# Patient Record
Sex: FEMALE | Race: BLACK OR AFRICAN AMERICAN | NOT HISPANIC OR LATINO | Employment: UNEMPLOYED | URBAN - METROPOLITAN AREA
[De-identification: names, ages, dates, MRNs, and addresses within clinical notes are randomized per-mention and may not be internally consistent; named-entity substitution may affect disease eponyms.]

---

## 2018-04-18 DIAGNOSIS — B17.10 ACUTE HEPATITIS C: Primary | ICD-10-CM

## 2018-05-08 ENCOUNTER — HOSPITAL ENCOUNTER (OUTPATIENT)
Dept: RADIOLOGY | Facility: OTHER | Age: 58
Discharge: HOME | End: 2018-05-08
Attending: INTERNAL MEDICINE
Payer: MEDICAID

## 2018-05-08 DIAGNOSIS — B17.10 ACUTE HEPATITIS C: ICD-10-CM

## 2018-05-08 PROCEDURE — 76700 US EXAM ABDOM COMPLETE: CPT | Mod: TC,59

## 2018-05-08 PROCEDURE — 91200 LIVER ELASTOGRAPHY: CPT | Mod: TC

## 2018-05-08 PROCEDURE — 76700 US EXAM ABDOM COMPLETE: CPT | Mod: 26,59,, | Performed by: RADIOLOGY

## 2018-05-08 PROCEDURE — 91200 LIVER ELASTOGRAPHY: CPT | Mod: 26,59,, | Performed by: RADIOLOGY

## 2020-05-01 ENCOUNTER — HOSPITAL ENCOUNTER (OUTPATIENT)
Facility: OTHER | Age: 60
Discharge: HOME | End: 2020-05-04
Attending: EMERGENCY MEDICINE | Admitting: EMERGENCY MEDICINE
Payer: MEDICAID

## 2020-05-01 ENCOUNTER — HOSPITAL ENCOUNTER (OUTPATIENT)
Dept: RADIATION THERAPY | Facility: HOSPITAL | Age: 60
Discharge: HOME | End: 2020-05-01
Attending: RADIOLOGY
Payer: MEDICAID

## 2020-05-01 DIAGNOSIS — J45.909 ASTHMA, UNSPECIFIED ASTHMA SEVERITY, UNSPECIFIED WHETHER COMPLICATED, UNSPECIFIED WHETHER PERSISTENT: ICD-10-CM

## 2020-05-01 DIAGNOSIS — R91.8 MASS OF UPPER LOBE OF RIGHT LUNG: ICD-10-CM

## 2020-05-01 DIAGNOSIS — Z72.0 TOBACCO ABUSE: ICD-10-CM

## 2020-05-01 DIAGNOSIS — R06.02 SHORTNESS OF BREATH: ICD-10-CM

## 2020-05-01 DIAGNOSIS — R91.8 LUNG MASS: Primary | ICD-10-CM

## 2020-05-01 PROBLEM — I10 ESSENTIAL HYPERTENSION: Status: ACTIVE | Noted: 2020-05-01

## 2020-05-01 LAB
ALBUMIN SERPL BCP-MCNC: 2.9 G/DL (ref 3.5–5.2)
ALP SERPL-CCNC: 81 U/L (ref 55–135)
ALT SERPL W/O P-5'-P-CCNC: 11 U/L (ref 10–44)
ANION GAP SERPL CALC-SCNC: 11 MMOL/L (ref 8–16)
AST SERPL-CCNC: 13 U/L (ref 10–40)
BASOPHILS # BLD AUTO: 0.12 K/UL (ref 0–0.2)
BASOPHILS NFR BLD: 0.9 % (ref 0–1.9)
BILIRUB SERPL-MCNC: 0.3 MG/DL (ref 0.1–1)
BNP SERPL-MCNC: 42 PG/ML (ref 0–99)
BUN SERPL-MCNC: 9 MG/DL (ref 6–20)
CALCIUM SERPL-MCNC: 9.8 MG/DL (ref 8.7–10.5)
CHLORIDE SERPL-SCNC: 104 MMOL/L (ref 95–110)
CK SERPL-CCNC: 76 U/L (ref 20–180)
CO2 SERPL-SCNC: 25 MMOL/L (ref 23–29)
CREAT SERPL-MCNC: 0.7 MG/DL (ref 0.5–1.4)
CRP SERPL-MCNC: 37.2 MG/L (ref 0–8.2)
DIFFERENTIAL METHOD: ABNORMAL
EOSINOPHIL # BLD AUTO: 0.7 K/UL (ref 0–0.5)
EOSINOPHIL NFR BLD: 5.5 % (ref 0–8)
ERYTHROCYTE [DISTWIDTH] IN BLOOD BY AUTOMATED COUNT: 14.1 % (ref 11.5–14.5)
EST. GFR  (AFRICAN AMERICAN): >60 ML/MIN/1.73 M^2
EST. GFR  (NON AFRICAN AMERICAN): >60 ML/MIN/1.73 M^2
GLUCOSE SERPL-MCNC: 115 MG/DL (ref 70–110)
HCT VFR BLD AUTO: 37.8 % (ref 37–48.5)
HGB BLD-MCNC: 11.5 G/DL (ref 12–16)
IMM GRANULOCYTES # BLD AUTO: 0.09 K/UL (ref 0–0.04)
IMM GRANULOCYTES NFR BLD AUTO: 0.7 % (ref 0–0.5)
LACTATE SERPL-SCNC: 2.9 MMOL/L (ref 0.5–2.2)
LDH SERPL L TO P-CCNC: 372 U/L (ref 110–260)
LYMPHOCYTES # BLD AUTO: 2.5 K/UL (ref 1–4.8)
LYMPHOCYTES NFR BLD: 19.3 % (ref 18–48)
MCH RBC QN AUTO: 26.4 PG (ref 27–31)
MCHC RBC AUTO-ENTMCNC: 30.4 G/DL (ref 32–36)
MCV RBC AUTO: 87 FL (ref 82–98)
MONOCYTES # BLD AUTO: 0.8 K/UL (ref 0.3–1)
MONOCYTES NFR BLD: 6.6 % (ref 4–15)
NEUTROPHILS # BLD AUTO: 8.5 K/UL (ref 1.8–7.7)
NEUTROPHILS NFR BLD: 67 % (ref 38–73)
NRBC BLD-RTO: 0 /100 WBC
PLATELET # BLD AUTO: 351 K/UL (ref 150–350)
PMV BLD AUTO: 9.3 FL (ref 9.2–12.9)
POTASSIUM SERPL-SCNC: 3.7 MMOL/L (ref 3.5–5.1)
PROCALCITONIN SERPL IA-MCNC: 0.02 NG/ML
PROT SERPL-MCNC: 7.7 G/DL (ref 6–8.4)
RBC # BLD AUTO: 4.35 M/UL (ref 4–5.4)
SARS-COV-2 RDRP RESP QL NAA+PROBE: NEGATIVE
SODIUM SERPL-SCNC: 140 MMOL/L (ref 136–145)
TROPONIN I SERPL DL<=0.01 NG/ML-MCNC: <0.006 NG/ML (ref 0–0.03)
WBC # BLD AUTO: 12.69 K/UL (ref 3.9–12.7)

## 2020-05-01 PROCEDURE — G0378 HOSPITAL OBSERVATION PER HR: HCPCS

## 2020-05-01 PROCEDURE — 93010 EKG 12-LEAD: ICD-10-PCS | Mod: ,,, | Performed by: INTERNAL MEDICINE

## 2020-05-01 PROCEDURE — 94640 AIRWAY INHALATION TREATMENT: CPT

## 2020-05-01 PROCEDURE — 82550 ASSAY OF CK (CPK): CPT

## 2020-05-01 PROCEDURE — 99285 EMERGENCY DEPT VISIT HI MDM: CPT | Mod: 25

## 2020-05-01 PROCEDURE — 83615 LACTATE (LD) (LDH) ENZYME: CPT

## 2020-05-01 PROCEDURE — 25000003 PHARM REV CODE 250: Performed by: PHYSICIAN ASSISTANT

## 2020-05-01 PROCEDURE — 85025 COMPLETE CBC W/AUTO DIFF WBC: CPT

## 2020-05-01 PROCEDURE — 25500020 PHARM REV CODE 255: Performed by: EMERGENCY MEDICINE

## 2020-05-01 PROCEDURE — 84145 PROCALCITONIN (PCT): CPT

## 2020-05-01 PROCEDURE — 86140 C-REACTIVE PROTEIN: CPT

## 2020-05-01 PROCEDURE — 96372 THER/PROPH/DIAG INJ SC/IM: CPT

## 2020-05-01 PROCEDURE — 83605 ASSAY OF LACTIC ACID: CPT

## 2020-05-01 PROCEDURE — 94761 N-INVAS EAR/PLS OXIMETRY MLT: CPT

## 2020-05-01 PROCEDURE — 25000242 PHARM REV CODE 250 ALT 637 W/ HCPCS: Performed by: EMERGENCY MEDICINE

## 2020-05-01 PROCEDURE — 84484 ASSAY OF TROPONIN QUANT: CPT

## 2020-05-01 PROCEDURE — 25000242 PHARM REV CODE 250 ALT 637 W/ HCPCS: Performed by: PHYSICIAN ASSISTANT

## 2020-05-01 PROCEDURE — 80053 COMPREHEN METABOLIC PANEL: CPT

## 2020-05-01 PROCEDURE — 93010 ELECTROCARDIOGRAM REPORT: CPT | Mod: ,,, | Performed by: INTERNAL MEDICINE

## 2020-05-01 PROCEDURE — 83880 ASSAY OF NATRIURETIC PEPTIDE: CPT

## 2020-05-01 PROCEDURE — 27000221 HC OXYGEN, UP TO 24 HOURS

## 2020-05-01 PROCEDURE — U0002 COVID-19 LAB TEST NON-CDC: HCPCS

## 2020-05-01 PROCEDURE — 63600175 PHARM REV CODE 636 W HCPCS: Performed by: PHYSICIAN ASSISTANT

## 2020-05-01 PROCEDURE — 99220 PR INITIAL OBSERVATION CARE,LEVL III: ICD-10-PCS | Mod: ,,, | Performed by: PHYSICIAN ASSISTANT

## 2020-05-01 PROCEDURE — 25000003 PHARM REV CODE 250: Performed by: EMERGENCY MEDICINE

## 2020-05-01 PROCEDURE — 99220 PR INITIAL OBSERVATION CARE,LEVL III: CPT | Mod: ,,, | Performed by: PHYSICIAN ASSISTANT

## 2020-05-01 PROCEDURE — 93005 ELECTROCARDIOGRAM TRACING: CPT

## 2020-05-01 RX ORDER — GABAPENTIN 100 MG/1
100 CAPSULE ORAL 3 TIMES DAILY
Status: ON HOLD | COMMUNITY
End: 2020-06-19 | Stop reason: HOSPADM

## 2020-05-01 RX ORDER — AMLODIPINE BESYLATE 5 MG/1
5 TABLET ORAL DAILY
Status: ON HOLD | COMMUNITY
End: 2022-01-01 | Stop reason: SDUPTHER

## 2020-05-01 RX ORDER — FLUTICASONE FUROATE AND VILANTEROL 200; 25 UG/1; UG/1
1 POWDER RESPIRATORY (INHALATION) DAILY
Status: DISCONTINUED | OUTPATIENT
Start: 2020-05-02 | End: 2020-05-04 | Stop reason: HOSPADM

## 2020-05-01 RX ORDER — HYDROCODONE BITARTRATE AND ACETAMINOPHEN 5; 325 MG/1; MG/1
1 TABLET ORAL EVERY 6 HOURS PRN
Status: DISCONTINUED | OUTPATIENT
Start: 2020-05-01 | End: 2020-05-04 | Stop reason: HOSPADM

## 2020-05-01 RX ORDER — ENOXAPARIN SODIUM 100 MG/ML
40 INJECTION SUBCUTANEOUS EVERY 24 HOURS
Status: DISCONTINUED | OUTPATIENT
Start: 2020-05-01 | End: 2020-05-03

## 2020-05-01 RX ORDER — PANTOPRAZOLE SODIUM 40 MG/1
40 TABLET, DELAYED RELEASE ORAL DAILY
Status: DISCONTINUED | OUTPATIENT
Start: 2020-05-02 | End: 2020-05-04 | Stop reason: HOSPADM

## 2020-05-01 RX ORDER — HYDRALAZINE HYDROCHLORIDE 25 MG/1
25 TABLET, FILM COATED ORAL EVERY 8 HOURS PRN
Status: DISCONTINUED | OUTPATIENT
Start: 2020-05-01 | End: 2020-05-04 | Stop reason: HOSPADM

## 2020-05-01 RX ORDER — ALBUTEROL SULFATE 0.63 MG/3ML
0.63 SOLUTION RESPIRATORY (INHALATION) EVERY 6 HOURS PRN
Status: ON HOLD | COMMUNITY
End: 2020-05-04 | Stop reason: SDUPTHER

## 2020-05-01 RX ORDER — IPRATROPIUM BROMIDE AND ALBUTEROL SULFATE 2.5; .5 MG/3ML; MG/3ML
3 SOLUTION RESPIRATORY (INHALATION)
Status: COMPLETED | OUTPATIENT
Start: 2020-05-01 | End: 2020-05-01

## 2020-05-01 RX ORDER — AMOXICILLIN 250 MG
1 CAPSULE ORAL 2 TIMES DAILY PRN
Status: DISCONTINUED | OUTPATIENT
Start: 2020-05-01 | End: 2020-05-04 | Stop reason: HOSPADM

## 2020-05-01 RX ORDER — ONDANSETRON 2 MG/ML
4 INJECTION INTRAMUSCULAR; INTRAVENOUS EVERY 8 HOURS PRN
Status: DISCONTINUED | OUTPATIENT
Start: 2020-05-01 | End: 2020-05-04 | Stop reason: HOSPADM

## 2020-05-01 RX ORDER — GUAIFENESIN 100 MG/5ML
200 SOLUTION ORAL EVERY 4 HOURS PRN
Status: DISCONTINUED | OUTPATIENT
Start: 2020-05-01 | End: 2020-05-02

## 2020-05-01 RX ORDER — GABAPENTIN 100 MG/1
100 CAPSULE ORAL 3 TIMES DAILY
Status: DISCONTINUED | OUTPATIENT
Start: 2020-05-01 | End: 2020-05-04 | Stop reason: HOSPADM

## 2020-05-01 RX ORDER — BENZONATATE 100 MG/1
100 CAPSULE ORAL 3 TIMES DAILY PRN
Status: DISCONTINUED | OUTPATIENT
Start: 2020-05-01 | End: 2020-05-02

## 2020-05-01 RX ORDER — VALSARTAN 80 MG/1
320 TABLET ORAL DAILY
Status: DISCONTINUED | OUTPATIENT
Start: 2020-05-02 | End: 2020-05-04 | Stop reason: HOSPADM

## 2020-05-01 RX ORDER — VALSARTAN AND HYDROCHLOROTHIAZIDE 320; 12.5 MG/1; MG/1
1 TABLET, FILM COATED ORAL DAILY
Status: DISCONTINUED | OUTPATIENT
Start: 2020-05-02 | End: 2020-05-01

## 2020-05-01 RX ORDER — PANTOPRAZOLE SODIUM 20 MG/1
20 TABLET, DELAYED RELEASE ORAL DAILY
Status: DISCONTINUED | OUTPATIENT
Start: 2020-05-02 | End: 2020-05-01

## 2020-05-01 RX ORDER — IPRATROPIUM BROMIDE AND ALBUTEROL SULFATE 2.5; .5 MG/3ML; MG/3ML
3 SOLUTION RESPIRATORY (INHALATION)
Status: DISCONTINUED | OUTPATIENT
Start: 2020-05-01 | End: 2020-05-04 | Stop reason: HOSPADM

## 2020-05-01 RX ORDER — PANTOPRAZOLE SODIUM 20 MG/1
20 TABLET, DELAYED RELEASE ORAL DAILY
Status: ON HOLD | COMMUNITY
End: 2020-06-19 | Stop reason: HOSPADM

## 2020-05-01 RX ORDER — HYDROCHLOROTHIAZIDE 12.5 MG/1
12.5 TABLET ORAL DAILY
Status: DISCONTINUED | OUTPATIENT
Start: 2020-05-02 | End: 2020-05-04 | Stop reason: HOSPADM

## 2020-05-01 RX ORDER — ACETAMINOPHEN 325 MG/1
650 TABLET ORAL EVERY 4 HOURS PRN
Status: DISCONTINUED | OUTPATIENT
Start: 2020-05-01 | End: 2020-05-04 | Stop reason: HOSPADM

## 2020-05-01 RX ORDER — IBUPROFEN 200 MG
1 TABLET ORAL DAILY
Status: DISCONTINUED | OUTPATIENT
Start: 2020-05-02 | End: 2020-05-04 | Stop reason: HOSPADM

## 2020-05-01 RX ORDER — VALSARTAN AND HYDROCHLOROTHIAZIDE 320; 12.5 MG/1; MG/1
1 TABLET, FILM COATED ORAL DAILY
Status: ON HOLD | COMMUNITY
End: 2020-06-19 | Stop reason: HOSPADM

## 2020-05-01 RX ORDER — ALBUTEROL SULFATE 0.83 MG/ML
0.63 SOLUTION RESPIRATORY (INHALATION) EVERY 6 HOURS PRN
Status: DISCONTINUED | OUTPATIENT
Start: 2020-05-01 | End: 2020-05-04 | Stop reason: HOSPADM

## 2020-05-01 RX ORDER — SODIUM CHLORIDE 0.9 % (FLUSH) 0.9 %
10 SYRINGE (ML) INJECTION
Status: DISCONTINUED | OUTPATIENT
Start: 2020-05-01 | End: 2020-05-04 | Stop reason: HOSPADM

## 2020-05-01 RX ORDER — AMLODIPINE BESYLATE 5 MG/1
5 TABLET ORAL DAILY
Status: DISCONTINUED | OUTPATIENT
Start: 2020-05-02 | End: 2020-05-04 | Stop reason: HOSPADM

## 2020-05-01 RX ADMIN — IPRATROPIUM BROMIDE AND ALBUTEROL SULFATE 3 ML: .5; 3 SOLUTION RESPIRATORY (INHALATION) at 01:05

## 2020-05-01 RX ADMIN — IPRATROPIUM BROMIDE AND ALBUTEROL SULFATE 3 ML: .5; 3 SOLUTION RESPIRATORY (INHALATION) at 07:05

## 2020-05-01 RX ADMIN — BENZONATATE 100 MG: 100 CAPSULE ORAL at 08:05

## 2020-05-01 RX ADMIN — ENOXAPARIN SODIUM 40 MG: 100 INJECTION SUBCUTANEOUS at 08:05

## 2020-05-01 RX ADMIN — IOHEXOL 75 ML: 350 INJECTION, SOLUTION INTRAVENOUS at 01:05

## 2020-05-01 RX ADMIN — GUAIFENESIN 200 MG: 100 SOLUTION ORAL at 07:05

## 2020-05-01 RX ADMIN — HYDROCODONE BITARTRATE AND ACETAMINOPHEN 1 TABLET: 5; 325 TABLET ORAL at 06:05

## 2020-05-01 RX ADMIN — GUAIFENESIN 200 MG: 100 SOLUTION ORAL at 11:05

## 2020-05-01 RX ADMIN — GABAPENTIN 100 MG: 100 CAPSULE ORAL at 08:05

## 2020-05-01 RX ADMIN — SODIUM CHLORIDE 1000 ML: 0.9 INJECTION, SOLUTION INTRAVENOUS at 01:05

## 2020-05-01 NOTE — HPI
58 y/o F with a PMHx of hypertension, chronic Hep C, GERD, chronic low back pain, tobacco abuse, lung mass presented to ED with c/o worsening SOB over the last 2 months associated with productive cough. Symptoms occur at rest and worsen with exertion. Also reported associated chest and back pain with cough. Denied any hemoptysis. Reported she was treated in early March for a presumed pneumonia at [facility] and was scheduled for a Pulmonary follow up at [facility] that was cancelled d/t COVID pandemic that has not been rescheduled.  Reports long history of smoking, but stopped 2 weeks ago when symptoms became severe. Denies fever, chills, HA, N/V/D, abdominal pain, night sweats, weight loss, lower extremity edema. Denies any alcohol or illicit drug use. ED evaluation CT chest masslike consolidation with pleural effusion on the right. Afebrile, labs unremarkable. Placed in Observation.

## 2020-05-01 NOTE — ASSESSMENT & PLAN NOTE
- worsening SOB for 2 months, initially noted at [facility], tx for PNA, was to f/w up with Pulm at [facility], cancelled d/t COVID pandemia  - CT Masslike consolidation RUL with associated right hilar/subcarinal involvement concerning for neoplasm until proven otherwise. Metastatic bilateral pulmonary nodularity and mediastinal adenopathy.  Additional enlarged left axillary node, possible additional metastasis. Moderate right pleural effusion.  - Pulm Critical care consulted   - will need thora and bronch  - cont home bronchodilators   - supplement O2  - assess prior to dc for possible home needs

## 2020-05-01 NOTE — ASSESSMENT & PLAN NOTE
- elevated on admission  - resume home meds amlodipine 5 mg, HCTZ 12.5 mg, valsartan 320 mg daily  - PRN hydralazine  - monitor

## 2020-05-01 NOTE — ED TRIAGE NOTES
"+intermittent SOB and coughing " since [place]" . Pt states " I was supposed to go get a lung study done because the SOB keeps getting worse but they canceled." pt reports subjective fever. Pt denies cp, abdominal pain, n/v/d. No respiratory distress noted. Pt talking in complete sentences.   "

## 2020-05-01 NOTE — H&P
[facility] Hospital Medicine  History & Physical    Patient Name: Janeth Boyce  MRN: [id number]  Admission Date: 5/1/2020  Attending Physician: KARISHMA Hudson MD   Primary Care Provider: Freeman Caballero MD         Patient information was obtained from patient, past medical records and ER records.     Subjective:     Principal Problem:Mass of upper lobe of right lung    Chief Complaint:   Chief Complaint   Patient presents with    Shortness of Breath     Reports worsening SOB over the last month, reports having a canceled Pulmonology appointment at [facility]        HPI: 60 y/o F with a PMHx of hypertension, chronic Hep C, GERD, chronic low back pain, tobacco abuse, lung mass presented to ED with c/o worsening SOB over the last 2 months associated with productive cough. Symptoms occur at rest and worsen with exertion. Also reported associated chest and back pain with cough. Denied any hemoptysis. Reported she was treated in early March for a presumed pneumonia at [facility] and was scheduled for a Pulmonary follow up at [facility] that was cancelled d/t COVID pandemic that has not been rescheduled.  Reports long history of smoking, but stopped 2 weeks ago when symptoms became severe. Denies fever, chills, HA, N/V/D, abdominal pain, night sweats, weight loss, lower extremity edema. Denies any alcohol or illicit drug use. ED evaluation CT chest masslike consolidation with pleural effusion on the right. Afebrile, labs unremarkable. Placed in Observation.     Past Medical History:   Diagnosis Date    Asthma     Hepatitis C     Hypertension        History reviewed. No pertinent surgical history.    Review of patient's allergies indicates:  No Known Allergies    No current facility-administered medications on file prior to encounter.      Current Outpatient Medications on File Prior to Encounter   Medication Sig    albuterol (ACCUNEB) 0.63 mg/3 mL Nebu Take 0.63 mg by nebulization every 6 (six) hours as  needed. Rescue    amLODIPine (NORVASC) 5 MG tablet Take 5 mg by mouth once daily.    budesonide/formoterol fumarate (SYMBICORT INHL) Inhale into the lungs.    gabapentin (NEURONTIN) 100 MG capsule Take 100 mg by mouth 3 (three) times daily.    pantoprazole (PROTONIX) 20 MG tablet Take 20 mg by mouth once daily.    valsartan-hydrochlorothiazide (DIOVAN-HCT) 320-12.5 mg per tablet Take 1 tablet by mouth once daily.    [DISCONTINUED] oxycodone-acetaminophen 5-325 mg (PERCOCET) 5-325 mg per tablet Take 1 tablet by mouth every 4 (four) hours as needed for Pain.     Family History     None        Tobacco Use    Smoking status: Current Some Day Smoker   Substance and Sexual Activity    Alcohol use: Yes     Comment: occasional    Drug use: No    Sexual activity: Not on file     Review of Systems   Constitutional: Negative for activity change, appetite change, chills and fever.   HENT: Negative for congestion, rhinorrhea and sore throat.    Eyes: Negative.    Respiratory: Positive for cough and shortness of breath. Negative for wheezing.    Cardiovascular: Positive for chest pain (right sided with cough). Negative for palpitations and leg swelling.   Gastrointestinal: Negative for abdominal distention, abdominal pain, constipation, diarrhea, nausea and vomiting.   Endocrine: Negative for polydipsia and polyuria.   Genitourinary: Negative for difficulty urinating, frequency and hematuria.   Musculoskeletal: Negative for back pain and neck pain.   Skin: Negative.    Neurological: Negative for dizziness, syncope, weakness, light-headedness, numbness and headaches.   Psychiatric/Behavioral: Negative for confusion and decreased concentration.     Objective:     Vital Signs (Most Recent):  Temp: 98.5 °F (36.9 °C) (05/01/20 1643)  Pulse: 87 (05/01/20 1643)  Resp: 16 (05/01/20 1643)  BP: (!) 206/93 (05/01/20 1643)  SpO2: 99 % (05/01/20 1643) Vital Signs (24h Range):  Temp:  [98.5 °F (36.9 °C)-98.6 °F (37 °C)] 98.5 °F (36.9  °C)  Pulse:  [70-92] 87  Resp:  [16-28] 16  SpO2:  [95 %-100 %] 99 %  BP: (142-206)/(75-93) 206/93     Weight: 94.8 kg (209 lb)  Body mass index is 34.78 kg/m².    Physical Exam   Constitutional: She is oriented to person, place, and time. She appears well-developed and well-nourished. No distress.   HENT:   Head: Normocephalic and atraumatic.   Eyes: Conjunctivae are normal. No scleral icterus.   Neck: Normal range of motion. Neck supple.   Cardiovascular: Normal rate, regular rhythm, normal heart sounds and intact distal pulses.   Pulmonary/Chest: She has no wheezes (mild expiratory).   Mild tacypnea  Diminished bs R   Abdominal: Soft. Bowel sounds are normal. She exhibits no distension. There is no tenderness.   Musculoskeletal: Normal range of motion. She exhibits no edema.   Neurological: She is alert and oriented to person, place, and time.   Skin: Skin is warm and dry. Capillary refill takes less than 2 seconds. She is not diaphoretic.   Psychiatric: She has a normal mood and affect.   Nursing note and vitals reviewed.          Significant Labs:   CBC:   Recent Labs   Lab 05/01/20  1208   WBC 12.69   HGB 11.5*   HCT 37.8   *     CMP:   Recent Labs   Lab 05/01/20  1208      K 3.7      CO2 25   *   BUN 9   CREATININE 0.7   CALCIUM 9.8   PROT 7.7   ALBUMIN 2.9*   BILITOT 0.3   ALKPHOS 81   AST 13   ALT 11   ANIONGAP 11   EGFRNONAA >60     Troponin:   Recent Labs   Lab 05/01/20  1208   TROPONINI <0.006     All pertinent labs within the past 24 hours have been reviewed.    Significant Imaging: I have reviewed all pertinent imaging results/findings within the past 24 hours.   Imaging Results           CT Chest With Contrast (Final result)  Result time 05/01/20 14:19:18    Final result by Bruce Bunn MD (05/01/20 14:19:18)                 Impression:      Masslike consolidation centered in the left upper lobe with associated right hilar/subcarinal involvement concerning for  neoplasm until proven otherwise.    Metastatic bilateral pulmonary nodularity and mediastinal adenopathy.  Additional enlarged left axillary node, possible additional metastasis.    Moderate right pleural effusion.    Additional findings as above.    RECIST SUMMARY:    Date of prior examination for comparison: None    Lesion 1: Right upper lobe.  7.8 cm.  Series 2, image 29.    Lesion 2: Precarinal node.  2.6 cm.  Series 2, image 31.    This report was flagged in Epic as abnormal.      Electronically signed by: Bruce Bunn  Date:    05/01/2020  Time:    14:19             Narrative:    EXAMINATION:  CT CHEST WITH CONTRAST    CLINICAL HISTORY:  Neoplasm: chest, lung, suspected;    TECHNIQUE:  Low dose axial images were obtained from the thoracic inlet to the pubic symphysis following the administration of 100 cc of Omnipaque 350 intravenous contrast.  Oral contrast was also administered.  Sagittal and coronal reformats were provided.    COMPARISON:  Chest radiograph dated 05/01/2020    FINDINGS:  There is heterogeneous masslike consolidation centered in the left upper lobe with pleural extension, for reference measuring 6.5 x 7.8 cm (series 2, image 29).  Areas of surrounding ground-glass attenuation more superiorly extending to the apex.  There is associated right hilar/subcarinal involvement with resultant narrowing of the distal right mainstem and proximal segmental bronchi.  Several additional subcentimeter nodules in the right middle, lower lobes and left lung (for reference 7 mm in the left upper lobe on series 2, image 31).  Moderate right-sided pleural effusion with dependent atelectasis.  No pericardial effusion.    Structures at the base of the neck are unremarkable.  The trachea is clear.  There is mediastinal adenopathy predominately involving the prevascular and paratracheal regions, for reference precarinal node measuring 2.6 cm (series 2, image 31).  There is also enlarged left axillary node  measuring 1.9 cm (series 2, image 20).    Limited images through the upper abdomen are unremarkable.  No aggressive osseous lesion.  Spine DJD.                                X-Ray Chest AP Portable (Final result)  Result time 05/01/20 12:33:12    Final result by Jasper Crouch MD (05/01/20 12:33:12)                 Impression:      Opacity in the RIGHT upper lobe may be that of pneumonia.  Postobstructive pneumonia secondary to an underlying mass/neoplasm not excluded.  CT examination chest may be helpful.    This report was flagged in Epic as abnormal.      Electronically signed by: Jasper Crouch MD  Date:    05/01/2020  Time:    12:33             Narrative:    EXAMINATION:  XR CHEST AP PORTABLE    CLINICAL HISTORY:  Suspected Covid-19 Virus Infection;    TECHNIQUE:  Single frontal view of the chest was performed.    COMPARISON:  None    FINDINGS:  There is relative opacity at the level the RIGHT upper lobe with volume loss.  A postobstructive mass is not excluded.  Underlying mild pulmonary vascular congestion.  Other etiologies for the opacity in the RIGHT upper lobe include that of pneumonia although postobstructive pneumonia not excluded.  Small RIGHT effusion suspect.  No evident pneumothorax.    The cardiac silhouette is normal in size. The hilar and mediastinal contours are unremarkable.    Old fracture deformity identified level the RIGHT proximal humerus identified, incompletely visualized, seen on a prior examination of 09/11/2013.  Incompletely healed RIGHT humerus fracture not excluded.                                  Assessment/Plan:     * Mass of upper lobe of right lung  - worsening SOB for 2 months, initially noted at [place], tx for PNA, was to f/w up with Pulm at [facility], cancelled d/t COVID pandemia  - CT Masslike consolidation RUL with associated right hilar/subcarinal involvement concerning for neoplasm until proven otherwise. Metastatic bilateral pulmonary nodularity and mediastinal  adenopathy.  Additional enlarged left axillary node, possible additional metastasis. Moderate right pleural effusion.  - Pulm Critical care consulted   - will need thora and bronch  - cont home bronchodilators   - supplement O2  - assess prior to dc for possible home needs      Tobacco abuse  - 34 pack year Hx  - reports quit 2 weeks ago  - nicotine patch      Essential hypertension  - elevated on admission  - resume home meds amlodipine 5 mg, HCTZ 12.5 mg, valsartan 320 mg daily  - PRN hydralazine  - monitor      Shortness of breath  - see above      VTE Risk Mitigation (From admission, onward)         Ordered     enoxaparin injection 40 mg  Daily      05/01/20 1719     IP VTE HIGH RISK PATIENT  Once      05/01/20 1718     Place sequential compression device  Until discontinued      05/01/20 1646                   Gypsy Colmenares PA-C  Department of Hospital Medicine   [facility]   Melolabial Transposition Flap Text: The defect edges were debeveled with a #15 scalpel blade.  Given the location of the defect and the proximity to free margins a melolabial flap was deemed most appropriate.  Using a sterile surgical marker, an appropriate melolabial transposition flap was drawn incorporating the defect.    The area thus outlined was incised deep to adipose tissue with a #15 scalpel blade.  The skin margins were undermined to an appropriate distance in all directions utilizing iris scissors.

## 2020-05-01 NOTE — ED PROVIDER NOTES
"Encounter Date: 5/1/2020    SCRIBE #1 NOTE: I, Melva Flo, am scribing for, and in the presence of, Dr. Beck.       History     Chief Complaint   Patient presents with    Shortness of Breath     Reports worsening SOB over the last month, reports having a canceled Pulmonology appointment at [facility]     Time seen by provider: 11:46 AM    This is a 59 y.o. female with a history of HTN who presents with complaint of worsening shortness of breath for 2 months. She reports visiting [facility] when symptoms began who believed she had pneumonia and referred her to pulmonology at [facility]. Her appointment with [facility] was cancelled due to COVID19 and never rescheduled. Since then, she has felt short of breath at rest and states that severity increases with walking. She also reports cough with phlegm and chest pain on cough. She confirms a history of asthma for which she uses a "pump." She was smoking when symptoms began and decided to quit when her breathing worsened last week. She does not drink alcohol or use illicit drugs. She denies fever, chills, and leg swelling. She reports some loose stool but no N/V/D. She denies any weakness, dizziness, or lightheadedness. She has no other complaints at this time.    The history is provided by the patient.     Review of patient's allergies indicates:  No Known Allergies  Past Medical History:   Diagnosis Date    Asthma     Hepatitis C     Hypertension      History reviewed. No pertinent surgical history.  History reviewed. No pertinent family history.  Social History     Tobacco Use    Smoking status: Current Some Day Smoker   Substance Use Topics    Alcohol use: Yes     Comment: occasional    Drug use: No     Review of Systems   Constitutional: Negative for chills and fever.   HENT: Negative for sore throat.    Eyes: Negative for visual disturbance.   Respiratory: Positive for cough and shortness of breath.    Cardiovascular: Positive for chest pain (on cough). " Negative for leg swelling.   Gastrointestinal: Negative for diarrhea, nausea and vomiting.        Positive for loose stool.   Musculoskeletal: Negative for joint swelling.   Skin: Negative for rash and wound.   Neurological: Negative for dizziness, weakness, light-headedness, numbness and headaches.   Psychiatric/Behavioral: Negative for confusion.       Physical Exam     Initial Vitals [05/01/20 1138]   BP Pulse Resp Temp SpO2   (!) 169/90 82 (!) 22 98.6 °F (37 °C) 97 %      MAP       --         Physical Exam    Nursing note and vitals reviewed.  Constitutional: She appears well-developed and well-nourished. No distress.   HENT:   Head: Normocephalic and atraumatic.   Mouth/Throat: Oropharynx is clear and moist.   Eyes: Conjunctivae and EOM are normal. Pupils are equal, round, and reactive to light.   Neck: Normal range of motion. Neck supple.   Cardiovascular: Normal rate, regular rhythm and normal heart sounds.   No murmur heard.  Pulmonary/Chest: No respiratory distress. She has wheezes. She has no rhonchi. She has no rales.   Scattered expiratory wheezes. Mild tachypnea.   Abdominal: Soft. Bowel sounds are normal. There is no tenderness.   Musculoskeletal: Normal range of motion.   Neurological: She is alert and oriented to person, place, and time. She has normal strength. No cranial nerve deficit or sensory deficit.   Skin: Skin is warm and dry. No rash noted.   Psychiatric: She has a normal mood and affect. Her behavior is normal.         ED Course   Procedures  Labs Reviewed   CBC W/ AUTO DIFFERENTIAL - Abnormal; Notable for the following components:       Result Value    Hemoglobin 11.5 (*)     Mean Corpuscular Hemoglobin 26.4 (*)     Mean Corpuscular Hemoglobin Conc 30.4 (*)     Platelets 351 (*)     Immature Granulocytes 0.7 (*)     Gran # (ANC) 8.5 (*)     Immature Grans (Abs) 0.09 (*)     Eos # 0.7 (*)     All other components within normal limits   COMPREHENSIVE METABOLIC PANEL - Abnormal; Notable for  the following components:    Glucose 115 (*)     Albumin 2.9 (*)     All other components within normal limits   C-REACTIVE PROTEIN - Abnormal; Notable for the following components:    CRP 37.2 (*)     All other components within normal limits   LACTATE DEHYDROGENASE - Abnormal; Notable for the following components:     (*)     All other components within normal limits   LACTIC ACID, PLASMA - Abnormal; Notable for the following components:    Lactate (Lactic Acid) 2.9 (*)     All other components within normal limits   TROPONIN I   CK   SARS-COV-2 RNA AMPLIFICATION, QUAL    Narrative:     What symptom criteria does the patient meet?->Shortness of  breath or difficulty breathing   PROCALCITONIN   B-TYPE NATRIURETIC PEPTIDE     EKG Readings: (Independently Interpreted)   Normal sinus rhythm at rate of 82 with no STEMI and no ectopy.       Imaging Results           CT Chest With Contrast (Final result)  Result time 05/01/20 14:19:18    Final result by Bruce Bunn MD (05/01/20 14:19:18)                 Impression:      Masslike consolidation centered in the left upper lobe with associated right hilar/subcarinal involvement concerning for neoplasm until proven otherwise.    Metastatic bilateral pulmonary nodularity and mediastinal adenopathy.  Additional enlarged left axillary node, possible additional metastasis.    Moderate right pleural effusion.    Additional findings as above.    RECIST SUMMARY:    Date of prior examination for comparison: None    Lesion 1: Right upper lobe.  7.8 cm.  Series 2, image 29.    Lesion 2: Precarinal node.  2.6 cm.  Series 2, image 31.    This report was flagged in Epic as abnormal.      Electronically signed by: Bruce Bunn  Date:    05/01/2020  Time:    14:19             Narrative:    EXAMINATION:  CT CHEST WITH CONTRAST    CLINICAL HISTORY:  Neoplasm: chest, lung, suspected;    TECHNIQUE:  Low dose axial images were obtained from the thoracic inlet to the pubic  symphysis following the administration of 100 cc of Omnipaque 350 intravenous contrast.  Oral contrast was also administered.  Sagittal and coronal reformats were provided.    COMPARISON:  Chest radiograph dated 05/01/2020    FINDINGS:  There is heterogeneous masslike consolidation centered in the left upper lobe with pleural extension, for reference measuring 6.5 x 7.8 cm (series 2, image 29).  Areas of surrounding ground-glass attenuation more superiorly extending to the apex.  There is associated right hilar/subcarinal involvement with resultant narrowing of the distal right mainstem and proximal segmental bronchi.  Several additional subcentimeter nodules in the right middle, lower lobes and left lung (for reference 7 mm in the left upper lobe on series 2, image 31).  Moderate right-sided pleural effusion with dependent atelectasis.  No pericardial effusion.    Structures at the base of the neck are unremarkable.  The trachea is clear.  There is mediastinal adenopathy predominately involving the prevascular and paratracheal regions, for reference precarinal node measuring 2.6 cm (series 2, image 31).  There is also enlarged left axillary node measuring 1.9 cm (series 2, image 20).    Limited images through the upper abdomen are unremarkable.  No aggressive osseous lesion.  Spine DJD.                                X-Ray Chest AP Portable (Final result)  Result time 05/01/20 12:33:12    Final result by Jasper Crouch MD (05/01/20 12:33:12)                 Impression:      Opacity in the RIGHT upper lobe may be that of pneumonia.  Postobstructive pneumonia secondary to an underlying mass/neoplasm not excluded.  CT examination chest may be helpful.    This report was flagged in Epic as abnormal.      Electronically signed by: Jasper Crouch MD  Date:    05/01/2020  Time:    12:33             Narrative:    EXAMINATION:  XR CHEST AP PORTABLE    CLINICAL HISTORY:  Suspected Covid-19 Virus  Infection;    TECHNIQUE:  Single frontal view of the chest was performed.    COMPARISON:  None    FINDINGS:  There is relative opacity at the level the RIGHT upper lobe with volume loss.  A postobstructive mass is not excluded.  Underlying mild pulmonary vascular congestion.  Other etiologies for the opacity in the RIGHT upper lobe include that of pneumonia although postobstructive pneumonia not excluded.  Small RIGHT effusion suspect.  No evident pneumothorax.    The cardiac silhouette is normal in size. The hilar and mediastinal contours are unremarkable.    Old fracture deformity identified level the RIGHT proximal humerus identified, incompletely visualized, seen on a prior examination of 09/11/2013.  Incompletely healed RIGHT humerus fracture not excluded.                              X-Rays:   Independently Interpreted Readings:   Chest X-Ray: Right upper lobe infiltrate. No effusion of pneumothorax. Will defer to radiologist.     Medical Decision Making:   History:   Old Medical Records: I decided to obtain old medical records.  Independently Interpreted Test(s):   I have ordered and independently interpreted X-rays - see prior notes.  I have ordered and independently interpreted EKG Reading(s) - see prior notes  Clinical Tests:   Lab Tests: Ordered and Reviewed  Radiological Study: Ordered and Reviewed  Medical Tests: Ordered and Reviewed  ED Management:  Emergent evaluation of 59-year-old female who presents with complaint of worsening shortness of breath, recent pneumonia.  Vital signs are benign, afebrile.  She did have a desaturation when she got up to use the bathroom, and return with O2 saturation 91%, placed on nasal cannula.  Physical examination revealed wheezing.  COVID swab is negative.  She was treated with DuoNebs with improvement.  Labs do show elevated CRP and lactic acid.  I was concerned that previous pneumonia diagnosed as well as continued right upper lobe infiltrate today on x-ray  represent neoplasm.  CT of the chest performed which do show right upper lobe neoplasm.  Patient is admitted in serious condition for further workup and care.            Scribe Attestation:   Scribe #1: I performed the above scribed service and the documentation accurately describes the services I performed. I attest to the accuracy of the note.    Attending Attestation:           Physician Attestation for Scribe:  Physician Attestation Statement for Scribe #1: I, Dr. Beck, reviewed documentation, as scribed by Melva Rossi in my presence, and it is both accurate and complete.                 ED Course as of May 01 2210   Fri May 01, 2020   1450 Discussed case with Dr. Gonzales who will admit patient to observation for Dr. Hudson.    [SF]      ED Course User Index  [SF] Melva Rossi                Clinical Impression:     1. Shortness of breath    2. Mass of upper lobe of right lung    3. Tobacco abuse                ED Disposition Condition    Observation                           Shama Beck MD  05/01/20 3410

## 2020-05-01 NOTE — SUBJECTIVE & OBJECTIVE
Past Medical History:   Diagnosis Date    Asthma     Hepatitis C     Hypertension        History reviewed. No pertinent surgical history.    Review of patient's allergies indicates:  No Known Allergies    No current facility-administered medications on file prior to encounter.      Current Outpatient Medications on File Prior to Encounter   Medication Sig    albuterol (ACCUNEB) 0.63 mg/3 mL Nebu Take 0.63 mg by nebulization every 6 (six) hours as needed. Rescue    amLODIPine (NORVASC) 5 MG tablet Take 5 mg by mouth once daily.    budesonide/formoterol fumarate (SYMBICORT INHL) Inhale into the lungs.    gabapentin (NEURONTIN) 100 MG capsule Take 100 mg by mouth 3 (three) times daily.    pantoprazole (PROTONIX) 20 MG tablet Take 20 mg by mouth once daily.    valsartan-hydrochlorothiazide (DIOVAN-HCT) 320-12.5 mg per tablet Take 1 tablet by mouth once daily.    [DISCONTINUED] oxycodone-acetaminophen 5-325 mg (PERCOCET) 5-325 mg per tablet Take 1 tablet by mouth every 4 (four) hours as needed for Pain.     Family History     None        Tobacco Use    Smoking status: Current Some Day Smoker   Substance and Sexual Activity    Alcohol use: Yes     Comment: occasional    Drug use: No    Sexual activity: Not on file     Review of Systems   Constitutional: Negative for activity change, appetite change, chills and fever.   HENT: Negative for congestion, rhinorrhea and sore throat.    Eyes: Negative.    Respiratory: Positive for cough and shortness of breath. Negative for wheezing.    Cardiovascular: Positive for chest pain (right sided with cough). Negative for palpitations and leg swelling.   Gastrointestinal: Negative for abdominal distention, abdominal pain, constipation, diarrhea, nausea and vomiting.   Endocrine: Negative for polydipsia and polyuria.   Genitourinary: Negative for difficulty urinating, frequency and hematuria.   Musculoskeletal: Negative for back pain and neck pain.   Skin: Negative.     Neurological: Negative for dizziness, syncope, weakness, light-headedness, numbness and headaches.   Psychiatric/Behavioral: Negative for confusion and decreased concentration.     Objective:     Vital Signs (Most Recent):  Temp: 98.5 °F (36.9 °C) (05/01/20 1643)  Pulse: 87 (05/01/20 1643)  Resp: 16 (05/01/20 1643)  BP: (!) 206/93 (05/01/20 1643)  SpO2: 99 % (05/01/20 1643) Vital Signs (24h Range):  Temp:  [98.5 °F (36.9 °C)-98.6 °F (37 °C)] 98.5 °F (36.9 °C)  Pulse:  [70-92] 87  Resp:  [16-28] 16  SpO2:  [95 %-100 %] 99 %  BP: (142-206)/(75-93) 206/93     Weight: 94.8 kg (209 lb)  Body mass index is 34.78 kg/m².    Physical Exam   Constitutional: She is oriented to person, place, and time. She appears well-developed and well-nourished. No distress.   HENT:   Head: Normocephalic and atraumatic.   Eyes: Conjunctivae are normal. No scleral icterus.   Neck: Normal range of motion. Neck supple.   Cardiovascular: Normal rate, regular rhythm, normal heart sounds and intact distal pulses.   Pulmonary/Chest: She has no wheezes (mild expiratory).   Mild tacypnea  Diminished bs R   Abdominal: Soft. Bowel sounds are normal. She exhibits no distension. There is no tenderness.   Musculoskeletal: Normal range of motion. She exhibits no edema.   Neurological: She is alert and oriented to person, place, and time.   Skin: Skin is warm and dry. Capillary refill takes less than 2 seconds. She is not diaphoretic.   Psychiatric: She has a normal mood and affect.   Nursing note and vitals reviewed.          Significant Labs:   CBC:   Recent Labs   Lab 05/01/20  1208   WBC 12.69   HGB 11.5*   HCT 37.8   *     CMP:   Recent Labs   Lab 05/01/20  1208      K 3.7      CO2 25   *   BUN 9   CREATININE 0.7   CALCIUM 9.8   PROT 7.7   ALBUMIN 2.9*   BILITOT 0.3   ALKPHOS 81   AST 13   ALT 11   ANIONGAP 11   EGFRNONAA >60     Troponin:   Recent Labs   Lab 05/01/20  1208   TROPONINI <0.006     All pertinent labs within  the past 24 hours have been reviewed.    Significant Imaging: I have reviewed all pertinent imaging results/findings within the past 24 hours.   Imaging Results           CT Chest With Contrast (Final result)  Result time 05/01/20 14:19:18    Final result by Bruce Bunn MD (05/01/20 14:19:18)                 Impression:      Masslike consolidation centered in the left upper lobe with associated right hilar/subcarinal involvement concerning for neoplasm until proven otherwise.    Metastatic bilateral pulmonary nodularity and mediastinal adenopathy.  Additional enlarged left axillary node, possible additional metastasis.    Moderate right pleural effusion.    Additional findings as above.    RECIST SUMMARY:    Date of prior examination for comparison: None    Lesion 1: Right upper lobe.  7.8 cm.  Series 2, image 29.    Lesion 2: Precarinal node.  2.6 cm.  Series 2, image 31.    This report was flagged in Epic as abnormal.      Electronically signed by: Bruce Bunn  Date:    05/01/2020  Time:    14:19             Narrative:    EXAMINATION:  CT CHEST WITH CONTRAST    CLINICAL HISTORY:  Neoplasm: chest, lung, suspected;    TECHNIQUE:  Low dose axial images were obtained from the thoracic inlet to the pubic symphysis following the administration of 100 cc of Omnipaque 350 intravenous contrast.  Oral contrast was also administered.  Sagittal and coronal reformats were provided.    COMPARISON:  Chest radiograph dated 05/01/2020    FINDINGS:  There is heterogeneous masslike consolidation centered in the left upper lobe with pleural extension, for reference measuring 6.5 x 7.8 cm (series 2, image 29).  Areas of surrounding ground-glass attenuation more superiorly extending to the apex.  There is associated right hilar/subcarinal involvement with resultant narrowing of the distal right mainstem and proximal segmental bronchi.  Several additional subcentimeter nodules in the right middle, lower lobes and left lung  (for reference 7 mm in the left upper lobe on series 2, image 31).  Moderate right-sided pleural effusion with dependent atelectasis.  No pericardial effusion.    Structures at the base of the neck are unremarkable.  The trachea is clear.  There is mediastinal adenopathy predominately involving the prevascular and paratracheal regions, for reference precarinal node measuring 2.6 cm (series 2, image 31).  There is also enlarged left axillary node measuring 1.9 cm (series 2, image 20).    Limited images through the upper abdomen are unremarkable.  No aggressive osseous lesion.  Spine DJD.                                X-Ray Chest AP Portable (Final result)  Result time 05/01/20 12:33:12    Final result by Jasper Crouch MD (05/01/20 12:33:12)                 Impression:      Opacity in the RIGHT upper lobe may be that of pneumonia.  Postobstructive pneumonia secondary to an underlying mass/neoplasm not excluded.  CT examination chest may be helpful.    This report was flagged in Epic as abnormal.      Electronically signed by: Jasper Crouch MD  Date:    05/01/2020  Time:    12:33             Narrative:    EXAMINATION:  XR CHEST AP PORTABLE    CLINICAL HISTORY:  Suspected Covid-19 Virus Infection;    TECHNIQUE:  Single frontal view of the chest was performed.    COMPARISON:  None    FINDINGS:  There is relative opacity at the level the RIGHT upper lobe with volume loss.  A postobstructive mass is not excluded.  Underlying mild pulmonary vascular congestion.  Other etiologies for the opacity in the RIGHT upper lobe include that of pneumonia although postobstructive pneumonia not excluded.  Small RIGHT effusion suspect.  No evident pneumothorax.    The cardiac silhouette is normal in size. The hilar and mediastinal contours are unremarkable.    Old fracture deformity identified level the RIGHT proximal humerus identified, incompletely visualized, seen on a prior examination of 09/11/2013.  Incompletely healed  RIGHT humerus fracture not excluded.

## 2020-05-02 PROBLEM — R05.9 COUGH: Status: ACTIVE | Noted: 2020-05-02

## 2020-05-02 LAB — LACTATE SERPL-SCNC: 1 MMOL/L (ref 0.5–2.2)

## 2020-05-02 PROCEDURE — S4991 NICOTINE PATCH NONLEGEND: HCPCS | Performed by: PHYSICIAN ASSISTANT

## 2020-05-02 PROCEDURE — 96372 THER/PROPH/DIAG INJ SC/IM: CPT

## 2020-05-02 PROCEDURE — 83605 ASSAY OF LACTIC ACID: CPT

## 2020-05-02 PROCEDURE — G0378 HOSPITAL OBSERVATION PER HR: HCPCS

## 2020-05-02 PROCEDURE — 99226 PR SUBSEQUENT OBSERVATION CARE,LEVEL III: CPT | Mod: ,,, | Performed by: PHYSICIAN ASSISTANT

## 2020-05-02 PROCEDURE — 25000003 PHARM REV CODE 250: Performed by: INTERNAL MEDICINE

## 2020-05-02 PROCEDURE — 94761 N-INVAS EAR/PLS OXIMETRY MLT: CPT

## 2020-05-02 PROCEDURE — 99226 PR SUBSEQUENT OBSERVATION CARE,LEVEL III: ICD-10-PCS | Mod: ,,, | Performed by: PHYSICIAN ASSISTANT

## 2020-05-02 PROCEDURE — 94640 AIRWAY INHALATION TREATMENT: CPT

## 2020-05-02 PROCEDURE — 99900035 HC TECH TIME PER 15 MIN (STAT)

## 2020-05-02 PROCEDURE — 25000242 PHARM REV CODE 250 ALT 637 W/ HCPCS: Performed by: PHYSICIAN ASSISTANT

## 2020-05-02 PROCEDURE — 36415 COLL VENOUS BLD VENIPUNCTURE: CPT

## 2020-05-02 PROCEDURE — 63600175 PHARM REV CODE 636 W HCPCS: Performed by: PHYSICIAN ASSISTANT

## 2020-05-02 PROCEDURE — 25000003 PHARM REV CODE 250: Performed by: PHYSICIAN ASSISTANT

## 2020-05-02 RX ORDER — PROMETHAZINE HYDROCHLORIDE AND CODEINE PHOSPHATE 6.25; 1 MG/5ML; MG/5ML
5 SOLUTION ORAL EVERY 6 HOURS PRN
Status: DISCONTINUED | OUTPATIENT
Start: 2020-05-02 | End: 2020-05-04 | Stop reason: HOSPADM

## 2020-05-02 RX ADMIN — PANTOPRAZOLE SODIUM 40 MG: 40 TABLET, DELAYED RELEASE ORAL at 10:05

## 2020-05-02 RX ADMIN — HYDROCHLOROTHIAZIDE 12.5 MG: 12.5 TABLET ORAL at 10:05

## 2020-05-02 RX ADMIN — PROMETHAZINE HYDROCHLORIDE AND CODEINE PHOSPHATE 5 ML: 10; 6.25 SOLUTION ORAL at 05:05

## 2020-05-02 RX ADMIN — IPRATROPIUM BROMIDE AND ALBUTEROL SULFATE 3 ML: .5; 3 SOLUTION RESPIRATORY (INHALATION) at 09:05

## 2020-05-02 RX ADMIN — IPRATROPIUM BROMIDE AND ALBUTEROL SULFATE 3 ML: .5; 3 SOLUTION RESPIRATORY (INHALATION) at 12:05

## 2020-05-02 RX ADMIN — IPRATROPIUM BROMIDE AND ALBUTEROL SULFATE 3 ML: .5; 3 SOLUTION RESPIRATORY (INHALATION) at 07:05

## 2020-05-02 RX ADMIN — PROMETHAZINE HYDROCHLORIDE AND CODEINE PHOSPHATE 5 ML: 10; 6.25 SOLUTION ORAL at 10:05

## 2020-05-02 RX ADMIN — DOCUSATE SODIUM 50MG AND SENNOSIDES 8.6MG 1 TABLET: 8.6; 5 TABLET, FILM COATED ORAL at 08:05

## 2020-05-02 RX ADMIN — GABAPENTIN 100 MG: 100 CAPSULE ORAL at 08:05

## 2020-05-02 RX ADMIN — AMLODIPINE BESYLATE 5 MG: 5 TABLET ORAL at 10:05

## 2020-05-02 RX ADMIN — NICOTINE 1 PATCH: 21 PATCH, EXTENDED RELEASE TRANSDERMAL at 10:05

## 2020-05-02 RX ADMIN — VALSARTAN 320 MG: 80 TABLET, FILM COATED ORAL at 10:05

## 2020-05-02 RX ADMIN — ENOXAPARIN SODIUM 40 MG: 100 INJECTION SUBCUTANEOUS at 05:05

## 2020-05-02 RX ADMIN — GABAPENTIN 100 MG: 100 CAPSULE ORAL at 10:05

## 2020-05-02 RX ADMIN — GABAPENTIN 100 MG: 100 CAPSULE ORAL at 05:05

## 2020-05-02 RX ADMIN — HYDROCODONE BITARTRATE AND ACETAMINOPHEN 1 TABLET: 5; 325 TABLET ORAL at 12:05

## 2020-05-02 RX ADMIN — IPRATROPIUM BROMIDE AND ALBUTEROL SULFATE 3 ML: .5; 3 SOLUTION RESPIRATORY (INHALATION) at 03:05

## 2020-05-02 RX ADMIN — HYDROCODONE BITARTRATE AND ACETAMINOPHEN 1 TABLET: 5; 325 TABLET ORAL at 08:05

## 2020-05-02 RX ADMIN — FLUTICASONE FUROATE AND VILANTEROL TRIFENATATE 1 PUFF: 200; 25 POWDER RESPIRATORY (INHALATION) at 09:05

## 2020-05-02 NOTE — PROGRESS NOTES
Patient seen and examined by me. I agree with the trainee's separate note except as modified.     59 year old with h/o HTN, HCV, tobacco abuse came in with SOB and cough x 2 months. Was treated in March for pneumonia at [facility]. Work up revealed signs concerning for malignancy. Main complaint is intractable cough.     Afebrile, HR 80s, 97% on 2L/min     Labs unremarkable.    CT chest reviewed by me: Large RUL mass with scattered nodules and mediastinal lymphadenopathy; small-moderate right pleural effusion. Left axillary LN enlargement.    On my exam: well appearing, normal work of breathing     Impression: Imaging consistent with metastatic lung cancer, needs formal diagnosis. Patient has low health literacy and we spent 20 minutes with her trying to explain our concerns    Recommendations:   -best site of biopsy is axillary LN with IR. This is the least invasive and would give staging. Yield and risk profile is better than thoracentesis  -no suggestions of infection, agree with no abx  -codeine-based cough medicine for symptom relief. Has not responding to anything else yet.     We will sign off. Please call with any further questions.

## 2020-05-02 NOTE — SUBJECTIVE & OBJECTIVE
Past Medical History:   Diagnosis Date    Asthma     Hepatitis C     Hypertension        Past Surgical History:   Procedure Laterality Date     SECTION      KNEE SURGERY         Review of patient's allergies indicates:  No Known Allergies    Family History     None        Tobacco Use    Smoking status: Former Smoker     Packs/day: 0.50     Years: 40.00     Pack years: 20.00     Types: Cigarettes     Last attempt to quit: 2020     Years since quittin.0   Substance and Sexual Activity    Alcohol use: Yes     Comment: occasional    Drug use: No    Sexual activity: Not on file         Review of Systems   Constitutional: Positive for activity change. Negative for chills, fatigue and fever.   HENT: Negative for rhinorrhea.    Respiratory: Positive for cough and shortness of breath.    Cardiovascular: Negative for chest pain and leg swelling.   Gastrointestinal: Negative for abdominal pain, diarrhea, nausea and vomiting.   Genitourinary: Negative for dysuria and hematuria.   Musculoskeletal: Negative for arthralgias and myalgias.   Skin: Negative for rash.   Hematological: Negative for adenopathy. Does not bruise/bleed easily.   Psychiatric/Behavioral: Negative for confusion.     Objective:     Vital Signs (Most Recent):  Temp: 98.4 °F (36.9 °C) (20 0930)  Pulse: 86 (20 1251)  Resp: 18 (20 1251)  BP: (!) 140/92 (20 0930)  SpO2: 96 % (20 1251) Vital Signs (24h Range):  Temp:  [97.9 °F (36.6 °C)-98.6 °F (37 °C)] 98.4 °F (36.9 °C)  Pulse:  [75-93] 86  Resp:  [16-22] 18  SpO2:  [96 %-100 %] 96 %  BP: (140-206)/(72-93) 140/92     Weight: 94.8 kg (208 lb 15.9 oz)  Body mass index is 34.78 kg/m².    No intake or output data in the 24 hours ending 20 1506    Physical Exam   Constitutional: She appears well-developed and well-nourished.   HENT:   Head: Normocephalic and atraumatic.   Poor dentition   Eyes: No scleral icterus.   Neck: Neck supple.   Cardiovascular: Normal  rate and regular rhythm.   No murmur heard.  Pulmonary/Chest:   Decreased right basilar breath sounds, tubular breath sounds throughout RUL & RLL. CTAB on left. 95% on RA at rest.   Abdominal: Soft. She exhibits no distension.   Musculoskeletal: She exhibits no edema or deformity.   Lymphadenopathy:     She has no cervical adenopathy.   Neurological: She is alert. Coordination normal.   Face symmetric, answering questions appropriately   Skin: Skin is warm and dry. No rash noted.       O2:  Oxygen Concentration (%): 28 (05/01/20 1918)    Lines/Drains/Airways     Peripheral Intravenous Line                 Peripheral IV - Single Lumen 05/01/20 1230 20 G Right Hand 1 day                Significant Labs:    CBC/Anemia Profile:  Recent Labs   Lab 05/01/20  1208   WBC 12.69   HGB 11.5*   HCT 37.8   *   MCV 87   RDW 14.1        Chemistries:  Recent Labs   Lab 05/01/20  1208      K 3.7      CO2 25   BUN 9   CREATININE 0.7   CALCIUM 9.8   ALBUMIN 2.9*   PROT 7.7   BILITOT 0.3   ALKPHOS 81   ALT 11   AST 13       All pertinent labs within the past 24 hours have been reviewed.    Significant Imaging:   CT: I have reviewed all pertinent results/findings within the past 24 hours and my personal findings are:  large, heterogeneous RUL mass with compression of RUL bronchus and BI. Large 4R and station 7 LNs. Large left axillary LN. Small right dependent pleural effusion

## 2020-05-02 NOTE — HOSPITAL COURSE
59 year old with h/o HTN, HCV, tobacco abuse admitted to observation with SOB and cough x 2 months. CT chest Large RUL mass with scattered nodules and mediastinal lymphadenopathy; small-moderate right pleural effusion. Left axillary LN enlargement. Pulmonary consulted recommendation best site of biopsy is axillary LN with IR. This is the least invasive and would give staging. Yield and risk profile is better than thoracentesis, no suggestions of infection. S/p right lung biopsy 5/4/2020. Home O2 arranged to use with activity. Discussed with Pulmonary, will follow up results, Heme/onco referral placed. Stable for discharge home.

## 2020-05-02 NOTE — HPI
Ms. Boyce is a 60 y/o woman admitted for evaluation of progressive symptoms of PRITCHETT and cough. She has had a worsening nonproductive cough since sometime in January to February. She was treated in the ED at [facility] for presumed pneumonia in early March and told to follow up with a pulmonologist at [facility]. She was given breathing treatments and antibiotics, which she reports improved her cough. Unfortunately due to cancelled appointments due to COVID, she was unable to have a follow up appointment with Pulmonology. In the ED she was noted to have a large lung mass and mediastinal adenopathy concerning for cancer. She quit smoking about 3 weeks ago after 1/2ppd x about 40 years, with several quit attempts over the years. She has no family or personal history of malignancy. She denies hemoptysis, hand/ joint soreness.

## 2020-05-02 NOTE — PLAN OF CARE
Patient remains on RA SpO2 95-96% with no distress.Q4 aerosol treatments given tolerated well. No change in pt respiratory status this shift will monitor.

## 2020-05-02 NOTE — ASSESSMENT & PLAN NOTE
RUL mass with compression of RUL bronchus and BI. Associated small pleural effusion and concerning enlarged left axillary lymph node concerning for metastases.  -Recommend IR biopsy of axillary lymph node for diagnosis & staging. Consult order placed. Thoracentesis would be less likely to yield a diagnosis.  -We have discussed my concern for a malignancy, and Ms. Boyce are her family is hopeful that this is an alternative diagnosis despite my repeated mentioning of my concern.   -Congratulated her on her efforts with quitting smoking.

## 2020-05-02 NOTE — ASSESSMENT & PLAN NOTE
Suspect her cough is related to her large lung mass.  -Symptomatic management with codeine-based cough syrup, but recommend this only QHS at discharge to help with sleeping.  -Can use robitussin and tessalon during the day for symptom management

## 2020-05-02 NOTE — SUBJECTIVE & OBJECTIVE
Interval History: c/o cough    Review of Systems   Constitutional: Negative for activity change, appetite change, chills and fever.   HENT: Negative for congestion, rhinorrhea and sore throat.    Eyes: Negative.    Respiratory: Positive for cough and shortness of breath. Negative for wheezing.    Cardiovascular: Positive for chest pain (right sided with cough). Negative for palpitations and leg swelling.   Gastrointestinal: Negative for abdominal distention, abdominal pain, constipation, diarrhea, nausea and vomiting.   Endocrine: Negative for polydipsia and polyuria.   Genitourinary: Negative for difficulty urinating, frequency and hematuria.   Musculoskeletal: Negative for back pain and neck pain.   Skin: Negative.    Neurological: Negative for dizziness, syncope, weakness, light-headedness, numbness and headaches.   Psychiatric/Behavioral: Negative for confusion and decreased concentration.     Objective:     Vital Signs (Most Recent):  Temp: 98.4 °F (36.9 °C) (05/02/20 0930)  Pulse: 87 (05/02/20 0930)  Resp: 16 (05/02/20 0930)  BP: (!) 140/92 (05/02/20 0930)  SpO2: 96 % (05/02/20 0930) Vital Signs (24h Range):  Temp:  [97.9 °F (36.6 °C)-98.6 °F (37 °C)] 98.4 °F (36.9 °C)  Pulse:  [70-93] 87  Resp:  [16-28] 16  SpO2:  [95 %-100 %] 96 %  BP: (140-206)/(72-93) 140/92     Weight: 94.8 kg (208 lb 15.9 oz)  Body mass index is 34.78 kg/m².  No intake or output data in the 24 hours ending 05/02/20 1202   Physical Exam   Constitutional: She is oriented to person, place, and time. She appears well-developed and well-nourished. No distress.   HENT:   Head: Normocephalic and atraumatic.   Eyes: Conjunctivae are normal. No scleral icterus.   Neck: Normal range of motion. Neck supple.   Cardiovascular: Normal rate, regular rhythm, normal heart sounds and intact distal pulses.   Pulmonary/Chest: She has no wheezes (mild expiratory).   Mild tacypnea  Diminished bs R   Abdominal: Soft. Bowel sounds are normal. She exhibits no  distension. There is no tenderness.   Musculoskeletal: Normal range of motion. She exhibits no edema.   Neurological: She is alert and oriented to person, place, and time.   Skin: Skin is warm and dry. Capillary refill takes less than 2 seconds. She is not diaphoretic.   Psychiatric: She has a normal mood and affect.   Nursing note and vitals reviewed.      Significant Labs:   CBC:   Recent Labs   Lab 05/01/20  1208   WBC 12.69   HGB 11.5*   HCT 37.8   *     CMP:   Recent Labs   Lab 05/01/20  1208      K 3.7      CO2 25   *   BUN 9   CREATININE 0.7   CALCIUM 9.8   PROT 7.7   ALBUMIN 2.9*   BILITOT 0.3   ALKPHOS 81   AST 13   ALT 11   ANIONGAP 11   EGFRNONAA >60     All pertinent labs within the past 24 hours have been reviewed.    Significant Imaging: I have reviewed all pertinent imaging results/findings within the past 24 hours.   Imaging Results           CT Chest With Contrast (Final result)  Result time 05/01/20 14:19:18    Final result by Bruce Bunn MD (05/01/20 14:19:18)                 Impression:      Masslike consolidation centered in the left upper lobe with associated right hilar/subcarinal involvement concerning for neoplasm until proven otherwise.    Metastatic bilateral pulmonary nodularity and mediastinal adenopathy.  Additional enlarged left axillary node, possible additional metastasis.    Moderate right pleural effusion.    Additional findings as above.    RECIST SUMMARY:    Date of prior examination for comparison: None    Lesion 1: Right upper lobe.  7.8 cm.  Series 2, image 29.    Lesion 2: Precarinal node.  2.6 cm.  Series 2, image 31.    This report was flagged in Epic as abnormal.      Electronically signed by: Bruce Bunn  Date:    05/01/2020  Time:    14:19             Narrative:    EXAMINATION:  CT CHEST WITH CONTRAST    CLINICAL HISTORY:  Neoplasm: chest, lung, suspected;    TECHNIQUE:  Low dose axial images were obtained from the thoracic inlet  to the pubic symphysis following the administration of 100 cc of Omnipaque 350 intravenous contrast.  Oral contrast was also administered.  Sagittal and coronal reformats were provided.    COMPARISON:  Chest radiograph dated 05/01/2020    FINDINGS:  There is heterogeneous masslike consolidation centered in the left upper lobe with pleural extension, for reference measuring 6.5 x 7.8 cm (series 2, image 29).  Areas of surrounding ground-glass attenuation more superiorly extending to the apex.  There is associated right hilar/subcarinal involvement with resultant narrowing of the distal right mainstem and proximal segmental bronchi.  Several additional subcentimeter nodules in the right middle, lower lobes and left lung (for reference 7 mm in the left upper lobe on series 2, image 31).  Moderate right-sided pleural effusion with dependent atelectasis.  No pericardial effusion.    Structures at the base of the neck are unremarkable.  The trachea is clear.  There is mediastinal adenopathy predominately involving the prevascular and paratracheal regions, for reference precarinal node measuring 2.6 cm (series 2, image 31).  There is also enlarged left axillary node measuring 1.9 cm (series 2, image 20).    Limited images through the upper abdomen are unremarkable.  No aggressive osseous lesion.  Spine DJD.                                X-Ray Chest AP Portable (Final result)  Result time 05/01/20 12:33:12    Final result by Jasper Crouch MD (05/01/20 12:33:12)                 Impression:      Opacity in the RIGHT upper lobe may be that of pneumonia.  Postobstructive pneumonia secondary to an underlying mass/neoplasm not excluded.  CT examination chest may be helpful.    This report was flagged in Epic as abnormal.      Electronically signed by: Jasper Crouch MD  Date:    05/01/2020  Time:    12:33             Narrative:    EXAMINATION:  XR CHEST AP PORTABLE    CLINICAL HISTORY:  Suspected Covid-19 Virus  Infection;    TECHNIQUE:  Single frontal view of the chest was performed.    COMPARISON:  None    FINDINGS:  There is relative opacity at the level the RIGHT upper lobe with volume loss.  A postobstructive mass is not excluded.  Underlying mild pulmonary vascular congestion.  Other etiologies for the opacity in the RIGHT upper lobe include that of pneumonia although postobstructive pneumonia not excluded.  Small RIGHT effusion suspect.  No evident pneumothorax.    The cardiac silhouette is normal in size. The hilar and mediastinal contours are unremarkable.    Old fracture deformity identified level the RIGHT proximal humerus identified, incompletely visualized, seen on a prior examination of 09/11/2013.  Incompletely healed RIGHT humerus fracture not excluded.

## 2020-05-02 NOTE — NURSING
Plan of care reviewed with patient. No distress noted at this time. Peripheral IV saline locked. Fall precautions maintained. Pt O2 levels 94- 96% on room air. Pt received cough meds twice this shift. Pt aware of NPO status after Sunday night for procedure. Will continue to monitor closely.

## 2020-05-02 NOTE — PLAN OF CARE
See discharge planning assessment completed by KATE Lau [facility]      05/02/20 3148   Discharge Assessment   Assessment Type Discharge Planning Assessment

## 2020-05-02 NOTE — ASSESSMENT & PLAN NOTE
- elevated on admission, improved  - cont home meds amlodipine 5 mg, HCTZ 12.5 mg, valsartan 320 mg daily  - PRN hydralazine  - monitor

## 2020-05-02 NOTE — CONSULTS
[facility]  Pulmonology  Consult Note    Patient Name: Janeth Boyce  MRN: [id number]  Admission Date: 2020  Hospital Length of Stay: 0 days  Code Status: Full Code  Attending Physician: KARISHMA Hudson MD  Primary Care Provider: Freeman Caballero MD   Principal Problem: Mass of upper lobe of right lung    Inpatient consult to Pulmonary Critical Care  Consult performed by: Yenny Pierce DO  Consult ordered by: Gypsy Colmenares PA-C        Subjective:     HPI:  Ms. Boyce is a 58 y/o woman admitted for evaluation of progressive symptoms of PRITCHETT and cough. She has had a worsening nonproductive cough since sometime in January to February. She was treated in the ED at [facility] for presumed pneumonia in early March and told to follow up with a pulmonologist at [facility]. She was given breathing treatments and antibiotics, which she reports improved her cough. Unfortunately due to cancelled appointments due to COVID, she was unable to have a follow up appointment with Pulmonology. In the ED she was noted to have a large lung mass and mediastinal adenopathy concerning for cancer. She quit smoking about 3 weeks ago after 12ppd x about 40 years, with several quit attempts over the years. She has no family or personal history of malignancy. She denies hemoptysis, hand/ joint soreness.     Past Medical History:   Diagnosis Date    Asthma     Hepatitis C     Hypertension        Past Surgical History:   Procedure Laterality Date     SECTION      KNEE SURGERY         Review of patient's allergies indicates:  No Known Allergies    Family History     None        Tobacco Use    Smoking status: Former Smoker     Packs/day: 0.50     Years: 40.00     Pack years: 20.00     Types: Cigarettes     Last attempt to quit: 2020     Years since quittin.0   Substance and Sexual Activity    Alcohol use: Yes     Comment: occasional    Drug use: No    Sexual activity: Not on file         Review of  Systems   Constitutional: Positive for activity change. Negative for chills, fatigue and fever.   HENT: Negative for rhinorrhea.    Respiratory: Positive for cough and shortness of breath.    Cardiovascular: Negative for chest pain and leg swelling.   Gastrointestinal: Negative for abdominal pain, diarrhea, nausea and vomiting.   Genitourinary: Negative for dysuria and hematuria.   Musculoskeletal: Negative for arthralgias and myalgias.   Skin: Negative for rash.   Hematological: Negative for adenopathy. Does not bruise/bleed easily.   Psychiatric/Behavioral: Negative for confusion.     Objective:     Vital Signs (Most Recent):  Temp: 98.4 °F (36.9 °C) (05/02/20 0930)  Pulse: 86 (05/02/20 1251)  Resp: 18 (05/02/20 1251)  BP: (!) 140/92 (05/02/20 0930)  SpO2: 96 % (05/02/20 1251) Vital Signs (24h Range):  Temp:  [97.9 °F (36.6 °C)-98.6 °F (37 °C)] 98.4 °F (36.9 °C)  Pulse:  [75-93] 86  Resp:  [16-22] 18  SpO2:  [96 %-100 %] 96 %  BP: (140-206)/(72-93) 140/92     Weight: 94.8 kg (208 lb 15.9 oz)  Body mass index is 34.78 kg/m².    No intake or output data in the 24 hours ending 05/02/20 1506    Physical Exam   Constitutional: She appears well-developed and well-nourished.   HENT:   Head: Normocephalic and atraumatic.   Poor dentition   Eyes: No scleral icterus.   Neck: Neck supple.   Cardiovascular: Normal rate and regular rhythm.   No murmur heard.  Pulmonary/Chest:   Decreased right basilar breath sounds, tubular breath sounds throughout RUL & RLL. CTAB on left. 95% on RA at rest.   Abdominal: Soft. She exhibits no distension.   Musculoskeletal: She exhibits no edema or deformity.   Lymphadenopathy:     She has no cervical adenopathy.   Neurological: She is alert. Coordination normal.   Face symmetric, answering questions appropriately   Skin: Skin is warm and dry. No rash noted.       O2:  Oxygen Concentration (%): 28 (05/01/20 1918)    Lines/Drains/Airways     Peripheral Intravenous Line                 Peripheral  IV - Single Lumen 05/01/20 1230 20 G Right Hand 1 day                Significant Labs:    CBC/Anemia Profile:  Recent Labs   Lab 05/01/20  1208   WBC 12.69   HGB 11.5*   HCT 37.8   *   MCV 87   RDW 14.1        Chemistries:  Recent Labs   Lab 05/01/20  1208      K 3.7      CO2 25   BUN 9   CREATININE 0.7   CALCIUM 9.8   ALBUMIN 2.9*   PROT 7.7   BILITOT 0.3   ALKPHOS 81   ALT 11   AST 13       All pertinent labs within the past 24 hours have been reviewed.    Significant Imaging:   CT: I have reviewed all pertinent results/findings within the past 24 hours and my personal findings are:  large, heterogeneous RUL mass with compression of RUL bronchus and BI. Large 4R and station 7 LNs. Large left axillary LN. Small right dependent pleural effusion    Assessment/Plan:     * Mass of upper lobe of right lung  RUL mass with compression of RUL bronchus and BI. Associated small pleural effusion and concerning enlarged left axillary lymph node concerning for metastases.  -Recommend IR biopsy of axillary lymph node for diagnosis & staging. Consult order placed. Thoracentesis would be less likely to yield a diagnosis.  -We have discussed my concern for a malignancy, and Ms. Boyce are her family is hopeful that this is an alternative diagnosis despite my repeated mentioning of my concern.   -Congratulated her on her efforts with quitting smoking.     Cough  Suspect her cough is related to her large lung mass.  -Symptomatic management with codeine-based cough syrup, but recommend this only QHS at discharge to help with sleeping.  -Can use robitussin and tessalon during the day for symptom management    Tobacco abuse  -con't nicotine replacement therapy  -congratulated her on her success    Shortness of breath  Dyspnea likely due to large right lung mass and recurrent coughing.  -will need ambulatory desaturation screening prior to discharge          Thank you for your consult. I will sign off. Please contact  us if you have any additional questions. Recommend outpatient pulmonology follow up 1-2 weeks after discharge.     Yenny Pierce, DO  Pulmonology  [facility], [place]

## 2020-05-02 NOTE — PLAN OF CARE
Patient in no apparent distress. Sat's  96 % on 2 lpm. Aerosol treatments started Q 4 wa . Will continue to monitor.

## 2020-05-02 NOTE — PROGRESS NOTES
[facility] Hospital Medicine  Progress Note    Patient Name: Janeth Boyce  MRN: [id number]  Patient Class: OP- Observation   Admission Date: 5/1/2020  Length of Stay: 0 days  Attending Physician: KARISHMA Hudson MD  Primary Care Provider: Freeman Caballero MD        Subjective:     Principal Problem:Mass of upper lobe of right lung        HPI:  58 y/o F with a PMHx of hypertension, chronic Hep C, GERD, chronic low back pain, tobacco abuse, lung mass presented to ED with c/o worsening SOB over the last 2 months associated with productive cough. Symptoms occur at rest and worsen with exertion. Also reported associated chest and back pain with cough. Denied any hemoptysis. Reported she was treated in early March for a presumed pneumonia at [facility] and was scheduled for a Pulmonary follow up at [facility] that was cancelled d/t COVID pandemic that has not been rescheduled.  Reports long history of smoking, but stopped 2 weeks ago when symptoms became severe. Denies fever, chills, HA, N/V/D, abdominal pain, night sweats, weight loss, lower extremity edema. Denies any alcohol or illicit drug use. ED evaluation CT chest masslike consolidation with pleural effusion on the right. Afebrile, labs unremarkable. Placed in Observation.     Overview/Hospital Course:  59 year old with h/o HTN, HCV, tobacco abuse admitted to observation with SOB and cough x 2 months. CT chest Large RUL mass with scattered nodules and mediastinal lymphadenopathy; small-moderate right pleural effusion. Left axillary LN enlargement. Pulmonary consulted recommendation best site of biopsy is axillary LN with IR. This is the least invasive and would give staging. Yield and risk profile is better than thoracentesis, no suggestions of infection.     Interval History: c/o cough    Review of Systems   Constitutional: Negative for activity change, appetite change, chills and fever.   HENT: Negative for congestion, rhinorrhea and sore throat.     Eyes: Negative.    Respiratory: Positive for cough and shortness of breath. Negative for wheezing.    Cardiovascular: Positive for chest pain (right sided with cough). Negative for palpitations and leg swelling.   Gastrointestinal: Negative for abdominal distention, abdominal pain, constipation, diarrhea, nausea and vomiting.   Endocrine: Negative for polydipsia and polyuria.   Genitourinary: Negative for difficulty urinating, frequency and hematuria.   Musculoskeletal: Negative for back pain and neck pain.   Skin: Negative.    Neurological: Negative for dizziness, syncope, weakness, light-headedness, numbness and headaches.   Psychiatric/Behavioral: Negative for confusion and decreased concentration.     Objective:     Vital Signs (Most Recent):  Temp: 98.4 °F (36.9 °C) (05/02/20 0930)  Pulse: 87 (05/02/20 0930)  Resp: 16 (05/02/20 0930)  BP: (!) 140/92 (05/02/20 0930)  SpO2: 96 % (05/02/20 0930) Vital Signs (24h Range):  Temp:  [97.9 °F (36.6 °C)-98.6 °F (37 °C)] 98.4 °F (36.9 °C)  Pulse:  [70-93] 87  Resp:  [16-28] 16  SpO2:  [95 %-100 %] 96 %  BP: (140-206)/(72-93) 140/92     Weight: 94.8 kg (208 lb 15.9 oz)  Body mass index is 34.78 kg/m².  No intake or output data in the 24 hours ending 05/02/20 1202   Physical Exam   Constitutional: She is oriented to person, place, and time. She appears well-developed and well-nourished. No distress.   HENT:   Head: Normocephalic and atraumatic.   Eyes: Conjunctivae are normal. No scleral icterus.   Neck: Normal range of motion. Neck supple.   Cardiovascular: Normal rate, regular rhythm, normal heart sounds and intact distal pulses.   Pulmonary/Chest: She has no wheezes (mild expiratory).   Mild tacypnea  Diminished bs R   Abdominal: Soft. Bowel sounds are normal. She exhibits no distension. There is no tenderness.   Musculoskeletal: Normal range of motion. She exhibits no edema.   Neurological: She is alert and oriented to person, place, and time.   Skin: Skin is warm and  dry. Capillary refill takes less than 2 seconds. She is not diaphoretic.   Psychiatric: She has a normal mood and affect.   Nursing note and vitals reviewed.      Significant Labs:   CBC:   Recent Labs   Lab 05/01/20  1208   WBC 12.69   HGB 11.5*   HCT 37.8   *     CMP:   Recent Labs   Lab 05/01/20  1208      K 3.7      CO2 25   *   BUN 9   CREATININE 0.7   CALCIUM 9.8   PROT 7.7   ALBUMIN 2.9*   BILITOT 0.3   ALKPHOS 81   AST 13   ALT 11   ANIONGAP 11   EGFRNONAA >60     All pertinent labs within the past 24 hours have been reviewed.    Significant Imaging: I have reviewed all pertinent imaging results/findings within the past 24 hours.   Imaging Results           CT Chest With Contrast (Final result)  Result time 05/01/20 14:19:18    Final result by Bruce Bunn MD (05/01/20 14:19:18)                 Impression:      Masslike consolidation centered in the left upper lobe with associated right hilar/subcarinal involvement concerning for neoplasm until proven otherwise.    Metastatic bilateral pulmonary nodularity and mediastinal adenopathy.  Additional enlarged left axillary node, possible additional metastasis.    Moderate right pleural effusion.    Additional findings as above.    RECIST SUMMARY:    Date of prior examination for comparison: None    Lesion 1: Right upper lobe.  7.8 cm.  Series 2, image 29.    Lesion 2: Precarinal node.  2.6 cm.  Series 2, image 31.    This report was flagged in Epic as abnormal.      Electronically signed by: Bruce Bunn  Date:    05/01/2020  Time:    14:19             Narrative:    EXAMINATION:  CT CHEST WITH CONTRAST    CLINICAL HISTORY:  Neoplasm: chest, lung, suspected;    TECHNIQUE:  Low dose axial images were obtained from the thoracic inlet to the pubic symphysis following the administration of 100 cc of Omnipaque 350 intravenous contrast.  Oral contrast was also administered.  Sagittal and coronal reformats were  provided.    COMPARISON:  Chest radiograph dated 05/01/2020    FINDINGS:  There is heterogeneous masslike consolidation centered in the left upper lobe with pleural extension, for reference measuring 6.5 x 7.8 cm (series 2, image 29).  Areas of surrounding ground-glass attenuation more superiorly extending to the apex.  There is associated right hilar/subcarinal involvement with resultant narrowing of the distal right mainstem and proximal segmental bronchi.  Several additional subcentimeter nodules in the right middle, lower lobes and left lung (for reference 7 mm in the left upper lobe on series 2, image 31).  Moderate right-sided pleural effusion with dependent atelectasis.  No pericardial effusion.    Structures at the base of the neck are unremarkable.  The trachea is clear.  There is mediastinal adenopathy predominately involving the prevascular and paratracheal regions, for reference precarinal node measuring 2.6 cm (series 2, image 31).  There is also enlarged left axillary node measuring 1.9 cm (series 2, image 20).    Limited images through the upper abdomen are unremarkable.  No aggressive osseous lesion.  Spine DJD.                                X-Ray Chest AP Portable (Final result)  Result time 05/01/20 12:33:12    Final result by Jasper Crouch MD (05/01/20 12:33:12)                 Impression:      Opacity in the RIGHT upper lobe may be that of pneumonia.  Postobstructive pneumonia secondary to an underlying mass/neoplasm not excluded.  CT examination chest may be helpful.    This report was flagged in Epic as abnormal.      Electronically signed by: Jasper Crouch MD  Date:    05/01/2020  Time:    12:33             Narrative:    EXAMINATION:  XR CHEST AP PORTABLE    CLINICAL HISTORY:  Suspected Covid-19 Virus Infection;    TECHNIQUE:  Single frontal view of the chest was performed.    COMPARISON:  None    FINDINGS:  There is relative opacity at the level the RIGHT upper lobe with volume loss.   A postobstructive mass is not excluded.  Underlying mild pulmonary vascular congestion.  Other etiologies for the opacity in the RIGHT upper lobe include that of pneumonia although postobstructive pneumonia not excluded.  Small RIGHT effusion suspect.  No evident pneumothorax.    The cardiac silhouette is normal in size. The hilar and mediastinal contours are unremarkable.    Old fracture deformity identified level the RIGHT proximal humerus identified, incompletely visualized, seen on a prior examination of 09/11/2013.  Incompletely healed RIGHT humerus fracture not excluded.                                    Assessment/Plan:      * Mass of upper lobe of right lung  - worsening SOB for 2 months, initially noted at [facility], tx for PNA, was to f/w up with Pulm at [facility], cancelled d/t COVID pandemia  - CT Masslike consolidation RUL with associated right hilar/subcarinal involvement concerning for neoplasm until proven otherwise. Metastatic bilateral pulmonary nodularity and mediastinal adenopathy.  Additional enlarged left axillary node, possible additional metastasis. Moderate right pleural effusion.  - Pulm Critical care consulted recs for biopsy axillary LN with IR. This is the least invasive and would give staging. Yield and risk profile is better than thoracentesis  - NPO tomorrow night, consult IR  - cont home bronchodilators   - supplement O2  - assess prior to dc for possible home needs      Cough  - promethazine-codeine    Tobacco abuse  - 34 pack year Hx  - reports quit 2 weeks ago  - nicotine patch      Essential hypertension  - elevated on admission, improved  - cont home meds amlodipine 5 mg, HCTZ 12.5 mg, valsartan 320 mg daily  - PRN hydralazine  - monitor      Shortness of breath  - see above      VTE Risk Mitigation (From admission, onward)         Ordered     enoxaparin injection 40 mg  Daily      05/01/20 1719     IP VTE HIGH RISK PATIENT  Once      05/01/20 1718     Place sequential compression  device  Until discontinued      05/01/20 5931                      Gypsy Colmenares PA-C  Department of Hospital Medicine   [facility]

## 2020-05-02 NOTE — PLAN OF CARE
"Discharge planner met with patient at the bedside, appears to be engaging in a telephone conversation on her personal cell phone.  Patient denies any inpatient admissions less than 30 days.  Updated her home address to : [address] [place], [place] [address] dc'ing home to current address; * may have difficulties with transportation home, as per patient   " my nurse told me that they will find me a ride home if I can't get one you know."  Patient explained to that she should try to reach out to her family members for reliable transportation at the time of discharge; verbalizes understanding.     Verified PCP as:   MD quincy Obrien  Preferred pharmacy:   [facility]   [address] [place] [address] [phone]  Denies dialysis care  Denies Coumadin for home use  Denies active home health services  Patient, who lives alone (dog), functions independently in her ADL's; has a 7 days a week sitter for 6 hours daily; assist with transportation, cleaning, and shopping needs; occasionally food preparation   " whatever I need sometimes."  She uses a nebulizer recently ordered by her PCP and quad walking can for home use.   Dc plan is home with no anticipated needs. Patient not certain if her sister or friends would be available to provide transportation at the time of discharge.    5/2/20 1117   Discharge Assessment   Assessment Type Discharge Planning Assessment   Confirmed/corrected address and phone number on face sheet? Yes; updated contact information   Assessment information obtained from? Patient    Communicated expected length of stay with patient/caregiver Yes, observation    Type of Healthcare Directive Received None    If Healthcare Directive is received, is it scanned into Epic? no (comment)   Prior to hospitalization cognitive status: Alert/Oriented   Prior to hospitalization functional status: Independent; Assistive Equipment    Current cognitive status: Alert/Oriented    Current " Functional Status: Independent; Assistive Equipment    Arrived From home or self-care   Lives With Alone    Able to Return to Prior Arrangements Yes    Is patient able to care for self after discharge? Yes    Provided patient/caregiver education on the expected discharge date and the discharge plan Yes   Readmission Within The Last 30 Days no previous admission in last 30 days   Do you have any financial concerns preventing you from receiving the healthcare you need? No   Does the patient have transportation to healthcare appointments? Yes   Transportation Available family or friend will provide; public transportation;    Patient's perception of discharge disposition home or self care   Discharge Plan A Home with self care    Discharge Plan B Home with family    Patient/Family In Agreement With Plan Yes

## 2020-05-02 NOTE — ASSESSMENT & PLAN NOTE
- worsening SOB for 2 months, initially noted at [facility], tx for PNA, was to f/w up with Pulm at [facility], cancelled d/t COVID pandemia  - CT Masslike consolidation RUL with associated right hilar/subcarinal involvement concerning for neoplasm until proven otherwise. Metastatic bilateral pulmonary nodularity and mediastinal adenopathy.  Additional enlarged left axillary node, possible additional metastasis. Moderate right pleural effusion.  - Pulm Critical care consulted recs for biopsy axillary LN with IR. This is the least invasive and would give staging. Yield and risk profile is better than thoracentesis  - NPO tomorrow night, consult IR  - cont home bronchodilators   - supplement O2  - assess prior to dc for possible home needs

## 2020-05-02 NOTE — ASSESSMENT & PLAN NOTE
Dyspnea likely due to large right lung mass and recurrent coughing.  -will need ambulatory desaturation screening prior to discharge

## 2020-05-02 NOTE — PLAN OF CARE
AFVSS. Cough continues to keep patient from being able to sleep well, but does seem to be somewhat improved slightly from start of shift. PRN cough medication minimally effective. Pain medication seemed to help patient more than the cough medicine. Pt states that she just wants to be able to get some sleep without waking frequently with coughing. Pt seems anxious to speak to the MD today about the possibility of tests being run and biopsy being obtained. She also states that she is going to discuss antibiotics being given. She states that she was given antibiotic at the other hospital and they helped so she is confused as to why they are not being given here. No further needs or complaints voiced at this time. No distress noted. Will continue to monitor.    Problem: Fall Injury Risk  Goal: Absence of Fall and Fall-Related Injury  Outcome: Ongoing, Progressing     Problem: Adult Inpatient Plan of Care  Goal: Plan of Care Review  Outcome: Ongoing, Progressing  Goal: Patient-Specific Goal (Individualization)  Outcome: Ongoing, Progressing  Goal: Absence of Hospital-Acquired Illness or Injury  Outcome: Ongoing, Progressing  Goal: Optimal Comfort and Wellbeing  Outcome: Ongoing, Progressing  Goal: Readiness for Transition of Care  Outcome: Ongoing, Progressing  Goal: Rounds/Family Conference  Outcome: Ongoing, Progressing     Problem: Spiritual Distress Risk or Actual  Goal: Spiritual Wellbeing  Outcome: Ongoing, Progressing

## 2020-05-03 PROCEDURE — 27000221 HC OXYGEN, UP TO 24 HOURS

## 2020-05-03 PROCEDURE — 94640 AIRWAY INHALATION TREATMENT: CPT

## 2020-05-03 PROCEDURE — 99226 PR SUBSEQUENT OBSERVATION CARE,LEVEL III: ICD-10-PCS | Mod: ,,, | Performed by: PHYSICIAN ASSISTANT

## 2020-05-03 PROCEDURE — 25000003 PHARM REV CODE 250: Performed by: PHYSICIAN ASSISTANT

## 2020-05-03 PROCEDURE — 25000003 PHARM REV CODE 250: Performed by: INTERNAL MEDICINE

## 2020-05-03 PROCEDURE — G0378 HOSPITAL OBSERVATION PER HR: HCPCS

## 2020-05-03 PROCEDURE — 99900035 HC TECH TIME PER 15 MIN (STAT)

## 2020-05-03 PROCEDURE — S4991 NICOTINE PATCH NONLEGEND: HCPCS | Performed by: PHYSICIAN ASSISTANT

## 2020-05-03 PROCEDURE — 94618 PULMONARY STRESS TESTING: CPT

## 2020-05-03 PROCEDURE — 25000242 PHARM REV CODE 250 ALT 637 W/ HCPCS: Performed by: PHYSICIAN ASSISTANT

## 2020-05-03 PROCEDURE — 94761 N-INVAS EAR/PLS OXIMETRY MLT: CPT

## 2020-05-03 PROCEDURE — 99226 PR SUBSEQUENT OBSERVATION CARE,LEVEL III: CPT | Mod: ,,, | Performed by: PHYSICIAN ASSISTANT

## 2020-05-03 RX ORDER — GUAIFENESIN 100 MG/5ML
200 SOLUTION ORAL EVERY 4 HOURS PRN
Status: DISCONTINUED | OUTPATIENT
Start: 2020-05-03 | End: 2020-05-04 | Stop reason: HOSPADM

## 2020-05-03 RX ORDER — DICLOFENAC SODIUM 10 MG/G
2 GEL TOPICAL DAILY
Status: DISCONTINUED | OUTPATIENT
Start: 2020-05-03 | End: 2020-05-03

## 2020-05-03 RX ORDER — DICLOFENAC SODIUM 10 MG/G
2 GEL TOPICAL 2 TIMES DAILY
Status: DISCONTINUED | OUTPATIENT
Start: 2020-05-03 | End: 2020-05-04 | Stop reason: HOSPADM

## 2020-05-03 RX ADMIN — PANTOPRAZOLE SODIUM 40 MG: 40 TABLET, DELAYED RELEASE ORAL at 08:05

## 2020-05-03 RX ADMIN — IPRATROPIUM BROMIDE AND ALBUTEROL SULFATE 3 ML: .5; 3 SOLUTION RESPIRATORY (INHALATION) at 11:05

## 2020-05-03 RX ADMIN — GUAIFENESIN 200 MG: 100 SOLUTION ORAL at 08:05

## 2020-05-03 RX ADMIN — GABAPENTIN 100 MG: 100 CAPSULE ORAL at 08:05

## 2020-05-03 RX ADMIN — HYDROCHLOROTHIAZIDE 12.5 MG: 12.5 TABLET ORAL at 08:05

## 2020-05-03 RX ADMIN — DICLOFENAC 2 G: 10 GEL TOPICAL at 10:05

## 2020-05-03 RX ADMIN — IPRATROPIUM BROMIDE AND ALBUTEROL SULFATE 3 ML: .5; 3 SOLUTION RESPIRATORY (INHALATION) at 08:05

## 2020-05-03 RX ADMIN — GABAPENTIN 100 MG: 100 CAPSULE ORAL at 02:05

## 2020-05-03 RX ADMIN — NICOTINE 1 PATCH: 21 PATCH, EXTENDED RELEASE TRANSDERMAL at 08:05

## 2020-05-03 RX ADMIN — AMLODIPINE BESYLATE 5 MG: 5 TABLET ORAL at 08:05

## 2020-05-03 RX ADMIN — DICLOFENAC 2 G: 10 GEL TOPICAL at 08:05

## 2020-05-03 RX ADMIN — HYDROCODONE BITARTRATE AND ACETAMINOPHEN 1 TABLET: 5; 325 TABLET ORAL at 08:05

## 2020-05-03 RX ADMIN — IPRATROPIUM BROMIDE AND ALBUTEROL SULFATE 3 ML: .5; 3 SOLUTION RESPIRATORY (INHALATION) at 04:05

## 2020-05-03 RX ADMIN — PROMETHAZINE HYDROCHLORIDE AND CODEINE PHOSPHATE 5 ML: 10; 6.25 SOLUTION ORAL at 07:05

## 2020-05-03 RX ADMIN — VALSARTAN 320 MG: 80 TABLET, FILM COATED ORAL at 08:05

## 2020-05-03 RX ADMIN — FLUTICASONE FUROATE AND VILANTEROL TRIFENATATE 1 PUFF: 200; 25 POWDER RESPIRATORY (INHALATION) at 08:05

## 2020-05-03 RX ADMIN — HYDROCODONE BITARTRATE AND ACETAMINOPHEN 1 TABLET: 5; 325 TABLET ORAL at 10:05

## 2020-05-03 RX ADMIN — PROMETHAZINE HYDROCHLORIDE AND CODEINE PHOSPHATE 5 ML: 10; 6.25 SOLUTION ORAL at 02:05

## 2020-05-03 NOTE — PLAN OF CARE
Pt is awake alert and oriented.  C/O knee pain treated with prn pain medication.  C/O constipation treated with prn medication. No S/S of distress. Vital Signs stable.

## 2020-05-03 NOTE — NURSING
Pt AAOx4, NAD noted. Pt complained of knee pain, given Norco as needed. Pt has frequent, productive cough, given promethazine-codeine as needed. Vitals monitored and stable. 6 minute walk completed, see previous note. Pt currently on 2L of O2 via NC, followed by respiratory. Pt remained free from falls or injury during shift. Pt instructed to be NPO after midnight, verbalized understanding. Pt currently in bed, safety measures implemented. Will continue to monitor.

## 2020-05-03 NOTE — PROGRESS NOTES
[facility] Hospital Medicine  Progress Note    Patient Name: Janeth Boyce  MRN: [id number]  Patient Class: OP- Observation   Admission Date: 5/1/2020  Length of Stay: 0 days  Attending Physician: KARISHMA Hudson MD  Primary Care Provider: Freeman Caballero MD        Subjective:     Principal Problem:Mass of upper lobe of right lung        HPI:  60 y/o F with a PMHx of hypertension, chronic Hep C, GERD, chronic low back pain, tobacco abuse, lung mass presented to ED with c/o worsening SOB over the last 2 months associated with productive cough. Symptoms occur at rest and worsen with exertion. Also reported associated chest and back pain with cough. Denied any hemoptysis. Reported she was treated in early March for a presumed pneumonia at [facility] and was scheduled for a Pulmonary follow up at [facility] that was cancelled d/t COVID pandemic that has not been rescheduled.  Reports long history of smoking, but stopped 2 weeks ago when symptoms became severe. Denies fever, chills, HA, N/V/D, abdominal pain, night sweats, weight loss, lower extremity edema. Denies any alcohol or illicit drug use. ED evaluation CT chest masslike consolidation with pleural effusion on the right. Afebrile, labs unremarkable. Placed in Observation.     Overview/Hospital Course:  59 year old with h/o HTN, HCV, tobacco abuse admitted to observation with SOB and cough x 2 months. CT chest Large RUL mass with scattered nodules and mediastinal lymphadenopathy; small-moderate right pleural effusion. Left axillary LN enlargement. Pulmonary consulted recommendation best site of biopsy is axillary LN with IR. This is the least invasive and would give staging. Yield and risk profile is better than thoracentesis, no suggestions of infection.     Interval History: c/o cough    Review of Systems   Constitutional: Negative for activity change, appetite change, chills and fever.   HENT: Negative for congestion, rhinorrhea and sore throat.     Eyes: Negative.    Respiratory: Positive for cough and shortness of breath. Negative for wheezing.    Cardiovascular: Positive for chest pain (right sided with cough). Negative for palpitations and leg swelling.   Gastrointestinal: Negative for abdominal distention, abdominal pain, constipation, diarrhea, nausea and vomiting.   Endocrine: Negative for polydipsia and polyuria.   Genitourinary: Negative for difficulty urinating, frequency and hematuria.   Musculoskeletal: Negative for back pain and neck pain.   Skin: Negative.    Neurological: Negative for dizziness, syncope, weakness, light-headedness, numbness and headaches.   Psychiatric/Behavioral: Negative for confusion and decreased concentration.     Objective:     Vital Signs (Most Recent):  Temp: 97.6 °F (36.4 °C) (05/03/20 1140)  Pulse: 82 (05/03/20 1140)  Resp: 20 (05/03/20 1140)  BP: 124/82 (05/03/20 1140)  SpO2: (!) 90 % (05/03/20 1140) Vital Signs (24h Range):  Temp:  [97.6 °F (36.4 °C)-99 °F (37.2 °C)] 97.6 °F (36.4 °C)  Pulse:  [] 82  Resp:  [18-21] 20  SpO2:  [90 %-96 %] 90 %  BP: (124-144)/(60-87) 124/82     Weight: 94.8 kg (208 lb 15.9 oz)  Body mass index is 34.78 kg/m².  No intake or output data in the 24 hours ending 05/03/20 1435   Physical Exam   Constitutional: She is oriented to person, place, and time. She appears well-developed and well-nourished. No distress.   HENT:   Head: Normocephalic and atraumatic.   Eyes: Conjunctivae are normal. No scleral icterus.   Neck: Normal range of motion. Neck supple.   Cardiovascular: Normal rate, regular rhythm, normal heart sounds and intact distal pulses.   Pulmonary/Chest: She has no wheezes (mild expiratory).   Mild tachypnea  Diminished bs R   Abdominal: Soft. Bowel sounds are normal. She exhibits no distension. There is no tenderness.   Musculoskeletal: Normal range of motion. She exhibits no edema.   Neurological: She is alert and oriented to person, place, and time.   Skin: Skin is warm  and dry. Capillary refill takes less than 2 seconds. She is not diaphoretic.   Psychiatric: She has a normal mood and affect.   Nursing note and vitals reviewed.      Significant Labs:   CBC:   No results for input(s): WBC, HGB, HCT, PLT in the last 48 hours.  CMP:   No results for input(s): NA, K, CL, CO2, GLU, BUN, CREATININE, CALCIUM, PROT, ALBUMIN, BILITOT, ALKPHOS, AST, ALT, ANIONGAP, EGFRNONAA in the last 48 hours.    Invalid input(s): ESTGFAFR[place]  All pertinent labs have been reviewed.    Significant Imaging: I have reviewed all pertinent imaging results/findings within the past 24 hours.   Imaging Results           CT Chest With Contrast (Final result)  Result time 05/01/20 14:19:18    Final result by Bruce Bunn MD (05/01/20 14:19:18)                 Impression:      Masslike consolidation centered in the left upper lobe with associated right hilar/subcarinal involvement concerning for neoplasm until proven otherwise.    Metastatic bilateral pulmonary nodularity and mediastinal adenopathy.  Additional enlarged left axillary node, possible additional metastasis.    Moderate right pleural effusion.    Additional findings as above.    RECIST SUMMARY:    Date of prior examination for comparison: None    Lesion 1: Right upper lobe.  7.8 cm.  Series 2, image 29.    Lesion 2: Precarinal node.  2.6 cm.  Series 2, image 31.    This report was flagged in Epic as abnormal.      Electronically signed by: Bruce Bunn  Date:    05/01/2020  Time:    14:19             Narrative:    EXAMINATION:  CT CHEST WITH CONTRAST    CLINICAL HISTORY:  Neoplasm: chest, lung, suspected;    TECHNIQUE:  Low dose axial images were obtained from the thoracic inlet to the pubic symphysis following the administration of 100 cc of Omnipaque 350 intravenous contrast.  Oral contrast was also administered.  Sagittal and coronal reformats were provided.    COMPARISON:  Chest radiograph dated 05/01/2020    FINDINGS:  There is  heterogeneous masslike consolidation centered in the left upper lobe with pleural extension, for reference measuring 6.5 x 7.8 cm (series 2, image 29).  Areas of surrounding ground-glass attenuation more superiorly extending to the apex.  There is associated right hilar/subcarinal involvement with resultant narrowing of the distal right mainstem and proximal segmental bronchi.  Several additional subcentimeter nodules in the right middle, lower lobes and left lung (for reference 7 mm in the left upper lobe on series 2, image 31).  Moderate right-sided pleural effusion with dependent atelectasis.  No pericardial effusion.    Structures at the base of the neck are unremarkable.  The trachea is clear.  There is mediastinal adenopathy predominately involving the prevascular and paratracheal regions, for reference precarinal node measuring 2.6 cm (series 2, image 31).  There is also enlarged left axillary node measuring 1.9 cm (series 2, image 20).    Limited images through the upper abdomen are unremarkable.  No aggressive osseous lesion.  Spine DJD.                                X-Ray Chest AP Portable (Final result)  Result time 05/01/20 12:33:12    Final result by Jasper Crouch MD (05/01/20 12:33:12)                 Impression:      Opacity in the RIGHT upper lobe may be that of pneumonia.  Postobstructive pneumonia secondary to an underlying mass/neoplasm not excluded.  CT examination chest may be helpful.    This report was flagged in Epic as abnormal.      Electronically signed by: Jasper Crouch MD  Date:    05/01/2020  Time:    12:33             Narrative:    EXAMINATION:  XR CHEST AP PORTABLE    CLINICAL HISTORY:  Suspected Covid-19 Virus Infection;    TECHNIQUE:  Single frontal view of the chest was performed.    COMPARISON:  None    FINDINGS:  There is relative opacity at the level the RIGHT upper lobe with volume loss.  A postobstructive mass is not excluded.  Underlying mild pulmonary vascular  congestion.  Other etiologies for the opacity in the RIGHT upper lobe include that of pneumonia although postobstructive pneumonia not excluded.  Small RIGHT effusion suspect.  No evident pneumothorax.    The cardiac silhouette is normal in size. The hilar and mediastinal contours are unremarkable.    Old fracture deformity identified level the RIGHT proximal humerus identified, incompletely visualized, seen on a prior examination of 09/11/2013.  Incompletely healed RIGHT humerus fracture not excluded.                                    Assessment/Plan:      * Mass of upper lobe of right lung  - worsening SOB for 2 months, initially noted at [facility], tx for PNA, was to f/w up with Pulm at [facility], cancelled d/t COVID pandemia  - CT Masslike consolidation RUL with associated right hilar/subcarinal involvement concerning for neoplasm until proven otherwise. Metastatic bilateral pulmonary nodularity and mediastinal adenopathy.  Additional enlarged left axillary node, possible additional metastasis. Moderate right pleural effusion.  - Pulm Critical care consulted recs for biopsy axillary LN with IR. This is the least invasive and would give staging. Yield and risk profile is better than thoracentesis  - NPO tonight, biopsy with IR tomorrow  - cont home bronchodilators   - supplement O2  - assess prior to dc for possible home needs      Cough  - promethazine-codeine    Tobacco abuse  - 34 pack year Hx  - reports quit 2 weeks ago  - nicotine patch      Essential hypertension  - elevated on admission, improved  - cont home meds amlodipine 5 mg, HCTZ 12.5 mg, valsartan 320 mg daily  - PRN hydralazine  - monitor      Shortness of breath  - see above      VTE Risk Mitigation (From admission, onward)         Ordered     IP VTE HIGH RISK PATIENT  Once      05/01/20 1718     Place sequential compression device  Until discontinued      05/01/20 1646                      Gypsy Colmenares PA-C  Department of Hospital Medicine   [facility]

## 2020-05-03 NOTE — NURSING
Pt O2 measured on RA at rest, O2 sats at 93%.  Pt walked down the hallway to nurses station and back on RA, O2 sats measured at 86%.  Pt placed on 2L of O2 via NC when back in room, O2 now maintaining at >92%. Will remain on 2L.

## 2020-05-03 NOTE — ASSESSMENT & PLAN NOTE
- worsening SOB for 2 months, initially noted at [facility], tx for PNA, was to f/w up with Pulm at [facility], cancelled d/t COVID pandemia  - CT Masslike consolidation RUL with associated right hilar/subcarinal involvement concerning for neoplasm until proven otherwise. Metastatic bilateral pulmonary nodularity and mediastinal adenopathy.  Additional enlarged left axillary node, possible additional metastasis. Moderate right pleural effusion.  - Pulm Critical care consulted recs for biopsy axillary LN with IR. This is the least invasive and would give staging. Yield and risk profile is better than thoracentesis  - NPO tonight, biopsy with IR tomorrow  - cont home bronchodilators   - supplement O2  - assess prior to dc for possible home needs

## 2020-05-03 NOTE — PLAN OF CARE
Patient on RA with sats as documented.  Patient given aerosol treatment with no adverse reactions noted. Patient instructed on proper use.  Will continue to monitor.

## 2020-05-03 NOTE — SUBJECTIVE & OBJECTIVE
Interval History: c/o cough    Review of Systems   Constitutional: Negative for activity change, appetite change, chills and fever.   HENT: Negative for congestion, rhinorrhea and sore throat.    Eyes: Negative.    Respiratory: Positive for cough and shortness of breath. Negative for wheezing.    Cardiovascular: Positive for chest pain (right sided with cough). Negative for palpitations and leg swelling.   Gastrointestinal: Negative for abdominal distention, abdominal pain, constipation, diarrhea, nausea and vomiting.   Endocrine: Negative for polydipsia and polyuria.   Genitourinary: Negative for difficulty urinating, frequency and hematuria.   Musculoskeletal: Negative for back pain and neck pain.   Skin: Negative.    Neurological: Negative for dizziness, syncope, weakness, light-headedness, numbness and headaches.   Psychiatric/Behavioral: Negative for confusion and decreased concentration.     Objective:     Vital Signs (Most Recent):  Temp: 97.6 °F (36.4 °C) (05/03/20 1140)  Pulse: 82 (05/03/20 1140)  Resp: 20 (05/03/20 1140)  BP: 124/82 (05/03/20 1140)  SpO2: (!) 90 % (05/03/20 1140) Vital Signs (24h Range):  Temp:  [97.6 °F (36.4 °C)-99 °F (37.2 °C)] 97.6 °F (36.4 °C)  Pulse:  [] 82  Resp:  [18-21] 20  SpO2:  [90 %-96 %] 90 %  BP: (124-144)/(60-87) 124/82     Weight: 94.8 kg (208 lb 15.9 oz)  Body mass index is 34.78 kg/m².  No intake or output data in the 24 hours ending 05/03/20 1435   Physical Exam   Constitutional: She is oriented to person, place, and time. She appears well-developed and well-nourished. No distress.   HENT:   Head: Normocephalic and atraumatic.   Eyes: Conjunctivae are normal. No scleral icterus.   Neck: Normal range of motion. Neck supple.   Cardiovascular: Normal rate, regular rhythm, normal heart sounds and intact distal pulses.   Pulmonary/Chest: She has no wheezes (mild expiratory).   Mild tachypnea  Diminished bs R   Abdominal: Soft. Bowel sounds are normal. She exhibits no  distension. There is no tenderness.   Musculoskeletal: Normal range of motion. She exhibits no edema.   Neurological: She is alert and oriented to person, place, and time.   Skin: Skin is warm and dry. Capillary refill takes less than 2 seconds. She is not diaphoretic.   Psychiatric: She has a normal mood and affect.   Nursing note and vitals reviewed.      Significant Labs:   CBC:   No results for input(s): WBC, HGB, HCT, PLT in the last 48 hours.  CMP:   No results for input(s): NA, K, CL, CO2, GLU, BUN, CREATININE, CALCIUM, PROT, ALBUMIN, BILITOT, ALKPHOS, AST, ALT, ANIONGAP, EGFRNONAA in the last 48 hours.    Invalid input(s): ESTGFA[place]  All pertinent labs have been reviewed.    Significant Imaging: I have reviewed all pertinent imaging results/findings within the past 24 hours.   Imaging Results           CT Chest With Contrast (Final result)  Result time 05/01/20 14:19:18    Final result by Bruce Bunn MD (05/01/20 14:19:18)                 Impression:      Masslike consolidation centered in the left upper lobe with associated right hilar/subcarinal involvement concerning for neoplasm until proven otherwise.    Metastatic bilateral pulmonary nodularity and mediastinal adenopathy.  Additional enlarged left axillary node, possible additional metastasis.    Moderate right pleural effusion.    Additional findings as above.    RECIST SUMMARY:    Date of prior examination for comparison: None    Lesion 1: Right upper lobe.  7.8 cm.  Series 2, image 29.    Lesion 2: Precarinal node.  2.6 cm.  Series 2, image 31.    This report was flagged in Epic as abnormal.      Electronically signed by: Bruce Bunn  Date:    05/01/2020  Time:    14:19             Narrative:    EXAMINATION:  CT CHEST WITH CONTRAST    CLINICAL HISTORY:  Neoplasm: chest, lung, suspected;    TECHNIQUE:  Low dose axial images were obtained from the thoracic inlet to the pubic symphysis following the administration of 100 cc of Omnipaque  350 intravenous contrast.  Oral contrast was also administered.  Sagittal and coronal reformats were provided.    COMPARISON:  Chest radiograph dated 05/01/2020    FINDINGS:  There is heterogeneous masslike consolidation centered in the left upper lobe with pleural extension, for reference measuring 6.5 x 7.8 cm (series 2, image 29).  Areas of surrounding ground-glass attenuation more superiorly extending to the apex.  There is associated right hilar/subcarinal involvement with resultant narrowing of the distal right mainstem and proximal segmental bronchi.  Several additional subcentimeter nodules in the right middle, lower lobes and left lung (for reference 7 mm in the left upper lobe on series 2, image 31).  Moderate right-sided pleural effusion with dependent atelectasis.  No pericardial effusion.    Structures at the base of the neck are unremarkable.  The trachea is clear.  There is mediastinal adenopathy predominately involving the prevascular and paratracheal regions, for reference precarinal node measuring 2.6 cm (series 2, image 31).  There is also enlarged left axillary node measuring 1.9 cm (series 2, image 20).    Limited images through the upper abdomen are unremarkable.  No aggressive osseous lesion.  Spine DJD.                                X-Ray Chest AP Portable (Final result)  Result time 05/01/20 12:33:12    Final result by Jasper Crouch MD (05/01/20 12:33:12)                 Impression:      Opacity in the RIGHT upper lobe may be that of pneumonia.  Postobstructive pneumonia secondary to an underlying mass/neoplasm not excluded.  CT examination chest may be helpful.    This report was flagged in Epic as abnormal.      Electronically signed by: Jasper Crouch MD  Date:    05/01/2020  Time:    12:33             Narrative:    EXAMINATION:  XR CHEST AP PORTABLE    CLINICAL HISTORY:  Suspected Covid-19 Virus Infection;    TECHNIQUE:  Single frontal view of the chest was  performed.    COMPARISON:  None    FINDINGS:  There is relative opacity at the level the RIGHT upper lobe with volume loss.  A postobstructive mass is not excluded.  Underlying mild pulmonary vascular congestion.  Other etiologies for the opacity in the RIGHT upper lobe include that of pneumonia although postobstructive pneumonia not excluded.  Small RIGHT effusion suspect.  No evident pneumothorax.    The cardiac silhouette is normal in size. The hilar and mediastinal contours are unremarkable.    Old fracture deformity identified level the RIGHT proximal humerus identified, incompletely visualized, seen on a prior examination of 09/11/2013.  Incompletely healed RIGHT humerus fracture not excluded.

## 2020-05-04 VITALS
HEART RATE: 84 BPM | DIASTOLIC BLOOD PRESSURE: 82 MMHG | SYSTOLIC BLOOD PRESSURE: 152 MMHG | BODY MASS INDEX: 34.82 KG/M2 | RESPIRATION RATE: 20 BRPM | HEIGHT: 65 IN | WEIGHT: 209 LBS | TEMPERATURE: 98 F | OXYGEN SATURATION: 96 %

## 2020-05-04 LAB — POCT GLUCOSE: 142 MG/DL (ref 70–110)

## 2020-05-04 PROCEDURE — 94640 AIRWAY INHALATION TREATMENT: CPT

## 2020-05-04 PROCEDURE — 88305 TISSUE EXAM BY PATHOLOGIST: CPT | Performed by: PATHOLOGY

## 2020-05-04 PROCEDURE — 88274 CYTOGENETICS 25-99: CPT | Mod: 59 | Performed by: PATHOLOGY

## 2020-05-04 PROCEDURE — 99226 PR SUBSEQUENT OBSERVATION CARE,LEVEL III: ICD-10-PCS | Mod: ,,, | Performed by: PHYSICIAN ASSISTANT

## 2020-05-04 PROCEDURE — 88271 CYTOGENETICS DNA PROBE: CPT | Mod: 59 | Performed by: PATHOLOGY

## 2020-05-04 PROCEDURE — 88305 TISSUE EXAM BY PATHOLOGIST: ICD-10-PCS | Mod: 26,,, | Performed by: PATHOLOGY

## 2020-05-04 PROCEDURE — 25000003 PHARM REV CODE 250: Performed by: INTERNAL MEDICINE

## 2020-05-04 PROCEDURE — G0378 HOSPITAL OBSERVATION PER HR: HCPCS

## 2020-05-04 PROCEDURE — 63600175 PHARM REV CODE 636 W HCPCS: Performed by: RADIOLOGY

## 2020-05-04 PROCEDURE — 94761 N-INVAS EAR/PLS OXIMETRY MLT: CPT

## 2020-05-04 PROCEDURE — 25000003 PHARM REV CODE 250: Performed by: PHYSICIAN ASSISTANT

## 2020-05-04 PROCEDURE — 27000221 HC OXYGEN, UP TO 24 HOURS

## 2020-05-04 PROCEDURE — 81235 EGFR GENE COM VARIANTS: CPT | Performed by: PATHOLOGY

## 2020-05-04 PROCEDURE — 99226 PR SUBSEQUENT OBSERVATION CARE,LEVEL III: CPT | Mod: ,,, | Performed by: PHYSICIAN ASSISTANT

## 2020-05-04 PROCEDURE — S4991 NICOTINE PATCH NONLEGEND: HCPCS | Performed by: PHYSICIAN ASSISTANT

## 2020-05-04 PROCEDURE — 25000242 PHARM REV CODE 250 ALT 637 W/ HCPCS: Performed by: PHYSICIAN ASSISTANT

## 2020-05-04 PROCEDURE — 88381 MICRODISSECTION MANUAL: CPT | Mod: 59 | Performed by: PATHOLOGY

## 2020-05-04 PROCEDURE — 81210 BRAF GENE: CPT | Performed by: PATHOLOGY

## 2020-05-04 PROCEDURE — 88360 TUMOR IMMUNOHISTOCHEM/MANUAL: CPT | Performed by: PATHOLOGY

## 2020-05-04 PROCEDURE — 99152 MOD SED SAME PHYS/QHP 5/>YRS: CPT | Performed by: RADIOLOGY

## 2020-05-04 PROCEDURE — 88271 CYTOGENETICS DNA PROBE: CPT | Performed by: PATHOLOGY

## 2020-05-04 PROCEDURE — 88305 TISSUE EXAM BY PATHOLOGIST: CPT | Mod: 26,,, | Performed by: PATHOLOGY

## 2020-05-04 RX ORDER — ALBUTEROL SULFATE 0.63 MG/3ML
0.63 SOLUTION RESPIRATORY (INHALATION) EVERY 6 HOURS PRN
Qty: 90 ML | Refills: 0 | Status: ON HOLD | OUTPATIENT
Start: 2020-05-04 | End: 2020-06-19 | Stop reason: HOSPADM

## 2020-05-04 RX ORDER — HYDROCODONE BITARTRATE AND ACETAMINOPHEN 5; 325 MG/1; MG/1
1 TABLET ORAL EVERY 8 HOURS PRN
Qty: 12 TABLET | Refills: 0 | Status: SHIPPED | OUTPATIENT
Start: 2020-05-04 | End: 2020-05-13

## 2020-05-04 RX ORDER — MIDAZOLAM HYDROCHLORIDE 1 MG/ML
INJECTION INTRAMUSCULAR; INTRAVENOUS
Status: DISCONTINUED | OUTPATIENT
Start: 2020-05-04 | End: 2020-05-04 | Stop reason: HOSPADM

## 2020-05-04 RX ORDER — FENTANYL CITRATE 50 UG/ML
INJECTION, SOLUTION INTRAMUSCULAR; INTRAVENOUS
Status: DISCONTINUED | OUTPATIENT
Start: 2020-05-04 | End: 2020-05-04 | Stop reason: HOSPADM

## 2020-05-04 RX ORDER — PROMETHAZINE HYDROCHLORIDE AND CODEINE PHOSPHATE 6.25; 1 MG/5ML; MG/5ML
5 SOLUTION ORAL NIGHTLY PRN
Qty: 118 ML | Refills: 0 | Status: SHIPPED | OUTPATIENT
Start: 2020-05-04 | End: 2020-05-13

## 2020-05-04 RX ADMIN — HYDROCHLOROTHIAZIDE 12.5 MG: 12.5 TABLET ORAL at 11:05

## 2020-05-04 RX ADMIN — HYDROCODONE BITARTRATE AND ACETAMINOPHEN 1 TABLET: 5; 325 TABLET ORAL at 03:05

## 2020-05-04 RX ADMIN — GABAPENTIN 100 MG: 100 CAPSULE ORAL at 11:05

## 2020-05-04 RX ADMIN — PANTOPRAZOLE SODIUM 40 MG: 40 TABLET, DELAYED RELEASE ORAL at 11:05

## 2020-05-04 RX ADMIN — VALSARTAN 320 MG: 80 TABLET, FILM COATED ORAL at 11:05

## 2020-05-04 RX ADMIN — FLUTICASONE FUROATE AND VILANTEROL TRIFENATATE 1 PUFF: 200; 25 POWDER RESPIRATORY (INHALATION) at 08:05

## 2020-05-04 RX ADMIN — HYDROCODONE BITARTRATE AND ACETAMINOPHEN 1 TABLET: 5; 325 TABLET ORAL at 11:05

## 2020-05-04 RX ADMIN — DICLOFENAC 2 G: 10 GEL TOPICAL at 11:05

## 2020-05-04 RX ADMIN — IPRATROPIUM BROMIDE AND ALBUTEROL SULFATE 3 ML: .5; 3 SOLUTION RESPIRATORY (INHALATION) at 11:05

## 2020-05-04 RX ADMIN — IPRATROPIUM BROMIDE AND ALBUTEROL SULFATE 3 ML: .5; 3 SOLUTION RESPIRATORY (INHALATION) at 08:05

## 2020-05-04 RX ADMIN — NICOTINE 1 PATCH: 21 PATCH, EXTENDED RELEASE TRANSDERMAL at 11:05

## 2020-05-04 RX ADMIN — AMLODIPINE BESYLATE 5 MG: 5 TABLET ORAL at 11:05

## 2020-05-04 NOTE — PLAN OF CARE
[facility] delivered O2 and Nebulizer to the patient, delivery and rental agreement was signed. R/T is currently going over education with the patient.

## 2020-05-04 NOTE — CONSULTS
Inpatient Radiology Pre-procedure Note    History of Present Illness:  Janeth Boyce is a 59 y.o. female who presents for right lung mass biopsy.  Admission H&P reviewed.  Past Medical History:   Diagnosis Date    Asthma     Hepatitis C     Hypertension      Past Surgical History:   Procedure Laterality Date     SECTION      KNEE SURGERY         Review of Systems:   As documented in primary team H&P    Home Meds:   Prior to Admission medications    Medication Sig Start Date End Date Taking? Authorizing Provider   amLODIPine (NORVASC) 5 MG tablet Take 5 mg by mouth once daily.   Yes Historical Provider, MD   budesonide/formoterol fumarate (SYMBICORT INHL) Inhale into the lungs.   Yes Historical Provider, MD   gabapentin (NEURONTIN) 100 MG capsule Take 100 mg by mouth 3 (three) times daily.   Yes Historical Provider, MD   pantoprazole (PROTONIX) 20 MG tablet Take 20 mg by mouth once daily.   Yes Historical Provider, MD   valsartan-hydrochlorothiazide (DIOVAN-HCT) 320-12.5 mg per tablet Take 1 tablet by mouth once daily.   Yes Historical Provider, MD   albuterol (ACCUNEB) 0.63 mg/3 mL Nebu Take 0.63 mg by nebulization every 6 (six) hours as needed. Rescue  20 Yes Historical Provider, MD   albuterol (ACCUNEB) 0.63 mg/3 mL Nebu Take 3 mLs (0.63 mg total) by nebulization every 6 (six) hours as needed. Rescue 20   Gypsy Colmenares PA-C   HYDROcodone-acetaminophen (NORCO) 5-325 mg per tablet Take 1 tablet by mouth every 8 (eight) hours as needed. 20   Gypsy Colmenares PA-C   promethazine-codeine 6.25-10 mg/5 ml (PHENERGAN WITH CODEINE) 6.25-10 mg/5 mL syrup Take 5 mLs by mouth nightly as needed for Cough. 20   Gypsy Colmenares PA-C     Scheduled Meds:    albuterol-ipratropium  3 mL Nebulization Q4H WAKE    amLODIPine  5 mg Oral Daily    diclofenac sodium  2 g Topical (Top) BID    fluticasone furoate-vilanteroL  1 puff Inhalation Daily    gabapentin  100 mg Oral TID    valsartan  320 mg  Oral Daily    And    hydroCHLOROthiazide  12.5 mg Oral Daily    nicotine  1 patch Transdermal Daily    pantoprazole  40 mg Oral Daily     Continuous Infusions:   PRN Meds:acetaminophen, albuterol, fentaNYL, guaifenesin 100 mg/5 ml, hydrALAZINE, HYDROcodone-acetaminophen, midazolam, ondansetron, promethazine-codeine 6.25-10 mg/5 ml, senna-docusate 8.6-50 mg, sodium chloride 0.9%  Anticoagulants/Antiplatelets: no anticoagulation    Allergies: Review of patient's allergies indicates:  No Known Allergies  Sedation Hx: have not been any systemic reactions    Labs:  No results for input(s): INR in the last 168 hours.    Invalid input(s):  PT,  PTT    Recent Labs   Lab 05/01/20  1208   WBC 12.69   HGB 11.5*   HCT 37.8   MCV 87   *      Recent Labs   Lab 05/01/20  1208   *      K 3.7      CO2 25   BUN 9   CREATININE 0.7   CALCIUM 9.8   ALT 11   AST 13   ALBUMIN 2.9*   BILITOT 0.3         Vitals:  Temp: 98.2 °F (36.8 °C) (05/04/20 0723)  Pulse: 87 (05/04/20 1035)  Resp: 17 (05/04/20 1035)  BP: (!) 146/84 (05/04/20 1035)  SpO2: 95 % (05/04/20 1035)     Physical Exam:  ASA: 3  Mallampati: 2    General: no acute distress  Mental Status: alert and oriented to person, place and time  HEENT: normocephalic, atraumatic  Chest: unlabored breathing  Heart: regular heart rate  Abdomen: nondistended  Extremity: moves all extremities    Plan: CT guided right lung mass biopsy  Sedation Plan: Moderate    Cliff Vaca M.D.  Diagnostic and Interventional Radiologist  Department of Radiology  Pager: [phone]

## 2020-05-04 NOTE — PROCEDURES
Radiology Post-Procedure Note    Pre Op Diagnosis: Right lung mass    Post Op Diagnosis: Right lung mass    Procedure: right lung biopsy    Procedure performed by: Cliff Vaca MD    Written Informed Consent Obtained: Yes    Specimen Removed: YES 12 x 18 gauge cores    Estimated Blood Loss: Minimal    Findings:   Using CT guidance a 17 g sheath needle was placed into a Right lung mass. 18 g monopty biopsy gun used to take 18 core biopsy samples. Specimen sent to pathology.     Additional specimen sent to lab: No    Patient tolerated procedure well.    Cliff Vaca M.D.  Diagnostic and Interventional Radiologist  Department of Radiology  Pager: [phone]

## 2020-05-04 NOTE — DISCHARGE SUMMARY
[facility] Hospital Medicine  Discharge Summary      Patient Name: Janeth Boyce  MRN: [id number]  Admission Date: 5/1/2020  Hospital Length of Stay: 0 days  Discharge Date and Time: No discharge date for patient encounter.  Attending Physician: KARISHMA Hudson MD   Discharging Provider: Gypsy Colmenares PA-C  Primary Care Provider: Freeman Caballero MD      HPI:   58 y/o F with a PMHx of hypertension, chronic Hep C, GERD, chronic low back pain, tobacco abuse, lung mass presented to ED with c/o worsening SOB over the last 2 months associated with productive cough. Symptoms occur at rest and worsen with exertion. Also reported associated chest and back pain with cough. Denied any hemoptysis. Reported she was treated in early March for a presumed pneumonia at [facility] and was scheduled for a Pulmonary follow up at [facility] that was cancelled d/t COVID pandemic that has not been rescheduled.  Reports long history of smoking, but stopped 2 weeks ago when symptoms became severe. Denies fever, chills, HA, N/V/D, abdominal pain, night sweats, weight loss, lower extremity edema. Denies any alcohol or illicit drug use. ED evaluation CT chest masslike consolidation with pleural effusion on the right. Afebrile, labs unremarkable. Placed in Observation.     Procedure(s) (LRB):  BIOPSY, WITH CT GUIDANCE (Right)      Hospital Course:   59 year old with h/o HTN, HCV, tobacco abuse admitted to observation with SOB and cough x 2 months. CT chest Large RUL mass with scattered nodules and mediastinal lymphadenopathy; small-moderate right pleural effusion. Left axillary LN enlargement. Pulmonary consulted recommendation best site of biopsy is axillary LN with IR. This is the least invasive and would give staging. Yield and risk profile is better than thoracentesis, no suggestions of infection. S/p right lung biopsy 5/4/2020. Home O2 arranged to use with activity. Discussed with Pulmonary, will follow up results,  "Heme/onco referral placed. Stable for discharge home.      Consults:   Consults (From admission, onward)        Status Ordering Provider     Inpatient consult to Interventional Radiology  Once     Provider:  (Not yet assigned)    Completed YENNY PIERCE     Inpatient consult to Pulmonary Critical Care  Once     Provider:  Yenny Pierce DO    Completed HERNANDEZ SPRAGUE          No new Assessment & Plan notes have been filed under this hospital service since the last note was generated.  Service: Hospital Medicine    Final Active Diagnoses:    Diagnosis Date Noted POA    PRINCIPAL PROBLEM:  Mass of upper lobe of right lung [R91.8] 05/01/2020 Yes    Cough [R05] 05/02/2020 Yes    Shortness of breath [R06.02] 05/01/2020 Yes    Essential hypertension [I10] 05/01/2020 Yes    Tobacco abuse [Z72.0] 05/01/2020 Yes      Problems Resolved During this Admission:       Discharged Condition: stable    Disposition: Home or Self Care    Follow Up:  Follow-up Information     Schedule an appointment as soon as possible for a visit with Freeman Caballero MD.    Specialty:  Family Medicine  Why:  post hospital follow-up  Contact information:  [address] [place] [address]  [phone]                 Patient Instructions:      OXYGEN FOR HOME USE     Order Specific Question Answer Comments   Liter Flow 2    Duration With activity    Qualifying SpO2: 86    Testing done at: Exercise/Activity    Route nasal cannula    Device home concentrator with portable unit    Length of need (in months): 99 mos    Patient condition with qualifying saturation other chronic pulmonary condition    Height: 5' 5" (1.651 m)    Weight: 94.8 kg (208 lb 15.9 oz)    Alternative treatment measures have been tried or considered and deemed clinically ineffective. Yes    Vendor: [facility]    Expected Date of Delivery: 5/4/2020      NEBULIZER FOR HOME USE     Order Specific Question Answer Comments   Height: 5' 5" (1.651 m)    Weight: " 94.8 kg (208 lb 15.9 oz)    Length of need (1-99 months): 99      Ambulatory referral/consult to Hematology / Oncology   Standing Status: Future   Referral Priority: Routine Referral Type: Consultation   Referral Reason: Specialty Services Required   Requested Specialty: Hematology and Oncology   Number of Visits Requested: 1     Diet Adult Regular     Notify your health care provider if you experience any of the following:  temperature >100.4     Notify your health care provider if you experience any of the following:  persistent nausea and vomiting or diarrhea     Notify your health care provider if you experience any of the following:  severe uncontrolled pain     Notify your health care provider if you experience any of the following:  difficulty breathing or increased cough     Specimen to Pathology, Radiology Lung, biopsy not tranplant     Order Specific Question Answer Comments   Source: Lung, biopsy not tranplant    Clinical Information: right      Activity as tolerated       Significant Diagnostic Studies    Imaging Results           CT Chest With Contrast (Final result)  Result time 05/01/20 14:19:18    Final result by Bruce Bunn MD (05/01/20 14:19:18)                 Impression:      Masslike consolidation centered in the left upper lobe with associated right hilar/subcarinal involvement concerning for neoplasm until proven otherwise.    Metastatic bilateral pulmonary nodularity and mediastinal adenopathy.  Additional enlarged left axillary node, possible additional metastasis.    Moderate right pleural effusion.    Additional findings as above.    RECIST SUMMARY:    Date of prior examination for comparison: None    Lesion 1: Right upper lobe.  7.8 cm.  Series 2, image 29.    Lesion 2: Precarinal node.  2.6 cm.  Series 2, image 31.    This report was flagged in Epic as abnormal.      Electronically signed by: Bruce Bunn  Date:    05/01/2020  Time:    14:19             Narrative:     EXAMINATION:  CT CHEST WITH CONTRAST    CLINICAL HISTORY:  Neoplasm: chest, lung, suspected;    TECHNIQUE:  Low dose axial images were obtained from the thoracic inlet to the pubic symphysis following the administration of 100 cc of Omnipaque 350 intravenous contrast.  Oral contrast was also administered.  Sagittal and coronal reformats were provided.    COMPARISON:  Chest radiograph dated 05/01/2020    FINDINGS:  There is heterogeneous masslike consolidation centered in the left upper lobe with pleural extension, for reference measuring 6.5 x 7.8 cm (series 2, image 29).  Areas of surrounding ground-glass attenuation more superiorly extending to the apex.  There is associated right hilar/subcarinal involvement with resultant narrowing of the distal right mainstem and proximal segmental bronchi.  Several additional subcentimeter nodules in the right middle, lower lobes and left lung (for reference 7 mm in the left upper lobe on series 2, image 31).  Moderate right-sided pleural effusion with dependent atelectasis.  No pericardial effusion.    Structures at the base of the neck are unremarkable.  The trachea is clear.  There is mediastinal adenopathy predominately involving the prevascular and paratracheal regions, for reference precarinal node measuring 2.6 cm (series 2, image 31).  There is also enlarged left axillary node measuring 1.9 cm (series 2, image 20).    Limited images through the upper abdomen are unremarkable.  No aggressive osseous lesion.  Spine DJD.                                X-Ray Chest AP Portable (Final result)  Result time 05/01/20 12:33:12    Final result by Jasper Crouch MD (05/01/20 12:33:12)                 Impression:      Opacity in the RIGHT upper lobe may be that of pneumonia.  Postobstructive pneumonia secondary to an underlying mass/neoplasm not excluded.  CT examination chest may be helpful.    This report was flagged in Epic as abnormal.      Electronically signed by: Jasper  MD Miko  Date:    05/01/2020  Time:    12:33             Narrative:    EXAMINATION:  XR CHEST AP PORTABLE    CLINICAL HISTORY:  Suspected Covid-19 Virus Infection;    TECHNIQUE:  Single frontal view of the chest was performed.    COMPARISON:  None    FINDINGS:  There is relative opacity at the level the RIGHT upper lobe with volume loss.  A postobstructive mass is not excluded.  Underlying mild pulmonary vascular congestion.  Other etiologies for the opacity in the RIGHT upper lobe include that of pneumonia although postobstructive pneumonia not excluded.  Small RIGHT effusion suspect.  No evident pneumothorax.    The cardiac silhouette is normal in size. The hilar and mediastinal contours are unremarkable.    Old fracture deformity identified level the RIGHT proximal humerus identified, incompletely visualized, seen on a prior examination of 09/11/2013.  Incompletely healed RIGHT humerus fracture not excluded.                                 All labs within the past 24 hours have been reviewed    Pending Diagnostic Studies:     Procedure Component Value Units Date/Time    Specimen to Pathology, Radiology Lung, biopsy not tranplant [[id number]] Collected:  05/04/20 1029    Order Status:  Sent Lab Status:  In process Updated:  05/04/20 1123    X-Ray Chest 1 View [[id number]]     Order Status:  Sent Lab Status:  No result          Medications:  Reconciled Home Medications:      Medication List      START taking these medications    HYDROcodone-acetaminophen 5-325 mg per tablet  Commonly known as:  NORCO  Take 1 tablet by mouth every 8 (eight) hours as needed.     promethazine-codeine 6.25-10 mg/5 ml 6.25-10 mg/5 mL syrup  Commonly known as:  PHENERGAN with CODEINE  Take 5 mLs by mouth nightly as needed for Cough.        CONTINUE taking these medications    albuterol 0.63 mg/3 mL Nebu  Commonly known as:  ACCUNEB  Take 3 mLs (0.63 mg total) by nebulization every 6 (six) hours as needed. Rescue     gabapentin 100 MG  capsule  Commonly known as:  NEURONTIN  Take 100 mg by mouth 3 (three) times daily.     NORVASC 5 MG tablet  Generic drug:  amLODIPine  Take 5 mg by mouth once daily.     pantoprazole 20 MG tablet  Commonly known as:  PROTONIX  Take 20 mg by mouth once daily.     SYMBICORT INHL  Inhale into the lungs.     valsartan-hydrochlorothiazide 320-12.5 mg per tablet  Commonly known as:  DIOVAN-HCT  Take 1 tablet by mouth once daily.            Indwelling Lines/Drains at time of discharge:   Lines/Drains/Airways     None                 Time spent on the discharge of patient: >30 minutes  Patient was seen and examined on the date of discharge and determined to be suitable for discharge.         Gypsy Colmenares PA-C  Department of Hospital Medicine  [facility]

## 2020-05-04 NOTE — PLAN OF CARE
Pt remained free from falls or injuries this shift. Pt independent in repositioning. No skin breakdown noticed. Pt rested well through the night. Medicated x2 prn knee pain. NPO since midnight for lung biopsy

## 2020-05-05 ENCOUNTER — TELEPHONE (OUTPATIENT)
Dept: HEMATOLOGY/ONCOLOGY | Facility: CLINIC | Age: 60
End: 2020-05-05

## 2020-05-05 NOTE — TELEPHONE ENCOUNTER
----- Message from Wilma Pierson RN sent at 5/5/2020  3:42 PM CDT -----  Contact: pt      ----- Message -----  From: Jennifer IGNACIO August  Sent: 5/5/2020   1:13 PM CDT  To: , #    Reason: Pt calling to speak back with nurse pertaining to appt    Communication: [phone]

## 2020-05-05 NOTE — TELEPHONE ENCOUNTER
----- Message from Beena Alexander RN sent at 5/5/2020  3:44 PM CDT -----  Contact: pt      ----- Message -----  From: Jennifer Bland  Sent: 5/5/2020   1:13 PM CDT  To: , #    Reason: Pt calling to speak back with nurse pertaining to appt    Communication: [phone]

## 2020-05-06 ENCOUNTER — TELEPHONE (OUTPATIENT)
Dept: HEMATOLOGY/ONCOLOGY | Facility: CLINIC | Age: 60
End: 2020-05-06

## 2020-05-06 DIAGNOSIS — R91.8 MASS OF UPPER LOBE OF RIGHT LUNG: Primary | ICD-10-CM

## 2020-05-06 NOTE — NURSING
Called to confirm scheduling of additional workup testing needed.  Transportation is a concern and is asking for assistance with transportation to consult visit on .  Instructions given for NPO for 4hrs in preparation of CT of abdomen/Pelvis.  Verbalized understanding.  Contact information given to call with changes or concerns.  Oncology Navigation   Intake  Cancer Type: Thoracic  MD Assigned: Dr. Peterson  Internal / External Referral: Internal  Initial Nurse Navigator Contact: 20  Contact Method: Individual basket  Date Worked: 20  First Appointment Available: 20  Appointment Date: 20  Schedule to Appointment Timeline (days): 8  First Available Date vs. Scheduled Date (days): 0  Multiple appointments: Yes (CT abdomen/Pelvis MRI)  Reason if booked > 7 days after scheduling: Transportation coordination     Treatment     Acuity  Treatment Tolerability: Has not started treatment yet/treatment fully completed and side effects resolved  ECO  Comorbidities in Medical History: 1  Hospitalization Within the Past Month: 1  Other Medical Factors (+1 each): Home medical equipment required (Uses home oxygen)   Needed: 0  Support: 1  Verbalizes Financial Concerns: 0  Transportation: 1  Mental Health: PHQ Score: 0  History of noncompliance/frequent no shows and cancellations: 0  Verbalizes the need for more education: 0  Other Factors (+1 for Each): 1  Navigation Acuity: 8     Follow Up  Follow up in about 8 days (around 2020) for To follow up on initial appointment.

## 2020-05-06 NOTE — TELEPHONE ENCOUNTER
----- Message from Jennifer Bland sent at 5/6/2020  1:41 PM CDT -----  Contact: pt  Reason: Pt states her call dropped and is returning it    Communication:[phone]

## 2020-05-06 NOTE — TELEPHONE ENCOUNTER
----- Message from Thomas Devries sent at 5/6/2020  2:11 PM CDT -----  Contact: Patient  Returning a call     Communication preference:: [phone]    Additional info::

## 2020-05-07 ENCOUNTER — TELEPHONE (OUTPATIENT)
Dept: PULMONOLOGY | Facility: OTHER | Age: 60
End: 2020-05-07

## 2020-05-07 NOTE — TELEPHONE ENCOUNTER
Multiple attempts to call Janeth to discuss lung biopsy results yesterday and today without success. Left messages both days. Attempted to call cell phone, which was an incorrect number.    PET scan ordered and appointment with Oncology set for 5/13 with Dr. Peterson.    Yenny Pierce 05/07/2020 3:58 PM  [employer] Pulmonary & Critical Care Fellow

## 2020-05-08 ENCOUNTER — TELEPHONE (OUTPATIENT)
Dept: PULMONOLOGY | Facility: OTHER | Age: 60
End: 2020-05-08

## 2020-05-08 NOTE — TELEPHONE ENCOUNTER
I was able to speak with Ms. Boyce to let her know her biopsy results showing lung cancer. She is very upset, but is glad that she already has her appointments scheduled for additional scans and with Oncology next week. Her cough has improved since discharge from the hospital. All of her questions were answered.    Yenny Pierce 05/08/2020 9:10 AM  [facility] Fellow

## 2020-05-09 ENCOUNTER — HOSPITAL ENCOUNTER (OUTPATIENT)
Dept: RADIOLOGY | Facility: HOSPITAL | Age: 60
Discharge: HOME | End: 2020-05-09
Attending: STUDENT IN AN ORGANIZED HEALTH CARE EDUCATION/TRAINING PROGRAM
Payer: MEDICAID

## 2020-05-09 DIAGNOSIS — R91.8 MASS OF UPPER LOBE OF RIGHT LUNG: ICD-10-CM

## 2020-05-09 PROCEDURE — 25500020 PHARM REV CODE 255: Performed by: STUDENT IN AN ORGANIZED HEALTH CARE EDUCATION/TRAINING PROGRAM

## 2020-05-09 PROCEDURE — A9585 GADOBUTROL INJECTION: HCPCS | Performed by: STUDENT IN AN ORGANIZED HEALTH CARE EDUCATION/TRAINING PROGRAM

## 2020-05-09 PROCEDURE — 70553 MRI BRAIN STEM W/O & W/DYE: CPT | Mod: 26,,, | Performed by: RADIOLOGY

## 2020-05-09 PROCEDURE — 74177 CT ABDOMEN PELVIS WITH CONTRAST: ICD-10-PCS | Mod: 26,,, | Performed by: RADIOLOGY

## 2020-05-09 PROCEDURE — 70553 MRI BRAIN STEM W/O & W/DYE: CPT | Mod: TC

## 2020-05-09 PROCEDURE — 70553 MRI BRAIN W WO CONTRAST: ICD-10-PCS | Mod: 26,,, | Performed by: RADIOLOGY

## 2020-05-09 PROCEDURE — 74177 CT ABD & PELVIS W/CONTRAST: CPT | Mod: TC

## 2020-05-09 PROCEDURE — 74177 CT ABD & PELVIS W/CONTRAST: CPT | Mod: 26,,, | Performed by: RADIOLOGY

## 2020-05-09 RX ORDER — GADOBUTROL 604.72 MG/ML
10 INJECTION INTRAVENOUS
Status: COMPLETED | OUTPATIENT
Start: 2020-05-09 | End: 2020-05-09

## 2020-05-09 RX ADMIN — IOHEXOL 15 ML: 350 INJECTION, SOLUTION INTRAVENOUS at 01:05

## 2020-05-09 RX ADMIN — GADOBUTROL 10 ML: 604.72 INJECTION INTRAVENOUS at 03:05

## 2020-05-09 RX ADMIN — IOHEXOL 15 ML: 350 INJECTION, SOLUTION INTRAVENOUS at 12:05

## 2020-05-09 RX ADMIN — IOHEXOL 100 ML: 350 INJECTION, SOLUTION INTRAVENOUS at 02:05

## 2020-05-10 NOTE — PLAN OF CARE
Patient called nursing station and notfiied charge nurse Abril that her oxygen tanks need to be filled and she only has one left.     ERICKA Samuels notified CM of patient's concern and oxygen was supplied by [facility] GRACIELA LI called patient and provided [facility] number for patient to request tanks to be refilled. Patient stated she will call and no further questions or concerns noted

## 2020-05-11 ENCOUNTER — TELEPHONE (OUTPATIENT)
Dept: HEMATOLOGY/ONCOLOGY | Facility: CLINIC | Age: 60
End: 2020-05-11

## 2020-05-11 ENCOUNTER — HOSPITAL ENCOUNTER (OUTPATIENT)
Dept: RADIOLOGY | Facility: HOSPITAL | Age: 60
Discharge: HOME | End: 2020-05-11
Attending: STUDENT IN AN ORGANIZED HEALTH CARE EDUCATION/TRAINING PROGRAM
Payer: MEDICAID

## 2020-05-11 DIAGNOSIS — R91.8 MASS OF UPPER LOBE OF RIGHT LUNG: ICD-10-CM

## 2020-05-11 PROBLEM — C79.31 PRIMARY MALIGNANT NEOPLASM OF LUNG WITH METASTASIS TO BRAIN: Status: ACTIVE | Noted: 2020-05-11

## 2020-05-11 PROBLEM — C34.92: Status: ACTIVE | Noted: 2020-05-11

## 2020-05-11 PROBLEM — C34.90 PRIMARY MALIGNANT NEOPLASM OF LUNG WITH METASTASIS TO BRAIN: Status: ACTIVE | Noted: 2020-05-11

## 2020-05-11 PROCEDURE — 78306 BONE IMAGING WHOLE BODY: CPT | Mod: 26,,, | Performed by: RADIOLOGY

## 2020-05-11 PROCEDURE — A9503 TC99M MEDRONATE: HCPCS

## 2020-05-11 PROCEDURE — 78306 NM BONE SCAN WHOLE BODY: ICD-10-PCS | Mod: 26,,, | Performed by: RADIOLOGY

## 2020-05-11 NOTE — NURSING
Return call to pt to confirm bone scan scheduled for 10:30 am injection and scanning for 1:00pm scanning.  Daughter to bring pt to appointment.

## 2020-05-13 ENCOUNTER — OFFICE VISIT (OUTPATIENT)
Dept: HEMATOLOGY/ONCOLOGY | Facility: CLINIC | Age: 60
End: 2020-05-13
Payer: MEDICAID

## 2020-05-13 ENCOUNTER — CLINICAL SUPPORT (OUTPATIENT)
Dept: HEMATOLOGY/ONCOLOGY | Facility: CLINIC | Age: 60
End: 2020-05-13
Payer: MEDICAID

## 2020-05-13 ENCOUNTER — LAB VISIT (OUTPATIENT)
Dept: LAB | Facility: HOSPITAL | Age: 60
End: 2020-05-13
Attending: STUDENT IN AN ORGANIZED HEALTH CARE EDUCATION/TRAINING PROGRAM
Payer: MEDICAID

## 2020-05-13 VITALS
SYSTOLIC BLOOD PRESSURE: 121 MMHG | WEIGHT: 190.25 LBS | HEART RATE: 81 BPM | RESPIRATION RATE: 20 BRPM | BODY MASS INDEX: 30.58 KG/M2 | DIASTOLIC BLOOD PRESSURE: 77 MMHG | HEIGHT: 66 IN | OXYGEN SATURATION: 100 %

## 2020-05-13 DIAGNOSIS — R91.8 LUNG MASS: ICD-10-CM

## 2020-05-13 DIAGNOSIS — C79.31 PRIMARY MALIGNANT NEOPLASM OF LUNG WITH METASTASIS TO BRAIN: Primary | ICD-10-CM

## 2020-05-13 DIAGNOSIS — C34.91 ADENOCARCINOMA OF LUNG, STAGE 4, RIGHT: ICD-10-CM

## 2020-05-13 DIAGNOSIS — C34.90 PRIMARY MALIGNANT NEOPLASM OF LUNG WITH METASTASIS TO BRAIN: Primary | ICD-10-CM

## 2020-05-13 DIAGNOSIS — C34.90 PRIMARY MALIGNANT NEOPLASM OF LUNG WITH METASTASIS TO BRAIN: ICD-10-CM

## 2020-05-13 DIAGNOSIS — J45.909 ASTHMA, UNSPECIFIED ASTHMA SEVERITY, UNSPECIFIED WHETHER COMPLICATED, UNSPECIFIED WHETHER PERSISTENT: ICD-10-CM

## 2020-05-13 DIAGNOSIS — R76.8 HEPATITIS C ANTIBODY TEST POSITIVE: ICD-10-CM

## 2020-05-13 DIAGNOSIS — C79.31 PRIMARY MALIGNANT NEOPLASM OF LUNG WITH METASTASIS TO BRAIN: ICD-10-CM

## 2020-05-13 DIAGNOSIS — R91.8 MASS OF UPPER LOBE OF RIGHT LUNG: ICD-10-CM

## 2020-05-13 DIAGNOSIS — C79.31 METASTASIS TO BRAIN: Primary | ICD-10-CM

## 2020-05-13 LAB
ALBUMIN SERPL BCP-MCNC: 2.7 G/DL (ref 3.5–5.2)
ALP SERPL-CCNC: 96 U/L (ref 55–135)
ALT SERPL W/O P-5'-P-CCNC: 6 U/L (ref 10–44)
ANION GAP SERPL CALC-SCNC: 9 MMOL/L (ref 8–16)
AST SERPL-CCNC: 13 U/L (ref 10–40)
BASOPHILS # BLD AUTO: 0.13 K/UL (ref 0–0.2)
BASOPHILS NFR BLD: 1.1 % (ref 0–1.9)
BILIRUB SERPL-MCNC: 0.2 MG/DL (ref 0.1–1)
BUN SERPL-MCNC: 24 MG/DL (ref 6–20)
CALCIUM SERPL-MCNC: 9.4 MG/DL (ref 8.7–10.5)
CHLORIDE SERPL-SCNC: 104 MMOL/L (ref 95–110)
CO2 SERPL-SCNC: 23 MMOL/L (ref 23–29)
CREAT SERPL-MCNC: 0.8 MG/DL (ref 0.5–1.4)
DIFFERENTIAL METHOD: ABNORMAL
EOSINOPHIL # BLD AUTO: 0.8 K/UL (ref 0–0.5)
EOSINOPHIL NFR BLD: 6.2 % (ref 0–8)
ERYTHROCYTE [DISTWIDTH] IN BLOOD BY AUTOMATED COUNT: 14.1 % (ref 11.5–14.5)
EST. GFR  (AFRICAN AMERICAN): >60 ML/MIN/1.73 M^2
EST. GFR  (NON AFRICAN AMERICAN): >60 ML/MIN/1.73 M^2
GLUCOSE SERPL-MCNC: 154 MG/DL (ref 70–110)
HAV IGM SERPL QL IA: NEGATIVE
HBV CORE AB SERPL QL IA: POSITIVE
HBV CORE IGM SERPL QL IA: NEGATIVE
HBV SURFACE AG SERPL QL IA: NEGATIVE
HCT VFR BLD AUTO: 36.8 % (ref 37–48.5)
HCV AB SERPL QL IA: POSITIVE
HGB BLD-MCNC: 11.1 G/DL (ref 12–16)
IMM GRANULOCYTES # BLD AUTO: 0.1 K/UL (ref 0–0.04)
IMM GRANULOCYTES NFR BLD AUTO: 0.8 % (ref 0–0.5)
LYMPHOCYTES # BLD AUTO: 2 K/UL (ref 1–4.8)
LYMPHOCYTES NFR BLD: 16.4 % (ref 18–48)
MCH RBC QN AUTO: 26.8 PG (ref 27–31)
MCHC RBC AUTO-ENTMCNC: 30.2 G/DL (ref 32–36)
MCV RBC AUTO: 89 FL (ref 82–98)
MONOCYTES # BLD AUTO: 0.9 K/UL (ref 0.3–1)
MONOCYTES NFR BLD: 7.4 % (ref 4–15)
NEUTROPHILS # BLD AUTO: 8.2 K/UL (ref 1.8–7.7)
NEUTROPHILS NFR BLD: 68.1 % (ref 38–73)
NRBC BLD-RTO: 0 /100 WBC
PLATELET # BLD AUTO: 416 K/UL (ref 150–350)
PMV BLD AUTO: 9.3 FL (ref 9.2–12.9)
POTASSIUM SERPL-SCNC: 3.8 MMOL/L (ref 3.5–5.1)
PROT SERPL-MCNC: 7.9 G/DL (ref 6–8.4)
RBC # BLD AUTO: 4.14 M/UL (ref 4–5.4)
SODIUM SERPL-SCNC: 136 MMOL/L (ref 136–145)
WBC # BLD AUTO: 12.04 K/UL (ref 3.9–12.7)

## 2020-05-13 PROCEDURE — 86704 HEP B CORE ANTIBODY TOTAL: CPT

## 2020-05-13 PROCEDURE — 99205 OFFICE O/P NEW HI 60 MIN: CPT | Mod: S$PBB,,, | Performed by: STUDENT IN AN ORGANIZED HEALTH CARE EDUCATION/TRAINING PROGRAM

## 2020-05-13 PROCEDURE — 99999 PR PBB SHADOW E&M-EST. PATIENT-LVL IV: CPT | Mod: PBBFAC,,, | Performed by: STUDENT IN AN ORGANIZED HEALTH CARE EDUCATION/TRAINING PROGRAM

## 2020-05-13 PROCEDURE — 87522 HEPATITIS C REVRS TRNSCRPJ: CPT

## 2020-05-13 PROCEDURE — 99214 OFFICE O/P EST MOD 30 MIN: CPT | Mod: PBBFAC | Performed by: STUDENT IN AN ORGANIZED HEALTH CARE EDUCATION/TRAINING PROGRAM

## 2020-05-13 PROCEDURE — 85025 COMPLETE CBC W/AUTO DIFF WBC: CPT

## 2020-05-13 PROCEDURE — 36415 COLL VENOUS BLD VENIPUNCTURE: CPT

## 2020-05-13 PROCEDURE — 80053 COMPREHEN METABOLIC PANEL: CPT

## 2020-05-13 PROCEDURE — 99999 PR PBB SHADOW E&M-EST. PATIENT-LVL IV: ICD-10-PCS | Mod: PBBFAC,,, | Performed by: STUDENT IN AN ORGANIZED HEALTH CARE EDUCATION/TRAINING PROGRAM

## 2020-05-13 PROCEDURE — 99205 PR OFFICE/OUTPT VISIT, NEW, LEVL V, 60-74 MIN: ICD-10-PCS | Mod: S$PBB,,, | Performed by: STUDENT IN AN ORGANIZED HEALTH CARE EDUCATION/TRAINING PROGRAM

## 2020-05-13 PROCEDURE — 80074 ACUTE HEPATITIS PANEL: CPT

## 2020-05-13 RX ORDER — PROMETHAZINE HYDROCHLORIDE AND CODEINE PHOSPHATE 6.25; 1 MG/5ML; MG/5ML
5 SOLUTION ORAL NIGHTLY PRN
Qty: 473 ML | Refills: 0 | Status: SHIPPED | OUTPATIENT
Start: 2020-05-13 | End: 2020-06-10

## 2020-05-13 RX ORDER — HYDROCODONE BITARTRATE AND ACETAMINOPHEN 5; 325 MG/1; MG/1
1 TABLET ORAL EVERY 12 HOURS PRN
Qty: 60 TABLET | Refills: 0 | Status: ON HOLD | OUTPATIENT
Start: 2020-05-13 | End: 2020-05-28 | Stop reason: HOSPADM

## 2020-05-13 RX ORDER — PROMETHAZINE HYDROCHLORIDE AND CODEINE PHOSPHATE 6.25; 1 MG/5ML; MG/5ML
5 SOLUTION ORAL NIGHTLY PRN
Qty: 473 ML | Refills: 0 | Status: SHIPPED | OUTPATIENT
Start: 2020-05-13 | End: 2020-05-13

## 2020-05-13 NOTE — Clinical Note
1. Referral to hepatology (plan for immunotherapy in hep C patient).  2. Referral to radiation oncology (brain met)  3.  auth for pembrolizumab 4. 5/26 needs cmp, cbc, tsh, covid screen.  5.  5/27 follow up with me and then cycle 1 pembrolizumab after.  Thanks -e

## 2020-05-13 NOTE — PLAN OF CARE
START OFF PATHWAY REGIMEN - Non-Small Cell Lung            Pembrolizumab (Keytruda)           Additional Orders: Severe immune-mediated reactions can occur. See   prescribing information for more details and required immediate management with   steroids. Monitor thyroid, renal, liver function tests, glucose, and sodium at   baseline and before each dose of pembrolizumab. Ref: Keytruda (pembrolizumab)   prescribing information, 2016.    **Always confirm dose/schedule in your pharmacy ordering system**    Patient Characteristics:  Stage IV Metastatic, Nonsquamous, Initial Chemotherapy/Immunotherapy, PS = 0, 1,   ALK or EGFR or ROS1 or NTRK Genomic Alterations - Did Not Order Test/Quantity   Not Sufficient  AJCC T Category: TX  Current Disease Status: Distant Metastases  AJCC N Category: NX  AJCC M Category: M1b  AJCC 8 Stage Grouping: LAM  Histology: Nonsquamous Cell  ROS1 Rearrangement Status: Negative  T790M Mutation Status: Not Applicable - EGFR Mutation Negative/Unknown  Other Mutations/Biomarkers: No Other Actionable Mutations  NTRK Gene Fusion Status: Did Not Order Test  PD-L1 Expression Status: PD-L1 Positive ? 50% (TPS)  Chemotherapy/Immunotherapy LOT: Initial Chemotherapy/Immunotherapy  Molecular Targeted Therapy: Not Appropriate  ALK Rearrangement Status: Negative  EGFR Mutation Status: Negative/Wild Type  BRAF V600E Mutation Status: Awaiting Test Results  ECOG Performance Status: 1  Intent of Therapy:  Non-Curative / Palliative Intent, Discussed with Patient

## 2020-05-13 NOTE — PROGRESS NOTES
"  PATIENT: Janeth Boyce  MRN: [id number]  DATE: 5/13/2020      Diagnosis:   1. Adenocarcinoma of lung, stage 4, right    2. Primary malignant neoplasm of lung with metastasis to brain    3. Lung mass        Chief Complaint: Lung Cancer (consult)      Oncologic History:    Oncologic History 1. Lung adenocarcinoma    Oncologic Treatment     Pathology 5/5/2020  Right lung, biopsy:  Positive for adenocarcinoma with areas of necrosis.  Molecular studies will be completed for EGFR, ALK, ROS-1, PD-L1 and BRAF and results will follow in a supplemental report.        Subjective:    Interval History: Ms. Boyce is here for new patient consultation    59 year old woman with newly diagnosed lung cancer.  She has been smoking since 15 years old and estimates about 0.5ppd.  She has always had some level of dyspnea with exertion at baseline, but noticed it was worse in February 2020 with an associated cough with white and clear sputum.  She says she was evaluated at [facility] who told her the scans were suspicious for cancer.  She then headed to [facility] but because of COVID-19 her appointment was cancelled.  She is here today without any new symptoms since February and the cough is persistent.  Denies any unilateral weakness, dizziness, headaches, or gait instability.  Able to perform all ADLs and can walk 1 block before getting tired and needing to rest.  On 5/1/2020, she was "admitted to observation with SOB and cough x 2 months. CT chest Large RUL mass with scattered nodules and mediastinal lymphadenopathy; small-moderate right pleural effusion....S/p right lung biopsy 5/4/2020. Home O2 arranged to use with activity. Discussed with Pulmonary, will follow up results, Heme/onco referral placed. Stable for discharge home."    Her niece is on speaker phone during this visit.    Past Medical History:   Past Medical History:   Diagnosis Date    Asthma     Hepatitis C     Hypertension     Lung cancer        Past Surgical HIstory: "   Past Surgical History:   Procedure Laterality Date     SECTION      KNEE SURGERY         Family History: History reviewed. No pertinent family history.    Social History:  reports that she quit smoking about 4 weeks ago. Her smoking use included cigarettes. She has a 20.00 pack-year smoking history. She does not have any smokeless tobacco history on file. She reports that she drinks alcohol. She reports that she does not use drugs.    Allergies:  Review of patient's allergies indicates:  No Known Allergies    Medications:  Current Outpatient Medications   Medication Sig Dispense Refill    albuterol (ACCUNEB) 0.63 mg/3 mL Nebu Take 3 mLs (0.63 mg total) by nebulization every 6 (six) hours as needed. Rescue 90 mL 0    amLODIPine (NORVASC) 5 MG tablet Take 5 mg by mouth once daily.      budesonide/formoterol fumarate (SYMBICORT INHL) Inhale into the lungs.      gabapentin (NEURONTIN) 100 MG capsule Take 100 mg by mouth 3 (three) times daily.      HYDROcodone-acetaminophen (NORCO) 5-325 mg per tablet Take 1 tablet by mouth every 8 (eight) hours as needed. 12 tablet 0    pantoprazole (PROTONIX) 20 MG tablet Take 20 mg by mouth once daily.      promethazine-codeine 6.25-10 mg/5 ml (PHENERGAN WITH CODEINE) 6.25-10 mg/5 mL syrup Take 5 mLs by mouth nightly as needed for Cough. 118 mL 0    valsartan-hydrochlorothiazide (DIOVAN-HCT) 320-12.5 mg per tablet Take 1 tablet by mouth once daily.       No current facility-administered medications for this visit.        Review of Systems   Constitutional: Positive for activity change and fatigue. Negative for chills, fever and unexpected weight change.   HENT: Negative for nosebleeds.    Eyes: Negative for visual disturbance.   Respiratory: Positive for cough and shortness of breath.    Cardiovascular: Negative for chest pain and leg swelling.   Gastrointestinal: Negative for abdominal distention, abdominal pain and nausea.   Endocrine: Negative for cold  "intolerance and heat intolerance.   Genitourinary: Negative for difficulty urinating and dysuria.   Musculoskeletal: Positive for arthralgias (chronic).   Skin: Negative for color change.   Neurological: Positive for weakness. Negative for dizziness, light-headedness and numbness.   Hematological: Does not bruise/bleed easily.   Psychiatric/Behavioral: Negative for confusion.       ECOG Performance Status:  1  Objective:      Vitals:   Vitals:    05/13/20 0925   BP: 121/77   BP Location: Left arm   Patient Position: Sitting   BP Method: Medium (Automatic)   Pulse: 81   Resp: 20   SpO2: 100%   Weight: 86.3 kg (190 lb 4.1 oz)   Height: 5' 6" (1.676 m)     BMI: Body mass index is 30.71 kg/m².    Physical Exam   Constitutional: She appears well-developed. No distress.   HENT:   Head: Normocephalic.   Mouth/Throat: Oropharynx is clear and moist. No oropharyngeal exudate.   Eyes: Pupils are equal, round, and reactive to light. EOM are normal. No scleral icterus.   Neck: Normal range of motion. No tracheal deviation present.   Cardiovascular: Normal rate, regular rhythm and normal heart sounds. Exam reveals no gallop and no friction rub.   No murmur heard.  Pulmonary/Chest: Effort normal. No respiratory distress. She has no wheezes. She has no rales.   Diminished breath sounds throughout right lung fields   Abdominal: Soft. Bowel sounds are normal. She exhibits no distension and no mass. There is no tenderness. There is no guarding.   Musculoskeletal: Normal range of motion. She exhibits no edema.   Neurological: She is alert.   Skin: Skin is warm and dry.       Laboratory Data:   Recent Results (from the past 168 hour(s))   Comprehensive metabolic panel    Collection Time: 05/13/20 11:01 AM   Result Value Ref Range    Sodium 136 136 - 145 mmol/L    Potassium 3.8 3.5 - 5.1 mmol/L    Chloride 104 95 - 110 mmol/L    CO2 23 23 - 29 mmol/L    Glucose 154 (H) 70 - 110 mg/dL    BUN, Bld 24 (H) 6 - 20 mg/dL    Creatinine 0.8 0.5 " - 1.4 mg/dL    Calcium 9.4 8.7 - 10.5 mg/dL    Total Protein 7.9 6.0 - 8.4 g/dL    Albumin 2.7 (L) 3.5 - 5.2 g/dL    Total Bilirubin 0.2 0.1 - 1.0 mg/dL    Alkaline Phosphatase 96 55 - 135 U/L    AST 13 10 - 40 U/L    ALT 6 (L) 10 - 44 U/L    Anion Gap 9 8 - 16 mmol/L    eGFR if African American >60.0 >60 mL/min/1.73 m^2    eGFR if non African American >60.0 >60 mL/min/1.73 m^2   CBC auto differential    Collection Time: 20 11:01 AM   Result Value Ref Range    WBC 12.04 3.90 - 12.70 K/uL    RBC 4.14 4.00 - 5.40 M/uL    Hemoglobin 11.1 (L) 12.0 - 16.0 g/dL    Hematocrit 36.8 (L) 37.0 - 48.5 %    Mean Corpuscular Volume 89 82 - 98 fL    Mean Corpuscular Hemoglobin 26.8 (L) 27.0 - 31.0 pg    Mean Corpuscular Hemoglobin Conc 30.2 (L) 32.0 - 36.0 g/dL    RDW 14.1 11.5 - 14.5 %    Platelets 416 (H) 150 - 350 K/uL    MPV 9.3 9.2 - 12.9 fL    Immature Granulocytes 0.8 (H) 0.0 - 0.5 %    Gran # (ANC) 8.2 (H) 1.8 - 7.7 K/uL    Immature Grans (Abs) 0.10 (H) 0.00 - 0.04 K/uL    Lymph # 2.0 1.0 - 4.8 K/uL    Mono # 0.9 0.3 - 1.0 K/uL    Eos # 0.8 (H) 0.0 - 0.5 K/uL    Baso # 0.13 0.00 - 0.20 K/uL    nRBC 0 0 /100 WBC    Gran% 68.1 38.0 - 73.0 %    Lymph% 16.4 (L) 18.0 - 48.0 %    Mono% 7.4 4.0 - 15.0 %    Eosinophil% 6.2 0.0 - 8.0 %    Basophil% 1.1 0.0 - 1.9 %    Differential Method Automated    Genetic Misc Sendout Test, Blood    Collection Time: 20 11:01 AM   Result Value Ref Range    Miscellaneous Genetic Test Name Guardant 360      CareEverywhere  19  HCV RNA HAZEL Qualitative - [facility] Positive (A)   Comment: Positive: HCV RNA Detected         Imagin2020 CT abdomen/pelvis  COMPARISON:  CT abdomen pelvis 2020    Chest radiograph 2020    CT chest 2020    Shoulder radiograph 2013    FINDINGS:  There is increased radiopharmaceutical distribution of the thoracic spine noting degenerative endplate changes at this level on 2020 CT chest.  Subtle increased  radiopharmaceutical density in the right proximal humerus which may represent sequelae of remote fracture at this level.  Increased density of the bilateral knees likely representing degenerative changes.  The remainder of the skeleton demonstrates physiologic distribution of the radiopharmaceutical.  Both kidneys and the bladder appear normal.      Impression       There is no scintigraphic evidence of metastatic disease.         5/9/2020 MRI brain  COMPARISON:  None    FINDINGS:  Diffusion-weighted imaging demonstrates no evidence of high signal to indicate recent infarction or ischemia.    T2 and FLAIR imaging demonstrates mild vasogenic edema in the right cerebellum associated with a 1.1 cm round heterogeneously enhancing mass in the right cerebellum.  This is consistent with metastatic disease.  No other metastatic foci are detected.    There is moderate probable chronic microvascular ischemic changes in the periventricular and subcortical white matter bilaterally.  Mild involutional changes.    No midline shift or hydrocephalus.    No evidence of major vascular infarction.    No evidence of hematoma/hemorrhage.    Normal vascular flow voids near the skull base.    The paranasal sinuses and mastoid air cells are within normal limits.      Impression       1.1 cm enhancing mass in the right cerebellum with mild adjacent vasogenic edema consistent with metastatic disease.  Recommend clinical correlation and follow-up.       5/11/2020 Bone scan  COMPARISON:  CT chest 05/01/2020.    FINDINGS:  There is a partially visualized right hilar mass with multiple pulmonary nodules within the visualized lung parenchyma, better evaluated to the full extent on prior CT chest examination.  Moderate-sized right pleural effusion, unchanged.    Heart is not enlarged.  No pericardial effusion.    Liver is upper normal in size with normal attenuation.  No focal hepatic lesions are seen.  Gallbladder is normal.  No intra or  extrahepatic biliary ductal dilatation.  Spleen is normal in size.    Stomach, pancreas, and adrenal glands show no significant abnormalities.    Kidneys are normal in size and location.  No renal mass or hydronephrosis.  Visualized ureters are normal in course to the bladder.  Urinary bladder is unremarkable.  Uterus is surgically removed.    Visualized loops of small large bowel show no evidence of inflammation or obstruction.  Colonic diverticulosis without evidence of diverticulitis.  Normal appendix.    No lymphadenopathy in the abdomen and pelvis.  No free intraperitoneal air or ascites.  Abdominal aorta is normal in course and caliber.    Osseous structures show age-appropriate degenerative changes without osseous lytic or destructive process.  Incidental sternal foramen.  Extraperitoneal soft tissues appear unremarkable.      Impression       Partially visualized right hilar mass with multiple pulmonary nodules within both lungs, better visualized to the full extent on prior CT examination.  No evidence of intra-abdominal or intrapelvic metastases.    Moderate-sized right pleural effusion.    Colonic diverticulosis.    Hysterectomy.     5/1/2020 CT chest  COMPARISON:  Chest radiograph dated 05/01/2020    FINDINGS:  There is heterogeneous masslike consolidation centered in the right upper lobe with pleural extension, for reference measuring 6.5 x 7.8 cm (series 2, image 29).  Areas of surrounding ground-glass attenuation more superiorly extending to the apex.  There is associated right hilar/subcarinal involvement with resultant narrowing of the distal right mainstem and proximal segmental bronchi.  Several additional subcentimeter nodules in the right middle, lower lobes and left lung (for reference 7 mm in the left upper lobe on series 2, image 31).  Moderate right-sided pleural effusion with dependent atelectasis.  No pericardial effusion.    Structures at the base of the neck are unremarkable.  The  trachea is clear.  There is mediastinal adenopathy predominately involving the prevascular and paratracheal regions, for reference precarinal node measuring 2.6 cm (series 2, image 31).  There is also enlarged left axillary node measuring 1.9 cm (series 2, image 20).    Limited images through the upper abdomen are unremarkable.  No aggressive osseous lesion.  Spine DJD.      Impression       Masslike consolidation centered in the right upper lobe with associated right hilar/subcarinal involvement concerning for neoplasm until proven otherwise.    Metastatic bilateral pulmonary nodularity and mediastinal adenopathy.  Additional enlarged left axillary node, possible additional metastasis.    Moderate right pleural effusion.    Additional findings as above.    RECIST SUMMARY:    Date of prior examination for comparison: None    Lesion 1: Right upper lobe.  7.8 cm.  Series 2, image 29.    Lesion 2: Precarinal node.  2.6 cm.  Series 2, image 31.           Assessment:       1. Adenocarcinoma of lung, stage 4, right    2. Primary malignant neoplasm of lung with metastasis to brain    3. Lung mass           Plan:       1. 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  Also has pleural effusion on right side.  Pending mutational testing for BRAF.  PDL1 expressed in 100% of tumor cells from biopsy.  No mutation identified for EGFR/ALK/ROS1 by tissue.  -Will refer to radiation oncology  -Candidate for immunotherapy alone.  However, will need referral to hepatology for prophylaxis evaluation given her Hep C status.  --Will eval to see if eligible for [id number]  -Will present to tumor board 5/20/2020 and return to clinic 5/27/20 to start treatment.  (results of mutational analysis not yet resulted during the visit and was only aware of results of tests including PDL1 after the visit).      05/13/2020      Phoned the patient.      Discussed the following with the patient:  In an effort to protect our  immunocompromised patients from potential exposure to COVID-19, [facility] will now require all patients receiving an infusion, an injection, and/or radiation therapy to be tested for COVID-19 prior to their appointment.  All patients currently under treatment will be tested immediately, and patients initiating new treatment cycles or with one-time appointments (injections, transfusions, etc.) must be tested within 72 hours of their appointment.      Symptom Patient's Response   Fever YES/NO: no   Cough YES/NO: yes   Shortness of breath  YES/NO: yes   Difficulty breathing YES/NO: yes   GI symptoms such as diarrhea or nausea YES/NO: yes   Loss of taste YES/NO: no   Loss of smell YES/NO: no     Other Screening Patient's Response   Has the patient previously undergone COVID-19 testing? YES/NO: yes     If yes to the question directly above, what was the result? negative   Has the patient been in close contact with someone who has undergone COVID-19 testing? YES/NO: no     If yes to the question directly above, what was the result? not applicable      The patient's 24-hour COVID-19 test was ordered and scheduled.  Reviewed the appointment date, time, and location with the patient.      Advised the patient that she can expect the results to take approximately 24 hours.  Advised the patient that someone will reach out to her regarding her results if she tests positive, as her treatment appointment will be rescheduled if she tests positive for COVID-19.  Also advised the patient that if she does not hear back from our office or if she is told by our office she has tested negative for COVID-19, she can proceed with treatment as originally planned.     Questions were answered to the patient's satisfaction, and the patient verbalized understanding of information and agreement with the plan.       The above was completed in accordance with instructions and guidelines set forth by [facility].     Signed,    Maik Peterson,  MD     Date:  05/13/2020      Orders Placed This Encounter   Procedures    Comprehensive metabolic panel    CBC auto differential           Maik Peterson MD  Hematology Oncology Fellow PGY6  Pager [phone]

## 2020-05-13 NOTE — NURSING
Nurse Navigator Note  Met with patient during consult appointment with Dr. Ariza and Dr. Peterson. Patient presented in wheelchair and nasal oxygen.  Patient and I reviewed the information discussed with both providers including treatment (radiation/chemotherapy) diagnosis-Stage IV lung with brain mets, and future plans for workup-sent for additional labs.  Visit also included speaker phone with niece on the other end.  Information explained to family members again by phone.  Pt returned call to inquire about pain medication and cough med.  Pain medication e-scripted to [facility].    Oncology Navigation   Intake  Date of Diagnosis: 20  Cancer Type: Thoracic  MD Assigned: Dr. Peterson  Internal / External Referral: Internal  Initial Nurse Navigator Contact: 20  Diagnosis to Initial Contact Timeline (days): 0 days  Contact Method: Individual basket  Date Worked: 20  First Appointment Available: 20  Appointment Date: 20  Schedule to Appointment Timeline (days): 8  First Available Date vs. Scheduled Date (days): 0  Multiple appointments: Yes (CT abdomen/Pelvis MRI)  Reason if booked > 7 days after scheduling: Transportation coordination     Treatment  Current Status: Active  Treatment Type(s): Chemotherapy;Radiation  Chemotherapy Regimen: Pembrolizumab q3wk    Radiation Oncologist: Dr. Colmenares    Procedures: Bone scan;MRI  Bone Scan Schedule Date: 20  MRI Schedule Date: 20    Support Systems: Children;Family members;Friends / neighbors     Acuity  Stage: 2  Treatment Tolerability: Has not started treatment yet/treatment fully completed and side effects resolved  ECO  Comorbidities in Medical History: 1  Hospitalization Within the Past Month: 1  Other Medical Factors (+1 each): Home medical equipment required (Uses home oxygen)   Needed: 0  Support: 0  Verbalizes Financial Concerns: 0  Transportation: 0  Mental Health: PHQ Score: 0  History of noncompliance/frequent no  shows and cancellations: 0  Verbalizes the need for more education: 0  Other Factors (+1 for Each): 1  Navigation Acuity: 8     Follow Up  Follow up in about 4 weeks (around 6/10/2020) for Follow up chemo/radiation treatment.

## 2020-05-14 ENCOUNTER — TELEPHONE (OUTPATIENT)
Dept: HEMATOLOGY/ONCOLOGY | Facility: CLINIC | Age: 60
End: 2020-05-14

## 2020-05-14 ENCOUNTER — DOCUMENTATION ONLY (OUTPATIENT)
Dept: TRANSPLANT | Facility: CLINIC | Age: 60
End: 2020-05-14

## 2020-05-14 NOTE — NURSING
Pt records reviewed.   Pt will be referred to Hepatitis C clinic    Initial referral received  from the [facility]que.   Referring Provider/diagnosis   LILIAN HEATON   Diagnosis: Hepatitis C antibody test positive     HCV RNA pending at time of referral.     Referral letter sent to  patient.

## 2020-05-14 NOTE — LETTER
May 14, 2020    DENISSE Boyce  [address] [address]      Dear DENISSE Boyce:    Your doctor has referred you to the [facility]. We are sending this letter to remind you to make an appointment with us to complete the referral process.     Please call us at [phone] to schedule an appointment. We look forward to seeing you soon.     If you received a call and have been scheduled, please disregard this letter.       Sincerely,        [facility]   [address] [place] [id number]  [phone]

## 2020-05-17 ENCOUNTER — NURSE TRIAGE (OUTPATIENT)
Dept: ADMINISTRATIVE | Facility: CLINIC | Age: 60
End: 2020-05-17

## 2020-05-17 NOTE — TELEPHONE ENCOUNTER
Attempted to contact patient through post procedure symptom monitoring program. No contact made. Triage RN to call back tomorrow.      Reason for Disposition   No answer.  First attempt to contact caller.  Follow-up call scheduled within 15 minutes.    Additional Information   Negative: Caller is angry or rude (e.g., hangs up, verbally abusive, yelling)   Negative: Caller hangs up   Negative: Caller has already spoken with the PCP and has no further questions.   Negative: Caller has already spoken with another triager and has no further questions.   Negative: Caller has already spoken with another triager or PCP AND has further questions AND triager able to answer questions.   Negative: Busy signal.  First attempt to contact caller.  Follow-up call scheduled within 15 minutes.    Protocols used: NO CONTACT OR DUPLICATE CONTACT CALL-A-AH

## 2020-05-18 ENCOUNTER — TELEPHONE (OUTPATIENT)
Dept: HEMATOLOGY/ONCOLOGY | Facility: CLINIC | Age: 60
End: 2020-05-18

## 2020-05-18 ENCOUNTER — HOSPITAL ENCOUNTER (OUTPATIENT)
Dept: RADIATION THERAPY | Facility: HOSPITAL | Age: 60
Discharge: HOME | End: 2020-05-18
Payer: MEDICAID

## 2020-05-18 ENCOUNTER — INITIAL CONSULT (OUTPATIENT)
Dept: RADIATION ONCOLOGY | Facility: CLINIC | Age: 60
End: 2020-05-18
Payer: MEDICAID

## 2020-05-18 ENCOUNTER — DOCUMENTATION ONLY (OUTPATIENT)
Dept: RADIATION ONCOLOGY | Facility: CLINIC | Age: 60
End: 2020-05-18

## 2020-05-18 VITALS
BODY MASS INDEX: 31.59 KG/M2 | HEART RATE: 88 BPM | RESPIRATION RATE: 20 BRPM | OXYGEN SATURATION: 95 % | WEIGHT: 195.69 LBS | SYSTOLIC BLOOD PRESSURE: 170 MMHG | TEMPERATURE: 98 F | DIASTOLIC BLOOD PRESSURE: 83 MMHG

## 2020-05-18 DIAGNOSIS — C34.91 ADENOCARCINOMA OF LUNG, STAGE 4, RIGHT: ICD-10-CM

## 2020-05-18 DIAGNOSIS — C79.31 METASTASIS TO BRAIN: ICD-10-CM

## 2020-05-18 DIAGNOSIS — C79.31 SECONDARY MALIGNANT NEOPLASM OF BRAIN: Primary | ICD-10-CM

## 2020-05-18 LAB
FINAL PATHOLOGIC DIAGNOSIS: NORMAL
GROSS: NORMAL
HCV RNA SERPL NAA+PROBE-LOG IU: <1.08 LOG (10) IU/ML
HCV RNA SERPL QL NAA+PROBE: NOT DETECTED IU/ML
HCV RNA SPEC NAA+PROBE-ACNC: <12 IU/ML
SUPPLEMENTAL DIAGNOSIS: NORMAL

## 2020-05-18 PROCEDURE — 99205 PR OFFICE/OUTPT VISIT, NEW, LEVL V, 60-74 MIN: ICD-10-PCS | Mod: S$PBB,,, | Performed by: RADIOLOGY

## 2020-05-18 PROCEDURE — 99214 OFFICE O/P EST MOD 30 MIN: CPT | Mod: PBBFAC | Performed by: RADIOLOGY

## 2020-05-18 PROCEDURE — 99999 PR PBB SHADOW E&M-EST. PATIENT-LVL IV: ICD-10-PCS | Mod: PBBFAC,,, | Performed by: RADIOLOGY

## 2020-05-18 PROCEDURE — 99205 OFFICE O/P NEW HI 60 MIN: CPT | Mod: S$PBB,,, | Performed by: RADIOLOGY

## 2020-05-18 PROCEDURE — 99999 PR PBB SHADOW E&M-EST. PATIENT-LVL IV: CPT | Mod: PBBFAC,,, | Performed by: RADIOLOGY

## 2020-05-18 RX ORDER — LORAZEPAM 1 MG/1
1 TABLET ORAL
Qty: 5 TABLET | Refills: 0 | Status: SHIPPED | OUTPATIENT
Start: 2020-05-18 | End: 2020-05-22

## 2020-05-18 NOTE — TELEPHONE ENCOUNTER
----- Message from Thomas Devries sent at 5/18/2020 11:45 AM CDT -----  Contact: Patient  Patient Advice/Staff Message     Caller name: Pt    Reason for call: Wants to speak with the doctor, says that she is short winded. Needs to be advised.     Do you feel you need to be seen today:: No        Communication Preference: [phone]    Additional Information:

## 2020-05-18 NOTE — TELEPHONE ENCOUNTER
Contacted for post procedural symptom tracker. Pt denies any cough, sob or fever since procedure. Pt c/o abdominal pain she describes as gas when walking around. Doctor called pt during triage and no further triage needed.    Reason for Disposition   [1] Follow-up call to recent contact AND [2] information only call, no triage required    Additional Information   Negative: [1] Caller is not with the adult (patient) AND [2] reporting urgent symptoms   Negative: Lab result questions   Negative: Medication questions   Negative: Caller can't be reached by phone   Negative: Caller has already spoken to PCP or another triager   Negative: RN needs further essential information from caller in order to complete triage   Negative: Requesting regular office appointment   Negative: [1] Caller requesting NON-URGENT health information AND [2] PCP's office is the best resource   Negative: Health Information question, no triage required and triager able to answer question   Negative: General information question, no triage required and triager able to answer question   Negative: Question about upcoming scheduled test, no triage required and triager able to answer question   Negative: [1] Caller is not with the adult (patient) AND [2] probable NON-URGENT symptoms    Protocols used: INFORMATION ONLY CALL-A-

## 2020-05-18 NOTE — LETTER
May 18, 2020      Maik Peterson MD  [address]  [facility] [address]           [facility]nal[facility] Winters - Radiation Oncology  [address]  [facility] [address]  Phone: [phone]          Patient: Janeth Boyce   MR Number: [id number]   YOB: 1960   Date of Visit: 5/18/2020       Dear Dr. Maik Peterson:    Thank you for referring Janeth Boyce to me for evaluation. Attached you will find relevant portions of my assessment and plan of care.    If you have questions, please do not hesitate to call me. I look forward to following Janeth Boyce along with you.    Sincerely,    Oni Colmenares MD    Enclosure  CC:  No Recipients    If you would like to receive this communication electronically, please contact [email or web address] or [phone] to request more information on "[employer] Link access.    For providers and/or their staff who would like to refer a patient to [facility], please contact us through our one-stop-shop provider referral line, [facility], at [phone].    If you feel you have received this communication in error or would no longer like to receive these types of communications, please e-mail [email or web address]

## 2020-05-18 NOTE — PROGRESS NOTES
05/18/2020    Radiation Oncology Consultation    Assessment   This is a 59 y.o. y/o female with Stage IV RUL NSCLC (adeno; mutation status pending) diagnosed on biopsy 5/4/20. 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 She is referred for consideration of treatment options.     I discussed the treatment options available for cancer that has spread to the brain including surgical resection, radiosurgery, and whole brain radiation. For large, symptomatic masses, surgery is typically preferred to improve symptoms and reduce risk of radiation-induced edema. For patients with more limited disease, radiosurgery is typically favored to improve local control while limiting side effects of radiation. Whole brain radiation is often recommended when many metastases are present of in certain histologies such as small cell lung cancer.    I recommend treating the Right cerebellar metastasis to 20-24 Gy x1 fraction with radiosurgery. Potential short- and long-term side effects of radiosurgery were reviewed. At the end of our discussion, she wished to proceed with the recommended treatment.     At this time her dyspnea appears stable, but she does have disease surrounding all 3 lobar bronchi of the Right lung. If her dyspnea worsens, I would recommend palliative RT for relief of airway obstruction.        Plan   1) CT Simulation today for radiosurgery treatment planning.   2) If dyspnea worsens, consider palliative RT to Right lung.        Chief Complaint   Patient presents with    Lung Cancer     radiation cons       HPI: Ms. Boyce is a 60 y/o female who was diagnosed with RUL NSCLC (adeno; mutational studies pending) on RUL biopsy 5/4/20. As part of staging, she underwent MRI Brain 5/9/20 that demonstrated a 1.1 cm Right cerebellar enhancing mass c/w metastasis. She is referred for consideration of treatment options.     In clinic today, she is accompanied by her daughter. She has dyspnea on exertion  and cough but feels these have been stable for 2-3 months. She denies fevers, weight loss, hemoptysis, or chest pain.       Prior Radiation History: None    Past Medical History:   Diagnosis Date    Asthma     Hepatitis C     Hypertension     Lung cancer        Past Surgical History:   Procedure Laterality Date     SECTION      KNEE SURGERY         Social History     Tobacco Use    Smoking status: Former Smoker     Packs/day: 0.50     Years: 40.00     Pack years: 20.00     Types: Cigarettes     Last attempt to quit: 2020     Years since quittin.1   Substance Use Topics    Alcohol use: Yes     Comment: occasional    Drug use: No       Cancer-related family history is not on file.    Current Outpatient Medications on File Prior to Visit   Medication Sig Dispense Refill    albuterol (ACCUNEB) 0.63 mg/3 mL Nebu Take 3 mLs (0.63 mg total) by nebulization every 6 (six) hours as needed. Rescue 90 mL 0    amLODIPine (NORVASC) 5 MG tablet Take 5 mg by mouth once daily.      budesonide/formoterol fumarate (SYMBICORT INHL) Inhale into the lungs.      gabapentin (NEURONTIN) 100 MG capsule Take 100 mg by mouth 3 (three) times daily.      HYDROcodone-acetaminophen (NORCO) 5-325 mg per tablet Take 1 tablet by mouth every 12 (twelve) hours as needed for Pain. 60 tablet 0    pantoprazole (PROTONIX) 20 MG tablet Take 20 mg by mouth once daily.      valsartan-hydrochlorothiazide (DIOVAN-HCT) 320-12.5 mg per tablet Take 1 tablet by mouth once daily.      promethazine-codeine 6.25-10 mg/5 ml (PHENERGAN WITH CODEINE) 6.25-10 mg/5 mL syrup Take 5 mLs by mouth nightly as needed for Cough. (Patient not taking: Reported on 2020) 473 mL 0     No current facility-administered medications on file prior to visit.        Review of patient's allergies indicates:  No Known Allergies    Review of Systems   Constitutional: Negative for fever and weight loss.   HENT: Negative for ear pain and sore throat.    Eyes:  Negative for blurred vision and double vision.   Respiratory: Positive for shortness of breath. Negative for cough and hemoptysis.    Cardiovascular: Negative for chest pain and leg swelling.   Gastrointestinal: Positive for diarrhea. Negative for abdominal pain, constipation, heartburn and nausea.   Genitourinary: Negative for dysuria and hematuria.   Musculoskeletal: Negative for falls and joint pain.   Neurological: Negative for tingling, speech change, focal weakness, seizures and headaches.   Psychiatric/Behavioral: Negative for depression. The patient is not nervous/anxious.         Vital Signs: BP (!) 170/83 (BP Location: Right arm)   Pulse 88   Temp 98.2 °F (36.8 °C) (Oral)   Resp 20   Wt 88.8 kg (195 lb 11.2 oz)   SpO2 95%   BMI 31.59 kg/m²     ECOG Performance Status: 2 - Ambulates, capable of self care only    Physical Exam   Constitutional: She is oriented to person, place, and time and well-developed, well-nourished, and in no distress.   HENT:   Head: Normocephalic and atraumatic.   Mouth/Throat: Oropharynx is clear and moist.   Eyes: EOM are normal. No scleral icterus.   Neck: Normal range of motion. Neck supple.   Pulmonary/Chest: No accessory muscle usage. No respiratory distress.   Abdominal: Soft. Normal appearance. She exhibits no distension.   Musculoskeletal: Normal range of motion. She exhibits no edema.   Lymphadenopathy:     She has no cervical adenopathy.        Right: No supraclavicular adenopathy present.        Left: No supraclavicular adenopathy present.   Neurological: She is alert and oriented to person, place, and time. No cranial nerve deficit.   Skin: Skin is warm and dry.   Psychiatric: Mood, affect and judgment normal.   Vitals reviewed.       Labs:    Imaging: I have personally reviewed the patient's available images and reports and summarized pertinent findings above in HPI.     Pathology: I have personally reviewed the patient's available pathology and summarized  pertinent findings above in HPI.       - Thank you for allowing me to participate in the care of your patient.    Oni Colmenares MD, PhD

## 2020-05-18 NOTE — NURSING
05/18/2020           Discussed the following with the patient:  In an effort to protect our immunocompromised patients from potential exposure to COVID-19, [facility] will now require all patients receiving an infusion, an injection, and/or radiation therapy to be tested for COVID-19 prior to their appointment.  All patients currently under treatment will be tested immediately, and patients initiating new treatment cycles or with one-time appointments (injections, transfusions, etc.) must be tested within 72 hours of their appointment.      Symptom Patient's Response   Fever YES/NO: no   Cough YES/NO: no   Shortness of breath  YES/NO: yes   Difficulty breathing YES/NO: no   GI symptoms such as diarrhea or nausea YES/NO: no   Loss of taste YES/NO: no   Loss of smell YES/NO: no     Other Screening Patient's Response   Has the patient previously undergone COVID-19 testing? YES/NO: yes     If yes to the question directly above, what was the result? negative   Has the patient been in close contact with someone who has undergone COVID-19 testing? YES/NO: no     If yes to the question directly above, what was the result? not applicable      The patient's 24-hour COVID-19 test was ordered and scheduled.  Reviewed the appointment date, time, and location with the patient.      Advised the patient that she can expect the results to take approximately 24 hours.  Advised the patient that someone will reach out to her regarding her results if she tests positive, as her treatment appointment will be rescheduled if she tests positive for COVID-19.  Also advised the patient that if she does not hear back from our office or if she is told by our office she has tested negative for COVID-19, she can proceed with treatment as originally planned.     Questions were answered to the patient's satisfaction, and the patient verbalized understanding of information and agreement with the plan.       The above was completed in accordance with  instructions and guidelines set forth by [facility].     Signed,        Leia Chavez RN     Date:  05/18/2020

## 2020-05-19 ENCOUNTER — TELEPHONE (OUTPATIENT)
Dept: HEMATOLOGY/ONCOLOGY | Facility: CLINIC | Age: 60
End: 2020-05-19

## 2020-05-19 ENCOUNTER — TELEPHONE (OUTPATIENT)
Dept: RADIATION ONCOLOGY | Facility: CLINIC | Age: 60
End: 2020-05-19

## 2020-05-19 PROCEDURE — 77334 RADIATION TREATMENT AID(S): CPT | Mod: TC | Performed by: RADIOLOGY

## 2020-05-19 PROCEDURE — 77334 PR  RADN TREATMENT AID(S) COMPLX: ICD-10-PCS | Mod: 26,,, | Performed by: RADIOLOGY

## 2020-05-19 PROCEDURE — 77334 RADIATION TREATMENT AID(S): CPT | Mod: 26,,, | Performed by: RADIOLOGY

## 2020-05-19 NOTE — NURSING
"Returned call to pt with concerns of feeling short of breath and an uncomfortable feeling at the top of what is described as her "epigastric" area of her stomach.  Also complained of not having her albuterol for her breathing treatment.  Called preferred pharmacy to inquire and prescription was sent and will be filled for pt to .  Spoke back to pt and informed of prescription feeling.  Complained of not moving her bowels regularly since she was told she has cancer and feels like she is full of gas.  Informed that there are OTC medications that she could take to help relieve her gas.  Pt is very anxious and concerned about completing her scanning on tomorrow.    Oncology Navigation   Intake  Date of Diagnosis: 20  Cancer Type: Thoracic  MD Assigned: Dr. Peterson  Internal / External Referral: Internal  Initial Nurse Navigator Contact: 20  Diagnosis to Initial Contact Timeline (days): 0 days  Contact Method: Individual basket  Date Worked: 20  First Appointment Available: 20  Appointment Date: 20  Schedule to Appointment Timeline (days): 8  First Available Date vs. Scheduled Date (days): 0  Multiple appointments: Yes (CT abdomen/Pelvis MRI)  Reason if booked > 7 days after scheduling: Transportation coordination  Start of Treatment: 20     Treatment  Current Status: Active  Treatment Type(s): Chemotherapy;Radiation  Chemotherapy Regimen: Pembrolizumab q3wk    Radiation Oncologist: Dr. Colmenares    Procedures: Bone scan;MRI  Bone Scan Schedule Date: 20  MRI Schedule Date: 20    Support Systems: Children;Family members;Friends / neighbors     Acuity  Stage: 2  Treatment Tolerability: Has not started treatment yet/treatment fully completed and side effects resolved  ECO  Comorbidities in Medical History: 1  Hospitalization Within the Past Month: 1  Other Medical Factors (+1 each): Home medical equipment required (Uses home oxygen)   Needed: 0  Support: " 0  Verbalizes Financial Concerns: 0  Transportation: 0  Mental Health: PHQ Score: 0  History of noncompliance/frequent no shows and cancellations: 0  Verbalizes the need for more education: 0  Other Factors (+1 for Each): 1  Navigation Acuity: 8     Follow Up  No follow-ups on file.

## 2020-05-19 NOTE — TELEPHONE ENCOUNTER
----- Message from Yoly Drake sent at 5/19/2020  1:16 PM CDT -----  Contact: Patient      ----- Message -----  From: Thomas Devries  Sent: 5/19/2020  12:43 PM CDT  To: [facility]  Pool    Returning a call     Caller name:: Pt    Who Left Message for Patient:: Mariana     Communication preference:: [phone]    Additional info::

## 2020-05-19 NOTE — TELEPHONE ENCOUNTER
----- Message from Yoly Drake sent at 5/13/2020  4:27 PM CDT -----  .  auth for pembrolizumab 4. 5/26 needs cmp, cbc, tsh, covid screen.  5. 5/27 follow up with me and then cycle 1 pembrolizumab after.  Thanks -e

## 2020-05-19 NOTE — TELEPHONE ENCOUNTER
Patient having pain at the top of the stomach, that comes and goes. States that it is like reflux, she verifies that she is taking Protonix as prescribed. She is also asking for her nebulizer solution to be refilled.

## 2020-05-19 NOTE — TELEPHONE ENCOUNTER
"----- Message from Kait To sent at 5/19/2020  9:47 AM CDT -----  Contact: Janeth  Consult/Advisory:    Name Of Caller: Janeth  Provider Name: Dr. Peterson  Does patient feel the need to be seen today? No  Relationship to the Pt?: Self  Contact Preference?: [phone]  What is the nature of the call?  Patient states she is experiencing acid reflux and request a callback to discuss    Additional Notes:  "Thank you for all that you do for our patients'"      "

## 2020-05-19 NOTE — TELEPHONE ENCOUNTER
----- Message from Teagan Avery sent at 5/19/2020  1:15 PM CDT -----  Contact: Pt's sister Ayan Garcia is returning your call. Please call at [phone]  Return call appt for tomorrow in simulations confirmed

## 2020-05-20 PROCEDURE — 77290 THER RAD SIMULAJ FIELD CPLX: CPT | Mod: 26,,, | Performed by: RADIOLOGY

## 2020-05-20 PROCEDURE — 77263 PR  RADIATION THERAPY PLAN COMPLEX: ICD-10-PCS | Mod: ,,, | Performed by: RADIOLOGY

## 2020-05-20 PROCEDURE — 77290 PR  SET RADN THERAPY FIELD COMPLEX: ICD-10-PCS | Mod: 26,,, | Performed by: RADIOLOGY

## 2020-05-20 PROCEDURE — 77290 THER RAD SIMULAJ FIELD CPLX: CPT | Mod: TC | Performed by: RADIOLOGY

## 2020-05-20 PROCEDURE — 77263 THER RADIOLOGY TX PLNG CPLX: CPT | Mod: ,,, | Performed by: RADIOLOGY

## 2020-05-20 PROCEDURE — 77014 HC CT GUIDANCE RADIATION THERAPY FLDS PLACEMENT: CPT | Mod: TC | Performed by: RADIOLOGY

## 2020-05-21 ENCOUNTER — TELEPHONE (OUTPATIENT)
Dept: HEPATOLOGY | Facility: CLINIC | Age: 60
End: 2020-05-21

## 2020-05-21 LAB
GENETIC COUNSELING?: NO
GENSO SPECIMEN TYPE: NORMAL
MISCELLANEOUS GENETIC TEST NAME: NORMAL
PARTENTAL OR SIBLING TESTING?: NO
REFERENCE LAB: NORMAL
TEST RESULT: NORMAL

## 2020-05-21 NOTE — TELEPHONE ENCOUNTER
----- Message from Jennifer B. Scheuermann, PA sent at 5/19/2020 12:14 PM CDT -----  Don't schedule  We've talked w/ hem/onc. If they want her seen they'll send to hepatology but for now don't do anything    ----- Message -----  From: Kylee Peoples RN  Sent: 5/18/2020  10:53 AM CDT  To: Jennifer B. Scheuermann, PA    Hep C quant pending 5/13.  Cancer patient.  Should this one be book ASAP.  ----- Message -----  From: Yoly Drake  Sent: 5/13/2020   4:29 PM CDT  To: [facility] Hepatitis C Staff    Patient has been referred by oncologist. Can you assist with scheduling?

## 2020-05-22 ENCOUNTER — TELEPHONE (OUTPATIENT)
Dept: HEMATOLOGY/ONCOLOGY | Facility: CLINIC | Age: 60
End: 2020-05-22

## 2020-05-22 DIAGNOSIS — C79.31 SECONDARY MALIGNANT NEOPLASM OF BRAIN: ICD-10-CM

## 2020-05-22 NOTE — TELEPHONE ENCOUNTER
"Spoke to patient.  Says sometimes when she stands she has the sensation of wanting to urinate right away.  Advised she may want to try timed voiding every few hours to keep bladder empty so she doesn't have to rush to the bathroom.    Reviewed ativan prescription.  She says she took one for the simulation but then doesn't know what happened to the rest of the pills.  Told her I will prescribe her 2 more pills for pickup on Monday but that she cannot let anyone else hold onto them.  She is agreeable with this plan.    Maik Peterson MD  Hematology Oncology Fellow PGY6  Pager [phone]    ----- Message from Nichole Mccauley RN sent at 5/22/2020  2:15 PM CDT -----  Hi,       I had the pleasure of speaking with Ms. Boyce this morning.  She is complaining of feeling urinary incontinence when she stands.  She is reporting that she gets a real "chill" feeling and then sweaty feeling once she stands.  Then she has uncontrollable urination.  She states she didn't have a catheter while in the hospital but it sounds like it could be a bladder issue.      She is also requesting a refill or a prescription for the pills she was to use for the simulation on Wednesday.  Looks like Dr. Colmenares ordered Ativan for her and she doesn't have any more.  I do believe she took them to calm her nerves down and now don't have any more to take for her simulation.  Please advise or call pt.    Thanks  Nichole"

## 2020-05-22 NOTE — NURSING
"Received call from pt this morning complaining of urinary incontinence.  Symptoms also include feeling a chill and feeling flushed, then urinating  on herself once she stands. Currently using pads to help with incontinence. Also asking for additional Ativan to help calm her "nerves" states she has taken the ones that was prescribed for the simulation on Wednesday.  Message sent to staff.   "
0 = independent

## 2020-05-25 ENCOUNTER — HOSPITAL ENCOUNTER (INPATIENT)
Facility: HOSPITAL | Age: 60
LOS: 4 days | Discharge: HOME | DRG: 181 | End: 2020-05-29
Attending: EMERGENCY MEDICINE | Admitting: INTERNAL MEDICINE
Payer: MEDICAID

## 2020-05-25 ENCOUNTER — TELEPHONE (OUTPATIENT)
Dept: HEMATOLOGY/ONCOLOGY | Facility: CLINIC | Age: 60
End: 2020-05-25

## 2020-05-25 DIAGNOSIS — F41.9 ANXIETY: ICD-10-CM

## 2020-05-25 DIAGNOSIS — J90 PLEURAL EFFUSION ON RIGHT: ICD-10-CM

## 2020-05-25 DIAGNOSIS — C34.91 ADENOCARCINOMA OF LUNG, STAGE 4, RIGHT: Primary | ICD-10-CM

## 2020-05-25 DIAGNOSIS — C79.31 PRIMARY MALIGNANT NEOPLASM OF LUNG WITH METASTASIS TO BRAIN: ICD-10-CM

## 2020-05-25 DIAGNOSIS — C34.90 PRIMARY MALIGNANT NEOPLASM OF LUNG WITH METASTASIS TO BRAIN: ICD-10-CM

## 2020-05-25 DIAGNOSIS — R06.02 SOB (SHORTNESS OF BREATH): ICD-10-CM

## 2020-05-25 DIAGNOSIS — R07.9 CHEST PAIN: ICD-10-CM

## 2020-05-25 PROBLEM — G47.00 INSOMNIA: Status: ACTIVE | Noted: 2020-05-25

## 2020-05-25 LAB
ALBUMIN SERPL BCP-MCNC: 2.9 G/DL (ref 3.5–5.2)
ALP SERPL-CCNC: 94 U/L (ref 55–135)
ALT SERPL W/O P-5'-P-CCNC: 9 U/L (ref 10–44)
ANION GAP SERPL CALC-SCNC: 10 MMOL/L (ref 8–16)
AST SERPL-CCNC: 13 U/L (ref 10–40)
BASOPHILS # BLD AUTO: 0.13 K/UL (ref 0–0.2)
BASOPHILS NFR BLD: 1.1 % (ref 0–1.9)
BILIRUB SERPL-MCNC: 0.3 MG/DL (ref 0.1–1)
BILIRUB UR QL STRIP: NEGATIVE
BNP SERPL-MCNC: 24 PG/ML (ref 0–99)
BUN SERPL-MCNC: 12 MG/DL (ref 6–20)
CALCIUM SERPL-MCNC: 10 MG/DL (ref 8.7–10.5)
CHLORIDE SERPL-SCNC: 101 MMOL/L (ref 95–110)
CLARITY UR REFRACT.AUTO: CLEAR
CO2 SERPL-SCNC: 27 MMOL/L (ref 23–29)
COLOR UR AUTO: YELLOW
CREAT SERPL-MCNC: 0.8 MG/DL (ref 0.5–1.4)
DIFFERENTIAL METHOD: ABNORMAL
EOSINOPHIL # BLD AUTO: 0.9 K/UL (ref 0–0.5)
EOSINOPHIL NFR BLD: 7.8 % (ref 0–8)
ERYTHROCYTE [DISTWIDTH] IN BLOOD BY AUTOMATED COUNT: 14.6 % (ref 11.5–14.5)
EST. GFR  (AFRICAN AMERICAN): >60 ML/MIN/1.73 M^2
EST. GFR  (NON AFRICAN AMERICAN): >60 ML/MIN/1.73 M^2
GLUCOSE SERPL-MCNC: 151 MG/DL (ref 70–110)
GLUCOSE UR QL STRIP: NEGATIVE
HCT VFR BLD AUTO: 39.4 % (ref 37–48.5)
HGB BLD-MCNC: 11.7 G/DL (ref 12–16)
HGB UR QL STRIP: NEGATIVE
IMM GRANULOCYTES # BLD AUTO: 0.05 K/UL (ref 0–0.04)
IMM GRANULOCYTES NFR BLD AUTO: 0.4 % (ref 0–0.5)
INR PPP: 1.1 (ref 0.8–1.2)
KETONES UR QL STRIP: NEGATIVE
LEUKOCYTE ESTERASE UR QL STRIP: NEGATIVE
LIPASE SERPL-CCNC: 6 U/L (ref 4–60)
LYMPHOCYTES # BLD AUTO: 1.8 K/UL (ref 1–4.8)
LYMPHOCYTES NFR BLD: 15.3 % (ref 18–48)
MCH RBC QN AUTO: 26.2 PG (ref 27–31)
MCHC RBC AUTO-ENTMCNC: 29.7 G/DL (ref 32–36)
MCV RBC AUTO: 88 FL (ref 82–98)
MONOCYTES # BLD AUTO: 0.9 K/UL (ref 0.3–1)
MONOCYTES NFR BLD: 7.1 % (ref 4–15)
NEUTROPHILS # BLD AUTO: 8.2 K/UL (ref 1.8–7.7)
NEUTROPHILS NFR BLD: 68.3 % (ref 38–73)
NITRITE UR QL STRIP: NEGATIVE
NRBC BLD-RTO: 0 /100 WBC
PH UR STRIP: 5 [PH] (ref 5–8)
PLATELET # BLD AUTO: 347 K/UL (ref 150–350)
PMV BLD AUTO: 9.6 FL (ref 9.2–12.9)
POTASSIUM SERPL-SCNC: 3.5 MMOL/L (ref 3.5–5.1)
PROT SERPL-MCNC: 8.4 G/DL (ref 6–8.4)
PROT UR QL STRIP: NEGATIVE
PROTHROMBIN TIME: 10.9 SEC (ref 9–12.5)
RBC # BLD AUTO: 4.47 M/UL (ref 4–5.4)
SARS-COV-2 RDRP RESP QL NAA+PROBE: NEGATIVE
SODIUM SERPL-SCNC: 138 MMOL/L (ref 136–145)
SP GR UR STRIP: 1.02 (ref 1–1.03)
TROPONIN I SERPL DL<=0.01 NG/ML-MCNC: <0.006 NG/ML (ref 0–0.03)
URN SPEC COLLECT METH UR: NORMAL
WBC # BLD AUTO: 12 K/UL (ref 3.9–12.7)

## 2020-05-25 PROCEDURE — 99223 PR INITIAL HOSPITAL CARE,LEVL III: ICD-10-PCS | Mod: ,,, | Performed by: INTERNAL MEDICINE

## 2020-05-25 PROCEDURE — 81003 URINALYSIS AUTO W/O SCOPE: CPT

## 2020-05-25 PROCEDURE — 85610 PROTHROMBIN TIME: CPT

## 2020-05-25 PROCEDURE — 99223 1ST HOSP IP/OBS HIGH 75: CPT | Mod: ,,, | Performed by: INTERNAL MEDICINE

## 2020-05-25 PROCEDURE — 93010 EKG 12-LEAD: ICD-10-PCS | Mod: ,,, | Performed by: INTERNAL MEDICINE

## 2020-05-25 PROCEDURE — 25500020 PHARM REV CODE 255: Performed by: EMERGENCY MEDICINE

## 2020-05-25 PROCEDURE — 99285 EMERGENCY DEPT VISIT HI MDM: CPT | Mod: 25

## 2020-05-25 PROCEDURE — 99900035 HC TECH TIME PER 15 MIN (STAT)

## 2020-05-25 PROCEDURE — 93005 ELECTROCARDIOGRAM TRACING: CPT

## 2020-05-25 PROCEDURE — 99285 PR EMERGENCY DEPT VISIT,LEVEL V: ICD-10-PCS | Mod: ,,, | Performed by: EMERGENCY MEDICINE

## 2020-05-25 PROCEDURE — 94640 AIRWAY INHALATION TREATMENT: CPT

## 2020-05-25 PROCEDURE — 25000242 PHARM REV CODE 250 ALT 637 W/ HCPCS: Performed by: STUDENT IN AN ORGANIZED HEALTH CARE EDUCATION/TRAINING PROGRAM

## 2020-05-25 PROCEDURE — 99284 PR EMERGENCY DEPT VISIT,LEVEL IV: ICD-10-PCS | Mod: ,,, | Performed by: EMERGENCY MEDICINE

## 2020-05-25 PROCEDURE — 99285 EMERGENCY DEPT VISIT HI MDM: CPT | Mod: ,,, | Performed by: EMERGENCY MEDICINE

## 2020-05-25 PROCEDURE — 27000221 HC OXYGEN, UP TO 24 HOURS

## 2020-05-25 PROCEDURE — 85025 COMPLETE CBC W/AUTO DIFF WBC: CPT

## 2020-05-25 PROCEDURE — 83690 ASSAY OF LIPASE: CPT

## 2020-05-25 PROCEDURE — 99284 EMERGENCY DEPT VISIT MOD MDM: CPT | Mod: ,,, | Performed by: EMERGENCY MEDICINE

## 2020-05-25 PROCEDURE — 20600001 HC STEP DOWN PRIVATE ROOM

## 2020-05-25 PROCEDURE — 93010 ELECTROCARDIOGRAM REPORT: CPT | Mod: ,,, | Performed by: INTERNAL MEDICINE

## 2020-05-25 PROCEDURE — 25000003 PHARM REV CODE 250: Performed by: STUDENT IN AN ORGANIZED HEALTH CARE EDUCATION/TRAINING PROGRAM

## 2020-05-25 PROCEDURE — U0002 COVID-19 LAB TEST NON-CDC: HCPCS

## 2020-05-25 PROCEDURE — 84484 ASSAY OF TROPONIN QUANT: CPT

## 2020-05-25 PROCEDURE — 80053 COMPREHEN METABOLIC PANEL: CPT

## 2020-05-25 PROCEDURE — 83880 ASSAY OF NATRIURETIC PEPTIDE: CPT

## 2020-05-25 PROCEDURE — 94761 N-INVAS EAR/PLS OXIMETRY MLT: CPT

## 2020-05-25 PROCEDURE — 25000003 PHARM REV CODE 250: Performed by: EMERGENCY MEDICINE

## 2020-05-25 RX ORDER — BUPROPION HYDROCHLORIDE 150 MG/1
TABLET, EXTENDED RELEASE ORAL
COMMUNITY
Start: 2020-05-17 | End: 2020-09-16 | Stop reason: SDUPTHER

## 2020-05-25 RX ORDER — PANTOPRAZOLE SODIUM 20 MG/1
20 TABLET, DELAYED RELEASE ORAL DAILY
Status: DISCONTINUED | OUTPATIENT
Start: 2020-05-26 | End: 2020-05-29 | Stop reason: HOSPADM

## 2020-05-25 RX ORDER — AMLODIPINE BESYLATE 5 MG/1
5 TABLET ORAL DAILY
Status: DISCONTINUED | OUTPATIENT
Start: 2020-05-26 | End: 2020-05-29 | Stop reason: HOSPADM

## 2020-05-25 RX ORDER — BENZONATATE 100 MG/1
100 CAPSULE ORAL 3 TIMES DAILY PRN
Status: DISCONTINUED | OUTPATIENT
Start: 2020-05-25 | End: 2020-05-29 | Stop reason: HOSPADM

## 2020-05-25 RX ORDER — LORAZEPAM 1 MG/1
1 TABLET ORAL
Qty: 2 TABLET | Refills: 0 | Status: ON HOLD | OUTPATIENT
Start: 2020-05-25 | End: 2020-05-28 | Stop reason: HOSPADM

## 2020-05-25 RX ORDER — ACETAMINOPHEN 500 MG
1000 TABLET ORAL EVERY 6 HOURS PRN
Status: DISCONTINUED | OUTPATIENT
Start: 2020-05-25 | End: 2020-05-29 | Stop reason: HOSPADM

## 2020-05-25 RX ORDER — SODIUM CHLORIDE 0.9 % (FLUSH) 0.9 %
10 SYRINGE (ML) INJECTION
Status: DISCONTINUED | OUTPATIENT
Start: 2020-05-25 | End: 2020-05-29 | Stop reason: HOSPADM

## 2020-05-25 RX ORDER — LIDOCAINE HYDROCHLORIDE 20 MG/ML
10 INJECTION, SOLUTION EPIDURAL; INFILTRATION; INTRACAUDAL; PERINEURAL
Status: DISCONTINUED | OUTPATIENT
Start: 2020-05-25 | End: 2020-05-26

## 2020-05-25 RX ORDER — PROCHLORPERAZINE EDISYLATE 5 MG/ML
5 INJECTION INTRAMUSCULAR; INTRAVENOUS EVERY 6 HOURS PRN
Status: DISCONTINUED | OUTPATIENT
Start: 2020-05-25 | End: 2020-05-28

## 2020-05-25 RX ORDER — BUPROPION HYDROCHLORIDE 150 MG/1
150 TABLET ORAL DAILY
Status: DISCONTINUED | OUTPATIENT
Start: 2020-05-26 | End: 2020-05-29 | Stop reason: HOSPADM

## 2020-05-25 RX ORDER — IBUPROFEN 200 MG
24 TABLET ORAL
Status: DISCONTINUED | OUTPATIENT
Start: 2020-05-25 | End: 2020-05-29 | Stop reason: HOSPADM

## 2020-05-25 RX ORDER — IPRATROPIUM BROMIDE AND ALBUTEROL SULFATE 2.5; .5 MG/3ML; MG/3ML
3 SOLUTION RESPIRATORY (INHALATION)
Status: COMPLETED | OUTPATIENT
Start: 2020-05-25 | End: 2020-05-25

## 2020-05-25 RX ORDER — AMOXICILLIN 250 MG
1 CAPSULE ORAL 2 TIMES DAILY
Status: DISCONTINUED | OUTPATIENT
Start: 2020-05-25 | End: 2020-05-29 | Stop reason: HOSPADM

## 2020-05-25 RX ORDER — LOSARTAN POTASSIUM AND HYDROCHLOROTHIAZIDE 12.5; 1 MG/1; MG/1
1 TABLET ORAL DAILY
Status: DISCONTINUED | OUTPATIENT
Start: 2020-05-26 | End: 2020-05-29 | Stop reason: HOSPADM

## 2020-05-25 RX ORDER — IBUPROFEN 200 MG
16 TABLET ORAL
Status: DISCONTINUED | OUTPATIENT
Start: 2020-05-25 | End: 2020-05-29 | Stop reason: HOSPADM

## 2020-05-25 RX ORDER — LIDOCAINE HYDROCHLORIDE 20 MG/ML
10 INJECTION, SOLUTION EPIDURAL; INFILTRATION; INTRACAUDAL; PERINEURAL ONCE
Status: DISCONTINUED | OUTPATIENT
Start: 2020-05-26 | End: 2020-05-25

## 2020-05-25 RX ORDER — POLYETHYLENE GLYCOL 3350 17 G/17G
17 POWDER, FOR SOLUTION ORAL DAILY
Status: DISCONTINUED | OUTPATIENT
Start: 2020-05-26 | End: 2020-05-29 | Stop reason: HOSPADM

## 2020-05-25 RX ORDER — IPRATROPIUM BROMIDE AND ALBUTEROL SULFATE 2.5; .5 MG/3ML; MG/3ML
3 SOLUTION RESPIRATORY (INHALATION) EVERY 4 HOURS PRN
Status: DISCONTINUED | OUTPATIENT
Start: 2020-05-25 | End: 2020-05-26

## 2020-05-25 RX ORDER — HYDROXYZINE HYDROCHLORIDE 10 MG/1
10 TABLET, FILM COATED ORAL
Status: COMPLETED | OUTPATIENT
Start: 2020-05-25 | End: 2020-05-25

## 2020-05-25 RX ORDER — GLUCAGON 1 MG
1 KIT INJECTION
Status: DISCONTINUED | OUTPATIENT
Start: 2020-05-25 | End: 2020-05-29 | Stop reason: HOSPADM

## 2020-05-25 RX ORDER — TALC
6 POWDER (GRAM) TOPICAL NIGHTLY PRN
Status: DISCONTINUED | OUTPATIENT
Start: 2020-05-25 | End: 2020-05-29 | Stop reason: HOSPADM

## 2020-05-25 RX ORDER — GABAPENTIN 100 MG/1
100 CAPSULE ORAL 3 TIMES DAILY
Status: DISCONTINUED | OUTPATIENT
Start: 2020-05-25 | End: 2020-05-29 | Stop reason: HOSPADM

## 2020-05-25 RX ORDER — ONDANSETRON 8 MG/1
8 TABLET, ORALLY DISINTEGRATING ORAL EVERY 8 HOURS PRN
Status: DISCONTINUED | OUTPATIENT
Start: 2020-05-25 | End: 2020-05-28

## 2020-05-25 RX ADMIN — GABAPENTIN 100 MG: 100 CAPSULE ORAL at 09:05

## 2020-05-25 RX ADMIN — BENZONATATE 100 MG: 100 CAPSULE ORAL at 10:05

## 2020-05-25 RX ADMIN — IOHEXOL 100 ML: 350 INJECTION, SOLUTION INTRAVENOUS at 02:05

## 2020-05-25 RX ADMIN — HYDROXYZINE HYDROCHLORIDE 10 MG: 10 TABLET, FILM COATED ORAL at 06:05

## 2020-05-25 RX ADMIN — IPRATROPIUM BROMIDE AND ALBUTEROL SULFATE 3 ML: .5; 3 SOLUTION RESPIRATORY (INHALATION) at 01:05

## 2020-05-25 RX ADMIN — DOCUSATE SODIUM - SENNOSIDES 1 TABLET: 50; 8.6 TABLET, FILM COATED ORAL at 09:05

## 2020-05-25 RX ADMIN — Medication 6 MG: at 10:05

## 2020-05-25 RX ADMIN — IPRATROPIUM BROMIDE AND ALBUTEROL SULFATE 3 ML: .5; 3 SOLUTION RESPIRATORY (INHALATION) at 09:05

## 2020-05-25 NOTE — EKG INTERPRETATIONS - EMERGENCY DEPT.
Independently interpreted by MD:  Rate 96, NSR, no stemi, no ectopy, no hypertrophy, nonspecific T wave changes

## 2020-05-25 NOTE — NURSING
Received a call from pt who complains of increased SOB.  Currently using oxygen and prescribed inhalers.  Instructed pt to go to emergency room if her breathing is worse.  She would like for me to speak with the doctor, spoke with Dr. Peterson and again instructed pt to go to the nearest ER.  Verbalized understanding and will call her daughter to see what she can do.   Also offered an appointment to see Oncology Psychologist and pt deferred making appointment until she speaks with her daughter.

## 2020-05-25 NOTE — ASSESSMENT & PLAN NOTE
Recently dx in May 2020  Not yet started on treatment     Oncologic History 1. Lung adenocarcinoma    Oncologic Treatment      Pathology 5/5/2020  Right lung, biopsy:  Positive for adenocarcinoma with areas of necrosis.  Molecular studies will be completed for EGFR, ALK, ROS-1, PD-L1 and BRAF and results will follow in a supplemental report.

## 2020-05-25 NOTE — ED PROVIDER NOTES
Encounter Date: 2020       History     Chief Complaint   Patient presents with    Shortness of Breath     lung cancer, no treatment started yet, sob     HPI   Ms. Janeth Boyce is a 58yo F with hx of HTN, Hep C, GERD, asthma, and recently diagnosed stage 4 right lung adenocarcinoma with metastasis to the brain (dx 20, not started on treatment) who presents with progressively worsening SOB x 4 days. Patient states she has been feeling short winded ever since discharge home after her biopsy earlier this month, but has gotten significantly worse within the last 4 days. It is worse with exertion and upon laying flat. She denies chest pain or tightness, but endorses epigastric pain. Patient also describes associated chills with her SOB especially when needing to urinate and with BMs. She denies any urinary or bowel incontinence. Patient has a chronic cough productive of clear white sputum that is unchanged. Denies fevers, congestion, sore throat, or runny nose. Patient says she is on home oxygen 2L which she wears continuously. On arrival, patient was afebrile, , RR 32, normotensive, and satting 98% on RA.     Review of patient's allergies indicates:  No Known Allergies  Past Medical History:   Diagnosis Date    Asthma     Hepatitis C     Hypertension     Lung cancer      Past Surgical History:   Procedure Laterality Date     SECTION      KNEE SURGERY       No family history on file.  Social History     Tobacco Use    Smoking status: Former Smoker     Packs/day: 0.50     Years: 40.00     Pack years: 20.00     Types: Cigarettes     Last attempt to quit: 2020     Years since quittin.1   Substance Use Topics    Alcohol use: Yes     Comment: occasional    Drug use: No     Review of Systems   Constitutional: Positive for activity change and chills. Negative for fever.   HENT: Negative for congestion, rhinorrhea and sore throat.    Eyes: Negative for photophobia and visual  disturbance.   Respiratory: Positive for cough (chronic) and shortness of breath. Negative for chest tightness.    Cardiovascular: Negative for chest pain and leg swelling.   Gastrointestinal: Positive for abdominal distention (bloating) and constipation. Negative for abdominal pain, diarrhea, nausea and vomiting.   Genitourinary: Negative for difficulty urinating, dysuria, flank pain and hematuria.   Musculoskeletal: Negative for arthralgias and back pain.   Skin: Negative for rash and wound.   Neurological: Negative for dizziness, syncope and headaches.   Psychiatric/Behavioral: Negative for agitation and confusion. The patient is nervous/anxious.         Physical Exam     Initial Vitals [05/25/20 1145]   BP Pulse Resp Temp SpO2   129/75 103 (!) 32 97.9 °F (36.6 °C) 98 %      MAP       --         Physical Exam    Constitutional: She appears well-developed and well-nourished. She is not diaphoretic. She appears distressed.   AAF, appears anxious   HENT:   Head: Normocephalic and atraumatic.   Mouth/Throat: Oropharynx is clear and moist.   Eyes: Conjunctivae are normal. No scleral icterus.   Neck: Normal range of motion. Neck supple.   Cardiovascular: Regular rhythm, normal heart sounds and intact distal pulses.   No murmur heard.  Tachycardic    Pulmonary/Chest: She has no wheezes. She has no rales.   Tachypneic. Increased WOB. No use of accessory muscles.  Decreased/absent breath sounds throughout R lung fields  Clear breath sounds through L lung fields   Abdominal: Soft. Bowel sounds are normal. She exhibits distension. There is no tenderness. There is no rebound.   Musculoskeletal: She exhibits no edema or tenderness.   Neurological: She is alert and oriented to person, place, and time.   Skin: Skin is warm and dry.         ED Course   Procedures  Labs Reviewed   CBC W/ AUTO DIFFERENTIAL   COMPREHENSIVE METABOLIC PANEL   B-TYPE NATRIURETIC PEPTIDE   TROPONIN I   SARS-COV-2 RNA AMPLIFICATION, QUAL   URINALYSIS,  REFLEX TO URINE CULTURE     EKG Readings: (Independently Interpreted)   NSR. No STEMI. No ectopy.       Imaging Results    None       X-Rays:   Independently Interpreted Readings:   Chest X-Ray: Complete opacification of R hemithorax, unchanged from prior imaging 5/4/20     Medical Decision Making:   History:   Old Medical Records: I decided to obtain old medical records.  Old Records Summarized: records from clinic visits and records from previous admission(s).  Initial Assessment:   60yo F with hx of HTN, Hep C, GERD, asthma, and recently diagnosed stage 4 right lung adenocarcinoma with metastasis to the brain who presents with progressively worsening SOB x 4 days.  Differential Diagnosis:   DDx includes but is not limited to worsening of lung disease from lung cancer, pleural effusion, PE, asthma exacerbation, new onset CHF  Independently Interpreted Test(s):   I have ordered and independently interpreted X-rays - see prior notes.  I have ordered and independently interpreted EKG Reading(s) - see prior notes  Clinical Tests:   Lab Tests: Ordered  Radiological Study: Ordered  Medical Tests: Ordered  ED Management:  CBC and CMP unremarkable. Troponin and BNP negative. Lipase wnl.  EKG with NSR, no ST changes.  CXR with complete opacification of R hemithorax, unchanged from prior imaging 5/4/20.  Will obtain CTA chest to rule out PE in this patient with cancer and recent lung biopsy procedure.    Patient continues to be anxious and complaining of SOB, requesting oxygen and breathing treatment. Patient satting well on RA without wheezing on exam, however will provide duoneb for symptomatic relief.      CTA chest demonstrating progression of airway obstruction and postobstructive consolidation with enlargement of the right pleural effusion.  Large right apically pulmonary mass similar in size compared to prior. No pulmonary thromboembolism. Mediastinal and left pulmonary probable metastatic disease. Also with possible  lytic lesions in T2 and T7 which may represent metastatic disease.    Discussed with heme/onc fellow - will come evaluate patient and admit to med onc service for further workup and management of SOB.     Other:   I have discussed this case with another health care provider.                                 Clinical Impression:       ICD-10-CM ICD-9-CM   1. Adenocarcinoma of lung, stage 4, right C34.91 162.9   2. SOB (shortness of breath) R06.02 786.05   3. Pleural effusion on right J90 511.9                                Triston Dixon MD  Resident  05/25/20 [id number]

## 2020-05-25 NOTE — ED NOTES
"Pt asked for "something to calm me down" for her 8/10 epigastric pain. Pt informed that we don't prescribe anxiety meds for pain. MD notified.     Pt asked for blankets, pillows, and to change the TV channel repeatedly.   "

## 2020-05-25 NOTE — H&P
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  Presents with progressively worsening SOB. She states she has been feeling SOB ever since her biopsy 5/5/20 but has gotten significantly worse in the last 4 days and was so bad yesterday she was unable to sleep. It is worse with exertion and upon laying flat. She denies chest pain, but endorses chest tightness. Patient also complains of feeling flushed, diaphoretic, SOB and lightheaded recently when needing to urinate an/or with her BMs. However she denies any urinary or bowel incontinence. Patient has a chronic cough productive of clear white sputum that is unchanged. She is on home oxygen 2L which she wears continuously. In the ED patient was AF, , RR 32, normotensive, and satting 98% on RA.  When examined by Medical Oncology, she was sitting upright, tachypnic with very mild use of accessory muscles. At that time her O2sat was 93% on RA. Of note, her lung cancer was dx earlier this month. She has not yet started treatment.    Oncologic History:    Oncologic History 1. Lung adenocarcinoma    Oncologic Treatment      Pathology 5/5/2020  Right lung, biopsy:  Positive for adenocarcinoma with areas of necrosis.  Molecular studies will be completed for EGFR, ALK, ROS-1, PD-L1 and BRAF and results will follow in a supplemental report.        Oncology Treatment Plan:   OP PEMBROLIZUMAB 200MG Q3W    Medications:  Continuous Infusions:  Scheduled Meds:  PRN Meds:lidocaine (PF) 20 mg/mL (2%)      Review of patient's allergies indicates:  No Known Allergies     Past Medical History:   Diagnosis Date    Asthma     Hepatitis C 2019    treated    Hypertension     Lung cancer      Past Surgical History:   Procedure Laterality Date     SECTION      KNEE SURGERY       Family History     None        Tobacco Use    Smoking status: Former Smoker     Packs/day: 0.50     Years: 40.00     Pack years: 20.00     Types: Cigarettes     Last attempt to quit: 2020     Years since quittin.1   Substance and Sexual Activity    Alcohol use: Yes     Comment: occasional    Drug use: No    Sexual activity: Not on file       Review of Systems   Constitutional: Positive for diaphoresis (with BM and urination) and fatigue. Negative for chills and fever.   HENT: Negative for ear pain, hearing loss, postnasal drip, rhinorrhea, sore throat, tinnitus and trouble swallowing.    Eyes: Negative for visual disturbance.   Respiratory: Positive for chest tightness and shortness of breath. Negative for cough, wheezing and stridor.    Cardiovascular: Negative for chest pain and leg swelling.   Gastrointestinal: Positive for constipation. Negative for abdominal distention, abdominal pain, diarrhea, nausea and vomiting.   Endocrine: Negative for cold intolerance and heat intolerance.   Genitourinary: Negative for difficulty urinating and dysuria.   Musculoskeletal: Negative for arthralgias and myalgias.   Skin: Negative for rash.   Neurological: Positive for weakness and light-headedness (with urination and BMs). Negative for dizziness and headaches.   Hematological: Does not bruise/bleed easily.   Psychiatric/Behavioral: Negative for confusion.     Objective:     Vital Signs (Most Recent):  Temp: 97.9 °F (36.6 °C) (20 1145)  Pulse: 82 (20 1400)  Resp: 18 (20 1400)  BP: (!) 154/95 (20 1400)  SpO2: (!) 94 % (20 1400) Vital Signs (24h Range):  Temp:  [97.9 °F (36.6 °C)] 97.9 °F (36.6  °C)  Pulse:  [] 82  Resp:  [18-32] 18  SpO2:  [94 %-98 %] 94 %  BP: (129-154)/(75-95) 154/95     Weight: 88 kg (194 lb)  Body mass index is 31.31 kg/m².  Body surface area is 2.02 meters squared.    No intake or output data in the 24 hours ending 05/25/20 1732    Physical Exam   Constitutional: She is oriented to person, place, and time. She appears well-developed and well-nourished.   Anxious appearing   HENT:   Head: Normocephalic and atraumatic.   Right Ear: Hearing normal.   Left Ear: Hearing normal.   Eyes: Conjunctivae, EOM and lids are normal.   Neck: Trachea normal, full passive range of motion without pain and phonation normal. No JVD present. No tracheal tenderness present. No tracheal deviation present.   Cardiovascular: Normal rate, regular rhythm, S1 normal, S2 normal, normal heart sounds and normal pulses. Exam reveals no gallop, no S3, no S4 and no friction rub.   No murmur heard.  Pulses:       Radial pulses are 2+ on the right side, and 2+ on the left side.   Pulmonary/Chest: Accessory muscle usage (mild, intermittent usage) present. No stridor. Tachypnea noted. No respiratory distress. She has decreased breath sounds in the right upper field, the right middle field and the right lower field. She has no wheezes. She has no rhonchi. She has no rales.   Abdominal: Soft. Normal appearance and bowel sounds are normal. There is no tenderness.   Musculoskeletal: Normal range of motion.   Neurological: She is alert and oriented to person, place, and time. She has normal strength. No sensory deficit. GCS eye subscore is 4. GCS verbal subscore is 5. GCS motor subscore is 6.   Skin: Skin is warm, dry and intact. No rash noted. She is not diaphoretic. No pallor.   Psychiatric: She has a normal mood and affect. Her speech is normal and behavior is normal. Judgment and thought content normal. Cognition and memory are normal. She is attentive.   Nursing note and vitals reviewed.      Significant Labs:    Recent Lab Results       05/25/20  1318   05/25/20  1220        Albumin   2.9     Alkaline Phosphatase   94     ALT   9     Anion Gap   10     Appearance, UA Clear       AST   13     Baso #   0.13     Basophil%   1.1     Bilirubin (UA) Negative       BILIRUBIN TOTAL   0.3  Comment:  For infants and newborns, interpretation of results should be based  on gestational age, weight and in agreement with clinical  observations.  Premature Infant recommended reference ranges:  Up to 24 hours.............<8.0 mg/dL  Up to 48 hours............<12.0 mg/dL  3-5 days..................<15.0 mg/dL  6-29 days.................<15.0 mg/dL       BNP   24  Comment:  Values of less than 100 pg/ml are consistent with non-CHF populations.     BUN, Bld   12     Calcium   10.0     Chloride   101     CO2   27     Color, UA Yellow       Creatinine   0.8     Differential Method   Automated     eGFR if    >60.0     eGFR if non    >60.0  Comment:  Calculation used to obtain the estimated glomerular filtration  rate (eGFR) is the CKD-EPI equation.        Eos #   0.9     Eosinophil%   7.8     Glucose   151     Glucose, UA Negative       Gran # (ANC)   8.2     Gran%   68.3     Hematocrit   39.4     Hemoglobin   11.7     Immature Grans (Abs)   0.05  Comment:  Mild elevation in immature granulocytes is non specific and   can be seen in a variety of conditions including stress response,   acute inflammation, trauma and pregnancy. Correlation with other   laboratory and clinical findings is essential.       Immature Granulocytes   0.4     Ketones, UA Negative       Leukocytes, UA Negative       Lipase   6     Lymph #   1.8     Lymph%   15.3     MCH   26.2     MCHC   29.7     MCV   88     Mono #   0.9     Mono%   7.1     MPV   9.6     NITRITE UA Negative       nRBC   0     Occult Blood UA Negative       pH, UA 5.0       Platelets   347     Potassium   3.5     PROTEIN TOTAL   8.4     Protein, UA  Negative  Comment:  Recommend a 24 hour urine protein or a urine   protein/creatinine ratio if globulin induced proteinuria is  clinically suspected.         RBC   4.47     RDW   14.6     SARS-CoV-2 RNA, Amplification, Qual   Negative  Comment:  This test utilizes isothermal nucleic acid amplification   technology to detect the SARS-CoV-2 RdRp nucleic acid segment.   The analytical sensitivity (limit of detection) is 125 genome   equivalents/mL.   A POSITIVE result implies infection with the SARS-CoV-2 virus;  the patient is presumed to be contagious.    A NEGATIVE result means that SARS-CoV-2 nucleic acids are not  present above the limit of detection. A NEGATIVE result should be   treated as presumptive. It does not rule out the possibility of   COVID-19 and should not be the sole basis for treatment decisions.   If COVID-19 is strongly suspected based on clinical and exposure   history, re-testing using an alternate molecular assay should be   considered.   This test is only for use under the Food and Drug   Administration s Emergency Use Authorization (EUA).   Commercial kits are provided by [employer].   Performance characteristics of the EUA have been independently  verified by [facility] Department of  Pathology and Laboratory Medicine.   _________________________________________________________________  The ID NOW COVID-19 Letter of Authorization, along with the   authorized Fact Sheet for Healthcare Providers, the authorized Fact  Sheet for Patients, and authorized labeling are available on the FDA   website:  www.fda.gov/MedicalDevices/Safety/EmergencySituations/mzm466881.htm       Sodium   138     Specific Gravity, UA 1.020       Specimen UA Urine, Clean Catch       Troponin I   <0.006  Comment:  The reference interval for Troponin I represents the 99th percentile   cutoff   for our facility and is consistent with 3rd generation assay   performance.       WBC   12.00            Diagnostic Results:  I have reviewed and interpreted all pertinent imaging results/findings within the past 24 hours.    Assessment/Plan:     Insomnia  2/2 SOB    Plan:  -Melatonin qHS, but patient HOB must be elevated  -Avoid sedating Rx for now as it can worsen SOB    Adenocarcinoma of lung, stage 4, right  Recently dx in May 2020  Not yet started on treatment     Oncologic History 1. Lung adenocarcinoma    Oncologic Treatment      Pathology 5/5/2020  Right lung, biopsy:  Positive for adenocarcinoma with areas of necrosis.  Molecular studies will be completed for EGFR, ALK, ROS-1, PD-L1 and BRAF and results will follow in a supplemental report.         Essential hypertension  On norvasc at home  On valsartan-hctz, not on formulary    Will start losartan-hctz  C/w norvasc    Shortness of breath  Progressive worsening SOB  CTA Chest: Progression of airway obstruction and postobstructive consolidation with enlargement of the right pleural effusion.  Patient's large right apically pulmonary mass is similar in size allowing for differences in technique. No pulmonary thromboembolism. Mediastinal and left pulmonary probable metastatic disease. Questionable lytic lesions in T2 and T7 which may represent metastatic disease.    Plan:  -Pulmonology consulted. Appreciate their assistance  · Likely that her worsening dyspnea is 2.2 near collapse of her right lung, however increase in pleural effusion could be a contributor.   · Discussed thoracentesis with patient -- emphasized that it could potentially improve her breathing but would not completely resolve her symptoms. Although her underlying pathology is high risk for recurrence, it is possible in the short term that drainage of her pleural effusion could allow for better aeration of her lung  · Consented, patient deferred bedside procedure to am. Will send pleural studies as well.  -NPO after MN for thora  -Will hold VTE ppx today in anticipation of thora  tomorrow. Will restart VTE ppx with lovenox later tomorrow  -Monitor O2sats. Initiate supplemental O2 if sats < 92%  -Avoiding sedating medications as it can worsen SOB        Chan Dunham MD  Hematology/Oncology  [facility]    Attending Note  I have personally taken the history and examined this patient and agree with the residents note as stated above.  Above discussed with patient, and her niece and daughter by phone  Appreciate Pulmonary consult

## 2020-05-25 NOTE — ASSESSMENT & PLAN NOTE
Progressive worsening SOB  CTA Chest: Progression of airway obstruction and postobstructive consolidation with enlargement of the right pleural effusion.  Patient's large right apically pulmonary mass is similar in size allowing for differences in technique. No pulmonary thromboembolism. Mediastinal and left pulmonary probable metastatic disease. Questionable lytic lesions in T2 and T7 which may represent metastatic disease.    Plan:  -Pulmonology consulted. Appreciate their assistance  · Likely that her worsening dyspnea is 2.2 near collapse of her right lung, however increase in pleural effusion could be a contributor.   · Discussed thoracentesis with patient -- emphasized that it could potentially improve her breathing but would not completely resolve her symptoms. Although her underlying pathology is high risk for recurrence, it is possible in the short term that drainage of her pleural effusion could allow for better aeration of her lung  · Consented, patient deferred bedside procedure to am. Will send pleural studies as well.  -NPO after MN for thora  -Will hold VTE ppx today in anticipation of thora tomorrow. Will restart VTE ppx with lovenox later tomorrow  -Monitor O2sats. Initiate supplemental O2 if sats < 92%  -Avoiding sedating medications as it can worsen SOB

## 2020-05-25 NOTE — SUBJECTIVE & OBJECTIVE
Oncology Treatment Plan:   OP PEMBROLIZUMAB 200MG Q3W    Medications:  Continuous Infusions:  Scheduled Meds:  PRN Meds:lidocaine (PF) 20 mg/mL (2%)     Review of patient's allergies indicates:  No Known Allergies     Past Medical History:   Diagnosis Date    Asthma     Hepatitis C 2019    treated    Hypertension     Lung cancer      Past Surgical History:   Procedure Laterality Date     SECTION      KNEE SURGERY       Family History     None        Tobacco Use    Smoking status: Former Smoker     Packs/day: 0.50     Years: 40.00     Pack years: 20.00     Types: Cigarettes     Last attempt to quit: 2020     Years since quittin.1   Substance and Sexual Activity    Alcohol use: Yes     Comment: occasional    Drug use: No    Sexual activity: Not on file       Review of Systems   Constitutional: Positive for diaphoresis (with BM and urination) and fatigue. Negative for chills and fever.   HENT: Negative for ear pain, hearing loss, postnasal drip, rhinorrhea, sore throat, tinnitus and trouble swallowing.    Eyes: Negative for visual disturbance.   Respiratory: Positive for chest tightness and shortness of breath. Negative for cough, wheezing and stridor.    Cardiovascular: Negative for chest pain and leg swelling.   Gastrointestinal: Positive for constipation. Negative for abdominal distention, abdominal pain, diarrhea, nausea and vomiting.   Endocrine: Negative for cold intolerance and heat intolerance.   Genitourinary: Negative for difficulty urinating and dysuria.   Musculoskeletal: Negative for arthralgias and myalgias.   Skin: Negative for rash.   Neurological: Positive for weakness and light-headedness (with urination and BMs). Negative for dizziness and headaches.   Hematological: Does not bruise/bleed easily.   Psychiatric/Behavioral: Negative for confusion.     Objective:     Vital Signs (Most Recent):  Temp: 97.9 °F (36.6 °C) (20 1145)  Pulse: 82 (20 1400)  Resp: 18  (05/25/20 1400)  BP: (!) 154/95 (05/25/20 1400)  SpO2: (!) 94 % (05/25/20 1400) Vital Signs (24h Range):  Temp:  [97.9 °F (36.6 °C)] 97.9 °F (36.6 °C)  Pulse:  [] 82  Resp:  [18-32] 18  SpO2:  [94 %-98 %] 94 %  BP: (129-154)/(75-95) 154/95     Weight: 88 kg (194 lb)  Body mass index is 31.31 kg/m².  Body surface area is 2.02 meters squared.    No intake or output data in the 24 hours ending 05/25/20 1732    Physical Exam   Constitutional: She is oriented to person, place, and time. She appears well-developed and well-nourished.   Anxious appearing   HENT:   Head: Normocephalic and atraumatic.   Right Ear: Hearing normal.   Left Ear: Hearing normal.   Eyes: Conjunctivae, EOM and lids are normal.   Neck: Trachea normal, full passive range of motion without pain and phonation normal. No JVD present. No tracheal tenderness present. No tracheal deviation present.   Cardiovascular: Normal rate, regular rhythm, S1 normal, S2 normal, normal heart sounds and normal pulses. Exam reveals no gallop, no S3, no S4 and no friction rub.   No murmur heard.  Pulses:       Radial pulses are 2+ on the right side, and 2+ on the left side.   Pulmonary/Chest: Accessory muscle usage (mild, intermittent usage) present. No stridor. Tachypnea noted. No respiratory distress. She has decreased breath sounds in the right upper field, the right middle field and the right lower field. She has no wheezes. She has no rhonchi. She has no rales.   Abdominal: Soft. Normal appearance and bowel sounds are normal. There is no tenderness.   Musculoskeletal: Normal range of motion.   Neurological: She is alert and oriented to person, place, and time. She has normal strength. No sensory deficit. GCS eye subscore is 4. GCS verbal subscore is 5. GCS motor subscore is 6.   Skin: Skin is warm, dry and intact. No rash noted. She is not diaphoretic. No pallor.   Psychiatric: She has a normal mood and affect. Her speech is normal and behavior is normal.  Judgment and thought content normal. Cognition and memory are normal. She is attentive.   Nursing note and vitals reviewed.      Significant Labs:   Recent Lab Results       05/25/20  1318   05/25/20  1220        Albumin   2.9     Alkaline Phosphatase   94     ALT   9     Anion Gap   10     Appearance, UA Clear       AST   13     Baso #   0.13     Basophil%   1.1     Bilirubin (UA) Negative       BILIRUBIN TOTAL   0.3  Comment:  For infants and newborns, interpretation of results should be based  on gestational age, weight and in agreement with clinical  observations.  Premature Infant recommended reference ranges:  Up to 24 hours.............<8.0 mg/dL  Up to 48 hours............<12.0 mg/dL  3-5 days..................<15.0 mg/dL  6-29 days.................<15.0 mg/dL       BNP   24  Comment:  Values of less than 100 pg/ml are consistent with non-CHF populations.     BUN, Bld   12     Calcium   10.0     Chloride   101     CO2   27     Color, UA Yellow       Creatinine   0.8     Differential Method   Automated     eGFR if    >60.0     eGFR if non    >60.0  Comment:  Calculation used to obtain the estimated glomerular filtration  rate (eGFR) is the CKD-EPI equation.        Eos #   0.9     Eosinophil%   7.8     Glucose   151     Glucose, UA Negative       Gran # (ANC)   8.2     Gran%   68.3     Hematocrit   39.4     Hemoglobin   11.7     Immature Grans (Abs)   0.05  Comment:  Mild elevation in immature granulocytes is non specific and   can be seen in a variety of conditions including stress response,   acute inflammation, trauma and pregnancy. Correlation with other   laboratory and clinical findings is essential.       Immature Granulocytes   0.4     Ketones, UA Negative       Leukocytes, UA Negative       Lipase   6     Lymph #   1.8     Lymph%   15.3     MCH   26.2     MCHC   29.7     MCV   88     Mono #   0.9     Mono%   7.1     MPV   9.6     NITRITE UA Negative       nRBC   0      Occult Blood UA Negative       pH, UA 5.0       Platelets   347     Potassium   3.5     PROTEIN TOTAL   8.4     Protein, UA Negative  Comment:  Recommend a 24 hour urine protein or a urine   protein/creatinine ratio if globulin induced proteinuria is  clinically suspected.         RBC   4.47     RDW   14.6     SARS-CoV-2 RNA, Amplification, Qual   Negative  Comment:  This test utilizes isothermal nucleic acid amplification   technology to detect the SARS-CoV-2 RdRp nucleic acid segment.   The analytical sensitivity (limit of detection) is 125 genome   equivalents/mL.   A POSITIVE result implies infection with the SARS-CoV-2 virus;  the patient is presumed to be contagious.    A NEGATIVE result means that SARS-CoV-2 nucleic acids are not  present above the limit of detection. A NEGATIVE result should be   treated as presumptive. It does not rule out the possibility of   COVID-19 and should not be the sole basis for treatment decisions.   If COVID-19 is strongly suspected based on clinical and exposure   history, re-testing using an alternate molecular assay should be   considered.   This test is only for use under the Food and Drug   Administration s Emergency Use Authorization (EUA).   Commercial kits are provided by [employer].   Performance characteristics of the EUA have been independently  verified by [facility] Department of  Pathology and Laboratory Medicine.   _________________________________________________________________  The ID NOW COVID-19 Letter of Authorization, along with the   authorized Fact Sheet for Healthcare Providers, the authorized Fact  Sheet for Patients, and authorized labeling are available on the FDA   website:  www.fda.gov/MedicalDevices/Safety/EmergencySituations/twq600850.htm       Sodium   138     Specific Gravity, UA 1.020       Specimen UA Urine, Clean Catch       Troponin I   <0.006  Comment:  The reference interval for Troponin I represents the 99th percentile    cutoff   for our facility and is consistent with 3rd generation assay   performance.       WBC   12.00           Diagnostic Results:  I have reviewed and interpreted all pertinent imaging results/findings within the past 24 hours.

## 2020-05-25 NOTE — ASSESSMENT & PLAN NOTE
2/2 SOB    Plan:  -Melatonin qHS, but patient HOB must be elevated  -Avoid sedating Rx for now as it can worsen SOB

## 2020-05-25 NOTE — CONSULTS
Consult Note  [facility]    Attending: Rah Eason  Fellow: Lotus Hauser  Admit Date: 2020  Today's Date: 2020  Reason for Consult:  Right Pleural Effusion    SUBJECTIVE:     HPI:  Ms. Boyce is a 58yo M with PMHx of asthma, HTN and recently diagnosed lung adenocarcinoma presenting with worsening dyspnea. Denies fevers, chills, purulent sputum. Of note patient was recently seen at [facility] for RUL mass and dyspnea with small-moderate pleural effusion.   Reports home inhaler use with nebulizer machine -- denies wheezing but reports that with her increasing dyspnea that using her nebulizer did not help her breathing; also endorses worsening orthopnea.       Review of patient's allergies indicates:  No Known Allergies    Past Medical History:   Diagnosis Date    Asthma     Hepatitis C 2019    treated    Hypertension     Lung cancer      Past Surgical History:   Procedure Laterality Date     SECTION      KNEE SURGERY       History reviewed. No pertinent family history.  Social History     Tobacco Use    Smoking status: Former Smoker     Packs/day: 0.50     Years: 40.00     Pack years: 20.00     Types: Cigarettes     Last attempt to quit: 2020     Years since quittin.1   Substance Use Topics    Alcohol use: Yes     Comment: occasional    Drug use: No       All medications reviewed.    Review of Systems   Constitutional: Negative for chills and fever.   Respiratory: Positive for cough, sputum production and shortness of breath. Negative for wheezing.    Cardiovascular: Positive for orthopnea. Negative for chest pain.       OBJECTIVE:     Vital Signs Trends/Hx Reviewed  Vitals:    20 1145 20 1345 20 1400   BP: 129/75  (!) 154/95   BP Location:   Right arm   Patient Position:   Sitting   Pulse: 103 82 82   Resp: (!) 32 18 18   Temp: 97.9 °F (36.6 °C)     TempSrc: Oral     SpO2: 98% 95% (!) 94%   Weight: 88 kg (194 lb)     Height: 5'  "6" (1.676 m)         Physical Exam:  General: NAD, cooperative & interactive.  HEENT: AT/NC, PERRL, EOMI, oral and nasal mucosa moist. Poor dentition  Neck: Supple without JVD or palpable LAD.   Cardiac: normal rate, regular rhythm, with no MRG with brisk cap refill and symmetric pulses in distal extremities.  Respiratory: Absent breath sounds on the right; CTAL   Abdomen: Soft, NT/ND. +BS. No hepatosplenomegaly.   Extremities: No edema.   Neuro: Grossly intact to brief exam. Oriented x3 with appropriate mood/affect to situation.       Laboratory:  No results for input(s): PH, PCO2, PO2, HCO3, POCSATURATED, BE in the last 24 hours.  Recent Labs   Lab 05/25/20  1220   WBC 12.00   RBC 4.47   HGB 11.7*   HCT 39.4      MCV 88   MCH 26.2*   MCHC 29.7*     Recent Labs   Lab 05/25/20  1220      K 3.5      CO2 27   BUN 12   CREATININE 0.8       Microbiology Data:   Microbiology Results (last 7 days)     ** No results found for the last 168 hours. **           Chest Imaging:   CTA: Progression of airway obstruction and postobstructive consolidation with enlargement of the right pleural effusion.  Patient's large right apically pulmonary mass is similar in size allowing for differences in technique.    No pulmonary thromboembolism.    Mediastinal and left pulmonary probable metastatic disease.    Questionable lytic lesions in T2 and T7 which may represent metastatic disease.        PRN Medications:   lidocaine (PF) 20 mg/mL (2%)    Assessment & Plan:   Patient Active Problem List   Diagnosis    Shortness of breath    Mass of upper lobe of right lung    Essential hypertension    Tobacco abuse    Cough    Adenocarcinoma of lung, stage 4, right    Primary malignant neoplasm of lung with metastasis to brain    Secondary malignant neoplasm of brain       ASSESSMENT & RECOMMENDATIONS   Ms. Boyce is a 58yo F with PMHx significant for the above with worsening dyspnea without relief from bronchodilator " therapy. Review of chest imaging this admission demonstrating modest enlargement of her right pleural effusion with worsening consolidation/post-obstructive atelectasis of her RUL/RML 2.2 right lung adenocarcinoma.     · Likely that her worsening dyspnea is 2.2 near collapse of her right lung, however increase in pleural effusion could be a contributor.   · Discussed thoracentesis with patient -- emphasized that it could potentially improve her breathing but would not completely resolve her symptoms. Although her underlying pathology is high risk for recurrence, it is possible in the short term that drainage of her pleural effusion could allow for better aeration of her lung  · Consented, patient deferred bedside procedure to am. Will send pleural studies as well.     Thank you for allowing us to participate in the care of this patient. We will continue to follow along with you. Please call with questions.    Lotus Hauser M.D., PGY-IV  [employer] [facility]/Critical Care Fellow

## 2020-05-25 NOTE — HPI
58yo F with h/o HTN, Hep C s/p unknown treatment in 2019, GERD, asthma, and recently diagnosed stage 4 right upper lung adenocarcinoma with metastasis to the brain (dx 5/5/20, not started on treatment).  Presents with progressively worsening SOB. She states she has been feeling SOB ever since her biopsy 5/5/20 but has gotten significantly worse in the last 4 days and was so bad yesterday she was unable to sleep. It is worse with exertion and upon laying flat. She denies chest pain, but endorses chest tightness. Patient also complains of feeling flushed, diaphoretic, SOB and lightheaded recently when needing to urinate an/or with her BMs. However she denies any urinary or bowel incontinence. Patient has a chronic cough productive of clear white sputum that is unchanged. She is on home oxygen 2L which she wears continuously. In the ED patient was AF, , RR 32, normotensive, and satting 98% on RA. When examined by Medical Oncology, she was sitting upright, tachypnic with very mild use of accessory muscles. At that time her O2sat was 93% on RA. Of note, her lung cancer was dx earlier this month. She has not yet started treatment.    Oncologic History:    Oncologic History 1. Lung adenocarcinoma    Oncologic Treatment      Pathology 5/5/2020  Right lung, biopsy:  Positive for adenocarcinoma with areas of necrosis.  Molecular studies will be completed for EGFR, ALK, ROS-1, PD-L1 and BRAF and results will follow in a supplemental report.

## 2020-05-25 NOTE — ED NOTES
Bed: 08  Expected date: 5/25/20  Expected time: 11:49 AM  Means of arrival:   Comments:  Triage [place]

## 2020-05-26 PROBLEM — F41.9 ANXIETY: Status: ACTIVE | Noted: 2020-05-26

## 2020-05-26 PROBLEM — B19.20 HCV (HEPATITIS C VIRUS): Status: ACTIVE | Noted: 2020-05-26

## 2020-05-26 LAB
ALBUMIN SERPL BCP-MCNC: 2.6 G/DL (ref 3.5–5.2)
ALP SERPL-CCNC: 88 U/L (ref 55–135)
ALT SERPL W/O P-5'-P-CCNC: 8 U/L (ref 10–44)
ANION GAP SERPL CALC-SCNC: 10 MMOL/L (ref 8–16)
APPEARANCE FLD: ABNORMAL
AST SERPL-CCNC: 16 U/L (ref 10–40)
BASOPHILS # BLD AUTO: 0.13 K/UL (ref 0–0.2)
BASOPHILS NFR BLD: 0.9 % (ref 0–1.9)
BILIRUB SERPL-MCNC: 0.4 MG/DL (ref 0.1–1)
BODY FLD TYPE: ABNORMAL
BODY FLUID SOURCE, LDH: NORMAL
BUN SERPL-MCNC: 11 MG/DL (ref 6–20)
CALCIUM SERPL-MCNC: 9.5 MG/DL (ref 8.7–10.5)
CHLORIDE SERPL-SCNC: 100 MMOL/L (ref 95–110)
CO2 SERPL-SCNC: 28 MMOL/L (ref 23–29)
COLOR FLD: ABNORMAL
CREAT SERPL-MCNC: 0.7 MG/DL (ref 0.5–1.4)
DIFFERENTIAL METHOD: ABNORMAL
EOSINOPHIL # BLD AUTO: 0.9 K/UL (ref 0–0.5)
EOSINOPHIL NFR BLD: 6.2 % (ref 0–8)
EOSINOPHIL NFR FLD MANUAL: 4 %
ERYTHROCYTE [DISTWIDTH] IN BLOOD BY AUTOMATED COUNT: 14.7 % (ref 11.5–14.5)
EST. GFR  (AFRICAN AMERICAN): >60 ML/MIN/1.73 M^2
EST. GFR  (NON AFRICAN AMERICAN): >60 ML/MIN/1.73 M^2
GLUCOSE FLD-MCNC: 89 MG/DL
GLUCOSE SERPL-MCNC: 92 MG/DL (ref 70–110)
GRAM STN SPEC: NORMAL
GRAM STN SPEC: NORMAL
HCT VFR BLD AUTO: 36.5 % (ref 37–48.5)
HGB BLD-MCNC: 10.8 G/DL (ref 12–16)
IMM GRANULOCYTES # BLD AUTO: 0.09 K/UL (ref 0–0.04)
IMM GRANULOCYTES NFR BLD AUTO: 0.7 % (ref 0–0.5)
LDH FLD L TO P-CCNC: 472 U/L
LDH SERPL L TO P-CCNC: 447 U/L (ref 110–260)
LYMPHOCYTES # BLD AUTO: 1.5 K/UL (ref 1–4.8)
LYMPHOCYTES NFR BLD: 10.8 % (ref 18–48)
LYMPHOCYTES NFR FLD MANUAL: 62 %
MAGNESIUM SERPL-MCNC: 1.6 MG/DL (ref 1.6–2.6)
MCH RBC QN AUTO: 26 PG (ref 27–31)
MCHC RBC AUTO-ENTMCNC: 29.6 G/DL (ref 32–36)
MCV RBC AUTO: 88 FL (ref 82–98)
MONOCYTES # BLD AUTO: 1 K/UL (ref 0.3–1)
MONOCYTES NFR BLD: 6.9 % (ref 4–15)
MONOS+MACROS NFR FLD MANUAL: 27 %
NEUTROPHILS # BLD AUTO: 10.3 K/UL (ref 1.8–7.7)
NEUTROPHILS NFR BLD: 74.5 % (ref 38–73)
NEUTROPHILS NFR FLD MANUAL: 6 %
NRBC BLD-RTO: 0 /100 WBC
OTHER CELLS FLD MANUAL: 1 %
PHOSPHATE SERPL-MCNC: 3.7 MG/DL (ref 2.7–4.5)
PLATELET # BLD AUTO: 329 K/UL (ref 150–350)
PMV BLD AUTO: 10.2 FL (ref 9.2–12.9)
POTASSIUM SERPL-SCNC: 4.1 MMOL/L (ref 3.5–5.1)
PROT SERPL-MCNC: 7.8 G/DL (ref 6–8.4)
RBC # BLD AUTO: 4.16 M/UL (ref 4–5.4)
SODIUM SERPL-SCNC: 138 MMOL/L (ref 136–145)
SPECIMEN SOURCE: NORMAL
WBC # BLD AUTO: 13.82 K/UL (ref 3.9–12.7)
WBC # FLD: 1721 /CU MM

## 2020-05-26 PROCEDURE — 82945 GLUCOSE OTHER FLUID: CPT

## 2020-05-26 PROCEDURE — 94761 N-INVAS EAR/PLS OXIMETRY MLT: CPT

## 2020-05-26 PROCEDURE — 99900035 HC TECH TIME PER 15 MIN (STAT)

## 2020-05-26 PROCEDURE — 80053 COMPREHEN METABOLIC PANEL: CPT

## 2020-05-26 PROCEDURE — 20600001 HC STEP DOWN PRIVATE ROOM

## 2020-05-26 PROCEDURE — 99233 SBSQ HOSP IP/OBS HIGH 50: CPT | Mod: ,,, | Performed by: INTERNAL MEDICINE

## 2020-05-26 PROCEDURE — 25000003 PHARM REV CODE 250: Performed by: STUDENT IN AN ORGANIZED HEALTH CARE EDUCATION/TRAINING PROGRAM

## 2020-05-26 PROCEDURE — 83615 LACTATE (LD) (LDH) ENZYME: CPT

## 2020-05-26 PROCEDURE — 99233 PR SUBSEQUENT HOSPITAL CARE,LEVL III: ICD-10-PCS | Mod: ,,, | Performed by: INTERNAL MEDICINE

## 2020-05-26 PROCEDURE — 27000221 HC OXYGEN, UP TO 24 HOURS

## 2020-05-26 PROCEDURE — 77295 3-D RADIOTHERAPY PLAN: CPT | Mod: 26,,, | Performed by: RADIOLOGY

## 2020-05-26 PROCEDURE — 90791 PR PSYCHIATRIC DIAGNOSTIC EVALUATION: ICD-10-PCS | Mod: ,,, | Performed by: PSYCHOLOGIST

## 2020-05-26 PROCEDURE — 83615 LACTATE (LD) (LDH) ENZYME: CPT | Mod: 91

## 2020-05-26 PROCEDURE — 36415 COLL VENOUS BLD VENIPUNCTURE: CPT

## 2020-05-26 PROCEDURE — 87205 SMEAR GRAM STAIN: CPT

## 2020-05-26 PROCEDURE — 32554 PR THORACEN W/O IMAG GUIDANC: ICD-10-PCS | Mod: RT,,, | Performed by: EMERGENCY MEDICINE

## 2020-05-26 PROCEDURE — 89051 BODY FLUID CELL COUNT: CPT

## 2020-05-26 PROCEDURE — 84100 ASSAY OF PHOSPHORUS: CPT

## 2020-05-26 PROCEDURE — 77295 3-D RADIOTHERAPY PLAN: CPT | Mod: TC | Performed by: RADIOLOGY

## 2020-05-26 PROCEDURE — 87075 CULTR BACTERIA EXCEPT BLOOD: CPT

## 2020-05-26 PROCEDURE — 77295 PR 3D RADIOTHERAPY PLAN: ICD-10-PCS | Mod: 26,,, | Performed by: RADIOLOGY

## 2020-05-26 PROCEDURE — 94640 AIRWAY INHALATION TREATMENT: CPT

## 2020-05-26 PROCEDURE — 90791 PSYCH DIAGNOSTIC EVALUATION: CPT | Mod: ,,, | Performed by: PSYCHOLOGIST

## 2020-05-26 PROCEDURE — 85025 COMPLETE CBC W/AUTO DIFF WBC: CPT

## 2020-05-26 PROCEDURE — 32554 ASPIRATE PLEURA W/O IMAGING: CPT | Mod: RT,,, | Performed by: EMERGENCY MEDICINE

## 2020-05-26 PROCEDURE — 25000242 PHARM REV CODE 250 ALT 637 W/ HCPCS: Performed by: STUDENT IN AN ORGANIZED HEALTH CARE EDUCATION/TRAINING PROGRAM

## 2020-05-26 PROCEDURE — 87070 CULTURE OTHR SPECIMN AEROBIC: CPT

## 2020-05-26 PROCEDURE — 83735 ASSAY OF MAGNESIUM: CPT

## 2020-05-26 RX ORDER — IPRATROPIUM BROMIDE AND ALBUTEROL SULFATE 2.5; .5 MG/3ML; MG/3ML
3 SOLUTION RESPIRATORY (INHALATION) EVERY 4 HOURS
Status: DISCONTINUED | OUTPATIENT
Start: 2020-05-26 | End: 2020-05-29 | Stop reason: HOSPADM

## 2020-05-26 RX ORDER — LIDOCAINE HYDROCHLORIDE 20 MG/ML
10 INJECTION, SOLUTION EPIDURAL; INFILTRATION; INTRACAUDAL; PERINEURAL ONCE
Status: COMPLETED | OUTPATIENT
Start: 2020-05-26 | End: 2020-05-26

## 2020-05-26 RX ORDER — OXYCODONE HYDROCHLORIDE 5 MG/1
5 TABLET ORAL ONCE
Status: COMPLETED | OUTPATIENT
Start: 2020-05-26 | End: 2020-05-26

## 2020-05-26 RX ORDER — LORAZEPAM 1 MG/1
1 TABLET ORAL EVERY 6 HOURS PRN
Status: DISCONTINUED | OUTPATIENT
Start: 2020-05-26 | End: 2020-05-29 | Stop reason: HOSPADM

## 2020-05-26 RX ADMIN — PANTOPRAZOLE SODIUM 20 MG: 20 TABLET, DELAYED RELEASE ORAL at 09:05

## 2020-05-26 RX ADMIN — AMLODIPINE BESYLATE 5 MG: 5 TABLET ORAL at 09:05

## 2020-05-26 RX ADMIN — IPRATROPIUM BROMIDE AND ALBUTEROL SULFATE 3 ML: .5; 3 SOLUTION RESPIRATORY (INHALATION) at 04:05

## 2020-05-26 RX ADMIN — LOSARTAN POTASSIUM AND HYDROCHLOROTHIAZIDE 1 TABLET: 12.5; 1 TABLET ORAL at 11:05

## 2020-05-26 RX ADMIN — IPRATROPIUM BROMIDE AND ALBUTEROL SULFATE 3 ML: .5; 3 SOLUTION RESPIRATORY (INHALATION) at 07:05

## 2020-05-26 RX ADMIN — GABAPENTIN 100 MG: 100 CAPSULE ORAL at 09:05

## 2020-05-26 RX ADMIN — BUPROPION HYDROCHLORIDE 150 MG: 150 TABLET, FILM COATED, EXTENDED RELEASE ORAL at 09:05

## 2020-05-26 RX ADMIN — GABAPENTIN 100 MG: 100 CAPSULE ORAL at 03:05

## 2020-05-26 RX ADMIN — OXYCODONE 5 MG: 5 TABLET ORAL at 03:05

## 2020-05-26 RX ADMIN — LIDOCAINE HYDROCHLORIDE 200 MG: 20 INJECTION, SOLUTION EPIDURAL; INFILTRATION; INTRACAUDAL; PERINEURAL at 01:05

## 2020-05-26 RX ADMIN — LIDOCAINE HYDROCHLORIDE 200 MG: 20 INJECTION, SOLUTION EPIDURAL; INFILTRATION; INTRACAUDAL; PERINEURAL at 09:05

## 2020-05-26 RX ADMIN — LORAZEPAM 1 MG: 1 TABLET ORAL at 11:05

## 2020-05-26 RX ADMIN — OXYCODONE 5 MG: 5 TABLET ORAL at 08:05

## 2020-05-26 RX ADMIN — Medication 6 MG: at 09:05

## 2020-05-26 RX ADMIN — IPRATROPIUM BROMIDE AND ALBUTEROL SULFATE 3 ML: .5; 3 SOLUTION RESPIRATORY (INHALATION) at 11:05

## 2020-05-26 NOTE — SUBJECTIVE & OBJECTIVE
Pain: 8  Distress:   Sleep: Poor    Symptoms:   · Mood: insomnia and fatigue  · Anxiety: excessive anxiety/worry, restlessness/keyed up and irritability  · Substance Abuse: denied  · Cognitive Functioning: denied  · Health Behaviors: noncontributory    Psychiatric History: psychotropic management by PCP    Past Medical History:   Diagnosis Date    Anxiety     Asthma     Hepatitis C 2019    treated    Hx of psychiatric care     Hypertension     Lung cancer     Psychiatric exam requested by authority     Psychiatric problem        Family History of Psychiatric Illness: not assessed    Social History (marriage, employment, etc.): Patient lives alone in [facility]. She has three children and several grandchildren She reported that her children are aware of her current diagnosis and current admission.     Substance Use:   Alcohol: none currently   Drugs: none   Tobacco: Former Smoker   Caffeine: none    Current Medications and Drug Reactions (include OTC, herbal):   See medication list. Patient reportedly takes Psych meds as prescribed.     Psychotherapeutics (From admission, onward)    Start     Stop Route Frequency Ordered    05/26/20 1200  LORazepam tablet 1 mg      -- Oral Every 6 hours PRN 05/26/20 1100    05/26/20 0900  buPROPion TB24 tablet 150 mg      -- Oral Daily 05/25/20 1736          Strengths and Liabilities: Strength: Patient accepts guidance/feedback, Strength: Patient is expressive/articulate., Strength: Patient is motivated for change., Strength: Patient has reasonable judgment., Liability: Patient is unstable.    Current Evaluation:     Mental Status Exam:  General Appearance:  age appropriate, lying in bed   Speech: Interruped by SOB and coughing      Level of Cooperation: cooperative      Thought Processes: normal and logical   Mood: euthymic      Thought Content: normal, no suicidality, no homicidality, delusions, or paranoia   Affect: congruent and appropriate   Orientation: Oriented  x3   Memory: recent >  intact, remote >  intact   Attention Span & Concentration: intact   Fund of General Knowledge: intact and appropriate to age and level of education   Abstract Reasoning: intact   Judgment & Insight: good     Language  intact     Janeth Boyce has adjusted to illness fairly well primarily through focus on family. She is exhibiting moderate anxiety including worry about her current situation, finances, and her family.  She has engaged in fair information gathering.  The patient has good family/friend support.  Her support system is coping adequately with the diagnosis/treatment/prognosis. Illness-related psychosocial stressors include difficulty meeting family responsibilities.  The patient has a good partnership with [place] [facility] [place]. The patient reports the following barriers to cancer care:financial limitations.       Assessment - Diagnosis - Goals:       ICD-10-CM ICD-9-CM   1. Adenocarcinoma of lung, stage 4, right C34.91 162.9   2. SOB (shortness of breath) R06.02 786.05   3. Pleural effusion on right J90 511.9   4. Chest pain R07.9 786.50       Plan:individual psychotherapy and medication management by physician    Summary and Recommendations  Janeth Boyce is a 59 y.o. female referred by Self, [facility] for psychological evaluation and treatment.  Ms. Boyce appears to be coping marginally with her diagnosis and proposed treatment course.  Patient was encouraged to speak to her primary care physician or oncologist regarding psychotropic medication options. She is interested in CBT to address depression/anxiety/insomnia and will follow up with me for that purpose. Mood protective strategies during cancer treatment were discussed. .

## 2020-05-26 NOTE — SUBJECTIVE & OBJECTIVE
Interval History: NAONE. Patient was able to rest after getting melatonin yesterday evening. HOB elevated while resting. Patient is a very anxious person. Willing to start ativan 1mg q6PRN. Pulm attempted thora today x2. Second attempt resulted in 400cc extracted. No symptomatic relief. Discussed palliative RT to the Lung with the patient. She is agreeable. Rad Onc agreed to evaluate pt tomorrow at 0800 and potentially give RT later that day if able.     Oncology Treatment Plan:   OP PEMBROLIZUMAB 200MG Q3W    Medications:  Continuous Infusions:  Scheduled Meds:   albuterol-ipratropium  3 mL Nebulization Q4H    amLODIPine  5 mg Oral Daily    buPROPion  150 mg Oral Daily    gabapentin  100 mg Oral TID    lidocaine (PF) 20 mg/mL (2%)  10 mL Infiltration Once    losartan-hydrochlorothiazide 100-12.5 mg  1 tablet Oral Daily    pantoprazole  20 mg Oral Daily    polyethylene glycol  17 g Oral Daily    senna-docusate 8.6-50 mg  1 tablet Oral BID     PRN Meds:acetaminophen, benzonatate, Dextrose 10% Bolus, Dextrose 10% Bolus, glucagon (human recombinant), glucose, glucose, LORazepam, melatonin, ondansetron, prochlorperazine, sodium chloride 0.9%            Review of Systems   Constitutional: Positive for diaphoresis (with BM and urination) and fatigue. Negative for chills and fever.   HENT: Negative for ear pain, hearing loss, postnasal drip, rhinorrhea, sore throat, tinnitus and trouble swallowing.    Eyes: Negative for visual disturbance.   Respiratory: Positive for chest tightness and shortness of breath. Negative for cough, wheezing and stridor.    Cardiovascular: Negative for chest pain and leg swelling.   Gastrointestinal: Negative for abdominal distention, abdominal pain, constipation, diarrhea, nausea and vomiting.   Endocrine: Negative for cold intolerance and heat intolerance.   Genitourinary: Negative for difficulty urinating and dysuria.   Musculoskeletal: Negative for arthralgias and myalgias.   Skin:  Negative for rash.   Neurological: Positive for weakness and light-headedness (with urination and BMs). Negative for dizziness and headaches.   Hematological: Does not bruise/bleed easily.   Psychiatric/Behavioral: Negative for confusion.     Objective:     Vital Signs (Most Recent):  Temp: 98.1 °F (36.7 °C) (05/26/20 1125)  Pulse: 93 (05/26/20 1125)  Resp: 16 (05/26/20 1125)  BP: 135/79 (05/26/20 1125)  SpO2: (!) 94 % (05/26/20 1125) Vital Signs (24h Range):  Temp:  [98.1 °F (36.7 °C)-99 °F (37.2 °C)] 98.1 °F (36.7 °C)  Pulse:  [] 93  Resp:  [16-26] 16  SpO2:  [93 %-97 %] 94 %  BP: (124-136)/(73-85) 135/79     Weight: 81.1 kg (178 lb 12.7 oz)  Body mass index is 28.86 kg/m².  Body surface area is 1.94 meters squared.      Intake/Output Summary (Last 24 hours) at 5/26/2020 1412  Last data filed at 5/26/2020 0400  Gross per 24 hour   Intake 240 ml   Output 400 ml   Net -160 ml       Physical Exam   Constitutional: She is oriented to person, place, and time. She appears well-developed and well-nourished.   Anxious appearing   HENT:   Head: Normocephalic and atraumatic.   Right Ear: Hearing normal.   Left Ear: Hearing normal.   Eyes: Conjunctivae, EOM and lids are normal.   Neck: Trachea normal, full passive range of motion without pain and phonation normal. No JVD present. No tracheal tenderness present. No tracheal deviation present.   Cardiovascular: Normal rate, regular rhythm, S1 normal, S2 normal, normal heart sounds and normal pulses. Exam reveals no gallop, no S3, no S4 and no friction rub.   No murmur heard.  Pulses:       Radial pulses are 2+ on the right side, and 2+ on the left side.   Pulmonary/Chest: Accessory muscle usage (mild, intermittent usage) present. No stridor. Tachypnea noted. No respiratory distress. She has decreased breath sounds in the right upper field, the right middle field and the right lower field. She has no wheezes. She has no rhonchi. She has no rales.   Abdominal: Soft. Normal  appearance and bowel sounds are normal. There is no tenderness.   Musculoskeletal: Normal range of motion.   Neurological: She is alert and oriented to person, place, and time. She has normal strength. No sensory deficit. GCS eye subscore is 4. GCS verbal subscore is 5. GCS motor subscore is 6.   Skin: Skin is warm, dry and intact. No rash noted. She is not diaphoretic. No pallor.   Psychiatric: She has a normal mood and affect. Her speech is normal and behavior is normal. Judgment and thought content normal. Cognition and memory are normal. She is attentive.   Nursing note and vitals reviewed.      Significant Labs:   Recent Lab Results       05/26/20  0442   05/25/20  1939        Albumin 2.6       Alkaline Phosphatase 88       ALT 8       Anion Gap 10       AST 16       Baso # 0.13       Basophil% 0.9       BILIRUBIN TOTAL 0.4  Comment:  For infants and newborns, interpretation of results should be based  on gestational age, weight and in agreement with clinical  observations.  Premature Infant recommended reference ranges:  Up to 24 hours.............<8.0 mg/dL  Up to 48 hours............<12.0 mg/dL  3-5 days..................<15.0 mg/dL  6-29 days.................<15.0 mg/dL         BUN, Bld 11       Calcium 9.5       Chloride 100       CO2 28       Creatinine 0.7       Differential Method Automated       eGFR if  >60.0       eGFR if non  >60.0  Comment:  Calculation used to obtain the estimated glomerular filtration  rate (eGFR) is the CKD-EPI equation.          Eos # 0.9       Eosinophil% 6.2       Glucose 92       Gran # (ANC) 10.3       Gran% 74.5       Hematocrit 36.5       Hemoglobin 10.8       Immature Grans (Abs) 0.09  Comment:  Mild elevation in immature granulocytes is non specific and   can be seen in a variety of conditions including stress response,   acute inflammation, trauma and pregnancy. Correlation with other   laboratory and clinical findings is essential.          Immature Granulocytes 0.7       INR   1.1  Comment:  Coumadin Therapy:  2.0 - 3.0 for INR for all indicators except mechanical heart valves  and antiphospholipid syndromes which should use 2.5 - 3.5.       Lymph # 1.5       Lymph% 10.8       Magnesium 1.6       MCH 26.0       MCHC 29.6       MCV 88       Mono # 1.0       Mono% 6.9       MPV 10.2       nRBC 0       Phosphorus 3.7       Platelets 329       Potassium 4.1       PROTEIN TOTAL 7.8       Protime   10.9     RBC 4.16       RDW 14.7       Sodium 138       WBC 13.82             Diagnostic Results:  I have reviewed and interpreted all pertinent imaging results/findings within the past 24 hours.

## 2020-05-26 NOTE — PLAN OF CARE
Side rails up x2; call bell in place; bed in lowest, locked position; skid proof socks on; no evidence of skin breakdown; care plan explained to patient; pt remains free of injury.  Pt tolerated po, voids, BM x 1, ambulates. C/o pain oxy IR given pt stated she had relief. Thoracentesis complete after second attempt. Bandaid to right back d/c/I. 02 sats 93-94% 02 2 L NC. CXR completed. VSS and afebrile.

## 2020-05-26 NOTE — CONSULTS
"  [facility]  Adult Nutrition  Consult Note    SUMMARY     Recommendations    Recommendation:   1. Continue regular diet, encourage good PO intake, high protein diet   2. Add boost plus BID to increase intake   3. Suggest MVI   RD to monitor and follow up     Goals: pt to tolerate >85% of EEN/EPN by RD follow up   Nutrition Goal Status: new  Communication of RD Recs: other (comment)(POC)    Reason for Assessment    Reason For Assessment: consult  Diagnosis: (SOB)  Relevant Medical History: HTN; Lung cancer; asthma  Interdisciplinary Rounds: did not attend  General Information Comments: Remote assessment completed. Spoke with pt over phone, reported good PO intake, 100% of meals at this time. States PTA good PO at home and drinking ONS. Pt agrees to drink boost while admitted. Reported wt loss from UBW ~200 lbs over past few months. Encouraged intake and high protein diet with pt. At risk for malnutrition due to wt loss, will continue to monitor. Due to recent hospital wide restrictions to limit the transfer of (COVID-19), we are not performing any physical exams at this time. All S/S will be observational; NFPE to be performed at a future date.  Nutrition Discharge Planning: adequate PO intake, nutrition optimization    Nutrition Risk Screen    Nutrition Risk Screen: no indicators present    Nutrition/Diet History    Food Allergies: NKFA  Factors Affecting Nutritional Intake: None identified at this time    Anthropometrics    Temp: 98.1 °F (36.7 °C)  Height Method: Stated  Height: 5' 6" (167.6 cm)  Height (inches): 66 in  Weight Method: Bed Scale  Weight: 81.1 kg (178 lb 12.7 oz)  Weight (lb): 178.79 lb  Ideal Body Weight (IBW), Female: 130 lb  % Ideal Body Weight, Female (lb): 149.23 %  BMI (Calculated): 28.9  BMI Grade: 25 - 29.9 - overweight     Lab/Procedures/Meds    Pertinent Labs Reviewed: reviewed  Pertinent Labs Comments: noted  Pertinent Medications Reviewed: reviewed  Pertinent " Medications Comments: senna-docusate; amlodipine; gabapentin; pantoprazole     Estimated/Assessed Needs    Weight Used For Calorie Calculations: 81.1 kg (178 lb 12.7 oz)  Energy Calorie Requirements (kcal): 1247-3241 kcal/d  Energy Need Method: Kcal/kg  Protein Requirements:  g/day   Weight Used For Protein Calculations: 81.1 kg (178 lb 12.7 oz)  Fluid Requirements (mL): 1 mL/kcal or per MD  RDA Method (mL): 2027    Nutrition Prescription Ordered    Current Diet Order: Regular diet     Evaluation of Received Nutrient/Fluid Intake    Energy Calories Required: meeting needs  Protein Required: meeting needs  Fluid Required: meeting needs  Comments: LBM 5/25  Tolerance: tolerating  % Intake of Estimated Energy Needs: 75 - 100 %  % Meal Intake: 75 - 100 %    Nutrition Risk    Level of Risk/Frequency of Follow-up: low     Assessment and Plan  Nutrition Problem  increased nutrient needs    Related to (etiology):   Physiological needs     Signs and Symptoms (as evidenced by):   Pt with lung cancer     Interventions (treatment strategy):  Collaboration of care with other providers  Commercial beverage    Nutrition Diagnosis Status:   New    Monitor and Evaluation    Food and Nutrient Intake: energy intake, food and beverage intake  Food and Nutrient Adminstration: diet order  Knowledge/Beliefs/Attitudes: food and nutrition knowledge/skill  Anthropometric Measurements: weight, weight change  Biochemical Data, Medical Tests and Procedures: electrolyte and renal panel, lipid profile, gastrointestinal profile, glucose/endocrine profile, inflammatory profile  Nutrition-Focused Physical Findings: overall appearance     Nutrition Follow-Up    RD Follow-up?: Yes

## 2020-05-26 NOTE — PLAN OF CARE
Patient AAOX4. Vital signs stable, patient afebrile with no complaints of nausea or pain. Patient complained of cough and requested codeine cough syrup stating that this is a home med. I called the physician and was told that the team did not want to give her any sedating medication. Tessalon Pearls were prescribed with good results. Patient up independently in room to void in toilet. 2 small BMs this shift. No acute events overnight. Fall precautions maintained. Instructed patient to call for assistance. Call light within reach will continue to monitor.

## 2020-05-26 NOTE — CONSULTS
[facility]  Radiation Oncology  Consult Note    Patient Name: Janeth Boyce  MRN: [id number]  Admission Date: 5/25/2020  Hospital Length of Stay: 1 days  Code Status: Full Code   Attending Provider: Helen Ley MD  Consulting Provider: Oni Colmenares MD  Primary Care Physician: Freeman Caballero MD  Principal Problem:<principal problem not specified>    Inpatient consult to Radiation Oncology  Consult performed by: Oni Colmenares MD  Consult ordered by: Chan Dunham MD        Assessment/Plan:     Adenocarcinoma of lung, stage 4, right  This is a 59 y.o. y/o female with Stage IV RUL NSCLC (adeno; mutation status pending) diagnosed on biopsy 5/4/20. She was recently evaluated in clinic by me for brain metastasis. She was admitted 5/25/20 for worsening dyspnea secondary to airway obstruction. CT Chest 5/25/20 demonstrated near total collapse of the right lung with increase in pleural effusion and persistent tumor burden around Right lung lobar bronchi. She underwent thoracentesis today with reported ~500 ml drained just prior to when I evaluated her at bedside. She felt her dyspnea was somewhat improved but persistent. Radiation Oncology was consulted for consideration of palliative RT.     Palliative radiation is indicated for relief of airway obstruction. I recommend treating Right lung disease to 20 Gy in 5 fractions. We will plan to simulate patient early tomorrow morning and deliver 1st fraction by tomorrow afternoon. I will discuss potential risks and benefits with patient in the morning prior to CT Simulation.         Thank you for your consult. I will follow-up with patient. Please contact us if you have any additional questions.    Oni Colmenares MD  Radiation Oncology  [facility]

## 2020-05-26 NOTE — ASSESSMENT & PLAN NOTE
Progressive worsening SOB  CTA Chest: Progression of airway obstruction and postobstructive consolidation with enlargement of the right pleural effusion.  Patient's large right apically pulmonary mass is similar in size allowing for differences in technique. No pulmonary thromboembolism. Mediastinal and left pulmonary probable metastatic disease. Questionable lytic lesions in T2 and T7 which may represent metastatic disease.    Plan:  -Pulmonology consulted. Appreciate their assistance  -VTE ppx with lovenox   -Monitor O2sats. Initiate supplemental O2 if sats < 92%  -Avoiding sedating medications as it can worsen SOB  -Rad onc consulted for palliative RT for the lung. They will eval tomorrow at 0800 and might be able to perform RT later tomorrow  -Will give oxy 5mg x1 for pain relief following thora

## 2020-05-26 NOTE — CONSULTS
[facility]  Psychology  Consult Note    Diagnostic Interview - CPT 55631    Patient Name: Janeth Boyce  MRN: [id number]   Patient Class: IP- Inpatient  Admission Date: 5/25/2020  Hospital Length of Stay: 1 days  Attending Physician: Helen Ley MD  Primary Care Provider: Freeman Caballero MD    Inpatient consult to Hematology/Oncology Psychology  Consult performed by: Nancy Laughlin, PhD  Consult ordered by: Chan Dunham MD  Reason for consult: Anxiety; Adjustment  Assessment/Recommendations: Patient exhibiting moderate anxiety related to her current disease status. Anxiety may exacerbate SOB. Patient willing to continue to meet with me for support while inpatient and continue with treatment on an outpatient basis. Patient was offered Child Life to assist with explaining her disease status to her grandchildren dn stated that she would think about it. If patient's anxiety is not managed with Team's addition of Ativan, may consider a med review of benefit of Wellbutrin vs. SSRI.     Plan: Psych will follow her for duration of inpatient admission and outpatient.           History of Present Illness:   Patient is a 59 year old AAF with recent diagnosis of Stage IV Lung Cancer who had not yet started treatment. Patient admitted for worsening SOB. Patient reported anxiety regarding her current diagnosis, concerns for treatment, and worry about her future. Patient has adequate insight and is willing to follow recommendations of treatment team. She has adequate social support. She is interested in psychological treatment. Mood and protective strategies were discussed.     Pain: 8  Distress: 7/10  Sleep: Poor, But with addition of melatonin sleep last night improved    Symptoms:   · Mood: insomnia and fatigue  · Anxiety: excessive anxiety/worry, restlessness/keyed up and irritability  · Substance Abuse: denied  · Cognitive Functioning: denied  · Health Behaviors: noncontributory    Psychiatric  History: psychotropic management by PCP    Past Medical History:   Diagnosis Date    Anxiety     Asthma     Hepatitis C 2019    treated    Hx of psychiatric care     Hypertension     Lung cancer     Psychiatric exam requested by authority     Psychiatric problem        Family History of Psychiatric Illness: not assessed    Social History (marriage, employment, etc.): Patient lives alone in [facility]. She has three children and several grandchildren She reported that her children are aware of her current diagnosis and current admission.     Substance Use:   Alcohol: none currently   Drugs: none   Tobacco: Former Smoker   Caffeine: none    Current Medications and Drug Reactions (include OTC, herbal):   See medication list. Patient reportedly takes Psych meds as prescribed.     Psychotherapeutics (From admission, onward)    Start     Stop Route Frequency Ordered    05/26/20 1200  LORazepam tablet 1 mg      -- Oral Every 6 hours PRN 05/26/20 1100    05/26/20 0900  buPROPion TB24 tablet 150 mg      -- Oral Daily 05/25/20 3636          Strengths and Liabilities: Strength: Patient accepts guidance/feedback, Strength: Patient is expressive/articulate., Strength: Patient is motivated for change., Strength: Patient has reasonable judgment., Liability: Patient is unstable.    Current Evaluation:     Mental Status Exam:  General Appearance:  age appropriate, lying in bed   Speech: Interruped by SOB and coughing      Level of Cooperation: cooperative      Thought Processes: normal and logical   Mood: euthymic      Thought Content: normal, no suicidality, no homicidality, delusions, or paranoia   Affect: congruent and appropriate   Orientation: Oriented x3   Memory: recent >  intact, remote >  intact   Attention Span & Concentration: intact   Fund of General Knowledge: intact and appropriate to age and level of education   Abstract Reasoning: intact   Judgment & Insight: good     Language  intact     Janeth IGNACIO  Austen has adjusted to illness fairly well primarily through focus on family. She is exhibiting moderate anxiety including worry about her current situation, finances, and her family.  She has engaged in fair information gathering.  The patient has good family/friend support.  Her support system is coping adequately with the diagnosis/treatment/prognosis. Illness-related psychosocial stressors include difficulty meeting family responsibilities.  The patient has a good partnership with her [facility] oncology treatment team. The patient reports the following barriers to cancer care:financial limitations.       Assessment - Diagnosis - Goals:       ICD-10-CM ICD-9-CM   1. Adenocarcinoma of lung, stage 4, right C34.91 162.9   2. SOB (shortness of breath) R06.02 786.05   3. Pleural effusion on right J90 511.9   4. Chest pain R07.9 786.50   5. Anxiety F41.9 300.00       Plan:individual psychotherapy and medication management by physician    Summary and Recommendations  Janeth Boyce is a 59 y.o. female referred by Self, Aaareferral for psychological evaluation and treatment.  Ms. Boyce appears to be coping marginally with her diagnosis and proposed treatment course.  Patient was encouraged to speak to her primary care physician or oncologist regarding psychotropic medication options. She is interested in CBT to address depression/anxiety/insomnia and will follow up with me for that purpose. Mood protective strategies during cancer treatment were discussed. .      Length of Service (minutes): 45    Nancy Laughlin, PhD  Psychology  [facility]

## 2020-05-26 NOTE — ASSESSMENT & PLAN NOTE
Treated for HCV in 2019    Per Dr. Peterson, patient does not require treatment given completion of treatment in 2019

## 2020-05-26 NOTE — ASSESSMENT & PLAN NOTE
This is a 59 y.o. y/o female with Stage IV RUL NSCLC (adeno; mutation status pending) diagnosed on biopsy 5/4/20. She was recently evaluated in clinic by me for brain metastasis. She was admitted 5/25/20 for worsening dyspnea secondary to airway obstruction. CT Chest 5/25/20 demonstrated near total collapse of the right lung with increase in pleural effusion and persistent tumor burden around Right lung lobar bronchi. She underwent thoracentesis today with reported ~500 ml drained just prior to when I evaluated her at bedside. She felt her dyspnea was somewhat improved but persistent. Radiation Oncology was consulted for consideration of palliative RT.     Palliative radiation is indicated for relief of airway obstruction. I recommend treating Right lung disease to 20 Gy in 5 fractions. We will plan to simulate patient early tomorrow morning and deliver 1st fraction by tomorrow afternoon. I will discuss potential risks and benefits with patient in the morning prior to CT Simulation.

## 2020-05-26 NOTE — PLAN OF CARE
Recommendations    Recommendation:   1. Continue regular diet, encourage good PO intake, high protein diet   2. Add boost plus BID to increase intake   3. Suggest MVI   RD to monitor and follow up     Goals: pt to tolerate >85% of EEN/EPN by RD follow up   Nutrition Goal Status: new  Communication of RD Recs: other (comment)(POC)

## 2020-05-26 NOTE — PROGRESS NOTES
[facility]  Hematology/Oncology  Progress Note    Patient Name: Janeth Boyce  Admission Date: 5/25/2020  Hospital Length of Stay: 1 days  Code Status: Full Code     Subjective:     HPI:  58yo F with h/o HTN, Hep C s/p unknown treatment in 2019, GERD, asthma, and recently diagnosed stage 4 right upper lung adenocarcinoma with metastasis to the brain (dx 5/5/20, not started on treatment).  Presents with progressively worsening SOB. She states she has been feeling SOB ever since her biopsy 5/5/20 but has gotten significantly worse in the last 4 days and was so bad yesterday she was unable to sleep. It is worse with exertion and upon laying flat. She denies chest pain, but endorses chest tightness. Patient also complains of feeling flushed, diaphoretic, SOB and lightheaded recently when needing to urinate an/or with her BMs. However she denies any urinary or bowel incontinence. Patient has a chronic cough productive of clear white sputum that is unchanged. She is on home oxygen 2L which she wears continuously. In the ED patient was AF, , RR 32, normotensive, and satting 98% on RA.  When examined by Medical Oncology, she was sitting upright, tachypnic with very mild use of accessory muscles. At that time her O2sat was 93% on RA. Of note, her lung cancer was dx earlier this month. She has not yet started treatment.    Oncologic History:    Oncologic History 1. Lung adenocarcinoma    Oncologic Treatment      Pathology 5/5/2020  Right lung, biopsy:  Positive for adenocarcinoma with areas of necrosis.  Molecular studies will be completed for EGFR, ALK, ROS-1, PD-L1 and BRAF and results will follow in a supplemental report.       Interval History: NAONE. Patient was able to rest after getting melatonin yesterday evening. HOB elevated while resting. Patient is a very anxious person. Willing to start ativan 1mg q6PRN. Pulm attempted thora today x2. Second attempt resulted in 400cc extracted. No  symptomatic relief. Discussed palliative RT to the Lung with the patient. She is agreeable. Rad Onc agreed to evaluate pt tomorrow at 0800 and potentially give RT later that day if able.     Oncology Treatment Plan:   OP PEMBROLIZUMAB 200MG Q3W    Medications:  Continuous Infusions:  Scheduled Meds:   albuterol-ipratropium  3 mL Nebulization Q4H    amLODIPine  5 mg Oral Daily    buPROPion  150 mg Oral Daily    gabapentin  100 mg Oral TID    lidocaine (PF) 20 mg/mL (2%)  10 mL Infiltration Once    losartan-hydrochlorothiazide 100-12.5 mg  1 tablet Oral Daily    pantoprazole  20 mg Oral Daily    polyethylene glycol  17 g Oral Daily    senna-docusate 8.6-50 mg  1 tablet Oral BID     PRN Meds:acetaminophen, benzonatate, Dextrose 10% Bolus, Dextrose 10% Bolus, glucagon (human recombinant), glucose, glucose, LORazepam, melatonin, ondansetron, prochlorperazine, sodium chloride 0.9%            Review of Systems   Constitutional: Positive for diaphoresis (with BM and urination) and fatigue. Negative for chills and fever.   HENT: Negative for ear pain, hearing loss, postnasal drip, rhinorrhea, sore throat, tinnitus and trouble swallowing.    Eyes: Negative for visual disturbance.   Respiratory: Positive for chest tightness and shortness of breath. Negative for cough, wheezing and stridor.    Cardiovascular: Negative for chest pain and leg swelling.   Gastrointestinal: Negative for abdominal distention, abdominal pain, constipation, diarrhea, nausea and vomiting.   Endocrine: Negative for cold intolerance and heat intolerance.   Genitourinary: Negative for difficulty urinating and dysuria.   Musculoskeletal: Negative for arthralgias and myalgias.   Skin: Negative for rash.   Neurological: Positive for weakness and light-headedness (with urination and BMs). Negative for dizziness and headaches.   Hematological: Does not bruise/bleed easily.   Psychiatric/Behavioral: Negative for confusion.     Objective:     Vital  Signs (Most Recent):  Temp: 98.1 °F (36.7 °C) (05/26/20 1125)  Pulse: 93 (05/26/20 1125)  Resp: 16 (05/26/20 1125)  BP: 135/79 (05/26/20 1125)  SpO2: (!) 94 % (05/26/20 1125) Vital Signs (24h Range):  Temp:  [98.1 °F (36.7 °C)-99 °F (37.2 °C)] 98.1 °F (36.7 °C)  Pulse:  [] 93  Resp:  [16-26] 16  SpO2:  [93 %-97 %] 94 %  BP: (124-136)/(73-85) 135/79     Weight: 81.1 kg (178 lb 12.7 oz)  Body mass index is 28.86 kg/m².  Body surface area is 1.94 meters squared.      Intake/Output Summary (Last 24 hours) at 5/26/2020 1412  Last data filed at 5/26/2020 0400  Gross per 24 hour   Intake 240 ml   Output 400 ml   Net -160 ml       Physical Exam   Constitutional: She is oriented to person, place, and time. She appears well-developed and well-nourished.   Anxious appearing   HENT:   Head: Normocephalic and atraumatic.   Right Ear: Hearing normal.   Left Ear: Hearing normal.   Eyes: Conjunctivae, EOM and lids are normal.   Neck: Trachea normal, full passive range of motion without pain and phonation normal. No JVD present. No tracheal tenderness present. No tracheal deviation present.   Cardiovascular: Normal rate, regular rhythm, S1 normal, S2 normal, normal heart sounds and normal pulses. Exam reveals no gallop, no S3, no S4 and no friction rub.   No murmur heard.  Pulses:       Radial pulses are 2+ on the right side, and 2+ on the left side.   Pulmonary/Chest: Accessory muscle usage (mild, intermittent usage) present. No stridor. Tachypnea noted. No respiratory distress. She has decreased breath sounds in the right upper field, the right middle field and the right lower field. She has no wheezes. She has no rhonchi. She has no rales.   Abdominal: Soft. Normal appearance and bowel sounds are normal. There is no tenderness.   Musculoskeletal: Normal range of motion.   Neurological: She is alert and oriented to person, place, and time. She has normal strength. No sensory deficit. GCS eye subscore is 4. GCS verbal  subscore is 5. GCS motor subscore is 6.   Skin: Skin is warm, dry and intact. No rash noted. She is not diaphoretic. No pallor.   Psychiatric: She has a normal mood and affect. Her speech is normal and behavior is normal. Judgment and thought content normal. Cognition and memory are normal. She is attentive.   Nursing note and vitals reviewed.      Significant Labs:   Recent Lab Results       05/26/20  0442   05/25/20  1939        Albumin 2.6       Alkaline Phosphatase 88       ALT 8       Anion Gap 10       AST 16       Baso # 0.13       Basophil% 0.9       BILIRUBIN TOTAL 0.4  Comment:  For infants and newborns, interpretation of results should be based  on gestational age, weight and in agreement with clinical  observations.  Premature Infant recommended reference ranges:  Up to 24 hours.............<8.0 mg/dL  Up to 48 hours............<12.0 mg/dL  3-5 days..................<15.0 mg/dL  6-29 days.................<15.0 mg/dL         BUN, Bld 11       Calcium 9.5       Chloride 100       CO2 28       Creatinine 0.7       Differential Method Automated       eGFR if  >60.0       eGFR if non  >60.0  Comment:  Calculation used to obtain the estimated glomerular filtration  rate (eGFR) is the CKD-EPI equation.          Eos # 0.9       Eosinophil% 6.2       Glucose 92       Gran # (ANC) 10.3       Gran% 74.5       Hematocrit 36.5       Hemoglobin 10.8       Immature Grans (Abs) 0.09  Comment:  Mild elevation in immature granulocytes is non specific and   can be seen in a variety of conditions including stress response,   acute inflammation, trauma and pregnancy. Correlation with other   laboratory and clinical findings is essential.         Immature Granulocytes 0.7       INR   1.1  Comment:  Coumadin Therapy:  2.0 - 3.0 for INR for all indicators except mechanical heart valves  and antiphospholipid syndromes which should use 2.5 - 3.5.       Lymph # 1.5       Lymph% 10.8       Magnesium  1.6       MCH 26.0       MCHC 29.6       MCV 88       Mono # 1.0       Mono% 6.9       MPV 10.2       nRBC 0       Phosphorus 3.7       Platelets 329       Potassium 4.1       PROTEIN TOTAL 7.8       Protime   10.9     RBC 4.16       RDW 14.7       Sodium 138       WBC 13.82             Diagnostic Results:  I have reviewed and interpreted all pertinent imaging results/findings within the past 24 hours.    Assessment/Plan:     HCV (hepatitis C virus)  Treated for HCV in 2019    Per Dr. Peterson, patient does not require treatment given completion of treatment in 2019    Anxiety  On wellbutrin at home    Plan:  -C/w wellbutrin  -Okay to start ativan 1mg q6PRN for anxiety    Insomnia  2/2 SOB    Plan:  -Melatonin qHS, but patient HOB must be elevated  -Avoid sedating Rx for now as it can worsen SOB    Adenocarcinoma of lung, stage 4, right  Recently dx in May 2020  Not yet started on treatment     Oncologic History 1. Lung adenocarcinoma    Oncologic Treatment      Pathology 5/5/2020  Right lung, biopsy:  Positive for adenocarcinoma with areas of necrosis.  Molecular studies will be completed for EGFR, ALK, ROS-1, PD-L1 and BRAF and results will follow in a supplemental report.         Essential hypertension  On norvasc at home  On valsartan-hctz, not on formulary    C/w norvasc, losartan-hctz      Shortness of breath  Progressive worsening SOB  CTA Chest: Progression of airway obstruction and postobstructive consolidation with enlargement of the right pleural effusion.  Patient's large right apically pulmonary mass is similar in size allowing for differences in technique. No pulmonary thromboembolism. Mediastinal and left pulmonary probable metastatic disease. Questionable lytic lesions in T2 and T7 which may represent metastatic disease.    Plan:  -Pulmonology consulted. Appreciate their assistance  -VTE ppx with lovenox   -Monitor O2sats. Initiate supplemental O2 if sats < 92%  -Avoiding sedating medications as it can  worsen SOB  -Rad onc consulted for palliative RT for the lung. They will eval tomorrow at 0800 and might be able to perform RT later tomorrow  -Will give oxy 5mg x1 for pain relief following thora        Chan Dunham MD  Hematology/Oncology  [facility]    Attending Note  I have personally taken the history and examined this patient and agree with the resident's note as stated above.  Attending to reattempt thoracentesis but may not have sufficient fluid  We discussed with patient XRT to treat compressed airway and Carboplatin  Case also discussed with CT surgery who are happy to discuss bronch and see if amenable to a stent  Discussed above decision before Keytruda as she needs a more rapid response

## 2020-05-26 NOTE — PROGRESS NOTES
Admit Assessment    Patient Identification  Janeth Boyce   :  1960  Admit Date:  2020  Attending Provider:  Helen Ley MD              Referral:   Patient was admitted to Medical Oncology Service with a diagnosis of Stage IV Adenocarcinoma of the Right Upper Lung with Mets. to the the Brain (dx 20), and she was admitted this hospital stay due to Worsening SOB.  SOB (shortness of breath) [R06.02]  Pleural effusion on right [J90]  Chest pain [R07.9]  Adenocarcinoma of lung, stage 4, right [C34.91].  Additional oncologic and medical history noted in H&P, in chart.    Oncology Social Worker is involved. Patient was referred to the Social Work Department via admission list/census - routine referral. Patient presents as a 59 y.o. year old, single,  female.    Persons interviewed: Met with patient in her room this afternoon to complete assessment, and later returned to her room to provide Advance Care Planning booklet and try to assist her use the [employer] website to check on the status of her stimulus check which she hasn't received yet.      Living Situation:    Patient lives alone in an apartment located at [address], [address], [place] [address]. Patient's cell phone number is [phone].    She is ambulatory and able to perform ADLs. Limited by SOB even with using home oxygen.    She has 3 children, all of whom reside locally, as well as a sister and niece.    Kell Boyce, Daughter, [phone]  Ayan Garcia, Sister, [phone]  Jeannine, Niece, [phone]      Current or Past Agencies and Description of Services/Supplies    DME  Agency Name: [facility]  Agency Phone Number: [phone]  Equipment Currently Used at Home: Oxygen, Nebulizer Machine       Home Health  None    IV Infusion  None    Nutrition: Oral diet.    Outpatient Pharmacy:     [facility]  [address] [place] [address] [address]  [facility] [address]  Phone: [phone] Fax:  [phone]    [facility] [address] [facility]  [address] [facility] [address]  Phone: [phone] Fax: [phone]      Patient Preference of agencies include: none stated.    Patient/Caregiver informed of right to choose providers or agencies.  Patient provides permission to release any necessary information to [facility] and to [facility] agencies as needed to facilitate patient care, treatment planning, and patient discharge planning.  Written and verbal resources provided as needed/requested.      Coping  Patient has support from her family and neighbors.          Adjustment to Diagnosis and Treatment  Newly diagnosed; patient will benefit from reinforcement of information and multiple supportive services/disciplines.      Emotional/Behavioral/Cognitive Issues  Patient anxious.           History/Current Symptoms of Anxiety/Depression: Yes    History/Current Substance Use:   Social History     Tobacco Use    Smoking status: Former Smoker     Packs/day: 0.50     Years: 40.00     Pack years: 20.00     Types: Cigarettes     Last attempt to quit: 2020     Years since quittin.1   Substance and Sexual Activity    Alcohol use: Yes     Comment: occasional    Drug use: No    Sexual activity: Not on file       Indications of Abuse/Neglect: No  Abuse Screen (yes response referral indicated)  Feels Unsafe at Home or Work/School: no    Financial:  Payor/Plan Subscr  Sex Relation Sub. Ins. ID Effective Group Num   1. MEDICAID - [place]* DENISSE VO 1960 Female  [id number]* [id number]                                    [address]      Patient receives monthly SSI benefits and she [place] [facility] [employer] [facility].                Other identified concerns/needs: Patient interested in home health services. She hasn't received her economic stimulus check that was to be prepared  and mailed to her.    Plan: Patient plans to return to her apartment  via family car at discharge from the hospital.    Interventions/Referrals: Explained advance directives and provided patient with booklet containing written information and LW and HPOA forms. Using the computer in patient's room, assisted patient in trying to check on the status of her economic stimulus check, but unable to get an update (entered current and previous address), and there is a limit on the number of attempts in a 24 hour period, so will need to follow up on this on Thursday when I return to work. Also tried calling the IRS at the number listed in the website but it was a recording.      Patient engaged in treatment planning process.    Oncology Social Worker providing psychosocial and supportive counseling, resources, education, assistance and discharge planning as appropriate. Patient expressed understanding of  available resources,  following, remains available.    Provided patient with business card containing all contact information for Oncology Social Worker.    Will continue to follow.

## 2020-05-26 NOTE — PROCEDURES
Thoracentesis Procedure Note    05/26/2020    Pre-operative Diagnosis:    Pleural Effusion on RIGHT    Post-operative Diagnosis:  same    Indications:   Pleural Effusion on RIGHT  Dyspnea    Consent:   Informed consent was obtained. Risks of the procedure were discussed including infection, bleeding, pain, and pneumothorax. The patient signed the consent freely in the presence of a witness (nursing staff) after all questions were answered.     Procedure Details:  After the patient was positioned, an acceptable site for fluid aspiration was visualized and marked under ultrasound guidance. The usual sterile field was created using Chlorhexadine solution and sterile drapes. 1% plain lidocaine was then used to create a wheal, and then given via deeper infiltration between the rib space.  A small nick was created using a #11 blade scalpel. The thoracentesis catheter was inserted without difficulty, and fluid was obtained via aspiration with 60mL syringe. The catheter was then withdrawn and a clean dressing was applied. A pleural ultrasound was performed over the anterior 2nd rib space which showed satisfactory lung slide and no pneumothorax on M-mode exam.     Findings:  400 ml of bloody pleural fluid was obtained. Samples were sent for microbiology, cell count, and cytology.     Complications:   None; patient tolerated the procedure well.          Condition:  stable    Plan:   A follow up chest x-ray was ordered.  Bed Rest for 2 hours.    Lotus Hauser MD   PGY IV  [facility]

## 2020-05-26 NOTE — TREATMENT PLAN
Thoracic Surgery    Case discussed by Dr. Molina and Dr. Ley. Offered bronchoscopy with possible debulking and pleurX placement however patient elected for chemoRT. Underwent thoracentesis this afternoon with 400 cc removed. Please call with additional questions or concerns.     Chelsi Oliva PA-C  Thoracic Surgery  [id number]

## 2020-05-26 NOTE — HOSPITAL COURSE
Admitted to Medical Oncology. Able to rest comfortably while seated upright. Patient is generally very anxious. Pulmonology attempted thoracentesis twice. Second attempt resulted in 400cc being drained, without relief. Discussed palliative RT to the lung. Pt agreeable. Rad onc was supposed to perform RT to brain lesions outpatient on 5/27, but after discussion with them, will instead perform palliative RT to the lung 5/27. Discussed case with Dr. Peterson who treats patient as primary Oncologist, in particular regarding her Hep C status. He states no treatment is needed for the HCV. Patient reports some improvement following XRT. Able to ambulate comfortably. Will finish XRT outpatient. AF and HDS. In fair condition for discharge.

## 2020-05-27 LAB
ALBUMIN SERPL BCP-MCNC: 2.5 G/DL (ref 3.5–5.2)
ALP SERPL-CCNC: 82 U/L (ref 55–135)
ALT SERPL W/O P-5'-P-CCNC: 8 U/L (ref 10–44)
ANION GAP SERPL CALC-SCNC: 9 MMOL/L (ref 8–16)
AST SERPL-CCNC: 16 U/L (ref 10–40)
BASOPHILS # BLD AUTO: 0.14 K/UL (ref 0–0.2)
BASOPHILS NFR BLD: 1 % (ref 0–1.9)
BILIRUB SERPL-MCNC: 0.5 MG/DL (ref 0.1–1)
BUN SERPL-MCNC: 13 MG/DL (ref 6–20)
CALCIUM SERPL-MCNC: 9.1 MG/DL (ref 8.7–10.5)
CHLORIDE SERPL-SCNC: 101 MMOL/L (ref 95–110)
CO2 SERPL-SCNC: 25 MMOL/L (ref 23–29)
CREAT SERPL-MCNC: 0.8 MG/DL (ref 0.5–1.4)
DIFFERENTIAL METHOD: ABNORMAL
EOSINOPHIL # BLD AUTO: 0.8 K/UL (ref 0–0.5)
EOSINOPHIL NFR BLD: 6.2 % (ref 0–8)
ERYTHROCYTE [DISTWIDTH] IN BLOOD BY AUTOMATED COUNT: 14.6 % (ref 11.5–14.5)
EST. GFR  (AFRICAN AMERICAN): >60 ML/MIN/1.73 M^2
EST. GFR  (NON AFRICAN AMERICAN): >60 ML/MIN/1.73 M^2
GLUCOSE SERPL-MCNC: 89 MG/DL (ref 70–110)
HCT VFR BLD AUTO: 35.9 % (ref 37–48.5)
HGB BLD-MCNC: 10.6 G/DL (ref 12–16)
IMM GRANULOCYTES # BLD AUTO: 0.08 K/UL (ref 0–0.04)
IMM GRANULOCYTES NFR BLD AUTO: 0.6 % (ref 0–0.5)
LYMPHOCYTES # BLD AUTO: 1.7 K/UL (ref 1–4.8)
LYMPHOCYTES NFR BLD: 12.9 % (ref 18–48)
MAGNESIUM SERPL-MCNC: 1.6 MG/DL (ref 1.6–2.6)
MCH RBC QN AUTO: 26 PG (ref 27–31)
MCHC RBC AUTO-ENTMCNC: 29.5 G/DL (ref 32–36)
MCV RBC AUTO: 88 FL (ref 82–98)
MONOCYTES # BLD AUTO: 1.3 K/UL (ref 0.3–1)
MONOCYTES NFR BLD: 9.8 % (ref 4–15)
NEUTROPHILS # BLD AUTO: 9.3 K/UL (ref 1.8–7.7)
NEUTROPHILS NFR BLD: 69.5 % (ref 38–73)
NRBC BLD-RTO: 0 /100 WBC
PATH INTERP FLD-IMP: NORMAL
PHOSPHATE SERPL-MCNC: 4.1 MG/DL (ref 2.7–4.5)
PLATELET # BLD AUTO: 297 K/UL (ref 150–350)
PMV BLD AUTO: 10 FL (ref 9.2–12.9)
POTASSIUM SERPL-SCNC: 4 MMOL/L (ref 3.5–5.1)
PROT SERPL-MCNC: 7.4 G/DL (ref 6–8.4)
RBC # BLD AUTO: 4.07 M/UL (ref 4–5.4)
SODIUM SERPL-SCNC: 135 MMOL/L (ref 136–145)
WBC # BLD AUTO: 13.41 K/UL (ref 3.9–12.7)

## 2020-05-27 PROCEDURE — 77300 PR RADIATION THERAPY,DOSIMETRY PLAN: ICD-10-PCS | Mod: 26,,, | Performed by: RADIOLOGY

## 2020-05-27 PROCEDURE — 25000003 PHARM REV CODE 250: Performed by: INTERNAL MEDICINE

## 2020-05-27 PROCEDURE — 99900035 HC TECH TIME PER 15 MIN (STAT)

## 2020-05-27 PROCEDURE — 63600175 PHARM REV CODE 636 W HCPCS: Performed by: INTERNAL MEDICINE

## 2020-05-27 PROCEDURE — 99233 SBSQ HOSP IP/OBS HIGH 50: CPT | Mod: ,,, | Performed by: INTERNAL MEDICINE

## 2020-05-27 PROCEDURE — 77387 GUIDANCE FOR RADJ TX DLVR: CPT | Mod: TC | Performed by: RADIOLOGY

## 2020-05-27 PROCEDURE — 77334 PR  RADN TREATMENT AID(S) COMPLX: ICD-10-PCS | Mod: 26,,, | Performed by: RADIOLOGY

## 2020-05-27 PROCEDURE — 77334 RADIATION TREATMENT AID(S): CPT | Mod: TC | Performed by: RADIOLOGY

## 2020-05-27 PROCEDURE — 27000221 HC OXYGEN, UP TO 24 HOURS

## 2020-05-27 PROCEDURE — 77295 3-D RADIOTHERAPY PLAN: CPT | Mod: 26,,, | Performed by: RADIOLOGY

## 2020-05-27 PROCEDURE — 25000003 PHARM REV CODE 250: Performed by: STUDENT IN AN ORGANIZED HEALTH CARE EDUCATION/TRAINING PROGRAM

## 2020-05-27 PROCEDURE — 77295 PR 3D RADIOTHERAPY PLAN: ICD-10-PCS | Mod: 26,,, | Performed by: RADIOLOGY

## 2020-05-27 PROCEDURE — 83735 ASSAY OF MAGNESIUM: CPT

## 2020-05-27 PROCEDURE — 99233 PR SUBSEQUENT HOSPITAL CARE,LEVL III: ICD-10-PCS | Mod: ,,, | Performed by: INTERNAL MEDICINE

## 2020-05-27 PROCEDURE — 77263 THER RADIOLOGY TX PLNG CPLX: CPT | Mod: ,,, | Performed by: RADIOLOGY

## 2020-05-27 PROCEDURE — 77387 PR GUIDANCE FOR RADIATION TREATMENT DELIVERY: ICD-10-PCS | Mod: ,,, | Performed by: RADIOLOGY

## 2020-05-27 PROCEDURE — 77334 RADIATION TREATMENT AID(S): CPT | Mod: 26,,, | Performed by: RADIOLOGY

## 2020-05-27 PROCEDURE — 20600001 HC STEP DOWN PRIVATE ROOM

## 2020-05-27 PROCEDURE — 77387 GUIDANCE FOR RADJ TX DLVR: CPT | Mod: ,,, | Performed by: RADIOLOGY

## 2020-05-27 PROCEDURE — 77300 RADIATION THERAPY DOSE PLAN: CPT | Mod: 26,,, | Performed by: RADIOLOGY

## 2020-05-27 PROCEDURE — 84100 ASSAY OF PHOSPHORUS: CPT

## 2020-05-27 PROCEDURE — 77290 THER RAD SIMULAJ FIELD CPLX: CPT | Mod: TC | Performed by: RADIOLOGY

## 2020-05-27 PROCEDURE — 94640 AIRWAY INHALATION TREATMENT: CPT

## 2020-05-27 PROCEDURE — 77014 HC CT GUIDANCE RADIATION THERAPY FLDS PLACEMENT: CPT | Mod: TC | Performed by: RADIOLOGY

## 2020-05-27 PROCEDURE — 85025 COMPLETE CBC W/AUTO DIFF WBC: CPT

## 2020-05-27 PROCEDURE — 36415 COLL VENOUS BLD VENIPUNCTURE: CPT

## 2020-05-27 PROCEDURE — 80053 COMPREHEN METABOLIC PANEL: CPT

## 2020-05-27 PROCEDURE — 25000242 PHARM REV CODE 250 ALT 637 W/ HCPCS: Performed by: STUDENT IN AN ORGANIZED HEALTH CARE EDUCATION/TRAINING PROGRAM

## 2020-05-27 PROCEDURE — 77263 PR  RADIATION THERAPY PLAN COMPLEX: ICD-10-PCS | Mod: ,,, | Performed by: RADIOLOGY

## 2020-05-27 PROCEDURE — 77417 THER RADIOLOGY PORT IMAGE(S): CPT | Performed by: RADIOLOGY

## 2020-05-27 PROCEDURE — 63600175 PHARM REV CODE 636 W HCPCS: Performed by: STUDENT IN AN ORGANIZED HEALTH CARE EDUCATION/TRAINING PROGRAM

## 2020-05-27 PROCEDURE — 99232 SBSQ HOSP IP/OBS MODERATE 35: CPT | Mod: ,,, | Performed by: EMERGENCY MEDICINE

## 2020-05-27 PROCEDURE — 77412 RADIATION TX DELIVERY LVL 3: CPT | Performed by: RADIOLOGY

## 2020-05-27 PROCEDURE — 94761 N-INVAS EAR/PLS OXIMETRY MLT: CPT

## 2020-05-27 PROCEDURE — 77300 RADIATION THERAPY DOSE PLAN: CPT | Mod: TC | Performed by: RADIOLOGY

## 2020-05-27 PROCEDURE — 77295 3-D RADIOTHERAPY PLAN: CPT | Mod: TC | Performed by: RADIOLOGY

## 2020-05-27 PROCEDURE — 99232 PR SUBSEQUENT HOSPITAL CARE,LEVL II: ICD-10-PCS | Mod: ,,, | Performed by: EMERGENCY MEDICINE

## 2020-05-27 RX ORDER — ENOXAPARIN SODIUM 100 MG/ML
40 INJECTION SUBCUTANEOUS EVERY 24 HOURS
Status: DISCONTINUED | OUTPATIENT
Start: 2020-05-27 | End: 2020-05-29 | Stop reason: HOSPADM

## 2020-05-27 RX ORDER — ONDANSETRON 2 MG/ML
8 INJECTION INTRAMUSCULAR; INTRAVENOUS
Status: CANCELLED
Start: 2020-05-27

## 2020-05-27 RX ORDER — SODIUM CHLORIDE 0.9 % (FLUSH) 0.9 %
10 SYRINGE (ML) INJECTION
Status: DISCONTINUED | OUTPATIENT
Start: 2020-05-27 | End: 2020-05-29 | Stop reason: HOSPADM

## 2020-05-27 RX ORDER — HEPARIN 100 UNIT/ML
500 SYRINGE INTRAVENOUS
Status: DISCONTINUED | OUTPATIENT
Start: 2020-05-27 | End: 2020-05-29 | Stop reason: HOSPADM

## 2020-05-27 RX ORDER — HEPARIN 100 UNIT/ML
500 SYRINGE INTRAVENOUS
Status: CANCELLED | OUTPATIENT
Start: 2020-05-27

## 2020-05-27 RX ORDER — ONDANSETRON 2 MG/ML
8 INJECTION INTRAMUSCULAR; INTRAVENOUS
Status: DISCONTINUED | OUTPATIENT
Start: 2020-05-27 | End: 2020-05-29 | Stop reason: HOSPADM

## 2020-05-27 RX ORDER — SODIUM CHLORIDE 0.9 % (FLUSH) 0.9 %
10 SYRINGE (ML) INJECTION
Status: CANCELLED | OUTPATIENT
Start: 2020-05-27

## 2020-05-27 RX ORDER — OXYCODONE HYDROCHLORIDE 5 MG/1
5 TABLET ORAL EVERY 6 HOURS PRN
Status: DISCONTINUED | OUTPATIENT
Start: 2020-05-27 | End: 2020-05-29 | Stop reason: HOSPADM

## 2020-05-27 RX ADMIN — IPRATROPIUM BROMIDE AND ALBUTEROL SULFATE 3 ML: .5; 3 SOLUTION RESPIRATORY (INHALATION) at 11:05

## 2020-05-27 RX ADMIN — SODIUM CHLORIDE: 0.9 INJECTION, SOLUTION INTRAVENOUS at 05:05

## 2020-05-27 RX ADMIN — OXYCODONE 5 MG: 5 TABLET ORAL at 11:05

## 2020-05-27 RX ADMIN — ENOXAPARIN SODIUM 40 MG: 100 INJECTION SUBCUTANEOUS at 04:05

## 2020-05-27 RX ADMIN — LORAZEPAM 1 MG: 1 TABLET ORAL at 08:05

## 2020-05-27 RX ADMIN — AMLODIPINE BESYLATE 5 MG: 5 TABLET ORAL at 08:05

## 2020-05-27 RX ADMIN — LOSARTAN POTASSIUM AND HYDROCHLOROTHIAZIDE 1 TABLET: 12.5; 1 TABLET ORAL at 08:05

## 2020-05-27 RX ADMIN — BENZONATATE 100 MG: 100 CAPSULE ORAL at 08:05

## 2020-05-27 RX ADMIN — IPRATROPIUM BROMIDE AND ALBUTEROL SULFATE 3 ML: .5; 3 SOLUTION RESPIRATORY (INHALATION) at 03:05

## 2020-05-27 RX ADMIN — OXYCODONE 5 MG: 5 TABLET ORAL at 05:05

## 2020-05-27 RX ADMIN — SODIUM CHLORIDE 485 MG: 9 INJECTION, SOLUTION INTRAVENOUS at 05:05

## 2020-05-27 RX ADMIN — BENZONATATE 100 MG: 100 CAPSULE ORAL at 10:05

## 2020-05-27 RX ADMIN — Medication 6 MG: at 08:05

## 2020-05-27 RX ADMIN — IPRATROPIUM BROMIDE AND ALBUTEROL SULFATE 3 ML: .5; 3 SOLUTION RESPIRATORY (INHALATION) at 06:05

## 2020-05-27 RX ADMIN — IPRATROPIUM BROMIDE AND ALBUTEROL SULFATE 3 ML: .5; 3 SOLUTION RESPIRATORY (INHALATION) at 04:05

## 2020-05-27 RX ADMIN — GABAPENTIN 100 MG: 100 CAPSULE ORAL at 08:05

## 2020-05-27 RX ADMIN — IPRATROPIUM BROMIDE AND ALBUTEROL SULFATE 3 ML: .5; 3 SOLUTION RESPIRATORY (INHALATION) at 12:05

## 2020-05-27 RX ADMIN — DEXAMETHASONE SODIUM PHOSPHATE 12 MG: 4 INJECTION, SOLUTION INTRA-ARTICULAR; INTRALESIONAL; INTRAMUSCULAR; INTRAVENOUS; SOFT TISSUE at 04:05

## 2020-05-27 RX ADMIN — ONDANSETRON HYDROCHLORIDE 8 MG: 2 SOLUTION INTRAMUSCULAR; INTRAVENOUS at 04:05

## 2020-05-27 RX ADMIN — APREPITANT 130 MG: 130 INJECTION, EMULSION INTRAVENOUS at 04:05

## 2020-05-27 RX ADMIN — BUPROPION HYDROCHLORIDE 150 MG: 150 TABLET, FILM COATED, EXTENDED RELEASE ORAL at 08:05

## 2020-05-27 RX ADMIN — GABAPENTIN 100 MG: 100 CAPSULE ORAL at 03:05

## 2020-05-27 RX ADMIN — PANTOPRAZOLE SODIUM 20 MG: 20 TABLET, DELAYED RELEASE ORAL at 08:05

## 2020-05-27 RX ADMIN — DOCUSATE SODIUM - SENNOSIDES 1 TABLET: 50; 8.6 TABLET, FILM COATED ORAL at 08:05

## 2020-05-27 NOTE — ASSESSMENT & PLAN NOTE
Recently dx in May 2020  Not yet started on treatment     Oncologic History 1. Lung adenocarcinoma    Oncologic Treatment      Pathology 5/5/2020  Right lung, biopsy:  Positive for adenocarcinoma with areas of necrosis.  Molecular studies will be completed for EGFR, ALK, ROS-1, PD-L1 and BRAF and results will follow in a supplemental report.     XRT eval 5/27, hopefully able to receive treatment this afternoon.  Carboplatin x1  Keytruda to start outpatient

## 2020-05-27 NOTE — ASSESSMENT & PLAN NOTE
Progressive worsening SOB  CTA Chest: Progression of airway obstruction and postobstructive consolidation with enlargement of the right pleural effusion.  Patient's large right apically pulmonary mass is similar in size allowing for differences in technique. No pulmonary thromboembolism. Mediastinal and left pulmonary probable metastatic disease. Questionable lytic lesions in T2 and T7 which may represent metastatic disease.    Plan:  -Pulmonology consulted. Appreciate their assistance  -VTE ppx with lovenox   -Monitor O2sats. Initiate supplemental O2 if sats < 92%  -Avoiding sedating medications as it can worsen SOB  -Rad onc consulted for palliative RT for the lung. They will eval today at 0800 and might be able to perform RT later today  -Will give oxy 5mg x1 for pain relief following thora

## 2020-05-27 NOTE — PROGRESS NOTES
Progress Note  [facility]    Attending: Rah Eason  Fellow: Lotus Hauser  Admit Date: 5/25/2020  Today's Date: 05/27/2020      SUBJECTIVE:   NAEO. Patient with continued cough s/p thoracentesis but reports improvement in her shortness of breath.        OBJECTIVE:     Vital Signs Trends/Hx Reviewed  Vitals:    05/27/20 0348 05/27/20 0452 05/27/20 0740 05/27/20 1102   BP: (!) 114/53  117/61    BP Location: Right arm  Left arm    Patient Position: Lying  Sitting    Pulse: 92 88 104 94   Resp: 18 20 (!) 22 18   Temp: 99.1 °F (37.3 °C)  98.8 °F (37.1 °C)    TempSrc: Oral  Oral    SpO2: 95% 96% (!) 93% 95%   Weight:       Height:           Physical Exam:  General: NAD, cooperative & interactive.  HEENT: AT/NC, PERRL, EOMI, oral and nasal mucosa moist.   Neck: Supple without JVD or palpable LAD.   Cardiac: normal rate, regular rhythm, with no MRG with brisk cap refill and symmetric pulses in distal extremities.  Respiratory: Normal inspection. Symmetric chest rise. Normal palpation and percussion. Diminished breath sounds bilalerally R > L  Abdomen: Soft, NT/ND. +BS. No hepatosplenomegaly.   Extremities: No edema.   Neuro: Grossly intact to brief exam. Oriented x3 with appropriate mood/affect to situation.       Laboratory:  No results for input(s): PH, PCO2, PO2, HCO3, POCSATURATED, BE in the last 24 hours.  Recent Labs   Lab 05/27/20  0404   WBC 13.41*   RBC 4.07   HGB 10.6*   HCT 35.9*      MCV 88   MCH 26.0*   MCHC 29.5*     Recent Labs   Lab 05/27/20  0404   *   K 4.0      CO2 25   BUN 13   CREATININE 0.8   MG 1.6       Microbiology Data:   Microbiology Results (last 7 days)     Procedure Component Value Units Date/Time    Aerobic culture [[id number]] Collected:  05/26/20 1359    Order Status:  Completed Specimen:  Pleural Fluid Updated:  05/27/20 0728     Aerobic Bacterial Culture No growth    Culture, Anaerobic [[id number]] Collected:  05/26/20 1359    Order Status:   Completed Specimen:  Pleural Fluid Updated:  05/27/20 0633     Anaerobic Culture Culture in progress    Gram stain [[id number]] Collected:  05/26/20 1359    Order Status:  Completed Specimen:  Pleural Fluid Updated:  05/26/20 1732     Gram Stain Result Rare WBC's      No organisms seen           Chest Imaging:   CXR There is a large right pleural effusion with lung consolidation.  There is no change from the prior.  This could be pneumonia.      Scheduled Medications:    albuterol-ipratropium  3 mL Nebulization Q4H    amLODIPine  5 mg Oral Daily    buPROPion  150 mg Oral Daily    enoxaparin  40 mg Subcutaneous Daily    gabapentin  100 mg Oral TID    losartan-hydrochlorothiazide 100-12.5 mg  1 tablet Oral Daily    pantoprazole  20 mg Oral Daily    polyethylene glycol  17 g Oral Daily    senna-docusate 8.6-50 mg  1 tablet Oral BID       PRN Medications:   acetaminophen, benzonatate, Dextrose 10% Bolus, Dextrose 10% Bolus, glucagon (human recombinant), glucose, glucose, LORazepam, melatonin, ondansetron, prochlorperazine, sodium chloride 0.9%    Problem List:   Patient Active Problem List   Diagnosis    Shortness of breath    Mass of upper lobe of right lung    Essential hypertension    Tobacco abuse    Cough    Adenocarcinoma of lung, stage 4, right    Primary malignant neoplasm of lung with metastasis to brain    Secondary malignant neoplasm of brain    Insomnia    Anxiety    HCV (hepatitis C virus)       ASSESSMENT & RECOMMENDATIONS   Ms. Boyce is a 60yo F with PMHx significant for the above with worsening dyspnea without relief from bronchodilator therapy. Recently diagnosed with Stage IV Adenocarcinoma.    Thoracentesis yesterday with ~500cc removed; pleural studies consistent with exudative effusion with abnormal cells; consistent with malignant effusion.  Will add cytology to pleural studies.     · Patient reports modest improvement in her dyspnea s/p thoracentesis. Declined debulking surgery  and PleurX placement offered by CTS.  · Radiation Oncology consulted -- plan to day for stimulation and likely first radiation treatment.   · Discussed case with [facility]. Will defer further thoracentesis -- anticipation initiation of chemotherapy this admission.       Thank you for allowing us to participate in the care of this patient. We will sign off.  Please call with questions.    Lotus Hauser M.D., PGY-IV  [facility] Fellow

## 2020-05-27 NOTE — PLAN OF CARE
Uneventful shift. Pt pain controlled by oxy IR x1. Pt remains on 2L O2. Pt thoracentesis site with old drainage. Pt given melatonin prn to help her sleep. Pt has remained free from injury this shift. Bed in low locked position. Call light and personal belongings within reach. Side rails up x2. Nonskid socks in place. Pt instructed to call with any needs. Will continue to monitor.

## 2020-05-27 NOTE — SUBJECTIVE & OBJECTIVE
Interval History: NAONE. Patient continues to be able to rest. HOB elevated while resting. Palliative RT to the Lung eval today, goal is to give XRT if eval deems possible today. Will give one time dose of carboplatin. Keytruda to start outpatient.     Oncology Treatment Plan:   OP PEMBROLIZUMAB 200MG Q3W    Medications:  Continuous Infusions:  Scheduled Meds:   albuterol-ipratropium  3 mL Nebulization Q4H    amLODIPine  5 mg Oral Daily    buPROPion  150 mg Oral Daily    enoxaparin  40 mg Subcutaneous Daily    gabapentin  100 mg Oral TID    losartan-hydrochlorothiazide 100-12.5 mg  1 tablet Oral Daily    pantoprazole  20 mg Oral Daily    polyethylene glycol  17 g Oral Daily    senna-docusate 8.6-50 mg  1 tablet Oral BID     PRN Meds:acetaminophen, benzonatate, Dextrose 10% Bolus, Dextrose 10% Bolus, glucagon (human recombinant), glucose, glucose, LORazepam, melatonin, ondansetron, prochlorperazine, sodium chloride 0.9%            Review of Systems   Constitutional: Positive for diaphoresis (with BM and urination) and fatigue. Negative for chills and fever.   HENT: Negative for ear pain, hearing loss, postnasal drip, rhinorrhea, sore throat, tinnitus and trouble swallowing.    Eyes: Negative for visual disturbance.   Respiratory: Positive for chest tightness and shortness of breath. Negative for cough, wheezing and stridor.    Cardiovascular: Negative for chest pain and leg swelling.   Gastrointestinal: Negative for abdominal distention, abdominal pain, constipation, diarrhea, nausea and vomiting.   Endocrine: Negative for cold intolerance and heat intolerance.   Genitourinary: Negative for difficulty urinating and dysuria.   Musculoskeletal: Negative for arthralgias and myalgias.   Skin: Negative for rash.   Neurological: Positive for weakness and light-headedness (with urination and BMs). Negative for dizziness and headaches.   Hematological: Does not bruise/bleed easily.   Psychiatric/Behavioral: Negative  for confusion.     Objective:     Vital Signs (Most Recent):  Temp: 98.8 °F (37.1 °C) (05/27/20 0740)  Pulse: 104 (05/27/20 0740)  Resp: (!) 22 (05/27/20 0740)  BP: 117/61 (05/27/20 0740)  SpO2: (!) 93 % (05/27/20 0740) Vital Signs (24h Range):  Temp:  [98.1 °F (36.7 °C)-99.4 °F (37.4 °C)] 98.8 °F (37.1 °C)  Pulse:  [] 104  Resp:  [16-22] 22  SpO2:  [93 %-97 %] 93 %  BP: (114-135)/(53-81) 117/61     Weight: 81.1 kg (178 lb 12.7 oz)  Body mass index is 28.86 kg/m².  Body surface area is 1.94 meters squared.    No intake or output data in the 24 hours ending 05/27/20 0852    Physical Exam   Constitutional: She is oriented to person, place, and time. She appears well-developed and well-nourished.   Anxious appearing   HENT:   Head: Normocephalic and atraumatic.   Right Ear: Hearing normal.   Left Ear: Hearing normal.   Eyes: Conjunctivae, EOM and lids are normal.   Neck: Trachea normal, full passive range of motion without pain and phonation normal. No JVD present. No tracheal tenderness present. No tracheal deviation present.   Cardiovascular: Normal rate, regular rhythm, S1 normal, S2 normal, normal heart sounds and normal pulses. Exam reveals no gallop, no S3, no S4 and no friction rub.   No murmur heard.  Pulses:       Radial pulses are 2+ on the right side, and 2+ on the left side.   Pulmonary/Chest: No accessory muscle usage or stridor. No tachypnea. No respiratory distress. She has decreased breath sounds in the right upper field, the right middle field and the right lower field. She has no wheezes. She has no rhonchi. She has no rales.   Abdominal: Soft. Normal appearance and bowel sounds are normal. There is no tenderness.   Musculoskeletal: Normal range of motion.   Neurological: She is alert and oriented to person, place, and time. She has normal strength. No sensory deficit. GCS eye subscore is 4. GCS verbal subscore is 5. GCS motor subscore is 6.   Skin: Skin is warm, dry and intact. No rash noted.  She is not diaphoretic. No pallor.   Psychiatric: She has a normal mood and affect. Her speech is normal and behavior is normal. Judgment and thought content normal. Cognition and memory are normal. She is attentive.   Nursing note and vitals reviewed.      Significant Labs:   Recent Lab Results       05/27/20  0404   05/26/20  1723   05/26/20  1359        Eos, Fluid     4     WBC, Body Fluid     1721  Comment:  Reference ranges for body fluids not established.   Correlate clinically.       Lymphs, Fluid     62     Segs, Fluid     6     Body Fluid Type     Thoracentesis Fluid     Monocytes/Macrophages, Fluid     27     Pathologist Review, Body Fluid     REVIEWED  Comment:  Electronically reviewed and signed by:  Karlee Rubio MD  Signed on 05/27/20 at 08:46  Pathologist review of pleural fluid cell count:   Predominantly monocytes/macrophages and small lymphocytes with rare   large atypical single and small clusters of cells.  The findings are   concerning for potential involvement by the patient's known history   of carcinoma.  Correlation with cytology is required.       Aerobic Bacterial Culture     No growth[P]     Albumin 2.5         Alkaline Phosphatase 82         ALT 8         Anaerobic Culture     Culture in progress[P]     Anion Gap 9         AST 16         Baso # 0.14         Basophil% 1.0         BILIRUBIN TOTAL 0.5  Comment:  For infants and newborns, interpretation of results should be based  on gestational age, weight and in agreement with clinical  observations.  Premature Infant recommended reference ranges:  Up to 24 hours.............<8.0 mg/dL  Up to 48 hours............<12.0 mg/dL  3-5 days..................<15.0 mg/dL  6-29 days.................<15.0 mg/dL           Body Fluid Source, Glucose     Ascites  Comment:  R     Body Fluid Source, LDH     Thoracentesis Fluid  Comment:  R     BUN, Bld 13         Calcium 9.1         Chloride 101         CO2 25         Creatinine 0.8          Differential Method Automated         eGFR if  >60.0         eGFR if non  >60.0  Comment:  Calculation used to obtain the estimated glomerular filtration  rate (eGFR) is the CKD-EPI equation.            Eos # 0.8         Eosinophil% 6.2         Fluid Appearance     Cloudy     Fluid Color     Sarah     Glucose 89         Glucose, Fluid     89  Comment:  Reference intervals have not been established for body fluids.  Comparison of this result with the concentration in the blood,   serum,or plasma is recommended.  The reference interval for glucose in serum/plasma is    mg/dL. Please interpret in context with the clinical  picture.       Gram Stain Result     Rare WBC's          No organisms seen     Gran # (ANC) 9.3         Gran% 69.5         Hematocrit 35.9         Hemoglobin 10.6         Immature Grans (Abs) 0.08  Comment:  Mild elevation in immature granulocytes is non specific and   can be seen in a variety of conditions including stress response,   acute inflammation, trauma and pregnancy. Correlation with other   laboratory and clinical findings is essential.           Immature Granulocytes 0.6         LD   447  Comment:  Results are increased in hemolyzed samples.  *Result may be interfered by visible hemolysis         LD, Fluid     472  Comment:  Reference intervals have not been established for body fluids.  Comparison of this result with the concentration in the blood,   serum,or plasma is recommended.  The reference interval for LDH in serum/plasma is   110-260 U/L. Please interpret in context with the clinical  picture.       Lymph # 1.7         Lymph% 12.9         Magnesium 1.6         MCH 26.0         MCHC 29.5         MCV 88         Mono # 1.3         Mono% 9.8         MPV 10.0         nRBC 0         Other Cells, Fluid     1     Phosphorus 4.1         Platelets 297         Potassium 4.0         PROTEIN TOTAL 7.4         RBC 4.07         RDW 14.6         Sodium 135          WBC 13.41               Diagnostic Results:  I have reviewed and interpreted all pertinent imaging results/findings within the past 24 hours.

## 2020-05-27 NOTE — PROGRESS NOTES
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  Presents with progressively worsening SOB. She states she has been feeling SOB ever since her biopsy 5/5/20 but has gotten significantly worse in the last 4 days and was so bad yesterday she was unable to sleep. It is worse with exertion and upon laying flat. She denies chest pain, but endorses chest tightness. Patient also complains of feeling flushed, diaphoretic, SOB and lightheaded recently when needing to urinate an/or with her BMs. However she denies any urinary or bowel incontinence. Patient has a chronic cough productive of clear white sputum that is unchanged. She is on home oxygen 2L which she wears continuously. In the ED patient was AF, , RR 32, normotensive, and satting 98% on RA.  When examined by Medical Oncology, she was sitting upright, tachypnic with very mild use of accessory muscles. At that time her O2sat was 93% on RA. Of note, her lung cancer was dx earlier this month. She has not yet started treatment.    Oncologic History:    Oncologic History 1. Lung adenocarcinoma    Oncologic Treatment      Pathology 5/5/2020  Right lung, biopsy:  Positive for adenocarcinoma with areas of necrosis.  Molecular studies will be completed for EGFR, ALK, ROS-1, PD-L1 and BRAF and results will follow in a supplemental report.       Interval History: NAONE. Patient continues to be able to rest. HOB elevated while resting. Palliative RT to the Lung eval today, goal is to give XRT if eval deems possible today. Will give one time dose of carboplatin. Keytruda to start outpatient.     Oncology  Treatment Plan:   OP PEMBROLIZUMAB 200MG Q3W    Medications:  Continuous Infusions:  Scheduled Meds:   albuterol-ipratropium  3 mL Nebulization Q4H    amLODIPine  5 mg Oral Daily    buPROPion  150 mg Oral Daily    enoxaparin  40 mg Subcutaneous Daily    gabapentin  100 mg Oral TID    losartan-hydrochlorothiazide 100-12.5 mg  1 tablet Oral Daily    pantoprazole  20 mg Oral Daily    polyethylene glycol  17 g Oral Daily    senna-docusate 8.6-50 mg  1 tablet Oral BID     PRN Meds:acetaminophen, benzonatate, Dextrose 10% Bolus, Dextrose 10% Bolus, glucagon (human recombinant), glucose, glucose, LORazepam, melatonin, ondansetron, prochlorperazine, sodium chloride 0.9%            Review of Systems   Constitutional: Positive for diaphoresis (with BM and urination) and fatigue. Negative for chills and fever.   HENT: Negative for ear pain, hearing loss, postnasal drip, rhinorrhea, sore throat, tinnitus and trouble swallowing.    Eyes: Negative for visual disturbance.   Respiratory: Positive for chest tightness and shortness of breath. Negative for cough, wheezing and stridor.    Cardiovascular: Negative for chest pain and leg swelling.   Gastrointestinal: Negative for abdominal distention, abdominal pain, constipation, diarrhea, nausea and vomiting.   Endocrine: Negative for cold intolerance and heat intolerance.   Genitourinary: Negative for difficulty urinating and dysuria.   Musculoskeletal: Negative for arthralgias and myalgias.   Skin: Negative for rash.   Neurological: Positive for weakness and light-headedness (with urination and BMs). Negative for dizziness and headaches.   Hematological: Does not bruise/bleed easily.   Psychiatric/Behavioral: Negative for confusion.     Objective:     Vital Signs (Most Recent):  Temp: 98.8 °F (37.1 °C) (05/27/20 0740)  Pulse: 104 (05/27/20 0740)  Resp: (!) 22 (05/27/20 0740)  BP: 117/61 (05/27/20 0740)  SpO2: (!) 93 % (05/27/20 0740) Vital Signs (24h Range):  Temp:  [98.1 °F  (36.7 °C)-99.4 °F (37.4 °C)] 98.8 °F (37.1 °C)  Pulse:  [] 104  Resp:  [16-22] 22  SpO2:  [93 %-97 %] 93 %  BP: (114-135)/(53-81) 117/61     Weight: 81.1 kg (178 lb 12.7 oz)  Body mass index is 28.86 kg/m².  Body surface area is 1.94 meters squared.    No intake or output data in the 24 hours ending 05/27/20 0852    Physical Exam   Constitutional: She is oriented to person, place, and time. She appears well-developed and well-nourished.   Anxious appearing   HENT:   Head: Normocephalic and atraumatic.   Right Ear: Hearing normal.   Left Ear: Hearing normal.   Eyes: Conjunctivae, EOM and lids are normal.   Neck: Trachea normal, full passive range of motion without pain and phonation normal. No JVD present. No tracheal tenderness present. No tracheal deviation present.   Cardiovascular: Normal rate, regular rhythm, S1 normal, S2 normal, normal heart sounds and normal pulses. Exam reveals no gallop, no S3, no S4 and no friction rub.   No murmur heard.  Pulses:       Radial pulses are 2+ on the right side, and 2+ on the left side.   Pulmonary/Chest: No accessory muscle usage or stridor. No tachypnea. No respiratory distress. She has decreased breath sounds in the right upper field, the right middle field and the right lower field. She has no wheezes. She has no rhonchi. She has no rales.   Abdominal: Soft. Normal appearance and bowel sounds are normal. There is no tenderness.   Musculoskeletal: Normal range of motion.   Neurological: She is alert and oriented to person, place, and time. She has normal strength. No sensory deficit. GCS eye subscore is 4. GCS verbal subscore is 5. GCS motor subscore is 6.   Skin: Skin is warm, dry and intact. No rash noted. She is not diaphoretic. No pallor.   Psychiatric: She has a normal mood and affect. Her speech is normal and behavior is normal. Judgment and thought content normal. Cognition and memory are normal. She is attentive.   Nursing note and vitals  reviewed.      Significant Labs:   Recent Lab Results       05/27/20  0404   05/26/20  1723   05/26/20  1359        Eos, Fluid     4     WBC, Body Fluid     1721  Comment:  Reference ranges for body fluids not established.   Correlate clinically.       Lymphs, Fluid     62     Segs, Fluid     6     Body Fluid Type     Thoracentesis Fluid     Monocytes/Macrophages, Fluid     27     Pathologist Review, Body Fluid     REVIEWED  Comment:  Electronically reviewed and signed by:  Karlee Rubio MD  Signed on 05/27/20 at 08:46  Pathologist review of pleural fluid cell count:   Predominantly monocytes/macrophages and small lymphocytes with rare   large atypical single and small clusters of cells.  The findings are   concerning for potential involvement by the patient's known history   of carcinoma.  Correlation with cytology is required.       Aerobic Bacterial Culture     No growth[P]     Albumin 2.5         Alkaline Phosphatase 82         ALT 8         Anaerobic Culture     Culture in progress[P]     Anion Gap 9         AST 16         Baso # 0.14         Basophil% 1.0         BILIRUBIN TOTAL 0.5  Comment:  For infants and newborns, interpretation of results should be based  on gestational age, weight and in agreement with clinical  observations.  Premature Infant recommended reference ranges:  Up to 24 hours.............<8.0 mg/dL  Up to 48 hours............<12.0 mg/dL  3-5 days..................<15.0 mg/dL  6-29 days.................<15.0 mg/dL           Body Fluid Source, Glucose     Ascites  Comment:  R     Body Fluid Source, LDH     Thoracentesis Fluid  Comment:  R     BUN, Bld 13         Calcium 9.1         Chloride 101         CO2 25         Creatinine 0.8         Differential Method Automated         eGFR if  >60.0         eGFR if non  >60.0  Comment:  Calculation used to obtain the estimated glomerular filtration  rate (eGFR) is the CKD-EPI equation.            Eos # 0.8          Eosinophil% 6.2         Fluid Appearance     Cloudy     Fluid Color     Sarah     Glucose 89         Glucose, Fluid     89  Comment:  Reference intervals have not been established for body fluids.  Comparison of this result with the concentration in the blood,   serum,or plasma is recommended.  The reference interval for glucose in serum/plasma is    mg/dL. Please interpret in context with the clinical  picture.       Gram Stain Result     Rare WBC's          No organisms seen     Gran # (ANC) 9.3         Gran% 69.5         Hematocrit 35.9         Hemoglobin 10.6         Immature Grans (Abs) 0.08  Comment:  Mild elevation in immature granulocytes is non specific and   can be seen in a variety of conditions including stress response,   acute inflammation, trauma and pregnancy. Correlation with other   laboratory and clinical findings is essential.           Immature Granulocytes 0.6         LD   447  Comment:  Results are increased in hemolyzed samples.  *Result may be interfered by visible hemolysis         LD, Fluid     472  Comment:  Reference intervals have not been established for body fluids.  Comparison of this result with the concentration in the blood,   serum,or plasma is recommended.  The reference interval for LDH in serum/plasma is   110-260 U/L. Please interpret in context with the clinical  picture.       Lymph # 1.7         Lymph% 12.9         Magnesium 1.6         MCH 26.0         MCHC 29.5         MCV 88         Mono # 1.3         Mono% 9.8         MPV 10.0         nRBC 0         Other Cells, Fluid     1     Phosphorus 4.1         Platelets 297         Potassium 4.0         PROTEIN TOTAL 7.4         RBC 4.07         RDW 14.6         Sodium 135         WBC 13.41               Diagnostic Results:  I have reviewed and interpreted all pertinent imaging results/findings within the past 24 hours.    Assessment/Plan:     HCV (hepatitis C virus)  Treated for HCV in 2019    Per Dr. Peterson, patient does  not require treatment given completion of treatment in 2019    Anxiety  On wellbutrin at home    Plan:  -C/w wellbutrin  -Okay to start ativan 1mg q6PRN for anxiety    Insomnia  2/2 SOB    Plan:  -Melatonin qHS, but patient HOB must be elevated  -Avoid sedating Rx for now as it can worsen SOB    Adenocarcinoma of lung, stage 4, right  Recently dx in May 2020  Not yet started on treatment     Oncologic History 1. Lung adenocarcinoma    Oncologic Treatment      Pathology 5/5/2020  Right lung, biopsy:  Positive for adenocarcinoma with areas of necrosis.  Molecular studies will be completed for EGFR, ALK, ROS-1, PD-L1 and BRAF and results will follow in a supplemental report.     XRT eval 5/27, hopefully able to receive treatment this afternoon.  Carboplatin x1  Keytruda to start outpatient    Essential hypertension  On norvasc at home  On valsartan-hctz, not on formulary    C/w norvasc, losartan-hctz      Shortness of breath  Progressive worsening SOB  CTA Chest: Progression of airway obstruction and postobstructive consolidation with enlargement of the right pleural effusion.  Patient's large right apically pulmonary mass is similar in size allowing for differences in technique. No pulmonary thromboembolism. Mediastinal and left pulmonary probable metastatic disease. Questionable lytic lesions in T2 and T7 which may represent metastatic disease.    Plan:  -Pulmonology consulted. Appreciate their assistance  -VTE ppx with lovenox   -Monitor O2sats. Initiate supplemental O2 if sats < 92%  -Avoiding sedating medications as it can worsen SOB  -Rad onc consulted for palliative RT for the lung. They will eval today at 0800 and might be able to perform RT later today  -Will give oxy 5mg x1 for pain relief following cisco Dunham MD  Hematology/Oncology  [facility]    Attending Note  I have personally taken the history and examined this patient and agree with the resident's note as  stated above.  Plan for XRT to lung and Carboplatin AUC=4 - start today  Counseled on plan and discussed chemotherapy plan

## 2020-05-28 LAB
ALBUMIN SERPL BCP-MCNC: 2.4 G/DL (ref 3.5–5.2)
ALP SERPL-CCNC: 76 U/L (ref 55–135)
ALT SERPL W/O P-5'-P-CCNC: 11 U/L (ref 10–44)
ANION GAP SERPL CALC-SCNC: 10 MMOL/L (ref 8–16)
AST SERPL-CCNC: 13 U/L (ref 10–40)
BASOPHILS # BLD AUTO: 0.02 K/UL (ref 0–0.2)
BASOPHILS NFR BLD: 0.2 % (ref 0–1.9)
BILIRUB SERPL-MCNC: 0.3 MG/DL (ref 0.1–1)
BUN SERPL-MCNC: 23 MG/DL (ref 6–20)
CALCIUM SERPL-MCNC: 9.2 MG/DL (ref 8.7–10.5)
CHLORIDE SERPL-SCNC: 101 MMOL/L (ref 95–110)
CO2 SERPL-SCNC: 24 MMOL/L (ref 23–29)
CREAT SERPL-MCNC: 1.2 MG/DL (ref 0.5–1.4)
DIFFERENTIAL METHOD: ABNORMAL
EOSINOPHIL # BLD AUTO: 0 K/UL (ref 0–0.5)
EOSINOPHIL NFR BLD: 0 % (ref 0–8)
ERYTHROCYTE [DISTWIDTH] IN BLOOD BY AUTOMATED COUNT: 14.5 % (ref 11.5–14.5)
EST. GFR  (AFRICAN AMERICAN): 56.8 ML/MIN/1.73 M^2
EST. GFR  (NON AFRICAN AMERICAN): 49.2 ML/MIN/1.73 M^2
GLUCOSE SERPL-MCNC: 160 MG/DL (ref 70–110)
HCT VFR BLD AUTO: 36.1 % (ref 37–48.5)
HGB BLD-MCNC: 10.7 G/DL (ref 12–16)
IMM GRANULOCYTES # BLD AUTO: 0.09 K/UL (ref 0–0.04)
IMM GRANULOCYTES NFR BLD AUTO: 0.7 % (ref 0–0.5)
LYMPHOCYTES # BLD AUTO: 0.7 K/UL (ref 1–4.8)
LYMPHOCYTES NFR BLD: 5.6 % (ref 18–48)
MAGNESIUM SERPL-MCNC: 1.8 MG/DL (ref 1.6–2.6)
MCH RBC QN AUTO: 26.3 PG (ref 27–31)
MCHC RBC AUTO-ENTMCNC: 29.6 G/DL (ref 32–36)
MCV RBC AUTO: 89 FL (ref 82–98)
MONOCYTES # BLD AUTO: 0.3 K/UL (ref 0.3–1)
MONOCYTES NFR BLD: 2.5 % (ref 4–15)
NEUTROPHILS # BLD AUTO: 11 K/UL (ref 1.8–7.7)
NEUTROPHILS NFR BLD: 91 % (ref 38–73)
NRBC BLD-RTO: 0 /100 WBC
PHOSPHATE SERPL-MCNC: 4.3 MG/DL (ref 2.7–4.5)
PLATELET # BLD AUTO: 304 K/UL (ref 150–350)
PMV BLD AUTO: 9.7 FL (ref 9.2–12.9)
POTASSIUM SERPL-SCNC: 4.4 MMOL/L (ref 3.5–5.1)
PROT SERPL-MCNC: 7.8 G/DL (ref 6–8.4)
RBC # BLD AUTO: 4.07 M/UL (ref 4–5.4)
SODIUM SERPL-SCNC: 135 MMOL/L (ref 136–145)
WBC # BLD AUTO: 12.07 K/UL (ref 3.9–12.7)

## 2020-05-28 PROCEDURE — 84100 ASSAY OF PHOSPHORUS: CPT

## 2020-05-28 PROCEDURE — 63600175 PHARM REV CODE 636 W HCPCS: Performed by: STUDENT IN AN ORGANIZED HEALTH CARE EDUCATION/TRAINING PROGRAM

## 2020-05-28 PROCEDURE — 83735 ASSAY OF MAGNESIUM: CPT

## 2020-05-28 PROCEDURE — 85025 COMPLETE CBC W/AUTO DIFF WBC: CPT

## 2020-05-28 PROCEDURE — 77387 GUIDANCE FOR RADJ TX DLVR: CPT | Mod: ,,, | Performed by: RADIOLOGY

## 2020-05-28 PROCEDURE — 20600001 HC STEP DOWN PRIVATE ROOM

## 2020-05-28 PROCEDURE — 25000242 PHARM REV CODE 250 ALT 637 W/ HCPCS: Performed by: STUDENT IN AN ORGANIZED HEALTH CARE EDUCATION/TRAINING PROGRAM

## 2020-05-28 PROCEDURE — 36415 COLL VENOUS BLD VENIPUNCTURE: CPT

## 2020-05-28 PROCEDURE — 25000003 PHARM REV CODE 250: Performed by: INTERNAL MEDICINE

## 2020-05-28 PROCEDURE — 94761 N-INVAS EAR/PLS OXIMETRY MLT: CPT

## 2020-05-28 PROCEDURE — 94640 AIRWAY INHALATION TREATMENT: CPT

## 2020-05-28 PROCEDURE — 27000221 HC OXYGEN, UP TO 24 HOURS

## 2020-05-28 PROCEDURE — 99233 PR SUBSEQUENT HOSPITAL CARE,LEVL III: ICD-10-PCS | Mod: ,,, | Performed by: INTERNAL MEDICINE

## 2020-05-28 PROCEDURE — 99233 SBSQ HOSP IP/OBS HIGH 50: CPT | Mod: ,,, | Performed by: INTERNAL MEDICINE

## 2020-05-28 PROCEDURE — 25000003 PHARM REV CODE 250: Performed by: STUDENT IN AN ORGANIZED HEALTH CARE EDUCATION/TRAINING PROGRAM

## 2020-05-28 PROCEDURE — 77387 PR GUIDANCE FOR RADIATION TREATMENT DELIVERY: ICD-10-PCS | Mod: ,,, | Performed by: RADIOLOGY

## 2020-05-28 PROCEDURE — 77387 GUIDANCE FOR RADJ TX DLVR: CPT | Mod: TC | Performed by: RADIOLOGY

## 2020-05-28 PROCEDURE — 63600175 PHARM REV CODE 636 W HCPCS: Performed by: INTERNAL MEDICINE

## 2020-05-28 PROCEDURE — 77412 RADIATION TX DELIVERY LVL 3: CPT | Performed by: RADIOLOGY

## 2020-05-28 PROCEDURE — 99900035 HC TECH TIME PER 15 MIN (STAT)

## 2020-05-28 PROCEDURE — 80053 COMPREHEN METABOLIC PANEL: CPT

## 2020-05-28 RX ORDER — PROCHLORPERAZINE EDISYLATE 5 MG/ML
5 INJECTION INTRAMUSCULAR; INTRAVENOUS EVERY 6 HOURS PRN
Status: DISCONTINUED | OUTPATIENT
Start: 2020-05-28 | End: 2020-05-28

## 2020-05-28 RX ORDER — PROMETHAZINE HYDROCHLORIDE AND CODEINE PHOSPHATE 6.25; 1 MG/5ML; MG/5ML
5 SOLUTION ORAL EVERY 8 HOURS PRN
Status: DISCONTINUED | OUTPATIENT
Start: 2020-05-28 | End: 2020-05-29 | Stop reason: HOSPADM

## 2020-05-28 RX ORDER — AMOXICILLIN 250 MG
1 CAPSULE ORAL 2 TIMES DAILY
Qty: 60 TABLET | Refills: 0 | Status: ON HOLD | OUTPATIENT
Start: 2020-05-28 | End: 2022-01-01

## 2020-05-28 RX ORDER — DEXAMETHASONE 4 MG/1
8 TABLET ORAL DAILY
Qty: 2 TABLET | Refills: 0 | Status: SHIPPED | OUTPATIENT
Start: 2020-05-28 | End: 2020-05-30

## 2020-05-28 RX ORDER — LORAZEPAM 1 MG/1
1 TABLET ORAL EVERY 6 HOURS PRN
Qty: 40 TABLET | Refills: 0 | Status: SHIPPED | OUTPATIENT
Start: 2020-05-28 | End: 2020-06-02 | Stop reason: SDUPTHER

## 2020-05-28 RX ORDER — ONDANSETRON 8 MG/1
8 TABLET, ORALLY DISINTEGRATING ORAL EVERY 8 HOURS PRN
Qty: 60 TABLET | Refills: 0 | Status: SHIPPED | OUTPATIENT
Start: 2020-05-28 | End: 2020-07-06 | Stop reason: SDUPTHER

## 2020-05-28 RX ORDER — POLYETHYLENE GLYCOL 3350 17 G/17G
17 POWDER, FOR SOLUTION ORAL DAILY
Qty: 510 G | Refills: 0 | Status: ON HOLD | OUTPATIENT
Start: 2020-05-29 | End: 2022-01-01

## 2020-05-28 RX ORDER — OXYCODONE HYDROCHLORIDE 5 MG/1
5 TABLET ORAL EVERY 6 HOURS PRN
Qty: 28 TABLET | Refills: 0 | Status: SHIPPED | OUTPATIENT
Start: 2020-05-28 | End: 2020-06-05

## 2020-05-28 RX ADMIN — IPRATROPIUM BROMIDE AND ALBUTEROL SULFATE 3 ML: .5; 3 SOLUTION RESPIRATORY (INHALATION) at 04:05

## 2020-05-28 RX ADMIN — GABAPENTIN 100 MG: 100 CAPSULE ORAL at 03:05

## 2020-05-28 RX ADMIN — PROMETHAZINE HYDROCHLORIDE AND CODEINE PHOSPHATE 5 ML: 10; 6.25 SOLUTION ORAL at 03:05

## 2020-05-28 RX ADMIN — IPRATROPIUM BROMIDE AND ALBUTEROL SULFATE 3 ML: .5; 3 SOLUTION RESPIRATORY (INHALATION) at 11:05

## 2020-05-28 RX ADMIN — ONDANSETRON HYDROCHLORIDE 8 MG: 2 SOLUTION INTRAMUSCULAR; INTRAVENOUS at 05:05

## 2020-05-28 RX ADMIN — AMLODIPINE BESYLATE 5 MG: 5 TABLET ORAL at 08:05

## 2020-05-28 RX ADMIN — LORAZEPAM 1 MG: 1 TABLET ORAL at 05:05

## 2020-05-28 RX ADMIN — DOCUSATE SODIUM - SENNOSIDES 1 TABLET: 50; 8.6 TABLET, FILM COATED ORAL at 08:05

## 2020-05-28 RX ADMIN — BENZONATATE 100 MG: 100 CAPSULE ORAL at 11:05

## 2020-05-28 RX ADMIN — PROMETHAZINE HYDROCHLORIDE AND CODEINE PHOSPHATE 5 ML: 10; 6.25 SOLUTION ORAL at 10:05

## 2020-05-28 RX ADMIN — OXYCODONE 5 MG: 5 TABLET ORAL at 08:05

## 2020-05-28 RX ADMIN — GABAPENTIN 100 MG: 100 CAPSULE ORAL at 08:05

## 2020-05-28 RX ADMIN — DEXAMETHASONE SODIUM PHOSPHATE 12 MG: 4 INJECTION, SOLUTION INTRA-ARTICULAR; INTRALESIONAL; INTRAMUSCULAR; INTRAVENOUS; SOFT TISSUE at 05:05

## 2020-05-28 RX ADMIN — OXYCODONE 5 MG: 5 TABLET ORAL at 11:05

## 2020-05-28 RX ADMIN — PANTOPRAZOLE SODIUM 20 MG: 20 TABLET, DELAYED RELEASE ORAL at 08:05

## 2020-05-28 RX ADMIN — Medication 6 MG: at 08:05

## 2020-05-28 RX ADMIN — IPRATROPIUM BROMIDE AND ALBUTEROL SULFATE 3 ML: .5; 3 SOLUTION RESPIRATORY (INHALATION) at 03:05

## 2020-05-28 RX ADMIN — IPRATROPIUM BROMIDE AND ALBUTEROL SULFATE 3 ML: .5; 3 SOLUTION RESPIRATORY (INHALATION) at 08:05

## 2020-05-28 RX ADMIN — LOSARTAN POTASSIUM AND HYDROCHLOROTHIAZIDE 1 TABLET: 12.5; 1 TABLET ORAL at 08:05

## 2020-05-28 RX ADMIN — LORAZEPAM 1 MG: 1 TABLET ORAL at 08:05

## 2020-05-28 RX ADMIN — BUPROPION HYDROCHLORIDE 150 MG: 150 TABLET, FILM COATED, EXTENDED RELEASE ORAL at 08:05

## 2020-05-28 RX ADMIN — IPRATROPIUM BROMIDE AND ALBUTEROL SULFATE 3 ML: .5; 3 SOLUTION RESPIRATORY (INHALATION) at 07:05

## 2020-05-28 RX ADMIN — ENOXAPARIN SODIUM 40 MG: 100 INJECTION SUBCUTANEOUS at 05:05

## 2020-05-28 NOTE — SUBJECTIVE & OBJECTIVE
Interval History: NAONE. Patient continues to be able to rest. HOB elevated while resting. Palliative RT to the Lung yesterday, as well as one time dose of carboplatin. Keytruda to start outpatient. Patient reports some improvement in SOB following XRT. Able to ambulate without issue.    Oncology Treatment Plan:   IP NSCLC CARBOPLATIN (AUC)     Medications:  Continuous Infusions:  Scheduled Meds:   albuterol-ipratropium  3 mL Nebulization Q4H    amLODIPine  5 mg Oral Daily    buPROPion  150 mg Oral Daily    dexamethasone (DECADRON) IVPB  12 mg Intravenous Q24H    enoxaparin  40 mg Subcutaneous Daily    gabapentin  100 mg Oral TID    losartan-hydrochlorothiazide 100-12.5 mg  1 tablet Oral Daily    ondansetron  8 mg Intravenous Q12H    pantoprazole  20 mg Oral Daily    polyethylene glycol  17 g Oral Daily    senna-docusate 8.6-50 mg  1 tablet Oral BID     PRN Meds:acetaminophen, alteplase, benzonatate, Dextrose 10% Bolus, Dextrose 10% Bolus, glucagon (human recombinant), glucose, glucose, heparin, porcine (PF), LORazepam, melatonin, oxyCODONE, promethazine-codeine 6.25-10 mg/5 ml, sodium chloride 0.9%, sodium chloride 0.9%            Review of Systems   Constitutional: Positive for diaphoresis (with BM and urination). Negative for chills, fatigue and fever.   HENT: Negative for ear pain, hearing loss, postnasal drip, rhinorrhea, sore throat, tinnitus and trouble swallowing.    Eyes: Negative for visual disturbance.   Respiratory: Negative for cough, chest tightness, shortness of breath, wheezing and stridor.    Cardiovascular: Negative for chest pain and leg swelling.   Gastrointestinal: Negative for abdominal distention, abdominal pain, constipation, diarrhea, nausea and vomiting.   Endocrine: Negative for cold intolerance and heat intolerance.   Genitourinary: Negative for difficulty urinating and dysuria.   Musculoskeletal: Negative for arthralgias and myalgias.   Skin: Negative for rash.   Neurological:  Positive for weakness and light-headedness (with urination and BMs). Negative for dizziness and headaches.   Hematological: Does not bruise/bleed easily.   Psychiatric/Behavioral: Negative for confusion.     Objective:     Vital Signs (Most Recent):  Temp: 97.5 °F (36.4 °C) (05/28/20 1116)  Pulse: 89 (05/28/20 1154)  Resp: 16 (05/28/20 1154)  BP: 114/63 (05/28/20 1116)  SpO2: 98 % (05/28/20 1154) Vital Signs (24h Range):  Temp:  [97.5 °F (36.4 °C)-99.9 °F (37.7 °C)] 97.5 °F (36.4 °C)  Pulse:  [] 89  Resp:  [16-24] 16  SpO2:  [84 %-98 %] 98 %  BP: (109-123)/(60-79) 114/63     Weight: 85.5 kg (188 lb 7.9 oz)  Body mass index is 30.42 kg/m².  Body surface area is 2 meters squared.      Intake/Output Summary (Last 24 hours) at 5/28/2020 1252  Last data filed at 5/28/2020 0826  Gross per 24 hour   Intake --   Output 250 ml   Net -250 ml       Physical Exam   Constitutional: She is oriented to person, place, and time. She appears well-developed and well-nourished.   Anxious appearing   HENT:   Head: Normocephalic and atraumatic.   Right Ear: Hearing normal.   Left Ear: Hearing normal.   Eyes: Conjunctivae, EOM and lids are normal.   Neck: Trachea normal, full passive range of motion without pain and phonation normal. No JVD present. No tracheal tenderness present. No tracheal deviation present.   Cardiovascular: Normal rate, regular rhythm, S1 normal, S2 normal, normal heart sounds and normal pulses. Exam reveals no gallop, no S3, no S4 and no friction rub.   No murmur heard.  Pulses:       Radial pulses are 2+ on the right side, and 2+ on the left side.   Pulmonary/Chest: No accessory muscle usage or stridor. No tachypnea. No respiratory distress. She has decreased breath sounds in the right upper field, the right middle field and the right lower field. She has no wheezes. She has no rhonchi. She has no rales.   Abdominal: Soft. Normal appearance and bowel sounds are normal. There is no tenderness.    Musculoskeletal: Normal range of motion.   Neurological: She is alert and oriented to person, place, and time. She has normal strength. No sensory deficit. GCS eye subscore is 4. GCS verbal subscore is 5. GCS motor subscore is 6.   Skin: Skin is warm, dry and intact. No rash noted. She is not diaphoretic. No pallor.   Psychiatric: She has a normal mood and affect. Her speech is normal and behavior is normal. Judgment and thought content normal. Cognition and memory are normal. She is attentive.   Nursing note and vitals reviewed.      Significant Labs:   Recent Lab Results       05/28/20  0417        Albumin 2.4     Alkaline Phosphatase 76     ALT 11     Anion Gap 10     AST 13     Baso # 0.02     Basophil% 0.2     BILIRUBIN TOTAL 0.3  Comment:  For infants and newborns, interpretation of results should be based  on gestational age, weight and in agreement with clinical  observations.  Premature Infant recommended reference ranges:  Up to 24 hours.............<8.0 mg/dL  Up to 48 hours............<12.0 mg/dL  3-5 days..................<15.0 mg/dL  6-29 days.................<15.0 mg/dL       BUN, Bld 23     Calcium 9.2     Chloride 101     CO2 24     Creatinine 1.2     Differential Method Automated     eGFR if African American 56.8     eGFR if non  49.2  Comment:  Calculation used to obtain the estimated glomerular filtration  rate (eGFR) is the CKD-EPI equation.        Eos # 0.0     Eosinophil% 0.0     Glucose 160     Gran # (ANC) 11.0     Gran% 91.0     Hematocrit 36.1     Hemoglobin 10.7     Immature Grans (Abs) 0.09  Comment:  Mild elevation in immature granulocytes is non specific and   can be seen in a variety of conditions including stress response,   acute inflammation, trauma and pregnancy. Correlation with other   laboratory and clinical findings is essential.       Immature Granulocytes 0.7     Lymph # 0.7     Lymph% 5.6     Magnesium 1.8     MCH 26.3     MCHC 29.6     MCV 89     Mono #  0.3     Mono% 2.5     MPV 9.7     nRBC 0     Phosphorus 4.3     Platelets 304     Potassium 4.4     PROTEIN TOTAL 7.8     RBC 4.07     RDW 14.5     Sodium 135     WBC 12.07           Diagnostic Results:  I have reviewed and interpreted all pertinent imaging results/findings within the past 24 hours.

## 2020-05-28 NOTE — PROGRESS NOTES
Continuing to follow patient's case. Attended rounds this morning on patient with Dr. Ley and the Medical Oncology team. Patient has started radiation treatments, day 2 of 5 today, and has received the initial dose of Carboplatin chemotherapy. Discharge being planned for tomorrow after radiation treatment, and further treatment plans are outpatient. Met with patient in her room this afternoon to again try to determine her economic stimulus payment status, and was successful in getting update on the IRS website from the computer in patient room; patient got up to see the screen and read it herself. She called a family member so I could explain it to her. No other needs indicated at present. Will continue to follow.

## 2020-05-28 NOTE — PLAN OF CARE
POC reviewed with patient, questions answered and concerns addressed. VS stable, tolerating diet, had 2 BM's today, Tessilon pearls given x1, has frequent cough at times productive. Medicated x 1 for pain and once for anxiety. In no acute distress; will continue to monitor.

## 2020-05-28 NOTE — PHYSICIAN QUERY
PT Name: Janeth Boyce  MR #: [id number]    Physician Query Form - Respiratory Condition Clarification      CDS: Nai Ahn RN      Contact information: [email or web address] or (cell) [phone]    This form is a permanent document in the medical record.    Query Date: May 28, 2020    By submitting this query, we are merely seeking further clarification of documentation. Please utilize your independent clinical judgment when addressing the question(s) below.    The Medical record contains the following   Indicators   Supporting Clinical Findings Location in Medical Record   x SOB, PRITCHETT, Wheezing, Productive Cough, Use of Accessory Muscles, etc. Shortness of breath  Progressive worsening SOB  CTA Chest: Progression of airway obstruction and postobstructive consolidation with enlargement of the right pleural effusion.  Patient's large right apically pulmonary mass is similar in size allowing for differences in technique. No pulmonary thromboembolism. Mediastinal and left pulmonary probable metastatic disease. Questionable lytic lesions in T2 and T7 which may represent metastatic disease.   H&P 5/25   x Acute/Chronic Illness Advanced stage IV adenocarcionoma lung diagnosed via axillary LN biopsy. Significant PD-L1 positivity and not yet on chemotherapy. Worsening dyspnea above baseline. Currently RA with some increased WOB evident on my assessment. Chest imaging with extensive RUL mass and mediastinal/hilar invasion Near obliteration of right UL bronchus Pulm consult 5/25    Radiology Findings      Respiratory Distress or Failure      Hypoxia or Hypercapnia      RR         ABGs         O2 sat      BiPAP/Intubation      Supplemental O2     x Home O2, Oxygen Dependence on home oxygen 2L which she wears continuously ED Note 5/25    Treatment      Other       Respiratory failure can be acute, chronic or both. It is generally further specified as hypoxic, hypercapnic or both. Lastly, it is important to identify an  etiology, if known or suspected.   References::  https://www.acphospitalist.org/archives/2013/10/coding.htm [email or web address]-respiratory-failure-[email or web address]     The clinical guidelines noted below are only system guidelines, and do not replace the providers clinical judgment.    Provider, please specify diagnosis or diagnoses associated with above clinical findings.     [ x  ] Chronic Respiratory Failure with Hypoxia -Continuous home oxygen use     [   ] Other Chronic Respiratory Failure     [   ] Other Respiratory Diagnosis (please specify): _________________________________     [   ]  Clinically Undetermined       Please document in your progress notes daily for the duration of treatment until resolved and include in your discharge summary.

## 2020-05-28 NOTE — ASSESSMENT & PLAN NOTE
Recently dx in May 2020  Not yet started on treatment     Oncologic History 1. Lung adenocarcinoma    Oncologic Treatment      Pathology 5/5/2020  Right lung, biopsy:  Positive for adenocarcinoma with areas of necrosis.  Molecular studies will be completed for EGFR, ALK, ROS-1, PD-L1 and BRAF and results will follow in a supplemental report.     XRT eval and treatment 5/27  Carboplatin x1  Keytruda to start outpatient  Can complete XRT outpatient.

## 2020-05-28 NOTE — NURSING
CHEMO NOTE  CAR and consent on chart, dosage chemo checked by 2 chemo certified nurses. Pre-meds given as ordered. Carboplatin 485mg/NS 250cc hung @1740 and infused over 30min, Blood return good to IV RH.patient tolerated well.

## 2020-05-28 NOTE — ASSESSMENT & PLAN NOTE
Progressive worsening SOB  CTA Chest: Progression of airway obstruction and postobstructive consolidation with enlargement of the right pleural effusion.  Patient's large right apically pulmonary mass is similar in size allowing for differences in technique. No pulmonary thromboembolism. Mediastinal and left pulmonary probable metastatic disease. Questionable lytic lesions in T2 and T7 which may represent metastatic disease.    Plan:  -Pulmonology consulted. Appreciate their assistance  -VTE ppx with lovenox   -Monitor O2sats. Initiate supplemental O2 if sats < 92%  -Rad onc consulted for palliative XRT. Mild improvement following treatment yesterday. Will receive XRT tomorrow. Will finish XRT outpatient  -Oxy 5mg q6PRN

## 2020-05-28 NOTE — PROGRESS NOTES
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  Presents with progressively worsening SOB. She states she has been feeling SOB ever since her biopsy 5/5/20 but has gotten significantly worse in the last 4 days and was so bad yesterday she was unable to sleep. It is worse with exertion and upon laying flat. She denies chest pain, but endorses chest tightness. Patient also complains of feeling flushed, diaphoretic, SOB and lightheaded recently when needing to urinate an/or with her BMs. However she denies any urinary or bowel incontinence. Patient has a chronic cough productive of clear white sputum that is unchanged. She is on home oxygen 2L which she wears continuously. In the ED patient was AF, , RR 32, normotensive, and satting 98% on RA.  When examined by Medical Oncology, she was sitting upright, tachypnic with very mild use of accessory muscles. At that time her O2sat was 93% on RA. Of note, her lung cancer was dx earlier this month. She has not yet started treatment.    Oncologic History:    Oncologic History 1. Lung adenocarcinoma    Oncologic Treatment      Pathology 5/5/2020  Right lung, biopsy:  Positive for adenocarcinoma with areas of necrosis.  Molecular studies will be completed for EGFR, ALK, ROS-1, PD-L1 and BRAF and results will follow in a supplemental report.       Interval History: NAONE. Patient continues to be able to rest. HOB elevated while resting. Palliative RT to the Lung yesterday, as well as one time dose of carboplatin. Keytruda to start outpatient. Patient reports some improvement in SOB following XRT. Able to  ambulate without issue.    Oncology Treatment Plan:   IP NSCLC CARBOPLATIN (AUC)     Medications:  Continuous Infusions:  Scheduled Meds:   albuterol-ipratropium  3 mL Nebulization Q4H    amLODIPine  5 mg Oral Daily    buPROPion  150 mg Oral Daily    dexamethasone (DECADRON) IVPB  12 mg Intravenous Q24H    enoxaparin  40 mg Subcutaneous Daily    gabapentin  100 mg Oral TID    losartan-hydrochlorothiazide 100-12.5 mg  1 tablet Oral Daily    ondansetron  8 mg Intravenous Q12H    pantoprazole  20 mg Oral Daily    polyethylene glycol  17 g Oral Daily    senna-docusate 8.6-50 mg  1 tablet Oral BID     PRN Meds:acetaminophen, alteplase, benzonatate, Dextrose 10% Bolus, Dextrose 10% Bolus, glucagon (human recombinant), glucose, glucose, heparin, porcine (PF), LORazepam, melatonin, oxyCODONE, promethazine-codeine 6.25-10 mg/5 ml, sodium chloride 0.9%, sodium chloride 0.9%            Review of Systems   Constitutional: Positive for diaphoresis (with BM and urination). Negative for chills, fatigue and fever.   HENT: Negative for ear pain, hearing loss, postnasal drip, rhinorrhea, sore throat, tinnitus and trouble swallowing.    Eyes: Negative for visual disturbance.   Respiratory: Negative for cough, chest tightness, shortness of breath, wheezing and stridor.    Cardiovascular: Negative for chest pain and leg swelling.   Gastrointestinal: Negative for abdominal distention, abdominal pain, constipation, diarrhea, nausea and vomiting.   Endocrine: Negative for cold intolerance and heat intolerance.   Genitourinary: Negative for difficulty urinating and dysuria.   Musculoskeletal: Negative for arthralgias and myalgias.   Skin: Negative for rash.   Neurological: Positive for weakness and light-headedness (with urination and BMs). Negative for dizziness and headaches.   Hematological: Does not bruise/bleed easily.   Psychiatric/Behavioral: Negative for confusion.     Objective:     Vital Signs (Most Recent):  Temp: 97.5  °F (36.4 °C) (05/28/20 1116)  Pulse: 89 (05/28/20 1154)  Resp: 16 (05/28/20 1154)  BP: 114/63 (05/28/20 1116)  SpO2: 98 % (05/28/20 1154) Vital Signs (24h Range):  Temp:  [97.5 °F (36.4 °C)-99.9 °F (37.7 °C)] 97.5 °F (36.4 °C)  Pulse:  [] 89  Resp:  [16-24] 16  SpO2:  [84 %-98 %] 98 %  BP: (109-123)/(60-79) 114/63     Weight: 85.5 kg (188 lb 7.9 oz)  Body mass index is 30.42 kg/m².  Body surface area is 2 meters squared.      Intake/Output Summary (Last 24 hours) at 5/28/2020 1252  Last data filed at 5/28/2020 0826  Gross per 24 hour   Intake --   Output 250 ml   Net -250 ml       Physical Exam   Constitutional: She is oriented to person, place, and time. She appears well-developed and well-nourished.   Anxious appearing   HENT:   Head: Normocephalic and atraumatic.   Right Ear: Hearing normal.   Left Ear: Hearing normal.   Eyes: Conjunctivae, EOM and lids are normal.   Neck: Trachea normal, full passive range of motion without pain and phonation normal. No JVD present. No tracheal tenderness present. No tracheal deviation present.   Cardiovascular: Normal rate, regular rhythm, S1 normal, S2 normal, normal heart sounds and normal pulses. Exam reveals no gallop, no S3, no S4 and no friction rub.   No murmur heard.  Pulses:       Radial pulses are 2+ on the right side, and 2+ on the left side.   Pulmonary/Chest: No accessory muscle usage or stridor. No tachypnea. No respiratory distress. She has decreased breath sounds in the right upper field, the right middle field and the right lower field. She has no wheezes. She has no rhonchi. She has no rales.   Abdominal: Soft. Normal appearance and bowel sounds are normal. There is no tenderness.   Musculoskeletal: Normal range of motion.   Neurological: She is alert and oriented to person, place, and time. She has normal strength. No sensory deficit. GCS eye subscore is 4. GCS verbal subscore is 5. GCS motor subscore is 6.   Skin: Skin is warm, dry and intact. No rash  noted. She is not diaphoretic. No pallor.   Psychiatric: She has a normal mood and affect. Her speech is normal and behavior is normal. Judgment and thought content normal. Cognition and memory are normal. She is attentive.   Nursing note and vitals reviewed.      Significant Labs:   Recent Lab Results       05/28/20  0417        Albumin 2.4     Alkaline Phosphatase 76     ALT 11     Anion Gap 10     AST 13     Baso # 0.02     Basophil% 0.2     BILIRUBIN TOTAL 0.3  Comment:  For infants and newborns, interpretation of results should be based  on gestational age, weight and in agreement with clinical  observations.  Premature Infant recommended reference ranges:  Up to 24 hours.............<8.0 mg/dL  Up to 48 hours............<12.0 mg/dL  3-5 days..................<15.0 mg/dL  6-29 days.................<15.0 mg/dL       BUN, Bld 23     Calcium 9.2     Chloride 101     CO2 24     Creatinine 1.2     Differential Method Automated     eGFR if African American 56.8     eGFR if non  49.2  Comment:  Calculation used to obtain the estimated glomerular filtration  rate (eGFR) is the CKD-EPI equation.        Eos # 0.0     Eosinophil% 0.0     Glucose 160     Gran # (ANC) 11.0     Gran% 91.0     Hematocrit 36.1     Hemoglobin 10.7     Immature Grans (Abs) 0.09  Comment:  Mild elevation in immature granulocytes is non specific and   can be seen in a variety of conditions including stress response,   acute inflammation, trauma and pregnancy. Correlation with other   laboratory and clinical findings is essential.       Immature Granulocytes 0.7     Lymph # 0.7     Lymph% 5.6     Magnesium 1.8     MCH 26.3     MCHC 29.6     MCV 89     Mono # 0.3     Mono% 2.5     MPV 9.7     nRBC 0     Phosphorus 4.3     Platelets 304     Potassium 4.4     PROTEIN TOTAL 7.8     RBC 4.07     RDW 14.5     Sodium 135     WBC 12.07           Diagnostic Results:  I have reviewed and interpreted all pertinent imaging results/findings  within the past 24 hours.    Assessment/Plan:     HCV (hepatitis C virus)  Treated for HCV in 2019    Per Dr. Peterson, patient does not require treatment given completion of treatment in 2019    Anxiety  On wellbutrin at home    Plan:  -C/w wellbutrin  -Okay to start ativan 1mg q6PRN for anxiety    Insomnia  2/2 SOB    Plan:  -Melatonin qHS, but patient HOB must be elevated    Adenocarcinoma of lung, stage 4, right  Recently dx in May 2020  Not yet started on treatment     Oncologic History 1. Lung adenocarcinoma    Oncologic Treatment      Pathology 5/5/2020  Right lung, biopsy:  Positive for adenocarcinoma with areas of necrosis.  Molecular studies will be completed for EGFR, ALK, ROS-1, PD-L1 and BRAF and results will follow in a supplemental report.     XRT eval and treatment 5/27  Carboplatin x1  Keytruda to start outpatient  Can complete XRT outpatient.    Cough  Ativan for cough 2/2 anxiety  Phenergan-codeine for cough un-related to anxiety      Essential hypertension  On norvasc at home  On valsartan-hctz, not on formulary    C/w norvasc, losartan-hctz      Shortness of breath  Progressive worsening SOB  CTA Chest: Progression of airway obstruction and postobstructive consolidation with enlargement of the right pleural effusion.  Patient's large right apically pulmonary mass is similar in size allowing for differences in technique. No pulmonary thromboembolism. Mediastinal and left pulmonary probable metastatic disease. Questionable lytic lesions in T2 and T7 which may represent metastatic disease.    Plan:  -Pulmonology consulted. Appreciate their assistance  -VTE ppx with lovenox   -Monitor O2sats. Initiate supplemental O2 if sats < 92%  -Rad onc consulted for palliative XRT. Mild improvement following treatment yesterday. Will receive XRT tomorrow. Will finish XRT outpatient  -Oxy 5mg q6PRN             Chan Dunham MD  Hematology/Oncology  [facility]    Attending Note  I have  personally taken the history and examined this patient and agree with the resident's note as stated above.  Discharge tomorrow after XRT

## 2020-05-29 ENCOUNTER — DOCUMENTATION ONLY (OUTPATIENT)
Dept: RADIATION ONCOLOGY | Facility: CLINIC | Age: 60
End: 2020-05-29

## 2020-05-29 VITALS
WEIGHT: 188.5 LBS | DIASTOLIC BLOOD PRESSURE: 60 MMHG | HEIGHT: 66 IN | OXYGEN SATURATION: 93 % | BODY MASS INDEX: 30.29 KG/M2 | RESPIRATION RATE: 16 BRPM | TEMPERATURE: 97 F | HEART RATE: 97 BPM | SYSTOLIC BLOOD PRESSURE: 109 MMHG

## 2020-05-29 LAB
ALBUMIN SERPL BCP-MCNC: 2.3 G/DL (ref 3.5–5.2)
ALP SERPL-CCNC: 73 U/L (ref 55–135)
ALT SERPL W/O P-5'-P-CCNC: 12 U/L (ref 10–44)
ANION GAP SERPL CALC-SCNC: 9 MMOL/L (ref 8–16)
AST SERPL-CCNC: 11 U/L (ref 10–40)
BACTERIA SPEC AEROBE CULT: NO GROWTH
BASOPHILS # BLD AUTO: 0.02 K/UL (ref 0–0.2)
BASOPHILS NFR BLD: 0.1 % (ref 0–1.9)
BILIRUB SERPL-MCNC: 0.2 MG/DL (ref 0.1–1)
BUN SERPL-MCNC: 31 MG/DL (ref 6–20)
CALCIUM SERPL-MCNC: 8.7 MG/DL (ref 8.7–10.5)
CHLORIDE SERPL-SCNC: 102 MMOL/L (ref 95–110)
CO2 SERPL-SCNC: 22 MMOL/L (ref 23–29)
CREAT SERPL-MCNC: 1.2 MG/DL (ref 0.5–1.4)
DIFFERENTIAL METHOD: ABNORMAL
EOSINOPHIL # BLD AUTO: 0 K/UL (ref 0–0.5)
EOSINOPHIL NFR BLD: 0 % (ref 0–8)
ERYTHROCYTE [DISTWIDTH] IN BLOOD BY AUTOMATED COUNT: 14.5 % (ref 11.5–14.5)
EST. GFR  (AFRICAN AMERICAN): 56.8 ML/MIN/1.73 M^2
EST. GFR  (NON AFRICAN AMERICAN): 49.2 ML/MIN/1.73 M^2
GLUCOSE SERPL-MCNC: 183 MG/DL (ref 70–110)
HCT VFR BLD AUTO: 33.4 % (ref 37–48.5)
HGB BLD-MCNC: 9.9 G/DL (ref 12–16)
IMM GRANULOCYTES # BLD AUTO: 0.19 K/UL (ref 0–0.04)
IMM GRANULOCYTES NFR BLD AUTO: 1 % (ref 0–0.5)
LYMPHOCYTES # BLD AUTO: 0.4 K/UL (ref 1–4.8)
LYMPHOCYTES NFR BLD: 2.2 % (ref 18–48)
MAGNESIUM SERPL-MCNC: 1.8 MG/DL (ref 1.6–2.6)
MCH RBC QN AUTO: 26.2 PG (ref 27–31)
MCHC RBC AUTO-ENTMCNC: 29.6 G/DL (ref 32–36)
MCV RBC AUTO: 88 FL (ref 82–98)
MONOCYTES # BLD AUTO: 0.6 K/UL (ref 0.3–1)
MONOCYTES NFR BLD: 3.1 % (ref 4–15)
NEUTROPHILS # BLD AUTO: 17.8 K/UL (ref 1.8–7.7)
NEUTROPHILS NFR BLD: 93.6 % (ref 38–73)
NRBC BLD-RTO: 0 /100 WBC
PHOSPHATE SERPL-MCNC: 3.6 MG/DL (ref 2.7–4.5)
PLATELET # BLD AUTO: 289 K/UL (ref 150–350)
PMV BLD AUTO: 9.6 FL (ref 9.2–12.9)
POTASSIUM SERPL-SCNC: 4 MMOL/L (ref 3.5–5.1)
PROT SERPL-MCNC: 7.3 G/DL (ref 6–8.4)
RBC # BLD AUTO: 3.78 M/UL (ref 4–5.4)
SODIUM SERPL-SCNC: 133 MMOL/L (ref 136–145)
WBC # BLD AUTO: 18.98 K/UL (ref 3.9–12.7)

## 2020-05-29 PROCEDURE — 94640 AIRWAY INHALATION TREATMENT: CPT

## 2020-05-29 PROCEDURE — 77387 PR GUIDANCE FOR RADIATION TREATMENT DELIVERY: ICD-10-PCS | Mod: ,,, | Performed by: RADIOLOGY

## 2020-05-29 PROCEDURE — 77387 GUIDANCE FOR RADJ TX DLVR: CPT | Mod: ,,, | Performed by: RADIOLOGY

## 2020-05-29 PROCEDURE — 99239 PR HOSPITAL DISCHARGE DAY,>30 MIN: ICD-10-PCS | Mod: ,,, | Performed by: INTERNAL MEDICINE

## 2020-05-29 PROCEDURE — 77387 GUIDANCE FOR RADJ TX DLVR: CPT | Mod: TC | Performed by: RADIOLOGY

## 2020-05-29 PROCEDURE — 85025 COMPLETE CBC W/AUTO DIFF WBC: CPT

## 2020-05-29 PROCEDURE — 84100 ASSAY OF PHOSPHORUS: CPT

## 2020-05-29 PROCEDURE — 80053 COMPREHEN METABOLIC PANEL: CPT

## 2020-05-29 PROCEDURE — 25000003 PHARM REV CODE 250: Performed by: STUDENT IN AN ORGANIZED HEALTH CARE EDUCATION/TRAINING PROGRAM

## 2020-05-29 PROCEDURE — 90834 PR PSYCHOTHERAPY W/PATIENT, 45 MIN: ICD-10-PCS | Mod: ,,, | Performed by: PSYCHOLOGIST

## 2020-05-29 PROCEDURE — 63600175 PHARM REV CODE 636 W HCPCS: Performed by: INTERNAL MEDICINE

## 2020-05-29 PROCEDURE — 83735 ASSAY OF MAGNESIUM: CPT

## 2020-05-29 PROCEDURE — 27000221 HC OXYGEN, UP TO 24 HOURS

## 2020-05-29 PROCEDURE — 25000242 PHARM REV CODE 250 ALT 637 W/ HCPCS: Performed by: STUDENT IN AN ORGANIZED HEALTH CARE EDUCATION/TRAINING PROGRAM

## 2020-05-29 PROCEDURE — 77412 RADIATION TX DELIVERY LVL 3: CPT | Performed by: RADIOLOGY

## 2020-05-29 PROCEDURE — 94761 N-INVAS EAR/PLS OXIMETRY MLT: CPT

## 2020-05-29 PROCEDURE — 99239 HOSP IP/OBS DSCHRG MGMT >30: CPT | Mod: ,,, | Performed by: INTERNAL MEDICINE

## 2020-05-29 PROCEDURE — 90834 PSYTX W PT 45 MINUTES: CPT | Mod: ,,, | Performed by: PSYCHOLOGIST

## 2020-05-29 PROCEDURE — 36415 COLL VENOUS BLD VENIPUNCTURE: CPT

## 2020-05-29 RX ADMIN — IPRATROPIUM BROMIDE AND ALBUTEROL SULFATE 3 ML: .5; 3 SOLUTION RESPIRATORY (INHALATION) at 01:05

## 2020-05-29 RX ADMIN — LOSARTAN POTASSIUM AND HYDROCHLOROTHIAZIDE 1 TABLET: 12.5; 1 TABLET ORAL at 08:05

## 2020-05-29 RX ADMIN — BUPROPION HYDROCHLORIDE 150 MG: 150 TABLET, FILM COATED, EXTENDED RELEASE ORAL at 08:05

## 2020-05-29 RX ADMIN — LORAZEPAM 1 MG: 1 TABLET ORAL at 08:05

## 2020-05-29 RX ADMIN — PROMETHAZINE HYDROCHLORIDE AND CODEINE PHOSPHATE 5 ML: 10; 6.25 SOLUTION ORAL at 08:05

## 2020-05-29 RX ADMIN — IPRATROPIUM BROMIDE AND ALBUTEROL SULFATE 3 ML: .5; 3 SOLUTION RESPIRATORY (INHALATION) at 05:05

## 2020-05-29 RX ADMIN — AMLODIPINE BESYLATE 5 MG: 5 TABLET ORAL at 08:05

## 2020-05-29 RX ADMIN — PANTOPRAZOLE SODIUM 20 MG: 20 TABLET, DELAYED RELEASE ORAL at 08:05

## 2020-05-29 RX ADMIN — ONDANSETRON HYDROCHLORIDE 8 MG: 2 SOLUTION INTRAMUSCULAR; INTRAVENOUS at 05:05

## 2020-05-29 RX ADMIN — OXYCODONE 5 MG: 5 TABLET ORAL at 08:05

## 2020-05-29 RX ADMIN — GABAPENTIN 100 MG: 100 CAPSULE ORAL at 08:05

## 2020-05-29 NOTE — DISCHARGE INSTRUCTIONS
Please follow-up with Radiation Oncology outpatient. You have 2 future appointments, the first at 8:45am on Monday, 6/1/20. The second is 8:45an on Tuesday, 6/2/2020.

## 2020-05-29 NOTE — PROGRESS NOTES
Discharge instructions and prescriptions given and explained to pt. Pt. verbalized understanding with no further questions. Peripheral IV D/C'd with catheter tip intact. VS WDL. Patient is awaiting ride home by daughter.    ROAD TEST  O=SpO2 93% on 2L O2 on nasal cannula  A=Ambulating around room with standby assistance  D=IV D/C'd  T=Tolerating regular diet  E=Voids  S=Performs self care independently  T=N/a

## 2020-05-29 NOTE — PLAN OF CARE
POC reviewed with patient, questions answered and concerns addressed. VS stable and walking independently in room and SBA in bacon(needs O2). Medicated x2 for anxiety, and twice for pain. Also received a dose of phenergan with codeine. Voiding adequate amount cyu. In no acute distress; will continue to monitor.

## 2020-05-29 NOTE — PLAN OF CARE
POC reviewed with patient; understanding verbalized. Pt on 2L NC with O2 sats between 93-95%. PRN codeine given for cough. PRN oxy given for side pain. Pt with nonskid footwear on, bed in lowest position, and locked with bed rails up x2. Pt instructed to call prior to getting OOB. Pt has call light and person items within reach. Patient ambulates in room independently. VSS and afebrile this shift. All questions and concerns addressed at this time. Will continue to monitor.

## 2020-05-29 NOTE — DISCHARGE SUMMARY
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  Presents with progressively worsening SOB. She states she has been feeling SOB ever since her biopsy 5/5/20 but has gotten significantly worse in the last 4 days and was so bad yesterday she was unable to sleep. It is worse with exertion and upon laying flat. She denies chest pain, but endorses chest tightness. Patient also complains of feeling flushed, diaphoretic, SOB and lightheaded recently when needing to urinate an/or with her BMs. However she denies any urinary or bowel incontinence. Patient has a chronic cough productive of clear white sputum that is unchanged. She is on home oxygen 2L which she wears continuously. In the ED patient was AF, , RR 32, normotensive, and satting 98% on RA.  When examined by Medical Oncology, she was sitting upright, tachypnic with very mild use of accessory muscles. At that time her O2sat was 93% on RA. Of note, her lung cancer was dx earlier this month. She has not yet started treatment.    Oncologic History:    Oncologic History 1. Lung adenocarcinoma    Oncologic Treatment      Pathology 5/5/2020  Right lung, biopsy:  Positive for adenocarcinoma with areas of necrosis.  Molecular studies will be completed for EGFR, ALK, ROS-1, PD-L1 and BRAF and results will follow in a supplemental report.       * No surgery found *     Hospital Course: Admitted to Medical Oncology. Able to rest comfortably while seated  upright. Patient is generally very anxious. Pulmonology attempted thoracentesis twice. Second attempt resulted in 400cc being drained, without relief. Discussed palliative RT to the lung. Pt agreeable. Rad onc was supposed to perform RT to brain lesions outpatient on 5/27, but after discussion with them, will instead perform palliative RT to the lung 5/27. Discussed case with Dr. Peterson who treats patient as primary Oncologist, in particular regarding her Hep C status. He states no treatment is needed for the HCV. Patient reports some improvement following XRT. Able to ambulate comfortably. Will finish XRT outpatient. AF and HDS. In fair condition for discharge.    Consults:   Consults (From admission, onward)        Status Ordering Provider     Inpatient consult to Hematology/Oncology Psychology  Once     Provider:  (Not yet assigned)    Completed LORRAINE ARCE     Inpatient consult to Pulmonology  Once     Provider:  (Not yet assigned)    Completed ERNIE MENENDEZ     Inpatient consult to Radiation Oncology  Once     Provider:  (Not yet assigned)    Completed LORRAINE ARCE     Inpatient consult to Registered Dietitian/Nutritionist  Once     Provider:  (Not yet assigned)    Completed LORRAINE ARCE        Interval History: NAONE. Patient continues to be able to rest. HOB elevated while resting. Palliative RT to the Lung today. Will finish XRT outpatient. Patient reports continued improvement in SOB following XRT. Able to ambulate without issue. AF and HDS. In fair condition for discharge.    Oncology Treatment Plan:   IP NSCLC CARBOPLATIN (AUC)     Medications:  Continuous Infusions:  Scheduled Meds:   albuterol-ipratropium  3 mL Nebulization Q4H    amLODIPine  5 mg Oral Daily    buPROPion  150 mg Oral Daily    dexamethasone (DECADRON) IVPB  12 mg Intravenous Q24H    enoxaparin  40 mg Subcutaneous Daily    gabapentin  100 mg Oral TID    losartan-hydrochlorothiazide 100-12.5 mg  1 tablet Oral Daily     ondansetron  8 mg Intravenous Q12H    pantoprazole  20 mg Oral Daily    polyethylene glycol  17 g Oral Daily    senna-docusate 8.6-50 mg  1 tablet Oral BID     PRN Meds:acetaminophen, alteplase, benzonatate, Dextrose 10% Bolus, Dextrose 10% Bolus, glucagon (human recombinant), glucose, glucose, heparin, porcine (PF), LORazepam, melatonin, oxyCODONE, promethazine-codeine 6.25-10 mg/5 ml, sodium chloride 0.9%, sodium chloride 0.9%            Review of Systems   Constitutional: Positive for diaphoresis (with BM and urination). Negative for chills, fatigue and fever.   HENT: Negative for ear pain, hearing loss, postnasal drip, rhinorrhea, sore throat, tinnitus and trouble swallowing.    Eyes: Negative for visual disturbance.   Respiratory: Negative for cough, chest tightness, shortness of breath, wheezing and stridor.    Cardiovascular: Negative for chest pain and leg swelling.   Gastrointestinal: Negative for abdominal distention, abdominal pain, constipation, diarrhea, nausea and vomiting.   Endocrine: Negative for cold intolerance and heat intolerance.   Genitourinary: Negative for difficulty urinating and dysuria.   Musculoskeletal: Negative for arthralgias and myalgias.   Skin: Negative for rash.   Neurological: Positive for light-headedness (with urination and BMs). Negative for dizziness and headaches.   Hematological: Does not bruise/bleed easily.   Psychiatric/Behavioral: Negative for confusion.     Objective:     Vital Signs (Most Recent):  Temp: 97.8 °F (36.6 °C) (05/29/20 0751)  Pulse: 91 (05/29/20 0751)  Resp: 18 (05/29/20 0751)  BP: 125/67 (05/29/20 0751)  SpO2: (!) 94 % (05/29/20 0751) Vital Signs (24h Range):  Temp:  [97.1 °F (36.2 °C)-98.2 °F (36.8 °C)] 97.8 °F (36.6 °C)  Pulse:  [75-96] 91  Resp:  [16-20] 18  SpO2:  [90 %-98 %] 94 %  BP: (108-125)/(63-73) 125/67     Weight: 85.5 kg (188 lb 7.9 oz)  Body mass index is 30.42 kg/m².  Body surface area is 2 meters squared.      Intake/Output Summary  (Last 24 hours) at 5/29/2020 1003  Last data filed at 5/29/2020 0145  Gross per 24 hour   Intake --   Output 750 ml   Net -750 ml       Physical Exam   Constitutional: She is oriented to person, place, and time. She appears well-developed and well-nourished.   HENT:   Head: Normocephalic and atraumatic.   Right Ear: Hearing normal.   Left Ear: Hearing normal.   Eyes: Conjunctivae, EOM and lids are normal.   Neck: Trachea normal, full passive range of motion without pain and phonation normal. No JVD present. No tracheal tenderness present. No tracheal deviation present.   Cardiovascular: Normal rate, regular rhythm, S1 normal, S2 normal, normal heart sounds and normal pulses. Exam reveals no gallop, no S3, no S4 and no friction rub.   No murmur heard.  Pulses:       Radial pulses are 2+ on the right side, and 2+ on the left side.   Pulmonary/Chest: No accessory muscle usage or stridor. No tachypnea. No respiratory distress. She has decreased breath sounds in the right upper field, the right middle field and the right lower field. She has no wheezes. She has no rhonchi. She has no rales.   Abdominal: Soft. Normal appearance and bowel sounds are normal. There is no tenderness.   Musculoskeletal: Normal range of motion.   Neurological: She is alert and oriented to person, place, and time. She has normal strength. No sensory deficit. GCS eye subscore is 4. GCS verbal subscore is 5. GCS motor subscore is 6.   Skin: Skin is warm, dry and intact. No rash noted. She is not diaphoretic. No pallor.   Psychiatric: She has a normal mood and affect. Her speech is normal and behavior is normal. Judgment and thought content normal. Cognition and memory are normal. She is attentive.   Nursing note and vitals reviewed.      Significant Labs:   Recent Lab Results       05/29/20  0539        Albumin 2.3     Alkaline Phosphatase 73     ALT 12     Anion Gap 9     AST 11     Baso # 0.02     Basophil% 0.1     BILIRUBIN TOTAL  0.2  Comment:  For infants and newborns, interpretation of results should be based  on gestational age, weight and in agreement with clinical  observations.  Premature Infant recommended reference ranges:  Up to 24 hours.............<8.0 mg/dL  Up to 48 hours............<12.0 mg/dL  3-5 days..................<15.0 mg/dL  6-29 days.................<15.0 mg/dL       BUN, Bld 31     Calcium 8.7     Chloride 102     CO2 22     Creatinine 1.2     Differential Method Automated     eGFR if African American 56.8     eGFR if non  49.2  Comment:  Calculation used to obtain the estimated glomerular filtration  rate (eGFR) is the CKD-EPI equation.        Eos # 0.0     Eosinophil% 0.0     Glucose 183     Gran # (ANC) 17.8     Gran% 93.6     Hematocrit 33.4     Hemoglobin 9.9     Immature Grans (Abs) 0.19  Comment:  Mild elevation in immature granulocytes is non specific and   can be seen in a variety of conditions including stress response,   acute inflammation, trauma and pregnancy. Correlation with other   laboratory and clinical findings is essential.       Immature Granulocytes 1.0     Lymph # 0.4     Lymph% 2.2     Magnesium 1.8     MCH 26.2     MCHC 29.6     MCV 88     Mono # 0.6     Mono% 3.1     MPV 9.6     nRBC 0     Phosphorus 3.6     Platelets 289     Potassium 4.0     PROTEIN TOTAL 7.3     RBC 3.78     RDW 14.5     Sodium 133     WBC 18.98           Diagnostic Results:  I have reviewed and interpreted all pertinent imaging results/findings within the past 24 hours.      Significant Diagnostic Studies: Labs: All labs within the past 24 hours have been reviewed    Pending Diagnostic Studies:     None        Final Active Diagnoses:    Diagnosis Date Noted POA    PRINCIPAL PROBLEM:  Adenocarcinoma of lung, stage 4, right [C34.91] 05/11/2020 Yes    Anxiety [F41.9] 05/26/2020 Yes    HCV (hepatitis C virus) [B19.20] 05/26/2020 Yes    Insomnia [G47.00] 05/25/2020 Yes    Cough [R05] 05/02/2020 Yes     Shortness of breath [R06.02] 05/01/2020 Yes    Essential hypertension [I10] 05/01/2020 Yes      Problems Resolved During this Admission:      Discharged Condition: fair    Disposition: Home or Self Care    Follow Up:  Follow-up Information     Freeman Caballero MD.    Specialty:  Family Medicine  Why:  As needed  Contact information:  [address]  [facility] [address]  [phone]             [facility] RADIATION ONCOLOGY. Go on 6/1/2020.    Specialty:  Radiation Oncology  Why:  For radiation treatment at 8:45AM  Contact information:  [address]  [facility] [address]  [phone]               Patient Instructions:      Diet Adult Regular     Notify your health care provider if you experience any of the following:  temperature >100.4     Notify your health care provider if you experience any of the following:  persistent nausea and vomiting or diarrhea     Notify your health care provider if you experience any of the following:  severe uncontrolled pain     Notify your health care provider if you experience any of the following:  redness, tenderness, or signs of infection (pain, swelling, redness, odor or green/yellow discharge around incision site)     Notify your health care provider if you experience any of the following:  difficulty breathing or increased cough     Notify your health care provider if you experience any of the following:  severe persistent headache     Notify your health care provider if you experience any of the following:  worsening rash     Notify your health care provider if you experience any of the following:  persistent dizziness, light-headedness, or visual disturbances     Notify your health care provider if you experience any of the following:  increased confusion or weakness     Activity as tolerated     Medications:  Reconciled Home Medications:      Medication List      START taking these medications    dexAMETHasone 4 MG Tab  Commonly known as:   DECADRON  Take 2 tablets (8 mg total) by mouth once daily. for 1 day     ondansetron 8 MG Tbdl  Commonly known as:  ZOFRAN-ODT  Dissolve 1 tablet (8 mg total) by mouth every 8 (eight) hours as needed.     oxyCODONE 5 MG immediate release tablet  Commonly known as:  ROXICODONE  Take 1 tablet (5 mg total) by mouth every 6 (six) hours as needed.     polyethylene glycol 17 gram/dose powder  Commonly known as:  GLYCOLAX  mix 1 capful (17 g) and take by mouth once daily.     senna-docusate 8.6-50 mg 8.6-50 mg per tablet  Commonly known as:  PERICOLACE  Take 1 tablet by mouth 2 (two) times daily.        CHANGE how you take these medications    LORazepam 1 MG tablet  Commonly known as:  ATIVAN  Take 1 tablet (1 mg total) by mouth every 6 (six) hours as needed for Anxiety.  What changed:    · when to take this  · reasons to take this        CONTINUE taking these medications    albuterol 0.63 mg/3 mL Nebu  Commonly known as:  ACCUNEB  Take 3 mLs (0.63 mg total) by nebulization every 6 (six) hours as needed. Rescue     buPROPion 150 MG TBSR 12 hr tablet  Commonly known as:  WELLBUTRIN SR     gabapentin 100 MG capsule  Commonly known as:  NEURONTIN  Take 100 mg by mouth 3 (three) times daily.     NORVASC 5 MG tablet  Generic drug:  amLODIPine  Take 5 mg by mouth once daily.     pantoprazole 20 MG tablet  Commonly known as:  PROTONIX  Take 20 mg by mouth once daily.     promethazine-codeine 6.25-10 mg/5 ml 6.25-10 mg/5 mL syrup  Commonly known as:  PHENERGAN with CODEINE  Take 5 mLs by mouth nightly as needed for Cough.     valsartan-hydrochlorothiazide 320-12.5 mg per tablet  Commonly known as:  DIOVAN-HCT  Take 1 tablet by mouth once daily.        STOP taking these medications    HYDROcodone-acetaminophen 5-325 mg per tablet  Commonly known as:  NORCO     SYMBICORT INHL            Chan Dunham MD  Hematology/Oncology  [facility]    STAFF NOTE:  Agree with above

## 2020-05-29 NOTE — PROGRESS NOTES
[facility]  Psychology  Progress Note  Individual Psychotherapy (PhD/LCSW)    Patient Name: Janeth Boyce  MRN: [id number]    Patient Class: IP- Inpatient  Admission Date: 5/25/2020  Hospital Length of Stay: 4 days  Attending Physician: Camryn Gloria MD  Primary Care Provider: Freeman Caballero MD    Therapeutic Intervention: Met with patient.  Outpatient - Behavior modifying psychotherapy 45 min - CPT code 52221    Chief Complaint/Reason for Encounter: anxiety     Interval History and Content of Current Session: Patient reported generally doing much better with regard to her physical health. Patient continues to exhibit mild to moderate anxiety and has a low frustration tolerance. Dicsused impact of increased anxiety on current symptoms of coughing and SOB. Patient's biggest concerns are financial. SW was present for a portion of the interview to discuss with patient regarding her oxygen tank. Patient became frustrated with Medicaid not providing her with a portable tank. Work with SW to help calm patient and develop a plan. Mood and protective strategies were discussed and patient agreed to use the rolling tank and receive a quote about purchasing a portable tank out of pocket.     Risk Parameters:  Patient reports no suicidal ideation  Patient reports no homicidal ideation  Patient reports no self-injurious behavior  Patient reports no violent behavior    Verbal Deficits: None    Patient's response to intervention:  The patient's response to intervention is reluctant.    Progress toward goals and other mental status changes:  The patient's progress toward goals is good.    Objective:  The patient was fully cooperative throughout the session.  Appearance: age appropriate  Behavior/Cooperation: friendly and cooperative  Speech: normal in rate, volume, and tone  Mood: anxious  Affect: mood congruent  Thought Process: goal-directed, logical  Thought Content: normal,  No delusions or paranoia;  did not appear to be responding to internal stimuli during the session  Orientation: grossly intact  Memory: Grossly intact  Attention Span/Concentration: Distracted  Fund of Knowledge: average  Estimate of Intelligence: average from verbal skills and history  Cognition: grossly intact  Insight: patient has awareness of illness; good insight into own behavior and behavior of others  Judgment: the patient's behavior is adequate to circumstances    Diagnostic Impression - Plan:       ICD-10-CM ICD-9-CM   1. Adenocarcinoma of lung, stage 4, right C34.91 162.9   2. SOB (shortness of breath) R06.02 786.05   3. Pleural effusion on right J90 511.9   4. Chest pain R07.9 786.50   5. Anxiety F41.9 300.00   6. Primary malignant neoplasm of lung with metastasis to brain C34.90 162.9    C79.31 198.3       Treatment Plan:  · Target symptoms: anxiety  · Why chosen therapy is appropriate versus another modality: relevant to diagnosis, evidence based practice  · Outcome monitoring methods: self-report, observation, checklist/rating scale  · Therapeutic intervention type: behavior modifying psychotherapy    Plan:  individual psychotherapy and medication management by physician    Return to Clinic: 3 weeks Will contact patient to schedule follow up appointment once discharged.     Length of Service (minutes): 45    Nancy Laughlin, PhD  Psychology  [facility]

## 2020-05-29 NOTE — SUBJECTIVE & OBJECTIVE
Interval History: NAONE. Patient continues to be able to rest. HOB elevated while resting. Palliative RT to the Lung today. Will finish XRT outpatient. Patient reports continued improvement in SOB following XRT. Able to ambulate without issue. AF and HDS. In fair condition for discharge.    Oncology Treatment Plan:   IP NSCLC CARBOPLATIN (AUC)     Medications:  Continuous Infusions:  Scheduled Meds:   albuterol-ipratropium  3 mL Nebulization Q4H    amLODIPine  5 mg Oral Daily    buPROPion  150 mg Oral Daily    dexamethasone (DECADRON) IVPB  12 mg Intravenous Q24H    enoxaparin  40 mg Subcutaneous Daily    gabapentin  100 mg Oral TID    losartan-hydrochlorothiazide 100-12.5 mg  1 tablet Oral Daily    ondansetron  8 mg Intravenous Q12H    pantoprazole  20 mg Oral Daily    polyethylene glycol  17 g Oral Daily    senna-docusate 8.6-50 mg  1 tablet Oral BID     PRN Meds:acetaminophen, alteplase, benzonatate, Dextrose 10% Bolus, Dextrose 10% Bolus, glucagon (human recombinant), glucose, glucose, heparin, porcine (PF), LORazepam, melatonin, oxyCODONE, promethazine-codeine 6.25-10 mg/5 ml, sodium chloride 0.9%, sodium chloride 0.9%            Review of Systems   Constitutional: Positive for diaphoresis (with BM and urination). Negative for chills, fatigue and fever.   HENT: Negative for ear pain, hearing loss, postnasal drip, rhinorrhea, sore throat, tinnitus and trouble swallowing.    Eyes: Negative for visual disturbance.   Respiratory: Negative for cough, chest tightness, shortness of breath, wheezing and stridor.    Cardiovascular: Negative for chest pain and leg swelling.   Gastrointestinal: Negative for abdominal distention, abdominal pain, constipation, diarrhea, nausea and vomiting.   Endocrine: Negative for cold intolerance and heat intolerance.   Genitourinary: Negative for difficulty urinating and dysuria.   Musculoskeletal: Negative for arthralgias and myalgias.   Skin: Negative for rash.    Neurological: Positive for light-headedness (with urination and BMs). Negative for dizziness and headaches.   Hematological: Does not bruise/bleed easily.   Psychiatric/Behavioral: Negative for confusion.     Objective:     Vital Signs (Most Recent):  Temp: 97.8 °F (36.6 °C) (05/29/20 0751)  Pulse: 91 (05/29/20 0751)  Resp: 18 (05/29/20 0751)  BP: 125/67 (05/29/20 0751)  SpO2: (!) 94 % (05/29/20 0751) Vital Signs (24h Range):  Temp:  [97.1 °F (36.2 °C)-98.2 °F (36.8 °C)] 97.8 °F (36.6 °C)  Pulse:  [75-96] 91  Resp:  [16-20] 18  SpO2:  [90 %-98 %] 94 %  BP: (108-125)/(63-73) 125/67     Weight: 85.5 kg (188 lb 7.9 oz)  Body mass index is 30.42 kg/m².  Body surface area is 2 meters squared.      Intake/Output Summary (Last 24 hours) at 5/29/2020 1003  Last data filed at 5/29/2020 0145  Gross per 24 hour   Intake --   Output 750 ml   Net -750 ml       Physical Exam   Constitutional: She is oriented to person, place, and time. She appears well-developed and well-nourished.   HENT:   Head: Normocephalic and atraumatic.   Right Ear: Hearing normal.   Left Ear: Hearing normal.   Eyes: Conjunctivae, EOM and lids are normal.   Neck: Trachea normal, full passive range of motion without pain and phonation normal. No JVD present. No tracheal tenderness present. No tracheal deviation present.   Cardiovascular: Normal rate, regular rhythm, S1 normal, S2 normal, normal heart sounds and normal pulses. Exam reveals no gallop, no S3, no S4 and no friction rub.   No murmur heard.  Pulses:       Radial pulses are 2+ on the right side, and 2+ on the left side.   Pulmonary/Chest: No accessory muscle usage or stridor. No tachypnea. No respiratory distress. She has decreased breath sounds in the right upper field, the right middle field and the right lower field. She has no wheezes. She has no rhonchi. She has no rales.   Abdominal: Soft. Normal appearance and bowel sounds are normal. There is no tenderness.   Musculoskeletal: Normal  range of motion.   Neurological: She is alert and oriented to person, place, and time. She has normal strength. No sensory deficit. GCS eye subscore is 4. GCS verbal subscore is 5. GCS motor subscore is 6.   Skin: Skin is warm, dry and intact. No rash noted. She is not diaphoretic. No pallor.   Psychiatric: She has a normal mood and affect. Her speech is normal and behavior is normal. Judgment and thought content normal. Cognition and memory are normal. She is attentive.   Nursing note and vitals reviewed.      Significant Labs:   Recent Lab Results       05/29/20  0539        Albumin 2.3     Alkaline Phosphatase 73     ALT 12     Anion Gap 9     AST 11     Baso # 0.02     Basophil% 0.1     BILIRUBIN TOTAL 0.2  Comment:  For infants and newborns, interpretation of results should be based  on gestational age, weight and in agreement with clinical  observations.  Premature Infant recommended reference ranges:  Up to 24 hours.............<8.0 mg/dL  Up to 48 hours............<12.0 mg/dL  3-5 days..................<15.0 mg/dL  6-29 days.................<15.0 mg/dL       BUN, Bld 31     Calcium 8.7     Chloride 102     CO2 22     Creatinine 1.2     Differential Method Automated     eGFR if African American 56.8     eGFR if non  49.2  Comment:  Calculation used to obtain the estimated glomerular filtration  rate (eGFR) is the CKD-EPI equation.        Eos # 0.0     Eosinophil% 0.0     Glucose 183     Gran # (ANC) 17.8     Gran% 93.6     Hematocrit 33.4     Hemoglobin 9.9     Immature Grans (Abs) 0.19  Comment:  Mild elevation in immature granulocytes is non specific and   can be seen in a variety of conditions including stress response,   acute inflammation, trauma and pregnancy. Correlation with other   laboratory and clinical findings is essential.       Immature Granulocytes 1.0     Lymph # 0.4     Lymph% 2.2     Magnesium 1.8     MCH 26.2     MCHC 29.6     MCV 88     Mono # 0.6     Mono% 3.1     MPV 9.6      nRBC 0     Phosphorus 3.6     Platelets 289     Potassium 4.0     PROTEIN TOTAL 7.3     RBC 3.78     RDW 14.5     Sodium 133     WBC 18.98           Diagnostic Results:  I have reviewed and interpreted all pertinent imaging results/findings within the past 24 hours.

## 2020-05-29 NOTE — SUBJECTIVE & OBJECTIVE
Therapeutic Intervention: Met with patient.  Outpatient - Behavior modifying psychotherapy 45 min - CPT code 44304    Chief Complaint/Reason for Encounter: anxiety     Interval History and Content of Current Session: Patient reported generally doing much better with regard to her physical health. Patient continues to exhibit mild to moderate anxiety and has a low frustration tolerance. Dicsused impact of increased anxiety on current symptoms of coughing and SOB. Patient's biggest concerns are financial. SW was present for a portion of the interview to discuss with patient regarding her oxygen tank. Patient became frustrated with Medicaid not providing her with a portable tank. Work with SW to help calm patient and develop a plan. Mood and protective strategies were discussed and patient agreed to use the rolling tank and receive a quote about purchasing a portable tank out of pocket.     Risk Parameters:  Patient reports no suicidal ideation  Patient reports no homicidal ideation  Patient reports no self-injurious behavior  Patient reports no violent behavior    Verbal Deficits: None    Patient's response to intervention:  The patient's response to intervention is reluctant.    Progress toward goals and other mental status changes:  The patient's progress toward goals is good.    Objective:  The patient was fully cooperative throughout the session.  Appearance: age appropriate  Behavior/Cooperation: friendly and cooperative  Speech: normal in rate, volume, and tone  Mood: anxious  Affect: mood congruent  Thought Process: goal-directed, logical  Thought Content: normal,  No delusions or paranoia; did not appear to be responding to internal stimuli during the session  Orientation: grossly intact  Memory: Grossly intact  Attention Span/Concentration: Distracted  Fund of Knowledge: average  Estimate of Intelligence: average from verbal skills and history  Cognition: grossly intact  Insight: patient has awareness of  illness; good insight into own behavior and behavior of others  Judgment: the patient's behavior is adequate to circumstances

## 2020-05-29 NOTE — ASSESSMENT & PLAN NOTE
Progressive worsening SOB  CTA Chest: Progression of airway obstruction and postobstructive consolidation with enlargement of the right pleural effusion.  Patient's large right apically pulmonary mass is similar in size allowing for differences in technique. No pulmonary thromboembolism. Mediastinal and left pulmonary probable metastatic disease. Questionable lytic lesions in T2 and T7 which may represent metastatic disease.    Plan:  -Pulmonology consulted. Appreciate their assistance  -VTE ppx with lovenox   -Monitor O2sats. Initiate supplemental O2 if sats < 92%  -Rad onc consulted for palliative XRT. Mild improvement following treatment yesterday. Will finish XRT outpatient.  -Oxy 5mg q6PRN

## 2020-06-01 ENCOUNTER — TELEPHONE (OUTPATIENT)
Dept: HEMATOLOGY/ONCOLOGY | Facility: CLINIC | Age: 60
End: 2020-06-01

## 2020-06-01 ENCOUNTER — HOSPITAL ENCOUNTER (OUTPATIENT)
Dept: RADIATION THERAPY | Facility: HOSPITAL | Age: 60
Discharge: HOME | End: 2020-06-01
Attending: RADIOLOGY
Payer: MEDICAID

## 2020-06-01 PROCEDURE — 77412 RADIATION TX DELIVERY LVL 3: CPT | Performed by: RADIOLOGY

## 2020-06-01 PROCEDURE — 77387 GUIDANCE FOR RADJ TX DLVR: CPT | Mod: TC | Performed by: RADIOLOGY

## 2020-06-01 PROCEDURE — 77387 GUIDANCE FOR RADJ TX DLVR: CPT | Mod: ,,, | Performed by: RADIOLOGY

## 2020-06-01 PROCEDURE — 77387 PR GUIDANCE FOR RADIATION TREATMENT DELIVERY: ICD-10-PCS | Mod: ,,, | Performed by: RADIOLOGY

## 2020-06-02 ENCOUNTER — CLINICAL SUPPORT (OUTPATIENT)
Dept: HEMATOLOGY/ONCOLOGY | Facility: CLINIC | Age: 60
End: 2020-06-02
Payer: MEDICAID

## 2020-06-02 ENCOUNTER — TELEPHONE (OUTPATIENT)
Dept: HEMATOLOGY/ONCOLOGY | Facility: CLINIC | Age: 60
End: 2020-06-02

## 2020-06-02 ENCOUNTER — LAB VISIT (OUTPATIENT)
Dept: LAB | Facility: HOSPITAL | Age: 60
End: 2020-06-02
Payer: MEDICAID

## 2020-06-02 DIAGNOSIS — C34.90 LUNG CANCER: Primary | ICD-10-CM

## 2020-06-02 DIAGNOSIS — F41.9 ANXIETY: Primary | ICD-10-CM

## 2020-06-02 DIAGNOSIS — C34.91 ADENOCARCINOMA OF LUNG, STAGE 4, RIGHT: ICD-10-CM

## 2020-06-02 DIAGNOSIS — C79.31 METASTASIS TO BRAIN: ICD-10-CM

## 2020-06-02 LAB
ALBUMIN SERPL BCP-MCNC: 2.6 G/DL (ref 3.5–5.2)
ALP SERPL-CCNC: 82 U/L (ref 55–135)
ALT SERPL W/O P-5'-P-CCNC: 45 U/L (ref 10–44)
ANION GAP SERPL CALC-SCNC: 10 MMOL/L (ref 8–16)
AST SERPL-CCNC: 25 U/L (ref 10–40)
BACTERIA SPEC ANAEROBE CULT: NORMAL
BASOPHILS # BLD AUTO: 0.03 K/UL (ref 0–0.2)
BASOPHILS NFR BLD: 0.3 % (ref 0–1.9)
BILIRUB SERPL-MCNC: 0.3 MG/DL (ref 0.1–1)
BUN SERPL-MCNC: 21 MG/DL (ref 6–20)
CALCIUM SERPL-MCNC: 9.4 MG/DL (ref 8.7–10.5)
CHLORIDE SERPL-SCNC: 100 MMOL/L (ref 95–110)
CO2 SERPL-SCNC: 26 MMOL/L (ref 23–29)
CREAT SERPL-MCNC: 1 MG/DL (ref 0.5–1.4)
DIFFERENTIAL METHOD: ABNORMAL
EOSINOPHIL # BLD AUTO: 0.8 K/UL (ref 0–0.5)
EOSINOPHIL NFR BLD: 8.3 % (ref 0–8)
ERYTHROCYTE [DISTWIDTH] IN BLOOD BY AUTOMATED COUNT: 14.6 % (ref 11.5–14.5)
EST. GFR  (AFRICAN AMERICAN): >60 ML/MIN/1.73 M^2
EST. GFR  (NON AFRICAN AMERICAN): >60 ML/MIN/1.73 M^2
GLUCOSE SERPL-MCNC: 107 MG/DL (ref 70–110)
HCT VFR BLD AUTO: 37 % (ref 37–48.5)
HGB BLD-MCNC: 11.2 G/DL (ref 12–16)
IMM GRANULOCYTES # BLD AUTO: 0.16 K/UL (ref 0–0.04)
IMM GRANULOCYTES NFR BLD AUTO: 1.6 % (ref 0–0.5)
LYMPHOCYTES # BLD AUTO: 0.9 K/UL (ref 1–4.8)
LYMPHOCYTES NFR BLD: 8.7 % (ref 18–48)
MCH RBC QN AUTO: 25.7 PG (ref 27–31)
MCHC RBC AUTO-ENTMCNC: 30.3 G/DL (ref 32–36)
MCV RBC AUTO: 85 FL (ref 82–98)
MONOCYTES # BLD AUTO: 0.6 K/UL (ref 0.3–1)
MONOCYTES NFR BLD: 6.1 % (ref 4–15)
NEUTROPHILS # BLD AUTO: 7.5 K/UL (ref 1.8–7.7)
NEUTROPHILS NFR BLD: 75 % (ref 38–73)
NRBC BLD-RTO: 0 /100 WBC
PLATELET # BLD AUTO: 313 K/UL (ref 150–350)
PMV BLD AUTO: 9.1 FL (ref 9.2–12.9)
POTASSIUM SERPL-SCNC: 3.4 MMOL/L (ref 3.5–5.1)
PROT SERPL-MCNC: 7.4 G/DL (ref 6–8.4)
RBC # BLD AUTO: 4.35 M/UL (ref 4–5.4)
SODIUM SERPL-SCNC: 136 MMOL/L (ref 136–145)
WBC # BLD AUTO: 9.99 K/UL (ref 3.9–12.7)

## 2020-06-02 PROCEDURE — 77334 RADIATION TREATMENT AID(S): CPT | Mod: TC | Performed by: RADIOLOGY

## 2020-06-02 PROCEDURE — 77300 RADIATION THERAPY DOSE PLAN: CPT | Mod: 26,,, | Performed by: RADIOLOGY

## 2020-06-02 PROCEDURE — 80053 COMPREHEN METABOLIC PANEL: CPT

## 2020-06-02 PROCEDURE — 77300 PR RADIATION THERAPY,DOSIMETRY PLAN: ICD-10-PCS | Mod: 26,,, | Performed by: RADIOLOGY

## 2020-06-02 PROCEDURE — 77334 PR  RADN TREATMENT AID(S) COMPLX: ICD-10-PCS | Mod: 26,,, | Performed by: RADIOLOGY

## 2020-06-02 PROCEDURE — 77412 RADIATION TX DELIVERY LVL 3: CPT | Performed by: RADIOLOGY

## 2020-06-02 PROCEDURE — 77300 RADIATION THERAPY DOSE PLAN: CPT | Mod: TC | Performed by: RADIOLOGY

## 2020-06-02 PROCEDURE — 85025 COMPLETE CBC W/AUTO DIFF WBC: CPT

## 2020-06-02 PROCEDURE — 77334 RADIATION TREATMENT AID(S): CPT | Mod: 26,,, | Performed by: RADIOLOGY

## 2020-06-02 PROCEDURE — 77387 GUIDANCE FOR RADJ TX DLVR: CPT | Mod: TC | Performed by: RADIOLOGY

## 2020-06-02 PROCEDURE — 77336 RADIATION PHYSICS CONSULT: CPT | Mod: 59 | Performed by: RADIOLOGY

## 2020-06-02 PROCEDURE — 77387 PR GUIDANCE FOR RADIATION TREATMENT DELIVERY: ICD-10-PCS | Mod: ,,, | Performed by: RADIOLOGY

## 2020-06-02 PROCEDURE — 36415 COLL VENOUS BLD VENIPUNCTURE: CPT

## 2020-06-02 PROCEDURE — 77387 GUIDANCE FOR RADJ TX DLVR: CPT | Mod: ,,, | Performed by: RADIOLOGY

## 2020-06-02 RX ORDER — LORAZEPAM 1 MG/1
1 TABLET ORAL
Qty: 5 TABLET | Refills: 0 | Status: SHIPPED | OUTPATIENT
Start: 2020-06-02 | End: 2020-07-06 | Stop reason: SDUPTHER

## 2020-06-02 NOTE — NURSING
Pt seen in Radiation for One on One chemo-class along with family member (niece) Jina. Received last dose of Palliative radiation to lung for relief of airway obstruction that caused her to be hospitalized 5/25.  Binder and appt calendar given, chemotherapy video shown, medication regimen information given Keytruda and side effects of CARBOplatin that was given while in the hospital, nutrition and  available discussed (Dietician and MSW contact information given). Instructed to purchase a thermometer for home use.  Pt changed radiation to brain from Thursday to Wednesday.  Pt asked for additional Ativan in preparation for radiation to brain, Dr. Colmenares to send additional medication to preferred pharmacy.   Initial dose of Keytruda scheduled for 6/4. No other concerns voiced at this time. Will F/U at chairside at 1st scheduled chemotherapy.  Oncology Navigation   Intake  Date of Diagnosis: 05/05/20  Cancer Type: Thoracic  MD Assigned: Dr. Peterson  Internal / External Referral: Internal  Initial Nurse Navigator Contact: 05/05/20  Diagnosis to Initial Contact Timeline (days): 0 days  Contact Method: Individual basket  Date Worked: 05/05/20  First Appointment Available: 05/13/20  Appointment Date: 05/13/20  Schedule to Appointment Timeline (days): 8  First Available Date vs. Scheduled Date (days): 0  Multiple appointments: Yes (CT abdomen/Pelvis MRI)  Reason if booked > 7 days after scheduling: Transportation coordination  Start of Treatment: 05/27/20     Treatment  Current Status: Active  Treatment Type(s): Radiation  Chemotherapy Regimen: Pembrolizumab q3wk  Start Date: 05/27/20  End Date: 06/02/20  Total cGy: 0  Total Treatments: 5 (Treatment for Lung)    Radiation Oncologist: Dr. Colmenares (Initiated hospital consult )    Procedures: Bone scan;MRI  Bone Scan Schedule Date: 05/11/20  MRI Schedule Date: 05/09/20    Support Systems: Family members;Friends / neighbors     Acuity  Stage: 2  Treatment Tolerability:  Minimal symptoms  ECO  Comorbidities in Medical History: 1  Hospitalization Within the Past Month: 1  Other Medical Factors (+1 each): Home medical equipment required (Uses home oxygen)   Needed: 0  Support: 0  Verbalizes Financial Concerns: 0  Transportation: 0  Mental Health: PHQ Score: 0  History of noncompliance/frequent no shows and cancellations: 0  Verbalizes the need for more education: 0  Other Factors (+1 for Each): 1  Navigation Acuity: 5     Follow Up  No follow-ups on file.

## 2020-06-03 ENCOUNTER — TELEPHONE (OUTPATIENT)
Dept: HEMATOLOGY/ONCOLOGY | Facility: CLINIC | Age: 60
End: 2020-06-03

## 2020-06-03 ENCOUNTER — OFFICE VISIT (OUTPATIENT)
Dept: HEMATOLOGY/ONCOLOGY | Facility: CLINIC | Age: 60
End: 2020-06-03
Payer: MEDICAID

## 2020-06-03 ENCOUNTER — DOCUMENTATION ONLY (OUTPATIENT)
Dept: HEMATOLOGY/ONCOLOGY | Facility: CLINIC | Age: 60
End: 2020-06-03

## 2020-06-03 VITALS
BODY MASS INDEX: 30.16 KG/M2 | HEIGHT: 66 IN | WEIGHT: 187.63 LBS | SYSTOLIC BLOOD PRESSURE: 139 MMHG | OXYGEN SATURATION: 99 % | TEMPERATURE: 98 F | DIASTOLIC BLOOD PRESSURE: 76 MMHG | HEART RATE: 111 BPM | RESPIRATION RATE: 18 BRPM

## 2020-06-03 DIAGNOSIS — C34.91 ADENOCARCINOMA OF LUNG, STAGE 4, RIGHT: Primary | ICD-10-CM

## 2020-06-03 DIAGNOSIS — C34.90 PRIMARY MALIGNANT NEOPLASM OF LUNG WITH METASTASIS TO BRAIN: ICD-10-CM

## 2020-06-03 DIAGNOSIS — C79.31 PRIMARY MALIGNANT NEOPLASM OF LUNG WITH METASTASIS TO BRAIN: ICD-10-CM

## 2020-06-03 PROCEDURE — 77014 HC CT GUIDANCE RADIATION THERAPY FLDS PLACEMENT: CPT | Mod: TC,59 | Performed by: RADIOLOGY

## 2020-06-03 PROCEDURE — 77470 PR  SPECIAL RADIATION TREATMENT: ICD-10-PCS | Mod: 26,59,, | Performed by: RADIOLOGY

## 2020-06-03 PROCEDURE — 99214 OFFICE O/P EST MOD 30 MIN: CPT | Mod: S$PBB,,, | Performed by: STUDENT IN AN ORGANIZED HEALTH CARE EDUCATION/TRAINING PROGRAM

## 2020-06-03 PROCEDURE — 77470 SPECIAL RADIATION TREATMENT: CPT | Mod: 59,TC | Performed by: RADIOLOGY

## 2020-06-03 PROCEDURE — 99214 OFFICE O/P EST MOD 30 MIN: CPT | Mod: PBBFAC,25 | Performed by: STUDENT IN AN ORGANIZED HEALTH CARE EDUCATION/TRAINING PROGRAM

## 2020-06-03 PROCEDURE — 77372 SRS LINEAR BASED: CPT | Performed by: RADIOLOGY

## 2020-06-03 PROCEDURE — 99999 PR PBB SHADOW E&M-EST. PATIENT-LVL IV: ICD-10-PCS | Mod: PBBFAC,,, | Performed by: STUDENT IN AN ORGANIZED HEALTH CARE EDUCATION/TRAINING PROGRAM

## 2020-06-03 PROCEDURE — 99999 PR PBB SHADOW E&M-EST. PATIENT-LVL IV: CPT | Mod: PBBFAC,,, | Performed by: STUDENT IN AN ORGANIZED HEALTH CARE EDUCATION/TRAINING PROGRAM

## 2020-06-03 PROCEDURE — 77370 RADIATION PHYSICS CONSULT: CPT | Performed by: RADIOLOGY

## 2020-06-03 PROCEDURE — 99214 PR OFFICE/OUTPT VISIT, EST, LEVL IV, 30-39 MIN: ICD-10-PCS | Mod: S$PBB,,, | Performed by: STUDENT IN AN ORGANIZED HEALTH CARE EDUCATION/TRAINING PROGRAM

## 2020-06-03 PROCEDURE — 77470 SPECIAL RADIATION TREATMENT: CPT | Mod: 26,59,, | Performed by: RADIOLOGY

## 2020-06-03 NOTE — TELEPHONE ENCOUNTER
"----- Message from Dimatereza To sent at 6/3/2020  9:36 AM CDT -----  Contact: Janeth  Call Back Request    Contact Preference: [phone]  Recepient: Nichole Mccauley   Provider: Maik Peterson  What is the nature of the call?:  Patient request a callback to discuss medication to take before or after seeing  today    Additional Notes:  "Thank you for all that you do for our patients'"        "

## 2020-06-03 NOTE — Clinical Note
1. Labs cmp cbc tsh and covid screen 6/24 then follow up with me.  2. Schedule cycle 2 pembrolizumab 6/25.  Thanks -e

## 2020-06-03 NOTE — NURSING
Meet with pt and daughter during appointment with Dr. Peterson.  Pt given additional information on Keytruda that reinforced chemo care for daughter Kell who wasn't present during chemo class.  Emailed daughter additional information that is contained in chemo binder.  Pt asking for home care assistance Dr. Peterson to order PT/OT evaluation for home.  Pt escorted to radiation appointment.  Took dose of Ativan before going to radiation.

## 2020-06-04 ENCOUNTER — TELEPHONE (OUTPATIENT)
Dept: INFUSION THERAPY | Facility: HOSPITAL | Age: 60
End: 2020-06-04

## 2020-06-04 ENCOUNTER — DOCUMENTATION ONLY (OUTPATIENT)
Dept: HEMATOLOGY/ONCOLOGY | Facility: CLINIC | Age: 60
End: 2020-06-04

## 2020-06-04 ENCOUNTER — TELEPHONE (OUTPATIENT)
Dept: HEMATOLOGY/ONCOLOGY | Facility: CLINIC | Age: 60
End: 2020-06-04

## 2020-06-04 ENCOUNTER — INFUSION (OUTPATIENT)
Dept: INFUSION THERAPY | Facility: HOSPITAL | Age: 60
End: 2020-06-04
Attending: STUDENT IN AN ORGANIZED HEALTH CARE EDUCATION/TRAINING PROGRAM
Payer: MEDICAID

## 2020-06-04 VITALS
RESPIRATION RATE: 20 BRPM | BODY MASS INDEX: 30.16 KG/M2 | HEART RATE: 98 BPM | TEMPERATURE: 98 F | HEIGHT: 66 IN | WEIGHT: 187.63 LBS | DIASTOLIC BLOOD PRESSURE: 68 MMHG | SYSTOLIC BLOOD PRESSURE: 100 MMHG | OXYGEN SATURATION: 95 %

## 2020-06-04 DIAGNOSIS — C79.31 PRIMARY MALIGNANT NEOPLASM OF LUNG WITH METASTASIS TO BRAIN: ICD-10-CM

## 2020-06-04 DIAGNOSIS — C34.90 PRIMARY MALIGNANT NEOPLASM OF LUNG WITH METASTASIS TO BRAIN: ICD-10-CM

## 2020-06-04 DIAGNOSIS — C34.91 ADENOCARCINOMA OF LUNG, STAGE 4, RIGHT: Primary | ICD-10-CM

## 2020-06-04 PROCEDURE — 96413 CHEMO IV INFUSION 1 HR: CPT

## 2020-06-04 PROCEDURE — 63600175 PHARM REV CODE 636 W HCPCS: Mod: JG | Performed by: INTERNAL MEDICINE

## 2020-06-04 PROCEDURE — 25000003 PHARM REV CODE 250: Performed by: INTERNAL MEDICINE

## 2020-06-04 RX ORDER — HEPARIN 100 UNIT/ML
500 SYRINGE INTRAVENOUS
Status: DISCONTINUED | OUTPATIENT
Start: 2020-06-04 | End: 2020-06-04 | Stop reason: HOSPADM

## 2020-06-04 RX ORDER — SODIUM CHLORIDE 0.9 % (FLUSH) 0.9 %
10 SYRINGE (ML) INJECTION
Status: CANCELLED | OUTPATIENT
Start: 2020-06-04

## 2020-06-04 RX ORDER — SODIUM CHLORIDE 0.9 % (FLUSH) 0.9 %
10 SYRINGE (ML) INJECTION
Status: DISCONTINUED | OUTPATIENT
Start: 2020-06-04 | End: 2020-06-04 | Stop reason: HOSPADM

## 2020-06-04 RX ORDER — HEPARIN 100 UNIT/ML
500 SYRINGE INTRAVENOUS
Status: CANCELLED | OUTPATIENT
Start: 2020-06-04

## 2020-06-04 RX ADMIN — SODIUM CHLORIDE 200 MG: 9 INJECTION, SOLUTION INTRAVENOUS at 09:06

## 2020-06-04 RX ADMIN — SODIUM CHLORIDE: 0.9 INJECTION, SOLUTION INTRAVENOUS at 08:06

## 2020-06-04 NOTE — NURSING
Meet chairside with pt during first dose of Keytruda infusion in infusion suite.  Pt becomes very tearful when speaking of her daughter.  Complains that she was not able to get her cereal down this morning and feeling nauseated.  Encouraged to take her anti-nausea medication in the morning so she could tolerate her meals.  Asked for Ensure, spoke with nutritionist Yenny who provided pt with 8 bottles of Ensure to take home along with coupons for purchase.  Reinforced washing of all vegetables and fruits before eating for 2 minutes.  Verbalized understanding.

## 2020-06-04 NOTE — PLAN OF CARE
Pt admitted for C1 Keytruda. Potential side effects and self care tips discussed. Handout with information on Keytruda given to patient and reviewed, education continued through out treatment, reinforcement needed. Pt met with Patient Navigator, Nichole and Nichole put in a consult for nutritionist to contact Patient. Pt tolerated treatment well, AVS given to patient and Patient discharged @ 9:40 in W/C

## 2020-06-04 NOTE — PROGRESS NOTES
PATIENT: Janeth Boyce  MRN: [id number]  DATE: 6/3/2020      Diagnosis:   1. Adenocarcinoma of lung, stage 4, right    2. Primary malignant neoplasm of lung with metastasis to brain        Chief Complaint: Metastasis to brain      Oncologic History:    Oncologic History 1. Lung adenocarcinoma    Oncologic Treatment 1. Carboplatin AUC4 x1 dose 5/27/20 and palliative radiation (5/27/2020 - 6/2/2020; 20 Gy to right lung)  2. Pembrolizumab    Pathology 5/5/2020  Right lung, biopsy:  Positive for adenocarcinoma with areas of necrosis.  Interpretation:   Right lung, specimen for PD-L1 immunohistochemistry studies (clone [id number], [facility], [place], [place]) ([id number]): 100% tumor cells are positive for PD-L1 (membranous positivity).     BRAF Mutation Analysis (V600E), Tumor Result Summary: NO MUTATION IDENTIFIED.   Result: BRAF status: Wild-type   Interpretation :     EGFR Gene, Mutation Analysis, Tumor Result Summary: NO MUTATION IDENTIFIED.   Result: EGFR status: Wild-type     ALK (2p23), Lung Cancer, FISH, Ts Result Summary: NEGATIVE   Interpretation: No rearrangement of the ALK gene was identified.     Result: nuc micha(ALKx3)[29/50]   Of 50 nuclei, 0% had separation of the 3'ALK and 5'ALK signals and 58% had three intact 3'ALK/5'ALK signals. The normal cutoff is <50.0% for one 3'ALK, one 5'ALK and one 3'ALK/5'ALK signal and <18.0% for three 3'ALK/5'ALK signals.     ROS1 (6Q22), FISH, Ts Result Summary: NEGATIVE   Interpretation: No rearrangement of the ROS1 gene was identified.   Result nuc micha(ROS1x1)[44/50]           Subjective:    Interval History: Ms. Boyce is here for follow up    59 year old woman with newly diagnosed lung cancer.  She has been smoking since 15 years old and estimates about 0.5ppd.  She has always had some level of dyspnea with exertion at baseline, but noticed it was worse in February 2020 with an associated cough with white and clear sputum.  She says she was evaluated at  "[facility] who told her the scans were suspicious for cancer.  She then headed to [facility] but because of COVID-19 her appointment was cancelled.  She is here today without any new symptoms since February and the cough is persistent.  Denies any unilateral weakness, dizziness, headaches, or gait instability.  Able to perform all ADLs and can walk 1 block before getting tired and needing to rest.  On 2020, she was "admitted to observation with SOB and cough x 2 months. CT chest Large RUL mass with scattered nodules and mediastinal lymphadenopathy; small-moderate right pleural effusion....S/p right lung biopsy 2020. Home O2 arranged to use with activity. Discussed with Pulmonary, will follow up results, Heme/onco referral placed. Stable for discharge home."  Admitted to Medical Oncology 2020-2020 for worsening shortness of breath.  CTPE negative for PE. Pulmonology attempted thoracentesis twice. Second attempt resulted in 400cc being drained, without relief.  She then received x1 dose of carboplatin and palliative RT to the right lung. Patient reported improvement in symptoms after XRT and was discharged.    She is doing better since discharge.  She has orthopnea but symptoms alleviated by sleeping on two pillows.  Able to perform baseline activities prior to admission but overall activity somewhat reduced.  Still dependent on oxygen.  Currently able to ambulate but is interested in getting a cane or walker.  Cough stable without fevers or chills or hemoptysis.  She is still able to take care of herself at home.    Her niece accompanies her on this visit.  Past Medical History:   Past Medical History:   Diagnosis Date    Anxiety     Asthma     Hepatitis C 2019    treated    Hx of psychiatric care     Hypertension     Lung cancer     Psychiatric exam requested by authority     Psychiatric problem        Past Surgical HIstory:   Past Surgical History:   Procedure Laterality Date     SECTION   "    KNEE SURGERY         Family History: No family history on file.    Social History:  reports that she quit smoking about 7 weeks ago. Her smoking use included cigarettes. She has a 20.00 pack-year smoking history. She does not have any smokeless tobacco history on file. She reports that she drinks alcohol. She reports that she does not use drugs.    Allergies:  Review of patient's allergies indicates:  No Known Allergies    Medications:  Current Outpatient Medications   Medication Sig Dispense Refill    albuterol (ACCUNEB) 0.63 mg/3 mL Nebu Take 3 mLs (0.63 mg total) by nebulization every 6 (six) hours as needed. Rescue 90 mL 0    amLODIPine (NORVASC) 5 MG tablet Take 5 mg by mouth once daily.      buPROPion (WELLBUTRIN SR) 150 MG TBSR 12 hr tablet       gabapentin (NEURONTIN) 100 MG capsule Take 100 mg by mouth 3 (three) times daily.      LORazepam (ATIVAN) 1 MG tablet Take 1 tablet (1 mg total) by mouth as needed for Anxiety (Take 30 minutes prior to procedure). 5 tablet 0    ondansetron (ZOFRAN-ODT) 8 MG TbDL Dissolve 1 tablet (8 mg total) by mouth every 8 (eight) hours as needed. 60 tablet 0    oxyCODONE (ROXICODONE) 5 MG immediate release tablet Take 1 tablet (5 mg total) by mouth every 6 (six) hours as needed. 28 tablet 0    pantoprazole (PROTONIX) 20 MG tablet Take 20 mg by mouth once daily.      polyethylene glycol (GLYCOLAX) 17 gram/dose powder mix 1 capful (17 g) and take by mouth once daily. 510 g 0    promethazine-codeine 6.25-10 mg/5 ml (PHENERGAN WITH CODEINE) 6.25-10 mg/5 mL syrup Take 5 mLs by mouth nightly as needed for Cough. 473 mL 0    senna-docusate 8.6-50 mg (PERICOLACE) 8.6-50 mg per tablet Take 1 tablet by mouth 2 (two) times daily. 60 tablet 0    valsartan-hydrochlorothiazide (DIOVAN-HCT) 320-12.5 mg per tablet Take 1 tablet by mouth once daily.       No current facility-administered medications for this visit.        Review of Systems   Constitutional: Positive for activity  "change and fatigue. Negative for chills, fever and unexpected weight change.   HENT: Negative for nosebleeds.    Eyes: Negative for visual disturbance.   Respiratory: Positive for cough and shortness of breath.    Cardiovascular: Negative for chest pain and leg swelling.   Gastrointestinal: Negative for abdominal distention, abdominal pain and nausea.   Endocrine: Negative for cold intolerance and heat intolerance.   Genitourinary: Negative for difficulty urinating and dysuria.   Musculoskeletal: Positive for arthralgias (chronic).   Skin: Negative for color change.   Neurological: Positive for weakness. Negative for dizziness, light-headedness and numbness.   Hematological: Does not bruise/bleed easily.   Psychiatric/Behavioral: Negative for confusion.       ECOG Performance Status:   2  Objective:      Vitals:   Vitals:    06/03/20 0934   BP: 139/76   Pulse: (!) 111   Resp: 18   Temp: 98.1 °F (36.7 °C)   TempSrc: Oral   SpO2: 99%   Weight: 85.1 kg (187 lb 9.8 oz)   Height: 5' 6" (1.676 m)     BMI: Body mass index is 30.28 kg/m².    Physical Exam   Constitutional: She appears well-developed. No distress.   HENT:   Head: Normocephalic.   Mouth/Throat: Oropharynx is clear and moist. No oropharyngeal exudate.   Eyes: Pupils are equal, round, and reactive to light. EOM are normal. No scleral icterus.   Neck: Normal range of motion. No tracheal deviation present.   Cardiovascular: Normal rate, regular rhythm and normal heart sounds. Exam reveals no gallop and no friction rub.   No murmur heard.  Pulmonary/Chest: Effort normal. No respiratory distress. She has no wheezes. She has no rales.   Diminished breath sounds right lower and middle lobe   Abdominal: Soft. Bowel sounds are normal. She exhibits no distension and no mass. There is no tenderness. There is no guarding.   Musculoskeletal: Normal range of motion. She exhibits no edema.   Neurological: She is alert.   Skin: Skin is warm and dry.       Laboratory Data: "   Recent Results (from the past 168 hour(s))   Comprehensive Metabolic Panel (CMP)    Collection Time: 05/28/20  4:17 AM   Result Value Ref Range    Sodium 135 (L) 136 - 145 mmol/L    Potassium 4.4 3.5 - 5.1 mmol/L    Chloride 101 95 - 110 mmol/L    CO2 24 23 - 29 mmol/L    Glucose 160 (H) 70 - 110 mg/dL    BUN, Bld 23 (H) 6 - 20 mg/dL    Creatinine 1.2 0.5 - 1.4 mg/dL    Calcium 9.2 8.7 - 10.5 mg/dL    Total Protein 7.8 6.0 - 8.4 g/dL    Albumin 2.4 (L) 3.5 - 5.2 g/dL    Total Bilirubin 0.3 0.1 - 1.0 mg/dL    Alkaline Phosphatase 76 55 - 135 U/L    AST 13 10 - 40 U/L    ALT 11 10 - 44 U/L    Anion Gap 10 8 - 16 mmol/L    eGFR if African American 56.8 (A) >60 mL/min/1.73 m^2    eGFR if non  49.2 (A) >60 mL/min/1.73 m^2   Magnesium    Collection Time: 05/28/20  4:17 AM   Result Value Ref Range    Magnesium 1.8 1.6 - 2.6 mg/dL   Phosphorus    Collection Time: 05/28/20  4:17 AM   Result Value Ref Range    Phosphorus 4.3 2.7 - 4.5 mg/dL   CBC with Automated Differential    Collection Time: 05/28/20  4:17 AM   Result Value Ref Range    WBC 12.07 3.90 - 12.70 K/uL    RBC 4.07 4.00 - 5.40 M/uL    Hemoglobin 10.7 (L) 12.0 - 16.0 g/dL    Hematocrit 36.1 (L) 37.0 - 48.5 %    Mean Corpuscular Volume 89 82 - 98 fL    Mean Corpuscular Hemoglobin 26.3 (L) 27.0 - 31.0 pg    Mean Corpuscular Hemoglobin Conc 29.6 (L) 32.0 - 36.0 g/dL    RDW 14.5 11.5 - 14.5 %    Platelets 304 150 - 350 K/uL    MPV 9.7 9.2 - 12.9 fL    Immature Granulocytes 0.7 (H) 0.0 - 0.5 %    Gran # (ANC) 11.0 (H) 1.8 - 7.7 K/uL    Immature Grans (Abs) 0.09 (H) 0.00 - 0.04 K/uL    Lymph # 0.7 (L) 1.0 - 4.8 K/uL    Mono # 0.3 0.3 - 1.0 K/uL    Eos # 0.0 0.0 - 0.5 K/uL    Baso # 0.02 0.00 - 0.20 K/uL    nRBC 0 0 /100 WBC    Gran% 91.0 (H) 38.0 - 73.0 %    Lymph% 5.6 (L) 18.0 - 48.0 %    Mono% 2.5 (L) 4.0 - 15.0 %    Eosinophil% 0.0 0.0 - 8.0 %    Basophil% 0.2 0.0 - 1.9 %    Differential Method Automated    Comprehensive Metabolic Panel (CMP)     Collection Time: 05/29/20  5:39 AM   Result Value Ref Range    Sodium 133 (L) 136 - 145 mmol/L    Potassium 4.0 3.5 - 5.1 mmol/L    Chloride 102 95 - 110 mmol/L    CO2 22 (L) 23 - 29 mmol/L    Glucose 183 (H) 70 - 110 mg/dL    BUN, Bld 31 (H) 6 - 20 mg/dL    Creatinine 1.2 0.5 - 1.4 mg/dL    Calcium 8.7 8.7 - 10.5 mg/dL    Total Protein 7.3 6.0 - 8.4 g/dL    Albumin 2.3 (L) 3.5 - 5.2 g/dL    Total Bilirubin 0.2 0.1 - 1.0 mg/dL    Alkaline Phosphatase 73 55 - 135 U/L    AST 11 10 - 40 U/L    ALT 12 10 - 44 U/L    Anion Gap 9 8 - 16 mmol/L    eGFR if African American 56.8 (A) >60 mL/min/1.73 m^2    eGFR if non  49.2 (A) >60 mL/min/1.73 m^2   Magnesium    Collection Time: 05/29/20  5:39 AM   Result Value Ref Range    Magnesium 1.8 1.6 - 2.6 mg/dL   Phosphorus    Collection Time: 05/29/20  5:39 AM   Result Value Ref Range    Phosphorus 3.6 2.7 - 4.5 mg/dL   CBC with Automated Differential    Collection Time: 05/29/20  5:39 AM   Result Value Ref Range    WBC 18.98 (H) 3.90 - 12.70 K/uL    RBC 3.78 (L) 4.00 - 5.40 M/uL    Hemoglobin 9.9 (L) 12.0 - 16.0 g/dL    Hematocrit 33.4 (L) 37.0 - 48.5 %    Mean Corpuscular Volume 88 82 - 98 fL    Mean Corpuscular Hemoglobin 26.2 (L) 27.0 - 31.0 pg    Mean Corpuscular Hemoglobin Conc 29.6 (L) 32.0 - 36.0 g/dL    RDW 14.5 11.5 - 14.5 %    Platelets 289 150 - 350 K/uL    MPV 9.6 9.2 - 12.9 fL    Immature Granulocytes 1.0 (H) 0.0 - 0.5 %    Gran # (ANC) 17.8 (H) 1.8 - 7.7 K/uL    Immature Grans (Abs) 0.19 (H) 0.00 - 0.04 K/uL    Lymph # 0.4 (L) 1.0 - 4.8 K/uL    Mono # 0.6 0.3 - 1.0 K/uL    Eos # 0.0 0.0 - 0.5 K/uL    Baso # 0.02 0.00 - 0.20 K/uL    nRBC 0 0 /100 WBC    Gran% 93.6 (H) 38.0 - 73.0 %    Lymph% 2.2 (L) 18.0 - 48.0 %    Mono% 3.1 (L) 4.0 - 15.0 %    Eosinophil% 0.0 0.0 - 8.0 %    Basophil% 0.1 0.0 - 1.9 %    Differential Method Automated    Comprehensive metabolic panel    Collection Time: 06/02/20  9:41 AM   Result Value Ref Range    Sodium 136 136 - 145  mmol/L    Potassium 3.4 (L) 3.5 - 5.1 mmol/L    Chloride 100 95 - 110 mmol/L    CO2 26 23 - 29 mmol/L    Glucose 107 70 - 110 mg/dL    BUN, Bld 21 (H) 6 - 20 mg/dL    Creatinine 1.0 0.5 - 1.4 mg/dL    Calcium 9.4 8.7 - 10.5 mg/dL    Total Protein 7.4 6.0 - 8.4 g/dL    Albumin 2.6 (L) 3.5 - 5.2 g/dL    Total Bilirubin 0.3 0.1 - 1.0 mg/dL    Alkaline Phosphatase 82 55 - 135 U/L    AST 25 10 - 40 U/L    ALT 45 (H) 10 - 44 U/L    Anion Gap 10 8 - 16 mmol/L    eGFR if African American >60.0 >60 mL/min/1.73 m^2    eGFR if non African American >60.0 >60 mL/min/1.73 m^2   CBC auto differential    Collection Time: 20  9:41 AM   Result Value Ref Range    WBC 9.99 3.90 - 12.70 K/uL    RBC 4.35 4.00 - 5.40 M/uL    Hemoglobin 11.2 (L) 12.0 - 16.0 g/dL    Hematocrit 37.0 37.0 - 48.5 %    Mean Corpuscular Volume 85 82 - 98 fL    Mean Corpuscular Hemoglobin 25.7 (L) 27.0 - 31.0 pg    Mean Corpuscular Hemoglobin Conc 30.3 (L) 32.0 - 36.0 g/dL    RDW 14.6 (H) 11.5 - 14.5 %    Platelets 313 150 - 350 K/uL    MPV 9.1 (L) 9.2 - 12.9 fL    Immature Granulocytes 1.6 (H) 0.0 - 0.5 %    Gran # (ANC) 7.5 1.8 - 7.7 K/uL    Immature Grans (Abs) 0.16 (H) 0.00 - 0.04 K/uL    Lymph # 0.9 (L) 1.0 - 4.8 K/uL    Mono # 0.6 0.3 - 1.0 K/uL    Eos # 0.8 (H) 0.0 - 0.5 K/uL    Baso # 0.03 0.00 - 0.20 K/uL    nRBC 0 0 /100 WBC    Gran% 75.0 (H) 38.0 - 73.0 %    Lymph% 8.7 (L) 18.0 - 48.0 %    Mono% 6.1 4.0 - 15.0 %    Eosinophil% 8.3 (H) 0.0 - 8.0 %    Basophil% 0.3 0.0 - 1.9 %    Differential Method Automated      CareEverywhere  19  HCV RNA HAZEL Qualitative - [facility] Positive (A)   Comment: Positive: HCV RNA Detected         Imagin2020 CT abdomen/pelvis  COMPARISON:  CT abdomen pelvis 2020    Chest radiograph 2020    CT chest 2020    Shoulder radiograph 2013    FINDINGS:  There is increased radiopharmaceutical distribution of the thoracic spine noting degenerative endplate changes at this level on  05/01/2020 CT chest.  Subtle increased radiopharmaceutical density in the right proximal humerus which may represent sequelae of remote fracture at this level.  Increased density of the bilateral knees likely representing degenerative changes.  The remainder of the skeleton demonstrates physiologic distribution of the radiopharmaceutical.  Both kidneys and the bladder appear normal.      Impression       There is no scintigraphic evidence of metastatic disease.         5/9/2020 MRI brain  COMPARISON:  None    FINDINGS:  Diffusion-weighted imaging demonstrates no evidence of high signal to indicate recent infarction or ischemia.    T2 and FLAIR imaging demonstrates mild vasogenic edema in the right cerebellum associated with a 1.1 cm round heterogeneously enhancing mass in the right cerebellum.  This is consistent with metastatic disease.  No other metastatic foci are detected.    There is moderate probable chronic microvascular ischemic changes in the periventricular and subcortical white matter bilaterally.  Mild involutional changes.    No midline shift or hydrocephalus.    No evidence of major vascular infarction.    No evidence of hematoma/hemorrhage.    Normal vascular flow voids near the skull base.    The paranasal sinuses and mastoid air cells are within normal limits.      Impression       1.1 cm enhancing mass in the right cerebellum with mild adjacent vasogenic edema consistent with metastatic disease.  Recommend clinical correlation and follow-up.       5/11/2020 Bone scan  COMPARISON:  CT chest 05/01/2020.    FINDINGS:  There is a partially visualized right hilar mass with multiple pulmonary nodules within the visualized lung parenchyma, better evaluated to the full extent on prior CT chest examination.  Moderate-sized right pleural effusion, unchanged.    Heart is not enlarged.  No pericardial effusion.    Liver is upper normal in size with normal attenuation.  No focal hepatic lesions are seen.   Gallbladder is normal.  No intra or extrahepatic biliary ductal dilatation.  Spleen is normal in size.    Stomach, pancreas, and adrenal glands show no significant abnormalities.    Kidneys are normal in size and location.  No renal mass or hydronephrosis.  Visualized ureters are normal in course to the bladder.  Urinary bladder is unremarkable.  Uterus is surgically removed.    Visualized loops of small large bowel show no evidence of inflammation or obstruction.  Colonic diverticulosis without evidence of diverticulitis.  Normal appendix.    No lymphadenopathy in the abdomen and pelvis.  No free intraperitoneal air or ascites.  Abdominal aorta is normal in course and caliber.    Osseous structures show age-appropriate degenerative changes without osseous lytic or destructive process.  Incidental sternal foramen.  Extraperitoneal soft tissues appear unremarkable.      Impression       Partially visualized right hilar mass with multiple pulmonary nodules within both lungs, better visualized to the full extent on prior CT examination.  No evidence of intra-abdominal or intrapelvic metastases.    Moderate-sized right pleural effusion.    Colonic diverticulosis.    Hysterectomy.     5/1/2020 CT chest  COMPARISON:  Chest radiograph dated 05/01/2020    FINDINGS:  There is heterogeneous masslike consolidation centered in the right upper lobe with pleural extension, for reference measuring 6.5 x 7.8 cm (series 2, image 29).  Areas of surrounding ground-glass attenuation more superiorly extending to the apex.  There is associated right hilar/subcarinal involvement with resultant narrowing of the distal right mainstem and proximal segmental bronchi.  Several additional subcentimeter nodules in the right middle, lower lobes and left lung (for reference 7 mm in the left upper lobe on series 2, image 31).  Moderate right-sided pleural effusion with dependent atelectasis.  No pericardial effusion.    Structures at the base of  the neck are unremarkable.  The trachea is clear.  There is mediastinal adenopathy predominately involving the prevascular and paratracheal regions, for reference precarinal node measuring 2.6 cm (series 2, image 31).  There is also enlarged left axillary node measuring 1.9 cm (series 2, image 20).    Limited images through the upper abdomen are unremarkable.  No aggressive osseous lesion.  Spine DJD.      Impression       Masslike consolidation centered in the right upper lobe with associated right hilar/subcarinal involvement concerning for neoplasm until proven otherwise.    Metastatic bilateral pulmonary nodularity and mediastinal adenopathy.  Additional enlarged left axillary node, possible additional metastasis.    Moderate right pleural effusion.    Additional findings as above.    RECIST SUMMARY:    Date of prior examination for comparison: None    Lesion 1: Right upper lobe.  7.8 cm.  Series 2, image 29.    Lesion 2: Precarinal node.  2.6 cm.  Series 2, image 31.           Assessment:       1. Adenocarcinoma of lung, stage 4, right    2. Primary malignant neoplasm of lung with metastasis to brain           Plan:       1. 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  Also has pleural effusion on right side s/p therapeutic thoracentesis 5/2020. PDL1 expressed in 100% of tumor cells from biopsy.  No mutation identified for EGFR/ALK/ROS1/BRAF by tissue.  -Will start single agent pembrolizumab  -Return to clinic in 3 weeks  -Discussed with hepatology and no additional antiviral therapy required with her history of Hep C.  --Not eligible for [id number]  -Home health orders for PT/OT and walker      06/03/2020        Discussed the following with the patient:  In an effort to protect our immunocompromised patients from potential exposure to COVID-19, [facility] will now require all patients receiving an infusion, an injection, and/or radiation therapy to be tested for COVID-19 prior to their  appointment.  All patients currently under treatment will be tested immediately, and patients initiating new treatment cycles or with one-time appointments (injections, transfusions, etc.) must be tested within 72 hours of their appointment.      Symptom Patient's Response   Fever YES/NO: no   Cough YES/NO: yes   Shortness of breath  YES/NO: yes   Difficulty breathing YES/NO: no   GI symptoms such as diarrhea or nausea YES/NO: no   Loss of taste YES/NO: no   Loss of smell YES/NO: no     Other Screening Patient's Response   Has the patient previously undergone COVID-19 testing? YES/NO: yes     If yes to the question directly above, what was the result? negative   Has the patient been in close contact with someone who has undergone COVID-19 testing? YES/NO: no     If yes to the question directly above, what was the result? not applicable      The patient's 24-hour COVID-19 test was ordered and scheduled.  Reviewed the appointment date, time, and location with the patient.      Advised the patient that she can expect the results to take approximately 24 hours.  Advised the patient that someone will reach out to her regarding her results if she tests positive, as her treatment appointment will be rescheduled if she tests positive for COVID-19.  Also advised the patient that if she does not hear back from our office or if she is told by our office she has tested negative for COVID-19, she can proceed with treatment as originally planned.     Questions were answered to the patient's satisfaction, and the patient verbalized understanding of information and agreement with the plan.       The above was completed in accordance with instructions and guidelines set forth by [facility].     Signed,        Maik Peterson MD     Date:  06/03/2020       No orders of the defined types were placed in this encounter.          Maik Peterson MD  Hematology Oncology Fellow PGY6  Pager [phone]      I have reviewed the notes,  assessments, and/or procedures performed by the housestaff, as above.  I have personally rounded with the housestaff. I concur with her/his assessment and plan and the documentation of Janeth Boyce.  I, Dr. Modesto Holly, personally spent more than  25 mins during this encounter, greater than 50% was spent in direct counseling and/or coordination of care.     Modesto Holly M.D., M.S., F.A.C.P.  Hematology/Oncology Attending  [facility]

## 2020-06-04 NOTE — TELEPHONE ENCOUNTER
----- Message from Celio Serrano RN sent at 6/4/2020  8:20 AM CDT -----  Contact: pt  Patient requesting a call from you  ----- Message -----  From: Jennifer Bland  Sent: 6/4/2020   8:02 AM CDT  To: Kristen Pleitez Staff    Reason: Calling to speak with Nichole     Communication:[phone]

## 2020-06-05 ENCOUNTER — TELEPHONE (OUTPATIENT)
Dept: HEMATOLOGY/ONCOLOGY | Facility: CLINIC | Age: 60
End: 2020-06-05

## 2020-06-05 DIAGNOSIS — C34.91 ADENOCARCINOMA OF LUNG, STAGE 4, RIGHT: Primary | ICD-10-CM

## 2020-06-05 RX ORDER — OXYCODONE HYDROCHLORIDE 5 MG/1
5 TABLET ORAL EVERY 12 HOURS PRN
Qty: 60 TABLET | Refills: 0 | Status: SHIPPED | OUTPATIENT
Start: 2020-06-05 | End: 2020-06-22

## 2020-06-05 RX ORDER — PROCHLORPERAZINE MALEATE 10 MG
10 TABLET ORAL 3 TIMES DAILY PRN
Qty: 60 TABLET | Refills: 2 | Status: SHIPPED | OUTPATIENT
Start: 2020-06-05 | End: 2020-07-06 | Stop reason: SDUPTHER

## 2020-06-05 NOTE — TELEPHONE ENCOUNTER
----- Message from Jennifer Bland sent at 6/5/2020  9:34 AM CDT -----  Contact: pt  Reason: Calling to speak with nurse pertaining to nausea    Communication: [phone]

## 2020-06-05 NOTE — TELEPHONE ENCOUNTER
Spoke with patient. She is calling to speak with nydia. Also, she notes constant nausea for the past several days, unrelieved by zofran, which she has been taking since yesterday. She is taking the phenergan/codeine syrup nightly as well. She has lost her taste--and is not eating much anymore. She is doing her best to supplement with ensure. She is experiencing some emesis---which she notes as being clear and watery. She is also asking for dr garcia to refill her pain medication, as she has only 2 tablets left.    Message routed to dr garcia and nydia

## 2020-06-05 NOTE — NURSING
Returned call to pt who is asking for a refill of her pain medication.  Stating she only has two left.  She is also complaining of diarrhea this morning, nausea and some vomiting.  Message sent to staff.

## 2020-06-05 NOTE — NURSING
Called pt to inform that medication (compazine and pain med) was ordered and sent to pharmacy at [facility].  Pt would prefer if medication could be filled at preferred pharmacy [facility].  Spoke with the pharmacist at both facilities and all medication will be delivered to pt today 6/5.  Pt notified.

## 2020-06-09 ENCOUNTER — TELEPHONE (OUTPATIENT)
Dept: HEMATOLOGY/ONCOLOGY | Facility: CLINIC | Age: 60
End: 2020-06-09

## 2020-06-09 DIAGNOSIS — C34.91 ADENOCARCINOMA OF LUNG, STAGE 4, RIGHT: Primary | ICD-10-CM

## 2020-06-09 RX ORDER — BENZONATATE 100 MG/1
100 CAPSULE ORAL 3 TIMES DAILY PRN
Qty: 90 CAPSULE | Refills: 1 | Status: SHIPPED | OUTPATIENT
Start: 2020-06-09 | End: 2020-06-22

## 2020-06-09 NOTE — TELEPHONE ENCOUNTER
----- Message from Thomas Devries sent at 6/9/2020  4:43 PM CDT -----  Contact: Patient  Returning a call       Communication preference:: [phone]    Additional info::

## 2020-06-09 NOTE — NURSING
Spoke with  Sari to confirm home health referral and confirming if it was initiated.  Returned call to pt, explained that [facility] is provided by the Waiver program she is currently enrolled in.  Informed her that she will have to call them to make other arrangements for another skilled person to come out to her home.  Requesting to change her Medicaid coverage, informed that is something that she will have to do and to call medicaid.  Verbalized understanding that services is limited for outpatient services for medicaid patients.  Complains of pain to her back from coughing all day, informed that she will have to call on tomorrow to see if Dr. Peterson would prescribe additional cough medication.  Pt will call her pharmacy to ask for delivery of tessalon pearls.

## 2020-06-09 NOTE — NURSING
Returned call to pt requesting a refill of her cough medication. She will pay cash for it because it helps her to stop coughing so she can sleep at night.  Message sent to staff.

## 2020-06-09 NOTE — NURSING
Called pt to give instructions on how to take tessalon pearls for her cough.  Pt states she cannot take the pearls because they don't work for her.  She would prefer the cough syrup, pt coughing during phone call.  Does not report any increased SOB.  Resend message to staff.

## 2020-06-09 NOTE — TELEPHONE ENCOUNTER
----- Message from Maik Peterson MD sent at 6/9/2020  3:20 PM CDT -----  I prescribed tessalon pearles for her to use during the day   -E  ----- Message -----  From: Nichole Mccauley RN  Sent: 6/9/2020   3:19 PM CDT  To: Maik Peterson MD    She says her sister comes over and her niece was there when we talked.   ----- Message -----  From: Maik Peterson MD  Sent: 6/9/2020   2:19 PM CDT  To: Nichole Mccauley RN    Hi  Does she have someone to help her with her meds? I gave her nearly half a liter and she's only supposed to take a teaspoon each night.    -E  ----- Message -----  From: Nichole Mccauley RN  Sent: 6/9/2020   9:59 AM CDT  To: MD Dr. Kristen Levin,       MsMark Boyce is requesting a refill on her cough medication.  She has run out and states it helps her to stop coughing to get sleep at night.    Thanks  Nichole

## 2020-06-10 ENCOUNTER — TELEPHONE (OUTPATIENT)
Dept: HEMATOLOGY/ONCOLOGY | Facility: CLINIC | Age: 60
End: 2020-06-10

## 2020-06-10 DIAGNOSIS — R91.8 LUNG MASS: ICD-10-CM

## 2020-06-10 DIAGNOSIS — C79.31 SECONDARY MALIGNANT NEOPLASM OF BRAIN: Primary | ICD-10-CM

## 2020-06-10 DIAGNOSIS — J45.909 ASTHMA, UNSPECIFIED ASTHMA SEVERITY, UNSPECIFIED WHETHER COMPLICATED, UNSPECIFIED WHETHER PERSISTENT: ICD-10-CM

## 2020-06-10 RX ORDER — PROMETHAZINE HYDROCHLORIDE AND CODEINE PHOSPHATE 6.25; 1 MG/5ML; MG/5ML
5 SOLUTION ORAL NIGHTLY PRN
Qty: 240 ML | Refills: 0 | Status: SHIPPED | OUTPATIENT
Start: 2020-06-10 | End: 2020-06-10 | Stop reason: SDUPTHER

## 2020-06-10 RX ORDER — PROMETHAZINE HYDROCHLORIDE AND CODEINE PHOSPHATE 6.25; 1 MG/5ML; MG/5ML
5 SOLUTION ORAL NIGHTLY PRN
Qty: 240 ML | Refills: 0 | Status: SHIPPED | OUTPATIENT
Start: 2020-06-10 | End: 2020-06-22 | Stop reason: SDUPTHER

## 2020-06-10 NOTE — TELEPHONE ENCOUNTER
----- Message from Nichole Mccauley RN sent at 6/9/2020  3:59 PM CDT -----  She says the pearls doesn't work for her.  Only the cough medication works.      Nichole  ----- Message -----  From: Maik Peterson MD  Sent: 6/9/2020   3:20 PM CDT  To: Nichole Mccauley RN    I prescribed tessalon pearles for her to use during the day   -E  ----- Message -----  From: Nichole Mccauley RN  Sent: 6/9/2020   3:19 PM CDT  To: Maik Peterson MD    She says her sister comes over and her niece was there when we talked.   ----- Message -----  From: Maik Peterson MD  Sent: 6/9/2020   2:19 PM CDT  To: Nichole Mccauley RN    Hi  Does she have someone to help her with her meds? I gave her nearly half a liter and she's only supposed to take a teaspoon each night.    -E  ----- Message -----  From: Nichole Mccauley RN  Sent: 6/9/2020   9:59 AM CDT  To: MD Dr. Kristen Levin,       MsMark Boyce is requesting a refill on her cough medication.  She has run out and states it helps her to stop coughing to get sleep at night.    Thanks  Nichole

## 2020-06-10 NOTE — NURSING
Returned call, pt wanted to inform nursing that she did try the tessalon pearls and the coughing is gone but, she is producing a lot of mucous.  Encouraged pt to ambulate as much as she can and to increase her fluid intake to help with the mucous.  Verbalized understanding.

## 2020-06-10 NOTE — TELEPHONE ENCOUNTER
----- Message from Thomas Devries sent at 6/10/2020 12:02 PM CDT -----  Contact: Patient  Returning a call     Communication preference:: [phone]    Additional info::

## 2020-06-11 ENCOUNTER — TELEPHONE (OUTPATIENT)
Dept: RADIATION ONCOLOGY | Facility: CLINIC | Age: 60
End: 2020-06-11

## 2020-06-11 DIAGNOSIS — R91.8 LUNG MASS: ICD-10-CM

## 2020-06-11 DIAGNOSIS — J45.909 ASTHMA, UNSPECIFIED ASTHMA SEVERITY, UNSPECIFIED WHETHER COMPLICATED, UNSPECIFIED WHETHER PERSISTENT: ICD-10-CM

## 2020-06-11 RX ORDER — PROMETHAZINE HYDROCHLORIDE AND CODEINE PHOSPHATE 6.25; 1 MG/5ML; MG/5ML
5 SOLUTION ORAL NIGHTLY PRN
Qty: 118 ML | Refills: 0 | OUTPATIENT
Start: 2020-06-11

## 2020-06-11 NOTE — TELEPHONE ENCOUNTER
followup call after completing radiation to the lung and brain message left on answer machine f/u will be in 3 mos with an MRI Brain

## 2020-06-12 ENCOUNTER — TELEPHONE (OUTPATIENT)
Dept: HEMATOLOGY/ONCOLOGY | Facility: CLINIC | Age: 60
End: 2020-06-12

## 2020-06-12 DIAGNOSIS — K13.79 MOUTH SORE SECONDARY TO CHEMOTHERAPY: Primary | ICD-10-CM

## 2020-06-12 DIAGNOSIS — T45.1X5A MOUTH SORE SECONDARY TO CHEMOTHERAPY: Primary | ICD-10-CM

## 2020-06-12 NOTE — TELEPHONE ENCOUNTER
----- Message from Batool Sari sent at 6/12/2020  1:36 PM CDT -----  Contact: Janeth      tel:   [phone]   Caller says she wants to speak to Nichole and to call back today.   Having a sore in her mouth.   Problems not able to sleep.   [facility] on [address]

## 2020-06-12 NOTE — TELEPHONE ENCOUNTER
Left voicemail letting patient know that Dr. Peterson sent magic mouth wash to help with mouth sores and she should try melatonin to help her sleep. She can purchase this at the drug store. Left office number for her to call back with any questions.

## 2020-06-13 ENCOUNTER — NURSE TRIAGE (OUTPATIENT)
Dept: ADMINISTRATIVE | Facility: CLINIC | Age: 60
End: 2020-06-13
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
## 2020-06-16 ENCOUNTER — TELEPHONE (OUTPATIENT)
Dept: HEMATOLOGY/ONCOLOGY | Facility: CLINIC | Age: 60
End: 2020-06-16

## 2020-06-16 ENCOUNTER — DOCUMENTATION ONLY (OUTPATIENT)
Dept: HEMATOLOGY/ONCOLOGY | Facility: CLINIC | Age: 60
End: 2020-06-16

## 2020-06-16 DIAGNOSIS — C34.91 ADENOCARCINOMA OF LUNG, STAGE 4, RIGHT: Primary | ICD-10-CM

## 2020-06-16 NOTE — PROGRESS NOTES
Informed per Nichole Mccauley, RN, Navigator, that Dr. Peterson was entering an order for a bedside commode for patient. Patient's other DME is through [facility]. Faxed the order for the bedside commode, patient's facesheet, and list of diagnoses (SnapShot Information) to [facility] at ext. [phone]. Called [facility] at ext. [phone] and spoke with Shira, who confirmed receipt of the fax, and said that she will call patient to schedule the delivery to her home address. Called patient at [phone] and discussed this with her. She asked about getting a small portable oxygen concentrator - informed patient that Medicaid insurance does not cover these. She asked about getting a stool to use to step on to get up in her bed and something to put behind her neck; explained that she would have to purchase these; these do not require a doctor's order and insurance will not cover these. Informed her that insurance will cover a hospital bed that she can adjust the height of and asked if patient would like to get one but she declined and said that she likes her bed and wants to keep using it. She said that her sister is there with her to help her and she was waiting for her home health nurse to arrive, she just woke up and was coughing a lot. She asked for the timeframe and I explained to her that [facility]'s staff will schedule the delivery timeframe with her directly. She stated understanding and agreement, and reported no other needs or concerns at this time.

## 2020-06-16 NOTE — NURSING
Returned call to pt, spoke with pt and antwan Muro, pt continues to complain about coughing all night.  Pt states she has taken the Tessalon pearls and they haven't done anything for her cough.  She is also complaining of increased pain to the rib area especially where she had a thoracentesis done while in the hospital.  Pt states she thinks the majority of her pain is related to her coughing so much. Requesting a refill of her cough medication and a bedside commode, will send in a request for order.  Pt is also complaining of not having a functional nebulizer and has asked her neighbor to use theirs.  Pt complains of coughing up thick green mucus.  Informed pt that coughing up the thick sputum is what is needed for her lungs to feel better.  Also reinforced that receiving her chemotherapy will/may cause increased coughing as the medication begins to work.    Pt. Is also requesting [facility].  I expressed to her she was not ready for a place like that but, pt insist that she would like to go to the nursing home portion of the facility.  Complains of not having enough family members around to help take care of her. Message sent to staff.

## 2020-06-16 NOTE — NURSING
Called [facility] to speak with pharmacist to confirm prescription of promethazine with codeine cough medication for pt.  Pt also called primary to request cough medication, which is what the pharmacist has filled..  Pt unaware that she has additional cough medication at the pharmacy.

## 2020-06-16 NOTE — TELEPHONE ENCOUNTER
----- Message from Thomas Devries sent at 6/16/2020  8:13 AM CDT -----  Contact: Patient  Returning a call     Communication preference:: [phone]    Additional info::

## 2020-06-17 ENCOUNTER — HOSPITAL ENCOUNTER (OUTPATIENT)
Facility: HOSPITAL | Age: 60
Discharge: HOME HEALTH | End: 2020-06-19
Attending: EMERGENCY MEDICINE | Admitting: INTERNAL MEDICINE
Payer: MEDICAID

## 2020-06-17 DIAGNOSIS — J90 PLEURAL EFFUSION: ICD-10-CM

## 2020-06-17 DIAGNOSIS — C34.91 ADENOCARCINOMA OF LUNG, STAGE 4, RIGHT: ICD-10-CM

## 2020-06-17 DIAGNOSIS — J91.0 MALIGNANT PLEURAL EFFUSION: ICD-10-CM

## 2020-06-17 DIAGNOSIS — R07.9 CHEST PAIN: ICD-10-CM

## 2020-06-17 DIAGNOSIS — J96.11 CHRONIC RESPIRATORY FAILURE WITH HYPOXIA: ICD-10-CM

## 2020-06-17 PROBLEM — K21.9 GERD (GASTROESOPHAGEAL REFLUX DISEASE): Status: ACTIVE | Noted: 2020-06-17

## 2020-06-17 LAB
ALBUMIN SERPL BCP-MCNC: 2.3 G/DL (ref 3.5–5.2)
ALP SERPL-CCNC: 69 U/L (ref 55–135)
ALT SERPL W/O P-5'-P-CCNC: 16 U/L (ref 10–44)
ANION GAP SERPL CALC-SCNC: 10 MMOL/L (ref 8–16)
AST SERPL-CCNC: 26 U/L (ref 10–40)
BASOPHILS # BLD AUTO: 0.06 K/UL (ref 0–0.2)
BASOPHILS NFR BLD: 0.9 % (ref 0–1.9)
BILIRUB SERPL-MCNC: 0.2 MG/DL (ref 0.1–1)
BUN SERPL-MCNC: 21 MG/DL (ref 6–20)
CALCIUM SERPL-MCNC: 8.9 MG/DL (ref 8.7–10.5)
CHLORIDE SERPL-SCNC: 100 MMOL/L (ref 95–110)
CO2 SERPL-SCNC: 26 MMOL/L (ref 23–29)
CREAT SERPL-MCNC: 1 MG/DL (ref 0.5–1.4)
DIFFERENTIAL METHOD: ABNORMAL
EOSINOPHIL # BLD AUTO: 0.4 K/UL (ref 0–0.5)
EOSINOPHIL NFR BLD: 5.4 % (ref 0–8)
ERYTHROCYTE [DISTWIDTH] IN BLOOD BY AUTOMATED COUNT: 15.6 % (ref 11.5–14.5)
EST. GFR  (AFRICAN AMERICAN): >60 ML/MIN/1.73 M^2
EST. GFR  (NON AFRICAN AMERICAN): >60 ML/MIN/1.73 M^2
GLUCOSE SERPL-MCNC: 105 MG/DL (ref 70–110)
HCT VFR BLD AUTO: 32.8 % (ref 37–48.5)
HGB BLD-MCNC: 9.8 G/DL (ref 12–16)
IMM GRANULOCYTES # BLD AUTO: 0.05 K/UL (ref 0–0.04)
IMM GRANULOCYTES NFR BLD AUTO: 0.7 % (ref 0–0.5)
LYMPHOCYTES # BLD AUTO: 1.1 K/UL (ref 1–4.8)
LYMPHOCYTES NFR BLD: 16.8 % (ref 18–48)
MCH RBC QN AUTO: 25.9 PG (ref 27–31)
MCHC RBC AUTO-ENTMCNC: 29.9 G/DL (ref 32–36)
MCV RBC AUTO: 87 FL (ref 82–98)
MONOCYTES # BLD AUTO: 0.9 K/UL (ref 0.3–1)
MONOCYTES NFR BLD: 13.3 % (ref 4–15)
NEUTROPHILS # BLD AUTO: 4.2 K/UL (ref 1.8–7.7)
NEUTROPHILS NFR BLD: 62.9 % (ref 38–73)
NRBC BLD-RTO: 0 /100 WBC
PLATELET # BLD AUTO: 315 K/UL (ref 150–350)
PMV BLD AUTO: 9.6 FL (ref 9.2–12.9)
POTASSIUM SERPL-SCNC: 5.1 MMOL/L (ref 3.5–5.1)
PROT SERPL-MCNC: 8 G/DL (ref 6–8.4)
RBC # BLD AUTO: 3.79 M/UL (ref 4–5.4)
SARS-COV-2 RDRP RESP QL NAA+PROBE: NEGATIVE
SODIUM SERPL-SCNC: 136 MMOL/L (ref 136–145)
TROPONIN I SERPL DL<=0.01 NG/ML-MCNC: 0.03 NG/ML (ref 0–0.03)
WBC # BLD AUTO: 6.67 K/UL (ref 3.9–12.7)

## 2020-06-17 PROCEDURE — G0378 HOSPITAL OBSERVATION PER HR: HCPCS

## 2020-06-17 PROCEDURE — U0002 COVID-19 LAB TEST NON-CDC: HCPCS

## 2020-06-17 PROCEDURE — 99285 EMERGENCY DEPT VISIT HI MDM: CPT | Mod: ,,, | Performed by: EMERGENCY MEDICINE

## 2020-06-17 PROCEDURE — 93005 ELECTROCARDIOGRAM TRACING: CPT

## 2020-06-17 PROCEDURE — 63600175 PHARM REV CODE 636 W HCPCS: Performed by: EMERGENCY MEDICINE

## 2020-06-17 PROCEDURE — 99220 PR INITIAL OBSERVATION CARE,LEVL III: CPT | Mod: ,,, | Performed by: INTERNAL MEDICINE

## 2020-06-17 PROCEDURE — 80053 COMPREHEN METABOLIC PANEL: CPT

## 2020-06-17 PROCEDURE — 96374 THER/PROPH/DIAG INJ IV PUSH: CPT

## 2020-06-17 PROCEDURE — 99285 PR EMERGENCY DEPT VISIT,LEVEL V: ICD-10-PCS | Mod: ,,, | Performed by: EMERGENCY MEDICINE

## 2020-06-17 PROCEDURE — 84484 ASSAY OF TROPONIN QUANT: CPT

## 2020-06-17 PROCEDURE — 99285 EMERGENCY DEPT VISIT HI MDM: CPT | Mod: 25

## 2020-06-17 PROCEDURE — 85025 COMPLETE CBC W/AUTO DIFF WBC: CPT

## 2020-06-17 PROCEDURE — 93010 EKG 12-LEAD: ICD-10-PCS | Mod: ,,, | Performed by: INTERNAL MEDICINE

## 2020-06-17 PROCEDURE — 93010 ELECTROCARDIOGRAM REPORT: CPT | Mod: ,,, | Performed by: INTERNAL MEDICINE

## 2020-06-17 PROCEDURE — 99220 PR INITIAL OBSERVATION CARE,LEVL III: ICD-10-PCS | Mod: ,,, | Performed by: INTERNAL MEDICINE

## 2020-06-17 RX ORDER — FLUTICASONE FUROATE AND VILANTEROL 100; 25 UG/1; UG/1
1 POWDER RESPIRATORY (INHALATION) DAILY
Status: DISCONTINUED | OUTPATIENT
Start: 2020-06-18 | End: 2020-06-19 | Stop reason: HOSPADM

## 2020-06-17 RX ORDER — IBUPROFEN 200 MG
24 TABLET ORAL
Status: DISCONTINUED | OUTPATIENT
Start: 2020-06-17 | End: 2020-06-19 | Stop reason: HOSPADM

## 2020-06-17 RX ORDER — BENZONATATE 100 MG/1
100 CAPSULE ORAL 3 TIMES DAILY PRN
Status: DISCONTINUED | OUTPATIENT
Start: 2020-06-17 | End: 2020-06-18

## 2020-06-17 RX ORDER — SODIUM CHLORIDE 0.9 % (FLUSH) 0.9 %
10 SYRINGE (ML) INJECTION
Status: DISCONTINUED | OUTPATIENT
Start: 2020-06-17 | End: 2020-06-19 | Stop reason: HOSPADM

## 2020-06-17 RX ORDER — BUPROPION HYDROCHLORIDE 150 MG/1
150 TABLET ORAL DAILY
Status: DISCONTINUED | OUTPATIENT
Start: 2020-06-18 | End: 2020-06-19 | Stop reason: HOSPADM

## 2020-06-17 RX ORDER — BUSPIRONE HYDROCHLORIDE 5 MG/1
10 TABLET ORAL 3 TIMES DAILY
Status: DISCONTINUED | OUTPATIENT
Start: 2020-06-18 | End: 2020-06-19 | Stop reason: HOSPADM

## 2020-06-17 RX ORDER — GABAPENTIN 300 MG/1
300 CAPSULE ORAL 3 TIMES DAILY
Status: DISCONTINUED | OUTPATIENT
Start: 2020-06-18 | End: 2020-06-19

## 2020-06-17 RX ORDER — GLUCAGON 1 MG
1 KIT INJECTION
Status: DISCONTINUED | OUTPATIENT
Start: 2020-06-17 | End: 2020-06-19 | Stop reason: HOSPADM

## 2020-06-17 RX ORDER — AMOXICILLIN 250 MG
1 CAPSULE ORAL 2 TIMES DAILY
Status: DISCONTINUED | OUTPATIENT
Start: 2020-06-17 | End: 2020-06-19 | Stop reason: HOSPADM

## 2020-06-17 RX ORDER — CODEINE SULFATE 15 MG/1
15 TABLET ORAL EVERY 4 HOURS PRN
Status: DISCONTINUED | OUTPATIENT
Start: 2020-06-17 | End: 2020-06-18

## 2020-06-17 RX ORDER — AMLODIPINE BESYLATE 5 MG/1
5 TABLET ORAL DAILY
Status: DISCONTINUED | OUTPATIENT
Start: 2020-06-18 | End: 2020-06-19 | Stop reason: HOSPADM

## 2020-06-17 RX ORDER — HYDROCODONE BITARTRATE AND ACETAMINOPHEN 5; 325 MG/1; MG/1
1 TABLET ORAL EVERY 4 HOURS PRN
Status: DISCONTINUED | OUTPATIENT
Start: 2020-06-18 | End: 2020-06-17

## 2020-06-17 RX ORDER — ALBUTEROL SULFATE 90 UG/1
1 AEROSOL, METERED RESPIRATORY (INHALATION) EVERY 6 HOURS PRN
Status: DISCONTINUED | OUTPATIENT
Start: 2020-06-17 | End: 2020-06-18

## 2020-06-17 RX ORDER — LOSARTAN POTASSIUM AND HYDROCHLOROTHIAZIDE 12.5; 1 MG/1; MG/1
1 TABLET ORAL DAILY
Status: DISCONTINUED | OUTPATIENT
Start: 2020-06-18 | End: 2020-06-18

## 2020-06-17 RX ORDER — PANTOPRAZOLE SODIUM 40 MG/1
40 TABLET, DELAYED RELEASE ORAL DAILY
Status: DISCONTINUED | OUTPATIENT
Start: 2020-06-18 | End: 2020-06-19 | Stop reason: HOSPADM

## 2020-06-17 RX ORDER — OXYCODONE HYDROCHLORIDE 5 MG/1
5 TABLET ORAL EVERY 12 HOURS PRN
Status: DISCONTINUED | OUTPATIENT
Start: 2020-06-17 | End: 2020-06-19 | Stop reason: HOSPADM

## 2020-06-17 RX ORDER — MORPHINE SULFATE 4 MG/ML
4 INJECTION, SOLUTION INTRAMUSCULAR; INTRAVENOUS
Status: COMPLETED | OUTPATIENT
Start: 2020-06-17 | End: 2020-06-17

## 2020-06-17 RX ORDER — IBUPROFEN 200 MG
16 TABLET ORAL
Status: DISCONTINUED | OUTPATIENT
Start: 2020-06-17 | End: 2020-06-19 | Stop reason: HOSPADM

## 2020-06-17 RX ADMIN — MORPHINE SULFATE 4 MG: 4 INJECTION INTRAVENOUS at 07:06

## 2020-06-17 NOTE — ED TRIAGE NOTES
"Pt. Is a 59 y.o female. Newly Dx. With lung cancer. Pt. Reports having chest pain and SOB beginning 1 hour ago. Pt. Reports "having fluid pulled off my lungs with a needle" and not feeling the same since.  "

## 2020-06-17 NOTE — ED PROVIDER NOTES
CC: Chest Pain (and SOB - hx stage 4 lung cancer )      History provided by:   Patient     HPI: Janeth Boyce is a 59 y.o. year old female past medical history of asthma, anxiety, hepatitis-C, hypertension, lung cancer who presents to the ED complaining of chest pain and shortness of breath    Patient reports history of lung cancer diagnosed approximately a month ago at [facility], had a right-sided pleural effusion drained approximately 3 weeks ago she reports shortness of breath and cough for the last 2-3 days, the cough is reported with brownish sputum and also associated right-sided chest pain with cough.  Patient is on 2-3 L nasal cannula oxygen at home  She reports last chemo 2 weeks ago and she finished the radiation treatment    Chart review with CTA chest with right-sided pleural effusion.  Patient with stage IV lung cancer        Past Medical History:   Diagnosis Date    Anxiety     Asthma     Hepatitis C 2019    treated    Hx of psychiatric care     Hypertension     Lung cancer     Psychiatric exam requested by authority     Psychiatric problem      Past Surgical History:   Procedure Laterality Date     SECTION      KNEE SURGERY       History reviewed. No pertinent family history.  No current facility-administered medications on file prior to encounter.      Current Outpatient Medications on File Prior to Encounter   Medication Sig Dispense Refill    albuterol (ACCUNEB) 0.63 mg/3 mL Nebu Take 3 mLs (0.63 mg total) by nebulization every 6 (six) hours as needed. Rescue 90 mL 0    albuterol (PROVENTIL) 2.5 mg /3 mL (0.083 %) nebulizer solution Take 3 mls by nebulization every 3 hours while awake 180 mL 12    albuterol (PROVENTIL/VENTOLIN HFA) 90 mcg/actuation inhaler Inhale 2 puffs every 6 hours by mouth into the lungs as needed for wheezing 18 g 5    amLODIPine (NORVASC) 5 MG tablet Take 5 mg by mouth once daily.      benzonatate (TESSALON PERLES) 100 MG capsule Take 1 capsule (100  mg total) by mouth 3 (three) times daily as needed for Cough. 90 capsule 1    budesonide-formoterol 160-4.5 mcg (SYMBICORT) 160-4.5 mcg/actuation HFAA Inhale two puffs by mouth into the lungs twice daily 10.2 g 12    buPROPion (WELLBUTRIN SR) 150 MG TBSR 12 hr tablet       busPIRone (BUSPAR) 10 MG tablet Take one tablet by mouth three times a day 90 tablet 3    diclofenac sodium (VOLTAREN) 1 % Gel Apply two grams topically four times a day 200 g 5    duke's soln (benadryl 30 mL, mylanta 30 mL, lidocaine 30 mL, nystatin 30 mL) 120mL Take 10 mLs by mouth 4 (four) times daily as needed (mouth pain). 120 mL 0    gabapentin (NEURONTIN) 100 MG capsule Take 100 mg by mouth 3 (three) times daily.      gabapentin (NEURONTIN) 300 MG capsule Take one capsule by mouth three times a day 270 capsule 3    gabapentin (NEURONTIN) 400 MG capsule Take one capsule by mouth three times daily 90 capsule 3    irbesartan-hydrochlorothiazide (AVALIDE) 300-12.5 mg per tablet Take one tablet by mouth every day 90 tablet 3    LORazepam (ATIVAN) 1 MG tablet Take 1 tablet (1 mg total) by mouth as needed for Anxiety (Take 30 minutes prior to procedure). 5 tablet 0    omeprazole (PRILOSEC) 20 MG capsule Take one capsule by mouth everyday. 90 capsule 3    ondansetron (ZOFRAN-ODT) 8 MG TbDL Dissolve 1 tablet (8 mg total) by mouth every 8 (eight) hours as needed. 60 tablet 0    oxyCODONE (ROXICODONE) 5 MG immediate release tablet Take 1 tablet (5 mg total) by mouth every 12 (twelve) hours as needed for Pain. 60 tablet 0    pantoprazole (PROTONIX) 20 MG tablet Take 20 mg by mouth once daily.      pantoprazole (PROTONIX) 20 MG tablet Take one tablet by mouth every day. 30 tablet 11    polyethylene glycol (GLYCOLAX) 17 gram/dose powder mix 1 capful (17 g) and take by mouth once daily. 510 g 0    prochlorperazine (COMPAZINE) 10 MG tablet Take 1 tablet (10 mg total) by mouth 3 (three) times daily as needed (for nausea. if zofran does not  work can use this medication instead). 60 tablet 2    promethazine-codeine 6.25-10 mg/5 ml (PHENERGAN WITH CODEINE) 6.25-10 mg/5 mL syrup Take 5 mLs by mouth nightly as needed for Cough. 240 mL 0    senna-docusate 8.6-50 mg (PERICOLACE) 8.6-50 mg per tablet Take 1 tablet by mouth 2 (two) times daily. 60 tablet 0    valsartan-hydrochlorothiazide (DIOVAN-HCT) 320-12.5 mg per tablet Take 1 tablet by mouth once daily.       Patient has no known allergies.  Social History     Socioeconomic History    Marital status: Single     Spouse name: Not on file    Number of children: 3    Years of education: Not on file    Highest education level: Not on file   Occupational History    Not on file   Social Needs    Financial resource strain: Not on file    Food insecurity     Worry: Not on file     Inability: Not on file    Transportation needs     Medical: Not on file     Non-medical: Not on file   Tobacco Use    Smoking status: Former Smoker     Packs/day: 0.50     Years: 40.00     Pack years: 20.00     Types: Cigarettes     Quit date: 2020     Years since quittin.1   Substance and Sexual Activity    Alcohol use: Yes     Comment: occasional    Drug use: No    Sexual activity: Not on file   Lifestyle    Physical activity     Days per week: Not on file     Minutes per session: Not on file    Stress: Not on file   Relationships    Social connections     Talks on phone: Not on file     Gets together: Not on file     Attends Rastafarian service: Not on file     Active member of club or organization: Not on file     Attends meetings of clubs or organizations: Not on file     Relationship status: Not on file   Other Topics Concern    Patient feels they ought to cut down on drinking/drug use Not Asked    Patient annoyed by others criticizing their drinking/drug use Not Asked    Patient has felt bad or guilty about drinking/drug use Not Asked    Patient has had a drink/used drugs as an eye opener in the AM Not  "Asked   Social History Narrative    Not on file       ROS:     Constitutional : neg for fever, neg for weakness  HENT neg for head injury, neg for sore throat  Eyes: neg for visual changes, neg for eye pain  Resp positive for cough and shortness of breath  Cardiac  neg for chest pain, neg for palpitations  GI neg for abd pain, neg for nausea, neg for vomiting   neg for urinary changes  Neuro neg for focal weakness or numbness  Skin neg for skin rash  MSK: neg for myalgia, neg for arthralgia  ALL: Patient has no known allergies.    PHYSICAL EXAM:  Vitals:    06/17/20 1842   BP: 115/70   Pulse: 102   Resp: 18   Temp: 98.4 °F (36.9 °C)         PHYSICAL EXAM:     general: comfortable, in no acute distress, pleasant  VS: triage VS reviewed  HENT: NC/AT  Eyes: PERRL, EOMI  CV:  Mildly tachycardic regular, no LE edema pitting edema  Resp: comfortable breathing on 3 L nasal cannula, speaks in full sentences, right lung base diminished breath sounds   ABD:  soft, ND, + normal BS, NT  Renal: No CVAT  Neuro: AAO x 3, face symmetric, speech normal  MSK: no deformity, no edema            DATA & INTERVENTIONS:    LABS reviewed:  Labs Reviewed - No data to display    RADIOLOGY reviewed:  Imaging Results    None         MEDICATIONS/FLUIDS:  Medications - No data to display      MDM:  Janeth Boyce is a 59 y.o. year old female who presents to the ED cough and shortness of breath with associated right-sided chest pain    Chest x-ray with right-sided hemithorax whitening, similar to x-ray on May 26  Chart reviewed patient had thoracentesis on May 26 with 500 mL of pleural fluid of the removed.  Pulmonary note  "Patient reports modest improvement in her dyspnea s/p thoracentesis. Declined debulking surgery and PleurX placement offered by CTS."  Patient agrees to stay in the hospital to have PleurX catheter placement.    DDX includes but not limited to: pulm effusion, pneumonia, PE, acs    Labs ordered and reviewed:   CBC normal " white count, hemoglobin 9.8, platelets within normal limits  CMP albumin 2.3  Troponin 0.026  COVID-19 neg  Medication given in the ED:  Morphine    CXR (ordered and reviewed):  Large right-sided pleural effusion  Imagings independently visualized: yes    EKG (independantly reviewed):  Ventricular rate 102, sinus tach, no acute ischemia    Old records obtained and reviewed: yes, see above    Case discussed with the consultant:  Hem/onc    IMPRESSION:  1.)  Large right-sided pleural effusion, shortness of breath, cough      Dispo: obs    Critical Care Time: N/A       Deepti Forrest MD  06/19/20 8942

## 2020-06-18 PROBLEM — J96.11 CHRONIC RESPIRATORY FAILURE WITH HYPOXIA: Status: ACTIVE | Noted: 2020-06-18

## 2020-06-18 LAB
ALBUMIN SERPL BCP-MCNC: 2.1 G/DL (ref 3.5–5.2)
ALP SERPL-CCNC: 68 U/L (ref 55–135)
ALT SERPL W/O P-5'-P-CCNC: 14 U/L (ref 10–44)
ANION GAP SERPL CALC-SCNC: 8 MMOL/L (ref 8–16)
ANION GAP SERPL CALC-SCNC: 8 MMOL/L (ref 8–16)
AST SERPL-CCNC: 20 U/L (ref 10–40)
BASOPHILS # BLD AUTO: 0.05 K/UL (ref 0–0.2)
BASOPHILS NFR BLD: 0.7 % (ref 0–1.9)
BILIRUB SERPL-MCNC: 0.2 MG/DL (ref 0.1–1)
BUN SERPL-MCNC: 22 MG/DL (ref 6–20)
BUN SERPL-MCNC: 23 MG/DL (ref 6–20)
CALCIUM SERPL-MCNC: 8.7 MG/DL (ref 8.7–10.5)
CALCIUM SERPL-MCNC: 9.2 MG/DL (ref 8.7–10.5)
CHLORIDE SERPL-SCNC: 100 MMOL/L (ref 95–110)
CHLORIDE SERPL-SCNC: 99 MMOL/L (ref 95–110)
CO2 SERPL-SCNC: 27 MMOL/L (ref 23–29)
CO2 SERPL-SCNC: 28 MMOL/L (ref 23–29)
CREAT SERPL-MCNC: 0.9 MG/DL (ref 0.5–1.4)
CREAT SERPL-MCNC: 1.2 MG/DL (ref 0.5–1.4)
DIFFERENTIAL METHOD: ABNORMAL
EOSINOPHIL # BLD AUTO: 0.4 K/UL (ref 0–0.5)
EOSINOPHIL NFR BLD: 6 % (ref 0–8)
ERYTHROCYTE [DISTWIDTH] IN BLOOD BY AUTOMATED COUNT: 15.6 % (ref 11.5–14.5)
EST. GFR  (AFRICAN AMERICAN): 56.8 ML/MIN/1.73 M^2
EST. GFR  (AFRICAN AMERICAN): >60 ML/MIN/1.73 M^2
EST. GFR  (NON AFRICAN AMERICAN): 49.2 ML/MIN/1.73 M^2
EST. GFR  (NON AFRICAN AMERICAN): >60 ML/MIN/1.73 M^2
GLUCOSE SERPL-MCNC: 131 MG/DL (ref 70–110)
GLUCOSE SERPL-MCNC: 99 MG/DL (ref 70–110)
HCT VFR BLD AUTO: 33.2 % (ref 37–48.5)
HGB BLD-MCNC: 9.5 G/DL (ref 12–16)
IMM GRANULOCYTES # BLD AUTO: 0.06 K/UL (ref 0–0.04)
IMM GRANULOCYTES NFR BLD AUTO: 0.9 % (ref 0–0.5)
LYMPHOCYTES # BLD AUTO: 1.4 K/UL (ref 1–4.8)
LYMPHOCYTES NFR BLD: 19.9 % (ref 18–48)
MAGNESIUM SERPL-MCNC: 1.5 MG/DL (ref 1.6–2.6)
MCH RBC QN AUTO: 25.3 PG (ref 27–31)
MCHC RBC AUTO-ENTMCNC: 28.6 G/DL (ref 32–36)
MCV RBC AUTO: 89 FL (ref 82–98)
MONOCYTES # BLD AUTO: 1.2 K/UL (ref 0.3–1)
MONOCYTES NFR BLD: 17 % (ref 4–15)
NEUTROPHILS # BLD AUTO: 3.8 K/UL (ref 1.8–7.7)
NEUTROPHILS NFR BLD: 55.5 % (ref 38–73)
NRBC BLD-RTO: 0 /100 WBC
PHOSPHATE SERPL-MCNC: 4.5 MG/DL (ref 2.7–4.5)
PLATELET # BLD AUTO: 324 K/UL (ref 150–350)
PMV BLD AUTO: 9.3 FL (ref 9.2–12.9)
POTASSIUM SERPL-SCNC: 4.9 MMOL/L (ref 3.5–5.1)
POTASSIUM SERPL-SCNC: 5 MMOL/L (ref 3.5–5.1)
PROCALCITONIN SERPL IA-MCNC: 0.06 NG/ML
PROT SERPL-MCNC: 7.2 G/DL (ref 6–8.4)
RBC # BLD AUTO: 3.75 M/UL (ref 4–5.4)
SODIUM SERPL-SCNC: 134 MMOL/L (ref 136–145)
SODIUM SERPL-SCNC: 136 MMOL/L (ref 136–145)
TROPONIN I SERPL DL<=0.01 NG/ML-MCNC: <0.006 NG/ML (ref 0–0.03)
WBC # BLD AUTO: 6.83 K/UL (ref 3.9–12.7)

## 2020-06-18 PROCEDURE — 94664 DEMO&/EVAL PT USE INHALER: CPT | Mod: 59

## 2020-06-18 PROCEDURE — 85025 COMPLETE CBC W/AUTO DIFF WBC: CPT

## 2020-06-18 PROCEDURE — 25000242 PHARM REV CODE 250 ALT 637 W/ HCPCS: Performed by: STUDENT IN AN ORGANIZED HEALTH CARE EDUCATION/TRAINING PROGRAM

## 2020-06-18 PROCEDURE — 99215 PR OFFICE/OUTPT VISIT, EST, LEVL V, 40-54 MIN: ICD-10-PCS | Mod: ,,, | Performed by: INTERNAL MEDICINE

## 2020-06-18 PROCEDURE — 27000221 HC OXYGEN, UP TO 24 HOURS

## 2020-06-18 PROCEDURE — 93005 ELECTROCARDIOGRAM TRACING: CPT

## 2020-06-18 PROCEDURE — 80053 COMPREHEN METABOLIC PANEL: CPT

## 2020-06-18 PROCEDURE — 84145 PROCALCITONIN (PCT): CPT

## 2020-06-18 PROCEDURE — 80048 BASIC METABOLIC PNL TOTAL CA: CPT

## 2020-06-18 PROCEDURE — 99215 OFFICE O/P EST HI 40 MIN: CPT | Mod: ,,, | Performed by: INTERNAL MEDICINE

## 2020-06-18 PROCEDURE — 36415 COLL VENOUS BLD VENIPUNCTURE: CPT

## 2020-06-18 PROCEDURE — 63600175 PHARM REV CODE 636 W HCPCS: Performed by: STUDENT IN AN ORGANIZED HEALTH CARE EDUCATION/TRAINING PROGRAM

## 2020-06-18 PROCEDURE — 84484 ASSAY OF TROPONIN QUANT: CPT

## 2020-06-18 PROCEDURE — 93010 EKG 12-LEAD: ICD-10-PCS | Mod: ,,, | Performed by: INTERNAL MEDICINE

## 2020-06-18 PROCEDURE — 99226 PR SUBSEQUENT OBSERVATION CARE,LEVEL III: CPT | Mod: ,,, | Performed by: INTERNAL MEDICINE

## 2020-06-18 PROCEDURE — 27000646 HC AEROBIKA DEVICE

## 2020-06-18 PROCEDURE — 93010 ELECTROCARDIOGRAM REPORT: CPT | Mod: ,,, | Performed by: INTERNAL MEDICINE

## 2020-06-18 PROCEDURE — 99226 PR SUBSEQUENT OBSERVATION CARE,LEVEL III: ICD-10-PCS | Mod: ,,, | Performed by: INTERNAL MEDICINE

## 2020-06-18 PROCEDURE — 96375 TX/PRO/DX INJ NEW DRUG ADDON: CPT

## 2020-06-18 PROCEDURE — G0378 HOSPITAL OBSERVATION PER HR: HCPCS

## 2020-06-18 PROCEDURE — 25000003 PHARM REV CODE 250: Performed by: STUDENT IN AN ORGANIZED HEALTH CARE EDUCATION/TRAINING PROGRAM

## 2020-06-18 PROCEDURE — 94640 AIRWAY INHALATION TREATMENT: CPT

## 2020-06-18 PROCEDURE — 83735 ASSAY OF MAGNESIUM: CPT

## 2020-06-18 PROCEDURE — 99900035 HC TECH TIME PER 15 MIN (STAT)

## 2020-06-18 PROCEDURE — 84100 ASSAY OF PHOSPHORUS: CPT

## 2020-06-18 PROCEDURE — 94761 N-INVAS EAR/PLS OXIMETRY MLT: CPT

## 2020-06-18 RX ORDER — BENZONATATE 100 MG/1
100 CAPSULE ORAL 3 TIMES DAILY
Status: DISCONTINUED | OUTPATIENT
Start: 2020-06-18 | End: 2020-06-18

## 2020-06-18 RX ORDER — HYDROCHLOROTHIAZIDE 12.5 MG/1
12.5 TABLET ORAL DAILY
Status: DISCONTINUED | OUTPATIENT
Start: 2020-06-18 | End: 2020-06-19 | Stop reason: HOSPADM

## 2020-06-18 RX ORDER — GUAIFENESIN/DEXTROMETHORPHAN 100-10MG/5
5 SYRUP ORAL EVERY 4 HOURS PRN
Status: DISCONTINUED | OUTPATIENT
Start: 2020-06-18 | End: 2020-06-19 | Stop reason: HOSPADM

## 2020-06-18 RX ORDER — LORAZEPAM 0.5 MG/1
0.5 TABLET ORAL DAILY PRN
Status: DISCONTINUED | OUTPATIENT
Start: 2020-06-18 | End: 2020-06-19 | Stop reason: HOSPADM

## 2020-06-18 RX ORDER — HYDROCODONE BITARTRATE AND ACETAMINOPHEN 5; 325 MG/1; MG/1
1 TABLET ORAL EVERY 4 HOURS PRN
Status: DISCONTINUED | OUTPATIENT
Start: 2020-06-18 | End: 2020-06-18

## 2020-06-18 RX ORDER — IPRATROPIUM BROMIDE AND ALBUTEROL SULFATE 2.5; .5 MG/3ML; MG/3ML
3 SOLUTION RESPIRATORY (INHALATION) EVERY 4 HOURS PRN
Status: DISCONTINUED | OUTPATIENT
Start: 2020-06-18 | End: 2020-06-19 | Stop reason: HOSPADM

## 2020-06-18 RX ORDER — MAGNESIUM SULFATE HEPTAHYDRATE 40 MG/ML
2 INJECTION, SOLUTION INTRAVENOUS ONCE
Status: COMPLETED | OUTPATIENT
Start: 2020-06-18 | End: 2020-06-18

## 2020-06-18 RX ORDER — BENZONATATE 100 MG/1
200 CAPSULE ORAL 3 TIMES DAILY
Status: DISCONTINUED | OUTPATIENT
Start: 2020-06-18 | End: 2020-06-19 | Stop reason: HOSPADM

## 2020-06-18 RX ORDER — HYDROCODONE BITARTRATE AND ACETAMINOPHEN 5; 325 MG/1; MG/1
1 TABLET ORAL EVERY 4 HOURS PRN
Status: DISCONTINUED | OUTPATIENT
Start: 2020-06-18 | End: 2020-06-19 | Stop reason: HOSPADM

## 2020-06-18 RX ORDER — VALSARTAN 160 MG/1
320 TABLET ORAL DAILY
Status: DISCONTINUED | OUTPATIENT
Start: 2020-06-18 | End: 2020-06-19

## 2020-06-18 RX ORDER — PROMETHAZINE HYDROCHLORIDE AND CODEINE PHOSPHATE 6.25; 1 MG/5ML; MG/5ML
10 SOLUTION ORAL EVERY 4 HOURS PRN
Status: DISCONTINUED | OUTPATIENT
Start: 2020-06-18 | End: 2020-06-18

## 2020-06-18 RX ADMIN — GABAPENTIN 300 MG: 300 CAPSULE ORAL at 02:06

## 2020-06-18 RX ADMIN — PROMETHAZINE HYDROCHLORIDE AND CODEINE PHOSPHATE 10 ML: 10; 6.25 SOLUTION ORAL at 02:06

## 2020-06-18 RX ADMIN — DOCUSATE SODIUM 50 MG AND SENNOSIDES 8.6 MG 1 TABLET: 8.6; 5 TABLET, FILM COATED ORAL at 08:06

## 2020-06-18 RX ADMIN — HYDROCODONE BITARTRATE AND ACETAMINOPHEN 1 TABLET: 5; 325 TABLET ORAL at 06:06

## 2020-06-18 RX ADMIN — BUSPIRONE HYDROCHLORIDE 10 MG: 5 TABLET ORAL at 08:06

## 2020-06-18 RX ADMIN — GUAIFENESIN AND DEXTROMETHORPHAN 5 ML: 100; 10 SYRUP ORAL at 05:06

## 2020-06-18 RX ADMIN — GABAPENTIN 300 MG: 300 CAPSULE ORAL at 09:06

## 2020-06-18 RX ADMIN — BUSPIRONE HYDROCHLORIDE 10 MG: 5 TABLET ORAL at 02:06

## 2020-06-18 RX ADMIN — AMLODIPINE BESYLATE 5 MG: 5 TABLET ORAL at 08:06

## 2020-06-18 RX ADMIN — BENZONATATE 200 MG: 100 CAPSULE ORAL at 09:06

## 2020-06-18 RX ADMIN — BENZONATATE 200 MG: 100 CAPSULE ORAL at 02:06

## 2020-06-18 RX ADMIN — LORAZEPAM 0.5 MG: 0.5 TABLET ORAL at 11:06

## 2020-06-18 RX ADMIN — FLUTICASONE FUROATE AND VILANTEROL TRIFENATATE 1 PUFF: 100; 25 POWDER RESPIRATORY (INHALATION) at 09:06

## 2020-06-18 RX ADMIN — GABAPENTIN 300 MG: 300 CAPSULE ORAL at 08:06

## 2020-06-18 RX ADMIN — VALSARTAN 320 MG: 160 TABLET, FILM COATED ORAL at 08:06

## 2020-06-18 RX ADMIN — BUPROPION HYDROCHLORIDE 150 MG: 150 TABLET, FILM COATED, EXTENDED RELEASE ORAL at 08:06

## 2020-06-18 RX ADMIN — HYDROCHLOROTHIAZIDE 12.5 MG: 12.5 TABLET ORAL at 08:06

## 2020-06-18 RX ADMIN — GUAIFENESIN AND DEXTROMETHORPHAN 5 ML: 100; 10 SYRUP ORAL at 11:06

## 2020-06-18 RX ADMIN — PROMETHAZINE HYDROCHLORIDE AND CODEINE PHOSPHATE 10 ML: 10; 6.25 SOLUTION ORAL at 10:06

## 2020-06-18 RX ADMIN — ALBUTEROL SULFATE 1 PUFF: 90 AEROSOL, METERED RESPIRATORY (INHALATION) at 08:06

## 2020-06-18 RX ADMIN — DOCUSATE SODIUM 50 MG AND SENNOSIDES 8.6 MG 1 TABLET: 8.6; 5 TABLET, FILM COATED ORAL at 09:06

## 2020-06-18 RX ADMIN — PANTOPRAZOLE SODIUM 40 MG: 40 TABLET, DELAYED RELEASE ORAL at 08:06

## 2020-06-18 RX ADMIN — BUSPIRONE HYDROCHLORIDE 10 MG: 5 TABLET ORAL at 09:06

## 2020-06-18 RX ADMIN — BENZONATATE 100 MG: 100 CAPSULE ORAL at 12:06

## 2020-06-18 RX ADMIN — OXYCODONE HYDROCHLORIDE 5 MG: 5 TABLET ORAL at 12:06

## 2020-06-18 RX ADMIN — MAGNESIUM SULFATE 2 G: 2 INJECTION INTRAVENOUS at 12:06

## 2020-06-18 RX ADMIN — PROMETHAZINE HYDROCHLORIDE AND CODEINE PHOSPHATE 10 ML: 10; 6.25 SOLUTION ORAL at 06:06

## 2020-06-18 NOTE — ASSESSMENT & PLAN NOTE
Likely 2/2 to known lung malignancy  CXR with complete opacification of the right hemithorax with some deviation the mediastinum laterally to the left.  Dyspnea likely 2/2 mass effect and near collapse of R. lung    Plan  - Continue breathing treatments/ bronchodilator therapy  - Continue Tessalon pearls PRN

## 2020-06-18 NOTE — SUBJECTIVE & OBJECTIVE
Interval History: No acute events overnight. Vitals remain stable and O2 saturations in the 90%'s on 2-3L. Cough continues to be productive of green-brown sputum, and patient complaining of sternal pain while coughing. Discussed the etiology of the patient's pleural effusion and that because it is related to her cancer that it is likely to recur. She understood this and was amenable to the PleurX catheter placement.    Oncology Treatment Plan:   OP PEMBROLIZUMAB 200MG Q3W    Medications:  Continuous Infusions:  Scheduled Meds:   amLODIPine  5 mg Oral Daily    benzonatate  200 mg Oral TID    buPROPion  150 mg Oral Daily    busPIRone  10 mg Oral TID    fluticasone furoate-vilanteroL  1 puff Inhalation Daily    gabapentin  300 mg Oral TID    hydroCHLOROthiazide  12.5 mg Oral Daily    magnesium sulfate IVPB  2 g Intravenous Once    pantoprazole  40 mg Oral Daily    senna-docusate 8.6-50 mg  1 tablet Oral BID    valsartan  320 mg Oral Daily     PRN Meds:albuterol-ipratropium, dextromethorphan-guaifenesin  mg/5 ml, Dextrose 10% Bolus, Dextrose 10% Bolus, glucagon (human recombinant), glucose, glucose, HYDROcodone-acetaminophen, LORazepam, oxyCODONE, sodium chloride 0.9%     Review of Systems   Constitutional: Negative for activity change, chills and fever.   HENT: Negative for congestion and ear pain.    Eyes: Negative for photophobia and visual disturbance.   Respiratory: Positive for cough, shortness of breath and wheezing. Negative for chest tightness.    Cardiovascular: Positive for chest pain. Negative for palpitations and leg swelling.   Gastrointestinal: Positive for abdominal pain. Negative for abdominal distention, diarrhea, nausea and vomiting.   Genitourinary: Negative for difficulty urinating and dysuria.   Musculoskeletal: Positive for gait problem. Negative for arthralgias.   Skin: Negative for color change and pallor.   Neurological: Negative for seizures and headaches.    Psychiatric/Behavioral: Negative for agitation and confusion.     Objective:     Vital Signs (Most Recent):  Temp: 99.1 °F (37.3 °C) (06/18/20 1114)  Pulse: 107 (06/18/20 1114)  Resp: 15 (06/18/20 1114)  BP: 118/73 (06/18/20 1114)  SpO2: 98 % (06/18/20 1114) Vital Signs (24h Range):  Temp:  [98 °F (36.7 °C)-99.1 °F (37.3 °C)] 99.1 °F (37.3 °C)  Pulse:  [] 107  Resp:  [15-24] 15  SpO2:  [92 %-99 %] 98 %  BP: (115-137)/(69-89) 118/73     Weight: 84.5 kg (186 lb 4.6 oz)  Body mass index is 30.07 kg/m².  Body surface area is 1.98 meters squared.    No intake or output data in the 24 hours ending 06/18/20 1409    Physical Exam  Constitutional:       General: She is not in acute distress.     Appearance: She is obese. She is ill-appearing. She is not diaphoretic.   HENT:      Head: Normocephalic and atraumatic.      Nose: Nose normal.      Mouth/Throat:      Mouth: Mucous membranes are moist.   Eyes:      General: No scleral icterus.     Pupils: Pupils are equal, round, and reactive to light.   Cardiovascular:      Rate and Rhythm: Normal rate and regular rhythm.      Pulses: Normal pulses.      Heart sounds: Normal heart sounds.   Pulmonary:      Effort: Pulmonary effort is normal.      Breath sounds: Decreased air movement present. Decreased breath sounds (Entire right lung and LLL) and wheezing present.   Abdominal:      General: Bowel sounds are normal.      Tenderness: There is no abdominal tenderness. There is no guarding.   Musculoskeletal:         General: No swelling or tenderness.      Right lower leg: No edema.      Left lower leg: No edema.   Skin:     General: Skin is warm.      Coloration: Skin is not jaundiced.   Neurological:      General: No focal deficit present.      Mental Status: She is alert and oriented to person, place, and time.   Psychiatric:         Mood and Affect: Mood normal.         Significant Labs:   CBC:   Recent Labs   Lab 06/17/20 2010 06/18/20  0443   WBC 6.67 6.83   HGB 9.8*  9.5*   HCT 32.8* 33.2*    324   , CMP:   Recent Labs   Lab 06/17/20 2010 06/18/20  0443    134*   K 5.1 5.0    99   CO2 26 27    99   BUN 21* 22*   CREATININE 1.0 0.9   CALCIUM 8.9 8.7   PROT 8.0 7.2   ALBUMIN 2.3* 2.1*   BILITOT 0.2 0.2   ALKPHOS 69 68   AST 26 20   ALT 16 14   ANIONGAP 10 8   EGFRNONAA >60.0 >60.0    and All pertinent labs from the last 24 hours have been reviewed.    Diagnostic Results:  I have reviewed all pertinent imaging results/findings within the past 24 hours.

## 2020-06-18 NOTE — NURSING
Patient c/o right sided chest pain, VS stable.  notified and in to see patient, EKG, bloodwork ordered. Medicated x1 with Hydrocodone. Will continue to monitor.

## 2020-06-18 NOTE — CONSULTS
Pulmonary / Critical Care Medicine  Consult Note    Primary Attending:  Nelia Casiano MD   Primary Team: Oncology   Consultant Attending: Dash Garcia MD   Consultant Fellow: Jess Lawton MD       Reason for Consult: pleural effusion      History of Present Illness:   Ms. 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      [place] [facility] [place]25[place]5/29. At that time, found to have a large right sided pleural effusion. Underwent thoracentesis with 5/26 with removal ~ 400cc, pleural fluid studies consistent with exudative effusion, most likely malignant effusion. Pleural fluid was reviewed by pathology which showed large atypical cells however no formal cytology of pleural fluid was performed.  Patient reports she had improvement in SOB following last thoracentesis which lasted ~ 2 weeks. Underwent palliative XRT to right lung 5/27-6/2. Received carboplatin x 1 dose 5/27 as well.Tumor cells with PD-L1 100% expression thus started on immunotherapy and received first dose Keytruda on 6/4.     For current symptoms, she describes continued nearly constant cough productive of white-to-yellow-to-brownish sputum production with some right sided chest/rib pain.  She denies any fever, chills, headache, nausea and vomiting. Remains on her home 2LNC.     CXR 6/17 with complete opacification of the right hemithorax with some deviation the mediastinum laterally to the left, however stable from previous CXR. Pulmonary consulted for assistance in management.         ROS: Comprehensive review of systems obtained and is negative except as noted in HPI.    PMHx:   Past Medical History:   Diagnosis Date    Anxiety      Asthma     Hepatitis C 2019    treated    Hx of psychiatric care     Hypertension     Lung cancer     Psychiatric exam requested by authority     Psychiatric problem        Home Meds:   No current facility-administered medications on file prior to encounter.      Current Outpatient Medications on File Prior to Encounter   Medication Sig Dispense Refill    albuterol (ACCUNEB) 0.63 mg/3 mL Nebu Take 3 mLs (0.63 mg total) by nebulization every 6 (six) hours as needed. Rescue 90 mL 0    albuterol (PROVENTIL) 2.5 mg /3 mL (0.083 %) nebulizer solution Take 3 mls by nebulization every 3 hours while awake 180 mL 12    albuterol (PROVENTIL/VENTOLIN HFA) 90 mcg/actuation inhaler Inhale 2 puffs every 6 hours by mouth into the lungs as needed for wheezing 18 g 5    amLODIPine (NORVASC) 5 MG tablet Take 5 mg by mouth once daily.      benzonatate (TESSALON PERLES) 100 MG capsule Take 1 capsule (100 mg total) by mouth 3 (three) times daily as needed for Cough. 90 capsule 1    budesonide-formoterol 160-4.5 mcg (SYMBICORT) 160-4.5 mcg/actuation HFAA Inhale two puffs by mouth into the lungs twice daily 10.2 g 12    buPROPion (WELLBUTRIN SR) 150 MG TBSR 12 hr tablet       busPIRone (BUSPAR) 10 MG tablet Take one tablet by mouth three times a day 90 tablet 3    diclofenac sodium (VOLTAREN) 1 % Gel Apply two grams topically four times a day 200 g 5    duke's soln (benadryl 30 mL, mylanta 30 mL, lidocaine 30 mL, nystatin 30 mL) 120mL Take 10 mLs by mouth 4 (four) times daily as needed (mouth pain). 120 mL 0    gabapentin (NEURONTIN) 100 MG capsule Take 100 mg by mouth 3 (three) times daily.      gabapentin (NEURONTIN) 300 MG capsule Take one capsule by mouth three times a day 270 capsule 3    gabapentin (NEURONTIN) 400 MG capsule Take one capsule by mouth three times daily 90 capsule 3    irbesartan-hydrochlorothiazide (AVALIDE) 300-12.5 mg per tablet Take one tablet by mouth every day 90 tablet 3    LORazepam  (ATIVAN) 1 MG tablet Take 1 tablet (1 mg total) by mouth as needed for Anxiety (Take 30 minutes prior to procedure). 5 tablet 0    omeprazole (PRILOSEC) 20 MG capsule Take one capsule by mouth everyday. 90 capsule 3    ondansetron (ZOFRAN-ODT) 8 MG TbDL Dissolve 1 tablet (8 mg total) by mouth every 8 (eight) hours as needed. 60 tablet 0    oxyCODONE (ROXICODONE) 5 MG immediate release tablet Take 1 tablet (5 mg total) by mouth every 12 (twelve) hours as needed for Pain. 60 tablet 0    pantoprazole (PROTONIX) 20 MG tablet Take 20 mg by mouth once daily.      pantoprazole (PROTONIX) 20 MG tablet Take one tablet by mouth every day. 30 tablet 11    polyethylene glycol (GLYCOLAX) 17 gram/dose powder mix 1 capful (17 g) and take by mouth once daily. 510 g 0    prochlorperazine (COMPAZINE) 10 MG tablet Take 1 tablet (10 mg total) by mouth 3 (three) times daily as needed (for nausea. if zofran does not work can use this medication instead). 60 tablet 2    promethazine-codeine 6.25-10 mg/5 ml (PHENERGAN WITH CODEINE) 6.25-10 mg/5 mL syrup Take 5 mLs by mouth nightly as needed for Cough. 240 mL 0    senna-docusate 8.6-50 mg (PERICOLACE) 8.6-50 mg per tablet Take 1 tablet by mouth 2 (two) times daily. 60 tablet 0    valsartan-hydrochlorothiazide (DIOVAN-HCT) 320-12.5 mg per tablet Take 1 tablet by mouth once daily.         PSHx:   Past Surgical History:   Procedure Laterality Date     SECTION      KNEE SURGERY         Allergies:   Review of patient's allergies indicates:  No Known Allergies      SHX:   - Tobacco:  reports that she quit smoking about 2 months ago. Her smoking use included cigarettes. She has a 20.00 pack-year smoking history. She does not have any smokeless tobacco history on file.  - EtOH:  reports current alcohol use.  - Illicit:   Social History     Substance and Sexual Activity   Drug Use No     - Occupation: Data Unavailable    FHx:   family history is not on file.    Current Meds:    Scheduled:    amLODIPine  5 mg Oral Daily    buPROPion  150 mg Oral Daily    busPIRone  10 mg Oral TID    fluticasone furoate-vilanteroL  1 puff Inhalation Daily    gabapentin  300 mg Oral TID    losartan-hydrochlorothiazide 100-12.5 mg  1 tablet Oral Daily    pantoprazole  40 mg Oral Daily    senna-docusate 8.6-50 mg  1 tablet Oral BID       Continuous Infusions:       PRN:   albuterol, benzonatate, Dextrose 10% Bolus, Dextrose 10% Bolus, glucagon (human recombinant), glucose, glucose, HYDROcodone-acetaminophen, oxyCODONE, promethazine-codeine 6.25-10 mg/5 ml, sodium chloride 0.9%    VITAL SIGNS:   Temp:  [98 °F (36.7 °C)-98.4 °F (36.9 °C)]   Pulse:  []   Resp:  [16-24]   BP: (115-137)/(69-89)   SpO2:  [92 %-99 %]         Physical Exam   Gen: well developed, well nourished, frequently coughing, with spells that make her short of breath   HEENT: poor dentition, moist mucosa  conjunctivae/corneas clear. PERRL.   CVS: regular rate and rhythm, no murmur   Chest: diminishesd breath sounds in all right lung fields, occasional rales on right, no wheezing appreciated   Abdomen: soft, non-tender non-distended; bowel sounds normal   Ext: no cyanosis or edema, or clubbing   Skin: no rashes   Neuro: oriented, grossly non-focal            LABS:     Recent Labs   Lab 06/17/20 2010 06/18/20  0443   WBC 6.67 6.83   RBC 3.79* 3.75*   HGB 9.8* 9.5*   HCT 32.8* 33.2*    324   MCV 87 89   MCH 25.9* 25.3*   MCHC 29.9* 28.6*    134*   K 5.1 5.0    99   CO2 26 27   BUN 21* 22*   CREATININE 1.0 0.9   MG  --  1.5*   ALT 16 14   AST 26 20   ALKPHOS 69 68   BILITOT 0.2 0.2   PROT 8.0 7.2   ALBUMIN 2.3* 2.1*   TROPONINI 0.026  --            CXR:    X-ray Chest Ap Portable    Result Date: 6/17/2020  Complete opacification of the right hemithorax stable from the prior. Left lung is clear.            ASSESSMENT/PLAN:   Ms. 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 [place] [facility] [place] with similar symptoms at which time found to have large right sided pleural effusion. Underwent thoracentesis with 5/26 with removal ~ 400cc, pleural fluid studies consistent with exudative effusion, most likely malignant effusion. Pleural fluid was reviewed by pathology which showed large atypical cells however no formal cytology of pleural fluid was performed.  Patient reports she had improvement in SOB following last thoracentesis which lasted ~ 2 weeks. Underwent palliative XRT to right lung 5/27-6/2. Received carboplatin x 1 dose 5/27 as well.Tumor cells with PD-L1 100% expression thus started on immunotherapy and received first dose Keytruda on 6/4.     1) Stage IV adenocarcinoma of the right lung  2)  Malignant pleural effusion, right  3) Chronic hypoxic respiratory failure  4) Cough  - given reaccumulation of fluid, and improvement in symptoms with prior thoracentesis, will proceed with PleurX placement  - NPO @ midnight  - PleurX scheduled for tomorrow at 1pm  - continue albuterol nebulizers for sob  - continue home symbicort  - consider more aggressive measures as alleviating cough symptoms  - discussed plan with daughter Lavern Boyce at bedside (phone # [phone]), answered all questions that arose.     Thank you for this consult, will continue to follow along with you.         Jess Lawton MD  Pulmonary / Critical Care Fellow  Pager: [phone]  06/18/2020  7:34 AM

## 2020-06-18 NOTE — ASSESSMENT & PLAN NOTE
- Continue home Buspirone 10mg TID  - Continue home Bupropion 150mg daily  - Continue home lorazepam qd PRN for anxiety at lower dose 0.5 mg qd out of concern for respiratory status, can increase to home dose after PleurX placement and clinically stable

## 2020-06-18 NOTE — ASSESSMENT & PLAN NOTE
Mass of upper lobe of right lung  Adenocarcinoma of lung, stage 4, right  60 yo female with Stage IV lung adenocarcinoma presents with cough and SOB. Recent admit 5/25-5/29 for similar symptoms, CTA revealed Progression of airway obstruction and postobstructive consolidation with enlargement of the right pleural effusion. S/P Thoracentesis with approximately 500CC removed. Patient with almost total R. Lung collapse 2/2 to mass. CXR on admit with complete opacification of the right hemithorax with some deviation the mediastinum laterally to the left, stable compared to prior imaging. Covid negative, no overt signs of infectious process    - Pulmonology consulted, and agreed that this will likely recur. Plan for PleurX catheter placement on 6/19  - Will benefit from palliative consult  - Hold VTE ppx for until PleurX catheter placed

## 2020-06-18 NOTE — HPI
Ms. 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 Patient states cough has been worse since her last Thoracentesis 3 weeks ago. Patient was admitted with similar symptioms 5/25-5/29 found to have a large R. Sided pleural effsion, S/p thoracentesis with approx 400cc removed. CTA showed narrowing of the right lower lobe lobar bronchus and occlusion of the right upper lobe and right middle lobe lobar bronchi with  postobstructive consolidation. Patient describes her cough as constant with brownish sputum production. Symptoms are associated with R. Sided chest pain with coughing. She denies any fever, chills, headache, nausea and vomiting. Patient complains of orthopnea, currently on home oxygen 2L N/C at home.     In ED, patient alert and oriented on 2.5L N/C saturating 99%. CXR with complete opacification of the right hemithorax with some deviation the mediastinum laterally to the left, however stable from previous CXR.      Oncologic History:    Oncologic History 1. Lung adenocarcinoma    Oncologic Treatment 1. Carboplatin AUC4 x1 dose 5/27/20 and palliative radiation (5/27/2020 - 6/2/2020; 20 Gy to right lung)  2. Pembrolizumab    Pathology 5/5/2020  Right lung, biopsy:  Positive for adenocarcinoma with areas of necrosis.  Interpretation:   Right lung, specimen for PD-L1 immunohistochemistry studies (clone [id number], [facility], [place], [place]) ([id number]): 100% tumor cells are positive for PD-L1 (membranous positivity).     BRAF Mutation Analysis (V600E), Tumor Result Summary: NO MUTATION IDENTIFIED.   Result: BRAF status: Wild-type   Interpretation :     EGFR Gene, Mutation Analysis, Tumor Result Summary: NO MUTATION IDENTIFIED.   Result: EGFR status: Wild-type     ALK (2p23), Lung Cancer, FISH, Ts Result Summary:  NEGATIVE   Interpretation: No rearrangement of the ALK gene was identified.     Result: nuc micha(ALKx3)[29/50]   Of 50 nuclei, 0% had separation of the 3'ALK and 5'ALK signals and 58% had three intact 3'ALK/5'ALK signals. The normal cutoff is <50.0% for one 3'ALK, one 5'ALK and one 3'ALK/5'ALK signal and <18.0% for three 3'ALK/5'ALK signals.     ROS1 (6Q22), FISH, Ts Result Summary: NEGATIVE   Interpretation: No rearrangement of the ROS1 gene was identified.   Result nuc micha(ROS1x1)[44/50]

## 2020-06-18 NOTE — ASSESSMENT & PLAN NOTE
Likely 2/2 to known lung malignancy. CXR with complete opacification of the right hemithorax with some deviation the mediastinum laterally to the left. Dyspnea likely 2/2 mass effect and near collapse of R. lung    - Duonebs q4 for wheezing  - Schedule benzonatate 200 mg TID  - Continue Gabapentin 300mg TID  - Start mucinex-DM syrup PRN for cough  - Acapella q4 while awake

## 2020-06-18 NOTE — H&P
[facility]  Hematology/Oncology  H&P    Patient Name: Janeth Boyce  MRN: [id number]  Admission Date: 6/17/2020  Code Status: DNR   Attending Provider: Nelia Casiano MD  Primary Care Physician: Freeman Caballero MD  Principal Problem:<principal problem not specified>    Subjective:     HPI:   Ms. 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 Patient states cough has been worse since her last Thoracentesis 3 weeks ago. Patient was admitted with similar symptioms 5/25-5/29 found to have a large R. Sided pleural effsion, S/p thoracentesis with approx 400cc removed. CTA showed narrowing of the right lower lobe lobar bronchus and occlusion of the right upper lobe and right middle lobe lobar bronchi with  postobstructive consolidation. Patient describes her cough as constant with brownish sputum production. Symptoms are associated with R. Sided chest pain with coughing. She denies any fever, chills, headache, nausea and vomiting. Patient complains of orthopnea, currently on home oxygen 2L N/C at home.     In ED, patient alert and oriented on 2.5L N/C saturating 99%. CXR with complete opacification of the right hemithorax with some deviation the mediastinum laterally to the left, however stable from previous CXR.      Oncologic History:    Oncologic History 1. Lung adenocarcinoma    Oncologic Treatment 1. Carboplatin AUC4 x1 dose 5/27/20 and palliative radiation (5/27/2020 - 6/2/2020; 20 Gy to right lung)  2. Pembrolizumab    Pathology 5/5/2020  Right lung, biopsy:  Positive for adenocarcinoma with areas of necrosis.  Interpretation:   Right lung, specimen for PD-L1 immunohistochemistry studies (clone [id number], [facility], [place], [place]) ([id number]): 100% tumor cells are positive for PD-L1 (membranous  positivity).     BRAF Mutation Analysis (V600E), Tumor Result Summary: NO MUTATION IDENTIFIED.   Result: BRAF status: Wild-type   Interpretation :     EGFR Gene, Mutation Analysis, Tumor Result Summary: NO MUTATION IDENTIFIED.   Result: EGFR status: Wild-type     ALK (2p23), Lung Cancer, FISH, Ts Result Summary: NEGATIVE   Interpretation: No rearrangement of the ALK gene was identified.     Result: nuc micha(ALKx3)[29/50]   Of 50 nuclei, 0% had separation of the 3'ALK and 5'ALK signals and 58% had three intact 3'ALK/5'ALK signals. The normal cutoff is <50.0% for one 3'ALK, one 5'ALK and one 3'ALK/5'ALK signal and <18.0% for three 3'ALK/5'ALK signals.     ROS1 (6Q22), FISH, Ts Result Summary: NEGATIVE   Interpretation: No rearrangement of the ROS1 gene was identified.   Result nuc micha(ROS1x1)[44/50]         Oncology Treatment Plan:   OP PEMBROLIZUMAB 200MG Q3W    Medications:  Continuous Infusions:  Scheduled Meds:  PRN Meds:Dextrose 10% Bolus, Dextrose 10% Bolus, glucagon (human recombinant), glucose, glucose, sodium chloride 0.9%     Review of patient's allergies indicates:  No Known Allergies     Past Medical History:   Diagnosis Date    Anxiety     Asthma     Hepatitis C 2019    treated    Hx of psychiatric care     Hypertension     Lung cancer     Psychiatric exam requested by authority     Psychiatric problem      Past Surgical History:   Procedure Laterality Date     SECTION      KNEE SURGERY       Family History     None        Tobacco Use    Smoking status: Former Smoker     Packs/day: 0.50     Years: 40.00     Pack years: 20.00     Types: Cigarettes     Quit date: 2020     Years since quittin.1   Substance and Sexual Activity    Alcohol use: Yes     Comment: occasional    Drug use: No    Sexual activity: Not on file       Review of Systems   Constitutional: Negative for activity change, chills and fever.   HENT: Negative for congestion and ear pain.    Eyes: Negative for  photophobia and visual disturbance.   Respiratory: Positive for cough, shortness of breath and wheezing. Negative for chest tightness.    Cardiovascular: Positive for chest pain. Negative for palpitations and leg swelling.   Gastrointestinal: Positive for abdominal pain. Negative for abdominal distention, diarrhea, nausea and vomiting.   Genitourinary: Negative for difficulty urinating and dysuria.   Musculoskeletal: Positive for gait problem. Negative for arthralgias.   Skin: Negative for color change and pallor.   Neurological: Negative for seizures and headaches.   Psychiatric/Behavioral: Negative for agitation and confusion.     Objective:     Vital Signs (Most Recent):  Temp: 98.4 °F (36.9 °C) (06/17/20 1842)  Pulse: 96 (06/17/20 2132)  Resp: 16 (06/17/20 2132)  BP: 137/76 (06/17/20 2132)  SpO2: 99 % (06/17/20 2132) Vital Signs (24h Range):  Temp:  [98.4 °F (36.9 °C)] 98.4 °F (36.9 °C)  Pulse:  [] 96  Resp:  [16-18] 16  SpO2:  [97 %-99 %] 99 %  BP: (115-137)/(70-76) 137/76     Weight: 85.1 kg (187 lb 9.8 oz)  Body mass index is 30.28 kg/m².  Body surface area is 1.99 meters squared.    No intake or output data in the 24 hours ending 06/17/20 2151    Physical Exam  Constitutional:       General: She is not in acute distress.     Appearance: She is obese. She is ill-appearing. She is not diaphoretic.   HENT:      Head: Normocephalic and atraumatic.      Nose: Nose normal.      Mouth/Throat:      Mouth: Mucous membranes are moist.   Eyes:      General: No scleral icterus.     Pupils: Pupils are equal, round, and reactive to light.   Cardiovascular:      Rate and Rhythm: Regular rhythm. Tachycardia present.      Pulses: Normal pulses.      Heart sounds: Normal heart sounds.   Pulmonary:      Effort: Pulmonary effort is normal.      Breath sounds: Decreased air movement present. Examination of the right-upper field reveals decreased breath sounds. Examination of the right-middle field reveals decreased breath  sounds. Examination of the right-lower field reveals decreased breath sounds. Decreased breath sounds and wheezing present.   Abdominal:      General: Bowel sounds are normal.      Tenderness: There is no abdominal tenderness. There is no guarding.   Musculoskeletal:         General: No swelling or tenderness.      Right lower leg: No edema.      Left lower leg: No edema.   Skin:     General: Skin is warm.      Coloration: Skin is not jaundiced.   Neurological:      General: No focal deficit present.      Mental Status: She is alert and oriented to person, place, and time.   Psychiatric:         Mood and Affect: Mood normal.         Significant Labs:   CBC:   Recent Labs   Lab 06/17/20 2010   WBC 6.67   HGB 9.8*   HCT 32.8*      , CMP:   Recent Labs   Lab 06/17/20 2010      K 5.1      CO2 26      BUN 21*   CREATININE 1.0   CALCIUM 8.9   PROT 8.0   ALBUMIN 2.3*   BILITOT 0.2   ALKPHOS 69   AST 26   ALT 16   ANIONGAP 10   EGFRNONAA >60.0    and Urine Studies: No results for input(s): COLORU, APPEARANCEUA, PHUR, SPECGRAV, PROTEINUA, GLUCUA, KETONESU, BILIRUBINUA, OCCULTUA, NITRITE, UROBILINOGEN, LEUKOCYTESUR, RBCUA, WBCUA, BACTERIA, SQUAMEPITHEL, HYALINECASTS in the last 48 hours.    Invalid input(s): WRIGHTSUR    Diagnostic Results:  Complete opacification of the right hemithorax with some deviation the mediastinum laterally to the left.    Assessment/Plan:     * Pleural effusion  60 yo female with Stage IV lung adenocarcinoma presents with cough and SOB. Recent admit 5/25-5/29 for similar symptoms, CTA revealed Progression of airway obstruction and postobstructive consolidation with enlargement of the right pleural effusion. S/P Thoracentesis with approximately 500CC removed. Patient with almost total R. Lung collapse 2/2 to mass. CXR on admit with complete opacification of the right hemithorax with some deviation the mediastinum laterally to the left, stable compared to prior imaging.  -  Covid negative, no overt signs of infectious process  Plan  - Unfortunately pt will likely continue to accumulate pleural fluid, would consult pulmonology for possible thoracentesis. Patient may however benefit from a palliative Pleurx placement (in vs. Outpatient).  - Patient undergoing immunotherapy, palliative radiation therapy for the lung cancer. Given the obstructive nature of the mass, she will continue to have cough.  - Will benefit from palliative consult.  - Continue supplemental oxygen  - Hold VTE ppx for possible thoracentesis tomorrow, resume afterwards.    GERD (gastroesophageal reflux disease)  - Continue Protonix 40mg daily    Anxiety  - Continue home Buspirone 10mg TID  - Continue home Bupropion 150mg daily    Cough  Likely 2/2 to known lung malignancy  CXR with complete opacification of the right hemithorax with some deviation the mediastinum laterally to the left.  Dyspnea likely 2/2 mass effect and near collapse of R. lung    Plan  - Continue breathing treatments/ bronchodilator therapy  - Continue Tessalon pearls PRN  - Continue Gabapentin 300mg TID  - Start Codeine 15mg q4h for cough suppression         Essential hypertension  - Continue home norvasc 5mg  - Start Losartan-HCTZ, alternate to home Valsartan/HCTZ        Talya Cade DO  Hematology/Oncology  [facility]

## 2020-06-18 NOTE — ASSESSMENT & PLAN NOTE
58 yo female with Stage IV lung adenocarcinoma presents with cough and SOB. Recent admit 5/25-5/29 for similar symptoms, CTA revealed Progression of airway obstruction and postobstructive consolidation with enlargement of the right pleural effusion. S/P Thoracentesis with approximately 500CC removed. Patient with almost total R. Lung collapse 2/2 to mass. CXR on admit with complete opacification of the right hemithorax with some deviation the mediastinum laterally to the left, stable compared to prior imaging.  - Covid negative, no overt signs of infectious process  Plan  - Unfortunately pt will likely continue to accumulate pleural fluid, would consult pulmonology for possible thoracentesis. Patient may however benefit from a palliative Pleurx placement (in vs. Outpatient).  - Patient undergoing immunotherapy, palliative radiation therapy for the lung cancer. Given the obstructive nature of the mass, she will continue to have cough.  - Will benefit from palliative consult.  - Continue supplemental oxygen  - Hold VTE ppx for possible thoracentesis tomorrow, resume afterwards.

## 2020-06-18 NOTE — PROGRESS NOTES
[facility]  Hematology/Oncology  Progress Note    Patient Name: Janeth Boyce  Admission Date: 6/17/2020  Hospital Length of Stay: 0 days  Code Status: DNR     Subjective:     HPI:  Ms. 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 Patient states cough has been worse since her last Thoracentesis 3 weeks ago. Patient was admitted with similar symptioms 5/25-5/29 found to have a large R. Sided pleural effsion, S/p thoracentesis with approx 400cc removed. CTA showed narrowing of the right lower lobe lobar bronchus and occlusion of the right upper lobe and right middle lobe lobar bronchi with  postobstructive consolidation. Patient describes her cough as constant with brownish sputum production. Symptoms are associated with R. Sided chest pain with coughing. She denies any fever, chills, headache, nausea and vomiting. Patient complains of orthopnea, currently on home oxygen 2L N/C at home.     In ED, patient alert and oriented on 2.5L N/C saturating 99%. CXR with complete opacification of the right hemithorax with some deviation the mediastinum laterally to the left, however stable from previous CXR.      Oncologic History:    Oncologic History 1. Lung adenocarcinoma    Oncologic Treatment 1. Carboplatin AUC4 x1 dose 5/27/20 and palliative radiation (5/27/2020 - 6/2/2020; 20 Gy to right lung)  2. Pembrolizumab    Pathology 5/5/2020  Right lung, biopsy:  Positive for adenocarcinoma with areas of necrosis.  Interpretation:   Right lung, specimen for PD-L1 immunohistochemistry studies (clone [id number], [facility], [place], [place]) ([id number]): 100% tumor cells are positive for PD-L1 (membranous positivity).     BRAF Mutation Analysis (V600E), Tumor Result Summary: NO MUTATION IDENTIFIED.   Result: BRAF  status: Wild-type   Interpretation :     EGFR Gene, Mutation Analysis, Tumor Result Summary: NO MUTATION IDENTIFIED.   Result: EGFR status: Wild-type     ALK (2p23), Lung Cancer, FISH, Ts Result Summary: NEGATIVE   Interpretation: No rearrangement of the ALK gene was identified.     Result: nuc micha(ALKx3)[29/50]   Of 50 nuclei, 0% had separation of the 3'ALK and 5'ALK signals and 58% had three intact 3'ALK/5'ALK signals. The normal cutoff is <50.0% for one 3'ALK, one 5'ALK and one 3'ALK/5'ALK signal and <18.0% for three 3'ALK/5'ALK signals.     ROS1 (6Q22), FISH, Ts Result Summary: NEGATIVE   Interpretation: No rearrangement of the ROS1 gene was identified.   Result nuc micha(ROS1x1)[44/50]        Hospital Course: Patient remained stable on home O2. Cough treated symptomatically. Pulmonology consulted, recommended PleurX catheter and patient was amenable to this, scheduled for 6/19.    Interval History: No acute events overnight. Vitals remain stable and O2 saturations in the 90%'s on 2-3L. Cough continues to be productive of green-brown sputum, and patient complaining of sternal pain while coughing. Discussed the etiology of the patient's pleural effusion and that because it is related to her cancer that it is likely to recur. She understood this and was amenable to the PleurX catheter placement.    Oncology Treatment Plan:   OP PEMBROLIZUMAB 200MG Q3W    Medications:  Continuous Infusions:  Scheduled Meds:   amLODIPine  5 mg Oral Daily    benzonatate  200 mg Oral TID    buPROPion  150 mg Oral Daily    busPIRone  10 mg Oral TID    fluticasone furoate-vilanteroL  1 puff Inhalation Daily    gabapentin  300 mg Oral TID    hydroCHLOROthiazide  12.5 mg Oral Daily    magnesium sulfate IVPB  2 g Intravenous Once    pantoprazole  40 mg Oral Daily    senna-docusate 8.6-50 mg  1 tablet Oral BID    valsartan  320 mg Oral Daily     PRN Meds:albuterol-ipratropium, dextromethorphan-guaifenesin  mg/5 ml, Dextrose  10% Bolus, Dextrose 10% Bolus, glucagon (human recombinant), glucose, glucose, HYDROcodone-acetaminophen, LORazepam, oxyCODONE, sodium chloride 0.9%     Review of Systems   Constitutional: Negative for activity change, chills and fever.   HENT: Negative for congestion and ear pain.    Eyes: Negative for photophobia and visual disturbance.   Respiratory: Positive for cough, shortness of breath and wheezing. Negative for chest tightness.    Cardiovascular: Positive for chest pain. Negative for palpitations and leg swelling.   Gastrointestinal: Positive for abdominal pain. Negative for abdominal distention, diarrhea, nausea and vomiting.   Genitourinary: Negative for difficulty urinating and dysuria.   Musculoskeletal: Positive for gait problem. Negative for arthralgias.   Skin: Negative for color change and pallor.   Neurological: Negative for seizures and headaches.   Psychiatric/Behavioral: Negative for agitation and confusion.     Objective:     Vital Signs (Most Recent):  Temp: 99.1 °F (37.3 °C) (06/18/20 1114)  Pulse: 107 (06/18/20 1114)  Resp: 15 (06/18/20 1114)  BP: 118/73 (06/18/20 1114)  SpO2: 98 % (06/18/20 1114) Vital Signs (24h Range):  Temp:  [98 °F (36.7 °C)-99.1 °F (37.3 °C)] 99.1 °F (37.3 °C)  Pulse:  [] 107  Resp:  [15-24] 15  SpO2:  [92 %-99 %] 98 %  BP: (115-137)/(69-89) 118/73     Weight: 84.5 kg (186 lb 4.6 oz)  Body mass index is 30.07 kg/m².  Body surface area is 1.98 meters squared.    No intake or output data in the 24 hours ending 06/18/20 1409    Physical Exam  Constitutional:       General: She is not in acute distress.     Appearance: She is obese. She is ill-appearing. She is not diaphoretic.   HENT:      Head: Normocephalic and atraumatic.      Nose: Nose normal.      Mouth/Throat:      Mouth: Mucous membranes are moist.   Eyes:      General: No scleral icterus.     Pupils: Pupils are equal, round, and reactive to light.   Cardiovascular:      Rate and Rhythm: Normal rate and  regular rhythm.      Pulses: Normal pulses.      Heart sounds: Normal heart sounds.   Pulmonary:      Effort: Pulmonary effort is normal.      Breath sounds: Decreased air movement present. Decreased breath sounds (Entire right lung and LLL) and wheezing present.   Abdominal:      General: Bowel sounds are normal.      Tenderness: There is no abdominal tenderness. There is no guarding.   Musculoskeletal:         General: No swelling or tenderness.      Right lower leg: No edema.      Left lower leg: No edema.   Skin:     General: Skin is warm.      Coloration: Skin is not jaundiced.   Neurological:      General: No focal deficit present.      Mental Status: She is alert and oriented to person, place, and time.   Psychiatric:         Mood and Affect: Mood normal.         Significant Labs:   CBC:   Recent Labs   Lab 06/17/20 2010 06/18/20  0443   WBC 6.67 6.83   HGB 9.8* 9.5*   HCT 32.8* 33.2*    324   , CMP:   Recent Labs   Lab 06/17/20 2010 06/18/20  0443    134*   K 5.1 5.0    99   CO2 26 27    99   BUN 21* 22*   CREATININE 1.0 0.9   CALCIUM 8.9 8.7   PROT 8.0 7.2   ALBUMIN 2.3* 2.1*   BILITOT 0.2 0.2   ALKPHOS 69 68   AST 26 20   ALT 16 14   ANIONGAP 10 8   EGFRNONAA >60.0 >60.0    and All pertinent labs from the last 24 hours have been reviewed.    Diagnostic Results:  I have reviewed all pertinent imaging results/findings within the past 24 hours.    Assessment/Plan:     * Pleural effusion  Mass of upper lobe of right lung  Adenocarcinoma of lung, stage 4, right  60 yo female with Stage IV lung adenocarcinoma presents with cough and SOB. Recent admit 5/25-5/29 for similar symptoms, CTA revealed Progression of airway obstruction and postobstructive consolidation with enlargement of the right pleural effusion. S/P Thoracentesis with approximately 500CC removed. Patient with almost total R. Lung collapse 2/2 to mass. CXR on admit with complete opacification of the right hemithorax with  some deviation the mediastinum laterally to the left, stable compared to prior imaging. Covid negative, no overt signs of infectious process    - Pulmonology consulted, and agreed that this will likely recur. Plan for PleurX catheter placement on 6/19  - Will benefit from palliative consult  - Hold VTE ppx for until PleurX catheter placed    Chronic respiratory failure with hypoxia  Asthma  - Continue home Symbicort  - Continue home supplemental O2  - Duonebs PRN    GERD (gastroesophageal reflux disease)  - Continue pantoprazole 40 mg qd    Anxiety  - Continue home Buspirone 10mg TID  - Continue home Bupropion 150mg daily  - Continue home lorazepam qd PRN for anxiety at lower dose 0.5 mg qd out of concern for respiratory status, can increase to home dose after PleurX placement and clinically stable    Cough  Likely 2/2 to known lung malignancy. CXR with complete opacification of the right hemithorax with some deviation the mediastinum laterally to the left. Dyspnea likely 2/2 mass effect and near collapse of R. lung    - Duonebs q4 for wheezing  - Schedule benzonatate 200 mg TID  - Continue Gabapentin 300mg TID  - Start mucinex-DM syrup PRN for cough  - Acapella q4 while awake    Essential hypertension  - Continue home amlodipine 5 mg qd  - Continue home valsartan 320 mg qd and HCTZ 12.5 mg qd             Ryan Tristan DO  Hematology/Oncology  [facility]

## 2020-06-18 NOTE — SUBJECTIVE & OBJECTIVE
Oncology Treatment Plan:   OP PEMBROLIZUMAB 200MG Q3W    Medications:  Continuous Infusions:  Scheduled Meds:  PRN Meds:Dextrose 10% Bolus, Dextrose 10% Bolus, glucagon (human recombinant), glucose, glucose, sodium chloride 0.9%     Review of patient's allergies indicates:  No Known Allergies     Past Medical History:   Diagnosis Date    Anxiety     Asthma     Hepatitis C 2019    treated    Hx of psychiatric care     Hypertension     Lung cancer     Psychiatric exam requested by authority     Psychiatric problem      Past Surgical History:   Procedure Laterality Date     SECTION      KNEE SURGERY       Family History     None        Tobacco Use    Smoking status: Former Smoker     Packs/day: 0.50     Years: 40.00     Pack years: 20.00     Types: Cigarettes     Quit date: 2020     Years since quittin.1   Substance and Sexual Activity    Alcohol use: Yes     Comment: occasional    Drug use: No    Sexual activity: Not on file       Review of Systems   Constitutional: Negative for activity change, chills and fever.   HENT: Negative for congestion and ear pain.    Eyes: Negative for photophobia and visual disturbance.   Respiratory: Positive for cough, shortness of breath and wheezing. Negative for chest tightness.    Cardiovascular: Positive for chest pain. Negative for palpitations and leg swelling.   Gastrointestinal: Positive for abdominal pain. Negative for abdominal distention, diarrhea, nausea and vomiting.   Genitourinary: Negative for difficulty urinating and dysuria.   Musculoskeletal: Positive for gait problem. Negative for arthralgias.   Skin: Negative for color change and pallor.   Neurological: Negative for seizures and headaches.   Psychiatric/Behavioral: Negative for agitation and confusion.     Objective:     Vital Signs (Most Recent):  Temp: 98.4 °F (36.9 °C) (20)  Pulse: 96 (20)  Resp: 16 (20)  BP: 137/76 (20)  SpO2: 99 %  (06/17/20 2132) Vital Signs (24h Range):  Temp:  [98.4 °F (36.9 °C)] 98.4 °F (36.9 °C)  Pulse:  [] 96  Resp:  [16-18] 16  SpO2:  [97 %-99 %] 99 %  BP: (115-137)/(70-76) 137/76     Weight: 85.1 kg (187 lb 9.8 oz)  Body mass index is 30.28 kg/m².  Body surface area is 1.99 meters squared.    No intake or output data in the 24 hours ending 06/17/20 2151    Physical Exam  Constitutional:       General: She is not in acute distress.     Appearance: She is obese. She is ill-appearing. She is not diaphoretic.   HENT:      Head: Normocephalic and atraumatic.      Nose: Nose normal.      Mouth/Throat:      Mouth: Mucous membranes are moist.   Eyes:      General: No scleral icterus.     Pupils: Pupils are equal, round, and reactive to light.   Cardiovascular:      Rate and Rhythm: Regular rhythm. Tachycardia present.      Pulses: Normal pulses.      Heart sounds: Normal heart sounds.   Pulmonary:      Effort: Pulmonary effort is normal.      Breath sounds: Decreased air movement present. Examination of the right-upper field reveals decreased breath sounds. Examination of the right-middle field reveals decreased breath sounds. Examination of the right-lower field reveals decreased breath sounds. Decreased breath sounds and wheezing present.   Abdominal:      General: Bowel sounds are normal.      Tenderness: There is no abdominal tenderness. There is no guarding.   Musculoskeletal:         General: No swelling or tenderness.      Right lower leg: No edema.      Left lower leg: No edema.   Skin:     General: Skin is warm.      Coloration: Skin is not jaundiced.   Neurological:      General: No focal deficit present.      Mental Status: She is alert and oriented to person, place, and time.   Psychiatric:         Mood and Affect: Mood normal.         Significant Labs:   CBC:   Recent Labs   Lab 06/17/20 2010   WBC 6.67   HGB 9.8*   HCT 32.8*      , CMP:   Recent Labs   Lab 06/17/20 2010      K 5.1       CO2 26      BUN 21*   CREATININE 1.0   CALCIUM 8.9   PROT 8.0   ALBUMIN 2.3*   BILITOT 0.2   ALKPHOS 69   AST 26   ALT 16   ANIONGAP 10   EGFRNONAA >60.0    and Urine Studies: No results for input(s): COLORU, APPEARANCEUA, PHUR, SPECGRAV, PROTEINUA, GLUCUA, KETONESU, BILIRUBINUA, OCCULTUA, NITRITE, UROBILINOGEN, LEUKOCYTESUR, RBCUA, WBCUA, BACTERIA, SQUAMEPITHEL, HYALINECASTS in the last 48 hours.    Invalid input(s): WRIGHTSUR    Diagnostic Results:  Complete opacification of the right hemithorax with some deviation the mediastinum laterally to the left.

## 2020-06-19 VITALS
SYSTOLIC BLOOD PRESSURE: 118 MMHG | BODY MASS INDEX: 29.94 KG/M2 | OXYGEN SATURATION: 93 % | HEIGHT: 66 IN | RESPIRATION RATE: 17 BRPM | TEMPERATURE: 98 F | HEART RATE: 95 BPM | WEIGHT: 186.31 LBS | DIASTOLIC BLOOD PRESSURE: 82 MMHG

## 2020-06-19 LAB
ALBUMIN SERPL BCP-MCNC: 2.1 G/DL (ref 3.5–5.2)
ALP SERPL-CCNC: 75 U/L (ref 55–135)
ALT SERPL W/O P-5'-P-CCNC: 15 U/L (ref 10–44)
ANION GAP SERPL CALC-SCNC: 9 MMOL/L (ref 8–16)
AST SERPL-CCNC: 18 U/L (ref 10–40)
BASOPHILS # BLD AUTO: 0.06 K/UL (ref 0–0.2)
BASOPHILS NFR BLD: 0.9 % (ref 0–1.9)
BILIRUB SERPL-MCNC: 0.2 MG/DL (ref 0.1–1)
BUN SERPL-MCNC: 21 MG/DL (ref 6–20)
CALCIUM SERPL-MCNC: 9.4 MG/DL (ref 8.7–10.5)
CHLORIDE SERPL-SCNC: 100 MMOL/L (ref 95–110)
CO2 SERPL-SCNC: 29 MMOL/L (ref 23–29)
CREAT SERPL-MCNC: 1 MG/DL (ref 0.5–1.4)
DIFFERENTIAL METHOD: ABNORMAL
EOSINOPHIL # BLD AUTO: 0.5 K/UL (ref 0–0.5)
EOSINOPHIL NFR BLD: 7.9 % (ref 0–8)
ERYTHROCYTE [DISTWIDTH] IN BLOOD BY AUTOMATED COUNT: 15.6 % (ref 11.5–14.5)
EST. GFR  (AFRICAN AMERICAN): >60 ML/MIN/1.73 M^2
EST. GFR  (NON AFRICAN AMERICAN): >60 ML/MIN/1.73 M^2
GLUCOSE SERPL-MCNC: 120 MG/DL (ref 70–110)
HCT VFR BLD AUTO: 34.8 % (ref 37–48.5)
HGB BLD-MCNC: 9.9 G/DL (ref 12–16)
IMM GRANULOCYTES # BLD AUTO: 0.06 K/UL (ref 0–0.04)
IMM GRANULOCYTES NFR BLD AUTO: 0.9 % (ref 0–0.5)
LYMPHOCYTES # BLD AUTO: 2 K/UL (ref 1–4.8)
LYMPHOCYTES NFR BLD: 30.2 % (ref 18–48)
MAGNESIUM SERPL-MCNC: 1.9 MG/DL (ref 1.6–2.6)
MCH RBC QN AUTO: 25.6 PG (ref 27–31)
MCHC RBC AUTO-ENTMCNC: 28.4 G/DL (ref 32–36)
MCV RBC AUTO: 90 FL (ref 82–98)
MONOCYTES # BLD AUTO: 1.1 K/UL (ref 0.3–1)
MONOCYTES NFR BLD: 16.6 % (ref 4–15)
NEUTROPHILS # BLD AUTO: 2.8 K/UL (ref 1.8–7.7)
NEUTROPHILS NFR BLD: 43.5 % (ref 38–73)
NRBC BLD-RTO: 0 /100 WBC
PHOSPHATE SERPL-MCNC: 3.5 MG/DL (ref 2.7–4.5)
PLATELET # BLD AUTO: 346 K/UL (ref 150–350)
PMV BLD AUTO: 9.1 FL (ref 9.2–12.9)
POTASSIUM SERPL-SCNC: 4.9 MMOL/L (ref 3.5–5.1)
PROT SERPL-MCNC: 7.6 G/DL (ref 6–8.4)
RBC # BLD AUTO: 3.86 M/UL (ref 4–5.4)
SODIUM SERPL-SCNC: 138 MMOL/L (ref 136–145)
WBC # BLD AUTO: 6.49 K/UL (ref 3.9–12.7)

## 2020-06-19 PROCEDURE — 25000003 PHARM REV CODE 250: Performed by: STUDENT IN AN ORGANIZED HEALTH CARE EDUCATION/TRAINING PROGRAM

## 2020-06-19 PROCEDURE — 99224 PR SUBSEQUENT OBSERVATION CARE,LEVEL I: ICD-10-PCS | Mod: ,,, | Performed by: INTERNAL MEDICINE

## 2020-06-19 PROCEDURE — 94664 DEMO&/EVAL PT USE INHALER: CPT

## 2020-06-19 PROCEDURE — 80053 COMPREHEN METABOLIC PANEL: CPT

## 2020-06-19 PROCEDURE — 94640 AIRWAY INHALATION TREATMENT: CPT

## 2020-06-19 PROCEDURE — 83735 ASSAY OF MAGNESIUM: CPT

## 2020-06-19 PROCEDURE — G0378 HOSPITAL OBSERVATION PER HR: HCPCS

## 2020-06-19 PROCEDURE — 27000646 HC AEROBIKA DEVICE

## 2020-06-19 PROCEDURE — C1729 CATH, DRAINAGE: HCPCS | Performed by: INTERNAL MEDICINE

## 2020-06-19 PROCEDURE — 32550 INSERT PLEURAL CATH: CPT | Performed by: INTERNAL MEDICINE

## 2020-06-19 PROCEDURE — 25000242 PHARM REV CODE 250 ALT 637 W/ HCPCS: Performed by: STUDENT IN AN ORGANIZED HEALTH CARE EDUCATION/TRAINING PROGRAM

## 2020-06-19 PROCEDURE — 94761 N-INVAS EAR/PLS OXIMETRY MLT: CPT

## 2020-06-19 PROCEDURE — 99152 MOD SED SAME PHYS/QHP 5/>YRS: CPT | Performed by: INTERNAL MEDICINE

## 2020-06-19 PROCEDURE — 63600175 PHARM REV CODE 636 W HCPCS: Performed by: INTERNAL MEDICINE

## 2020-06-19 PROCEDURE — 32556 PR PLR DRN W/CATH PL W/O IMG: ICD-10-PCS | Mod: RT,,, | Performed by: INTERNAL MEDICINE

## 2020-06-19 PROCEDURE — 85025 COMPLETE CBC W/AUTO DIFF WBC: CPT

## 2020-06-19 PROCEDURE — 84100 ASSAY OF PHOSPHORUS: CPT

## 2020-06-19 PROCEDURE — 27000489 HC PLEURY PATIENT STARTER KIT: Performed by: INTERNAL MEDICINE

## 2020-06-19 PROCEDURE — 32556 INSERT CATH PLEURA W/O IMAGE: CPT | Mod: RT,,, | Performed by: INTERNAL MEDICINE

## 2020-06-19 PROCEDURE — 99224 PR SUBSEQUENT OBSERVATION CARE,LEVEL I: CPT | Mod: ,,, | Performed by: INTERNAL MEDICINE

## 2020-06-19 PROCEDURE — 36415 COLL VENOUS BLD VENIPUNCTURE: CPT

## 2020-06-19 PROCEDURE — 27000221 HC OXYGEN, UP TO 24 HOURS

## 2020-06-19 PROCEDURE — 99900035 HC TECH TIME PER 15 MIN (STAT)

## 2020-06-19 PROCEDURE — 32557 INSERT CATH PLEURA W/ IMAGE: CPT | Performed by: INTERNAL MEDICINE

## 2020-06-19 PROCEDURE — 99153 MOD SED SAME PHYS/QHP EA: CPT | Performed by: INTERNAL MEDICINE

## 2020-06-19 RX ORDER — IRBESARTAN 300 MG/1
300 TABLET ORAL DAILY
Status: DISCONTINUED | OUTPATIENT
Start: 2020-06-19 | End: 2020-06-19 | Stop reason: HOSPADM

## 2020-06-19 RX ORDER — MIDAZOLAM HYDROCHLORIDE 5 MG/ML
INJECTION INTRAMUSCULAR; INTRAVENOUS CODE/TRAUMA/SEDATION MEDICATION
Status: DISCONTINUED | OUTPATIENT
Start: 2020-06-19 | End: 2020-06-19 | Stop reason: HOSPADM

## 2020-06-19 RX ORDER — GABAPENTIN 400 MG/1
400 CAPSULE ORAL 3 TIMES DAILY
Status: DISCONTINUED | OUTPATIENT
Start: 2020-06-19 | End: 2020-06-19 | Stop reason: HOSPADM

## 2020-06-19 RX ORDER — FENTANYL CITRATE 50 UG/ML
INJECTION, SOLUTION INTRAMUSCULAR; INTRAVENOUS CODE/TRAUMA/SEDATION MEDICATION
Status: DISCONTINUED | OUTPATIENT
Start: 2020-06-19 | End: 2020-06-19 | Stop reason: HOSPADM

## 2020-06-19 RX ORDER — ACETAMINOPHEN AND CODEINE PHOSPHATE 120; 12 MG/5ML; MG/5ML
5 SOLUTION ORAL ONCE
Status: DISCONTINUED | OUTPATIENT
Start: 2020-06-19 | End: 2020-06-19 | Stop reason: HOSPADM

## 2020-06-19 RX ADMIN — BUPROPION HYDROCHLORIDE 150 MG: 150 TABLET, FILM COATED, EXTENDED RELEASE ORAL at 08:06

## 2020-06-19 RX ADMIN — HYDROCODONE BITARTRATE AND ACETAMINOPHEN 1 TABLET: 5; 325 TABLET ORAL at 09:06

## 2020-06-19 RX ADMIN — HYDROCODONE BITARTRATE AND ACETAMINOPHEN 1 TABLET: 5; 325 TABLET ORAL at 12:06

## 2020-06-19 RX ADMIN — GUAIFENESIN AND DEXTROMETHORPHAN 5 ML: 100; 10 SYRUP ORAL at 05:06

## 2020-06-19 RX ADMIN — GABAPENTIN 400 MG: 400 CAPSULE ORAL at 08:06

## 2020-06-19 RX ADMIN — FLUTICASONE FUROATE AND VILANTEROL TRIFENATATE 1 PUFF: 100; 25 POWDER RESPIRATORY (INHALATION) at 09:06

## 2020-06-19 RX ADMIN — HYDROCHLOROTHIAZIDE 12.5 MG: 12.5 TABLET ORAL at 08:06

## 2020-06-19 RX ADMIN — GUAIFENESIN AND DEXTROMETHORPHAN 5 ML: 100; 10 SYRUP ORAL at 12:06

## 2020-06-19 RX ADMIN — AMLODIPINE BESYLATE 5 MG: 5 TABLET ORAL at 08:06

## 2020-06-19 RX ADMIN — FENTANYL CITRATE 50 MCG: 50 INJECTION, SOLUTION INTRAMUSCULAR; INTRAVENOUS at 11:06

## 2020-06-19 RX ADMIN — OXYCODONE HYDROCHLORIDE 5 MG: 5 TABLET ORAL at 12:06

## 2020-06-19 RX ADMIN — MIDAZOLAM HYDROCHLORIDE 1 MG: 5 INJECTION, SOLUTION INTRAMUSCULAR; INTRAVENOUS at 11:06

## 2020-06-19 RX ADMIN — IPRATROPIUM BROMIDE AND ALBUTEROL SULFATE 3 ML: .5; 3 SOLUTION RESPIRATORY (INHALATION) at 12:06

## 2020-06-19 RX ADMIN — BUSPIRONE HYDROCHLORIDE 10 MG: 5 TABLET ORAL at 08:06

## 2020-06-19 RX ADMIN — FENTANYL CITRATE 25 MCG: 50 INJECTION, SOLUTION INTRAMUSCULAR; INTRAVENOUS at 11:06

## 2020-06-19 RX ADMIN — PANTOPRAZOLE SODIUM 40 MG: 40 TABLET, DELAYED RELEASE ORAL at 09:06

## 2020-06-19 RX ADMIN — GABAPENTIN 400 MG: 400 CAPSULE ORAL at 02:06

## 2020-06-19 RX ADMIN — DOCUSATE SODIUM 50 MG AND SENNOSIDES 8.6 MG 1 TABLET: 8.6; 5 TABLET, FILM COATED ORAL at 08:06

## 2020-06-19 RX ADMIN — HYDROCODONE BITARTRATE AND ACETAMINOPHEN 1 TABLET: 5; 325 TABLET ORAL at 06:06

## 2020-06-19 RX ADMIN — BENZONATATE 200 MG: 100 CAPSULE ORAL at 02:06

## 2020-06-19 RX ADMIN — BENZONATATE 200 MG: 100 CAPSULE ORAL at 08:06

## 2020-06-19 RX ADMIN — BUSPIRONE HYDROCHLORIDE 10 MG: 5 TABLET ORAL at 02:06

## 2020-06-19 RX ADMIN — IRBESARTAN 300 MG: 300 TABLET, FILM COATED ORAL at 08:06

## 2020-06-19 NOTE — DISCHARGE SUMMARY
[facility]  Hematology/Oncology  Discharge Summary      Patient Name: Janeth Boyce  MRN: [id number]  Admission Date: 6/17/2020  Hospital Length of Stay: 0 days  Discharge Date and Time:  06/19/2020 11:32 AM  Attending Physician: Eunice Roche MD   Discharging Provider: Ryan Tristan DO  Primary Care Provider: Freeman Caballero MD    HPI:   Ms. 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 Patient states cough has been worse since her last Thoracentesis 3 weeks ago. Patient was admitted with similar symptioms 5/25-5/29 found to have a large R. Sided pleural effsion, S/p thoracentesis with approx 400cc removed. CTA showed narrowing of the right lower lobe lobar bronchus and occlusion of the right upper lobe and right middle lobe lobar bronchi with  postobstructive consolidation. Patient describes her cough as constant with brownish sputum production. Symptoms are associated with R. Sided chest pain with coughing. She denies any fever, chills, headache, nausea and vomiting. Patient complains of orthopnea, currently on home oxygen 2L N/C at home.     In ED, patient alert and oriented on 2.5L N/C saturating 99%. CXR with complete opacification of the right hemithorax with some deviation the mediastinum laterally to the left, however stable from previous CXR.      Oncologic History:    Oncologic History 1. Lung adenocarcinoma    Oncologic Treatment 1. Carboplatin AUC4 x1 dose 5/27/20 and palliative radiation (5/27/2020 - 6/2/2020; 20 Gy to right lung)  2. Pembrolizumab    Pathology 5/5/2020  Right lung, biopsy:  Positive for adenocarcinoma with areas of necrosis.  Interpretation:   Right lung, specimen for PD-L1 immunohistochemistry studies (clone [id number], [facility], [place], [place]) ([id number]):  100% tumor cells are positive for PD-L1 (membranous positivity).     BRAF Mutation Analysis (V600E), Tumor Result Summary: NO MUTATION IDENTIFIED.   Result: BRAF status: Wild-type   Interpretation :     EGFR Gene, Mutation Analysis, Tumor Result Summary: NO MUTATION IDENTIFIED.   Result: EGFR status: Wild-type     ALK (2p23), Lung Cancer, FISH, Ts Result Summary: NEGATIVE   Interpretation: No rearrangement of the ALK gene was identified.     Result: nuc micha(ALKx3)[29/50]   Of 50 nuclei, 0% had separation of the 3'ALK and 5'ALK signals and 58% had three intact 3'ALK/5'ALK signals. The normal cutoff is <50.0% for one 3'ALK, one 5'ALK and one 3'ALK/5'ALK signal and <18.0% for three 3'ALK/5'ALK signals.     ROS1 (6Q22), FISH, Ts Result Summary: NEGATIVE   Interpretation: No rearrangement of the ROS1 gene was identified.   Result nuc micha(ROS1x1)[44/50]          Hospital Course: Patient remained stable on home O2. Cough treated symptomatically. Pulmonology consulted, recommended PleurX catheter and patient was amenable to this, placed on 6/19 and drained 850 mLs. Patient had costochondritis chest pain and cough throughout admission, treated symptomatically. Patient was discharged with home health PleurX care.    Consults:   Consults (From admission, onward)        Status Ordering Provider     [facility]  Once     Provider:  (Not yet assigned)    Completed APRIL MELTON     Inpatient consult to Pulmonology  Once     Provider:  (Not yet assigned)    Completed LUCY NAIR          Vitals:    06/19/20 1118 06/19/20 1125 06/19/20 1127 06/19/20 1131   BP: 127/76 127/73 125/74 121/71   BP Location:       Patient Position:       Pulse: 93 91 92 94   Resp: 19 16 17 20   Temp:       TempSrc:       SpO2: (!) 94% 96% 95% (!) 92%   Weight:       Height:         Physical Exam  Constitutional:       General: She is not in acute distress.     Appearance: She is obese. She is ill-appearing. She is not  diaphoretic.   HENT:      Head: Normocephalic and atraumatic.      Nose: Nose normal.      Mouth/Throat:      Mouth: Mucous membranes are moist.   Eyes:      General: No scleral icterus.     Pupils: Pupils are equal, round, and reactive to light.   Cardiovascular:      Rate and Rhythm: Normal rate and regular rhythm.      Pulses: Normal pulses.      Heart sounds: Normal heart sounds.   Pulmonary: Right lateral chest Pleurx catheter with overlying dressing     Effort: Pulmonary effort is normal.      Breath sounds: Decreased air movement present. Decreased breath sounds (Entire right lung and LLL) and wheezing present.   Abdominal:      General: Bowel sounds are normal.      Tenderness: There is no abdominal tenderness. There is no guarding.   Musculoskeletal:         General: No swelling or tenderness.      Right lower leg: No edema.      Left lower leg: No edema.   Skin:     General: Skin is warm.      Coloration: Skin is not jaundiced.   Neurological:      General: No focal deficit present.      Mental Status: She is alert and oriented to person, place, and time.   Psychiatric:         Mood and Affect: Mood normal.       Significant Diagnostic Studies: Labs:   CMP   Recent Labs   Lab 06/17/20 2010 06/18/20 0443 06/18/20  1820 06/19/20  0616    134* 136 138   K 5.1 5.0 4.9 4.9    99 100 100   CO2 26 27 28 29    99 131* 120*   BUN 21* 22* 23* 21*   CREATININE 1.0 0.9 1.2 1.0   CALCIUM 8.9 8.7 9.2 9.4   PROT 8.0 7.2  --  7.6   ALBUMIN 2.3* 2.1*  --  2.1*   BILITOT 0.2 0.2  --  0.2   ALKPHOS 69 68  --  75   AST 26 20  --  18   ALT 16 14  --  15   ANIONGAP 10 8 8 9   ESTGFRAFRICA >60.0 >60.0 56.8* >60.0   EGFRNONAA >60.0 >60.0 49.2* >60.0   , CBC   Recent Labs   Lab 06/17/20 2010 06/18/20 0443 06/19/20  0616   WBC 6.67 6.83 6.49   HGB 9.8* 9.5* 9.9*   HCT 32.8* 33.2* 34.8*    324 346    and All labs within the past 24 hours have been reviewed      Pending Diagnostic Studies:     None         Final Active Diagnoses:    Diagnosis Date Noted POA    PRINCIPAL PROBLEM:  Pleural effusion [J90] 06/17/2020 Yes    Chronic respiratory failure with hypoxia [J96.11] 06/18/2020 Yes    GERD (gastroesophageal reflux disease) [K21.9] 06/17/2020 Yes    Anxiety [F41.9] 05/26/2020 Yes    Adenocarcinoma of lung, stage 4, right [C34.91] 05/11/2020 Yes    Cough [R05] 05/02/2020 Yes    Essential hypertension [I10] 05/01/2020 Yes    Mass of upper lobe of right lung [R91.8] 05/01/2020 Yes      Problems Resolved During this Admission:      Discharged Condition: stable    Disposition: Home-Health Care [facility]    Medications:  Reconciled Home Medications:      Medication List      CHANGE how you take these medications    albuterol 2.5 mg /3 mL (0.083 %) nebulizer solution  Commonly known as: PROVENTIL  Take 3 mls by nebulization every 3 hours while awake  What changed: Another medication with the same name was removed. Continue taking this medication, and follow the directions you see here.     gabapentin 400 MG capsule  Commonly known as: NEURONTIN  Take one capsule by mouth three times daily  What changed: Another medication with the same name was removed. Continue taking this medication, and follow the directions you see here.        CONTINUE taking these medications    benzonatate 100 MG capsule  Commonly known as: TESSALON PERLES  Take 1 capsule (100 mg total) by mouth 3 (three) times daily as needed for Cough.     budesonide-formoterol 160-4.5 mcg 160-4.5 mcg/actuation Hfaa  Commonly known as: SYMBICORT  Inhale two puffs by mouth into the lungs twice daily     buPROPion 150 MG TBSR 12 hr tablet  Commonly known as: WELLBUTRIN SR     busPIRone 10 MG tablet  Commonly known as: BUSPAR  Take one tablet by mouth three times a day     diclofenac sodium 1 % Gel  Commonly known as: VOLTAREN  Apply two grams topically four times a day     DUKE'S SOLUTION (BENADRYL 30 ML, MYLANTA 30 ML, LIDOCAINE 30 ML, NYSTATIN 30 ML)  Take 10  mLs by mouth 4 (four) times daily as needed (mouth pain).     irbesartan-hydrochlorothiazide 300-12.5 mg per tablet  Commonly known as: AVALIDE  Take one tablet by mouth every day     LORazepam 1 MG tablet  Commonly known as: ATIVAN  Take 1 tablet (1 mg total) by mouth as needed for Anxiety (Take 30 minutes prior to procedure).     NORVASC 5 MG tablet  Generic drug: amLODIPine  Take 5 mg by mouth once daily.     omeprazole 20 MG capsule  Commonly known as: PRILOSEC  Take one capsule by mouth everyday.     ondansetron 8 MG Tbdl  Commonly known as: ZOFRAN-ODT  Dissolve 1 tablet (8 mg total) by mouth every 8 (eight) hours as needed.     oxyCODONE 5 MG immediate release tablet  Commonly known as: ROXICODONE  Take 1 tablet (5 mg total) by mouth every 12 (twelve) hours as needed for Pain.     polyethylene glycol 17 gram/dose powder  Commonly known as: GLYCOLAX  mix 1 capful (17 g) and take by mouth once daily.     prochlorperazine 10 MG tablet  Commonly known as: COMPAZINE  Take 1 tablet (10 mg total) by mouth 3 (three) times daily as needed (for nausea. if zofran does not work can use this medication instead).     promethazine-codeine 6.25-10 mg/5 ml 6.25-10 mg/5 mL syrup  Commonly known as: PHENERGAN with CODEINE  Take 5 mLs by mouth nightly as needed for Cough.     SENNA-S 8.6-50 mg per tablet  Generic drug: senna-docusate 8.6-50 mg  Take 1 tablet by mouth 2 (two) times daily.        STOP taking these medications    pantoprazole 20 MG tablet  Commonly known as: PROTONIX     valsartan-hydrochlorothiazide 320-12.5 mg per tablet  Commonly known as: DIOVAN-HCT            Ryan Tristan DO  Hematology/Oncology  [facility]

## 2020-06-19 NOTE — PROGRESS NOTES
Called [facility] and spoke with Liane re: patient's home nebulizer machine not working, she spoke with her supervisor and then informed me that a new machine will be issued to patient; asked that it be delivered to patient's room and Liane is coordinating this. Patient reported that her daughter is on the way to the hospital, but is coming straight from work, and didn't go to patient's home to get her portable oxygen. Called [facility] and spoke with Eh re: this; she said that staff will bring another portable tank with cart and regulator to patient's room with the nebulizer machine, and staff will make arrangements to  this portable from her home. Informed patient and her nurse of the arrangements. No other discharge needs indicated at this time.

## 2020-06-19 NOTE — SEDATION DOCUMENTATION
Escort transported patient to procedure room.  H and P updated-yes, patient placed on cardiac monitor, anesthesia Plan:  Conscious sedation, ASA verified-yes, Airway exam performed-yes, Personal or Family history of anesthesia complications-No  Consent signed and witnessed, Liane Latham RN

## 2020-06-19 NOTE — PROGRESS NOTES
Home health services for skilled nursing assessments, Pleurx catheter care and supplies has been arranged through [facility]. Confirmed with [facility] liason nurse Sales. Home health nurse will see patient on tomorrow. Patient is being discharged home this afternoon. Spoke with nurse Kyle on the Onc Unit and she will do some teaching with patient and family member prior to patient's discharge.

## 2020-06-19 NOTE — NURSING
Pt discharged to home at this time per MD order.  Med rec completed by MD prior to discharge and copy of current med list given to pt.  All discharge instructions, medications, prescriptions, and follow-up appointments reviewed with pt; copy given and all questions answered to pt's satisfaction.  MARTY PIV d/c'd at this time; pt with no IV access at time of discharge.  Pleurex catheter dressing CDI: drain teaching done with pt ant daughter.  Home oxygen tank and nebulizer machine delivered to pt's bedside.   Pt ambulating in room with steady gait; tolerating regular diet; voiding without difficulty; pain well-controlled with PO pain meds.  All VSS; O2 sats WNL on 2L NC.  Pt left floor via wheelchair; accompanied by pt escort and family; no distress noted.

## 2020-06-19 NOTE — DISCHARGE INSTRUCTIONS
:  [facility], [phone], to provide skilled nursing assessments, Pleurx catheter care and supplies. Nurse will see patient on the day after discharge from the hospital.  Sari Larkin, JAGRUTI, [facility]  [phone]

## 2020-06-19 NOTE — H&P
The patient has been examined and the H&P has been reviewed:  I concur with the findings and no changes have occurred since H&P was written.      Anesthesia/Surgery risks, benefits and alternative options discussed and understood by patient/family.    Mallampati 3  ASA 3  Allergies: no known allergies  Prior problems with anesthesia: none    Dash Garcia MD

## 2020-06-19 NOTE — PLAN OF CARE
[facility]    HOME HEALTH ORDERS  FACE TO FACE ENCOUNTER    Patient Name: Janeth Boyce  YOB: 1960    PCP: Freeman Caballero MD   PCP Address: [address] / [place] [address]  PCP Phone Number: [phone]  PCP Fax: [phone]    Encounter Date: 06/19/2020    Admit to Home Health    Diagnoses:  Active Hospital Problems    Diagnosis  POA    *Pleural effusion [J90]  Yes    Chronic respiratory failure with hypoxia [J96.11]  Yes    GERD (gastroesophageal reflux disease) [K21.9]  Yes    Anxiety [F41.9]  Yes    Adenocarcinoma of lung, stage 4, right [C34.91]  Yes    Cough [R05]  Yes    Essential hypertension [I10]  Yes    Mass of upper lobe of right lung [R91.8]  Yes      Resolved Hospital Problems   No resolved problems to display.       Future Appointments   Date Time Provider Department Center   6/19/2020  1:00 PM INPATIENT, PULMONARY LAB [facility] PULMLAB [address]   6/24/2020  9:00 AM COVID TESTING, [facility] HEMONC [facility] HEMONC3 [address]   [facility]24[facility]2020  [facility]10 [facility] HO [address]   6/24/2020 10:00 AM Maik Peterson MD [facility] HEMONC3 [address]   6/25/2020  8:00 AM NURSE 8, [facility] CHEMO [facility] CHEMO [address]   6/25/2020 10:00 AM Nancy Laughlin, PhD [facility] CAN PSY [address]           I have seen and examined this patient face to face today. My clinical findings that support the need for the home health skilled services and home bound status are the following:  Weakness/numbness causing balance and gait disturbance due to Malignancy/Cancer making it taxing to leave home.  Requiring assistive device to leave home due to unsteady gait caused by  Weakness/Debility.  Patient with medication mismanagement issues requiring home bound status as evidenced by  Poor understanding of medication regimen/dosage.    Allergies:Review of patient's allergies indicates:  No Known Allergies    Diet: regular diet    Activities: activity as  tolerated    Nursing:   SN to complete comprehensive assessment including routine vital signs. Instruct on disease process and s/s of complications to report to MD. Review/verify medication list sent home with the patient at time of discharge  and instruct patient/caregiver as needed. Frequency may be adjusted depending on start of care date.    Notify MD if SBP > 160 or < 90; DBP > 90 or < 50; HR > 120 or < 50; Temp > 101;       CONSULTS:    Aide to provide assistance with personal care, ADLs, and vital signs.    MISCELLANEOUS CARE:  Home Oxygen:  No change. 2 L via nasal cannula at rest and 3 L when ambulating.    WOUND CARE ORDERS  yes:  PleurX Catheter Care     Please drain PleurX catheter every other day and record the amount of drainage. Please also take note of the color of the drainage. Encourage patient to change positions and cough at least once while draining to get more fluid. Patient can shower and go about normal activities as as the dressing provided in PleurX kits is securely intact. If gauze underneath becomes wet or dirty, please change.   Notify MD if:  1) Patient is draining more than 1,000 mL in one sitting.  2) Drainage abruptly stops. It is normal for drainage to gradually decrease.  3) You see purulent drainage coming from the catheter or near the drain exit site.  4) The catheter will not flush and appears clogged.  5) Any signs of infection at the PleurX catheter exit site.        Medications: Review discharge medications with patient and family and provide education.      Current Discharge Medication List      CONTINUE these medications which have NOT CHANGED    Details   albuterol (PROVENTIL) 2.5 mg /3 mL (0.083 %) nebulizer solution Take 3 mls by nebulization every 3 hours while awake  Qty: 180 mL, Refills: 12      amLODIPine (NORVASC) 5 MG tablet Take 5 mg by mouth once daily.    Comments:       benzonatate (TESSALON PERLES) 100 MG capsule Take 1 capsule (100 mg total) by mouth 3  (three) times daily as needed for Cough.  Qty: 90 capsule, Refills: 1    Associated Diagnoses: Adenocarcinoma of lung, stage 4, right      budesonide-formoterol 160-4.5 mcg (SYMBICORT) 160-4.5 mcg/actuation HFAA Inhale two puffs by mouth into the lungs twice daily  Qty: 10.2 g, Refills: 12      buPROPion (WELLBUTRIN SR) 150 MG TBSR 12 hr tablet       busPIRone (BUSPAR) 10 MG tablet Take one tablet by mouth three times a day  Qty: 90 tablet, Refills: 3      diclofenac sodium (VOLTAREN) 1 % Gel Apply two grams topically four times a day  Qty: 200 g, Refills: 5      duke's soln (benadryl 30 mL, mylanta 30 mL, lidocaine 30 mL, nystatin 30 mL) 120mL Take 10 mLs by mouth 4 (four) times daily as needed (mouth pain).  Qty: 120 mL, Refills: 0    Comments: Mix in equal parts.  Generic is fine.  Associated Diagnoses: Mouth sore secondary to chemotherapy      gabapentin (NEURONTIN) 400 MG capsule Take one capsule by mouth three times daily  Qty: 90 capsule, Refills: 3      irbesartan-hydrochlorothiazide (AVALIDE) 300-12.5 mg per tablet Take one tablet by mouth every day  Qty: 90 tablet, Refills: 3    Comments: .      LORazepam (ATIVAN) 1 MG tablet Take 1 tablet (1 mg total) by mouth as needed for Anxiety (Take 30 minutes prior to procedure).  Qty: 5 tablet, Refills: 0    Associated Diagnoses: Anxiety      omeprazole (PRILOSEC) 20 MG capsule Take one capsule by mouth everyday.  Qty: 90 capsule, Refills: 3      ondansetron (ZOFRAN-ODT) 8 MG TbDL Dissolve 1 tablet (8 mg total) by mouth every 8 (eight) hours as needed.  Qty: 60 tablet, Refills: 0      oxyCODONE (ROXICODONE) 5 MG immediate release tablet Take 1 tablet (5 mg total) by mouth every 12 (twelve) hours as needed for Pain.  Qty: 60 tablet, Refills: 0    Comments: Please fill greater than 7 day supply. This medication is medically necessary.  Associated Diagnoses: Adenocarcinoma of lung, stage 4, right      polyethylene glycol (GLYCOLAX) 17 gram/dose powder mix 1 capful (17  g) and take by mouth once daily.  Qty: 510 g, Refills: 0      prochlorperazine (COMPAZINE) 10 MG tablet Take 1 tablet (10 mg total) by mouth 3 (three) times daily as needed (for nausea. if zofran does not work can use this medication instead).  Qty: 60 tablet, Refills: 2    Associated Diagnoses: Adenocarcinoma of lung, stage 4, right      promethazine-codeine 6.25-10 mg/5 ml (PHENERGAN WITH CODEINE) 6.25-10 mg/5 mL syrup Take 5 mLs by mouth nightly as needed for Cough.  Qty: 240 mL, Refills: 0    Comments: Quantity prescribed more than 7 day supply? Yes, medically necessary  Associated Diagnoses: Asthma, unspecified asthma severity, unspecified whether complicated, unspecified whether persistent; Lung mass      senna-docusate 8.6-50 mg (PERICOLACE) 8.6-50 mg per tablet Take 1 tablet by mouth 2 (two) times daily.  Qty: 60 tablet, Refills: 0         STOP taking these medications       albuterol (ACCUNEB) 0.63 mg/3 mL Nebu Comments:   Reason for Stopping: Duplicate order        albuterol (PROVENTIL/VENTOLIN HFA) 90 mcg/actuation inhaler Comments:   Reason for Stopping: Duplicate order        pantoprazole (PROTONIX) 20 MG tablet Comments:   Reason for Stopping: Duplicate order        pantoprazole (PROTONIX) 20 MG tablet Comments:   Reason for Stopping: Duplicate order        valsartan-hydrochlorothiazide (DIOVAN-HCT) 320-12.5 mg per tablet Comments:   Reason for Stopping: Duplicate order              I certify that this patient is confined to her home and needs intermittent skilled nursing care.

## 2020-06-19 NOTE — PROCEDURES
"Janeth Boyce is a 59 y.o. female who presents for Pleurx catheter placement.     Vital Signs  Vitals:    20 1135 20 1144 20 1216 20 1536   BP: 123/73 116/66 139/73 118/82   BP Location:   Left arm Left arm   Patient Position:   Sitting Sitting   Pulse: 91 97 103 95   Resp: (!)    Temp:   97.6 °F (36.4 °C) 98.1 °F (36.7 °C)   TempSrc:   Oral Oral   SpO2: (!) 94% (!) 93% (!) 93% (!) 93%   Weight:       Height:           PleurX catheter placement  Date/Time: 2020 5:19 PM  Location procedure was performed: [facility] PULMONARY LAB  Assisting Provider: Jess Lawton   Attending/supervising provider: Dash Garcia MD  Pre-operative diagnosis: malignant pleural effusion, stage 4 adenocarcinoma of the lung  Post-operative diagnosis: malignant pleural effusion, stage 4 adenocarcinoma of the lung  Consent Done: Yes  Consent: Written consent obtained.  Risks and benefits: risks, benefits and alternatives were discussed  Consent given by: patient  Patient understanding: patient states understanding of the procedure being performed  Patient consent: the patient's understanding of the procedure matches consent given  Procedure consent: procedure consent matches procedure scheduled  Relevant documents: relevant documents present and verified  Test results: test results available and properly labeled  Imaging studies: imaging studies available  Required items: required blood products, implants, devices, and special equipment available  Patient identity confirmed: name,  verbally with patient  Time out: Immediately prior to procedure a "time out" was called to verify the correct patient, procedure, equipment, support staff and site/side marked as required.  Indications: malignant pleural effusion  Patient sedated: yes  Sedation type: moderate (conscious) sedation  (See MAR for exact dosages of medications).  Sedatives: midazolam  Analgesia: fentanyl  Local Anesthetic: lidocaine 1% " without epinephrine  Anesthetic total: 15 mL  Preparation: skin prepped with ChloraPrep  Placement location: right lateral  Scalpel size: 11  Tube size (Bahraini): 15  Ultrasound guidance: yes    Description of procedure:  The patient was brought to the pulmonary procedures room & placed semi-upright. Ultrasound evaluation was used to identify the optimal insertion site, & the skin was marked. A second skin site was marked 6cm anterior to this for the tunneling site. Moderate (conscious) sedation was administered. Chloraprep was applied to the placement site & surrounding skin. The site was draped in sterile sheet.    Lidocaine was administered at both sites & along the anticipated subcutaneous tunneling tract. A 1cm incision was made at the insertion site. A 1cm incision was made at the tunneling site. An 18 gauge over-the-needle catheter was then introduced into the pleural space at the insertion site, & the guidewire was advanced into the pleural space. The Pleurx catheter was introduced through a subcutaneous tunnel created between the two incisions. The polymer cuff was positioned within the subcutaneous space. Seldinger technique was used to dilate the skin & soft tissues with the plastic dilator followed by the introducer sheath. The Pleurx catheter was then advanced into the pleural space via the introducer sheath. There was good return of fluid. 850 mL of serous fluid was withdrawn slowly while the incision at the insertion site was closed with absorbable suture. The catheter was secured with silk suture at the tunneling site.    The catheter was capped off & sterile dressings were applied. The patient tolerated the procedure well without any complications. The patient was monitored following the procedure then transferred back to the medical telemetry unit in stable condition.     Dressing: 4x4 sterile gauze and Xeroform gauze (Tegaderm)  Patient tolerance: Patient tolerated the procedure well with no  immediate complications  Complications: No  Estimated blood loss (mL): 3  Specimens: No  Implants: Yes (indwelling pleural catheter)      I was present for & actively participated in the entire procedure.      Dash Garcia MD  [facility]

## 2020-06-19 NOTE — SEDATION DOCUMENTATION
Pleurx Catheter placed R midaxillary.  850 ml serosanguinous pleural fluid drained, dressed with 4X4 and tegaderm.  No specimens obtained during Pleurx.  Patient starter kit sent with patient and written orders for home drainage.  Verbal given to Dayan to include documentation charted in procedural sedation documentation.  Moderate concious sedation was performed and cardiorespiratory functions were monitored the entire procedure by Liane Latham RN.  Sedation began at 1104  and concluded at 1134.  The patient tolerated the procedure well.  Liane Latham RN

## 2020-06-19 NOTE — PROGRESS NOTES
Obtained bedside commode for pt safety due to recent chest pain during the day and SOB. Pt on 2 L NC. Educated pt three different times on safety and using the bedside commode. Pt also educated on why it was unsafe for her to get into the shower. Pt then found in the bathroom with no O2 on and SOB. Pt was walked back to bed by RN. When back in bed,  o2 put back on the pt and sats were 83 before recovering. Asked multiple times for the pt to call to get out of bed. Pt refused to get her legs back in bed so that the bed alarm could be put on. Pt refused bed alarm . Avasys was set at the bedside for safety.

## 2020-06-19 NOTE — PLAN OF CARE
Reviewed plan of care with pt at the beginning of the shift. Pt reported cough and pain throughout the shift. PRN given. Pt reported no n/v throughout the shift. Vital signs were stable throughout the shift.Fall precautions continued for pt. Bed locked and at lowest position. Call light within reach. Pt knows to call if assistance is needed. Pt NPO since midnight for test done today.

## 2020-06-22 ENCOUNTER — TELEPHONE (OUTPATIENT)
Dept: HEMATOLOGY/ONCOLOGY | Facility: CLINIC | Age: 60
End: 2020-06-22

## 2020-06-22 ENCOUNTER — DOCUMENTATION ONLY (OUTPATIENT)
Dept: HEMATOLOGY/ONCOLOGY | Facility: CLINIC | Age: 60
End: 2020-06-22

## 2020-06-22 DIAGNOSIS — J45.909 ASTHMA, UNSPECIFIED ASTHMA SEVERITY, UNSPECIFIED WHETHER COMPLICATED, UNSPECIFIED WHETHER PERSISTENT: ICD-10-CM

## 2020-06-22 DIAGNOSIS — C34.91 ADENOCARCINOMA OF LUNG, STAGE 4, RIGHT: Primary | ICD-10-CM

## 2020-06-22 DIAGNOSIS — R91.8 LUNG MASS: ICD-10-CM

## 2020-06-22 RX ORDER — LIDOCAINE 50 MG/G
OINTMENT TOPICAL
Qty: 50 G | Refills: 1 | Status: SHIPPED | OUTPATIENT
Start: 2020-06-22 | End: 2020-06-24 | Stop reason: SDUPTHER

## 2020-06-22 RX ORDER — PROMETHAZINE HYDROCHLORIDE AND CODEINE PHOSPHATE 6.25; 1 MG/5ML; MG/5ML
5 SOLUTION ORAL NIGHTLY PRN
Qty: 240 ML | Refills: 0 | Status: SHIPPED | OUTPATIENT
Start: 2020-06-22 | End: 2020-06-24 | Stop reason: SDUPTHER

## 2020-06-22 RX ORDER — TRAMADOL HYDROCHLORIDE 50 MG/1
50 TABLET ORAL EVERY 6 HOURS PRN
Qty: 120 TABLET | Refills: 0 | Status: SHIPPED | OUTPATIENT
Start: 2020-06-22 | End: 2020-06-30

## 2020-06-22 NOTE — TELEPHONE ENCOUNTER
----- Message from Thomas Devries sent at 6/22/2020 10:45 AM CDT -----  Contact: Patient  Who is calling:: Pt    Provider treating:: Kristen    Current symptom:: Pt has a cold and severe cough    Are you currently receiving treatment for your diagnosis:: Yes    When was your last treatment:: --    How long have you had the symptom:: 1 day    Do you feel you need to be seen today:: No    Communication preference::  [phone]    Additional Info::
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
## 2020-06-22 NOTE — TELEPHONE ENCOUNTER
Spoke to patient.  She says tramadol works better for pain.  She is also asking why biopsy site still is painful and it is making it hard for her to sleep.  She also needs refill on her cough medication.    Told her will prescribe tramadol, topical lidocaine for her biopsy site, and refill her cough medication.  Will also discontinue tessalon pearles that she says doesn't work and will switch her to mucinex DM.    She is agreeable with this plan.    Maik Peterson MD  Hematology Oncology Fellow PGY6  Pager [phone]

## 2020-06-24 ENCOUNTER — TELEPHONE (OUTPATIENT)
Dept: HEMATOLOGY/ONCOLOGY | Facility: CLINIC | Age: 60
End: 2020-06-24

## 2020-06-24 ENCOUNTER — LAB VISIT (OUTPATIENT)
Dept: LAB | Facility: HOSPITAL | Age: 60
End: 2020-06-24
Payer: MEDICAID

## 2020-06-24 ENCOUNTER — OFFICE VISIT (OUTPATIENT)
Dept: HEMATOLOGY/ONCOLOGY | Facility: CLINIC | Age: 60
End: 2020-06-24
Payer: MEDICAID

## 2020-06-24 VITALS
OXYGEN SATURATION: 98 % | HEART RATE: 86 BPM | TEMPERATURE: 98 F | RESPIRATION RATE: 22 BRPM | DIASTOLIC BLOOD PRESSURE: 56 MMHG | SYSTOLIC BLOOD PRESSURE: 102 MMHG | WEIGHT: 183.44 LBS | BODY MASS INDEX: 29.61 KG/M2

## 2020-06-24 DIAGNOSIS — C34.91 ADENOCARCINOMA OF LUNG, STAGE 4, RIGHT: Primary | ICD-10-CM

## 2020-06-24 DIAGNOSIS — J45.909 ASTHMA, UNSPECIFIED ASTHMA SEVERITY, UNSPECIFIED WHETHER COMPLICATED, UNSPECIFIED WHETHER PERSISTENT: ICD-10-CM

## 2020-06-24 DIAGNOSIS — J91.0 MALIGNANT PLEURAL EFFUSION: ICD-10-CM

## 2020-06-24 DIAGNOSIS — R91.8 LUNG MASS: ICD-10-CM

## 2020-06-24 DIAGNOSIS — C34.90 PRIMARY MALIGNANT NEOPLASM OF LUNG WITH METASTASIS TO BRAIN: ICD-10-CM

## 2020-06-24 DIAGNOSIS — C79.31 METASTASIS TO BRAIN: ICD-10-CM

## 2020-06-24 DIAGNOSIS — C34.91 ADENOCARCINOMA OF LUNG, STAGE 4, RIGHT: ICD-10-CM

## 2020-06-24 DIAGNOSIS — C79.31 PRIMARY MALIGNANT NEOPLASM OF LUNG WITH METASTASIS TO BRAIN: ICD-10-CM

## 2020-06-24 DIAGNOSIS — C79.31 SECONDARY MALIGNANT NEOPLASM OF BRAIN: ICD-10-CM

## 2020-06-24 DIAGNOSIS — J96.11 CHRONIC RESPIRATORY FAILURE WITH HYPOXIA: ICD-10-CM

## 2020-06-24 DIAGNOSIS — B18.2 CHRONIC HEPATITIS C WITHOUT HEPATIC COMA: ICD-10-CM

## 2020-06-24 LAB
ALBUMIN SERPL BCP-MCNC: 2 G/DL (ref 3.5–5.2)
ALP SERPL-CCNC: 61 U/L (ref 55–135)
ALT SERPL W/O P-5'-P-CCNC: 14 U/L (ref 10–44)
ANION GAP SERPL CALC-SCNC: 9 MMOL/L (ref 8–16)
AST SERPL-CCNC: 19 U/L (ref 10–40)
BASOPHILS # BLD AUTO: 0.13 K/UL (ref 0–0.2)
BASOPHILS NFR BLD: 1.3 % (ref 0–1.9)
BILIRUB SERPL-MCNC: 0.3 MG/DL (ref 0.1–1)
BUN SERPL-MCNC: 22 MG/DL (ref 6–20)
CALCIUM SERPL-MCNC: 8.8 MG/DL (ref 8.7–10.5)
CHLORIDE SERPL-SCNC: 99 MMOL/L (ref 95–110)
CO2 SERPL-SCNC: 27 MMOL/L (ref 23–29)
CREAT SERPL-MCNC: 1.1 MG/DL (ref 0.5–1.4)
DIFFERENTIAL METHOD: ABNORMAL
EOSINOPHIL # BLD AUTO: 0.5 K/UL (ref 0–0.5)
EOSINOPHIL NFR BLD: 4.8 % (ref 0–8)
ERYTHROCYTE [DISTWIDTH] IN BLOOD BY AUTOMATED COUNT: 15.7 % (ref 11.5–14.5)
EST. GFR  (AFRICAN AMERICAN): >60 ML/MIN/1.73 M^2
EST. GFR  (NON AFRICAN AMERICAN): 54.7 ML/MIN/1.73 M^2
GLUCOSE SERPL-MCNC: 102 MG/DL (ref 70–110)
HCT VFR BLD AUTO: 31.6 % (ref 37–48.5)
HGB BLD-MCNC: 9.5 G/DL (ref 12–16)
IMM GRANULOCYTES # BLD AUTO: 0.3 K/UL (ref 0–0.04)
IMM GRANULOCYTES NFR BLD AUTO: 3 % (ref 0–0.5)
LYMPHOCYTES # BLD AUTO: 2.5 K/UL (ref 1–4.8)
LYMPHOCYTES NFR BLD: 25.1 % (ref 18–48)
MCH RBC QN AUTO: 25.4 PG (ref 27–31)
MCHC RBC AUTO-ENTMCNC: 30.1 G/DL (ref 32–36)
MCV RBC AUTO: 85 FL (ref 82–98)
MONOCYTES # BLD AUTO: 1.6 K/UL (ref 0.3–1)
MONOCYTES NFR BLD: 15.4 % (ref 4–15)
NEUTROPHILS # BLD AUTO: 5.1 K/UL (ref 1.8–7.7)
NEUTROPHILS NFR BLD: 50.4 % (ref 38–73)
NRBC BLD-RTO: 0 /100 WBC
PLATELET # BLD AUTO: 438 K/UL (ref 150–350)
PMV BLD AUTO: 9.2 FL (ref 9.2–12.9)
POTASSIUM SERPL-SCNC: 4 MMOL/L (ref 3.5–5.1)
PROT SERPL-MCNC: 7.3 G/DL (ref 6–8.4)
RBC # BLD AUTO: 3.74 M/UL (ref 4–5.4)
SODIUM SERPL-SCNC: 135 MMOL/L (ref 136–145)
TSH SERPL DL<=0.005 MIU/L-ACNC: 2.48 UIU/ML (ref 0.4–4)
WBC # BLD AUTO: 10.06 K/UL (ref 3.9–12.7)

## 2020-06-24 PROCEDURE — 80053 COMPREHEN METABOLIC PANEL: CPT

## 2020-06-24 PROCEDURE — 36415 COLL VENOUS BLD VENIPUNCTURE: CPT

## 2020-06-24 PROCEDURE — 99214 OFFICE O/P EST MOD 30 MIN: CPT | Mod: PBBFAC | Performed by: STUDENT IN AN ORGANIZED HEALTH CARE EDUCATION/TRAINING PROGRAM

## 2020-06-24 PROCEDURE — 99999 PR PBB SHADOW E&M-EST. PATIENT-LVL IV: ICD-10-PCS | Mod: PBBFAC,,, | Performed by: STUDENT IN AN ORGANIZED HEALTH CARE EDUCATION/TRAINING PROGRAM

## 2020-06-24 PROCEDURE — 99214 PR OFFICE/OUTPT VISIT, EST, LEVL IV, 30-39 MIN: ICD-10-PCS | Mod: S$PBB,,, | Performed by: STUDENT IN AN ORGANIZED HEALTH CARE EDUCATION/TRAINING PROGRAM

## 2020-06-24 PROCEDURE — 99214 OFFICE O/P EST MOD 30 MIN: CPT | Mod: S$PBB,,, | Performed by: STUDENT IN AN ORGANIZED HEALTH CARE EDUCATION/TRAINING PROGRAM

## 2020-06-24 PROCEDURE — 84443 ASSAY THYROID STIM HORMONE: CPT

## 2020-06-24 PROCEDURE — 99999 PR PBB SHADOW E&M-EST. PATIENT-LVL IV: CPT | Mod: PBBFAC,,, | Performed by: STUDENT IN AN ORGANIZED HEALTH CARE EDUCATION/TRAINING PROGRAM

## 2020-06-24 PROCEDURE — 85025 COMPLETE CBC W/AUTO DIFF WBC: CPT

## 2020-06-24 RX ORDER — HEPARIN 100 UNIT/ML
500 SYRINGE INTRAVENOUS
Status: CANCELLED | OUTPATIENT
Start: 2020-06-25

## 2020-06-24 RX ORDER — PROMETHAZINE HYDROCHLORIDE AND CODEINE PHOSPHATE 6.25; 1 MG/5ML; MG/5ML
5 SOLUTION ORAL NIGHTLY PRN
Qty: 240 ML | Refills: 0 | Status: SHIPPED | OUTPATIENT
Start: 2020-06-24 | End: 2020-07-15 | Stop reason: SDUPTHER

## 2020-06-24 RX ORDER — LIDOCAINE 50 MG/G
OINTMENT TOPICAL
Qty: 35.44 G | Refills: 1 | Status: ON HOLD | OUTPATIENT
Start: 2020-06-24 | End: 2022-01-01 | Stop reason: HOSPADM

## 2020-06-24 RX ORDER — MIRTAZAPINE 15 MG/1
15 TABLET, ORALLY DISINTEGRATING ORAL NIGHTLY
Qty: 30 TABLET | Refills: 2 | Status: SHIPPED | OUTPATIENT
Start: 2020-06-24 | End: 2020-06-30

## 2020-06-24 RX ORDER — SODIUM CHLORIDE 0.9 % (FLUSH) 0.9 %
10 SYRINGE (ML) INJECTION
Status: CANCELLED | OUTPATIENT
Start: 2020-06-25

## 2020-06-24 NOTE — PROGRESS NOTES
PATIENT: Janeth Boyce  MRN: [id number]  DATE: 6/24/2020      Diagnosis:   1. Adenocarcinoma of lung, stage 4, right    2. Secondary malignant neoplasm of brain    3. Malignant pleural effusion    4. Chronic respiratory failure with hypoxia    5. Chronic hepatitis C without hepatic coma        Chief Complaint: Adenocarcinoma of lung, stage 4, right      Oncologic History:    Oncologic History 1. Lung adenocarcinoma    Oncologic Treatment 1. Carboplatin AUC4 x1 dose 5/27/20 and palliative radiation (5/27/2020 - 6/2/2020; 20 Gy to right lung)  2. Pembrolizumab  Cycle 1 6/4/2020      Pathology 5/5/2020  Right lung, biopsy:  Positive for adenocarcinoma with areas of necrosis.  Interpretation:   Right lung, specimen for PD-L1 immunohistochemistry studies (clone [id number], [facility], [place], [place]) ([id number]): 100% tumor cells are positive for PD-L1 (membranous positivity).     BRAF Mutation Analysis (V600E), Tumor Result Summary: NO MUTATION IDENTIFIED.   Result: BRAF status: Wild-type   Interpretation :     EGFR Gene, Mutation Analysis, Tumor Result Summary: NO MUTATION IDENTIFIED.   Result: EGFR status: Wild-type     ALK (2p23), Lung Cancer, FISH, Ts Result Summary: NEGATIVE   Interpretation: No rearrangement of the ALK gene was identified.     Result: nuc micha(ALKx3)[29/50]   Of 50 nuclei, 0% had separation of the 3'ALK and 5'ALK signals and 58% had three intact 3'ALK/5'ALK signals. The normal cutoff is <50.0% for one 3'ALK, one 5'ALK and one 3'ALK/5'ALK signal and <18.0% for three 3'ALK/5'ALK signals.     ROS1 (6Q22), FISH, Ts Result Summary: NEGATIVE   Interpretation: No rearrangement of the ROS1 gene was identified.   Result nuc micha(ROS1x1)[44/50]           Subjective:    Interval History: Ms. Boyce is here for follow up    59 year old woman with lung adenocarcinoma complicated by single brain met to cerebellum, malignant pleural effusion requiring pleurx, and hypoxemia requiring continuous  "supplemental oxygen.   She has been smoking since 15 years old and estimates about 0.5ppd.  She has always had some level of dyspnea with exertion at baseline, but noticed it was worse in February 2020 with an associated cough with white and clear sputum.  She says she was evaluated at [facility] who told her the scans were suspicious for cancer.  She then headed to [facility] but because of COVID-19 her appointment was cancelled.  She initially [place] [facility] [place] any new symptoms since February and the cough is persistent.  Denied any unilateral weakness, dizziness, headaches, or gait instability.  Was able to perform all ADLs and could walk 1 block before getting tired and needing to rest.  On 5/1/2020, she was "admitted to observation with SOB and cough x 2 months. CT chest Large RUL mass with scattered nodules and mediastinal lymphadenopathy; small-moderate right pleural effusion....S/p right lung biopsy 5/4/2020. Home O2 arranged to use with activity. Admitted to Medical Oncology 5/25/2020-5/29/2020 for worsening shortness of breath.  CTPE negative for PE. Pulmonology attempted thoracentesis twice. Second attempt resulted in 400cc being drained, without relief.  She then received x1 dose of carboplatin and palliative RT to the right lung. Patient reported improvement in symptoms after XRT and was discharged.      Single agent pembrolizumab was started for PDL1>50%.  She received her first dose of pembrolizumab  6/4/2020.  Since then she was readmitted to the hospital again for worsening shortness of breath 6/17/2020-6/19/2020.  "Pulmonology consulted, recommended PleurX catheter and patient was amenable to this, placed on 6/19 and drained 850 mLs. Patient had costochondritis chest pain and cough throughout admission, treated symptomatically. Patient was discharged with home health PleurX care."    She reports that she is still having a lot of fluid drained via pleurx but is draining less fluid than before (800cc " in the hospital now down to ~500cc with her most recent drainage).  She also has had persistent cough and pain.  Adjusted pain medications from oxycodone to tramadol because patient said it worked better.  Pain also still from biopsy site so prescribed topical lidocaine.  Refilled her cough medication and added mucinex DM since tessalon pearles did not help.  She is still able to live by herself and do all ADLs.    She is here today for follow up prior to cycle 2.  Her niece accompanies her at this visit.    Past Medical History:   Past Medical History:   Diagnosis Date    Anxiety     Asthma     Hepatitis C 2019    treated    Hx of psychiatric care     Hypertension     Lung cancer     Psychiatric exam requested by authority     Psychiatric problem        Past Surgical HIstory:   Past Surgical History:   Procedure Laterality Date     SECTION      KNEE SURGERY         Family History: No family history on file.    Social History:  reports that she quit smoking about 2 months ago. Her smoking use included cigarettes. She has a 20.00 pack-year smoking history. She does not have any smokeless tobacco history on file. She reports current alcohol use. She reports that she does not use drugs.    Allergies:  Review of patient's allergies indicates:  No Known Allergies    Medications:  Current Outpatient Medications   Medication Sig Dispense Refill    albuterol (PROVENTIL) 2.5 mg /3 mL (0.083 %) nebulizer solution Take 3 mls by nebulization every 3 hours while awake 180 mL 12    amLODIPine (NORVASC) 5 MG tablet Take 5 mg by mouth once daily.      budesonide-formoterol 160-4.5 mcg (SYMBICORT) 160-4.5 mcg/actuation HFAA Inhale two puffs by mouth into the lungs twice daily 10.2 g 12    buPROPion (WELLBUTRIN SR) 150 MG TBSR 12 hr tablet       busPIRone (BUSPAR) 10 MG tablet Take one tablet by mouth three times a day 90 tablet 3    dextromethorphan-guaifenesin  mg (MUCINEX DM)  mg per 12 hr  tablet Take 1 tablet by mouth 2 (two) times daily. for 20 days 40 tablet 0    diclofenac sodium (VOLTAREN) 1 % Gel Apply two grams topically four times a day 200 g 5    duke's soln (benadryl 30 mL, mylanta 30 mL, lidocaine 30 mL, nystatin 30 mL) 120mL Take 10 mLs by mouth 4 (four) times daily as needed (mouth pain). 120 mL 0    gabapentin (NEURONTIN) 400 MG capsule Take one capsule by mouth three times daily 90 capsule 3    irbesartan-hydrochlorothiazide (AVALIDE) 300-12.5 mg per tablet Take one tablet by mouth every day 90 tablet 3    lidocaine (XYLOCAINE) 5 % Oint ointment Apply topically as needed. Apply 1 inch to biopsy site and cover with saran wrap.  Apply twice per day as needed for pain. 50 g 1    LORazepam (ATIVAN) 1 MG tablet Take 1 tablet (1 mg total) by mouth as needed for Anxiety (Take 30 minutes prior to procedure). 5 tablet 0    omeprazole (PRILOSEC) 20 MG capsule Take one capsule by mouth everyday. 90 capsule 3    ondansetron (ZOFRAN-ODT) 8 MG TbDL Dissolve 1 tablet (8 mg total) by mouth every 8 (eight) hours as needed. 60 tablet 0    polyethylene glycol (GLYCOLAX) 17 gram/dose powder mix 1 capful (17 g) and take by mouth once daily. 510 g 0    prochlorperazine (COMPAZINE) 10 MG tablet Take 1 tablet (10 mg total) by mouth 3 (three) times daily as needed (for nausea. if zofran does not work can use this medication instead). 60 tablet 2    promethazine-codeine 6.25-10 mg/5 ml (PHENERGAN WITH CODEINE) 6.25-10 mg/5 mL syrup Take 5 mLs by mouth nightly as needed for Cough. 240 mL 0    senna-docusate 8.6-50 mg (PERICOLACE) 8.6-50 mg per tablet Take 1 tablet by mouth 2 (two) times daily. 60 tablet 0    traMADoL (ULTRAM) 50 mg tablet Take 1 tablet (50 mg total) by mouth every 6 (six) hours as needed for Pain. 120 tablet 0     No current facility-administered medications for this visit.        Review of Systems   Constitutional: Positive for activity change and fatigue. Negative for chills, fever  and unexpected weight change.   HENT: Negative for nosebleeds.    Eyes: Negative for visual disturbance.   Respiratory: Positive for cough and shortness of breath.    Cardiovascular: Negative for chest pain and leg swelling.   Gastrointestinal: Negative for abdominal distention, abdominal pain and nausea.   Endocrine: Negative for cold intolerance and heat intolerance.   Genitourinary: Negative for difficulty urinating and dysuria.   Musculoskeletal: Positive for arthralgias (chronic).   Skin: Negative for color change.   Neurological: Positive for weakness. Negative for dizziness, light-headedness and numbness.   Hematological: Does not bruise/bleed easily.   Psychiatric/Behavioral: Negative for confusion.       ECOG Performance Status:   2  Objective:      Vitals:   Vitals:    06/24/20 1002   BP: (!) 102/56   Pulse: 86   Resp: (!) 22   Temp: 97.9 °F (36.6 °C)   TempSrc: Oral   SpO2: 98%   Weight: 83.2 kg (183 lb 6.8 oz)     BMI: Body mass index is 29.61 kg/m².    Physical Exam  Constitutional:       General: She is not in acute distress.     Appearance: She is well-developed.   HENT:      Head: Normocephalic.      Mouth/Throat:      Pharynx: No oropharyngeal exudate.   Eyes:      General: No scleral icterus.     Pupils: Pupils are equal, round, and reactive to light.   Neck:      Musculoskeletal: Normal range of motion.      Trachea: No tracheal deviation.   Cardiovascular:      Rate and Rhythm: Normal rate and regular rhythm.      Heart sounds: Normal heart sounds. No murmur. No friction rub. No gallop.    Pulmonary:      Effort: Pulmonary effort is normal. No respiratory distress.      Breath sounds: No wheezing or rales.   Abdominal:      General: Bowel sounds are normal. There is no distension.      Palpations: Abdomen is soft. There is no mass.      Tenderness: There is no abdominal tenderness. There is no guarding.   Musculoskeletal: Normal range of motion.   Skin:     General: Skin is warm and dry.    Neurological:      Mental Status: She is alert.         Laboratory Data:   Recent Results (from the past 168 hour(s))   Comprehensive metabolic panel    Collection Time: 06/17/20  8:10 PM   Result Value Ref Range    Sodium 136 136 - 145 mmol/L    Potassium 5.1 3.5 - 5.1 mmol/L    Chloride 100 95 - 110 mmol/L    CO2 26 23 - 29 mmol/L    Glucose 105 70 - 110 mg/dL    BUN, Bld 21 (H) 6 - 20 mg/dL    Creatinine 1.0 0.5 - 1.4 mg/dL    Calcium 8.9 8.7 - 10.5 mg/dL    Total Protein 8.0 6.0 - 8.4 g/dL    Albumin 2.3 (L) 3.5 - 5.2 g/dL    Total Bilirubin 0.2 0.1 - 1.0 mg/dL    Alkaline Phosphatase 69 55 - 135 U/L    AST 26 10 - 40 U/L    ALT 16 10 - 44 U/L    Anion Gap 10 8 - 16 mmol/L    eGFR if African American >60.0 >60 mL/min/1.73 m^2    eGFR if non African American >60.0 >60 mL/min/1.73 m^2   CBC auto differential    Collection Time: 06/17/20  8:10 PM   Result Value Ref Range    WBC 6.67 3.90 - 12.70 K/uL    RBC 3.79 (L) 4.00 - 5.40 M/uL    Hemoglobin 9.8 (L) 12.0 - 16.0 g/dL    Hematocrit 32.8 (L) 37.0 - 48.5 %    Mean Corpuscular Volume 87 82 - 98 fL    Mean Corpuscular Hemoglobin 25.9 (L) 27.0 - 31.0 pg    Mean Corpuscular Hemoglobin Conc 29.9 (L) 32.0 - 36.0 g/dL    RDW 15.6 (H) 11.5 - 14.5 %    Platelets 315 150 - 350 K/uL    MPV 9.6 9.2 - 12.9 fL    Immature Granulocytes 0.7 (H) 0.0 - 0.5 %    Gran # (ANC) 4.2 1.8 - 7.7 K/uL    Immature Grans (Abs) 0.05 (H) 0.00 - 0.04 K/uL    Lymph # 1.1 1.0 - 4.8 K/uL    Mono # 0.9 0.3 - 1.0 K/uL    Eos # 0.4 0.0 - 0.5 K/uL    Baso # 0.06 0.00 - 0.20 K/uL    nRBC 0 0 /100 WBC    Gran% 62.9 38.0 - 73.0 %    Lymph% 16.8 (L) 18.0 - 48.0 %    Mono% 13.3 4.0 - 15.0 %    Eosinophil% 5.4 0.0 - 8.0 %    Basophil% 0.9 0.0 - 1.9 %    Differential Method Automated    Troponin I    Collection Time: 06/17/20  8:10 PM   Result Value Ref Range    Troponin I 0.026 0.000 - 0.026 ng/mL   COVID-19 Rapid Screening    Collection Time: 06/17/20  8:10 PM   Result Value Ref Range    SARS-CoV-2 RNA,  Amplification, Qual Negative Negative   Procalcitonin    Collection Time: 06/18/20  4:43 AM   Result Value Ref Range    Procalcitonin 0.06 <0.25 ng/mL   CBC with Automated Differential    Collection Time: 06/18/20  4:43 AM   Result Value Ref Range    WBC 6.83 3.90 - 12.70 K/uL    RBC 3.75 (L) 4.00 - 5.40 M/uL    Hemoglobin 9.5 (L) 12.0 - 16.0 g/dL    Hematocrit 33.2 (L) 37.0 - 48.5 %    Mean Corpuscular Volume 89 82 - 98 fL    Mean Corpuscular Hemoglobin 25.3 (L) 27.0 - 31.0 pg    Mean Corpuscular Hemoglobin Conc 28.6 (L) 32.0 - 36.0 g/dL    RDW 15.6 (H) 11.5 - 14.5 %    Platelets 324 150 - 350 K/uL    MPV 9.3 9.2 - 12.9 fL    Immature Granulocytes 0.9 (H) 0.0 - 0.5 %    Gran # (ANC) 3.8 1.8 - 7.7 K/uL    Immature Grans (Abs) 0.06 (H) 0.00 - 0.04 K/uL    Lymph # 1.4 1.0 - 4.8 K/uL    Mono # 1.2 (H) 0.3 - 1.0 K/uL    Eos # 0.4 0.0 - 0.5 K/uL    Baso # 0.05 0.00 - 0.20 K/uL    nRBC 0 0 /100 WBC    Gran% 55.5 38.0 - 73.0 %    Lymph% 19.9 18.0 - 48.0 %    Mono% 17.0 (H) 4.0 - 15.0 %    Eosinophil% 6.0 0.0 - 8.0 %    Basophil% 0.7 0.0 - 1.9 %    Differential Method Automated    Comprehensive Metabolic Panel (CMP)    Collection Time: 06/18/20  4:43 AM   Result Value Ref Range    Sodium 134 (L) 136 - 145 mmol/L    Potassium 5.0 3.5 - 5.1 mmol/L    Chloride 99 95 - 110 mmol/L    CO2 27 23 - 29 mmol/L    Glucose 99 70 - 110 mg/dL    BUN, Bld 22 (H) 6 - 20 mg/dL    Creatinine 0.9 0.5 - 1.4 mg/dL    Calcium 8.7 8.7 - 10.5 mg/dL    Total Protein 7.2 6.0 - 8.4 g/dL    Albumin 2.1 (L) 3.5 - 5.2 g/dL    Total Bilirubin 0.2 0.1 - 1.0 mg/dL    Alkaline Phosphatase 68 55 - 135 U/L    AST 20 10 - 40 U/L    ALT 14 10 - 44 U/L    Anion Gap 8 8 - 16 mmol/L    eGFR if African American >60.0 >60 mL/min/1.73 m^2    eGFR if non African American >60.0 >60 mL/min/1.73 m^2   Magnesium    Collection Time: 06/18/20  4:43 AM   Result Value Ref Range    Magnesium 1.5 (L) 1.6 - 2.6 mg/dL   Phosphorus    Collection Time: 06/18/20  4:43 AM   Result  Value Ref Range    Phosphorus 4.5 2.7 - 4.5 mg/dL   Troponin I    Collection Time: 06/18/20  6:20 PM   Result Value Ref Range    Troponin I <0.006 0.000 - 0.026 ng/mL   Basic metabolic panel    Collection Time: 06/18/20  6:20 PM   Result Value Ref Range    Sodium 136 136 - 145 mmol/L    Potassium 4.9 3.5 - 5.1 mmol/L    Chloride 100 95 - 110 mmol/L    CO2 28 23 - 29 mmol/L    Glucose 131 (H) 70 - 110 mg/dL    BUN, Bld 23 (H) 6 - 20 mg/dL    Creatinine 1.2 0.5 - 1.4 mg/dL    Calcium 9.2 8.7 - 10.5 mg/dL    Anion Gap 8 8 - 16 mmol/L    eGFR if African American 56.8 (A) >60 mL/min/1.73 m^2    eGFR if non  49.2 (A) >60 mL/min/1.73 m^2   CBC with Automated Differential    Collection Time: 06/19/20  6:16 AM   Result Value Ref Range    WBC 6.49 3.90 - 12.70 K/uL    RBC 3.86 (L) 4.00 - 5.40 M/uL    Hemoglobin 9.9 (L) 12.0 - 16.0 g/dL    Hematocrit 34.8 (L) 37.0 - 48.5 %    Mean Corpuscular Volume 90 82 - 98 fL    Mean Corpuscular Hemoglobin 25.6 (L) 27.0 - 31.0 pg    Mean Corpuscular Hemoglobin Conc 28.4 (L) 32.0 - 36.0 g/dL    RDW 15.6 (H) 11.5 - 14.5 %    Platelets 346 150 - 350 K/uL    MPV 9.1 (L) 9.2 - 12.9 fL    Immature Granulocytes 0.9 (H) 0.0 - 0.5 %    Gran # (ANC) 2.8 1.8 - 7.7 K/uL    Immature Grans (Abs) 0.06 (H) 0.00 - 0.04 K/uL    Lymph # 2.0 1.0 - 4.8 K/uL    Mono # 1.1 (H) 0.3 - 1.0 K/uL    Eos # 0.5 0.0 - 0.5 K/uL    Baso # 0.06 0.00 - 0.20 K/uL    nRBC 0 0 /100 WBC    Gran% 43.5 38.0 - 73.0 %    Lymph% 30.2 18.0 - 48.0 %    Mono% 16.6 (H) 4.0 - 15.0 %    Eosinophil% 7.9 0.0 - 8.0 %    Basophil% 0.9 0.0 - 1.9 %    Differential Method Automated    Comprehensive Metabolic Panel (CMP)    Collection Time: 06/19/20  6:16 AM   Result Value Ref Range    Sodium 138 136 - 145 mmol/L    Potassium 4.9 3.5 - 5.1 mmol/L    Chloride 100 95 - 110 mmol/L    CO2 29 23 - 29 mmol/L    Glucose 120 (H) 70 - 110 mg/dL    BUN, Bld 21 (H) 6 - 20 mg/dL    Creatinine 1.0 0.5 - 1.4 mg/dL    Calcium 9.4 8.7 - 10.5  mg/dL    Total Protein 7.6 6.0 - 8.4 g/dL    Albumin 2.1 (L) 3.5 - 5.2 g/dL    Total Bilirubin 0.2 0.1 - 1.0 mg/dL    Alkaline Phosphatase 75 55 - 135 U/L    AST 18 10 - 40 U/L    ALT 15 10 - 44 U/L    Anion Gap 9 8 - 16 mmol/L    eGFR if African American >60.0 >60 mL/min/1.73 m^2    eGFR if non African American >60.0 >60 mL/min/1.73 m^2   Magnesium    Collection Time: 20  6:16 AM   Result Value Ref Range    Magnesium 1.9 1.6 - 2.6 mg/dL   Phosphorus    Collection Time: 20  6:16 AM   Result Value Ref Range    Phosphorus 3.5 2.7 - 4.5 mg/dL     CareEverywhere  19  HCV RNA HAZEL Qualitative - [facility] Positive (A)   Comment: Positive: HCV RNA Detected         Imagin2020 CT abdomen/pelvis  COMPARISON:  CT abdomen pelvis 2020    Chest radiograph 2020    CT chest 2020    Shoulder radiograph 2013    FINDINGS:  There is increased radiopharmaceutical distribution of the thoracic spine noting degenerative endplate changes at this level on 2020 CT chest.  Subtle increased radiopharmaceutical density in the right proximal humerus which may represent sequelae of remote fracture at this level.  Increased density of the bilateral knees likely representing degenerative changes.  The remainder of the skeleton demonstrates physiologic distribution of the radiopharmaceutical.  Both kidneys and the bladder appear normal.      Impression       There is no scintigraphic evidence of metastatic disease.         2020 MRI brain  COMPARISON:  None    FINDINGS:  Diffusion-weighted imaging demonstrates no evidence of high signal to indicate recent infarction or ischemia.    T2 and FLAIR imaging demonstrates mild vasogenic edema in the right cerebellum associated with a 1.1 cm round heterogeneously enhancing mass in the right cerebellum.  This is consistent with metastatic disease.  No other metastatic foci are detected.    There is moderate probable chronic microvascular ischemic  changes in the periventricular and subcortical white matter bilaterally.  Mild involutional changes.    No midline shift or hydrocephalus.    No evidence of major vascular infarction.    No evidence of hematoma/hemorrhage.    Normal vascular flow voids near the skull base.    The paranasal sinuses and mastoid air cells are within normal limits.      Impression       1.1 cm enhancing mass in the right cerebellum with mild adjacent vasogenic edema consistent with metastatic disease.  Recommend clinical correlation and follow-up.       5/11/2020 Bone scan  COMPARISON:  CT chest 05/01/2020.    FINDINGS:  There is a partially visualized right hilar mass with multiple pulmonary nodules within the visualized lung parenchyma, better evaluated to the full extent on prior CT chest examination.  Moderate-sized right pleural effusion, unchanged.    Heart is not enlarged.  No pericardial effusion.    Liver is upper normal in size with normal attenuation.  No focal hepatic lesions are seen.  Gallbladder is normal.  No intra or extrahepatic biliary ductal dilatation.  Spleen is normal in size.    Stomach, pancreas, and adrenal glands show no significant abnormalities.    Kidneys are normal in size and location.  No renal mass or hydronephrosis.  Visualized ureters are normal in course to the bladder.  Urinary bladder is unremarkable.  Uterus is surgically removed.    Visualized loops of small large bowel show no evidence of inflammation or obstruction.  Colonic diverticulosis without evidence of diverticulitis.  Normal appendix.    No lymphadenopathy in the abdomen and pelvis.  No free intraperitoneal air or ascites.  Abdominal aorta is normal in course and caliber.    Osseous structures show age-appropriate degenerative changes without osseous lytic or destructive process.  Incidental sternal foramen.  Extraperitoneal soft tissues appear unremarkable.      Impression       Partially visualized right hilar mass with multiple  pulmonary nodules within both lungs, better visualized to the full extent on prior CT examination.  No evidence of intra-abdominal or intrapelvic metastases.    Moderate-sized right pleural effusion.    Colonic diverticulosis.    Hysterectomy.     5/1/2020 CT chest  COMPARISON:  Chest radiograph dated 05/01/2020    FINDINGS:  There is heterogeneous masslike consolidation centered in the right upper lobe with pleural extension, for reference measuring 6.5 x 7.8 cm (series 2, image 29).  Areas of surrounding ground-glass attenuation more superiorly extending to the apex.  There is associated right hilar/subcarinal involvement with resultant narrowing of the distal right mainstem and proximal segmental bronchi.  Several additional subcentimeter nodules in the right middle, lower lobes and left lung (for reference 7 mm in the left upper lobe on series 2, image 31).  Moderate right-sided pleural effusion with dependent atelectasis.  No pericardial effusion.    Structures at the base of the neck are unremarkable.  The trachea is clear.  There is mediastinal adenopathy predominately involving the prevascular and paratracheal regions, for reference precarinal node measuring 2.6 cm (series 2, image 31).  There is also enlarged left axillary node measuring 1.9 cm (series 2, image 20).    Limited images through the upper abdomen are unremarkable.  No aggressive osseous lesion.  Spine DJD.      Impression       Masslike consolidation centered in the right upper lobe with associated right hilar/subcarinal involvement concerning for neoplasm until proven otherwise.    Metastatic bilateral pulmonary nodularity and mediastinal adenopathy.  Additional enlarged left axillary node, possible additional metastasis.    Moderate right pleural effusion.    Additional findings as above.    RECIST SUMMARY:    Date of prior examination for comparison: None    Lesion 1: Right upper lobe.  7.8 cm.  Series 2, image 29.    Lesion 2: Precarinal  node.  2.6 cm.  Series 2, image 31.           Assessment:       1. Adenocarcinoma of lung, stage 4, right    2. Secondary malignant neoplasm of brain    3. Malignant pleural effusion    4. Chronic respiratory failure with hypoxia    5. Chronic hepatitis C without hepatic coma           Plan:       1. 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  Also has pleural effusion on right side s/p therapeutic thoracentesis 5/2020. PDL1 expressed in 100% of tumor cells from biopsy.  No mutation identified for EGFR/ALK/ROS1/BRAF by tissue.  -Continue single agent pembrolizumab  -Proceed with cycle 2  -Return to clinic in 3 weeks  -Repeat imaging CT chest/abdomen/pelvis and MRI brain prior to cycle 4.  -TSH and fT4 next due with cycle 4  -Discussed with hepatology and no additional antiviral therapy required with her history of Hep C.  --Not eligible for [id number]      Orders Placed This Encounter   Procedures    TSH    T4, free           Maik Peterson MD  Hematology Oncology Fellow PGY6  Pager [phone]      I have reviewed the notes, assessments, and/or procedures performed by the housestaff, as above.  I have personally interviewed and examined the patient at the beside, and rounded with the housestaff. I concur with her/his assessment and plan and the documentation of Janeth Boyce.  I, Dr. Modesto Holly, personally spent more than  25 mins during this encounter, greater than 50% was spent in direct counseling and/or coordination of care.     Modesto Holly M.D., M.S., F.A.C.P.  Hematology/Oncology Attending  [facility]

## 2020-06-24 NOTE — Clinical Note
1. Schedule cycle 3 7/16/20.  2.  labs cmp, cbc 7/15/20 and then follow up with dr. Rocha.  Thanks -e

## 2020-06-25 ENCOUNTER — INFUSION (OUTPATIENT)
Dept: INFUSION THERAPY | Facility: HOSPITAL | Age: 60
End: 2020-06-25
Attending: STUDENT IN AN ORGANIZED HEALTH CARE EDUCATION/TRAINING PROGRAM
Payer: MEDICAID

## 2020-06-25 VITALS
RESPIRATION RATE: 18 BRPM | SYSTOLIC BLOOD PRESSURE: 83 MMHG | DIASTOLIC BLOOD PRESSURE: 66 MMHG | TEMPERATURE: 98 F | HEART RATE: 98 BPM

## 2020-06-25 DIAGNOSIS — C34.91 ADENOCARCINOMA OF LUNG, STAGE 4, RIGHT: Primary | ICD-10-CM

## 2020-06-25 DIAGNOSIS — C34.90 PRIMARY MALIGNANT NEOPLASM OF LUNG WITH METASTASIS TO BRAIN: ICD-10-CM

## 2020-06-25 DIAGNOSIS — C79.31 PRIMARY MALIGNANT NEOPLASM OF LUNG WITH METASTASIS TO BRAIN: ICD-10-CM

## 2020-06-25 PROCEDURE — 25000003 PHARM REV CODE 250: Performed by: STUDENT IN AN ORGANIZED HEALTH CARE EDUCATION/TRAINING PROGRAM

## 2020-06-25 PROCEDURE — 96413 CHEMO IV INFUSION 1 HR: CPT

## 2020-06-25 PROCEDURE — 63600175 PHARM REV CODE 636 W HCPCS: Mod: JG | Performed by: STUDENT IN AN ORGANIZED HEALTH CARE EDUCATION/TRAINING PROGRAM

## 2020-06-25 RX ORDER — HEPARIN 100 UNIT/ML
500 SYRINGE INTRAVENOUS
Status: DISCONTINUED | OUTPATIENT
Start: 2020-06-25 | End: 2020-06-25 | Stop reason: HOSPADM

## 2020-06-25 RX ORDER — SODIUM CHLORIDE 0.9 % (FLUSH) 0.9 %
10 SYRINGE (ML) INJECTION
Status: DISCONTINUED | OUTPATIENT
Start: 2020-06-25 | End: 2020-06-25 | Stop reason: HOSPADM

## 2020-06-25 RX ADMIN — SODIUM CHLORIDE 200 MG: 9 INJECTION, SOLUTION INTRAVENOUS at 09:06

## 2020-06-30 ENCOUNTER — TELEPHONE (OUTPATIENT)
Dept: HEMATOLOGY/ONCOLOGY | Facility: CLINIC | Age: 60
End: 2020-06-30

## 2020-06-30 DIAGNOSIS — C34.91 ADENOCARCINOMA OF LUNG, STAGE 4, RIGHT: Primary | ICD-10-CM

## 2020-06-30 DIAGNOSIS — C34.91 ADENOCARCINOMA OF LUNG, STAGE 4, RIGHT: ICD-10-CM

## 2020-06-30 DIAGNOSIS — G47.09 OTHER INSOMNIA: Primary | ICD-10-CM

## 2020-06-30 DIAGNOSIS — C34.90 PRIMARY MALIGNANT NEOPLASM OF LUNG WITH METASTASIS TO BRAIN: ICD-10-CM

## 2020-06-30 DIAGNOSIS — C79.31 PRIMARY MALIGNANT NEOPLASM OF LUNG WITH METASTASIS TO BRAIN: ICD-10-CM

## 2020-06-30 RX ORDER — TRAMADOL HYDROCHLORIDE 50 MG/1
100 TABLET ORAL EVERY 8 HOURS PRN
Qty: 120 TABLET | Refills: 0
Start: 2020-06-30 | End: 2020-07-13 | Stop reason: SDUPTHER

## 2020-06-30 RX ORDER — ZOLPIDEM TARTRATE 5 MG/1
5 TABLET ORAL NIGHTLY PRN
Qty: 21 TABLET | Refills: 0 | Status: SHIPPED | OUTPATIENT
Start: 2020-06-30 | End: 2020-10-07

## 2020-06-30 RX ORDER — OXYCODONE HYDROCHLORIDE 5 MG/1
5 TABLET ORAL EVERY 12 HOURS PRN
Qty: 60 TABLET | Refills: 0 | Status: SHIPPED | OUTPATIENT
Start: 2020-06-30 | End: 2020-07-29 | Stop reason: SDUPTHER

## 2020-06-30 NOTE — NURSING
Returned call to pt, pt asking for a new prescription for her pain medication. Informed pt that Tramadol was ordered from [facility] for pain control.  She isn't requesting anything for anxiety as she was given Buspar in April and has adverse reactions to this.  She would like something to sleep, since she has been up since 3AM.   Remeron listed on the med card.  Message sent to Dr. Peterson and referral for pallative care placed.  Pt also asking if she could smoke or drink.  Encouraged her not to do either since she may increase her risk for falls and she is actively using oxygen at home.  Agreeable with discussion

## 2020-06-30 NOTE — TELEPHONE ENCOUNTER
Spoke to patient. tramadol is not working as well as oxycodone. She would like to be put back on oxycodone. Will refill but also told her to double her tramadol dose to 100mg PRN for now.  She says she has tried remeron in the past and does not like it.  Will do trial of zolpidem.  She inquired about valium but told her it is high risk for her given her respiratory issues. She is agreeable to this plan.    Maik Peterson MD  Hematology Oncology Fellow PGY6

## 2020-06-30 NOTE — TELEPHONE ENCOUNTER
Called. No answer.    Maik Peterson MD  Hematology Oncology Fellow PGY6    ----- Message from Celio Serrano RN sent at 6/30/2020  2:13 PM CDT -----  Contact: Patient    ----- Message -----  From: Thomas Devries  Sent: 6/30/2020  12:32 PM CDT  To: Kristen Pleitez Staff    Returning a call     Caller name:: Pt    Who Left Message for Patient:: Kristen    Communication preference:: [phone]    Additional info::

## 2020-07-02 ENCOUNTER — TELEPHONE (OUTPATIENT)
Dept: HEMATOLOGY/ONCOLOGY | Facility: CLINIC | Age: 60
End: 2020-07-02

## 2020-07-02 NOTE — NURSING
Returned call to pt requesting that the DME-commode chair be removed from her home.  She dosen't feel like she needs it now nor can her home accommodate placement of her commode chair next to her be.  Suggested that she place the commode chair in her bathroom, remove the the bottom and place over her toilet seat.  Pt refuse.  Pt would also like to know when will her Rolator will be delivered.  Called [facility] at extension [phone] to inquire on delivery of equipment. Informed that pt did not answer to confirm address for delivery.  Confirmed address as listed on hospital records.  Informed pt that equipment will be delivered on Monday.  Verbalized understanding.

## 2020-07-06 DIAGNOSIS — C34.91 ADENOCARCINOMA OF LUNG, STAGE 4, RIGHT: ICD-10-CM

## 2020-07-06 DIAGNOSIS — F41.9 ANXIETY: ICD-10-CM

## 2020-07-06 DIAGNOSIS — R11.0 NAUSEA: Primary | ICD-10-CM

## 2020-07-06 RX ORDER — PROCHLORPERAZINE MALEATE 10 MG
10 TABLET ORAL 3 TIMES DAILY PRN
Qty: 60 TABLET | Refills: 2 | Status: SHIPPED | OUTPATIENT
Start: 2020-07-06 | End: 2021-01-01

## 2020-07-06 RX ORDER — ONDANSETRON 8 MG/1
8 TABLET, ORALLY DISINTEGRATING ORAL EVERY 8 HOURS PRN
Qty: 60 TABLET | Refills: 0 | Status: ON HOLD | OUTPATIENT
Start: 2020-07-06 | End: 2022-01-01

## 2020-07-06 RX ORDER — PROCHLORPERAZINE MALEATE 10 MG
10 TABLET ORAL 3 TIMES DAILY PRN
Qty: 60 TABLET | Refills: 2 | OUTPATIENT
Start: 2020-07-06

## 2020-07-06 RX ORDER — LORAZEPAM 1 MG/1
1 TABLET ORAL
Qty: 5 TABLET | Refills: 0 | Status: SHIPPED | OUTPATIENT
Start: 2020-07-06 | End: 2020-10-07

## 2020-07-06 RX ORDER — ONDANSETRON 8 MG/1
8 TABLET, ORALLY DISINTEGRATING ORAL EVERY 8 HOURS PRN
Qty: 60 TABLET | Refills: 0 | OUTPATIENT
Start: 2020-07-06

## 2020-07-06 RX ORDER — LORAZEPAM 1 MG/1
1 TABLET ORAL
Qty: 5 TABLET | Refills: 0 | OUTPATIENT
Start: 2020-07-06

## 2020-07-06 NOTE — TELEPHONE ENCOUNTER
----- Message from Nichole Lindsay sent at 7/6/2020  7:52 AM CDT -----  Contact: Janeth guthrie [phone] or [phone]  Type:  Needs Medical Advice    Who Called: Janeth guthrie  Symptoms (please be specific):   How long has patient had these symptoms:   Pharmacy name and phone #:   Would the patient rather a call back or a response via [facility]? Call back  Best Call Back Number: [phone] or [phone]  Additional Information: Pt is requesting a call back from the nurse because pt is having a lot of nausea and she need a medication refill also but she has a drain in her side and she said the techs are hurting her and it's not draining much at all

## 2020-07-06 NOTE — TELEPHONE ENCOUNTER
Patient states that she started vomiting, she is out of her nausea medication. Her pleural drain is being drained every other day, let her know that may be to much and maybe it only needs to be done twice a week. She would like to know when drain can be removed, let her know that we can decrease the number of times a week of the drain. Most likely it would need to stay in a little longer. She would like to speak to Nichole as well.      Walker/rollater should be delivered today.

## 2020-07-06 NOTE — NURSING
Returned call to pt complaining of feeling nauseated, pt has placed a call for antiemetics to be refilled.  Also complains of pain while being drained for pleurx catheter drainage site.  Requesting that nurses visits be decreased to twice a week.  Reporting that 200cc of clear liquid was drained out on Sunday 7/5.  While talking to pt found one pill (compazine) and took that for her nausea, encouraged her not to eat any fried foods during this time.  Pt report eating gumbo on Sunday and feeling nauseated since. Also requesting a time Rolator will be delivered.  Called [phone] and informed that it will be delivered on Tuesday 7/7.

## 2020-07-07 ENCOUNTER — DOCUMENTATION ONLY (OUTPATIENT)
Dept: HEMATOLOGY/ONCOLOGY | Facility: CLINIC | Age: 60
End: 2020-07-07

## 2020-07-07 NOTE — PROGRESS NOTES
Received message via Epic from RN Celio asking that I check on the status of the rollator walker delivery so that she can let patient know. Patient still hasn't received it. Called [facility] at ext. [phone] and spoke with Kai Warner, who checked and said that it is being delivered today. Sent reply message to Celio. Will continue to follow and assist as needs are identified.

## 2020-07-07 NOTE — TELEPHONE ENCOUNTER
"----- Message from Laura Clark sent at 7/7/2020 12:40 PM CDT -----  Patient Assist    Name of caller: Janeth   Provider name: To Millard MD  Contact Preference:  [phone]   Is this regarding current patient or new patient?: current   What is the nature of the call?    - pt called regarding medication that she didn't get all the tabs for and   she was expecting a walker to be delivered and hadn't received it yet.   Please call and speak with her per her request.     Additional Notes:   "Thank you for all that you do for our patients'"          "

## 2020-07-08 ENCOUNTER — TELEPHONE (OUTPATIENT)
Dept: HEMATOLOGY/ONCOLOGY | Facility: CLINIC | Age: 60
End: 2020-07-08

## 2020-07-08 NOTE — TELEPHONE ENCOUNTER
Spoke to HH nurse, decrease thora drain to twice a week.  She will call with any other questions or concerns.

## 2020-07-08 NOTE — TELEPHONE ENCOUNTER
----- Message from Ynes Cha sent at 7/8/2020  8:24 AM CDT -----  Regarding: Nurse Schedule  Contact: Chau RENNY @ [phone]  Good Morning,  Patient is refusing to have nurse come ever other day to handle the Zhang Catheter and Drainage. Patient's last drainage was 250 on 07/05/2020.   Ms Ritchie with Home health would like to get schedule change please.    Please call and advise

## 2020-07-08 NOTE — NURSING
"Returned call to pt who requested a review of her medications.  Reviewed medications listed on medication list.  Pt continues to be concerned about use of oxygen compressor that has caused increase in her utility bill.   Sari sy.  Encouraged to place all medications not in use in a bag away from daily medications.  Pt is emotional after medication review and thankful for feeling so "good".    "

## 2020-07-13 ENCOUNTER — TELEPHONE (OUTPATIENT)
Dept: HEMATOLOGY/ONCOLOGY | Facility: CLINIC | Age: 60
End: 2020-07-13

## 2020-07-13 DIAGNOSIS — C34.91 ADENOCARCINOMA OF LUNG, STAGE 4, RIGHT: ICD-10-CM

## 2020-07-13 RX ORDER — TRAMADOL HYDROCHLORIDE 50 MG/1
100 TABLET ORAL EVERY 8 HOURS PRN
Qty: 60 TABLET | Refills: 0 | Status: SHIPPED | OUTPATIENT
Start: 2020-07-13 | End: 2020-08-26 | Stop reason: SDUPTHER

## 2020-07-13 NOTE — NURSING
Received call from pt this morning complaining that current medications where not working.  Reviewed medications and appointments via three way with [facility].  Requesting a refill of tramadol as needed for pain relief, stating that other medications on hand (oxycodone) does not relieve pain like tramadol.  Message sent to staff, Dr. Ariza-Spoke with Dr. Ariza later in the evening and new orders placed for tramadol.  Spoke with pt and informed that prescription was refilled.  Pt returned call and was informed by PA that medication was sent to [facility] for refill.  Also requesting an earlier time for chemo on 7/15.

## 2020-07-15 ENCOUNTER — TELEPHONE (OUTPATIENT)
Dept: HEMATOLOGY/ONCOLOGY | Facility: CLINIC | Age: 60
End: 2020-07-15

## 2020-07-15 ENCOUNTER — LAB VISIT (OUTPATIENT)
Dept: LAB | Facility: HOSPITAL | Age: 60
End: 2020-07-15
Payer: MEDICAID

## 2020-07-15 ENCOUNTER — OFFICE VISIT (OUTPATIENT)
Dept: HEMATOLOGY/ONCOLOGY | Facility: CLINIC | Age: 60
End: 2020-07-15
Payer: MEDICAID

## 2020-07-15 ENCOUNTER — TELEPHONE (OUTPATIENT)
Dept: PULMONOLOGY | Facility: CLINIC | Age: 60
End: 2020-07-15

## 2020-07-15 VITALS
DIASTOLIC BLOOD PRESSURE: 65 MMHG | SYSTOLIC BLOOD PRESSURE: 135 MMHG | RESPIRATION RATE: 22 BRPM | HEART RATE: 100 BPM | OXYGEN SATURATION: 95 % | HEIGHT: 66 IN | BODY MASS INDEX: 27.95 KG/M2 | TEMPERATURE: 98 F | WEIGHT: 173.94 LBS

## 2020-07-15 DIAGNOSIS — Z78.9 PATIENT HAS ACTIVE POWER OF ATTORNEY FOR HEALTH CARE: ICD-10-CM

## 2020-07-15 DIAGNOSIS — C34.91 ADENOCARCINOMA OF LUNG, STAGE 4, RIGHT: Primary | ICD-10-CM

## 2020-07-15 DIAGNOSIS — B18.2 CHRONIC HEPATITIS C WITHOUT HEPATIC COMA: ICD-10-CM

## 2020-07-15 DIAGNOSIS — R06.02 SHORTNESS OF BREATH: ICD-10-CM

## 2020-07-15 DIAGNOSIS — C79.31 METASTASIS TO BRAIN: ICD-10-CM

## 2020-07-15 DIAGNOSIS — R05.9 COUGH: ICD-10-CM

## 2020-07-15 DIAGNOSIS — C34.90 PRIMARY MALIGNANT NEOPLASM OF LUNG WITH METASTASIS TO BRAIN: ICD-10-CM

## 2020-07-15 DIAGNOSIS — C34.91 ADENOCARCINOMA OF LUNG, STAGE 4, RIGHT: ICD-10-CM

## 2020-07-15 DIAGNOSIS — C79.31 SECONDARY MALIGNANT NEOPLASM OF BRAIN: ICD-10-CM

## 2020-07-15 DIAGNOSIS — R64 MALIGNANT CACHEXIA: ICD-10-CM

## 2020-07-15 DIAGNOSIS — Z71.89 ADVANCE CARE PLANNING: ICD-10-CM

## 2020-07-15 DIAGNOSIS — R64 MALIGNANT CACHEXIA: Primary | ICD-10-CM

## 2020-07-15 DIAGNOSIS — R91.8 LUNG MASS: ICD-10-CM

## 2020-07-15 DIAGNOSIS — J45.909 ASTHMA, UNSPECIFIED ASTHMA SEVERITY, UNSPECIFIED WHETHER COMPLICATED, UNSPECIFIED WHETHER PERSISTENT: ICD-10-CM

## 2020-07-15 DIAGNOSIS — C79.31 PRIMARY MALIGNANT NEOPLASM OF LUNG WITH METASTASIS TO BRAIN: ICD-10-CM

## 2020-07-15 LAB
ALBUMIN SERPL BCP-MCNC: 2.1 G/DL (ref 3.5–5.2)
ALP SERPL-CCNC: 71 U/L (ref 55–135)
ALT SERPL W/O P-5'-P-CCNC: 6 U/L (ref 10–44)
ANION GAP SERPL CALC-SCNC: 8 MMOL/L (ref 8–16)
AST SERPL-CCNC: 11 U/L (ref 10–40)
BASOPHILS # BLD AUTO: 0.09 K/UL (ref 0–0.2)
BASOPHILS NFR BLD: 0.9 % (ref 0–1.9)
BILIRUB SERPL-MCNC: 0.3 MG/DL (ref 0.1–1)
BUN SERPL-MCNC: 24 MG/DL (ref 6–20)
CALCIUM SERPL-MCNC: 9.3 MG/DL (ref 8.7–10.5)
CHLORIDE SERPL-SCNC: 104 MMOL/L (ref 95–110)
CO2 SERPL-SCNC: 24 MMOL/L (ref 23–29)
CREAT SERPL-MCNC: 1.2 MG/DL (ref 0.5–1.4)
DIFFERENTIAL METHOD: ABNORMAL
EOSINOPHIL # BLD AUTO: 0.5 K/UL (ref 0–0.5)
EOSINOPHIL NFR BLD: 5.1 % (ref 0–8)
ERYTHROCYTE [DISTWIDTH] IN BLOOD BY AUTOMATED COUNT: 15.8 % (ref 11.5–14.5)
EST. GFR  (AFRICAN AMERICAN): 56.8 ML/MIN/1.73 M^2
EST. GFR  (NON AFRICAN AMERICAN): 49.2 ML/MIN/1.73 M^2
GLUCOSE SERPL-MCNC: 132 MG/DL (ref 70–110)
HCT VFR BLD AUTO: 30.3 % (ref 37–48.5)
HGB BLD-MCNC: 9 G/DL (ref 12–16)
IMM GRANULOCYTES # BLD AUTO: 0.09 K/UL (ref 0–0.04)
IMM GRANULOCYTES NFR BLD AUTO: 0.9 % (ref 0–0.5)
LYMPHOCYTES # BLD AUTO: 1.9 K/UL (ref 1–4.8)
LYMPHOCYTES NFR BLD: 18.9 % (ref 18–48)
MCH RBC QN AUTO: 25.3 PG (ref 27–31)
MCHC RBC AUTO-ENTMCNC: 29.7 G/DL (ref 32–36)
MCV RBC AUTO: 85 FL (ref 82–98)
MONOCYTES # BLD AUTO: 1.1 K/UL (ref 0.3–1)
MONOCYTES NFR BLD: 10.9 % (ref 4–15)
NEUTROPHILS # BLD AUTO: 6.4 K/UL (ref 1.8–7.7)
NEUTROPHILS NFR BLD: 63.3 % (ref 38–73)
NRBC BLD-RTO: 0 /100 WBC
PLATELET # BLD AUTO: 370 K/UL (ref 150–350)
PMV BLD AUTO: 9.2 FL (ref 9.2–12.9)
POTASSIUM SERPL-SCNC: 3.8 MMOL/L (ref 3.5–5.1)
PROT SERPL-MCNC: 8.2 G/DL (ref 6–8.4)
RBC # BLD AUTO: 3.56 M/UL (ref 4–5.4)
SODIUM SERPL-SCNC: 136 MMOL/L (ref 136–145)
WBC # BLD AUTO: 10.07 K/UL (ref 3.9–12.7)

## 2020-07-15 PROCEDURE — 99999 PR PBB SHADOW E&M-EST. PATIENT-LVL V: CPT | Mod: PBBFAC,,, | Performed by: STUDENT IN AN ORGANIZED HEALTH CARE EDUCATION/TRAINING PROGRAM

## 2020-07-15 PROCEDURE — 36415 COLL VENOUS BLD VENIPUNCTURE: CPT

## 2020-07-15 PROCEDURE — 99215 OFFICE O/P EST HI 40 MIN: CPT | Mod: PBBFAC,25 | Performed by: STUDENT IN AN ORGANIZED HEALTH CARE EDUCATION/TRAINING PROGRAM

## 2020-07-15 PROCEDURE — 85025 COMPLETE CBC W/AUTO DIFF WBC: CPT

## 2020-07-15 PROCEDURE — 99215 OFFICE O/P EST HI 40 MIN: CPT | Mod: S$PBB,,, | Performed by: STUDENT IN AN ORGANIZED HEALTH CARE EDUCATION/TRAINING PROGRAM

## 2020-07-15 PROCEDURE — 99497 PR ADVNCD CARE PLAN 30 MIN: ICD-10-PCS | Mod: S$PBB,,, | Performed by: INTERNAL MEDICINE

## 2020-07-15 PROCEDURE — 99215 PR OFFICE/OUTPT VISIT, EST, LEVL V, 40-54 MIN: ICD-10-PCS | Mod: S$PBB,,, | Performed by: STUDENT IN AN ORGANIZED HEALTH CARE EDUCATION/TRAINING PROGRAM

## 2020-07-15 PROCEDURE — 99999 PR PBB SHADOW E&M-EST. PATIENT-LVL V: ICD-10-PCS | Mod: PBBFAC,,, | Performed by: STUDENT IN AN ORGANIZED HEALTH CARE EDUCATION/TRAINING PROGRAM

## 2020-07-15 PROCEDURE — 99497 ADVNCD CARE PLAN 30 MIN: CPT | Mod: PBBFAC | Performed by: INTERNAL MEDICINE

## 2020-07-15 PROCEDURE — 99497 ADVNCD CARE PLAN 30 MIN: CPT | Mod: S$PBB,,, | Performed by: INTERNAL MEDICINE

## 2020-07-15 PROCEDURE — 80053 COMPREHEN METABOLIC PANEL: CPT

## 2020-07-15 RX ORDER — CYPROHEPTADINE HYDROCHLORIDE 4 MG/1
4 TABLET ORAL 3 TIMES DAILY
Qty: 90 TABLET | Refills: 0 | Status: SHIPPED | OUTPATIENT
Start: 2020-07-15 | End: 2020-08-05

## 2020-07-15 RX ORDER — HEPARIN 100 UNIT/ML
500 SYRINGE INTRAVENOUS
Status: CANCELLED | OUTPATIENT
Start: 2020-07-16

## 2020-07-15 RX ORDER — BENZONATATE 100 MG/1
200 CAPSULE ORAL 3 TIMES DAILY PRN
Qty: 30 CAPSULE | Refills: 1 | Status: SHIPPED | OUTPATIENT
Start: 2020-07-15 | End: 2020-07-22 | Stop reason: SDUPTHER

## 2020-07-15 RX ORDER — SODIUM CHLORIDE 0.9 % (FLUSH) 0.9 %
10 SYRINGE (ML) INJECTION
Status: CANCELLED | OUTPATIENT
Start: 2020-07-16

## 2020-07-15 RX ORDER — PROMETHAZINE HYDROCHLORIDE AND CODEINE PHOSPHATE 6.25; 1 MG/5ML; MG/5ML
5 SOLUTION ORAL EVERY 8 HOURS PRN
Qty: 240 ML | Refills: 0 | Status: SHIPPED | OUTPATIENT
Start: 2020-07-15 | End: 2020-08-05 | Stop reason: SDUPTHER

## 2020-07-15 RX ORDER — CYPROHEPTADINE HYDROCHLORIDE 4 MG/1
4 TABLET ORAL 3 TIMES DAILY
Qty: 90 TABLET | Refills: 0 | Status: SHIPPED | OUTPATIENT
Start: 2020-07-15 | End: 2020-07-15 | Stop reason: SDUPTHER

## 2020-07-15 NOTE — TELEPHONE ENCOUNTER
----- Message from Maeve Enriquez sent at 7/15/2020 11:36 AM CDT -----  Patient Requesting Order    Order Needed:  Medical Social Worker for community resources     Communication Preference: Chau-- [facility] -- [phone]    Additional Information:     Please call with verbal        detailed exam

## 2020-07-15 NOTE — TELEPHONE ENCOUNTER
"----- Message from Kait Ariza sent at 7/15/2020 12:26 PM CDT -----  Consult/Advisory:    Name Of Caller: Janeth   Contact Preference?: [phone]    Provider Name: Dr Rocha    What is the nature of the call?:  Patient request a callback to discuss prescription for medication to increase appetite.      Additional Notes:  "Thank you for all that you do for our patients'"           "

## 2020-07-15 NOTE — NURSING
Received message in Epic and on phone from pt who needed to talk.  Spoke with pt asking for a prescription refill on periactin that was to be sent by Dr. Lewis after visit this morning 7/15.  Family member unable to retrieve prescription at [facility] pharmacy.  Pt proceeded to go home.  Called [facility] pharmacy, spoke with pharmacist and confirmed that prescription for periactin was at pharmacy and will be delivered by this evening.  Pt informed that medication would be delivered.

## 2020-07-15 NOTE — Clinical Note
RTC for labs (CBC, CMP, TSH, T4), and CT C/A/P and brain MRI on 8/3 or 8/4, in 3 weeks prior to MD visit (8/5) and cycle 4 Keytruda (8/5 or 8/6). Thank you.

## 2020-07-15 NOTE — TELEPHONE ENCOUNTER
----- Message from Yoly Ramires sent at 7/15/2020  8:44 AM CDT -----  Can you all assist in getting her scheduled for a follow-up visit?  ----- Message -----  From: Lauren Rocha MD  Sent: 7/15/2020   8:40 AM CDT  To: Celio Serrano RN, #    Can this patient be scheduled to see pulmonology for follow-up visit? I put in a new consult as well. She was seen by Dr. Garcia in the hospital. She has a pleurX. Thank you

## 2020-07-15 NOTE — PROGRESS NOTES
PATIENT: Janeth Boyce  MRN: [id number]  DATE: 7/15/2020      Diagnosis:   1. Adenocarcinoma of lung, stage 4, right    2. Secondary malignant neoplasm of brain    3. Chronic hepatitis C without hepatic coma    4. Shortness of breath    5. Cough    6. Asthma, unspecified asthma severity, unspecified whether complicated, unspecified whether persistent    7. Lung mass    8. Patient has active power of  for health care    9. Advance care planning        Chief Complaint: Adenocarcinoma of lung, stage 4, right      Oncologic History:    Oncologic History 1. Lung adenocarcinoma    Oncologic Treatment 1. Carboplatin AUC4 x1 dose 5/27/20 and palliative radiation (5/27/2020 - 6/2/2020; 20 Gy to right lung)  2. Pembrolizumab  Cycle 1 6/4/2020       Pathology 5/5/2020  Right lung, biopsy:  Positive for adenocarcinoma with areas of necrosis.  Interpretation:   Right lung, specimen for PD-L1 immunohistochemistry studies (clone [id number], [facility], [place], [place]) ([id number]): 100% tumor cells are positive for PD-L1 (membranous positivity).     BRAF Mutation Analysis (V600E), Tumor Result Summary: NO MUTATION IDENTIFIED.   Result: BRAF status: Wild-type   Interpretation :     EGFR Gene, Mutation Analysis, Tumor Result Summary: NO MUTATION IDENTIFIED.   Result: EGFR status: Wild-type     ALK (2p23), Lung Cancer, FISH, Ts Result Summary: NEGATIVE   Interpretation: No rearrangement of the ALK gene was identified.     Result: nuc micha(ALKx3)[29/50]   Of 50 nuclei, 0% had separation of the 3'ALK and 5'ALK signals and 58% had three intact 3'ALK/5'ALK signals. The normal cutoff is <50.0% for one 3'ALK, one 5'ALK and one 3'ALK/5'ALK signal and <18.0% for three 3'ALK/5'ALK signals.     ROS1 (6Q22), FISH, Ts Result Summary: NEGATIVE   Interpretation: No rearrangement of the ROS1 gene was identified.   Result nuc micha(ROS1x1)[44/50]           Subjective:    Interval History: Ms. Boyce is here for follow up    59 y.o.  "woman with lung adenocarcinoma complicated by single brain met to cerebellum, malignant pleural effusion requiring pleurx, and hypoxemia requiring continuous supplemental oxygen.     Oncologic History  She has been smoking since 15 years old and estimates about 0.5ppd.  She has always had some level of dyspnea with exertion at baseline, but noticed it was worse in February 2020 with an associated cough with white and clear sputum.  She says she was evaluated at [facility] who told her the scans were suspicious for cancer.  She then headed to [facility] but because of COVID-19 her appointment was cancelled.  [place] [facility] [place] since [place] and the cough is persistent.  Denied any unilateral weakness, dizziness, headaches, or gait instability.  Was able to perform all ADLs and could walk 1 block before getting tired and needing to rest.  On 5/1/2020, she was "admitted to observation with SOB and cough x 2 months. CT chest Large RUL mass with scattered nodules and mediastinal lymphadenopathy; small-moderate right pleural effusion....S/p right lung biopsy 5/4/2020. Home O2 arranged to use with activity. Admitted to Medical Oncology 5/25/2020-5/29/2020 for worsening shortness of breath.  CTPE negative for PE. Pulmonology attempted thoracentesis twice. Second attempt resulted in 400cc being drained, without relief.  She then received x1 dose of carboplatin and palliative RT to the right lung. Patient reported improvement in symptoms after XRT and was discharged.      Single agent pembrolizumab was started for PDL1>50%.  She received her first dose of pembrolizumab  6/4/2020.  She since had an admit for right PleurX.    Interval History  Patient seen today, continues to have constant cough since prior to starting treatment, not sleeping well due to cough. Denies fever, hemoptysis. Using O2 2LNC 24/7. Denies dyspnea at rest, just with exertion. Home care coming M/F to drain right pleurX, just " draining ~100cc per patient each time now. C/o leg pain bilaterally and left lower back pain. No leg swelling. Appetite diminished, has not started Periactin yet. ECOG 2.     She is here today for follow up prior to cycle 3.     Past Medical History:   Past Medical History:   Diagnosis Date    Anxiety     Asthma     Hepatitis C 2019    treated    Hx of psychiatric care     Hypertension     Lung cancer     Psychiatric exam requested by authority     Psychiatric problem        Past Surgical HIstory:   Past Surgical History:   Procedure Laterality Date     SECTION      KNEE SURGERY         Family History: No family history on file.    Social History:  reports that she quit smoking about 3 months ago. Her smoking use included cigarettes. She has a 20.00 pack-year smoking history. She does not have any smokeless tobacco history on file. She reports current alcohol use. She reports that she does not use drugs.    Allergies:  Review of patient's allergies indicates:  No Known Allergies    Medications:  Current Outpatient Medications   Medication Sig Dispense Refill    albuterol (PROVENTIL) 2.5 mg /3 mL (0.083 %) nebulizer solution Take 3 mls by nebulization every 3 hours while awake 180 mL 12    amLODIPine (NORVASC) 5 MG tablet Take 5 mg by mouth once daily.      budesonide-formoterol 160-4.5 mcg (SYMBICORT) 160-4.5 mcg/actuation HFAA Inhale two puffs by mouth into the lungs twice daily 10.2 g 12    diclofenac sodium (VOLTAREN) 1 % Gel Apply two grams topically four times a day 200 g 5    gabapentin (NEURONTIN) 400 MG capsule Take one capsule by mouth three times daily 90 capsule 3    LORazepam (ATIVAN) 1 MG tablet Take 1 tablet (1 mg total) by mouth as needed for Anxiety (Take 30 minutes prior to procedure). 5 tablet 0    omeprazole (PRILOSEC) 20 MG capsule Take one capsule by mouth everyday. 90 capsule 3    prochlorperazine (COMPAZINE) 10 MG tablet Take 1 tablet (10 mg total) by mouth 3 (three)  times daily as needed (for nausea. if zofran does not work can use this medication instead). 60 tablet 2    promethazine-codeine 6.25-10 mg/5 ml (PHENERGAN WITH CODEINE) 6.25-10 mg/5 mL syrup Take 5 mLs by mouth every 8 (eight) hours as needed for Cough. 240 mL 0    senna-docusate 8.6-50 mg (PERICOLACE) 8.6-50 mg per tablet Take 1 tablet by mouth 2 (two) times daily. 60 tablet 0    traMADoL (ULTRAM) 50 mg tablet Take 2 tablets (100 mg total) by mouth every 8 (eight) hours as needed for Pain. 60 tablet 0    benzonatate (TESSALON PERLES) 100 MG capsule Take 2 capsules (200 mg total) by mouth 3 (three) times daily as needed for Cough. 30 capsule 1    buPROPion (WELLBUTRIN SR) 150 MG TBSR 12 hr tablet       dextromethorphan-guaifenesin  mg (MUCINEX DM)  mg per 12 hr tablet Take 1 tablet by mouth 2 (two) times daily. for 20 days (Patient not taking: Reported on 7/15/2020) 40 tablet 0    duke's soln (benadryl 30 mL, mylanta 30 mL, lidocaine 30 mL, nystatin 30 mL) 120mL Take 10 mLs by mouth 4 (four) times daily as needed (mouth pain). (Patient not taking: Reported on 7/15/2020) 120 mL 0    irbesartan-hydrochlorothiazide (AVALIDE) 300-12.5 mg per tablet Take one tablet by mouth every day 90 tablet 3    lidocaine (XYLOCAINE) 5 % Oint ointment Apply topically as needed. Apply 1 inch to biopsy site and cover with saran wrap.  Apply twice per day as needed for pain. 35.44 g 1    ondansetron (ZOFRAN-ODT) 8 MG TbDL Dissolve 1 tablet (8 mg total) by mouth every 8 (eight) hours as needed. (Patient not taking: Reported on 7/15/2020) 60 tablet 0    oxyCODONE (ROXICODONE) 5 MG immediate release tablet Take 1 tablet (5 mg total) by mouth every 12 (twelve) hours as needed for Pain (take when tramadol does not work). (Patient not taking: Reported on 7/15/2020) 60 tablet 0    polyethylene glycol (GLYCOLAX) 17 gram/dose powder mix 1 capful (17 g) and take by mouth once daily. (Patient not taking: Reported on  "7/15/2020) 510 g 0    zolpidem (AMBIEN) 5 MG Tab Take 1 tablet (5 mg total) by mouth nightly as needed (take if having difficulty sleeping). 21 tablet 0     No current facility-administered medications for this visit.        Review of Systems   Constitutional: Positive for activity change and fatigue. Negative for chills, fever and unexpected weight change.   HENT: Negative for nosebleeds.    Eyes: Negative for visual disturbance.   Respiratory: Positive for cough and shortness of breath.    Cardiovascular: Negative for chest pain and leg swelling.   Gastrointestinal: Negative for abdominal distention, abdominal pain and nausea.   Endocrine: Negative for cold intolerance and heat intolerance.   Genitourinary: Negative for difficulty urinating and dysuria.   Musculoskeletal: Positive for arthralgias (chronic).   Skin: Negative for color change.   Neurological: Positive for weakness. Negative for dizziness, light-headedness and numbness.   Hematological: Does not bruise/bleed easily.   Psychiatric/Behavioral: Negative for confusion.       ECOG Performance Status:   2  Objective:      Vitals:   Vitals:    07/15/20 0753   BP: 135/65   Pulse: 100   Resp: (!) 22   Temp: 97.8 °F (36.6 °C)   TempSrc: Temporal   SpO2: 95%   Weight: 78.9 kg (173 lb 15.1 oz)   Height: 5' 6" (1.676 m)     BMI: Body mass index is 28.08 kg/m².    Physical Exam  Constitutional:       General: She is not in acute distress.     Appearance: She is well-developed.   HENT:      Head: Normocephalic.      Mouth/Throat:      Pharynx: No oropharyngeal exudate.   Eyes:      General: No scleral icterus.     Pupils: Pupils are equal, round, and reactive to light.   Neck:      Musculoskeletal: Normal range of motion.      Trachea: No tracheal deviation.   Cardiovascular:      Rate and Rhythm: Normal rate and regular rhythm.      Heart sounds: Normal heart sounds. No murmur. No friction rub. No gallop.    Pulmonary:      Effort: Pulmonary effort is normal. No " respiratory distress.      Breath sounds: No wheezing or rales.   Abdominal:      General: Bowel sounds are normal. There is no distension.      Palpations: Abdomen is soft. There is no mass.      Tenderness: There is no abdominal tenderness. There is no guarding.   Musculoskeletal: Normal range of motion.   Skin:     General: Skin is warm and dry.   Neurological:      Mental Status: She is alert.         Laboratory Data:   Recent Results (from the past 168 hour(s))   Comprehensive metabolic panel    Collection Time: 07/15/20  7:18 AM   Result Value Ref Range    Sodium 136 136 - 145 mmol/L    Potassium 3.8 3.5 - 5.1 mmol/L    Chloride 104 95 - 110 mmol/L    CO2 24 23 - 29 mmol/L    Glucose 132 (H) 70 - 110 mg/dL    BUN, Bld 24 (H) 6 - 20 mg/dL    Creatinine 1.2 0.5 - 1.4 mg/dL    Calcium 9.3 8.7 - 10.5 mg/dL    Total Protein 8.2 6.0 - 8.4 g/dL    Albumin 2.1 (L) 3.5 - 5.2 g/dL    Total Bilirubin 0.3 0.1 - 1.0 mg/dL    Alkaline Phosphatase 71 55 - 135 U/L    AST 11 10 - 40 U/L    ALT 6 (L) 10 - 44 U/L    Anion Gap 8 8 - 16 mmol/L    eGFR if African American 56.8 (A) >60 mL/min/1.73 m^2    eGFR if non  49.2 (A) >60 mL/min/1.73 m^2   CBC auto differential    Collection Time: 07/15/20  7:18 AM   Result Value Ref Range    WBC 10.07 3.90 - 12.70 K/uL    RBC 3.56 (L) 4.00 - 5.40 M/uL    Hemoglobin 9.0 (L) 12.0 - 16.0 g/dL    Hematocrit 30.3 (L) 37.0 - 48.5 %    Mean Corpuscular Volume 85 82 - 98 fL    Mean Corpuscular Hemoglobin 25.3 (L) 27.0 - 31.0 pg    Mean Corpuscular Hemoglobin Conc 29.7 (L) 32.0 - 36.0 g/dL    RDW 15.8 (H) 11.5 - 14.5 %    Platelets 370 (H) 150 - 350 K/uL    MPV 9.2 9.2 - 12.9 fL    Immature Granulocytes 0.9 (H) 0.0 - 0.5 %    Gran # (ANC) 6.4 1.8 - 7.7 K/uL    Immature Grans (Abs) 0.09 (H) 0.00 - 0.04 K/uL    Lymph # 1.9 1.0 - 4.8 K/uL    Mono # 1.1 (H) 0.3 - 1.0 K/uL    Eos # 0.5 0.0 - 0.5 K/uL    Baso # 0.09 0.00 - 0.20 K/uL    nRBC 0 0 /100 WBC    Gran% 63.3 38.0 - 73.0 %     Lymph% 18.9 18.0 - 48.0 %    Mono% 10.9 4.0 - 15.0 %    Eosinophil% 5.1 0.0 - 8.0 %    Basophil% 0.9 0.0 - 1.9 %    Differential Method Automated      CareEverywhere  19  HCV RNA HAZEL Qualitative - [facility] Positive (A)   Comment: Positive: HCV RNA Detected         Imagin2020 CT abdomen/pelvis  COMPARISON:  CT abdomen pelvis 2020    Chest radiograph 2020    CT chest 2020    Shoulder radiograph 2013    FINDINGS:  There is increased radiopharmaceutical distribution of the thoracic spine noting degenerative endplate changes at this level on 2020 CT chest.  Subtle increased radiopharmaceutical density in the right proximal humerus which may represent sequelae of remote fracture at this level.  Increased density of the bilateral knees likely representing degenerative changes.  The remainder of the skeleton demonstrates physiologic distribution of the radiopharmaceutical.  Both kidneys and the bladder appear normal.      Impression       There is no scintigraphic evidence of metastatic disease.         2020 MRI brain  COMPARISON:  None    FINDINGS:  Diffusion-weighted imaging demonstrates no evidence of high signal to indicate recent infarction or ischemia.    T2 and FLAIR imaging demonstrates mild vasogenic edema in the right cerebellum associated with a 1.1 cm round heterogeneously enhancing mass in the right cerebellum.  This is consistent with metastatic disease.  No other metastatic foci are detected.    There is moderate probable chronic microvascular ischemic changes in the periventricular and subcortical white matter bilaterally.  Mild involutional changes.    No midline shift or hydrocephalus.    No evidence of major vascular infarction.    No evidence of hematoma/hemorrhage.    Normal vascular flow voids near the skull base.    The paranasal sinuses and mastoid air cells are within normal limits.      Impression       1.1 cm enhancing mass in the right  cerebellum with mild adjacent vasogenic edema consistent with metastatic disease.  Recommend clinical correlation and follow-up.       5/11/2020 Bone scan  COMPARISON:  CT chest 05/01/2020.    FINDINGS:  There is a partially visualized right hilar mass with multiple pulmonary nodules within the visualized lung parenchyma, better evaluated to the full extent on prior CT chest examination.  Moderate-sized right pleural effusion, unchanged.    Heart is not enlarged.  No pericardial effusion.    Liver is upper normal in size with normal attenuation.  No focal hepatic lesions are seen.  Gallbladder is normal.  No intra or extrahepatic biliary ductal dilatation.  Spleen is normal in size.    Stomach, pancreas, and adrenal glands show no significant abnormalities.    Kidneys are normal in size and location.  No renal mass or hydronephrosis.  Visualized ureters are normal in course to the bladder.  Urinary bladder is unremarkable.  Uterus is surgically removed.    Visualized loops of small large bowel show no evidence of inflammation or obstruction.  Colonic diverticulosis without evidence of diverticulitis.  Normal appendix.    No lymphadenopathy in the abdomen and pelvis.  No free intraperitoneal air or ascites.  Abdominal aorta is normal in course and caliber.    Osseous structures show age-appropriate degenerative changes without osseous lytic or destructive process.  Incidental sternal foramen.  Extraperitoneal soft tissues appear unremarkable.      Impression       Partially visualized right hilar mass with multiple pulmonary nodules within both lungs, better visualized to the full extent on prior CT examination.  No evidence of intra-abdominal or intrapelvic metastases.    Moderate-sized right pleural effusion.    Colonic diverticulosis.    Hysterectomy.     5/1/2020 CT chest  COMPARISON:  Chest radiograph dated 05/01/2020    FINDINGS:  There is heterogeneous masslike consolidation centered in the right upper lobe  with pleural extension, for reference measuring 6.5 x 7.8 cm (series 2, image 29).  Areas of surrounding ground-glass attenuation more superiorly extending to the apex.  There is associated right hilar/subcarinal involvement with resultant narrowing of the distal right mainstem and proximal segmental bronchi.  Several additional subcentimeter nodules in the right middle, lower lobes and left lung (for reference 7 mm in the left upper lobe on series 2, image 31).  Moderate right-sided pleural effusion with dependent atelectasis.  No pericardial effusion.    Structures at the base of the neck are unremarkable.  The trachea is clear.  There is mediastinal adenopathy predominately involving the prevascular and paratracheal regions, for reference precarinal node measuring 2.6 cm (series 2, image 31).  There is also enlarged left axillary node measuring 1.9 cm (series 2, image 20).    Limited images through the upper abdomen are unremarkable.  No aggressive osseous lesion.  Spine DJD.      Impression       Masslike consolidation centered in the right upper lobe with associated right hilar/subcarinal involvement concerning for neoplasm until proven otherwise.    Metastatic bilateral pulmonary nodularity and mediastinal adenopathy.  Additional enlarged left axillary node, possible additional metastasis.    Moderate right pleural effusion.    Additional findings as above.    RECIST SUMMARY:    Date of prior examination for comparison: None    Lesion 1: Right upper lobe.  7.8 cm.  Series 2, image 29.    Lesion 2: Precarinal node.  2.6 cm.  Series 2, image 31.           Assessment:       1. Adenocarcinoma of lung, stage 4, right    2. Secondary malignant neoplasm of brain    3. Chronic hepatitis C without hepatic coma    4. Shortness of breath    5. Cough    6. Asthma, unspecified asthma severity, unspecified whether complicated, unspecified whether persistent    7. Lung mass    8. Patient has active power of  for  [facility]    9. Advance care planning           Plan:       1. 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  Also has pleural effusion on right side s/p therapeutic thoracentesis 5/2020. PDL1 expressed in 100% of tumor cells from biopsy.  No mutation identified for EGFR/ALK/ROS1/BRAF by tissue.  -Continue single agent pembrolizumab  -Labs have been reviewed.  -Proceed with cycle 3  -Return to clinic in 3 weeks  -Repeat imaging CT chest/abdomen/pelvis and MRI brain prior to cycle 4.  -TSH and fT4 next due with cycle 4  -Discussed with hepatology and no additional antiviral therapy required with her history of Hep C.  -Not eligible for [id number]    2. Cough  -Refilled tessalon & codeine cough syrup Q8H PRN  -On gabapentin  -Previously also tried mucinex  -Had right pleurX placed 6/24 by Dr. Garcia, will arrange pulmonary follow-up, now draining 100cc twice a week with home care    3. HCPOA  -Patient appointed her daughter and niece       Orders Placed This Encounter   Procedures    CT Chest Abdomen Pelvis W W/O Contrast (XPD)    MRI Brain W WO Contrast    Ambulatory referral/consult to Pulmonary Disease Management       4. Advance Care Planning     Power of   I initiated the process of advance care planning today and explained the importance of this process to the patient.  I introduced the concept of advance directives to the patient, as well. Then the patient received detailed information about the importance of designating a Health Care Power of  (HCPOA). She was also instructed to communicate with this person about their wishes for future healthcare, should she become sick and lose decision-making capacity. The patient has not previously appointed a HCPOA. After our discussion, the patient has decided to complete a HCPOA and has appointed her daughter Kell Boyce ([phone]) and niece Jina Burks ([phone]).  I spent a total time of 16 minutes discussing this issue  with the patient.                Follow-up: RTC for labs (CBC, CMP, TSH, T4), and CT C/A/P and brain MRI in 3 weeks prior to MD visit and cycle 4 Keytruda    The following was staffed and discussed with supervising physician Dr. Mederos.    Lauren Rocha MD PGY-VI  Hematology/Oncology Fellow

## 2020-07-15 NOTE — TELEPHONE ENCOUNTER
Spoke with patient, informed her that I have been advised to contact her in regards to scheduling her a appointment with Dr Garcia. Patient verbalized that she understand and states that she does not want to schedule appointment because she she just left from visit with her Primary Care and states that Physician advised her that her fluid is looking good. I verbalized to patient that I understand and I advised patient that if she wants to schedule appointment or if she has any questions or concerns, she may contact the office. Office number has been provided.

## 2020-07-16 ENCOUNTER — INFUSION (OUTPATIENT)
Dept: INFUSION THERAPY | Facility: HOSPITAL | Age: 60
End: 2020-07-16
Attending: STUDENT IN AN ORGANIZED HEALTH CARE EDUCATION/TRAINING PROGRAM
Payer: MEDICAID

## 2020-07-16 ENCOUNTER — DOCUMENT SCAN (OUTPATIENT)
Dept: HOME HEALTH SERVICES | Facility: HOSPITAL | Age: 60
End: 2020-07-16
Payer: MEDICAID

## 2020-07-16 ENCOUNTER — TELEPHONE (OUTPATIENT)
Dept: HEMATOLOGY/ONCOLOGY | Facility: CLINIC | Age: 60
End: 2020-07-16

## 2020-07-16 ENCOUNTER — OFFICE VISIT (OUTPATIENT)
Dept: PSYCHIATRY | Facility: CLINIC | Age: 60
End: 2020-07-16
Payer: MEDICAID

## 2020-07-16 ENCOUNTER — SOCIAL WORK (OUTPATIENT)
Dept: HEMATOLOGY/ONCOLOGY | Facility: CLINIC | Age: 60
End: 2020-07-16

## 2020-07-16 VITALS
TEMPERATURE: 98 F | DIASTOLIC BLOOD PRESSURE: 60 MMHG | HEART RATE: 85 BPM | RESPIRATION RATE: 22 BRPM | OXYGEN SATURATION: 96 % | SYSTOLIC BLOOD PRESSURE: 106 MMHG

## 2020-07-16 DIAGNOSIS — C79.31 PRIMARY MALIGNANT NEOPLASM OF LUNG WITH METASTASIS TO BRAIN: ICD-10-CM

## 2020-07-16 DIAGNOSIS — C34.91 ADENOCARCINOMA OF LUNG, STAGE 4, RIGHT: Primary | ICD-10-CM

## 2020-07-16 DIAGNOSIS — F41.9 ANXIETY: Primary | ICD-10-CM

## 2020-07-16 DIAGNOSIS — C34.90 PRIMARY MALIGNANT NEOPLASM OF LUNG WITH METASTASIS TO BRAIN: ICD-10-CM

## 2020-07-16 PROCEDURE — 99999 PR PBB SHADOW E&M-EST. PATIENT-LVL I: ICD-10-PCS | Mod: PBBFAC,,, | Performed by: PSYCHOLOGIST

## 2020-07-16 PROCEDURE — 96413 CHEMO IV INFUSION 1 HR: CPT

## 2020-07-16 PROCEDURE — 25000003 PHARM REV CODE 250: Performed by: INTERNAL MEDICINE

## 2020-07-16 PROCEDURE — 90832 PR PSYCHOTHERAPY W/PATIENT, 30 MIN: ICD-10-PCS | Mod: ,,, | Performed by: PSYCHOLOGIST

## 2020-07-16 PROCEDURE — 63600175 PHARM REV CODE 636 W HCPCS: Mod: JG | Performed by: INTERNAL MEDICINE

## 2020-07-16 PROCEDURE — 99999 PR PBB SHADOW E&M-EST. PATIENT-LVL I: CPT | Mod: PBBFAC,,, | Performed by: PSYCHOLOGIST

## 2020-07-16 PROCEDURE — 99211 OFF/OP EST MAY X REQ PHY/QHP: CPT | Mod: PBBFAC | Performed by: PSYCHOLOGIST

## 2020-07-16 PROCEDURE — 90832 PSYTX W PT 30 MINUTES: CPT | Mod: ,,, | Performed by: PSYCHOLOGIST

## 2020-07-16 RX ORDER — HEPARIN 100 UNIT/ML
500 SYRINGE INTRAVENOUS
Status: DISCONTINUED | OUTPATIENT
Start: 2020-07-16 | End: 2020-07-16 | Stop reason: HOSPADM

## 2020-07-16 RX ORDER — SODIUM CHLORIDE 0.9 % (FLUSH) 0.9 %
10 SYRINGE (ML) INJECTION
Status: DISCONTINUED | OUTPATIENT
Start: 2020-07-16 | End: 2020-07-16 | Stop reason: HOSPADM

## 2020-07-16 RX ADMIN — SODIUM CHLORIDE: 9 INJECTION, SOLUTION INTRAVENOUS at 01:07

## 2020-07-16 RX ADMIN — SODIUM CHLORIDE 200 MG: 9 INJECTION, SOLUTION INTRAVENOUS at 01:07

## 2020-07-16 NOTE — NURSING
Meet with patient and niece during chemo infusi%on.  Pt in good spirits and smiling.  Coughing during conversation, taking cough med with codeine only at night due to drowsiness during the day.  Mask present and O2 level 96%. Received a copy of recent utility bill to give to Sari (FERNANDA).  Encouraged to take tessalon pills as prescribed.  Requesting additional help with obtaining Ensure supplement.  Spoke with Sari who messaged Yenny(Nutritionist) to see if time was available for pt to be seen.  Pt given 2 cases of Boost supplement by Sari before leaving. Encouraged to call if continued appetite concerns occur (>9lb weight loss since last appointment).

## 2020-07-16 NOTE — PROGRESS NOTES
Received consult from Navigator Nichole Mccauley, RN, re: patient needing nutritional support and supplements. Patient is in clinic today for her infusion and has had 10 lb weight loss. Discussed with Nichole about a referral to Oncology dietician and also offered to assist with getting nutritional supplements for patient, with the cost covered through the [employer] Patient Assistance Fund as patient is on a limited, fixed income. Sent message via Epic to Yenny Law RD, who did later call me; she will contact patient via phone and schedule to meet with her. Two cases of vanilla Boost Plus available today and were given to patient and her niece in clinic; Nichole and Yenny agreed that this supplement is ok for patient to use for now. Patient's niece will not be available to transport her for her next treatment on 8/3. Discussed with them about arranging Medicaid transportation, or if necessary I can assist with arranging cab transport if someone accompanies patient to assist her as patient not safe by herself (patient uses portable oxygen tank, and may need to use rollator too). Will follow up with Nichole re: patient's case. Will continue to follow and assist as needs are identified.

## 2020-07-16 NOTE — Clinical Note
Met with patient. She does not was psychological treatment with me. She tends to be agitated most often. Also stated the Ambien did not work. She did mention that Valium worked in the past. She may need to see our psychiatrist. Let me know your thoughts.

## 2020-07-16 NOTE — PLAN OF CARE
Patient tolerated keytruda well today. PIV removed, catheter tip intact. AVS given, reviewed upcoming appts. Verbalized understanding to call MD for any questions/concerns. Discharged home, ambulated independently with friend by side.

## 2020-07-16 NOTE — PLAN OF CARE
Problem: Adult Inpatient Plan of Care  Goal: Optimal Comfort and Wellbeing  Intervention: Provide Person-Centered Care  Flowsheets (Taken 7/16/2020 7235)  Trust Relationship/Rapport:   questions encouraged   care explained   choices provided   emotional support provided   reassurance provided   thoughts/feelings acknowledged   empathic listening provided   questions answered

## 2020-07-16 NOTE — PROGRESS NOTES
INFORMED CONSENT: Janeth Boyce   is known to this provider and identity was confirmed via NAME and .  The patient has been informed of the risks and benefits associated with engaging in psychotherapy, the handling of protected health information, the rights of privacy and the limits of confidentiality. The patient has also been informed of the importance of reporting any suicidal or homicidal ideation to this or any provider to ensure safety of all parties, and the Janeth Boyce expressed understanding. The patient was agreeable to these terms and freely participates in individual psychotherapy.    PSYCHO-ONCOLOGY NOTE/ Individual Psychotherapy     Date: 2020   Site:  [facility]        Therapeutic Intervention: Met with patient.  Outpatient - Supportive psychotherapy 30 min - CPT Code 92313      Patient was last seen by me on Visit date not found    Problem list  Patient Active Problem List   Diagnosis    Shortness of breath    Mass of upper lobe of right lung    Essential hypertension    Tobacco abuse    Cough    Adenocarcinoma of lung, stage 4, right    Primary malignant neoplasm of lung with metastasis to brain    Secondary malignant neoplasm of brain    Insomnia    Anxiety    HCV (hepatitis C virus)    GERD (gastroesophageal reflux disease)    Chronic respiratory failure with hypoxia    Malignant pleural effusion    Patient has active power of  for health care       Chief complaint/reason for encounter: depression and anxiety   Met with patient to evaluate psychosocial adaptation to diagnosis/treatment/survivorship of Lung Cancer    Current Medications  Current Outpatient Medications   Medication    albuterol (PROVENTIL) 2.5 mg /3 mL (0.083 %) nebulizer solution    amLODIPine (NORVASC) 5 MG tablet    benzonatate (TESSALON PERLES) 100 MG capsule    budesonide-formoterol 160-4.5 mcg (SYMBICORT) 160-4.5 mcg/actuation HFAA    buPROPion (WELLBUTRIN SR) 150 MG TBSR 12 hr  tablet    cyproheptadine (PERIACTIN) 4 mg tablet    diclofenac sodium (VOLTAREN) 1 % Gel    duke's soln (benadryl 30 mL, mylanta 30 mL, lidocaine 30 mL, nystatin 30 mL) 120mL    gabapentin (NEURONTIN) 400 MG capsule    irbesartan-hydrochlorothiazide (AVALIDE) 300-12.5 mg per tablet    lidocaine (XYLOCAINE) 5 % Oint ointment    LORazepam (ATIVAN) 1 MG tablet    omeprazole (PRILOSEC) 20 MG capsule    ondansetron (ZOFRAN-ODT) 8 MG TbDL    oxyCODONE (ROXICODONE) 5 MG immediate release tablet    polyethylene glycol (GLYCOLAX) 17 gram/dose powder    prochlorperazine (COMPAZINE) 10 MG tablet    promethazine-codeine 6.25-10 mg/5 ml (PHENERGAN WITH CODEINE) 6.25-10 mg/5 mL syrup    senna-docusate 8.6-50 mg (PERICOLACE) 8.6-50 mg per tablet    traMADoL (ULTRAM) 50 mg tablet    zolpidem (AMBIEN) 5 MG Tab     No current facility-administered medications for this visit.      Facility-Administered Medications Ordered in Other Visits   Medication Frequency    alteplase injection 2 mg PRN    heparin, porcine (PF) 100 unit/mL injection flush 500 Units PRN    sodium chloride 0.9% flush 10 mL PRN       Objective:  Janeth Boyce was escorted from infusion to my office.   Ms. Boyce was independently ambulatory at the time of session. The patient was fully cooperative throughout the session.  Appearance: age appropriate, appropriately  dressed, adequately  groomed  Behavior/Cooperation: resistant  Speech: normal in rate, volume, and tone and appropriate quality, quantity and organization of sentences  Mood: anxious  Affect: mood congruent  Thought Process: goal-directed, logical  Thought Content: normal,  No delusions or paranoia; did not appear to be responding to internal stimuli during the session  Orientation: grossly intact  Memory: Grossly intact  Attention Span/Concentration: Attends to session without distraction; reports no difficulty  Fund of Knowledge: average  Estimate of Intelligence: average from  verbal skills and history  Cognition: grossly intact  Insight: Fair  Judgment: the patient's behavior is adequate to circumstances    Interval history and content of current session: Obtained updates regarding patient current mood. Patient reported continued anxiety and depressed mood but stated that she takes her medication as prescribed. Patient not interested in psychological treatment at this time.   Discussed current adaptation to disease and treatment status. Reports to be coping with moderate difficulty. Evaluated cognitive response, paying particular attention to negative intrusive thoughts of a persistent and detrimental nature. Thoughts of this type are in evidence with severe distress. Provided cognitive behavioral therapy to address negative cognitions. Identified and evaluated psychosocial and environmental stressors secondary to diagnosis and treatment.  Examined proactive behaviors that may be implemented to minimize or ameliorate psychosocial stressors secondary to diagnosis and treatment.     Risk parameters:   Patient reports no suicidal ideation  Patient reports no homicidal ideation  Patient reports no self-injurious behavior  Patient reports no violent behavior   Safety needs:  None at this time      Verbal deficits: None     Patient's response to intervention:The patient's response to intervention is reluctant.     Progress toward goals and other mental status changes:  The patient's progress toward goals is not progressing.      Progress to date:No Progress - Continue Objectives      Goals from last visit: Not attempted     Patient reported outcomes:    Distress Thermometer: 9          Client Strengths: verbal, , good social support,, strong kellen, strong cultural traditions     Diagnosis:     ICD-10-CM ICD-9-CM   1. Anxiety  F41.9 300.00   2. Primary malignant neoplasm of lung with metastasis to brain  C34.90 162.9    C79.31 198.3       Treatment Plan:medication management by physician,  Patient not interested in psychological treatment at this time  · Target symptoms: depression, anxiety   · Why chosen therapy is appropriate versus another modality: relevant to diagnosis, evidence based practice  · Outcome monitoring methods: self-report, observation, checklist/rating scale  · Therapeutic intervention type: behavior modifying psychotherapy  · Prognosis: Fair      Return to clinic: as needed       Length of Service (minutes direct face-to-face contact): 30    Nancy Laughlin, PhD  [place] License #[id number]

## 2020-07-21 ENCOUNTER — DOCUMENT SCAN (OUTPATIENT)
Dept: HOME HEALTH SERVICES | Facility: HOSPITAL | Age: 60
End: 2020-07-21
Payer: MEDICAID

## 2020-07-22 ENCOUNTER — TELEPHONE (OUTPATIENT)
Dept: HEMATOLOGY/ONCOLOGY | Facility: CLINIC | Age: 60
End: 2020-07-22

## 2020-07-22 DIAGNOSIS — R05.9 COUGH: ICD-10-CM

## 2020-07-22 RX ORDER — BENZONATATE 100 MG/1
200 CAPSULE ORAL 3 TIMES DAILY PRN
Qty: 30 CAPSULE | Refills: 1 | Status: SHIPPED | OUTPATIENT
Start: 2020-07-22 | End: 2020-08-05 | Stop reason: SDUPTHER

## 2020-07-22 NOTE — NURSING
Spoke with MsMark Austen and she is asking for additional refills for her tessalon pills.  She is also asking when can the pleurx catheter be removed. Reporting from pt a drainage of less than 50 cc's on last nurse visit. Asking when can the pleurx-catheter be removed. Complained about the numbness and tingling to her hands and feet, currently taking gabapentin 3times a day, over all she fills good, message sent to staff.

## 2020-07-24 ENCOUNTER — DOCUMENT SCAN (OUTPATIENT)
Dept: HOME HEALTH SERVICES | Facility: HOSPITAL | Age: 60
End: 2020-07-24
Payer: MEDICAID

## 2020-07-27 ENCOUNTER — TELEPHONE (OUTPATIENT)
Dept: HEMATOLOGY/ONCOLOGY | Facility: CLINIC | Age: 60
End: 2020-07-27

## 2020-07-27 NOTE — NURSING
Received call from pt inquiring about what she could do about the bad taste in her mouth that is caused from the chemo.  Encouraged pt to change silver ware to plastic forks, spoons and knives.  Pt refused to change and would prefer to continue to use her own utensils.  Also asked pt to suck on hard candy and to add other seasoning to foods.  Currently eating skeetles as her hard candy.  Complains about not having an appetite and every time she gets ready to try something to eat she gets nauseated and has no appetite.  Continues to take the periactin and her anti-nausea medication.  Message will be sent to staff for further recommendations.

## 2020-07-28 ENCOUNTER — TELEPHONE (OUTPATIENT)
Dept: HEMATOLOGY/ONCOLOGY | Facility: CLINIC | Age: 60
End: 2020-07-28

## 2020-07-28 NOTE — NURSING
Received call from pt who is requesting a refill on pain medication Oxycodone, message sent to Dr. Rocha.  Pt with concerns that when she woke up this morning she coughed up blood and blood was in her nose.  Did not report nose bleed, currently uses oxygen during the night to sleep.  Dr. Rocha notified. Additional call received to inquire about medication refill and that nothing is helping her cough (pt coughing throughout conversation).  Reporting that she has only a small amount of cough medication left, last prescribed 7/15. Offered to make appointment with Pallative care and patient refused stating that she has to many appointments already.

## 2020-07-29 ENCOUNTER — TELEPHONE (OUTPATIENT)
Dept: HEMATOLOGY/ONCOLOGY | Facility: CLINIC | Age: 60
End: 2020-07-29

## 2020-07-29 DIAGNOSIS — C34.91 ADENOCARCINOMA OF LUNG, STAGE 4, RIGHT: ICD-10-CM

## 2020-07-29 RX ORDER — OXYCODONE HYDROCHLORIDE 5 MG/1
5 TABLET ORAL EVERY 12 HOURS PRN
Qty: 60 TABLET | Refills: 0 | Status: SHIPPED | OUTPATIENT
Start: 2020-07-29 | End: 2020-08-26

## 2020-07-29 NOTE — NURSING
Received call from pt requesting a refill on pain medication.  Messaged Dr. Rocha printed prescription for oxycodoyne.  Pt unable to provide transportation to  prescription, ask Dr. Rocha to e-script the medication and stated she is unable to. Proceeded to  prescription that Dr. Rocha stated she left on the 3rd floor to bring to the pharmacy, no prescription seen.  Pt notified that there is no prescription for pain.

## 2020-08-03 ENCOUNTER — HOSPITAL ENCOUNTER (OUTPATIENT)
Dept: RADIOLOGY | Facility: HOSPITAL | Age: 60
Discharge: HOME | End: 2020-08-03
Attending: STUDENT IN AN ORGANIZED HEALTH CARE EDUCATION/TRAINING PROGRAM
Payer: MEDICAID

## 2020-08-03 ENCOUNTER — TELEPHONE (OUTPATIENT)
Dept: HEMATOLOGY/ONCOLOGY | Facility: CLINIC | Age: 60
End: 2020-08-03

## 2020-08-03 DIAGNOSIS — C34.91 ADENOCARCINOMA OF LUNG, STAGE 4, RIGHT: ICD-10-CM

## 2020-08-03 PROCEDURE — 74177 CT ABD & PELVIS W/CONTRAST: CPT | Mod: 26,,, | Performed by: RADIOLOGY

## 2020-08-03 PROCEDURE — 71260 CT CHEST ABDOMEN PELVIS W W/O CONTRAST (XPD): ICD-10-PCS | Mod: 26,,, | Performed by: RADIOLOGY

## 2020-08-03 PROCEDURE — 74178 CT ABD&PLV WO CNTR FLWD CNTR: CPT | Mod: TC

## 2020-08-03 PROCEDURE — 70551 MRI BRAIN STEM W/O DYE: CPT | Mod: 26,,, | Performed by: RADIOLOGY

## 2020-08-03 PROCEDURE — 70551 MRI BRAIN STEM W/O DYE: CPT | Mod: TC

## 2020-08-03 PROCEDURE — 25500020 PHARM REV CODE 255: Performed by: STUDENT IN AN ORGANIZED HEALTH CARE EDUCATION/TRAINING PROGRAM

## 2020-08-03 PROCEDURE — 70551 MRI BRAIN WITHOUT CONTRAST: ICD-10-PCS | Mod: 26,,, | Performed by: RADIOLOGY

## 2020-08-03 PROCEDURE — 74177 CT CHEST ABDOMEN PELVIS W W/O CONTRAST (XPD): ICD-10-PCS | Mod: 26,,, | Performed by: RADIOLOGY

## 2020-08-03 PROCEDURE — 71260 CT THORAX DX C+: CPT | Mod: 26,,, | Performed by: RADIOLOGY

## 2020-08-03 RX ADMIN — IOHEXOL 15 ML: 350 INJECTION, SOLUTION INTRAVENOUS at 03:08

## 2020-08-03 RX ADMIN — IOHEXOL 75 ML: 350 INJECTION, SOLUTION INTRAVENOUS at 04:08

## 2020-08-03 RX ADMIN — IOHEXOL 15 ML: 350 INJECTION, SOLUTION INTRAVENOUS at 02:08

## 2020-08-04 ENCOUNTER — TELEPHONE (OUTPATIENT)
Dept: HEMATOLOGY/ONCOLOGY | Facility: CLINIC | Age: 60
End: 2020-08-04

## 2020-08-04 ENCOUNTER — DOCUMENT SCAN (OUTPATIENT)
Dept: HOME HEALTH SERVICES | Facility: HOSPITAL | Age: 60
End: 2020-08-04
Payer: MEDICAID

## 2020-08-04 NOTE — NURSING
"Received call from pt who states she is upset because she has been coughing all night especially after the home health nurse did not come by to empty her pleurx catheter.   In addition she was told that she has a lot of fluid still around her lung after doing her chest CT on yesterday 8/3.  Explained to pt that the pleurx is there to help the fluid (pleural effusion) around her lungs.  Pt verbalized understanding and was still upset that she coughs "almost" constantly.  Reviewed when last prescription for cough medication and tessalon pearls were filled.  Pt is out of cough medication, encouraged to take tessalon as prescribed.  Message will be sent to Dr. Rocha who pt sees on 8/5.    "

## 2020-08-04 NOTE — PROGRESS NOTES
PATIENT: Janeth Boyce  MRN: [id number]  DATE: 8/5/2020      Diagnosis:   1. 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 Secondary malignant neoplasm of brain    3. Chronic hepatitis C without hepatic coma    4. Shortness of breath    5. Tobacco abuse    6. Chronic respiratory failure with hypoxia    7. Malignant pleural effusion    8. Malignant cachexia    9. Cough    10. Asthma, unspecified asthma severity, unspecified whether complicated, unspecified whether persistent    11. Lung mass    12. Radiation pneumonitis        Chief Complaint: Adenocarcinoma of lung, stage 4, right      Oncologic History:    Oncologic History 1. Lung adenocarcinoma    Oncologic Treatment 1. Carboplatin AUC4 x1 dose 5/27/20 and palliative radiation (5/27/2020 - 6/2/2020; 20 Gy to right lung)  2. Pembrolizumab  Cycle 1 6/4/2020       Pathology 5/5/2020  Right lung, biopsy:  Positive for adenocarcinoma with areas of necrosis.  Interpretation:   Right lung, specimen for PD-L1 immunohistochemistry studies (clone [id number], [facility], [place], [place]) ([id number]): 100% tumor cells are positive for PD-L1 (membranous positivity).     BRAF Mutation Analysis (V600E), Tumor Result Summary: NO MUTATION IDENTIFIED.   Result: BRAF status: Wild-type   Interpretation :     EGFR Gene, Mutation Analysis, Tumor Result Summary: NO MUTATION IDENTIFIED.   Result: EGFR status: Wild-type     ALK (2p23), Lung Cancer, FISH, Ts Result Summary: NEGATIVE   Interpretation: No rearrangement of the ALK gene was identified.     Result: nuc micha(ALKx3)[29/50]   Of 50 nuclei, 0% had separation of the 3'ALK and 5'ALK signals and 58% had three intact 3'ALK/5'ALK signals. The normal cutoff is <50.0% for one 3'ALK, one 5'ALK and one 3'ALK/5'ALK signal and <18.0% for three 3'ALK/5'ALK signals.     ROS1 (6Q22), FISH, Ts Result Summary: NEGATIVE   Interpretation: No rearrangement of the ROS1 gene was identified.   Result nuc  "micha(ROS1x1)[44/50]           Subjective:    Interval History: Ms. Boyce is here for follow up    59 y.o. woman with lung adenocarcinoma complicated by single brain met to cerebellum, malignant pleural effusion requiring pleurx, and hypoxemia requiring continuous supplemental oxygen.     Oncologic History  She has been smoking since 15 years old and estimates about 0.5ppd.  She has always had some level of dyspnea with exertion at baseline, but noticed it was worse in February 2020 with an associated cough with white and clear sputum.  She says she was evaluated at [facility] who told her the scans were suspicious for cancer.  She then headed to [facility] but because of COVID-19 her appointment was cancelled.  She initially presented to [facility] [place] any new symptoms since February and the cough is persistent.  Denied any unilateral weakness, dizziness, headaches, or gait instability.  Was able to perform all ADLs and could walk 1 block before getting tired and needing to rest.  On 5/1/2020, she was "admitted to observation with SOB and cough x 2 months. CT chest Large RUL mass with scattered nodules and mediastinal lymphadenopathy; small-moderate right pleural effusion....S/p right lung biopsy 5/4/2020. Home O2 arranged to use with activity. Admitted to Medical Oncology 5/25/2020-5/29/2020 for worsening shortness of breath.  CTPE negative for PE. Pulmonology attempted thoracentesis twice. Second attempt resulted in 400cc being drained, without relief.  She then received x1 dose of carboplatin and palliative RT to the right lung. Patient reported improvement in symptoms after XRT and was discharged.      Single agent pembrolizumab was started for PDL1>50%.  She received her first dose of pembrolizumab  6/4/2020.  She since had an admit for right PleurX.    Interval History  Patient seen today, feels cough is getting worse. Denies fever, hemoptysis. Using O2 2LNC 24/7. Denies dyspnea at rest, just with exertion. Home care " coming M/F to drain right pleurX, just draining ~50-90cc per patient each time now. No leg swelling, no chest pain. Feeling numb all over. Losing more weight, poor appetite. ECOG 2.     She is here today for follow up prior to cycle 4. Scans reviewed.     Past Medical History:   Past Medical History:   Diagnosis Date    Anxiety     Asthma     Hepatitis C 2019    treated    Hx of psychiatric care     Hypertension     Lung cancer     Psychiatric exam requested by authority     Psychiatric problem        Past Surgical HIstory:   Past Surgical History:   Procedure Laterality Date     SECTION      KNEE SURGERY         Family History: No family history on file.    Social History:  reports that she quit smoking about 3 months ago. Her smoking use included cigarettes. She has a 20.00 pack-year smoking history. She does not have any smokeless tobacco history on file. She reports current alcohol use. She reports that she does not use drugs.    Allergies:  Review of patient's allergies indicates:  No Known Allergies    Medications:  Current Outpatient Medications   Medication Sig Dispense Refill    albuterol (PROVENTIL) 2.5 mg /3 mL (0.083 %) nebulizer solution Take 3 mls by nebulization every 3 hours while awake 180 mL 12    amLODIPine (NORVASC) 5 MG tablet Take 5 mg by mouth once daily.      benzonatate (TESSALON PERLES) 100 MG capsule Take 2 capsules (200 mg total) by mouth 3 (three) times daily as needed for Cough. 30 capsule 1    budesonide-formoterol 160-4.5 mcg (SYMBICORT) 160-4.5 mcg/actuation HFAA Inhale two puffs by mouth into the lungs twice daily 10.2 g 12    buPROPion (WELLBUTRIN SR) 150 MG TBSR 12 hr tablet       diclofenac sodium (VOLTAREN) 1 % Gel Apply two grams topically four times a day 200 g 5    gabapentin (NEURONTIN) 400 MG capsule Take one capsule by mouth three times daily 90 capsule 3    irbesartan-hydrochlorothiazide (AVALIDE) 300-12.5 mg per tablet Take one tablet by mouth  every day 90 tablet 3    lidocaine (XYLOCAINE) 5 % Oint ointment Apply topically as needed. Apply 1 inch to biopsy site and cover with saran wrap.  Apply twice per day as needed for pain. 35.44 g 1    LORazepam (ATIVAN) 1 MG tablet Take 1 tablet (1 mg total) by mouth as needed for Anxiety (Take 30 minutes prior to procedure). 5 tablet 0    omeprazole (PRILOSEC) 20 MG capsule Take one capsule by mouth everyday. 90 capsule 3    oxyCODONE (ROXICODONE) 5 MG immediate release tablet Take 1 tablet (5 mg total) by mouth every 12 (twelve) hours as needed for Pain (take when tramadol does not work). 60 tablet 0    prochlorperazine (COMPAZINE) 10 MG tablet Take 1 tablet (10 mg total) by mouth 3 (three) times daily as needed (for nausea. if zofran does not work can use this medication instead). 60 tablet 2    promethazine-codeine 6.25-10 mg/5 ml (PHENERGAN WITH CODEINE) 6.25-10 mg/5 mL syrup Take 5 mLs by mouth every 8 (eight) hours as needed for Cough. 473 mL 1    senna-docusate 8.6-50 mg (PERICOLACE) 8.6-50 mg per tablet Take 1 tablet by mouth 2 (two) times daily. 60 tablet 0    traMADoL (ULTRAM) 50 mg tablet Take 2 tablets (100 mg total) by mouth every 8 (eight) hours as needed for Pain. 60 tablet 0    zolpidem (AMBIEN) 5 MG Tab Take 1 tablet (5 mg total) by mouth nightly as needed (take if having difficulty sleeping). 21 tablet 0    duke's soln (benadryl 30 mL, mylanta 30 mL, lidocaine 30 mL, nystatin 30 mL) 120mL Take 10 mLs by mouth 4 (four) times daily as needed (mouth pain). (Patient not taking: Reported on 7/15/2020) 120 mL 0    mirtazapine (REMERON SOL-TAB) 15 MG disintegrating tablet Take 1 tablet (15 mg total) by mouth nightly. 30 tablet 2    ondansetron (ZOFRAN-ODT) 8 MG TbDL Dissolve 1 tablet (8 mg total) by mouth every 8 (eight) hours as needed. (Patient not taking: Reported on 7/15/2020) 60 tablet 0    polyethylene glycol (GLYCOLAX) 17 gram/dose powder mix 1 capful (17 g) and take by mouth once  "daily. (Patient not taking: Reported on 7/15/2020) 510 g 0    predniSONE (DELTASONE) 20 MG tablet Take 1 tablet (20 mg total) by mouth once daily. for 5 days 10 tablet 0     No current facility-administered medications for this visit.      Facility-Administered Medications Ordered in Other Visits   Medication Dose Route Frequency Provider Last Rate Last Dose    alteplase injection 2 mg  2 mg Intra-Catheter PRN Epifanio Mederos MD        heparin, porcine (PF) 100 unit/mL injection flush 500 Units  500 Units Intravenous PRN Epifanio Mederos MD        pembrolizumab (KEYTRUDA) 200 mg in sodium chloride 0.9% 100 mL chemo infusion  200 mg Intravenous 1 time in Clinic/HOD Epifanio Mederos MD        sodium chloride 0.9% flush 10 mL  10 mL Intravenous PRN Epifanio Mederos MD           Review of Systems   Constitutional: Positive for activity change and fatigue. Negative for chills, fever and unexpected weight change.   HENT: Negative for nosebleeds.    Eyes: Negative for visual disturbance.   Respiratory: Positive for cough and shortness of breath.    Cardiovascular: Negative for chest pain and leg swelling.   Gastrointestinal: Negative for abdominal distention, abdominal pain and nausea.   Endocrine: Negative for cold intolerance and heat intolerance.   Genitourinary: Negative for difficulty urinating and dysuria.   Musculoskeletal: Positive for arthralgias (chronic).   Skin: Negative for color change.   Neurological: Positive for weakness. Negative for dizziness, light-headedness and numbness.   Hematological: Does not bruise/bleed easily.   Psychiatric/Behavioral: Negative for confusion.       ECOG Performance Status:   2  Objective:      Vitals:   Vitals:    08/05/20 1015   BP: (!) 78/52   Pulse: 107   Resp: 16   Temp: 98.3 °F (36.8 °C)   TempSrc: Oral   SpO2: (!) 94%   Weight: 77.6 kg (171 lb 1.2 oz)   Height: 5' 6" (1.676 m)     BMI: Body mass index is 27.61 kg/m².    Physical Exam  Constitutional:       General: She " is not in acute distress.     Appearance: She is well-developed.      Comments: In wheelchair   HENT:      Head: Normocephalic.      Mouth/Throat:      Pharynx: No oropharyngeal exudate.   Eyes:      General: No scleral icterus.     Pupils: Pupils are equal, round, and reactive to light.   Neck:      Musculoskeletal: Normal range of motion.      Trachea: No tracheal deviation.   Cardiovascular:      Rate and Rhythm: Normal rate and regular rhythm.      Heart sounds: Normal heart sounds. No murmur. No friction rub. No gallop.    Pulmonary:      Effort: Pulmonary effort is normal. No respiratory distress.      Breath sounds: No wheezing or rales.      Comments: Diminished breath sounds  Abdominal:      General: Bowel sounds are normal. There is no distension.      Palpations: Abdomen is soft. There is no mass.      Tenderness: There is no abdominal tenderness. There is no guarding.   Musculoskeletal: Normal range of motion.   Skin:     General: Skin is warm and dry.   Neurological:      Mental Status: She is alert.         Laboratory Data:   Recent Results (from the past 168 hour(s))   TSH    Collection Time: 08/03/20 12:58 PM   Result Value Ref Range    TSH 1.310 0.400 - 4.000 uIU/mL   Comprehensive metabolic panel    Collection Time: 08/03/20 12:58 PM   Result Value Ref Range    Sodium 135 (L) 136 - 145 mmol/L    Potassium 4.5 3.5 - 5.1 mmol/L    Chloride 102 95 - 110 mmol/L    CO2 27 23 - 29 mmol/L    Glucose 115 (H) 70 - 110 mg/dL    BUN, Bld 30 (H) 6 - 20 mg/dL    Creatinine 1.1 0.5 - 1.4 mg/dL    Calcium 9.5 8.7 - 10.5 mg/dL    Total Protein 8.7 (H) 6.0 - 8.4 g/dL    Albumin 2.3 (L) 3.5 - 5.2 g/dL    Total Bilirubin 0.2 0.1 - 1.0 mg/dL    Alkaline Phosphatase 81 55 - 135 U/L    AST 15 10 - 40 U/L    ALT 10 10 - 44 U/L    Anion Gap 6 (L) 8 - 16 mmol/L    eGFR if African American >60.0 >60 mL/min/1.73 m^2    eGFR if non  54.7 (A) >60 mL/min/1.73 m^2   CBC auto differential    Collection Time:  08/03/20 12:58 PM   Result Value Ref Range    WBC 9.85 3.90 - 12.70 K/uL    RBC 3.48 (L) 4.00 - 5.40 M/uL    Hemoglobin 8.4 (L) 12.0 - 16.0 g/dL    Hematocrit 29.5 (L) 37.0 - 48.5 %    Mean Corpuscular Volume 85 82 - 98 fL    Mean Corpuscular Hemoglobin 24.1 (L) 27.0 - 31.0 pg    Mean Corpuscular Hemoglobin Conc 28.5 (L) 32.0 - 36.0 g/dL    RDW 15.9 (H) 11.5 - 14.5 %    Platelets 373 (H) 150 - 350 K/uL    MPV 9.1 (L) 9.2 - 12.9 fL    Immature Granulocytes 0.6 (H) 0.0 - 0.5 %    Gran # (ANC) 6.4 1.8 - 7.7 K/uL    Immature Grans (Abs) 0.06 (H) 0.00 - 0.04 K/uL    Lymph # 1.7 1.0 - 4.8 K/uL    Mono # 1.2 (H) 0.3 - 1.0 K/uL    Eos # 0.5 0.0 - 0.5 K/uL    Baso # 0.08 0.00 - 0.20 K/uL    nRBC 0 0 /100 WBC    Gran% 64.7 38.0 - 73.0 %    Lymph% 17.2 (L) 18.0 - 48.0 %    Mono% 12.1 4.0 - 15.0 %    Eosinophil% 4.6 0.0 - 8.0 %    Basophil% 0.8 0.0 - 1.9 %    Differential Method Automated    T4, free    Collection Time: 08/03/20 12:58 PM   Result Value Ref Range    Free T4 1.06 0.71 - 1.51 ng/dL     CareEverywhere  2/21/19  HCV RNA HAZEL Qualitative - [facility] Positive (A)   Comment: Positive: HCV RNA Detected         Imaging:   Narrative & Impression     EXAMINATION:  CT CHEST ABDOMEN PELVIS W W/O CONTRAST (XPD)     CLINICAL HISTORY:  f/u lung cancer;Malignant neoplasm of unspecified part of right bronchus or lung     TECHNIQUE:  Routine CT of the chest, abdomen and pelvis was performed with the oral contrast and 75 cc Omnipaque 350 intravenous contrast.  Coronal and sagittal reconstructions were done.     COMPARISON:  CT chest 05/01/2020, CT abdomen pelvis 05/09/2020. nuclear medicine bone scan 05/11/2020.  CTA chest 05/25/2020.     FINDINGS:  Visualized structures in the lower neck, both the breast and axillary areas are unremarkable except mildly enlarged lymph node at the left axilla at 1.3 cm (series 2, image 15), previously 1.9 cm.     Thoracic aorta is normal size with left arch and minimal atherosclerosis calcifications.   Heart size is normal without pericardial fluid.  Enlarged right paratracheal lymph nodes are again identified consistent with regional spread of neoplasm.  Index lymph node measures 1.8 cm (series 2, image 30), previously 2.6 cm.  Non index areas show similar, mild decrease in size.     Trachea and bronchi are patent, noting some airway narrowing centrally in the right upper lobe, improved from prior.     Left lung is well expanded and shows no acute consolidation or pleural effusion.  Several subcentimeter nodules are again identified similar to prior exam and suspect for intrathoracic metastatic disease.  Index nodule in the left upper lobe is 0.4 cm (series 2, image 30), previously 0.7 cm.  No new nodules.     The right lung is expanded but now shows extensive airspace consolidation with air bronchograms with the relative sparing of the base of the lower lobe; this could represent edema, radiation pneumonitis, aspiration, infectious pneumonia, etc.  Correlate clinically.  The ill-defined region of decreased parenchymal enhancement in the upper lobe correlates with the previously demonstrated tumor, now measuring 5.0 x 5.5 cm (series 2, image 27), previously 6.5 x 7.8 cm.  A mild volume of pleural effusion is present the around the right lung, potentially malignant effusion.  This shows some decrease in overall volume with a PleurX catheter now present.     The spleen, liver, portal vein, gallbladder, bile ducts, and pancreas are within normal limits.     Adrenal glands appear normal.  Kidneys show normal size and enhancement with no stone, obstruction, or solid mass detected.  Ureters are normal caliber.  Wall of urinary bladder is smooth.  Uterus is not identified, at no adnexal masses are seen.     Abdominal aorta has mild atherosclerosis but no aneurysm.  No enlarged retroperitoneal lymph nodes are seen.     No significant ascites is identified in the abdomen or pelvis.  Intestinal tract shows no obstruction  or acute inflammatory process.  Small hiatal hernia is present.  Small bowel is normal caliber with Oral contrast going through it.  Normal appendix is visualized.  Diverticulosis is seen in the left colon.     Soft tissues of the abdominal wall show no significant abnormality.     Skeletal structures show no acute fracture.  Age-appropriate degenerative changes are noted in the spine.  The T2 vertebral body again shows a mixed lytic/sclerotic lesion consistent with osseous metastatic disease similar to the most recent CT exam.     Impression:     1. History adenocarcinoma of right lung, stage IV.  Ill-defined lesion in right upper lobe and ipsilateral mediastinal lymph node enlargement show mild interval decrease in size.  See summary below.  Several subcentimeter pulmonary nodules are again noted in left lung, possible metastatic disease.  T2 vertebral body lesion consistent with metastatic disease.  No new disease detected.  2. Persistent right pleural effusion, decreased in volume with PleurX catheter in place; this could be malignant effusion.  3. New development of extensive consolidation in right lung, correlate for radiation pneumonitis, infectious pneumonia, aspiration, etc.  4. Hysterectomy, diverticulosis, and other findings as above.  5.  RECIST SUMMARY:  Date of prior examination for comparison: 05/01/2020  Lesion 1: Right upper lobe.  5.5 cm. Series 2 image 27.  Prior measurement 7.8 cm.  Lesion 2: Mediastinal lymph node.  1.8 cm. Series 2 image 30.  Prior measurement 2.6 cm.        Reading Physician Reading Date Result Priority   Ambrocio Crooks MD  [phone] 8/3/2020 Routine   Darci Watters MD  [phone] 8/3/2020       Narrative & Impression     EXAMINATION:  MRI BRAIN WITHOUT CONTRAST     CLINICAL HISTORY:  f/u brain mets; Malignant neoplasm of unspecified part of right bronchus or lung     TECHNIQUE:  Multiplanar multisequence MR imaging of the brain was performed without  "contrast.     *Patient is scheduled for MRI brain before and after the administration of contrast however exam was prematurely terminated prior to contrast administration secondary to patient discomfort and "burning" sensation.  Pre-contrast images evaluated and was ultimately determined that risk of continuing outweighed the potential benefit. *     COMPARISON:  MRI brain 05/09/2020     FINDINGS:  Intracranial compartment:     Ventricles and sulci are normal in size for age without evidence of hydrocephalus.     No extra-axial blood or fluid collections     Small rounded focus of abnormal T2/FLAIR hyperintense signal and diffusion restriction within the right cerebellum compatible with known metastatic lesion.  Decreased associated edema which is minimal on today's exam.  Small amount of associated gradient susceptibility which which likely represents a small amount of hemorrhage no new lesions; exam somewhat limited secondary to lack of intravenous contrast.  Stable patchy T2/FLAIR hyperintensities within the periventricular and subcortical white matter likely representing chronic microvascular ischemic change.  No acute hemorrhage, infarct, edema, midline shift, or herniation.     Normal vascular flow voids are preserved on B0 diffusion imaging.     Skull/extracranial contents (limited evaluation): Bone marrow signal intensity is normal.     Impression:     Study limited secondary to premature termination prior to contrast administration secondary to patient discomfort and "burning" sensation.     Grossly stable right cerebellar metastasis with improving associated vasogenic edema.  No new lesions identified.     Unchanged supratentorial T2/FLAIR hyperintensities likely representative of mild chronic microvascular ischemic change.           Assessment:       1. 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 Secondary malignant neoplasm of brain    3. Chronic hepatitis C without hepatic coma  "   4. Shortness of breath    5. Tobacco abuse    6. Chronic respiratory failure with hypoxia    7. Malignant pleural effusion    8. Malignant cachexia    9. Cough    10. Asthma, unspecified asthma severity, unspecified whether complicated, unspecified whether persistent    11. Lung mass    12. Radiation pneumonitis           Plan:       1. 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  Also has pleural effusion on right side s/p therapeutic thoracentesis 5/2020. PDL1 expressed in 100% of tumor cells from biopsy.  No mutation identified for EGFR/ALK/ROS1/BRAF by tissue.  -Continue single agent pembrolizumab  -Labs have been reviewed.  -Proceed with cycle 4  -Return to clinic in 3 weeks  -Repeat imaging CT chest/abdomen/pelvis and MRI brain reviewed  -TSH and fT4 next due with cycle 6  -Discussed with hepatology and no additional antiviral therapy required with her history of Hep C.  -Not eligible for [id number]    2. Cough  -On tessalon & codeine cough syrup Q8H PRN, refilled today  -On gabapentin  -Previously also tried mucinex  -Had right pleurX placed 6/24 by Dr. Garcia, home care draining this twice a week, patient has f/u with Dr. Garcia on 8/25  -Trial of prednisone 40mg x5 days for ?radiation pneumonitis    3. HCPOA  -Patient appointed her daughter and niece       Follow-up: RTC for labs (CBC, CMP, TSH, T4) prior to MD visit and cycle 5 Keytruda    The following was staffed and discussed with supervising physician Dr. Mederos.    Lauren Rocha MD PGY-VI  Hematology/Oncology Fellow

## 2020-08-05 ENCOUNTER — INFUSION (OUTPATIENT)
Dept: INFUSION THERAPY | Facility: HOSPITAL | Age: 60
End: 2020-08-05
Payer: MEDICAID

## 2020-08-05 ENCOUNTER — OFFICE VISIT (OUTPATIENT)
Dept: HEMATOLOGY/ONCOLOGY | Facility: CLINIC | Age: 60
End: 2020-08-05
Payer: MEDICAID

## 2020-08-05 VITALS — SYSTOLIC BLOOD PRESSURE: 72 MMHG | RESPIRATION RATE: 20 BRPM | DIASTOLIC BLOOD PRESSURE: 50 MMHG | HEART RATE: 97 BPM

## 2020-08-05 VITALS
HEIGHT: 66 IN | DIASTOLIC BLOOD PRESSURE: 52 MMHG | TEMPERATURE: 98 F | OXYGEN SATURATION: 94 % | HEART RATE: 107 BPM | SYSTOLIC BLOOD PRESSURE: 78 MMHG | RESPIRATION RATE: 16 BRPM | BODY MASS INDEX: 27.49 KG/M2 | WEIGHT: 171.06 LBS

## 2020-08-05 DIAGNOSIS — R91.8 LUNG MASS: ICD-10-CM

## 2020-08-05 DIAGNOSIS — C34.90 PRIMARY MALIGNANT NEOPLASM OF LUNG WITH METASTASIS TO BRAIN: ICD-10-CM

## 2020-08-05 DIAGNOSIS — Z72.0 TOBACCO ABUSE: ICD-10-CM

## 2020-08-05 DIAGNOSIS — J96.11 CHRONIC RESPIRATORY FAILURE WITH HYPOXIA: ICD-10-CM

## 2020-08-05 DIAGNOSIS — J45.909 ASTHMA, UNSPECIFIED ASTHMA SEVERITY, UNSPECIFIED WHETHER COMPLICATED, UNSPECIFIED WHETHER PERSISTENT: ICD-10-CM

## 2020-08-05 DIAGNOSIS — R06.02 SHORTNESS OF BREATH: ICD-10-CM

## 2020-08-05 DIAGNOSIS — C79.31 PRIMARY MALIGNANT NEOPLASM OF LUNG WITH METASTASIS TO BRAIN: ICD-10-CM

## 2020-08-05 DIAGNOSIS — B18.2 CHRONIC HEPATITIS C WITHOUT HEPATIC COMA: ICD-10-CM

## 2020-08-05 DIAGNOSIS — R05.9 COUGH: ICD-10-CM

## 2020-08-05 DIAGNOSIS — C34.91 ADENOCARCINOMA OF LUNG, STAGE 4, RIGHT: Primary | ICD-10-CM

## 2020-08-05 DIAGNOSIS — J91.0 MALIGNANT PLEURAL EFFUSION: ICD-10-CM

## 2020-08-05 DIAGNOSIS — J70.0 RADIATION PNEUMONITIS: ICD-10-CM

## 2020-08-05 DIAGNOSIS — R64 MALIGNANT CACHEXIA: ICD-10-CM

## 2020-08-05 DIAGNOSIS — C79.31 PRIMARY MALIGNANT NEOPLASM OF LUNG WITH METASTASIS TO BRAIN: Primary | ICD-10-CM

## 2020-08-05 DIAGNOSIS — C34.90 PRIMARY MALIGNANT NEOPLASM OF LUNG WITH METASTASIS TO BRAIN: Primary | ICD-10-CM

## 2020-08-05 DIAGNOSIS — C79.31 SECONDARY MALIGNANT NEOPLASM OF BRAIN: ICD-10-CM

## 2020-08-05 PROCEDURE — 99214 OFFICE O/P EST MOD 30 MIN: CPT | Mod: PBBFAC,25 | Performed by: STUDENT IN AN ORGANIZED HEALTH CARE EDUCATION/TRAINING PROGRAM

## 2020-08-05 PROCEDURE — 96413 CHEMO IV INFUSION 1 HR: CPT

## 2020-08-05 PROCEDURE — 99215 OFFICE O/P EST HI 40 MIN: CPT | Mod: S$PBB,,, | Performed by: STUDENT IN AN ORGANIZED HEALTH CARE EDUCATION/TRAINING PROGRAM

## 2020-08-05 PROCEDURE — 25000003 PHARM REV CODE 250: Performed by: INTERNAL MEDICINE

## 2020-08-05 PROCEDURE — 63600175 PHARM REV CODE 636 W HCPCS: Mod: JG | Performed by: INTERNAL MEDICINE

## 2020-08-05 PROCEDURE — 99999 PR PBB SHADOW E&M-EST. PATIENT-LVL IV: ICD-10-PCS | Mod: PBBFAC,,, | Performed by: STUDENT IN AN ORGANIZED HEALTH CARE EDUCATION/TRAINING PROGRAM

## 2020-08-05 PROCEDURE — 99215 PR OFFICE/OUTPT VISIT, EST, LEVL V, 40-54 MIN: ICD-10-PCS | Mod: S$PBB,,, | Performed by: STUDENT IN AN ORGANIZED HEALTH CARE EDUCATION/TRAINING PROGRAM

## 2020-08-05 PROCEDURE — 99999 PR PBB SHADOW E&M-EST. PATIENT-LVL IV: CPT | Mod: PBBFAC,,, | Performed by: STUDENT IN AN ORGANIZED HEALTH CARE EDUCATION/TRAINING PROGRAM

## 2020-08-05 RX ORDER — SODIUM CHLORIDE 0.9 % (FLUSH) 0.9 %
10 SYRINGE (ML) INJECTION
Status: DISCONTINUED | OUTPATIENT
Start: 2020-08-05 | End: 2020-08-05 | Stop reason: HOSPADM

## 2020-08-05 RX ORDER — PROMETHAZINE HYDROCHLORIDE AND CODEINE PHOSPHATE 6.25; 1 MG/5ML; MG/5ML
5 SOLUTION ORAL EVERY 8 HOURS PRN
Qty: 473 ML | Refills: 1 | Status: SHIPPED | OUTPATIENT
Start: 2020-08-05 | End: 2020-08-26 | Stop reason: SDUPTHER

## 2020-08-05 RX ORDER — BENZONATATE 100 MG/1
200 CAPSULE ORAL 3 TIMES DAILY PRN
Qty: 30 CAPSULE | Refills: 1 | Status: SHIPPED | OUTPATIENT
Start: 2020-08-05 | End: 2021-01-01

## 2020-08-05 RX ORDER — HEPARIN 100 UNIT/ML
500 SYRINGE INTRAVENOUS
Status: CANCELLED | OUTPATIENT
Start: 2020-08-05

## 2020-08-05 RX ORDER — PREDNISONE 20 MG/1
20 TABLET ORAL DAILY
Qty: 10 TABLET | Refills: 0 | Status: SHIPPED | OUTPATIENT
Start: 2020-08-05 | End: 2020-08-10

## 2020-08-05 RX ORDER — HEPARIN 100 UNIT/ML
500 SYRINGE INTRAVENOUS
Status: DISCONTINUED | OUTPATIENT
Start: 2020-08-05 | End: 2020-08-05 | Stop reason: HOSPADM

## 2020-08-05 RX ORDER — SODIUM CHLORIDE 0.9 % (FLUSH) 0.9 %
10 SYRINGE (ML) INJECTION
Status: CANCELLED | OUTPATIENT
Start: 2020-08-05

## 2020-08-05 RX ORDER — MIRTAZAPINE 15 MG/1
15 TABLET, ORALLY DISINTEGRATING ORAL NIGHTLY
Qty: 30 TABLET | Refills: 2 | Status: SHIPPED | OUTPATIENT
Start: 2020-08-05 | End: 2020-09-25

## 2020-08-05 RX ORDER — MIRTAZAPINE 15 MG/1
15 TABLET, ORALLY DISINTEGRATING ORAL NIGHTLY
Qty: 30 TABLET | Refills: 2 | Status: SHIPPED | OUTPATIENT
Start: 2020-08-05 | End: 2020-08-05 | Stop reason: SDUPTHER

## 2020-08-05 RX ADMIN — SODIUM CHLORIDE 200 MG: 9 INJECTION, SOLUTION INTRAVENOUS at 11:08

## 2020-08-05 RX ADMIN — SODIUM CHLORIDE: 9 INJECTION, SOLUTION INTRAVENOUS at 11:08

## 2020-08-05 NOTE — PLAN OF CARE
Pt received Keytruda; tolerated well. VSS and NAD. Pt instructed to call MD with any concerns. Pt discharged home independently.       Problem: Neurotoxicity (Chemotherapy Effects)  Goal: Neurotoxicity Symptom Control  Outcome: Ongoing, Progressing     Problem: Neutropenia (Chemotherapy Effects)  Goal: Absence of Infection  Outcome: Ongoing, Progressing     Problem: Thrombocytopenia Bleeding Risk (Chemotherapy Effects)  Goal: Absence of Bleeding  Outcome: Ongoing, Progressing

## 2020-08-05 NOTE — Clinical Note
RTC for labs (CBC, CMP, TSH, T4) prior to MD visit and cycle 5 Keytruda. Patient would like to make sure no pleurX removal on 8/25, since she is still requiring draining. Thank you.

## 2020-08-06 ENCOUNTER — TELEPHONE (OUTPATIENT)
Dept: HEMATOLOGY/ONCOLOGY | Facility: CLINIC | Age: 60
End: 2020-08-06

## 2020-08-06 ENCOUNTER — TELEPHONE (OUTPATIENT)
Dept: PSYCHIATRY | Facility: CLINIC | Age: 60
End: 2020-08-06

## 2020-08-06 NOTE — NURSING
Received call from pt requesting an extension of her physical therapy at home.  Spoke with FERNANDA Guo concerning pt's request to have physical therapy extended at home.  FERNANDA states it would be up to her insurance and medicaid.  Pt informed.

## 2020-08-06 NOTE — TELEPHONE ENCOUNTER
Patient call my  stated that she had not had any medication adjustment. Per Med Rec patient refused psychiatry appt.     Explained to patient about not refusing psychaitry appointment.     Will refer her to Dr. Cottrell again

## 2020-08-10 ENCOUNTER — TELEPHONE (OUTPATIENT)
Dept: PULMONOLOGY | Facility: CLINIC | Age: 60
End: 2020-08-10

## 2020-08-10 PROCEDURE — G0179 PR HOME HEALTH MD RECERTIFICATION: ICD-10-PCS | Mod: ,,, | Performed by: STUDENT IN AN ORGANIZED HEALTH CARE EDUCATION/TRAINING PROGRAM

## 2020-08-10 PROCEDURE — G0179 MD RECERTIFICATION HHA PT: HCPCS | Mod: ,,, | Performed by: STUDENT IN AN ORGANIZED HEALTH CARE EDUCATION/TRAINING PROGRAM

## 2020-08-10 NOTE — TELEPHONE ENCOUNTER
I called Mrs Boyce to remind her of a appointment with Dr Rodriguez tomorrow at 930am. Patient was very upset, yelling that no one told her she had appointments tomorrow, that she was in Hem/Onc last week and no one told her or her family members about appointments in Pulmonary or Psychiatry on Tuesday 8-11-20 . Patient states she does not have a ride tomorrow and to reschedule the Pulmonary visit for 8-25-20 when she has labwork.. I told her I will try and call her back. Enma Velez LPN

## 2020-08-11 ENCOUNTER — TELEPHONE (OUTPATIENT)
Dept: HEMATOLOGY/ONCOLOGY | Facility: CLINIC | Age: 60
End: 2020-08-11

## 2020-08-11 NOTE — TELEPHONE ENCOUNTER
"----- Message from Laura Clark sent at 8/11/2020 12:21 PM CDT -----   RN Assist    Name of caller: Meagan  Provider name: Josefina Aguilar MD   Contact Preference:  [phone]  Current patient or new patient?: current   Does Patient feel the need to see the MD today? No   What is the nature of the call?    - PleurX drain: last drain done there was no output. Called to advise.     Additional Notes:   "Thank you for all that you do for our patients'"          "

## 2020-08-13 ENCOUNTER — TELEPHONE (OUTPATIENT)
Dept: PSYCHIATRY | Facility: CLINIC | Age: 60
End: 2020-08-13

## 2020-08-13 ENCOUNTER — DOCUMENTATION ONLY (OUTPATIENT)
Dept: HEMATOLOGY/ONCOLOGY | Facility: CLINIC | Age: 60
End: 2020-08-13

## 2020-08-13 ENCOUNTER — TELEPHONE (OUTPATIENT)
Dept: HEMATOLOGY/ONCOLOGY | Facility: HOSPITAL | Age: 60
End: 2020-08-13

## 2020-08-13 DIAGNOSIS — C34.91 ADENOCARCINOMA OF LUNG, STAGE 4, RIGHT: Primary | ICD-10-CM

## 2020-08-13 NOTE — PROGRESS NOTES
Received VM message/call from patient this morning. She asked for more nutritional supplements, as she drank the last Boost for breakfast this morning. She asked for Ensure brand specifically. Explained to her that we are able to order through the hospital supply system and primarily [employer]/Boost supplements are available and one flavor of Ensure in Butter Pecan. She would like the available Ensure and Vanilla Boost. She has been drinking one for breakfast in the mornings. Sent an Epic message to my coworker Marivel Rojas LCSW, who is on vacation this week, asking if she can place the order in [facility] on Monday when she returns to work, as she has [employer] access. Explained this to patient and will call her once the supplements are delivered to my coworker and patient can arrange for a family member to pick them up for her. Patient asked if they would be delivered to her home address and I explained that this is not feasible.   Patient also said that she needs to speak with her doctor about her drain because no fluid was able to be drained at the last two nurse visits; I explained that I would send a message to her doctor and nurse and ask that they call patient; sent message via Epic to Dr. Rocha and staff. Informed patient that she should soon be receiving in the mail the second check from our Patient Assistance Fund for help with her [employer] bill. Patient stated understanding, agreement, and appreciation. Will continue to follow and assist as needs are identified.

## 2020-08-13 NOTE — TELEPHONE ENCOUNTER
Spoke to patient, states pleurX has not drained last 2 times, will continue to monitor & assess removal. Also complains of pain, asking for tramadol refill however currently taking oxycodone 5mg 2-3 times per day. Instructed patient that best to only take either oxycodone or tramadol. She still has oxycodone at home. Having chronic chest pain at site of pleurX. Feels dyspnea improved. Previously referred to palliative care for symptom management, will refer for soonest available visit for understanding illness, advanced care planning, and symptom management.

## 2020-08-13 NOTE — TELEPHONE ENCOUNTER
Patient called to obtain FERNANDA Guo's number regarding more supplemental drinks. Provided contact. Also reminded patent to schedule appointment with Psychiatry. Patient stated she is only able to come to appointment when she has another appointment due to transportation issues.     Will inform team.

## 2020-08-14 ENCOUNTER — TELEPHONE (OUTPATIENT)
Dept: HEMATOLOGY/ONCOLOGY | Facility: CLINIC | Age: 60
End: 2020-08-14

## 2020-08-14 NOTE — TELEPHONE ENCOUNTER
----- Message from Melva Landers sent at 8/14/2020  4:10 PM CDT -----  Regarding: Home Health - Order Change  Contact: Polly Gonzalez - [facility]  Polly Gonzalez - [facility]    Called - requesting a decrease for home visits to 1 time a week, until PT is seen by Kristen.  PT's flurox chest drain isn't draining and hasn't in a few weeks      Callback: [phone]

## 2020-08-17 ENCOUNTER — TELEPHONE (OUTPATIENT)
Dept: HEMATOLOGY/ONCOLOGY | Facility: CLINIC | Age: 60
End: 2020-08-17

## 2020-08-17 NOTE — NURSING
Called to return call to pt left on answering machine 8/10.  Pt confirmed that H/H nursing has started to come once a week to empty pleurx drainage container.  Coughing during phone call, also confirmed receipt of check for assistance with entergy bill, will make FERNANDA Guo aware of receipt.  Pt will return call when needed.

## 2020-08-21 ENCOUNTER — TELEPHONE (OUTPATIENT)
Dept: HEMATOLOGY/ONCOLOGY | Facility: CLINIC | Age: 60
End: 2020-08-21

## 2020-08-21 NOTE — NURSING
Received call from pt, informed her that her nutritional supplement is available for pick-up.  Pt to make arrangements to have the supplements picked up when she come for labs.

## 2020-08-25 ENCOUNTER — EXTERNAL HOME HEALTH (OUTPATIENT)
Dept: HOME HEALTH SERVICES | Facility: HOSPITAL | Age: 60
End: 2020-08-25
Payer: MEDICAID

## 2020-08-25 ENCOUNTER — LAB VISIT (OUTPATIENT)
Dept: LAB | Facility: HOSPITAL | Age: 60
End: 2020-08-25
Attending: STUDENT IN AN ORGANIZED HEALTH CARE EDUCATION/TRAINING PROGRAM
Payer: MEDICAID

## 2020-08-25 ENCOUNTER — TELEPHONE (OUTPATIENT)
Dept: HEMATOLOGY/ONCOLOGY | Facility: CLINIC | Age: 60
End: 2020-08-25

## 2020-08-25 ENCOUNTER — DOCUMENT SCAN (OUTPATIENT)
Dept: HOME HEALTH SERVICES | Facility: HOSPITAL | Age: 60
End: 2020-08-25
Payer: MEDICAID

## 2020-08-25 DIAGNOSIS — J91.0 MALIGNANT PLEURAL EFFUSION: Primary | ICD-10-CM

## 2020-08-25 DIAGNOSIS — C79.31 METASTASIS TO BRAIN: ICD-10-CM

## 2020-08-25 DIAGNOSIS — C34.91 ADENOCARCINOMA OF LUNG, STAGE 4, RIGHT: ICD-10-CM

## 2020-08-25 LAB
ALBUMIN SERPL BCP-MCNC: 2.6 G/DL (ref 3.5–5.2)
ALP SERPL-CCNC: 93 U/L (ref 55–135)
ALT SERPL W/O P-5'-P-CCNC: 7 U/L (ref 10–44)
ANION GAP SERPL CALC-SCNC: 8 MMOL/L (ref 8–16)
AST SERPL-CCNC: 13 U/L (ref 10–40)
BASOPHILS # BLD AUTO: 0.05 K/UL (ref 0–0.2)
BASOPHILS NFR BLD: 0.6 % (ref 0–1.9)
BILIRUB SERPL-MCNC: 0.2 MG/DL (ref 0.1–1)
BUN SERPL-MCNC: 31 MG/DL (ref 6–20)
CALCIUM SERPL-MCNC: 9.6 MG/DL (ref 8.7–10.5)
CHLORIDE SERPL-SCNC: 106 MMOL/L (ref 95–110)
CO2 SERPL-SCNC: 24 MMOL/L (ref 23–29)
CREAT SERPL-MCNC: 1.1 MG/DL (ref 0.5–1.4)
DIFFERENTIAL METHOD: ABNORMAL
EOSINOPHIL # BLD AUTO: 0.7 K/UL (ref 0–0.5)
EOSINOPHIL NFR BLD: 8.4 % (ref 0–8)
ERYTHROCYTE [DISTWIDTH] IN BLOOD BY AUTOMATED COUNT: 17.5 % (ref 11.5–14.5)
EST. GFR  (AFRICAN AMERICAN): >60 ML/MIN/1.73 M^2
EST. GFR  (NON AFRICAN AMERICAN): 54.7 ML/MIN/1.73 M^2
GLUCOSE SERPL-MCNC: 109 MG/DL (ref 70–110)
HCT VFR BLD AUTO: 30.7 % (ref 37–48.5)
HGB BLD-MCNC: 9 G/DL (ref 12–16)
IMM GRANULOCYTES # BLD AUTO: 0.03 K/UL (ref 0–0.04)
IMM GRANULOCYTES NFR BLD AUTO: 0.3 % (ref 0–0.5)
LYMPHOCYTES # BLD AUTO: 1.6 K/UL (ref 1–4.8)
LYMPHOCYTES NFR BLD: 18.3 % (ref 18–48)
MCH RBC QN AUTO: 25.4 PG (ref 27–31)
MCHC RBC AUTO-ENTMCNC: 29.3 G/DL (ref 32–36)
MCV RBC AUTO: 87 FL (ref 82–98)
MONOCYTES # BLD AUTO: 0.9 K/UL (ref 0.3–1)
MONOCYTES NFR BLD: 10.2 % (ref 4–15)
NEUTROPHILS # BLD AUTO: 5.5 K/UL (ref 1.8–7.7)
NEUTROPHILS NFR BLD: 62.2 % (ref 38–73)
NRBC BLD-RTO: 0 /100 WBC
PLATELET # BLD AUTO: 257 K/UL (ref 150–350)
PMV BLD AUTO: 8.9 FL (ref 9.2–12.9)
POTASSIUM SERPL-SCNC: 4.1 MMOL/L (ref 3.5–5.1)
PROT SERPL-MCNC: 8.2 G/DL (ref 6–8.4)
RBC # BLD AUTO: 3.55 M/UL (ref 4–5.4)
SODIUM SERPL-SCNC: 138 MMOL/L (ref 136–145)
T4 FREE SERPL-MCNC: 0.99 NG/DL (ref 0.71–1.51)
TSH SERPL DL<=0.005 MIU/L-ACNC: 1.15 UIU/ML (ref 0.4–4)
WBC # BLD AUTO: 8.84 K/UL (ref 3.9–12.7)

## 2020-08-25 PROCEDURE — 84439 ASSAY OF FREE THYROXINE: CPT

## 2020-08-25 PROCEDURE — 85025 COMPLETE CBC W/AUTO DIFF WBC: CPT

## 2020-08-25 PROCEDURE — 36415 COLL VENOUS BLD VENIPUNCTURE: CPT

## 2020-08-25 PROCEDURE — 80053 COMPREHEN METABOLIC PANEL: CPT

## 2020-08-25 PROCEDURE — 84443 ASSAY THYROID STIM HORMONE: CPT

## 2020-08-25 NOTE — PROGRESS NOTES
PATIENT: Janeth Boyce  MRN: [id number]  DATE: 8/26/2020      Diagnosis:   1. Adenocarcinoma of lung, stage 4, right    2. Malignant pleural effusion    3. Chronic respiratory failure with hypoxia    4. Anxiety    5. Chronic hepatitis C without hepatic coma    6. Malignant cachexia    7. Other insomnia        Chief Complaint: Primary malignant neoplasm of lung with metastasis to brain      Oncologic History:    Oncologic History 1. Lung adenocarcinoma    Oncologic Treatment 1. Carboplatin AUC4 x1 dose 5/27/20 and palliative radiation (5/27/2020 - 6/2/2020; 20 Gy to right lung)  2. Pembrolizumab  Cycle 1 6/4/2020       Pathology 5/5/2020  Right lung, biopsy:  Positive for adenocarcinoma with areas of necrosis.  Interpretation:   Right lung, specimen for PD-L1 immunohistochemistry studies (clone [id number], [facility], [place], [place]) ([id number]): 100% tumor cells are positive for PD-L1 (membranous positivity).     BRAF Mutation Analysis (V600E), Tumor Result Summary: NO MUTATION IDENTIFIED.   Result: BRAF status: Wild-type   Interpretation :     EGFR Gene, Mutation Analysis, Tumor Result Summary: NO MUTATION IDENTIFIED.   Result: EGFR status: Wild-type     ALK (2p23), Lung Cancer, FISH, Ts Result Summary: NEGATIVE   Interpretation: No rearrangement of the ALK gene was identified.     Result: nuc micha(ALKx3)[29/50]   Of 50 nuclei, 0% had separation of the 3'ALK and 5'ALK signals and 58% had three intact 3'ALK/5'ALK signals. The normal cutoff is <50.0% for one 3'ALK, one 5'ALK and one 3'ALK/5'ALK signal and <18.0% for three 3'ALK/5'ALK signals.     ROS1 (6Q22), FISH, Ts Result Summary: NEGATIVE   Interpretation: No rearrangement of the ROS1 gene was identified.   Result nuc micha(ROS1x1)[44/50]           Subjective:    Interval History: Ms. Boyce is here for follow up    59 y.o. woman with lung adenocarcinoma complicated by single brain met to cerebellum, malignant pleural effusion requiring pleurx, and  "hypoxemia requiring continuous supplemental oxygen.     Oncologic History  She has been smoking since 15 years old and estimates about 0.5ppd.  She has always had some level of dyspnea with exertion at baseline, but noticed it was worse in February 2020 with an associated cough with white and clear sputum.  She says she was [place] who told her the scans were suspicious for cancer.  She then headed to [facility] but because of COVID-19 her appointment was cancelled.  [place] [facility] [place] and the cough is persistent.  Denied any unilateral weakness, dizziness, headaches, or gait instability.  Was able to perform all ADLs and could walk 1 block before getting tired and needing to rest.  On 5/1/2020, she was "admitted to observation with SOB and cough x 2 months. CT chest Large RUL mass with scattered nodules and mediastinal lymphadenopathy; small-moderate right pleural effusion....S/p right lung biopsy 5/4/2020. Home O2 arranged to use with activity. Admitted to Medical Oncology 5/25/2020-5/29/2020 for worsening shortness of breath.  CTPE negative for PE. Pulmonology attempted thoracentesis twice. Second attempt resulted in 400cc being drained, without relief.  She then received x1 dose of carboplatin and palliative RT to the right lung. Patient reported improvement in symptoms after XRT and was discharged.      Single agent pembrolizumab was started for PDL1>50%.  She received her first dose of pembrolizumab 6/4/2020.  She since had an admit for right PleurX.  -Scans 8/3 decreased lesions per RECIST   -8/26/20: cycle 5 Pembrolizumab    Interval History  Patient seen today alone, to see pulmonary after this visit. Cough better. Appetite and off. Up 2 lbs. Chronic pain stable. Spoke with niece over phone. ECOG 2.     Past Medical History:   Past Medical History:   Diagnosis Date    Anxiety     Asthma     Hepatitis C 2019    treated    Hx of psychiatric care  "    Hypertension     Lung cancer     Psychiatric exam requested by authority     Psychiatric problem        Past Surgical HIstory:   Past Surgical History:   Procedure Laterality Date     SECTION      KNEE SURGERY         Family History: No family history on file.    Social History:  reports that she quit smoking about 4 months ago. Her smoking use included cigarettes. She has a 20.00 pack-year smoking history. She does not have any smokeless tobacco history on file. She reports current alcohol use. She reports that she does not use drugs.    Allergies:  Review of patient's allergies indicates:  No Known Allergies    Medications:  Current Outpatient Medications   Medication Sig Dispense Refill    albuterol (PROVENTIL) 2.5 mg /3 mL (0.083 %) nebulizer solution Take 3 mls by nebulization every 3 hours while awake 180 mL 12    amLODIPine (NORVASC) 5 MG tablet Take 5 mg by mouth once daily.      benzonatate (TESSALON PERLES) 100 MG capsule Take 2 capsules (200 mg total) by mouth 3 (three) times daily as needed for Cough. 30 capsule 1    budesonide-formoterol 160-4.5 mcg (SYMBICORT) 160-4.5 mcg/actuation HFAA Inhale two puffs by mouth into the lungs twice daily 10.2 g 12    buPROPion (WELLBUTRIN SR) 150 MG TBSR 12 hr tablet       diclofenac sodium (VOLTAREN) 1 % Gel Apply two grams topically four times a day 200 g 5    gabapentin (NEURONTIN) 400 MG capsule Take one capsule by mouth three times daily 90 capsule 3    irbesartan-hydrochlorothiazide (AVALIDE) 300-12.5 mg per tablet Take one tablet by mouth every day 90 tablet 3    lidocaine (XYLOCAINE) 5 % Oint ointment Apply topically as needed. Apply 1 inch to biopsy site and cover with saran wrap.  Apply twice per day as needed for pain. 35.44 g 1    LORazepam (ATIVAN) 1 MG tablet Take 1 tablet (1 mg total) by mouth as needed for Anxiety (Take 30 minutes prior to procedure). 5 tablet 0    mirtazapine (REMERON SOL-TAB) 15 MG disintegrating tablet  Take 1 tablet (15 mg total) by mouth nightly. 30 tablet 2    omeprazole (PRILOSEC) 20 MG capsule Take one capsule by mouth everyday. 90 capsule 3    oxyCODONE (ROXICODONE) 5 MG immediate release tablet Take 1 tablet (5 mg total) by mouth every 12 (twelve) hours as needed for Pain (take when tramadol does not work). 60 tablet 0    prochlorperazine (COMPAZINE) 10 MG tablet Take 1 tablet (10 mg total) by mouth 3 (three) times daily as needed (for nausea. if zofran does not work can use this medication instead). 60 tablet 2    promethazine-codeine 6.25-10 mg/5 ml (PHENERGAN WITH CODEINE) 6.25-10 mg/5 mL syrup Take 5 mLs by mouth every 8 (eight) hours as needed for Cough. 473 mL 1    senna-docusate 8.6-50 mg (PERICOLACE) 8.6-50 mg per tablet Take 1 tablet by mouth 2 (two) times daily. 60 tablet 0    traMADoL (ULTRAM) 50 mg tablet Take 2 tablets (100 mg total) by mouth every 8 (eight) hours as needed for Pain. 60 tablet 0    zolpidem (AMBIEN) 5 MG Tab Take 1 tablet (5 mg total) by mouth nightly as needed (take if having difficulty sleeping). 21 tablet 0    duke's soln (benadryl 30 mL, mylanta 30 mL, lidocaine 30 mL, nystatin 30 mL) 120mL Take 10 mLs by mouth 4 (four) times daily as needed (mouth pain). (Patient not taking: Reported on 7/15/2020) 120 mL 0    ondansetron (ZOFRAN-ODT) 8 MG TbDL Dissolve 1 tablet (8 mg total) by mouth every 8 (eight) hours as needed. (Patient not taking: Reported on 7/15/2020) 60 tablet 0    polyethylene glycol (GLYCOLAX) 17 gram/dose powder mix 1 capful (17 g) and take by mouth once daily. (Patient not taking: Reported on 7/15/2020) 510 g 0     No current facility-administered medications for this visit.        Review of Systems   Constitutional: Positive for activity change and fatigue. Negative for chills, fever and unexpected weight change.   HENT: Negative for nosebleeds.    Eyes: Negative for visual disturbance.   Respiratory: Positive for cough and shortness of breath.   "  Cardiovascular: Negative for chest pain and leg swelling.   Gastrointestinal: Negative for abdominal distention, abdominal pain and nausea.   Endocrine: Negative for cold intolerance and heat intolerance.   Genitourinary: Negative for difficulty urinating and dysuria.   Musculoskeletal: Positive for arthralgias (chronic).   Skin: Negative for color change.   Neurological: Positive for weakness. Negative for dizziness, light-headedness and numbness.   Hematological: Does not bruise/bleed easily.   Psychiatric/Behavioral: Negative for confusion.       ECOG Performance Status:   2  Objective:      Vitals:   Vitals:    08/26/20 1002   BP: 96/60   Pulse: 84   Resp: 16   Temp: 98.2 °F (36.8 °C)   TempSrc: Oral   SpO2: 96%   Weight: 78.7 kg (173 lb 8 oz)   Height: 5' 6" (1.676 m)     BMI: Body mass index is 28 kg/m².    Physical Exam  Constitutional:       General: She is not in acute distress.     Appearance: She is well-developed.      Comments: In wheelchair   HENT:      Head: Normocephalic.      Mouth/Throat:      Pharynx: No oropharyngeal exudate.   Eyes:      General: No scleral icterus.     Pupils: Pupils are equal, round, and reactive to light.   Neck:      Musculoskeletal: Normal range of motion.      Trachea: No tracheal deviation.   Cardiovascular:      Rate and Rhythm: Normal rate and regular rhythm.      Heart sounds: Normal heart sounds. No murmur. No friction rub. No gallop.    Pulmonary:      Effort: Pulmonary effort is normal. No respiratory distress.      Breath sounds: No wheezing or rales.      Comments: Diminished breath sounds  Abdominal:      General: Bowel sounds are normal. There is no distension.      Palpations: Abdomen is soft. There is no mass.      Tenderness: There is no abdominal tenderness. There is no guarding.   Musculoskeletal: Normal range of motion.   Skin:     General: Skin is warm and dry.   Neurological:      Mental Status: She is alert.         Laboratory Data:   Recent Results " (from the past 168 hour(s))   Comprehensive metabolic panel    Collection Time: 08/25/20 10:37 AM   Result Value Ref Range    Sodium 138 136 - 145 mmol/L    Potassium 4.1 3.5 - 5.1 mmol/L    Chloride 106 95 - 110 mmol/L    CO2 24 23 - 29 mmol/L    Glucose 109 70 - 110 mg/dL    BUN, Bld 31 (H) 6 - 20 mg/dL    Creatinine 1.1 0.5 - 1.4 mg/dL    Calcium 9.6 8.7 - 10.5 mg/dL    Total Protein 8.2 6.0 - 8.4 g/dL    Albumin 2.6 (L) 3.5 - 5.2 g/dL    Total Bilirubin 0.2 0.1 - 1.0 mg/dL    Alkaline Phosphatase 93 55 - 135 U/L    AST 13 10 - 40 U/L    ALT 7 (L) 10 - 44 U/L    Anion Gap 8 8 - 16 mmol/L    eGFR if African American >60.0 >60 mL/min/1.73 m^2    eGFR if non  54.7 (A) >60 mL/min/1.73 m^2   CBC auto differential    Collection Time: 08/25/20 10:37 AM   Result Value Ref Range    WBC 8.84 3.90 - 12.70 K/uL    RBC 3.55 (L) 4.00 - 5.40 M/uL    Hemoglobin 9.0 (L) 12.0 - 16.0 g/dL    Hematocrit 30.7 (L) 37.0 - 48.5 %    Mean Corpuscular Volume 87 82 - 98 fL    Mean Corpuscular Hemoglobin 25.4 (L) 27.0 - 31.0 pg    Mean Corpuscular Hemoglobin Conc 29.3 (L) 32.0 - 36.0 g/dL    RDW 17.5 (H) 11.5 - 14.5 %    Platelets 257 150 - 350 K/uL    MPV 8.9 (L) 9.2 - 12.9 fL    Immature Granulocytes 0.3 0.0 - 0.5 %    Gran # (ANC) 5.5 1.8 - 7.7 K/uL    Immature Grans (Abs) 0.03 0.00 - 0.04 K/uL    Lymph # 1.6 1.0 - 4.8 K/uL    Mono # 0.9 0.3 - 1.0 K/uL    Eos # 0.7 (H) 0.0 - 0.5 K/uL    Baso # 0.05 0.00 - 0.20 K/uL    nRBC 0 0 /100 WBC    Gran% 62.2 38.0 - 73.0 %    Lymph% 18.3 18.0 - 48.0 %    Mono% 10.2 4.0 - 15.0 %    Eosinophil% 8.4 (H) 0.0 - 8.0 %    Basophil% 0.6 0.0 - 1.9 %    Differential Method Automated    TSH    Collection Time: 08/25/20 10:37 AM   Result Value Ref Range    TSH 1.153 0.400 - 4.000 uIU/mL   T4, free    Collection Time: 08/25/20 10:37 AM   Result Value Ref Range    Free T4 0.99 0.71 - 1.51 ng/dL     CareEverywhere  2/21/19  HCV RNA HAZEL Qualitative - [facility] Positive (A)   Comment: Positive: HCV  RNA Detected         Imaging:   Narrative & Impression     EXAMINATION:  CT CHEST ABDOMEN PELVIS W W/O CONTRAST (XPD)     CLINICAL HISTORY:  f/u lung cancer;Malignant neoplasm of unspecified part of right bronchus or lung     TECHNIQUE:  Routine CT of the chest, abdomen and pelvis was performed with the oral contrast and 75 cc Omnipaque 350 intravenous contrast.  Coronal and sagittal reconstructions were done.     COMPARISON:  CT chest 05/01/2020, CT abdomen pelvis 05/09/2020. nuclear medicine bone scan 05/11/2020.  CTA chest 05/25/2020.     FINDINGS:  Visualized structures in the lower neck, both the breast and axillary areas are unremarkable except mildly enlarged lymph node at the left axilla at 1.3 cm (series 2, image 15), previously 1.9 cm.     Thoracic aorta is normal size with left arch and minimal atherosclerosis calcifications.  Heart size is normal without pericardial fluid.  Enlarged right paratracheal lymph nodes are again identified consistent with regional spread of neoplasm.  Index lymph node measures 1.8 cm (series 2, image 30), previously 2.6 cm.  Non index areas show similar, mild decrease in size.     Trachea and bronchi are patent, noting some airway narrowing centrally in the right upper lobe, improved from prior.     Left lung is well expanded and shows no acute consolidation or pleural effusion.  Several subcentimeter nodules are again identified similar to prior exam and suspect for intrathoracic metastatic disease.  Index nodule in the left upper lobe is 0.4 cm (series 2, image 30), previously 0.7 cm.  No new nodules.     The right lung is expanded but now shows extensive airspace consolidation with air bronchograms with the relative sparing of the base of the lower lobe; this could represent edema, radiation pneumonitis, aspiration, infectious pneumonia, etc.  Correlate clinically.  The ill-defined region of decreased parenchymal enhancement in the upper lobe correlates with the previously  demonstrated tumor, now measuring 5.0 x 5.5 cm (series 2, image 27), previously 6.5 x 7.8 cm.  A mild volume of pleural effusion is present the around the right lung, potentially malignant effusion.  This shows some decrease in overall volume with a PleurX catheter now present.     The spleen, liver, portal vein, gallbladder, bile ducts, and pancreas are within normal limits.     Adrenal glands appear normal.  Kidneys show normal size and enhancement with no stone, obstruction, or solid mass detected.  Ureters are normal caliber.  Wall of urinary bladder is smooth.  Uterus is not identified, at no adnexal masses are seen.     Abdominal aorta has mild atherosclerosis but no aneurysm.  No enlarged retroperitoneal lymph nodes are seen.     No significant ascites is identified in the abdomen or pelvis.  Intestinal tract shows no obstruction or acute inflammatory process.  Small hiatal hernia is present.  Small bowel is normal caliber with Oral contrast going through it.  Normal appendix is visualized.  Diverticulosis is seen in the left colon.     Soft tissues of the abdominal wall show no significant abnormality.     Skeletal structures show no acute fracture.  Age-appropriate degenerative changes are noted in the spine.  The T2 vertebral body again shows a mixed lytic/sclerotic lesion consistent with osseous metastatic disease similar to the most recent CT exam.     Impression:     1. History adenocarcinoma of right lung, stage IV.  Ill-defined lesion in right upper lobe and ipsilateral mediastinal lymph node enlargement show mild interval decrease in size.  See summary below.  Several subcentimeter pulmonary nodules are again noted in left lung, possible metastatic disease.  T2 vertebral body lesion consistent with metastatic disease.  No new disease detected.  2. Persistent right pleural effusion, decreased in volume with PleurX catheter in place; this could be malignant effusion.  3. New development of extensive  "consolidation in right lung, correlate for radiation pneumonitis, infectious pneumonia, aspiration, etc.  4. Hysterectomy, diverticulosis, and other findings as above.  5.  RECIST SUMMARY:  Date of prior examination for comparison: 05/01/2020  Lesion 1: Right upper lobe.  5.5 cm. Series 2 image 27.  Prior measurement 7.8 cm.  Lesion 2: Mediastinal lymph node.  1.8 cm. Series 2 image 30.  Prior measurement 2.6 cm.        Reading Physician Reading Date Result Priority   Ambrocio Crooks MD  [phone] 8/3/2020 Routine   Darci Watters MD  [phone] 8/3/2020       Narrative & Impression     EXAMINATION:  MRI BRAIN WITHOUT CONTRAST     CLINICAL HISTORY:  f/u brain mets; Malignant neoplasm of unspecified part of right bronchus or lung     TECHNIQUE:  Multiplanar multisequence MR imaging of the brain was performed without contrast.     *Patient is scheduled for MRI brain before and after the administration of contrast however exam was prematurely terminated prior to contrast administration secondary to patient discomfort and "burning" sensation.  Pre-contrast images evaluated and was ultimately determined that risk of continuing outweighed the potential benefit. *     COMPARISON:  MRI brain 05/09/2020     FINDINGS:  Intracranial compartment:     Ventricles and sulci are normal in size for age without evidence of hydrocephalus.     No extra-axial blood or fluid collections     Small rounded focus of abnormal T2/FLAIR hyperintense signal and diffusion restriction within the right cerebellum compatible with known metastatic lesion.  Decreased associated edema which is minimal on today's exam.  Small amount of associated gradient susceptibility which which likely represents a small amount of hemorrhage no new lesions; exam somewhat limited secondary to lack of intravenous contrast.  Stable patchy T2/FLAIR hyperintensities within the periventricular and subcortical white matter likely representing chronic " "microvascular ischemic change.  No acute hemorrhage, infarct, edema, midline shift, or herniation.     Normal vascular flow voids are preserved on B0 diffusion imaging.     Skull/extracranial contents (limited evaluation): Bone marrow signal intensity is normal.     Impression:     Study limited secondary to premature termination prior to contrast administration secondary to patient discomfort and "burning" sensation.     Grossly stable right cerebellar metastasis with improving associated vasogenic edema.  No new lesions identified.     Unchanged supratentorial T2/FLAIR hyperintensities likely representative of mild chronic microvascular ischemic change.           Assessment:       1. Adenocarcinoma of lung, stage 4, right    2. Malignant pleural effusion    3. Chronic respiratory failure with hypoxia    4. Anxiety    5. Chronic hepatitis C without hepatic coma    6. Malignant cachexia    7. Other insomnia           Plan:       1. 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  Also has pleural effusion on right side s/p therapeutic thoracentesis 5/2020. PDL1 expressed in 100% of tumor cells from biopsy.  No mutation identified for EGFR/ALK/ROS1/BRAF by tissue.  -Continue single agent pembrolizumab  -Labs have been reviewed.  -Scans were done prior to cycle 4 partial response  -Proceed with cycle 5 today  -Return to clinic in 3 weeks  -TSH and fT4 next due with cycle 7  -Discussed with hepatology and no additional antiviral therapy required with her history of Hep C.  -Not eligible for [id number]  -Tramadol PRN for pain - refilled today    2. Cough  -On tessalon & codeine cough syrup Q8H PRN, refilled today  -On gabapentin  -Previously also tried mucinex  -Had right pleurX placed 6/24 by Dr. Garcia, home care draining this twice a week, patient has f/u with Dr. Garcia today on 8/26  -Trialed prednisone 40mg x5 days for ?radiation pneumonitis - completed    3. HCPOA  -Patient appointed her daughter and " niece       Follow-up: RTC for labs (CBC, CMP) prior to MD visit and cycle 6 Keytruda    The following was staffed and discussed with supervising physician Dr. Mederos.    Lauren Rocha MD PGY-VI  Hematology/Oncology Fellow

## 2020-08-25 NOTE — NURSING
Call received from pt reporting that current pleurx container has no drainage,  nurse Jill present and is concern that this is the 4th visit with no drainage.  Pt with no complaints of increased SOB or coughing up sputum. Called to arrange appointment with Pulmonary/Dr. Garcia for 8/26 along with a chest xray.  Informed chemo infusion that pt would need to come for 1pm.  Appointments changed pt notified of change, transportation arranged.

## 2020-08-26 ENCOUNTER — TELEPHONE (OUTPATIENT)
Dept: HEMATOLOGY/ONCOLOGY | Facility: CLINIC | Age: 60
End: 2020-08-26

## 2020-08-26 ENCOUNTER — OFFICE VISIT (OUTPATIENT)
Dept: HEMATOLOGY/ONCOLOGY | Facility: CLINIC | Age: 60
End: 2020-08-26
Payer: MEDICAID

## 2020-08-26 ENCOUNTER — INFUSION (OUTPATIENT)
Dept: INFUSION THERAPY | Facility: HOSPITAL | Age: 60
End: 2020-08-26
Payer: MEDICAID

## 2020-08-26 ENCOUNTER — OFFICE VISIT (OUTPATIENT)
Dept: PULMONOLOGY | Facility: CLINIC | Age: 60
End: 2020-08-26
Payer: MEDICAID

## 2020-08-26 ENCOUNTER — DOCUMENTATION ONLY (OUTPATIENT)
Dept: HEMATOLOGY/ONCOLOGY | Facility: CLINIC | Age: 60
End: 2020-08-26

## 2020-08-26 ENCOUNTER — HOSPITAL ENCOUNTER (OUTPATIENT)
Dept: RADIOLOGY | Facility: HOSPITAL | Age: 60
Discharge: HOME | End: 2020-08-26
Attending: INTERNAL MEDICINE
Payer: MEDICAID

## 2020-08-26 VITALS
BODY MASS INDEX: 28.49 KG/M2 | RESPIRATION RATE: 16 BRPM | DIASTOLIC BLOOD PRESSURE: 60 MMHG | SYSTOLIC BLOOD PRESSURE: 130 MMHG | HEIGHT: 66 IN | OXYGEN SATURATION: 96 % | WEIGHT: 171 LBS | HEART RATE: 81 BPM | HEART RATE: 84 BPM | SYSTOLIC BLOOD PRESSURE: 96 MMHG | TEMPERATURE: 98 F | HEIGHT: 65 IN | OXYGEN SATURATION: 93 % | WEIGHT: 173.5 LBS | BODY MASS INDEX: 27.88 KG/M2 | DIASTOLIC BLOOD PRESSURE: 76 MMHG

## 2020-08-26 VITALS
DIASTOLIC BLOOD PRESSURE: 62 MMHG | RESPIRATION RATE: 18 BRPM | HEART RATE: 69 BPM | TEMPERATURE: 98 F | SYSTOLIC BLOOD PRESSURE: 106 MMHG

## 2020-08-26 DIAGNOSIS — F41.9 ANXIETY: ICD-10-CM

## 2020-08-26 DIAGNOSIS — C79.31 SECONDARY MALIGNANT NEOPLASM OF BRAIN: ICD-10-CM

## 2020-08-26 DIAGNOSIS — C34.90 PRIMARY MALIGNANT NEOPLASM OF LUNG WITH METASTASIS TO BRAIN: ICD-10-CM

## 2020-08-26 DIAGNOSIS — J91.0 MALIGNANT PLEURAL EFFUSION: ICD-10-CM

## 2020-08-26 DIAGNOSIS — R64 MALIGNANT CACHEXIA: ICD-10-CM

## 2020-08-26 DIAGNOSIS — C34.90 PRIMARY MALIGNANT NEOPLASM OF LUNG WITH METASTASIS TO BRAIN: Primary | ICD-10-CM

## 2020-08-26 DIAGNOSIS — G47.09 OTHER INSOMNIA: ICD-10-CM

## 2020-08-26 DIAGNOSIS — C34.91 ADENOCARCINOMA OF LUNG, STAGE 4, RIGHT: Primary | ICD-10-CM

## 2020-08-26 DIAGNOSIS — J45.909 ASTHMA, UNSPECIFIED ASTHMA SEVERITY, UNSPECIFIED WHETHER COMPLICATED, UNSPECIFIED WHETHER PERSISTENT: ICD-10-CM

## 2020-08-26 DIAGNOSIS — R05.9 COUGH: ICD-10-CM

## 2020-08-26 DIAGNOSIS — C34.91 ADENOCARCINOMA OF LUNG, STAGE 4, RIGHT: ICD-10-CM

## 2020-08-26 DIAGNOSIS — R06.02 SHORTNESS OF BREATH: ICD-10-CM

## 2020-08-26 DIAGNOSIS — B18.2 CHRONIC HEPATITIS C WITHOUT HEPATIC COMA: ICD-10-CM

## 2020-08-26 DIAGNOSIS — C79.31 PRIMARY MALIGNANT NEOPLASM OF LUNG WITH METASTASIS TO BRAIN: ICD-10-CM

## 2020-08-26 DIAGNOSIS — J96.11 CHRONIC RESPIRATORY FAILURE WITH HYPOXIA: ICD-10-CM

## 2020-08-26 DIAGNOSIS — C79.31 PRIMARY MALIGNANT NEOPLASM OF LUNG WITH METASTASIS TO BRAIN: Primary | ICD-10-CM

## 2020-08-26 DIAGNOSIS — R91.8 LUNG MASS: ICD-10-CM

## 2020-08-26 PROCEDURE — 71046 X-RAY EXAM CHEST 2 VIEWS: CPT | Mod: 26,,, | Performed by: RADIOLOGY

## 2020-08-26 PROCEDURE — 99215 PR OFFICE/OUTPT VISIT, EST, LEVL V, 40-54 MIN: ICD-10-PCS | Mod: S$PBB,,, | Performed by: STUDENT IN AN ORGANIZED HEALTH CARE EDUCATION/TRAINING PROGRAM

## 2020-08-26 PROCEDURE — 71046 X-RAY EXAM CHEST 2 VIEWS: CPT | Mod: TC,FY

## 2020-08-26 PROCEDURE — 99999 PR PBB SHADOW E&M-EST. PATIENT-LVL IV: CPT | Mod: PBBFAC,,, | Performed by: STUDENT IN AN ORGANIZED HEALTH CARE EDUCATION/TRAINING PROGRAM

## 2020-08-26 PROCEDURE — 99999 PR PBB SHADOW E&M-EST. PATIENT-LVL IV: ICD-10-PCS | Mod: PBBFAC,,, | Performed by: STUDENT IN AN ORGANIZED HEALTH CARE EDUCATION/TRAINING PROGRAM

## 2020-08-26 PROCEDURE — 63600175 PHARM REV CODE 636 W HCPCS: Mod: JG | Performed by: INTERNAL MEDICINE

## 2020-08-26 PROCEDURE — 99214 PR OFFICE/OUTPT VISIT, EST, LEVL IV, 30-39 MIN: ICD-10-PCS | Mod: S$PBB,,, | Performed by: INTERNAL MEDICINE

## 2020-08-26 PROCEDURE — 99215 OFFICE O/P EST HI 40 MIN: CPT | Mod: S$PBB,,, | Performed by: STUDENT IN AN ORGANIZED HEALTH CARE EDUCATION/TRAINING PROGRAM

## 2020-08-26 PROCEDURE — 25000003 PHARM REV CODE 250: Performed by: INTERNAL MEDICINE

## 2020-08-26 PROCEDURE — 99214 OFFICE O/P EST MOD 30 MIN: CPT | Mod: PBBFAC,25 | Performed by: INTERNAL MEDICINE

## 2020-08-26 PROCEDURE — 99214 OFFICE O/P EST MOD 30 MIN: CPT | Mod: PBBFAC,25,27 | Performed by: STUDENT IN AN ORGANIZED HEALTH CARE EDUCATION/TRAINING PROGRAM

## 2020-08-26 PROCEDURE — 99999 PR PBB SHADOW E&M-EST. PATIENT-LVL IV: CPT | Mod: PBBFAC,,, | Performed by: INTERNAL MEDICINE

## 2020-08-26 PROCEDURE — 99214 OFFICE O/P EST MOD 30 MIN: CPT | Mod: S$PBB,,, | Performed by: INTERNAL MEDICINE

## 2020-08-26 PROCEDURE — 96413 CHEMO IV INFUSION 1 HR: CPT

## 2020-08-26 PROCEDURE — 99999 PR PBB SHADOW E&M-EST. PATIENT-LVL IV: ICD-10-PCS | Mod: PBBFAC,,, | Performed by: INTERNAL MEDICINE

## 2020-08-26 PROCEDURE — 71046 XR CHEST PA AND LATERAL: ICD-10-PCS | Mod: 26,,, | Performed by: RADIOLOGY

## 2020-08-26 RX ORDER — HEPARIN 100 UNIT/ML
500 SYRINGE INTRAVENOUS
Status: CANCELLED | OUTPATIENT
Start: 2020-08-26

## 2020-08-26 RX ORDER — PROMETHAZINE HYDROCHLORIDE AND CODEINE PHOSPHATE 6.25; 1 MG/5ML; MG/5ML
5 SOLUTION ORAL EVERY 8 HOURS PRN
Qty: 473 ML | Refills: 1 | Status: SHIPPED | OUTPATIENT
Start: 2020-08-26 | End: 2020-09-16 | Stop reason: SDUPTHER

## 2020-08-26 RX ORDER — ALBUTEROL SULFATE 90 UG/1
2 AEROSOL, METERED RESPIRATORY (INHALATION) 4 TIMES DAILY PRN
COMMUNITY
Start: 2020-07-13 | End: 2021-01-01 | Stop reason: SDUPTHER

## 2020-08-26 RX ORDER — SODIUM CHLORIDE 0.9 % (FLUSH) 0.9 %
10 SYRINGE (ML) INJECTION
Status: CANCELLED | OUTPATIENT
Start: 2020-08-26

## 2020-08-26 RX ORDER — TRAMADOL HYDROCHLORIDE 50 MG/1
100 TABLET ORAL EVERY 8 HOURS PRN
Qty: 90 TABLET | Refills: 0 | Status: SHIPPED | OUTPATIENT
Start: 2020-08-26 | End: 2020-09-16 | Stop reason: SDUPTHER

## 2020-08-26 RX ORDER — SODIUM CHLORIDE 0.9 % (FLUSH) 0.9 %
10 SYRINGE (ML) INJECTION
Status: DISCONTINUED | OUTPATIENT
Start: 2020-08-26 | End: 2020-08-26 | Stop reason: HOSPADM

## 2020-08-26 RX ORDER — PREDNISONE 10 MG/1
TABLET ORAL
Qty: 91 TABLET | Refills: 0 | Status: SHIPPED | OUTPATIENT
Start: 2020-08-26 | End: 2020-08-28

## 2020-08-26 RX ORDER — HEPARIN 100 UNIT/ML
500 SYRINGE INTRAVENOUS
Status: DISCONTINUED | OUTPATIENT
Start: 2020-08-26 | End: 2020-08-26 | Stop reason: HOSPADM

## 2020-08-26 RX ORDER — PROMETHAZINE HYDROCHLORIDE 6.25 MG/5ML
5 SYRUP ORAL DAILY PRN
COMMUNITY
Start: 2020-06-15 | End: 2021-01-01 | Stop reason: SDUPTHER

## 2020-08-26 RX ADMIN — SODIUM CHLORIDE: 9 INJECTION, SOLUTION INTRAVENOUS at 12:08

## 2020-08-26 RX ADMIN — SODIUM CHLORIDE 200 MG: 9 INJECTION, SOLUTION INTRAVENOUS at 12:08

## 2020-08-26 NOTE — TELEPHONE ENCOUNTER
returning pharmacy's call.     ----- Message from Kait Ariza sent at 8/26/2020 11:17 AM CDT -----         Reason for Call:  Pharmacy calling to clarify a prescription    Name of caller: Brad   Pharmacy name and phone number   [facility] - [address] [phone] (Phone)  [phone] (Fax)      What do they need to clarify:  Prescription for promethazine-codeine 6.25-10 mg/5 ml (PHENERGAN WITH CODEINE) 6.25-10 mg/5 mL syrup  is 11 days to early to fill needs verbal verification to fill. Please contact to discuss       Request for caller to be placed on hold . IM nurse for provider .... Attempt to soft transfer call  If nurse unavailable - send message to provider box urgent

## 2020-08-26 NOTE — NURSING
Meet with daughter and pt between appointments.  Pt with concerns of having pleurx catheter removed.

## 2020-08-26 NOTE — PLAN OF CARE
Patient tolerated Keytruda with no complications. VSS. Pt instructed to call MD with any problems. NAD. Pt discharged home independently.

## 2020-08-28 ENCOUNTER — TELEPHONE (OUTPATIENT)
Dept: PULMONOLOGY | Facility: CLINIC | Age: 60
End: 2020-08-28

## 2020-08-28 RX ORDER — PREDNISONE 10 MG/1
TABLET ORAL
Qty: 91 TABLET | Refills: 0
Start: 2020-08-28 | End: 2020-10-07

## 2020-08-28 NOTE — TELEPHONE ENCOUNTER
Oncology relayed concern that a prolonged course of prednisone could interfere with the effectiveness of keytruda. Called Miss Boyce & she had already filled her prescription for prednisone. I gave her new instructions to only take the prednisone for one week, then stop. Pt voiced understanding of the change.    Dash Garcia MD  [facility]

## 2020-09-02 ENCOUNTER — TELEPHONE (OUTPATIENT)
Dept: PULMONOLOGY | Facility: CLINIC | Age: 60
End: 2020-09-02

## 2020-09-02 NOTE — TELEPHONE ENCOUNTER
I spoke to Ms Austen. Patient will come tomorrow for 6am to [facility] for rapid covid test prior to procedure. Patient confirmed and verbalized understanding.

## 2020-09-02 NOTE — TELEPHONE ENCOUNTER
----- Message from Ashanti Rosas sent at 9/2/2020 11:42 AM CDT -----  Regarding: Pt daughter Kell  Reason: Pt missed covid test on yesterday and have a procedure on tomorrow. Pt want to know what should she do. Please call     Communication: [phone]

## 2020-09-03 ENCOUNTER — HOSPITAL ENCOUNTER (OUTPATIENT)
Facility: HOSPITAL | Age: 60
Discharge: HOME | End: 2020-09-03
Attending: INTERNAL MEDICINE | Admitting: INTERNAL MEDICINE
Payer: MEDICAID

## 2020-09-03 VITALS
OXYGEN SATURATION: 98 % | BODY MASS INDEX: 28.79 KG/M2 | RESPIRATION RATE: 20 BRPM | SYSTOLIC BLOOD PRESSURE: 142 MMHG | DIASTOLIC BLOOD PRESSURE: 92 MMHG | TEMPERATURE: 98 F | HEART RATE: 69 BPM | WEIGHT: 173 LBS

## 2020-09-03 DIAGNOSIS — C34.90 METASTATIC NON-SMALL CELL LUNG CANCER: ICD-10-CM

## 2020-09-03 DIAGNOSIS — J91.0 MALIGNANT PLEURAL EFFUSION: Primary | ICD-10-CM

## 2020-09-03 LAB — SARS-COV-2 RDRP RESP QL NAA+PROBE: NEGATIVE

## 2020-09-03 PROCEDURE — 32552 REMOVE LUNG CATHETER: CPT | Mod: ,,, | Performed by: INTERNAL MEDICINE

## 2020-09-03 PROCEDURE — 63600175 PHARM REV CODE 636 W HCPCS: Performed by: INTERNAL MEDICINE

## 2020-09-03 PROCEDURE — 32552 REMOVE LUNG CATHETER: CPT | Performed by: INTERNAL MEDICINE

## 2020-09-03 PROCEDURE — 99152 MOD SED SAME PHYS/QHP 5/>YRS: CPT | Performed by: INTERNAL MEDICINE

## 2020-09-03 PROCEDURE — U0002 COVID-19 LAB TEST NON-CDC: HCPCS

## 2020-09-03 PROCEDURE — 32552 PR REMOVAL OF INDWELLING TUNNELED PLEURAL CATHETER WITH CUFF: ICD-10-PCS | Mod: ,,, | Performed by: INTERNAL MEDICINE

## 2020-09-03 PROCEDURE — 99153 MOD SED SAME PHYS/QHP EA: CPT | Performed by: INTERNAL MEDICINE

## 2020-09-03 RX ORDER — MIDAZOLAM HYDROCHLORIDE 5 MG/ML
INJECTION INTRAMUSCULAR; INTRAVENOUS CODE/TRAUMA/SEDATION MEDICATION
Status: COMPLETED | OUTPATIENT
Start: 2020-09-03 | End: 2020-09-03

## 2020-09-03 RX ORDER — FENTANYL CITRATE 50 UG/ML
INJECTION, SOLUTION INTRAMUSCULAR; INTRAVENOUS CODE/TRAUMA/SEDATION MEDICATION
Status: COMPLETED | OUTPATIENT
Start: 2020-09-03 | End: 2020-09-03

## 2020-09-03 RX ORDER — LIDOCAINE HYDROCHLORIDE 10 MG/ML
1 INJECTION, SOLUTION EPIDURAL; INFILTRATION; INTRACAUDAL; PERINEURAL ONCE
Status: DISCONTINUED | OUTPATIENT
Start: 2020-09-03 | End: 2020-09-03 | Stop reason: HOSPADM

## 2020-09-03 RX ADMIN — MIDAZOLAM 2 MG: 5 INJECTION INTRAMUSCULAR; INTRAVENOUS at 07:09

## 2020-09-03 RX ADMIN — FENTANYL CITRATE 50 MCG: 50 INJECTION, SOLUTION INTRAMUSCULAR; INTRAVENOUS at 07:09

## 2020-09-03 NOTE — H&P
The patient has been examined and the H&P from 8/26 has been reviewed:  I concur with the findings and no changes have occurred since H&P was written.      Anesthesia/Surgery risks, benefits and alternative options discussed and understood by patient/family.

## 2020-09-03 NOTE — SEDATION DOCUMENTATION
Moderate concious sedation was performed and cardiorespiratory functions were monitored the entire procedure by Queenie Potts RN.  Sedation began at 732am and concluded at 758am.

## 2020-09-03 NOTE — SEDATION DOCUMENTATION
Right Mix axillary Pleurex Catheter removed. Site was cleaned and dressed. Patient tolerated well.

## 2020-09-03 NOTE — DISCHARGE SUMMARY
[facility]  Discharge Summary      Patient Name: Janeth Boyce  MRN: [id number]  Admission Date: 9/3/2020  Hospital Length of Stay: 0 days  Discharge Date and Time:  09/03/2020 8:37 AM  Attending Physician: Dash Garcia MD   Discharging Provider: Dash Garcia MD  Primary Care Provider: Freeman Caballero MD    HPI: No notes on file    Procedure(s) (LRB):  Removal of indwelling pleural catheter     Indwelling Lines/Drains at time of discharge:   None    Hospital Course: Pt presented for removal of pleurx catheter with conscious sedation. Patient tolerated removal well. Discharged home in good condition.    Pending Diagnostic Studies:     None        Final Active Diagnoses:    Diagnosis Date Noted POA    Metastatic non-small cell lung cancer [C34.90]  Unknown    Malignant pleural effusion [J91.0]  Yes      Problems Resolved During this Admission:      Discharged Condition: good    Disposition: Home or Self Care    Follow Up:  Follow-up Information     Dash Garcia MD.    Specialty: Pulmonary Disease  Why: Call with any questions or problems after removal.  Contact information:  [address] [place] [address]  [address] [facility] [id number] [phone]                 Patient Instructions:   Change bandage once daily as previously instructed.  Do not take a bath for 7 days.  Sutures applied will absorb & do not need to be removed.    Medications:  Reconciled Home Medications:      Medication List      ASK your doctor about these medications    * albuterol 2.5 mg /3 mL (0.083 %) nebulizer solution  Commonly known as: PROVENTIL  Take 3 mls by nebulization every 3 hours while awake     * albuterol 90 mcg/actuation inhaler  Commonly known as: PROVENTIL/VENTOLIN HFA  2 puffs 4 (four) times daily as needed.     benzonatate 100 MG capsule  Commonly known as: TESSALON PERLES  Take 2 capsules (200 mg total) by mouth 3 (three) times daily as needed for Cough.     budesonide-formoterol 160-4.5 mcg  160-4.5 mcg/actuation Hfaa  Commonly known as: SYMBICORT  Inhale two puffs by mouth into the lungs twice daily     buPROPion 150 MG TBSR 12 hr tablet  Commonly known as: WELLBUTRIN SR     diclofenac sodium 1 % Gel  Commonly known as: VOLTAREN  Apply two grams topically four times a day     gabapentin 400 MG capsule  Commonly known as: NEURONTIN  Take one capsule by mouth three times daily     irbesartan-hydrochlorothiazide 300-12.5 mg per tablet  Commonly known as: AVALIDE  Take one tablet by mouth every day     lidocaine 5 % Oint ointment  Commonly known as: XYLOCAINE  Apply topically as needed. Apply 1 inch to biopsy site and cover with saran wrap.  Apply twice per day as needed for pain.     LORazepam 1 MG tablet  Commonly known as: ATIVAN  Take 1 tablet (1 mg total) by mouth as needed for Anxiety (Take 30 minutes prior to procedure).     mirtazapine 15 MG disintegrating tablet  Commonly known as: REMERON SOL-TAB  Take 1 tablet (15 mg total) by mouth nightly.     NORVASC 5 MG tablet  Generic drug: amLODIPine  Take 5 mg by mouth once daily.     omeprazole 20 MG capsule  Commonly known as: PRILOSEC  Take one capsule by mouth everyday.     ondansetron 8 MG Tbdl  Commonly known as: ZOFRAN-ODT  Dissolve 1 tablet (8 mg total) by mouth every 8 (eight) hours as needed.     polyethylene glycol 17 gram/dose powder  Commonly known as: GLYCOLAX  mix 1 capful (17 g) and take by mouth once daily.     predniSONE 10 MG tablet  Commonly known as: DELTASONE  Take 2 pill daily x7 days. Then stop.     prochlorperazine 10 MG tablet  Commonly known as: COMPAZINE  Take 1 tablet (10 mg total) by mouth 3 (three) times daily as needed (for nausea. if zofran does not work can use this medication instead).     promethazine 6.25 mg/5 mL syrup  Commonly known as: PHENERGAN  5 mLs daily as needed.     promethazine-codeine 6.25-10 mg/5 ml 6.25-10 mg/5 mL syrup  Commonly known as: PHENERGAN with CODEINE  Take 5 mLs by mouth every 8 (eight) hours  as needed for Cough.     SENNA-S 8.6-50 mg per tablet  Generic drug: senna-docusate 8.6-50 mg  Take 1 tablet by mouth 2 (two) times daily.     traMADoL 50 mg tablet  Commonly known as: ULTRAM  Take 2 tablets (100 mg total) by mouth every 8 (eight) hours as needed for Pain.     zolpidem 5 MG Tab  Commonly known as: AMBIEN  Take 1 tablet (5 mg total) by mouth nightly as needed (take if having difficulty sleeping).         * This list has 2 medication(s) that are the same as other medications prescribed for you. Read the directions carefully, and ask your doctor or other care provider to review them with you.                   Dash Garcia MD  [facility]

## 2020-09-03 NOTE — PROCEDURES
"Janeth Boyce is a 60 y.o. female who presents for Pleurx catheter placement.     Vital Signs  Vitals:    20 0742 20 0748 20 0752 20 0758   BP: (!) 141/89 (!) 142/93 (!) 137/94 134/80   BP Location:       Patient Position:       Pulse: 69 70 65 60   Resp:  20   Temp:       TempSrc:       SpO2: 100% 100% 100% 100%   Weight:           PleurX catheter removal  Date/Time: 2020 8:16 AM  Location procedure was performed: [facility]  Assisting Provider: n/a  Attending/supervising provider: Dash Garcia MD  Pre-operative diagnosis: malignant pleural effusion & metastatic non-small cell lung cancer  Post-operative diagnosis: malignant pleural effusion & metastatic non-small cell lung cancer  Indication: Patient reports dry taps for the past two weeks. Imaging with resolution of pleural effusion.  Consent Done: Yes  Consent: Written consent obtained.  Risks and benefits: risks, benefits and alternatives were discussed  Consent given by: patient  Patient understanding: patient states understanding of the procedure being performed  Patient consent: the patient's understanding of the procedure matches consent given  Procedure consent: procedure consent matches procedure scheduled  Relevant documents: relevant documents present and verified  Test results: test results available and properly labeled  Imaging studies: imaging studies available  Required items: required blood products, implants, devices, and special equipment available  Patient identity confirmed: name,  verbally with patient  Time out: Immediately prior to procedure a "time out" was called to verify the correct patient, procedure, equipment, support staff and site/side marked as required.  Patient sedated: yes  Sedation type: moderate (conscious) sedation  (See MAR for exact dosages of medications).  Sedatives: midazolam  Analgesia: fentanyl  Local Anesthetic: lidocaine 1% without epinephrine  Anesthetic " total: 10 mL  Preparation: skin prepped with ChloraPrep      Description of procedure:  The patient was brought to the pulmonary procedures room & placed semi-upright. The dressing of the pleurx catheter was removed. Chloraprep was applied liberally to the placement site, surrounding skin, & catheter. Sterile sheet was applied.    Lidocaine was administered along the subcutaneous tissue adjacent to the dacron cuff. Blunt dissection was performed around the catheter entry site until the dacron cuff was freed. The catheter was firmly grasped with forceps & removed. The catheter was examined & was found to be removed intact. There was very mild bleeding which resolved with pressure to the tunneling site. The skin was cleansed & three simple sutures were placed with monocryl.     Dressing: 4x4 sterile gauze and Xeroform gauze (Tegaderm)  Patient tolerance: Patient tolerated the procedure well with no immediate complications  Complications: No  Estimated blood loss (mL): 5  Specimens: No  Implants: No      I performed the procedure.      Dash Garcia MD  [facility]

## 2020-09-03 NOTE — DISCHARGE INSTRUCTIONS
Change bandage once daily as previously instructed.  Do not take a bath for 7 days.  Sutures applied will absorb & do not need to be removed.

## 2020-09-03 NOTE — SEDATION DOCUMENTATION
H and P updated-yes, patient placed on cardiac monitor, anesthesia Plan:  Conscious sedation, ASA verified-yes, Airway exam performed-yes, Personal or Family history of anesthesia complications-No  Consent signed and witnessed, Queenie Potts RN

## 2020-09-04 NOTE — H&P
History & Physical  [facility]        SUBJECTIVE:     Chief Complaint:   Malignant pleural effusion    History of Present Illness:  Janeth Boyce is a 60 y.o. female who presents for follow up of malignant pleural effusion associated with Stage IV adenocarcinoma of the right lung. We placed a pleurx catheter on 20. The catheter is no longer draining fluid.     Review of patient's allergies indicates:  No Known Allergies    Past Medical History:   Diagnosis Date    Anxiety     Asthma     Hepatitis C 2019    treated    Hx of psychiatric care     Hypertension     Lung cancer     Psychiatric exam requested by authority     Psychiatric problem      Past Surgical History:   Procedure Laterality Date     SECTION      KNEE SURGERY       History reviewed. No pertinent family history.  Social History     Socioeconomic History    Marital status: Single     Spouse name: Not on file    Number of children: 3    Years of education: Not on file    Highest education level: Not on file   Occupational History    Not on file   Social Needs    Financial resource strain: Not on file    Food insecurity     Worry: Not on file     Inability: Not on file    Transportation needs     Medical: Not on file     Non-medical: Not on file   Tobacco Use    Smoking status: Former Smoker     Packs/day: 0.50     Years: 40.00     Pack years: 20.00     Types: Cigarettes     Quit date: 2020     Years since quittin.4    Smokeless tobacco: Never Used   Substance and Sexual Activity    Alcohol use: Yes     Comment: occasional    Drug use: No    Sexual activity: Not on file   Lifestyle    Physical activity     Days per week: Not on file     Minutes per session: Not on file    Stress: Not on file   Relationships    Social connections     Talks on phone: Not on file     Gets together: Not on file     Attends Orthodoxy service: Not on file     Active member of club or organization: Not on file      Attends meetings of clubs or organizations: Not on file     Relationship status: Not on file   Other Topics Concern    Patient feels they ought to cut down on drinking/drug use Not Asked    Patient annoyed by others criticizing their drinking/drug use Not Asked    Patient has felt bad or guilty about drinking/drug use Not Asked    Patient has had a drink/used drugs as an eye opener in the AM Not Asked   Social History Narrative    Not on file     Review of Systems:  CV: no syncope  ENT: no sore throat  Resp: +cough but improved  Eyes: no eye pain  Gastrointestinal: no nausea or vomiting  Integument/Breast: no rash  Musculoskeletal: +msk pains  Neurological: no headaches  Behavioral/Psych: no confusion or depression  Heme: no bleeding      OBJECTIVE:     Vital Signs  Vitals:    09/03/20 0806 09/03/20 0810 09/03/20 0815 09/03/20 0830   BP: (!) 153/92   (!) 142/92   BP Location: Right arm      Patient Position: Lying      Pulse: 66 69 68 69   Resp: 20      Temp: 97.5 °F (36.4 °C)   98.2 °F (36.8 °C)   TempSrc: Temporal   Temporal   SpO2: 98% 98% 99% 98%   Weight:         Body mass index is 28.79 kg/m².    Physical Exam:  General: no distress  Eyes:  conjunctivae/corneas clear  Nose: no discharge  Neck: trachea midline with no masses appreciated  Lungs:  normal respiratory effort, no wheezes, no rales. Diminished in the right lung fields.  Heart: regular rate and rhythm and no murmur  Abdomen: non-distended  Extremities: no cyanosis, no edema, no clubbing  Skin: No rashes or lesions. good skin turgor. pleurx catheter secure & in place. Wound appears clean & dry without erythema.  Neurologic: alert, oriented, thought content appropriate    Laboratory:  Lab Results   Component Value Date    WBC 8.84 08/25/2020    HGB 9.0 (L) 08/25/2020    HCT 30.7 (L) 08/25/2020    MCV 87 08/25/2020     08/25/2020       Chest Imaging, My Impression:   Chest ct 8/2020: there is dense consolidation vs atelectasis of the right  upper & middle lobe, this is worse in comparison to her prior CT scan 5/2020.    Diagnostic Results:  CT: Reviewed  PET Scan: Reviewed    ASSESSMENT/PLAN:     Malignant effusion, non-small cell lung cancer. Remove pleurx.      Dash Garcia MD  [facility]

## 2020-09-08 ENCOUNTER — TELEPHONE (OUTPATIENT)
Dept: HEMATOLOGY/ONCOLOGY | Facility: CLINIC | Age: 60
End: 2020-09-08

## 2020-09-08 ENCOUNTER — NURSE TRIAGE (OUTPATIENT)
Dept: ADMINISTRATIVE | Facility: CLINIC | Age: 60
End: 2020-09-08

## 2020-09-08 NOTE — TELEPHONE ENCOUNTER
RN contacted pt through the Post Procedural Symptom Tracker for Day 13.  Pt stated that she is asymptomatic and doing well.  No additional action needed at this time per protocol.      Additional Information   Negative: Nursing judgment   Negative: Nursing judgment   Negative: Nursing judgment   Negative: Nursing judgment   Negative: Information only question and nurse able to answer    Protocols used: INFORMATION ONLY CALL - NO TRIAGE-A-[place]

## 2020-09-15 ENCOUNTER — LAB VISIT (OUTPATIENT)
Dept: LAB | Facility: HOSPITAL | Age: 60
End: 2020-09-15
Attending: STUDENT IN AN ORGANIZED HEALTH CARE EDUCATION/TRAINING PROGRAM
Payer: MEDICAID

## 2020-09-15 ENCOUNTER — DOCUMENTATION ONLY (OUTPATIENT)
Dept: HEMATOLOGY/ONCOLOGY | Facility: CLINIC | Age: 60
End: 2020-09-15

## 2020-09-15 DIAGNOSIS — C34.91 ADENOCARCINOMA OF LUNG, STAGE 4, RIGHT: ICD-10-CM

## 2020-09-15 LAB
ALBUMIN SERPL BCP-MCNC: 3 G/DL (ref 3.5–5.2)
ALP SERPL-CCNC: 94 U/L (ref 55–135)
ALT SERPL W/O P-5'-P-CCNC: 10 U/L (ref 10–44)
ANION GAP SERPL CALC-SCNC: 7 MMOL/L (ref 8–16)
AST SERPL-CCNC: 12 U/L (ref 10–40)
BASOPHILS # BLD AUTO: 0.08 K/UL (ref 0–0.2)
BASOPHILS NFR BLD: 0.6 % (ref 0–1.9)
BILIRUB SERPL-MCNC: 0.3 MG/DL (ref 0.1–1)
BUN SERPL-MCNC: 36 MG/DL (ref 8–23)
CALCIUM SERPL-MCNC: 9.5 MG/DL (ref 8.7–10.5)
CHLORIDE SERPL-SCNC: 105 MMOL/L (ref 95–110)
CO2 SERPL-SCNC: 24 MMOL/L (ref 23–29)
CREAT SERPL-MCNC: 1.3 MG/DL (ref 0.5–1.4)
DIFFERENTIAL METHOD: ABNORMAL
EOSINOPHIL # BLD AUTO: 0.3 K/UL (ref 0–0.5)
EOSINOPHIL NFR BLD: 2 % (ref 0–8)
ERYTHROCYTE [DISTWIDTH] IN BLOOD BY AUTOMATED COUNT: 19.5 % (ref 11.5–14.5)
EST. GFR  (AFRICAN AMERICAN): 51.2 ML/MIN/1.73 M^2
EST. GFR  (NON AFRICAN AMERICAN): 44.4 ML/MIN/1.73 M^2
GLUCOSE SERPL-MCNC: 101 MG/DL (ref 70–110)
HCT VFR BLD AUTO: 34.2 % (ref 37–48.5)
HGB BLD-MCNC: 10.4 G/DL (ref 12–16)
IMM GRANULOCYTES # BLD AUTO: 0.13 K/UL (ref 0–0.04)
IMM GRANULOCYTES NFR BLD AUTO: 0.9 % (ref 0–0.5)
LYMPHOCYTES # BLD AUTO: 2.4 K/UL (ref 1–4.8)
LYMPHOCYTES NFR BLD: 17.2 % (ref 18–48)
MCH RBC QN AUTO: 25.7 PG (ref 27–31)
MCHC RBC AUTO-ENTMCNC: 30.4 G/DL (ref 32–36)
MCV RBC AUTO: 85 FL (ref 82–98)
MONOCYTES # BLD AUTO: 1 K/UL (ref 0.3–1)
MONOCYTES NFR BLD: 7.2 % (ref 4–15)
NEUTROPHILS # BLD AUTO: 10 K/UL (ref 1.8–7.7)
NEUTROPHILS NFR BLD: 72.1 % (ref 38–73)
NRBC BLD-RTO: 0 /100 WBC
PLATELET # BLD AUTO: 291 K/UL (ref 150–350)
PMV BLD AUTO: 9.3 FL (ref 9.2–12.9)
POTASSIUM SERPL-SCNC: 4.8 MMOL/L (ref 3.5–5.1)
PROT SERPL-MCNC: 8 G/DL (ref 6–8.4)
RBC # BLD AUTO: 4.04 M/UL (ref 4–5.4)
SODIUM SERPL-SCNC: 136 MMOL/L (ref 136–145)
WBC # BLD AUTO: 13.82 K/UL (ref 3.9–12.7)

## 2020-09-15 PROCEDURE — 80053 COMPREHEN METABOLIC PANEL: CPT

## 2020-09-15 PROCEDURE — 85025 COMPLETE CBC W/AUTO DIFF WBC: CPT

## 2020-09-15 PROCEDURE — 36415 COLL VENOUS BLD VENIPUNCTURE: CPT

## 2020-09-15 NOTE — PROGRESS NOTES
Informed per Nichole Mccauley RN, that patient asked about getting additional vanilla flavored nutritional supplements. Sent message via Epic to my coworker Marivel Rojas LCSW, who is able to order via "[employer] and asked that she order 4 cases for patient. Will notify patient once the supplements are available for patient/family to . Follow up conversation with Nichole. Will continue to follow and assist as needs are identified.

## 2020-09-16 ENCOUNTER — OFFICE VISIT (OUTPATIENT)
Dept: HEMATOLOGY/ONCOLOGY | Facility: CLINIC | Age: 60
End: 2020-09-16
Payer: MEDICAID

## 2020-09-16 ENCOUNTER — DOCUMENTATION ONLY (OUTPATIENT)
Dept: HEMATOLOGY/ONCOLOGY | Facility: CLINIC | Age: 60
End: 2020-09-16

## 2020-09-16 ENCOUNTER — INFUSION (OUTPATIENT)
Dept: INFUSION THERAPY | Facility: HOSPITAL | Age: 60
End: 2020-09-16
Attending: INTERNAL MEDICINE
Payer: MEDICAID

## 2020-09-16 VITALS
HEART RATE: 94 BPM | RESPIRATION RATE: 18 BRPM | SYSTOLIC BLOOD PRESSURE: 85 MMHG | WEIGHT: 172.38 LBS | DIASTOLIC BLOOD PRESSURE: 59 MMHG | HEIGHT: 66 IN | TEMPERATURE: 98 F | BODY MASS INDEX: 27.7 KG/M2 | OXYGEN SATURATION: 99 %

## 2020-09-16 DIAGNOSIS — C34.91 ADENOCARCINOMA OF LUNG, STAGE 4, RIGHT: Primary | ICD-10-CM

## 2020-09-16 DIAGNOSIS — Z72.0 TOBACCO ABUSE: ICD-10-CM

## 2020-09-16 DIAGNOSIS — J91.0 MALIGNANT PLEURAL EFFUSION: ICD-10-CM

## 2020-09-16 DIAGNOSIS — J45.909 ASTHMA, UNSPECIFIED ASTHMA SEVERITY, UNSPECIFIED WHETHER COMPLICATED, UNSPECIFIED WHETHER PERSISTENT: ICD-10-CM

## 2020-09-16 DIAGNOSIS — F41.9 ANXIETY: ICD-10-CM

## 2020-09-16 DIAGNOSIS — C34.90 PRIMARY MALIGNANT NEOPLASM OF LUNG WITH METASTASIS TO BRAIN: ICD-10-CM

## 2020-09-16 DIAGNOSIS — C34.91 ADENOCARCINOMA OF LUNG, STAGE 4, RIGHT: ICD-10-CM

## 2020-09-16 DIAGNOSIS — B18.2 CHRONIC HEPATITIS C WITHOUT HEPATIC COMA: ICD-10-CM

## 2020-09-16 DIAGNOSIS — C79.31 PRIMARY MALIGNANT NEOPLASM OF LUNG WITH METASTASIS TO BRAIN: ICD-10-CM

## 2020-09-16 DIAGNOSIS — R91.8 LUNG MASS: ICD-10-CM

## 2020-09-16 PROCEDURE — 99999 PR PBB SHADOW E&M-EST. PATIENT-LVL V: ICD-10-PCS | Mod: PBBFAC,,, | Performed by: STUDENT IN AN ORGANIZED HEALTH CARE EDUCATION/TRAINING PROGRAM

## 2020-09-16 PROCEDURE — 25000003 PHARM REV CODE 250: Performed by: INTERNAL MEDICINE

## 2020-09-16 PROCEDURE — 63600175 PHARM REV CODE 636 W HCPCS: Mod: JG | Performed by: INTERNAL MEDICINE

## 2020-09-16 PROCEDURE — 99215 OFFICE O/P EST HI 40 MIN: CPT | Mod: PBBFAC,25 | Performed by: STUDENT IN AN ORGANIZED HEALTH CARE EDUCATION/TRAINING PROGRAM

## 2020-09-16 PROCEDURE — 96413 CHEMO IV INFUSION 1 HR: CPT

## 2020-09-16 PROCEDURE — 99215 OFFICE O/P EST HI 40 MIN: CPT | Mod: S$PBB,,, | Performed by: STUDENT IN AN ORGANIZED HEALTH CARE EDUCATION/TRAINING PROGRAM

## 2020-09-16 PROCEDURE — 99999 PR PBB SHADOW E&M-EST. PATIENT-LVL V: CPT | Mod: PBBFAC,,, | Performed by: STUDENT IN AN ORGANIZED HEALTH CARE EDUCATION/TRAINING PROGRAM

## 2020-09-16 PROCEDURE — 99215 PR OFFICE/OUTPT VISIT, EST, LEVL V, 40-54 MIN: ICD-10-PCS | Mod: S$PBB,,, | Performed by: STUDENT IN AN ORGANIZED HEALTH CARE EDUCATION/TRAINING PROGRAM

## 2020-09-16 RX ORDER — TRAMADOL HYDROCHLORIDE 50 MG/1
100 TABLET ORAL EVERY 8 HOURS PRN
Qty: 90 TABLET | Refills: 0 | Status: SHIPPED | OUTPATIENT
Start: 2020-09-16 | End: 2020-09-25 | Stop reason: ALTCHOICE

## 2020-09-16 RX ORDER — SODIUM CHLORIDE 0.9 % (FLUSH) 0.9 %
10 SYRINGE (ML) INJECTION
Status: CANCELLED | OUTPATIENT
Start: 2020-09-16

## 2020-09-16 RX ORDER — PROMETHAZINE HYDROCHLORIDE AND CODEINE PHOSPHATE 6.25; 1 MG/5ML; MG/5ML
5 SOLUTION ORAL EVERY 8 HOURS PRN
Qty: 473 ML | Refills: 1 | Status: SHIPPED | OUTPATIENT
Start: 2020-09-16 | End: 2020-10-07 | Stop reason: SDUPTHER

## 2020-09-16 RX ORDER — SODIUM CHLORIDE 0.9 % (FLUSH) 0.9 %
10 SYRINGE (ML) INJECTION
Status: DISCONTINUED | OUTPATIENT
Start: 2020-09-16 | End: 2020-09-16 | Stop reason: HOSPADM

## 2020-09-16 RX ORDER — HEPARIN 100 UNIT/ML
500 SYRINGE INTRAVENOUS
Status: DISCONTINUED | OUTPATIENT
Start: 2020-09-16 | End: 2020-09-16 | Stop reason: HOSPADM

## 2020-09-16 RX ORDER — BUPROPION HYDROCHLORIDE 150 MG/1
150 TABLET, EXTENDED RELEASE ORAL DAILY
Qty: 30 TABLET | Refills: 6 | Status: SHIPPED | OUTPATIENT
Start: 2020-09-16 | End: 2020-10-07 | Stop reason: SDUPTHER

## 2020-09-16 RX ORDER — HEPARIN 100 UNIT/ML
500 SYRINGE INTRAVENOUS
Status: CANCELLED | OUTPATIENT
Start: 2020-09-16

## 2020-09-16 RX ADMIN — SODIUM CHLORIDE 200 MG: 9 INJECTION, SOLUTION INTRAVENOUS at 09:09

## 2020-09-16 NOTE — PROGRESS NOTES
Received from Nichole Mccauley RN, a copy of patient's current electricity bill that she brought in today. Prepared the check request forms, with signatures from my coworkers and Manager, and emailed to Sindy Forrest in Accounts Payable; requesting a check in the amount of $121.08 made payable to [employer] from the [facility] Patient Assistance Fund, with patient's name and [employer] account number noted in the jacquelin section of the check; requested that the check be mailed to patient at her home address. Patient will make the payment to [employer]. Will continue to follow and assist as needs are identified.

## 2020-09-16 NOTE — PROGRESS NOTES
PATIENT: Janeth Boyce  MRN: [id number]  DATE: 9/16/2020      Diagnosis:   1. Adenocarcinoma of lung, stage 4, right    2. Primary malignant neoplasm of lung with metastasis to brain    3. Tobacco abuse    4. Chronic hepatitis C without hepatic coma    5. Malignant pleural effusion    6. Anxiety        Chief Complaint: Adenocarcinoma of lung, stage 4, right      Oncologic History:    Oncologic History 1. Lung adenocarcinoma    Oncologic Treatment 1. Carboplatin AUC4 x1 dose 5/27/20 and palliative radiation (5/27/2020 - 6/2/2020; 20 Gy to right lung)  2. Pembrolizumab  Cycle 1 6/4/2020       Pathology 5/5/2020  Right lung, biopsy:  Positive for adenocarcinoma with areas of necrosis.  Interpretation:   Right lung, specimen for PD-L1 immunohistochemistry studies (clone [id number], [facility], [place], [place]) ([id number]): 100% tumor cells are positive for PD-L1 (membranous positivity).     BRAF Mutation Analysis (V600E), Tumor Result Summary: NO MUTATION IDENTIFIED.   Result: BRAF status: Wild-type   Interpretation :     EGFR Gene, Mutation Analysis, Tumor Result Summary: NO MUTATION IDENTIFIED.   Result: EGFR status: Wild-type     ALK (2p23), Lung Cancer, FISH, Ts Result Summary: NEGATIVE   Interpretation: No rearrangement of the ALK gene was identified.     Result: nuc micha(ALKx3)[29/50]   Of 50 nuclei, 0% had separation of the 3'ALK and 5'ALK signals and 58% had three intact 3'ALK/5'ALK signals. The normal cutoff is <50.0% for one 3'ALK, one 5'ALK and one 3'ALK/5'ALK signal and <18.0% for three 3'ALK/5'ALK signals.     ROS1 (6Q22), FISH, Ts Result Summary: NEGATIVE   Interpretation: No rearrangement of the ROS1 gene was identified.   Result nuc micha(ROS1x1)[44/50]           Subjective:    Interval History: Ms. Boyce is here for follow up    60 y.o. woman with lung adenocarcinoma complicated by single brain met to cerebellum, and hypoxemia requiring continuous supplemental oxygen.     Oncologic  "History  She has been smoking since 15 years old and estimates about 0.5ppd.  She has always had some level of dyspnea with exertion at baseline, but noticed it was worse in February 2020 with an associated cough with white and clear sputum.  She says she was evaluated at [facility] who told her the scans were suspicious for cancer.  She then headed to [place] but because of COVID-19 her appointment was cancelled.  She initially [place] [facility] [place] any new symptoms since February and the cough is persistent.  Denied any unilateral weakness, dizziness, headaches, or gait instability.  Was able to perform all ADLs and could walk 1 block before getting tired and needing to rest.  On 5/1/2020, she was "admitted to observation with SOB and cough x 2 months. CT chest Large RUL mass with scattered nodules and mediastinal lymphadenopathy; small-moderate right pleural effusion....S/p right lung biopsy 5/4/2020. Home O2 arranged to use with activity. Admitted to Medical Oncology 5/25/2020-5/29/2020 for worsening shortness of breath.  CTPE negative for PE. Pulmonology attempted thoracentesis twice. Second attempt resulted in 400cc being drained, without relief.  She then received x1 dose of carboplatin and palliative RT to the right lung. Patient reported improvement in symptoms after XRT and was discharged.      Single agent pembrolizumab was started for PDL1>50%.  She received her first dose of pembrolizumab 6/4/2020.  She since had an admit for right PleurX.  -Scans 8/3 decreased lesions per RECIST   -8/26: pleurX removed  -9/16/20: cycle 6 Pembrolizumab    Interval History  Patient seen today alone, had pleurX removed. Cough better after steroid taper. Appetite and off. ECOG 2. Quit smoking 6 months ago.    Past Medical History:   Past Medical History:   Diagnosis Date    Anxiety     Asthma     Hepatitis C 2019    treated    Hx of psychiatric care     Hypertension     Lung cancer     Psychiatric exam requested by " authority     Psychiatric problem        Past Surgical HIstory:   Past Surgical History:   Procedure Laterality Date    BRONCHOSCOPY N/A 9/3/2020    Procedure: Bronchoscopy;  Surgeon: eRnee Diagnostic Provider;  Location: [facility] OR [address] [place] [address];  Service: Anesthesiology;  Laterality: N/A;     SECTION      KNEE SURGERY         Family History: No family history on file.    Social History:  reports that she quit smoking about 5 months ago. Her smoking use included cigarettes. She has a 20.00 pack-year smoking history. She has never used smokeless tobacco. She reports current alcohol use. She reports that she does not use drugs.    Allergies:  Review of patient's allergies indicates:  No Known Allergies    Medications:  Current Outpatient Medications   Medication Sig Dispense Refill    albuterol (PROVENTIL) 2.5 mg /3 mL (0.083 %) nebulizer solution Take 3 mls by nebulization every 3 hours while awake 180 mL 12    albuterol (PROVENTIL/VENTOLIN HFA) 90 mcg/actuation inhaler 2 puffs 4 (four) times daily as needed.      amLODIPine (NORVASC) 5 MG tablet Take 5 mg by mouth once daily.      benzonatate (TESSALON PERLES) 100 MG capsule Take 2 capsules (200 mg total) by mouth 3 (three) times daily as needed for Cough. 30 capsule 1    budesonide-formoterol 160-4.5 mcg (SYMBICORT) 160-4.5 mcg/actuation HFAA Inhale two puffs by mouth into the lungs twice daily 10.2 g 12    buPROPion (WELLBUTRIN SR) 150 MG TBSR 12 hr tablet Take 1 tablet (150 mg total) by mouth once daily. 30 tablet 6    diclofenac sodium (VOLTAREN) 1 % Gel Apply two grams topically four times a day 200 g 5    gabapentin (NEURONTIN) 400 MG capsule Take one capsule by mouth three times daily 90 capsule 3    irbesartan-hydrochlorothiazide (AVALIDE) 300-12.5 mg per tablet Take one tablet by mouth every day 90 tablet 3    lidocaine (XYLOCAINE) 5 % Oint ointment Apply topically as needed. Apply 1 inch to biopsy site and cover with saran wrap.  Apply twice  per day as needed for pain. 35.44 g 1    LORazepam (ATIVAN) 1 MG tablet Take 1 tablet (1 mg total) by mouth as needed for Anxiety (Take 30 minutes prior to procedure). 5 tablet 0    mirtazapine (REMERON SOL-TAB) 15 MG disintegrating tablet Take 1 tablet (15 mg total) by mouth nightly. 30 tablet 2    omeprazole (PRILOSEC) 20 MG capsule Take one capsule by mouth everyday. 90 capsule 3    ondansetron (ZOFRAN-ODT) 8 MG TbDL Dissolve 1 tablet (8 mg total) by mouth every 8 (eight) hours as needed. 60 tablet 0    polyethylene glycol (GLYCOLAX) 17 gram/dose powder mix 1 capful (17 g) and take by mouth once daily. 510 g 0    predniSONE (DELTASONE) 10 MG tablet Take 2 pill daily x7 days. Then stop. 91 tablet 0    prochlorperazine (COMPAZINE) 10 MG tablet Take 1 tablet (10 mg total) by mouth 3 (three) times daily as needed (for nausea. if zofran does not work can use this medication instead). 60 tablet 2    promethazine (PHENERGAN) 6.25 mg/5 mL syrup 5 mLs daily as needed.      senna-docusate 8.6-50 mg (PERICOLACE) 8.6-50 mg per tablet Take 1 tablet by mouth 2 (two) times daily. 60 tablet 0    zolpidem (AMBIEN) 5 MG Tab Take 1 tablet (5 mg total) by mouth nightly as needed (take if having difficulty sleeping). 21 tablet 0    promethazine-codeine 6.25-10 mg/5 ml (PHENERGAN WITH CODEINE) 6.25-10 mg/5 mL syrup Take 5 mLs by mouth every 8 (eight) hours as needed for Cough. 473 mL 1    traMADoL (ULTRAM) 50 mg tablet Take 2 tablets (100 mg total) by mouth every 8 (eight) hours as needed for Pain. 90 tablet 0     No current facility-administered medications for this visit.      Facility-Administered Medications Ordered in Other Visits   Medication Dose Route Frequency Provider Last Rate Last Dose    alteplase injection 2 mg  2 mg Intra-Catheter PRN Epifanio Mederos MD        heparin, porcine (PF) 100 unit/mL injection flush 500 Units  500 Units Intravenous PRN Epifanio Mederos MD        pembrolizumab (KEYTRUDA) 200 mg in  "sodium chloride 0.9% 100 mL chemo infusion  200 mg Intravenous 1 time in Clinic/HOD Epifanio Mederos MD        sodium chloride 0.9% 100 mL flush bag   Intravenous 1 time in Clinic/HOD Epifanio Mederos MD        sodium chloride 0.9% flush 10 mL  10 mL Intravenous PRN Epifanio Mederos MD           Review of Systems   Constitutional: Positive for activity change and fatigue. Negative for chills, fever and unexpected weight change.   HENT: Negative for nosebleeds.    Eyes: Negative for visual disturbance.   Respiratory: Positive for cough and shortness of breath.    Cardiovascular: Negative for chest pain and leg swelling.   Gastrointestinal: Negative for abdominal distention, abdominal pain and nausea.   Endocrine: Negative for cold intolerance and heat intolerance.   Genitourinary: Negative for difficulty urinating and dysuria.   Musculoskeletal: Positive for arthralgias (chronic).   Skin: Negative for color change.   Neurological: Positive for weakness. Negative for dizziness, light-headedness and numbness.   Hematological: Does not bruise/bleed easily.   Psychiatric/Behavioral: Negative for confusion.       ECOG Performance Status:   2  Objective:      Vitals:   Vitals:    09/16/20 0828   BP: (!) 85/59   Pulse: 94   Resp: 18   Temp: 98 °F (36.7 °C)   TempSrc: Oral   SpO2: 99%   Weight: 78.2 kg (172 lb 6.4 oz)   Height: 5' 6" (1.676 m)     BMI: Body mass index is 27.83 kg/m².    Physical Exam  Constitutional:       General: She is not in acute distress.     Appearance: She is well-developed.      Comments: In wheelchair   HENT:      Head: Normocephalic.      Mouth/Throat:      Pharynx: No oropharyngeal exudate.   Eyes:      General: No scleral icterus.     Pupils: Pupils are equal, round, and reactive to light.   Neck:      Musculoskeletal: Normal range of motion.      Trachea: No tracheal deviation.   Cardiovascular:      Rate and Rhythm: Normal rate and regular rhythm.      Heart sounds: Normal heart sounds. No " murmur. No friction rub. No gallop.    Pulmonary:      Effort: Pulmonary effort is normal. No respiratory distress.      Breath sounds: No wheezing or rales.      Comments: Diminished breath sounds, off oxygen  Abdominal:      General: Bowel sounds are normal. There is no distension.      Palpations: Abdomen is soft. There is no mass.      Tenderness: There is no abdominal tenderness. There is no guarding.   Musculoskeletal: Normal range of motion.   Skin:     General: Skin is warm and dry.   Neurological:      Mental Status: She is alert.         Laboratory Data:   Recent Results (from the past 168 hour(s))   CBC auto differential    Collection Time: 09/15/20  8:44 AM   Result Value Ref Range    WBC 13.82 (H) 3.90 - 12.70 K/uL    RBC 4.04 4.00 - 5.40 M/uL    Hemoglobin 10.4 (L) 12.0 - 16.0 g/dL    Hematocrit 34.2 (L) 37.0 - 48.5 %    Mean Corpuscular Volume 85 82 - 98 fL    Mean Corpuscular Hemoglobin 25.7 (L) 27.0 - 31.0 pg    Mean Corpuscular Hemoglobin Conc 30.4 (L) 32.0 - 36.0 g/dL    RDW 19.5 (H) 11.5 - 14.5 %    Platelets 291 150 - 350 K/uL    MPV 9.3 9.2 - 12.9 fL    Immature Granulocytes 0.9 (H) 0.0 - 0.5 %    Gran # (ANC) 10.0 (H) 1.8 - 7.7 K/uL    Immature Grans (Abs) 0.13 (H) 0.00 - 0.04 K/uL    Lymph # 2.4 1.0 - 4.8 K/uL    Mono # 1.0 0.3 - 1.0 K/uL    Eos # 0.3 0.0 - 0.5 K/uL    Baso # 0.08 0.00 - 0.20 K/uL    nRBC 0 0 /100 WBC    Gran% 72.1 38.0 - 73.0 %    Lymph% 17.2 (L) 18.0 - 48.0 %    Mono% 7.2 4.0 - 15.0 %    Eosinophil% 2.0 0.0 - 8.0 %    Basophil% 0.6 0.0 - 1.9 %    Differential Method Automated    Comprehensive metabolic panel    Collection Time: 09/15/20  8:44 AM   Result Value Ref Range    Sodium 136 136 - 145 mmol/L    Potassium 4.8 3.5 - 5.1 mmol/L    Chloride 105 95 - 110 mmol/L    CO2 24 23 - 29 mmol/L    Glucose 101 70 - 110 mg/dL    BUN, Bld 36 (H) 8 - 23 mg/dL    Creatinine 1.3 0.5 - 1.4 mg/dL    Calcium 9.5 8.7 - 10.5 mg/dL    Total Protein 8.0 6.0 - 8.4 g/dL    Albumin 3.0 (L) 3.5  - 5.2 g/dL    Total Bilirubin 0.3 0.1 - 1.0 mg/dL    Alkaline Phosphatase 94 55 - 135 U/L    AST 12 10 - 40 U/L    ALT 10 10 - 44 U/L    Anion Gap 7 (L) 8 - 16 mmol/L    eGFR if African American 51.2 (A) >60 mL/min/1.73 m^2    eGFR if non African American 44.4 (A) >60 mL/min/1.73 m^2     CareEverywhere  2/21/19  HCV RNA HAZEL Qualitative - [facility] Positive (A)   Comment: Positive: HCV RNA Detected         Imaging:   Narrative & Impression     EXAMINATION:  CT CHEST ABDOMEN PELVIS W W/O CONTRAST (XPD)     CLINICAL HISTORY:  f/u lung cancer;Malignant neoplasm of unspecified part of right bronchus or lung     TECHNIQUE:  Routine CT of the chest, abdomen and pelvis was performed with the oral contrast and 75 cc Omnipaque 350 intravenous contrast.  Coronal and sagittal reconstructions were done.     COMPARISON:  CT chest 05/01/2020, CT abdomen pelvis 05/09/2020. nuclear medicine bone scan 05/11/2020.  CTA chest 05/25/2020.     FINDINGS:  Visualized structures in the lower neck, both the breast and axillary areas are unremarkable except mildly enlarged lymph node at the left axilla at 1.3 cm (series 2, image 15), previously 1.9 cm.     Thoracic aorta is normal size with left arch and minimal atherosclerosis calcifications.  Heart size is normal without pericardial fluid.  Enlarged right paratracheal lymph nodes are again identified consistent with regional spread of neoplasm.  Index lymph node measures 1.8 cm (series 2, image 30), previously 2.6 cm.  Non index areas show similar, mild decrease in size.     Trachea and bronchi are patent, noting some airway narrowing centrally in the right upper lobe, improved from prior.     Left lung is well expanded and shows no acute consolidation or pleural effusion.  Several subcentimeter nodules are again identified similar to prior exam and suspect for intrathoracic metastatic disease.  Index nodule in the left upper lobe is 0.4 cm (series 2, image 30), previously 0.7 cm.  No new  nodules.     The right lung is expanded but now shows extensive airspace consolidation with air bronchograms with the relative sparing of the base of the lower lobe; this could represent edema, radiation pneumonitis, aspiration, infectious pneumonia, etc.  Correlate clinically.  The ill-defined region of decreased parenchymal enhancement in the upper lobe correlates with the previously demonstrated tumor, now measuring 5.0 x 5.5 cm (series 2, image 27), previously 6.5 x 7.8 cm.  A mild volume of pleural effusion is present the around the right lung, potentially malignant effusion.  This shows some decrease in overall volume with a PleurX catheter now present.     The spleen, liver, portal vein, gallbladder, bile ducts, and pancreas are within normal limits.     Adrenal glands appear normal.  Kidneys show normal size and enhancement with no stone, obstruction, or solid mass detected.  Ureters are normal caliber.  Wall of urinary bladder is smooth.  Uterus is not identified, at no adnexal masses are seen.     Abdominal aorta has mild atherosclerosis but no aneurysm.  No enlarged retroperitoneal lymph nodes are seen.     No significant ascites is identified in the abdomen or pelvis.  Intestinal tract shows no obstruction or acute inflammatory process.  Small hiatal hernia is present.  Small bowel is normal caliber with Oral contrast going through it.  Normal appendix is visualized.  Diverticulosis is seen in the left colon.     Soft tissues of the abdominal wall show no significant abnormality.     Skeletal structures show no acute fracture.  Age-appropriate degenerative changes are noted in the spine.  The T2 vertebral body again shows a mixed lytic/sclerotic lesion consistent with osseous metastatic disease similar to the most recent CT exam.     Impression:     1. History adenocarcinoma of right lung, stage IV.  Ill-defined lesion in right upper lobe and ipsilateral mediastinal lymph node enlargement show mild  "interval decrease in size.  See summary below.  Several subcentimeter pulmonary nodules are again noted in left lung, possible metastatic disease.  T2 vertebral body lesion consistent with metastatic disease.  No new disease detected.  2. Persistent right pleural effusion, decreased in volume with PleurX catheter in place; this could be malignant effusion.  3. New development of extensive consolidation in right lung, correlate for radiation pneumonitis, infectious pneumonia, aspiration, etc.  4. Hysterectomy, diverticulosis, and other findings as above.  5.  RECIST SUMMARY:  Date of prior examination for comparison: 05/01/2020  Lesion 1: Right upper lobe.  5.5 cm. Series 2 image 27.  Prior measurement 7.8 cm.  Lesion 2: Mediastinal lymph node.  1.8 cm. Series 2 image 30.  Prior measurement 2.6 cm.        Reading Physician Reading Date Result Priority   Ambrocio Crooks MD  [phone] 8/3/2020 Routine   Darci Watters MD  [phone] 8/3/2020       Narrative & Impression     EXAMINATION:  MRI BRAIN WITHOUT CONTRAST     CLINICAL HISTORY:  f/u brain mets; Malignant neoplasm of unspecified part of right bronchus or lung     TECHNIQUE:  Multiplanar multisequence MR imaging of the brain was performed without contrast.     *Patient is scheduled for MRI brain before and after the administration of contrast however exam was prematurely terminated prior to contrast administration secondary to patient discomfort and "burning" sensation.  Pre-contrast images evaluated and was ultimately determined that risk of continuing outweighed the potential benefit. *     COMPARISON:  MRI brain 05/09/2020     FINDINGS:  Intracranial compartment:     Ventricles and sulci are normal in size for age without evidence of hydrocephalus.     No extra-axial blood or fluid collections     Small rounded focus of abnormal T2/FLAIR hyperintense signal and diffusion restriction within the right cerebellum compatible with known " "metastatic lesion.  Decreased associated edema which is minimal on today's exam.  Small amount of associated gradient susceptibility which which likely represents a small amount of hemorrhage no new lesions; exam somewhat limited secondary to lack of intravenous contrast.  Stable patchy T2/FLAIR hyperintensities within the periventricular and subcortical white matter likely representing chronic microvascular ischemic change.  No acute hemorrhage, infarct, edema, midline shift, or herniation.     Normal vascular flow voids are preserved on B0 diffusion imaging.     Skull/extracranial contents (limited evaluation): Bone marrow signal intensity is normal.     Impression:     Study limited secondary to premature termination prior to contrast administration secondary to patient discomfort and "burning" sensation.     Grossly stable right cerebellar metastasis with improving associated vasogenic edema.  No new lesions identified.     Unchanged supratentorial T2/FLAIR hyperintensities likely representative of mild chronic microvascular ischemic change.           Assessment:       1. Adenocarcinoma of lung, stage 4, right    2. Primary malignant neoplasm of lung with metastasis to brain    3. Tobacco abuse    4. Chronic hepatitis C without hepatic coma    5. Malignant pleural effusion    6. Anxiety           Plan:       1. 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  Also has pleural effusion on right side s/p therapeutic thoracentesis 5/2020. PDL1 expressed in 100% of tumor cells from biopsy.  No mutation identified for EGFR/ALK/ROS1/BRAF by tissue.  -Continue single agent pembrolizumab  -Labs have been reviewed.  -Proceed with cycle 6 today  -Scans in Nov 2020, last scans 8/3/20  -Return to clinic in 3 weeks  -TSH and fT4 next due with cycle 7  -Discussed with hepatology and no additional antiviral therapy required with her history of Hep C.  -Not eligible for [id number]  -Tramadol PRN for pain - " refilled today  -To see [facility] care 9/25    2. Cough  -On tessalon & codeine cough syrup Q8H PRN, refilled today  -On gabapentin  -Previously also tried mucinex  -Had right pleurX placed 6/24 by Dr. Garcia, s/p removal 8/26/20  -Trialed prednisone for ?radiation pneumonitis - completed    3. HCPOA  -Patient appointed her daughter and niece     4. Anxiety/insomnia  -On wellbutrin, instructed not to take buspirone  -Also on trazodone PRN        Follow-up: RTC for labs (CBC, CMP, TSH, T4) prior to MD visit in 3 weeks and cycle 7 Keytruda, referral to internal medicine    The following was staffed and discussed with supervising physician Dr. Mederos.    Lauren Rocha MD PGY-VI  Hematology/Oncology Fellow

## 2020-09-16 NOTE — Clinical Note
RTC for labs (CBC, CMP, TSH, T4) prior to MD visit in 3 weeks and cycle 7 Keytruda, referral to internal medicine

## 2020-09-18 ENCOUNTER — DOCUMENTATION ONLY (OUTPATIENT)
Dept: HEMATOLOGY/ONCOLOGY | Facility: CLINIC | Age: 60
End: 2020-09-18

## 2020-09-18 NOTE — PROGRESS NOTES
Called patient to let her know that the nutritional supplements have arrived. She will make arrangements for someone to bring her to clinic to pick them up either today or Monday. Asked that she call me ahead of time and I will bring the 4 cases down in a cart and meet them at the entrance to the building, and she stated agreement. Provided her with my direct number again. Will continue to follow.

## 2020-09-18 NOTE — PROGRESS NOTES
Received call from patient this afternoon and she said that she can  the [employer] nutritional supplements this afternoon, her cousin is going to drive her to the clinic. Asked patient to call me back when she and her cousin arrive to the entrance of the [facility] building, and patient did so. Delivered the cases out to patient along with Navigator Nichole Mccauley RN, and assisted in putting the cases in patient's cousin's car. Patient stated appreciation to us for assistance given to her. Will continue to follow.

## 2020-09-22 ENCOUNTER — TELEPHONE (OUTPATIENT)
Dept: HEMATOLOGY/ONCOLOGY | Facility: CLINIC | Age: 60
End: 2020-09-22

## 2020-09-22 NOTE — NURSING
Received call from to confirm receiving new medicaid card and to confirm that she has an appointment with a new PCP Dr. Neumann.

## 2020-09-23 ENCOUNTER — TELEPHONE (OUTPATIENT)
Dept: INTERNAL MEDICINE | Facility: CLINIC | Age: 60
End: 2020-09-23

## 2020-09-23 NOTE — TELEPHONE ENCOUNTER
----- Message from Brooke Campbell sent at 9/23/2020  3:37 PM CDT -----  Contact: self [phone]  Please give pt a call back in ref to her appt on 10/15. Pt states she has spoke with her insurance company and they need the dr information so that the dr is paid and she doesn't receive a bill. Please call and advise     
Burns involving less than 10% of body surface

## 2020-09-24 ENCOUNTER — TELEPHONE (OUTPATIENT)
Dept: PALLIATIVE MEDICINE | Facility: CLINIC | Age: 60
End: 2020-09-24

## 2020-09-24 NOTE — PROGRESS NOTES
Consult Note  Palliative Care      Consult Requested By: Dr. Lauren Rocha  Reason for Consult: symptom management      ASSESSMENT/PLAN:     Plan/Recommendations:  Diagnoses and all orders for this visit:    Adenocarcinoma of lung, stage 4, right with known brain mets  - patient followed by Dr. Rocha  - currently receiving pembrolizumab; s/p 6 cycles  - restaging scans due in Nov 2020    Encounter for palliative care/Advanced care planning  - patient by herself today  - patient has completed ACP documents which are uploaded into EMR  - patient has listed Kell Boyce (daughter) at [phone] as her surrogate decision maker  - goals: continue disease directed therapy  - code status: full; though not specifically addressed today  - will plan to discuss code status at future appointments  - new patient folder given to patient and reviewed    Cancer related pain  - reports moderate to severe pain in her back, hip, and knees  - patient unable to describe the pain in detail but rather reports its in her bilateral back  - she was recently prescribed tramadol; however, this medication is not effective for the pain  - will rotate to percocet 5-325 mg q6h prn with max 4 tabs/day  - pain contract reviewed and signed today    Dyspnea  - conversational dyspnea noted today  - likely due to stage IV lung cancer  - starting patient on percocet 5-325 mg today; this should help dyspnea  - will continue to monitor    Neoplastic (malignant) related fatigue/Anorexia  - symptom cluster due to advanced metastatic lung cancer  - reports that Remeron did not help her and she is no longer taking this medication  - patient reports that she has difficulty sleeping at times, which likely also contributing to fatigue  - patient previously referred to nutrition, but she has declined  - will continue to monitor    Anxiety  - patient unable to explore more about her anxiety today  - she states that she uses her Ativan every morning, which she  "says helps her nerves  - she has also previously used her neighbor's Valium, which she states helped her sleep   - she repeatedly asked for "a nerve pill" during visit today  - given concern of sedating medications, conversational dyspnea, and newly prescribed opioid, will NOT recommend or prescribe benzodiazapine medication at this time  - will order hydroxyzine 25 mg TID to help with anxiety for 2 weeks  - spoke on the phone with oncology-psychology. Dr. Laughlin has seen patient in the past. Collateral information obtained during that discussion  - patient has declined to see Onc-psychiatry, Dr. Cottrell, in the past for medication recommendations  - will continue close monitoring     Understanding of illness/Prognosis: difficult to obtain today as patient was tangential in her thoughts at time. She does know that she has advanced metastatic lung cancer, but she also states that people "have lived years with it". Given her dyspnea, self reporting fatigue, and anorexia, concern for overall guarded to poor prognosis. Will await restaging scans in November    Goals of care: continue disease directed therapy and better symptom control    Follow up: ~ 4 weeks or sooner if needed    Patient's encounter and above plan of care discussed with patient's oncologist and oncology-psychology.     SUBJECTIVE:     History of Present Illness:  Patient is a 60 y.o. year old female with asthma, anxiety, HTN, Hep C s/p treatment, and lung cancer who presents to Palliative Medicine for symptom management.      Patient noticed worsening of her dyspnea on exertion in Feb 2020 with associated cough. She was evaluated at OSH and scans were suspicious for cancer. She initially was going to be further evaluated at another hospital but appointment was cancelled due to COVID. She then presented to [facility]. She was admitted in May for SOB and cough. Work up showed large right upper lobe mass with scattered nodules and mediastinal lymphadenopathy. She " underwent biopsy at that time consistent with lung cancer. She was admitted later in may for worsening shortness of breath. She was started on carboplatin and received palliative radiotherapy. She was started on pembrolizumab in early 2020 and had right pleurx palced. PleurX removed at the end of 2020.     2020:  LA  reviewed and summarized:  2020 Tramadol 50 mg Disp: 90 for 15 days  2020 Promethazine-codeine syrup Dsip: 473 ml for 24 days    Patient presented today without family in the room. Patient reports moderate to severe pain in her back, hip, and knees. She is unable to describe the pain well. She rates the pain as 7/10. She reports tramadol does not help with her pain. Patient also reporting anxiety and repeatedly asking for nerve medication. She stated that her ativan helps and in the past, she got relief and able to sleep with Valium. Patient confirmed that Kell would be her surrogate decision maker.     Past Medical History:   Diagnosis Date    Anxiety     Asthma     Hepatitis C 2019    treated    Hx of psychiatric care     Hypertension     Lung cancer     Psychiatric exam requested by authority     Psychiatric problem      Past Surgical History:   Procedure Laterality Date    BRONCHOSCOPY N/A 9/3/2020    Procedure: Bronchoscopy;  Surgeon: [facility] Provider;  Location: [facility] OR [address] [place] [address];  Service: Anesthesiology;  Laterality: N/A;     SECTION      KNEE SURGERY       Family history: Father with cancer    Review of patient's allergies indicates:  No Known Allergies    Medications:    Current Outpatient Medications:     albuterol (PROVENTIL) 2.5 mg /3 mL (0.083 %) nebulizer solution, Take 3 mls by nebulization every 3 hours while awake, Disp: 180 mL, Rfl: 12    albuterol (PROVENTIL/VENTOLIN HFA) 90 mcg/actuation inhaler, 2 puffs 4 (four) times daily as needed., Disp: , Rfl:     amLODIPine (NORVASC) 5 MG tablet, Take 5 mg by mouth once daily., Disp:  , Rfl:     benzonatate (TESSALON PERLES) 100 MG capsule, Take 2 capsules (200 mg total) by mouth 3 (three) times daily as needed for Cough., Disp: 30 capsule, Rfl: 1    budesonide-formoterol 160-4.5 mcg (SYMBICORT) 160-4.5 mcg/actuation HFAA, Inhale two puffs by mouth into the lungs twice daily, Disp: 10.2 g, Rfl: 12    buPROPion (WELLBUTRIN SR) 150 MG TBSR 12 hr tablet, Take 1 tablet (150 mg total) by mouth once daily., Disp: 30 tablet, Rfl: 6    diclofenac sodium (VOLTAREN) 1 % Gel, Apply two grams topically four times a day, Disp: 200 g, Rfl: 5    gabapentin (NEURONTIN) 400 MG capsule, Take one capsule by mouth three times daily, Disp: 90 capsule, Rfl: 3    irbesartan-hydrochlorothiazide (AVALIDE) 300-12.5 mg per tablet, Take one tablet by mouth every day, Disp: 90 tablet, Rfl: 3    lidocaine (XYLOCAINE) 5 % Oint ointment, Apply topically as needed. Apply 1 inch to biopsy site and cover with saran wrap.  Apply twice per day as needed for pain., Disp: 35.44 g, Rfl: 1    LORazepam (ATIVAN) 1 MG tablet, Take 1 tablet (1 mg total) by mouth as needed for Anxiety (Take 30 minutes prior to procedure)., Disp: 5 tablet, Rfl: 0    omeprazole (PRILOSEC) 20 MG capsule, Take one capsule by mouth everyday., Disp: 90 capsule, Rfl: 3    polyethylene glycol (GLYCOLAX) 17 gram/dose powder, mix 1 capful (17 g) and take by mouth once daily., Disp: 510 g, Rfl: 0    prochlorperazine (COMPAZINE) 10 MG tablet, Take 1 tablet (10 mg total) by mouth 3 (three) times daily as needed (for nausea. if zofran does not work can use this medication instead)., Disp: 60 tablet, Rfl: 2    promethazine (PHENERGAN) 6.25 mg/5 mL syrup, 5 mLs daily as needed., Disp: , Rfl:     senna-docusate 8.6-50 mg (PERICOLACE) 8.6-50 mg per tablet, Take 1 tablet by mouth 2 (two) times daily., Disp: 60 tablet, Rfl: 0    traMADoL (ULTRAM) 50 mg tablet, Take 2 tablets (100 mg total) by mouth every 8 (eight) hours as needed for Pain., Disp: 90 tablet, Rfl:  0    hydrOXYzine HCL (ATARAX) 25 MG tablet, Take 1 tablet (25 mg total) by mouth 3 (three) times daily. for 14 days, Disp: 42 tablet, Rfl: 0    mirtazapine (REMERON SOL-TAB) 15 MG disintegrating tablet, Take 1 tablet (15 mg total) by mouth nightly. (Patient not taking: Reported on 9/25/2020), Disp: 30 tablet, Rfl: 2    ondansetron (ZOFRAN-ODT) 8 MG TbDL, Dissolve 1 tablet (8 mg total) by mouth every 8 (eight) hours as needed. (Patient not taking: Reported on 9/25/2020), Disp: 60 tablet, Rfl: 0    oxyCODONE-acetaminophen (PERCOCET) 5-325 mg per tablet, Take 1 tablet by mouth every 6 (six) hours as needed for Pain., Disp: 120 tablet, Rfl: 0    predniSONE (DELTASONE) 10 MG tablet, Take 2 pill daily x7 days. Then stop. (Patient not taking: Reported on 9/25/2020), Disp: 91 tablet, Rfl: 0    promethazine-codeine 6.25-10 mg/5 ml (PHENERGAN WITH CODEINE) 6.25-10 mg/5 mL syrup, Take 5 mLs by mouth every 8 (eight) hours as needed for Cough. (Patient not taking: Reported on 9/25/2020), Disp: 473 mL, Rfl: 1    zolpidem (AMBIEN) 5 MG Tab, Take 1 tablet (5 mg total) by mouth nightly as needed (take if having difficulty sleeping). (Patient not taking: Reported on 9/25/2020), Disp: 21 tablet, Rfl: 0    OBJECTIVE:       ROS:  Review of Systems   Constitutional: Positive for appetite change and fatigue.   HENT: Negative.    Eyes: Negative.    Respiratory: Positive for cough and shortness of breath.    Cardiovascular: Negative.    Gastrointestinal: Negative.    Genitourinary: Negative.    Musculoskeletal: Positive for arthralgias and back pain.   Skin: Negative.    Neurological: Positive for dizziness. Negative for syncope and weakness.   Psychiatric/Behavioral: Positive for decreased concentration and dysphoric mood. The patient is nervous/anxious.    All other systems reviewed and are negative.      Review of Symptoms    Symptom Assessment (ESAS 0-10 Scale)  Pain:  7  Dyspnea:  6  Anxiety:  7  Nausea:  0  Depression:   0  Anorexia:  10  Fatigue:  8  Insomnia:  2  Restlessness:  0  Agitation:  0     Constipation:  Negative    Pain Assessment:  Location(s): back    Back       Back Location:  Generalized       Quantity:  7/10 in intensity       Chronicity:  month(s) ago, unchanged    OME in 24 hours:  Minmal    ECOG Performance Status Grade:  2 - Ambulates, capable of self care only    Living Arrangements:  Lives alone    Psychosocial/Cultural: Patient reports living alone. She has three children and her daughter checks in on her regularly. Also has good support of her neighbors    Spiritual:  F - Fransisca and Belief:  Yes  I - Importance:  Yes  C - Community:  None specifically mentioned today  A - Address in Care:  Not at this time    Advance Care Planning   Advance Directives:   Living Will: No    Do Not Resuscitate Status: No    Medical Power of : Yes    Agent's Name:  Kell Boyce (daughter)   Agent's Contact Number:  [phone]    Decision Making:  Patient answered questions              Physical Exam:  Vitals: Temp: 97.7 °F (36.5 °C) (09/25/20 1002)  Pulse: 92 (09/25/20 1002)  BP: 107/65 (09/25/20 1002)  Physical Exam   Constitutional: She is oriented to person, place, and time. She appears not lethargic.  Non-toxic appearance. She does not have a sickly appearance.   HENT:   Head: Normocephalic and atraumatic.   Right Ear: External ear normal.   Left Ear: External ear normal.   Eyes: Conjunctivae and EOM are normal. Right eye exhibits no discharge. Left eye exhibits no discharge.   Neck: Normal range of motion. No tracheal deviation present.   Pulmonary/Chest: No stridor. She has no wheezes.   Conversational dyspnea   Abdominal: Soft. She exhibits no distension.   Musculoskeletal:         General: No deformity or edema.   Neurological: She is alert and oriented to person, place, and time. She appears not lethargic. No cranial nerve deficit.   Skin: Skin is warm and dry. No rash noted. She is not diaphoretic.  "  Psychiatric: Affect normal.   Tangential thoughts at times, disinhibited, perseveration on one topic throughout visit   Vitals reviewed.      Labs:  CBC:   WBC   Date Value Ref Range Status   09/15/2020 13.82 (H) 3.90 - 12.70 K/uL Final     Mean Corpuscular Volume   Date Value Ref Range Status   09/15/2020 85 82 - 98 fL      Hematocrit   Date Value Ref Range Status   09/15/2020 34.2 (L) 37.0 - 48.5 % Final               0  Mean Corpuscular Volume   Date Value Ref Range Status   09/15/2020 85 82 - 98 fL Final   9/15/2020       Albumin:   Albumin   Date Value Ref Range Status   09/15/2020 3.0 (L) 3.5 - 5.2 g/dL Final     Protein:   Total Protein   Date Value Ref Range Status   09/15/2020 8.0 6.0 - 8.4 g/dL Final       Radiology:I have reviewed all pertinent imaging results/findings within the past 24 hours.    CT C/A/P 8/3/2020: "1. History adenocarcinoma of right lung, stage IV.  Ill-defined lesion in right upper lobe and ipsilateral mediastinal lymph node enlargement show mild interval decrease in size.  See summary below.  Several subcentimeter pulmonary nodules are again noted in left lung, possible metastatic disease.  T2 vertebral body lesion consistent with metastatic disease.  No new disease detected.  2. Persistent right pleural effusion, decreased in volume with PleurX catheter in place; this could be malignant effusion.  3. New development of extensive consolidation in right lung, correlate for radiation pneumonitis, infectious pneumonia, aspiration, etc.  4. Hysterectomy, diverticulosis, and other findings as above.  5.  RECIST SUMMARY:  Date of prior examination for comparison: 05/01/2020  Lesion 1: Right upper lobe.  5.5 cm. Series 2 image 27.  Prior measurement 7.8 cm.  Lesion 2: Mediastinal lymph node.  1.8 cm. Series 2 image 30.  Prior measurement 2.6 cm."      Encounter occurred during period of COVID-19 emergency. Encounter performed under the concurrent guidelines, limitations and " protocols.    Plan required increase review of medication choice, medication interaction, medication dosing, medication frequency and medication route due to patient complexity. PATIENT COMPLEXITY INCREASED BY: presence of renal insufficiency, moderate malnutrition, poor historian    Signature: Erica Vazquez MD

## 2020-09-25 ENCOUNTER — OFFICE VISIT (OUTPATIENT)
Dept: PALLIATIVE MEDICINE | Facility: CLINIC | Age: 60
End: 2020-09-25
Payer: MEDICAID

## 2020-09-25 VITALS — DIASTOLIC BLOOD PRESSURE: 65 MMHG | HEART RATE: 92 BPM | SYSTOLIC BLOOD PRESSURE: 107 MMHG | TEMPERATURE: 98 F

## 2020-09-25 DIAGNOSIS — R53.0 NEOPLASTIC (MALIGNANT) RELATED FATIGUE: ICD-10-CM

## 2020-09-25 DIAGNOSIS — R06.00 DYSPNEA, UNSPECIFIED TYPE: ICD-10-CM

## 2020-09-25 DIAGNOSIS — G89.3 CANCER RELATED PAIN: ICD-10-CM

## 2020-09-25 DIAGNOSIS — Z51.5 ENCOUNTER FOR PALLIATIVE CARE: ICD-10-CM

## 2020-09-25 DIAGNOSIS — F41.9 ANXIETY: ICD-10-CM

## 2020-09-25 DIAGNOSIS — C34.91 ADENOCARCINOMA OF LUNG, STAGE 4, RIGHT: Primary | ICD-10-CM

## 2020-09-25 DIAGNOSIS — Z71.89 ADVANCED CARE PLANNING/COUNSELING DISCUSSION: ICD-10-CM

## 2020-09-25 DIAGNOSIS — R63.0 ANOREXIA: ICD-10-CM

## 2020-09-25 PROCEDURE — 99999 PR PBB SHADOW E&M-EST. PATIENT-LVL IV: ICD-10-PCS | Mod: PBBFAC,,, | Performed by: STUDENT IN AN ORGANIZED HEALTH CARE EDUCATION/TRAINING PROGRAM

## 2020-09-25 PROCEDURE — 99999 PR PBB SHADOW E&M-EST. PATIENT-LVL IV: CPT | Mod: PBBFAC,,, | Performed by: STUDENT IN AN ORGANIZED HEALTH CARE EDUCATION/TRAINING PROGRAM

## 2020-09-25 PROCEDURE — 99205 PR OFFICE/OUTPT VISIT, NEW, LEVL V, 60-74 MIN: ICD-10-PCS | Mod: S$PBB,,, | Performed by: STUDENT IN AN ORGANIZED HEALTH CARE EDUCATION/TRAINING PROGRAM

## 2020-09-25 PROCEDURE — 99205 OFFICE O/P NEW HI 60 MIN: CPT | Mod: S$PBB,,, | Performed by: STUDENT IN AN ORGANIZED HEALTH CARE EDUCATION/TRAINING PROGRAM

## 2020-09-25 PROCEDURE — 99214 OFFICE O/P EST MOD 30 MIN: CPT | Mod: PBBFAC | Performed by: STUDENT IN AN ORGANIZED HEALTH CARE EDUCATION/TRAINING PROGRAM

## 2020-09-25 RX ORDER — OXYCODONE AND ACETAMINOPHEN 5; 325 MG/1; MG/1
1 TABLET ORAL EVERY 6 HOURS PRN
Qty: 120 TABLET | Refills: 0 | Status: SHIPPED | OUTPATIENT
Start: 2020-09-25 | End: 2020-10-07

## 2020-09-25 RX ORDER — HYDROXYZINE HYDROCHLORIDE 25 MG/1
25 TABLET, FILM COATED ORAL 3 TIMES DAILY
Qty: 42 TABLET | Refills: 0 | Status: SHIPPED | OUTPATIENT
Start: 2020-09-25 | End: 2020-10-09

## 2020-09-25 NOTE — PROGRESS NOTES
"Palliative Medicine   Palliative Care   Psychosocial Assessment    Patient Name: Janeth Boyce  MRN: [id number]    Reason for Referral: advance care planning    Present during Interview: patient.      Primary Language:English   Needed: no      Past Medical Situation:   PMH:   Past Medical History:   Diagnosis Date    Anxiety     Asthma     Hepatitis C 2019    treated    Hx of psychiatric care     Hypertension     Lung cancer     Psychiatric exam requested by authority     Psychiatric problem      Mental Health/Substance Use History: none identified, patent stated she used to drink a hieneken every other week but stopped   Risk of Abuse, neglect or exploitation: none identified   Current or Previous Trauma and/or evidence of PTSD: none identified   Non-traditional Health practices: none identified     Understanding of diagnosis and treatment: Patient understands she has stage IV lung cancer. Patient states wanting to keep the "cancer where it is" hoping it will not spread further  Experience/Comfort level with health care system: good    Patients Mental Status: patient is alert and oriented to person, place and time    Socio-Economic Factors/Resources:  Address: [address] [place] [address]  Phone Number: [phone] (home) [phone] (work)    Marital Status: Single  Perceived impact on household composition: patient lives alone in the [employer] in [place]  Children: three    Patient/Family perceptions about Caregiving Needs; availability and capacity: Patient cares for herself and hopes to continue this way.     Family Dynamics/Relationships: good    Patient/Family Strengths/Resilience: patient has a positive outlook  Patient/Family Coping Strategies: kellen, cleaning     Activities of Daily Living: patient is independent with activities of daily living   Support Systems-Family & Community (Home Health, HME etc): family, neighbors    Transportation:  " yes    Work/Education History: disability  Self-Care Activities/Hobbies: riding in the car, visiting with neighbors, television, cleaning      History: no    Financial Resources:Medicaid      Advanced Care Planning & Legal Concerns:   Advanced Directives/Living Will: no  [place]POST/POLST: no   Planning:  no    Power of : no  Surrogate Decision Maker: Kell / daughter [phone]    Emergency Contacts: [phone]    Spirituality, Culture & Coping Mechanisms:  F- Fransisca and Belief: Christian     I - Importance: yes    C - Community/Culture Values: yes     A - Address in Care: yes      Goals/Hopes/Expectations: to get pain relief and continue to live independently   Fears/Anxiety/Concerns: nerves      Plan of Care: MD to order pain medication and pain contact completed during visit. Coordinate visit with Oncology Psychology. Follow up with palliative medicine.     Fernando Galo LCSW  Palliative Medicine

## 2020-09-25 NOTE — LETTER
September 25, 2020      Lauren Rocha MD  [address]  [facility] [address]           [facility] Palliative Medicine [facility]  [address]  [facility] [address]  Phone: [phone]  Fax: [phone]          Patient: Janeth Boyce   MR Number: [id number]   YOB: 1960   Date of Visit: 9/25/2020       Dear Dr. Lauren Rocha:    Thank you for referring Janeth Boyce to me for evaluation. Attached you will find relevant portions of my assessment and plan of care.    If you have questions, please do not hesitate to call me. I look forward to following Janeth Boyce along with you.    Sincerely,    Erica Vazquez MD    Enclosure  CC:  No Recipients    If you would like to receive this communication electronically, please contact [email or web address] or [phone] to request more information on [employer] Link access.    For providers and/or their staff who would like to refer a patient to [facility], please contact us through our one-stop-shop provider referral line, [facility], at [phone].    If you feel you have received this communication in error or would no longer like to receive these types of communications, please e-mail [email or web address]

## 2020-09-28 ENCOUNTER — DOCUMENTATION ONLY (OUTPATIENT)
Dept: HEMATOLOGY/ONCOLOGY | Facility: CLINIC | Age: 60
End: 2020-09-28

## 2020-09-28 NOTE — PROGRESS NOTES
"Received calls/voice mail messages x 2 this morning from patient, inquiring about the financial assistance check for her [employer] bill. Patient had also spoken with Nichole Mccauley RN, Navigator re: same and she left a note at my desk. Sent email to Sindy Forrest in Accounts Payable requesting an update and received a reply from her this afternoon - "Check# [id number] issued on 09/18/20 and was mailed directly to [employer]". Called patient and informed her. Patient will call [employer] to confirm that the payment was received and posted to her account. Spoke with Nichole as well. No other needs indicated at this time.       "

## 2020-10-05 ENCOUNTER — DOCUMENT SCAN (OUTPATIENT)
Dept: HOME HEALTH SERVICES | Facility: HOSPITAL | Age: 60
End: 2020-10-05
Payer: MEDICAID

## 2020-10-06 ENCOUNTER — LAB VISIT (OUTPATIENT)
Dept: LAB | Facility: HOSPITAL | Age: 60
End: 2020-10-06
Payer: MEDICAID

## 2020-10-06 DIAGNOSIS — C34.91 ADENOCARCINOMA OF LUNG, STAGE 4, RIGHT: ICD-10-CM

## 2020-10-06 LAB
ALBUMIN SERPL BCP-MCNC: 3.3 G/DL (ref 3.5–5.2)
ALP SERPL-CCNC: 95 U/L (ref 55–135)
ALT SERPL W/O P-5'-P-CCNC: 10 U/L (ref 10–44)
ANION GAP SERPL CALC-SCNC: 9 MMOL/L (ref 8–16)
AST SERPL-CCNC: 13 U/L (ref 10–40)
BASOPHILS # BLD AUTO: 0.08 K/UL (ref 0–0.2)
BASOPHILS NFR BLD: 0.8 % (ref 0–1.9)
BILIRUB SERPL-MCNC: 0.3 MG/DL (ref 0.1–1)
BUN SERPL-MCNC: 23 MG/DL (ref 8–23)
CALCIUM SERPL-MCNC: 9.7 MG/DL (ref 8.7–10.5)
CHLORIDE SERPL-SCNC: 105 MMOL/L (ref 95–110)
CO2 SERPL-SCNC: 25 MMOL/L (ref 23–29)
CREAT SERPL-MCNC: 1.1 MG/DL (ref 0.5–1.4)
DIFFERENTIAL METHOD: ABNORMAL
EOSINOPHIL # BLD AUTO: 0.3 K/UL (ref 0–0.5)
EOSINOPHIL NFR BLD: 2.8 % (ref 0–8)
ERYTHROCYTE [DISTWIDTH] IN BLOOD BY AUTOMATED COUNT: 18.7 % (ref 11.5–14.5)
EST. GFR  (AFRICAN AMERICAN): >60 ML/MIN/1.73 M^2
EST. GFR  (NON AFRICAN AMERICAN): 54.3 ML/MIN/1.73 M^2
GLUCOSE SERPL-MCNC: 80 MG/DL (ref 70–110)
HCT VFR BLD AUTO: 35 % (ref 37–48.5)
HGB BLD-MCNC: 10.8 G/DL (ref 12–16)
IMM GRANULOCYTES # BLD AUTO: 0.1 K/UL (ref 0–0.04)
IMM GRANULOCYTES NFR BLD AUTO: 1 % (ref 0–0.5)
LYMPHOCYTES # BLD AUTO: 1.7 K/UL (ref 1–4.8)
LYMPHOCYTES NFR BLD: 18 % (ref 18–48)
MCH RBC QN AUTO: 26.8 PG (ref 27–31)
MCHC RBC AUTO-ENTMCNC: 30.9 G/DL (ref 32–36)
MCV RBC AUTO: 87 FL (ref 82–98)
MONOCYTES # BLD AUTO: 0.8 K/UL (ref 0.3–1)
MONOCYTES NFR BLD: 7.8 % (ref 4–15)
NEUTROPHILS # BLD AUTO: 6.7 K/UL (ref 1.8–7.7)
NEUTROPHILS NFR BLD: 69.6 % (ref 38–73)
NRBC BLD-RTO: 0 /100 WBC
PLATELET # BLD AUTO: 310 K/UL (ref 150–350)
PMV BLD AUTO: 9.2 FL (ref 9.2–12.9)
POTASSIUM SERPL-SCNC: 4.1 MMOL/L (ref 3.5–5.1)
PROT SERPL-MCNC: 8.2 G/DL (ref 6–8.4)
RBC # BLD AUTO: 4.03 M/UL (ref 4–5.4)
SODIUM SERPL-SCNC: 139 MMOL/L (ref 136–145)
T4 FREE SERPL-MCNC: 1.04 NG/DL (ref 0.71–1.51)
TSH SERPL DL<=0.005 MIU/L-ACNC: 1.9 UIU/ML (ref 0.4–4)
WBC # BLD AUTO: 9.64 K/UL (ref 3.9–12.7)

## 2020-10-06 PROCEDURE — 85025 COMPLETE CBC W/AUTO DIFF WBC: CPT

## 2020-10-06 PROCEDURE — 84439 ASSAY OF FREE THYROXINE: CPT

## 2020-10-06 PROCEDURE — 36415 COLL VENOUS BLD VENIPUNCTURE: CPT

## 2020-10-06 PROCEDURE — 80053 COMPREHEN METABOLIC PANEL: CPT

## 2020-10-06 PROCEDURE — 84443 ASSAY THYROID STIM HORMONE: CPT

## 2020-10-06 NOTE — PROGRESS NOTES
PATIENT: Janeth Boyce  MRN: [id number]  DATE: 10/7/2020      Diagnosis:   1. Metastatic non-small cell lung cancer    2. Primary malignant neoplasm of lung with metastasis to brain    3. Malignant cachexia    4. Chronic hepatitis C without hepatic coma    5. Tobacco abuse    6. Chronic respiratory failure with hypoxia    7. Cough    8. Anxiety    9. Asthma, unspecified asthma severity, unspecified whether complicated, unspecified whether persistent    10. Lung mass        Chief Complaint: Adenocarcinoma of lung, stage 4, right      Oncologic History:    Oncologic History 1. Lung adenocarcinoma    Oncologic Treatment 1. Carboplatin AUC4 x1 dose 5/27/20 and palliative radiation (5/27/2020 - 6/2/2020; 20 Gy to right lung)  2. Pembrolizumab  Cycle 1 6/4/2020       Pathology 5/5/2020  Right lung, biopsy:  Positive for adenocarcinoma with areas of necrosis.  Interpretation:   Right lung, specimen for PD-L1 immunohistochemistry studies (clone [id number], [facility], [place], [place]) ([id number]): 100% tumor cells are positive for PD-L1 (membranous positivity).     BRAF Mutation Analysis (V600E), Tumor Result Summary: NO MUTATION IDENTIFIED.   Result: BRAF status: Wild-type   Interpretation :     EGFR Gene, Mutation Analysis, Tumor Result Summary: NO MUTATION IDENTIFIED.   Result: EGFR status: Wild-type     ALK (2p23), Lung Cancer, FISH, Ts Result Summary: NEGATIVE   Interpretation: No rearrangement of the ALK gene was identified.     Result: nuc micha(ALKx3)[29/50]   Of 50 nuclei, 0% had separation of the 3'ALK and 5'ALK signals and 58% had three intact 3'ALK/5'ALK signals. The normal cutoff is <50.0% for one 3'ALK, one 5'ALK and one 3'ALK/5'ALK signal and <18.0% for three 3'ALK/5'ALK signals.     ROS1 (6Q22), FISH, Ts Result Summary: NEGATIVE   Interpretation: No rearrangement of the ROS1 gene was identified.   Result nuc micha(ROS1x1)[44/50]           Subjective:    Interval History: Ms. Boyce is here for  "follow up    60 y.o. woman with lung adenocarcinoma complicated by single brain met to cerebellum, and hypoxemia requiring continuous supplemental oxygen.     Oncologic History  She has been smoking since 15 years old and estimates about 0.5ppd.  She has always had some level of dyspnea with exertion at baseline, but noticed it was worse in February 2020 with an associated cough with white and clear sputum.  She says she was evaluated at [facility] who told her the scans were suspicious for cancer.  She then headed to [facility] but because of COVID-19 her appointment was cancelled.  She initially presented to [facility] without any new symptoms since February and the cough is persistent.  Denied any unilateral weakness, dizziness, headaches, or gait instability.  Was able to perform all ADLs and could walk 1 block before getting tired and needing to rest.  On 5/1/2020, she was "admitted to observation with SOB and cough x 2 months. CT chest Large RUL mass with scattered nodules and mediastinal lymphadenopathy; small-moderate right pleural effusion....S/p right lung biopsy 5/4/2020. Home O2 arranged to use with activity. Admitted to Medical Oncology 5/25/2020-5/29/2020 for worsening shortness of breath.  CTPE negative for PE. Pulmonology attempted thoracentesis twice. Second attempt resulted in 400cc being drained, without relief.  She then received x1 dose of carboplatin and palliative RT to the right lung. Patient reported improvement in symptoms after XRT and was discharged.      Single agent pembrolizumab was started for PDL1>50%.  She received her first dose of pembrolizumab 6/4/2020.  She since had an admit for right PleurX.  -Scans 8/3 decreased lesions per RECIST   -8/26: pleurX removed  -10/7/20: cycle 7 Pembrolizumab    Interval History  Patient seen today alone. Cough stable. ECOG 2. Having myalgias and arthralgias, did not like percocet, caused nausea and constipation, prefers tramadol. Quit smoking 6 months " ago.    Past Medical History:   Past Medical History:   Diagnosis Date    Anxiety     Asthma     Hepatitis C 2019    treated    Hx of psychiatric care     Hypertension     Lung cancer     Psychiatric exam requested by authority     Psychiatric problem        Past Surgical HIstory:   Past Surgical History:   Procedure Laterality Date    BRONCHOSCOPY N/A 9/3/2020    Procedure: Bronchoscopy;  Surgeon: Renee Diagnostic Provider;  Location: [facility] OR [address] [place] [address];  Service: Anesthesiology;  Laterality: N/A;     SECTION      KNEE SURGERY         Family History:   Family History   Problem Relation Age of Onset    Cancer Father        Social History:  reports that she quit smoking about 5 months ago. Her smoking use included cigarettes. She has a 20.00 pack-year smoking history. She has never used smokeless tobacco. She reports current alcohol use. She reports that she does not use drugs.    Allergies:  Review of patient's allergies indicates:  No Known Allergies    Medications:  Current Outpatient Medications   Medication Sig Dispense Refill    albuterol (PROVENTIL) 2.5 mg /3 mL (0.083 %) nebulizer solution Take 3 mls by nebulization every 3 hours while awake 180 mL 12    albuterol (PROVENTIL/VENTOLIN HFA) 90 mcg/actuation inhaler 2 puffs 4 (four) times daily as needed.      amLODIPine (NORVASC) 5 MG tablet Take 5 mg by mouth once daily.      benzonatate (TESSALON PERLES) 100 MG capsule Take 2 capsules (200 mg total) by mouth 3 (three) times daily as needed for Cough. 30 capsule 1    budesonide-formoterol 160-4.5 mcg (SYMBICORT) 160-4.5 mcg/actuation HFAA Inhale two puffs by mouth into the lungs twice daily 10.2 g 12    buPROPion (WELLBUTRIN SR) 150 MG TBSR 12 hr tablet Take 1 tablet (150 mg total) by mouth once daily. 30 tablet 6    diclofenac sodium (VOLTAREN) 1 % Gel Apply two grams topically four times a day 200 g 5    gabapentin (NEURONTIN) 400 MG capsule Take one capsule by mouth three times daily  90 capsule 3    hydrOXYzine HCL (ATARAX) 25 MG tablet Take 1 tablet (25 mg total) by mouth 3 (three) times daily. for 14 days 42 tablet 0    irbesartan-hydrochlorothiazide (AVALIDE) 300-12.5 mg per tablet Take one tablet by mouth every day 90 tablet 3    lidocaine (XYLOCAINE) 5 % Oint ointment Apply topically as needed. Apply 1 inch to biopsy site and cover with saran wrap.  Apply twice per day as needed for pain. 35.44 g 1    omeprazole (PRILOSEC) 20 MG capsule Take one capsule by mouth everyday. 90 capsule 3    ondansetron (ZOFRAN-ODT) 8 MG TbDL Dissolve 1 tablet (8 mg total) by mouth every 8 (eight) hours as needed. (Patient not taking: Reported on 9/25/2020) 60 tablet 0    polyethylene glycol (GLYCOLAX) 17 gram/dose powder mix 1 capful (17 g) and take by mouth once daily. 510 g 0    prochlorperazine (COMPAZINE) 10 MG tablet Take 1 tablet (10 mg total) by mouth 3 (three) times daily as needed (for nausea. if zofran does not work can use this medication instead). 60 tablet 2    promethazine (PHENERGAN) 6.25 mg/5 mL syrup 5 mLs daily as needed.      promethazine-codeine 6.25-10 mg/5 ml (PHENERGAN WITH CODEINE) 6.25-10 mg/5 mL syrup Take 5 mLs by mouth every 8 (eight) hours as needed for Cough. (Patient not taking: Reported on 9/25/2020) 473 mL 1    senna-docusate 8.6-50 mg (PERICOLACE) 8.6-50 mg per tablet Take 1 tablet by mouth 2 (two) times daily. 60 tablet 0     No current facility-administered medications for this visit.        Review of Systems   Constitutional: Positive for activity change and fatigue. Negative for chills, fever and unexpected weight change.   HENT: Negative for nosebleeds.    Eyes: Negative for visual disturbance.   Respiratory: Positive for cough and shortness of breath.    Cardiovascular: Negative for chest pain and leg swelling.   Gastrointestinal: Positive for constipation and nausea. Negative for abdominal distention, abdominal pain and diarrhea.   Endocrine: Negative for cold  "intolerance and heat intolerance.   Genitourinary: Negative for difficulty urinating and dysuria.   Musculoskeletal: Positive for arthralgias (chronic) and myalgias.   Skin: Negative for color change.   Neurological: Positive for weakness. Negative for dizziness, light-headedness and numbness.   Hematological: Does not bruise/bleed easily.   Psychiatric/Behavioral: Negative for confusion.       ECOG Performance Status:   2  Objective:      Vitals:   Vitals:    10/07/20 1052   BP: 109/71   Pulse: 85   Resp: (!) 22   Temp: 98.2 °F (36.8 °C)   TempSrc: Oral   SpO2: 97%   Weight: 79.1 kg (174 lb 6.1 oz)   Height: 5' 6" (1.676 m)     BMI: Body mass index is 28.15 kg/m².    Physical Exam  Constitutional:       General: She is not in acute distress.     Appearance: She is well-developed.      Comments: Walking   HENT:      Head: Normocephalic.      Mouth/Throat:      Pharynx: No oropharyngeal exudate.   Eyes:      General: No scleral icterus.     Pupils: Pupils are equal, round, and reactive to light.   Neck:      Musculoskeletal: Normal range of motion.      Trachea: No tracheal deviation.   Cardiovascular:      Rate and Rhythm: Normal rate and regular rhythm.      Heart sounds: Normal heart sounds. No murmur. No friction rub. No gallop.    Pulmonary:      Effort: Pulmonary effort is normal. No respiratory distress.      Breath sounds: No wheezing or rales.      Comments: Diminished breath sounds, off oxygen  Abdominal:      General: Bowel sounds are normal. There is no distension.      Palpations: Abdomen is soft. There is no mass.      Tenderness: There is no abdominal tenderness. There is no guarding.   Musculoskeletal: Normal range of motion.   Skin:     General: Skin is warm and dry.   Neurological:      Mental Status: She is alert.         Laboratory Data:   Recent Results (from the past 168 hour(s))   CBC auto differential    Collection Time: 10/06/20 10:08 AM   Result Value Ref Range    WBC 9.64 3.90 - 12.70 K/uL "    RBC 4.03 4.00 - 5.40 M/uL    Hemoglobin 10.8 (L) 12.0 - 16.0 g/dL    Hematocrit 35.0 (L) 37.0 - 48.5 %    Mean Corpuscular Volume 87 82 - 98 fL    Mean Corpuscular Hemoglobin 26.8 (L) 27.0 - 31.0 pg    Mean Corpuscular Hemoglobin Conc 30.9 (L) 32.0 - 36.0 g/dL    RDW 18.7 (H) 11.5 - 14.5 %    Platelets 310 150 - 350 K/uL    MPV 9.2 9.2 - 12.9 fL    Immature Granulocytes 1.0 (H) 0.0 - 0.5 %    Gran # (ANC) 6.7 1.8 - 7.7 K/uL    Immature Grans (Abs) 0.10 (H) 0.00 - 0.04 K/uL    Lymph # 1.7 1.0 - 4.8 K/uL    Mono # 0.8 0.3 - 1.0 K/uL    Eos # 0.3 0.0 - 0.5 K/uL    Baso # 0.08 0.00 - 0.20 K/uL    nRBC 0 0 /100 WBC    Gran% 69.6 38.0 - 73.0 %    Lymph% 18.0 18.0 - 48.0 %    Mono% 7.8 4.0 - 15.0 %    Eosinophil% 2.8 0.0 - 8.0 %    Basophil% 0.8 0.0 - 1.9 %    Differential Method Automated    Comprehensive metabolic panel    Collection Time: 10/06/20 10:08 AM   Result Value Ref Range    Sodium 139 136 - 145 mmol/L    Potassium 4.1 3.5 - 5.1 mmol/L    Chloride 105 95 - 110 mmol/L    CO2 25 23 - 29 mmol/L    Glucose 80 70 - 110 mg/dL    BUN, Bld 23 8 - 23 mg/dL    Creatinine 1.1 0.5 - 1.4 mg/dL    Calcium 9.7 8.7 - 10.5 mg/dL    Total Protein 8.2 6.0 - 8.4 g/dL    Albumin 3.3 (L) 3.5 - 5.2 g/dL    Total Bilirubin 0.3 0.1 - 1.0 mg/dL    Alkaline Phosphatase 95 55 - 135 U/L    AST 13 10 - 40 U/L    ALT 10 10 - 44 U/L    Anion Gap 9 8 - 16 mmol/L    eGFR if African American >60.0 >60 mL/min/1.73 m^2    eGFR if non  54.3 (A) >60 mL/min/1.73 m^2   TSH    Collection Time: 10/06/20 10:08 AM   Result Value Ref Range    TSH 1.904 0.400 - 4.000 uIU/mL   T4, free    Collection Time: 10/06/20 10:08 AM   Result Value Ref Range    Free T4 1.04 0.71 - 1.51 ng/dL     CareEverywhere  2/21/19  HCV RNA HAZEL Qualitative - [facility] Positive (A)   Comment: Positive: HCV RNA Detected         Imaging:   Narrative & Impression     EXAMINATION:  CT CHEST ABDOMEN PELVIS W W/O CONTRAST (XPD)     CLINICAL HISTORY:  f/u lung  cancer;Malignant neoplasm of unspecified part of right bronchus or lung     TECHNIQUE:  Routine CT of the chest, abdomen and pelvis was performed with the oral contrast and 75 cc Omnipaque 350 intravenous contrast.  Coronal and sagittal reconstructions were done.     COMPARISON:  CT chest 05/01/2020, CT abdomen pelvis 05/09/2020. nuclear medicine bone scan 05/11/2020.  CTA chest 05/25/2020.     FINDINGS:  Visualized structures in the lower neck, both the breast and axillary areas are unremarkable except mildly enlarged lymph node at the left axilla at 1.3 cm (series 2, image 15), previously 1.9 cm.     Thoracic aorta is normal size with left arch and minimal atherosclerosis calcifications.  Heart size is normal without pericardial fluid.  Enlarged right paratracheal lymph nodes are again identified consistent with regional spread of neoplasm.  Index lymph node measures 1.8 cm (series 2, image 30), previously 2.6 cm.  Non index areas show similar, mild decrease in size.     Trachea and bronchi are patent, noting some airway narrowing centrally in the right upper lobe, improved from prior.     Left lung is well expanded and shows no acute consolidation or pleural effusion.  Several subcentimeter nodules are again identified similar to prior exam and suspect for intrathoracic metastatic disease.  Index nodule in the left upper lobe is 0.4 cm (series 2, image 30), previously 0.7 cm.  No new nodules.     The right lung is expanded but now shows extensive airspace consolidation with air bronchograms with the relative sparing of the base of the lower lobe; this could represent edema, radiation pneumonitis, aspiration, infectious pneumonia, etc.  Correlate clinically.  The ill-defined region of decreased parenchymal enhancement in the upper lobe correlates with the previously demonstrated tumor, now measuring 5.0 x 5.5 cm (series 2, image 27), previously 6.5 x 7.8 cm.  A mild volume of pleural effusion is present the around  the right lung, potentially malignant effusion.  This shows some decrease in overall volume with a PleurX catheter now present.     The spleen, liver, portal vein, gallbladder, bile ducts, and pancreas are within normal limits.     Adrenal glands appear normal.  Kidneys show normal size and enhancement with no stone, obstruction, or solid mass detected.  Ureters are normal caliber.  Wall of urinary bladder is smooth.  Uterus is not identified, at no adnexal masses are seen.     Abdominal aorta has mild atherosclerosis but no aneurysm.  No enlarged retroperitoneal lymph nodes are seen.     No significant ascites is identified in the abdomen or pelvis.  Intestinal tract shows no obstruction or acute inflammatory process.  Small hiatal hernia is present.  Small bowel is normal caliber with Oral contrast going through it.  Normal appendix is visualized.  Diverticulosis is seen in the left colon.     Soft tissues of the abdominal wall show no significant abnormality.     Skeletal structures show no acute fracture.  Age-appropriate degenerative changes are noted in the spine.  The T2 vertebral body again shows a mixed lytic/sclerotic lesion consistent with osseous metastatic disease similar to the most recent CT exam.     Impression:     1. History adenocarcinoma of right lung, stage IV.  Ill-defined lesion in right upper lobe and ipsilateral mediastinal lymph node enlargement show mild interval decrease in size.  See summary below.  Several subcentimeter pulmonary nodules are again noted in left lung, possible metastatic disease.  T2 vertebral body lesion consistent with metastatic disease.  No new disease detected.  2. Persistent right pleural effusion, decreased in volume with PleurX catheter in place; this could be malignant effusion.  3. New development of extensive consolidation in right lung, correlate for radiation pneumonitis, infectious pneumonia, aspiration, etc.  4. Hysterectomy, diverticulosis, and other  "findings as above.  5.  RECIST SUMMARY:  Date of prior examination for comparison: 05/01/2020  Lesion 1: Right upper lobe.  5.5 cm. Series 2 image 27.  Prior measurement 7.8 cm.  Lesion 2: Mediastinal lymph node.  1.8 cm. Series 2 image 30.  Prior measurement 2.6 cm.        Reading Physician Reading Date Result Priority   Ambrocio Crooks MD  [phone] 8/3/2020 Routine   Darci Watters MD  [phone] 8/3/2020       Narrative & Impression     EXAMINATION:  MRI BRAIN WITHOUT CONTRAST     CLINICAL HISTORY:  f/u brain mets; Malignant neoplasm of unspecified part of right bronchus or lung     TECHNIQUE:  Multiplanar multisequence MR imaging of the brain was performed without contrast.     *Patient is scheduled for MRI brain before and after the administration of contrast however exam was prematurely terminated prior to contrast administration secondary to patient discomfort and "burning" sensation.  Pre-contrast images evaluated and was ultimately determined that risk of continuing outweighed the potential benefit. *     COMPARISON:  MRI brain 05/09/2020     FINDINGS:  Intracranial compartment:     Ventricles and sulci are normal in size for age without evidence of hydrocephalus.     No extra-axial blood or fluid collections     Small rounded focus of abnormal T2/FLAIR hyperintense signal and diffusion restriction within the right cerebellum compatible with known metastatic lesion.  Decreased associated edema which is minimal on today's exam.  Small amount of associated gradient susceptibility which which likely represents a small amount of hemorrhage no new lesions; exam somewhat limited secondary to lack of intravenous contrast.  Stable patchy T2/FLAIR hyperintensities within the periventricular and subcortical white matter likely representing chronic microvascular ischemic change.  No acute hemorrhage, infarct, edema, midline shift, or herniation.     Normal vascular flow voids are preserved on " "B0 diffusion imaging.     Skull/extracranial contents (limited evaluation): Bone marrow signal intensity is normal.     Impression:     Study limited secondary to premature termination prior to contrast administration secondary to patient discomfort and "burning" sensation.     Grossly stable right cerebellar metastasis with improving associated vasogenic edema.  No new lesions identified.     Unchanged supratentorial T2/FLAIR hyperintensities likely representative of mild chronic microvascular ischemic change.           Assessment:       1. Metastatic non-small cell lung cancer    2. Primary malignant neoplasm of lung with metastasis to brain    3. Malignant cachexia    4. Chronic hepatitis C without hepatic coma    5. Tobacco abuse    6. Chronic respiratory failure with hypoxia    7. Cough    8. Anxiety    9. Asthma, unspecified asthma severity, unspecified whether complicated, unspecified whether persistent    10. Lung mass           Plan:       1. 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  Also has pleural effusion on right side s/p therapeutic thoracentesis 5/2020. PDL1 expressed in 100% of tumor cells from biopsy.  No mutation identified for EGFR/ALK/ROS1/BRAF by tissue.  -Continue single agent pembrolizumab  -Labs have been reviewed.  -Proceed with cycle 7 today  -Scans in Nov 2020, last scans 8/3/20  -Return to clinic in 3 weeks  -TSH and fT4 next due with next cycle  -Discussed with hepatology and no additional antiviral therapy required with her history of Hep C.  -Not eligible for [id number]  -Has established care with palliative care, trial of percocet instead of tramadol, did not like percocet d/t nausea/constipation, would like to switch back to tramadol    2. Cough  -On tessalon & codeine cough syrup Q8H PRN, refilled today  -On gabapentin  -Previously also tried mucinex  -Had right pleurX placed 6/24 by Dr. Garcia, s/p removal 8/26/20  -Trialed prednisone for ?radiation pneumonitis " - completed    3. HCPOA  -Patient appointed her daughter and niece     4. Anxiety/insomnia  -On wellbutrin, instructed not to take buspirone  -Also on trazodone PRN        Follow-up: RTC for labs (CBC, CMP) prior to MD visit in 3 weeks 10/28/20 and cycle 8 Keytruda    The following was staffed and discussed with supervising physician Dr. Mederos.    Lauren Rocha MD PGY-VI  Hematology/Oncology Fellow

## 2020-10-07 ENCOUNTER — INFUSION (OUTPATIENT)
Dept: INFUSION THERAPY | Facility: HOSPITAL | Age: 60
End: 2020-10-07
Attending: INTERNAL MEDICINE
Payer: MEDICAID

## 2020-10-07 ENCOUNTER — OFFICE VISIT (OUTPATIENT)
Dept: HEMATOLOGY/ONCOLOGY | Facility: CLINIC | Age: 60
End: 2020-10-07
Payer: MEDICAID

## 2020-10-07 VITALS
BODY MASS INDEX: 28.03 KG/M2 | WEIGHT: 174.38 LBS | HEART RATE: 77 BPM | SYSTOLIC BLOOD PRESSURE: 103 MMHG | TEMPERATURE: 98 F | DIASTOLIC BLOOD PRESSURE: 64 MMHG | HEIGHT: 66 IN | RESPIRATION RATE: 18 BRPM

## 2020-10-07 VITALS
TEMPERATURE: 98 F | OXYGEN SATURATION: 97 % | HEIGHT: 66 IN | BODY MASS INDEX: 28.03 KG/M2 | DIASTOLIC BLOOD PRESSURE: 71 MMHG | RESPIRATION RATE: 22 BRPM | SYSTOLIC BLOOD PRESSURE: 109 MMHG | HEART RATE: 85 BPM | WEIGHT: 174.38 LBS

## 2020-10-07 DIAGNOSIS — C34.91 ADENOCARCINOMA OF LUNG, STAGE 4, RIGHT: Primary | ICD-10-CM

## 2020-10-07 DIAGNOSIS — C79.31 PRIMARY MALIGNANT NEOPLASM OF LUNG WITH METASTASIS TO BRAIN: ICD-10-CM

## 2020-10-07 DIAGNOSIS — C34.90 METASTATIC NON-SMALL CELL LUNG CANCER: ICD-10-CM

## 2020-10-07 DIAGNOSIS — Z72.0 TOBACCO ABUSE: ICD-10-CM

## 2020-10-07 DIAGNOSIS — G89.3 CHRONIC NEOPLASM-RELATED PAIN: ICD-10-CM

## 2020-10-07 DIAGNOSIS — C34.90 PRIMARY MALIGNANT NEOPLASM OF LUNG WITH METASTASIS TO BRAIN: ICD-10-CM

## 2020-10-07 DIAGNOSIS — J96.11 CHRONIC RESPIRATORY FAILURE WITH HYPOXIA: ICD-10-CM

## 2020-10-07 DIAGNOSIS — R64 MALIGNANT CACHEXIA: ICD-10-CM

## 2020-10-07 DIAGNOSIS — C34.90 PRIMARY MALIGNANT NEOPLASM OF LUNG WITH METASTASIS TO BRAIN: Primary | ICD-10-CM

## 2020-10-07 DIAGNOSIS — R91.8 LUNG MASS: ICD-10-CM

## 2020-10-07 DIAGNOSIS — B18.2 CHRONIC HEPATITIS C WITHOUT HEPATIC COMA: ICD-10-CM

## 2020-10-07 DIAGNOSIS — C79.31 PRIMARY MALIGNANT NEOPLASM OF LUNG WITH METASTASIS TO BRAIN: Primary | ICD-10-CM

## 2020-10-07 DIAGNOSIS — F41.9 ANXIETY: ICD-10-CM

## 2020-10-07 DIAGNOSIS — J45.909 ASTHMA, UNSPECIFIED ASTHMA SEVERITY, UNSPECIFIED WHETHER COMPLICATED, UNSPECIFIED WHETHER PERSISTENT: ICD-10-CM

## 2020-10-07 DIAGNOSIS — R05.9 COUGH: ICD-10-CM

## 2020-10-07 PROCEDURE — 99999 PR PBB SHADOW E&M-EST. PATIENT-LVL IV: ICD-10-PCS | Mod: PBBFAC,,, | Performed by: STUDENT IN AN ORGANIZED HEALTH CARE EDUCATION/TRAINING PROGRAM

## 2020-10-07 PROCEDURE — A4216 STERILE WATER/SALINE, 10 ML: HCPCS | Performed by: INTERNAL MEDICINE

## 2020-10-07 PROCEDURE — 99999 PR PBB SHADOW E&M-EST. PATIENT-LVL IV: CPT | Mod: PBBFAC,,, | Performed by: STUDENT IN AN ORGANIZED HEALTH CARE EDUCATION/TRAINING PROGRAM

## 2020-10-07 PROCEDURE — 99215 PR OFFICE/OUTPT VISIT, EST, LEVL V, 40-54 MIN: ICD-10-PCS | Mod: S$PBB,,, | Performed by: STUDENT IN AN ORGANIZED HEALTH CARE EDUCATION/TRAINING PROGRAM

## 2020-10-07 PROCEDURE — 99215 OFFICE O/P EST HI 40 MIN: CPT | Mod: S$PBB,,, | Performed by: STUDENT IN AN ORGANIZED HEALTH CARE EDUCATION/TRAINING PROGRAM

## 2020-10-07 PROCEDURE — 96413 CHEMO IV INFUSION 1 HR: CPT

## 2020-10-07 PROCEDURE — 25000003 PHARM REV CODE 250: Performed by: INTERNAL MEDICINE

## 2020-10-07 PROCEDURE — 63600175 PHARM REV CODE 636 W HCPCS: Mod: JG | Performed by: INTERNAL MEDICINE

## 2020-10-07 PROCEDURE — 99214 OFFICE O/P EST MOD 30 MIN: CPT | Mod: PBBFAC,25 | Performed by: STUDENT IN AN ORGANIZED HEALTH CARE EDUCATION/TRAINING PROGRAM

## 2020-10-07 RX ORDER — PROMETHAZINE HYDROCHLORIDE AND CODEINE PHOSPHATE 6.25; 1 MG/5ML; MG/5ML
5 SOLUTION ORAL EVERY 8 HOURS PRN
Qty: 473 ML | Refills: 1 | Status: SHIPPED | OUTPATIENT
Start: 2020-10-07 | End: 2020-10-28 | Stop reason: SDUPTHER

## 2020-10-07 RX ORDER — BUPROPION HYDROCHLORIDE 150 MG/1
150 TABLET, EXTENDED RELEASE ORAL DAILY
Qty: 30 TABLET | Refills: 6 | Status: SHIPPED | OUTPATIENT
Start: 2020-10-07 | End: 2020-11-05 | Stop reason: SDUPTHER

## 2020-10-07 RX ORDER — TRAMADOL HYDROCHLORIDE 50 MG/1
100 TABLET ORAL EVERY 8 HOURS PRN
Qty: 60 TABLET | Refills: 0 | Status: SHIPPED | OUTPATIENT
Start: 2020-10-07 | End: 2020-10-28 | Stop reason: SDUPTHER

## 2020-10-07 RX ORDER — SODIUM CHLORIDE 0.9 % (FLUSH) 0.9 %
10 SYRINGE (ML) INJECTION
Status: CANCELLED | OUTPATIENT
Start: 2020-10-07

## 2020-10-07 RX ORDER — HEPARIN 100 UNIT/ML
500 SYRINGE INTRAVENOUS
Status: DISCONTINUED | OUTPATIENT
Start: 2020-10-07 | End: 2020-10-07 | Stop reason: HOSPADM

## 2020-10-07 RX ORDER — HEPARIN 100 UNIT/ML
500 SYRINGE INTRAVENOUS
Status: CANCELLED | OUTPATIENT
Start: 2020-10-07

## 2020-10-07 RX ORDER — SODIUM CHLORIDE 0.9 % (FLUSH) 0.9 %
10 SYRINGE (ML) INJECTION
Status: DISCONTINUED | OUTPATIENT
Start: 2020-10-07 | End: 2020-10-07 | Stop reason: HOSPADM

## 2020-10-07 RX ADMIN — Medication 10 ML: at 12:10

## 2020-10-07 RX ADMIN — SODIUM CHLORIDE 200 MG: 9 INJECTION, SOLUTION INTRAVENOUS at 11:10

## 2020-10-15 NOTE — PLAN OF CARE
Form completed and given to Victorina to be faxed. Patient is discharged home with self care and DME. [facility] delivered oxygen tank and nebulizer tot he bedside.     CM needs addressed for discharge.     Patient family will transport her home.        05/04/20 4867   Final Note   Anticipated Discharge Disposition Home   What phone number can be called within the next 1-3 days to see how you are doing after discharge? [phone]   Right Care Referral Info   Post Acute Recommendation No Care

## 2020-10-22 ENCOUNTER — TELEPHONE (OUTPATIENT)
Dept: HEMATOLOGY/ONCOLOGY | Facility: CLINIC | Age: 60
End: 2020-10-22

## 2020-10-22 NOTE — NURSING
Pt calling to request additional Ensure drink.  Available to pick it up Tuesday 10/27 or Wednesday 10/28, message sent to FERNANDA Orta.

## 2020-10-23 ENCOUNTER — DOCUMENTATION ONLY (OUTPATIENT)
Dept: HEMATOLOGY/ONCOLOGY | Facility: CLINIC | Age: 60
End: 2020-10-23

## 2020-10-23 NOTE — PROGRESS NOTES
Received message from the clinic that the patient was in need of more nutritional supplements. Attempted to contact patient to discuss what flavors needed to be ordered. There was no answer so I left a detailed message along with my contact information.

## 2020-10-27 ENCOUNTER — LAB VISIT (OUTPATIENT)
Dept: LAB | Facility: HOSPITAL | Age: 60
End: 2020-10-27
Payer: MEDICAID

## 2020-10-27 ENCOUNTER — TELEPHONE (OUTPATIENT)
Dept: HEMATOLOGY/ONCOLOGY | Facility: CLINIC | Age: 60
End: 2020-10-27

## 2020-10-27 DIAGNOSIS — C34.91 ADENOCARCINOMA OF LUNG, STAGE 4, RIGHT: ICD-10-CM

## 2020-10-27 LAB
ALBUMIN SERPL BCP-MCNC: 3.3 G/DL (ref 3.5–5.2)
ALP SERPL-CCNC: 95 U/L (ref 55–135)
ALT SERPL W/O P-5'-P-CCNC: 8 U/L (ref 10–44)
ANION GAP SERPL CALC-SCNC: 10 MMOL/L (ref 8–16)
AST SERPL-CCNC: 14 U/L (ref 10–40)
BILIRUB SERPL-MCNC: 0.3 MG/DL (ref 0.1–1)
BUN SERPL-MCNC: 24 MG/DL (ref 8–23)
CALCIUM SERPL-MCNC: 9.8 MG/DL (ref 8.7–10.5)
CHLORIDE SERPL-SCNC: 105 MMOL/L (ref 95–110)
CO2 SERPL-SCNC: 24 MMOL/L (ref 23–29)
CREAT SERPL-MCNC: 1.1 MG/DL (ref 0.5–1.4)
ERYTHROCYTE [DISTWIDTH] IN BLOOD BY AUTOMATED COUNT: 17.2 % (ref 11.5–14.5)
EST. GFR  (AFRICAN AMERICAN): >60 ML/MIN/1.73 M^2
EST. GFR  (NON AFRICAN AMERICAN): 54.3 ML/MIN/1.73 M^2
GLUCOSE SERPL-MCNC: 101 MG/DL (ref 70–110)
HCT VFR BLD AUTO: 36.7 % (ref 37–48.5)
HGB BLD-MCNC: 11.1 G/DL (ref 12–16)
IMM GRANULOCYTES # BLD AUTO: 0.05 K/UL (ref 0–0.04)
MCH RBC QN AUTO: 27 PG (ref 27–31)
MCHC RBC AUTO-ENTMCNC: 30.2 G/DL (ref 32–36)
MCV RBC AUTO: 89 FL (ref 82–98)
NEUTROPHILS # BLD AUTO: 6.3 K/UL (ref 1.8–7.7)
PLATELET # BLD AUTO: 288 K/UL (ref 150–350)
PMV BLD AUTO: 9 FL (ref 9.2–12.9)
POTASSIUM SERPL-SCNC: 3.8 MMOL/L (ref 3.5–5.1)
PROT SERPL-MCNC: 8.3 G/DL (ref 6–8.4)
RBC # BLD AUTO: 4.11 M/UL (ref 4–5.4)
SODIUM SERPL-SCNC: 139 MMOL/L (ref 136–145)
T4 FREE SERPL-MCNC: 1.06 NG/DL (ref 0.71–1.51)
TSH SERPL DL<=0.005 MIU/L-ACNC: 0.78 UIU/ML (ref 0.4–4)
WBC # BLD AUTO: 9.38 K/UL (ref 3.9–12.7)

## 2020-10-27 PROCEDURE — 85027 COMPLETE CBC AUTOMATED: CPT

## 2020-10-27 PROCEDURE — 84443 ASSAY THYROID STIM HORMONE: CPT

## 2020-10-27 PROCEDURE — 80053 COMPREHEN METABOLIC PANEL: CPT

## 2020-10-27 PROCEDURE — 36415 COLL VENOUS BLD VENIPUNCTURE: CPT

## 2020-10-27 PROCEDURE — 84439 ASSAY OF FREE THYROXINE: CPT

## 2020-10-27 NOTE — TELEPHONE ENCOUNTER
"----- Message from Laura Clark sent at 10/27/2020 12:30 PM CDT -----  Patient Assist    Name of caller:  Janeth   Contact Preference:  [phone]  Does Patient feel the need to see the MD today? No   Physician:   What is the nature of the call?    - pt has been trying to connect with a sw but not getting a   response to her messages. Please contact pt if able to assist.     Additional Notes:   Call connected to STEFAN Larkin 2x and notified.   "Thank you for all that you do for our patients'"          "

## 2020-10-27 NOTE — PROGRESS NOTES
PATIENT: Janeth Boyce  MRN: [id number]  DATE: 10/28/2020      Diagnosis:   1. Metastatic non-small cell lung cancer    2. Primary malignant neoplasm of lung with metastasis to brain    3. Chronic respiratory failure with hypoxia    4. Essential hypertension    5. Anxiety    6. Chronic hepatitis C without hepatic coma    7. Tobacco abuse    8. Malignant cachexia    9. Cough        Chief Complaint: Primary malignant neoplasm of lung with metastasis to brain; Adenocarcinoma of lung, stage 4, right; and Knee Pain      Oncologic History:    Oncologic History 1. Lung adenocarcinoma    Oncologic Treatment 1. Carboplatin AUC4 x1 dose 5/27/20 and palliative radiation (5/27/2020 - 6/2/2020; 20 Gy to right lung)  2. Pembrolizumab  Cycle 1 6/4/2020       Pathology 5/5/2020  Right lung, biopsy:  Positive for adenocarcinoma with areas of necrosis.  Interpretation:   Right lung, specimen for PD-L1 immunohistochemistry studies (clone [id number], [facility], [place], [place]) ([id number]): 100% tumor cells are positive for PD-L1 (membranous positivity).     BRAF Mutation Analysis (V600E), Tumor Result Summary: NO MUTATION IDENTIFIED.   Result: BRAF status: Wild-type   Interpretation :     EGFR Gene, Mutation Analysis, Tumor Result Summary: NO MUTATION IDENTIFIED.   Result: EGFR status: Wild-type     ALK (2p23), Lung Cancer, FISH, Ts Result Summary: NEGATIVE   Interpretation: No rearrangement of the ALK gene was identified.     Result: nuc micha(ALKx3)[29/50]   Of 50 nuclei, 0% had separation of the 3'ALK and 5'ALK signals and 58% had three intact 3'ALK/5'ALK signals. The normal cutoff is <50.0% for one 3'ALK, one 5'ALK and one 3'ALK/5'ALK signal and <18.0% for three 3'ALK/5'ALK signals.     ROS1 (6Q22), FISH, Ts Result Summary: NEGATIVE   Interpretation: No rearrangement of the ROS1 gene was identified.   Result nuc micha(ROS1x1)[44/50]           Subjective:    Interval History: Ms. Boyce is here for follow up    60 y.o.  "woman with lung adenocarcinoma complicated by single brain met to cerebellum, and hypoxemia requiring continuous supplemental oxygen.     Oncologic History  She has been smoking since 15 years old and estimates about 0.5ppd.  She has always had some level of dyspnea with exertion at baseline, but noticed it was worse in February 2020 with an associated cough with white and clear sputum.  She says she was evaluated at [facility] who told her the scans were suspicious for cancer.  She then headed to [facility] but because of COVID-19 her appointment was cancelled.  She initially presented to [facility] without any new symptoms since February and the cough is persistent.  Denied any unilateral weakness, dizziness, headaches, or gait instability.  Was able to perform all ADLs and could walk 1 block before getting tired and needing to rest.  On 5/1/2020, she was "admitted to observation with SOB and cough x 2 months. CT chest Large RUL mass with scattered nodules and mediastinal lymphadenopathy; small-moderate right pleural effusion....S/p right lung biopsy 5/4/2020. Home O2 arranged to use with activity. Admitted to Medical Oncology 5/25/2020-5/29/2020 for worsening shortness of breath.  CTPE negative for PE. Pulmonology attempted thoracentesis twice. Second attempt resulted in 400cc being drained, without relief.  She then received x1 dose of carboplatin and palliative RT to the right lung. Patient reported improvement in symptoms after XRT and was discharged.      Single agent pembrolizumab was started for PDL1>50%.  She received her first dose of pembrolizumab 6/4/2020.  She since had an admit for right PleurX.  -Scans 8/3 decreased lesions per RECIST   -8/26: pleurX removed  -10/28/20: cycle 8 Pembrolizumab, reschedule d/t storm to 10/30    Interval History  Patient seen today with her family member. Cough stable. ECOG 2. Having arthralgias especially in knees. Good appetite. Chronic dyspnea. No nausea/vomiting/diarrhea.    Past " Medical History:   Past Medical History:   Diagnosis Date    Anxiety     Asthma     Hepatitis C 2019    treated    Hx of psychiatric care     Hypertension     Lung cancer     Psychiatric exam requested by authority     Psychiatric problem        Past Surgical HIstory:   Past Surgical History:   Procedure Laterality Date    BRONCHOSCOPY N/A 9/3/2020    Procedure: Bronchoscopy;  Surgeon: Renee Diagnostic Provider;  Location: [facility] OR [address] [place] [address];  Service: Anesthesiology;  Laterality: N/A;     SECTION      KNEE SURGERY         Family History:   Family History   Problem Relation Age of Onset    Cancer Father        Social History:  reports that she quit smoking about 6 months ago. Her smoking use included cigarettes. She has a 20.00 pack-year smoking history. She has never used smokeless tobacco. She reports current alcohol use. She reports that she does not use drugs.    Allergies:  Review of patient's allergies indicates:  No Known Allergies    Medications:  Current Outpatient Medications   Medication Sig Dispense Refill    albuterol (PROVENTIL) 2.5 mg /3 mL (0.083 %) nebulizer solution Take 3 mls by nebulization every 3 hours while awake 180 mL 12    albuterol (PROVENTIL/VENTOLIN HFA) 90 mcg/actuation inhaler 2 puffs 4 (four) times daily as needed.      amLODIPine (NORVASC) 5 MG tablet Take 5 mg by mouth once daily.      benzonatate (TESSALON PERLES) 100 MG capsule Take 2 capsules (200 mg total) by mouth 3 (three) times daily as needed for Cough. 30 capsule 1    budesonide-formoterol 160-4.5 mcg (SYMBICORT) 160-4.5 mcg/actuation HFAA Inhale two puffs by mouth into the lungs twice daily 10.2 g 12    buPROPion (WELLBUTRIN SR) 150 MG TBSR 12 hr tablet Take 1 tablet (150 mg total) by mouth once daily. 30 tablet 6    diclofenac sodium (VOLTAREN) 1 % Gel Apply two grams topically four times a day 200 g 5    gabapentin (NEURONTIN) 400 MG capsule Take one capsule by mouth three times daily 90 capsule 3     irbesartan-hydrochlorothiazide (AVALIDE) 300-12.5 mg per tablet Take one tablet by mouth every day 90 tablet 3    lidocaine (XYLOCAINE) 5 % Oint ointment Apply topically as needed. Apply 1 inch to biopsy site and cover with saran wrap.  Apply twice per day as needed for pain. 35.44 g 1    omeprazole (PRILOSEC) 20 MG capsule Take one capsule by mouth everyday. 90 capsule 3    polyethylene glycol (GLYCOLAX) 17 gram/dose powder mix 1 capful (17 g) and take by mouth once daily. 510 g 0    prochlorperazine (COMPAZINE) 10 MG tablet Take 1 tablet (10 mg total) by mouth 3 (three) times daily as needed (for nausea. if zofran does not work can use this medication instead). 60 tablet 2    promethazine (PHENERGAN) 6.25 mg/5 mL syrup 5 mLs daily as needed.      promethazine-codeine 6.25-10 mg/5 ml (PHENERGAN WITH CODEINE) 6.25-10 mg/5 mL syrup Take 5 mLs by mouth every 8 (eight) hours as needed for Cough. 473 mL 1    senna-docusate 8.6-50 mg (PERICOLACE) 8.6-50 mg per tablet Take 1 tablet by mouth 2 (two) times daily. 60 tablet 0    traMADoL (ULTRAM) 50 mg tablet Take 2 tablets (100 mg total) by mouth every 8 (eight) hours as needed for Pain. 60 tablet 0    ondansetron (ZOFRAN-ODT) 8 MG TbDL Dissolve 1 tablet (8 mg total) by mouth every 8 (eight) hours as needed. (Patient not taking: Reported on 9/25/2020) 60 tablet 0     No current facility-administered medications for this visit.        Review of Systems   Constitutional: Positive for activity change and fatigue. Negative for chills, fever and unexpected weight change.   HENT: Negative for nosebleeds.    Eyes: Negative for visual disturbance.   Respiratory: Positive for cough and shortness of breath.    Cardiovascular: Negative for chest pain and leg swelling.   Gastrointestinal: Negative for abdominal distention, abdominal pain, constipation, diarrhea and nausea.   Endocrine: Negative for cold intolerance and heat intolerance.   Genitourinary: Negative for  "difficulty urinating and dysuria.   Musculoskeletal: Positive for arthralgias (chronic) and myalgias.   Skin: Negative for color change.   Neurological: Positive for weakness. Negative for dizziness, light-headedness and numbness.   Hematological: Does not bruise/bleed easily.   Psychiatric/Behavioral: Negative for confusion.       ECOG Performance Status:   2  Objective:      Vitals:   Vitals:    10/28/20 0917   BP: 110/76   BP Location: Right arm   Patient Position: Sitting   BP Method: Medium (Automatic)   Pulse: 89   Resp: 18   Temp: 98.3 °F (36.8 °C)   TempSrc: Oral   SpO2: 98%   Weight: 80.7 kg (177 lb 14.6 oz)   Height: 5' 6" (1.676 m)     BMI: Body mass index is 28.72 kg/m².    Physical Exam  Constitutional:       General: She is not in acute distress.     Appearance: She is well-developed.      Comments: Walking   HENT:      Head: Normocephalic.      Mouth/Throat:      Pharynx: No oropharyngeal exudate.   Eyes:      General: No scleral icterus.     Pupils: Pupils are equal, round, and reactive to light.   Neck:      Musculoskeletal: Normal range of motion.      Trachea: No tracheal deviation.   Cardiovascular:      Rate and Rhythm: Normal rate and regular rhythm.      Heart sounds: Normal heart sounds. No murmur. No friction rub. No gallop.    Pulmonary:      Effort: Pulmonary effort is normal. No respiratory distress.      Breath sounds: No wheezing or rales.      Comments: Diminished breath sounds, off oxygen  Abdominal:      General: Bowel sounds are normal. There is no distension.      Palpations: Abdomen is soft. There is no mass.      Tenderness: There is no abdominal tenderness. There is no guarding.   Musculoskeletal: Normal range of motion.   Skin:     General: Skin is warm and dry.   Neurological:      Mental Status: She is alert.         Laboratory Data:   Recent Results (from the past 168 hour(s))   CBC Oncology    Collection Time: 10/27/20  9:50 AM   Result Value Ref Range    WBC 9.38 3.90 - " 12.70 K/uL    RBC 4.11 4.00 - 5.40 M/uL    Hemoglobin 11.1 (L) 12.0 - 16.0 g/dL    Hematocrit 36.7 (L) 37.0 - 48.5 %    MCV 89 82 - 98 fL    MCH 27.0 27.0 - 31.0 pg    MCHC 30.2 (L) 32.0 - 36.0 g/dL    RDW 17.2 (H) 11.5 - 14.5 %    Platelets 288 150 - 350 K/uL    MPV 9.0 (L) 9.2 - 12.9 fL    Gran # (ANC) 6.3 1.8 - 7.7 K/uL    Immature Grans (Abs) 0.05 (H) 0.00 - 0.04 K/uL   Comprehensive metabolic panel    Collection Time: 10/27/20  9:50 AM   Result Value Ref Range    Sodium 139 136 - 145 mmol/L    Potassium 3.8 3.5 - 5.1 mmol/L    Chloride 105 95 - 110 mmol/L    CO2 24 23 - 29 mmol/L    Glucose 101 70 - 110 mg/dL    BUN 24 (H) 8 - 23 mg/dL    Creatinine 1.1 0.5 - 1.4 mg/dL    Calcium 9.8 8.7 - 10.5 mg/dL    Total Protein 8.3 6.0 - 8.4 g/dL    Albumin 3.3 (L) 3.5 - 5.2 g/dL    Total Bilirubin 0.3 0.1 - 1.0 mg/dL    Alkaline Phosphatase 95 55 - 135 U/L    AST 14 10 - 40 U/L    ALT 8 (L) 10 - 44 U/L    Anion Gap 10 8 - 16 mmol/L    eGFR if African American >60.0 >60 mL/min/1.73 m^2    eGFR if non  54.3 (A) >60 mL/min/1.73 m^2   TSH    Collection Time: 10/27/20  9:50 AM   Result Value Ref Range    TSH 0.781 0.400 - 4.000 uIU/mL   FREE T4    Collection Time: 10/27/20  9:50 AM   Result Value Ref Range    Free T4 1.06 0.71 - 1.51 ng/dL     CareEverywhere  2/21/19  HCV RNA HAZEL Qualitative - [facility] Positive (A)   Comment: Positive: HCV RNA Detected         Imaging:   Narrative & Impression     EXAMINATION:  CT CHEST ABDOMEN PELVIS W W/O CONTRAST (XPD)     CLINICAL HISTORY:  f/u lung cancer;Malignant neoplasm of unspecified part of right bronchus or lung     TECHNIQUE:  Routine CT of the chest, abdomen and pelvis was performed with the oral contrast and 75 cc Omnipaque 350 intravenous contrast.  Coronal and sagittal reconstructions were done.     COMPARISON:  CT chest 05/01/2020, CT abdomen pelvis 05/09/2020. nuclear medicine bone scan 05/11/2020.  CTA chest 05/25/2020.     FINDINGS:  Visualized structures  in the lower neck, both the breast and axillary areas are unremarkable except mildly enlarged lymph node at the left axilla at 1.3 cm (series 2, image 15), previously 1.9 cm.     Thoracic aorta is normal size with left arch and minimal atherosclerosis calcifications.  Heart size is normal without pericardial fluid.  Enlarged right paratracheal lymph nodes are again identified consistent with regional spread of neoplasm.  Index lymph node measures 1.8 cm (series 2, image 30), previously 2.6 cm.  Non index areas show similar, mild decrease in size.     Trachea and bronchi are patent, noting some airway narrowing centrally in the right upper lobe, improved from prior.     Left lung is well expanded and shows no acute consolidation or pleural effusion.  Several subcentimeter nodules are again identified similar to prior exam and suspect for intrathoracic metastatic disease.  Index nodule in the left upper lobe is 0.4 cm (series 2, image 30), previously 0.7 cm.  No new nodules.     The right lung is expanded but now shows extensive airspace consolidation with air bronchograms with the relative sparing of the base of the lower lobe; this could represent edema, radiation pneumonitis, aspiration, infectious pneumonia, etc.  Correlate clinically.  The ill-defined region of decreased parenchymal enhancement in the upper lobe correlates with the previously demonstrated tumor, now measuring 5.0 x 5.5 cm (series 2, image 27), previously 6.5 x 7.8 cm.  A mild volume of pleural effusion is present the around the right lung, potentially malignant effusion.  This shows some decrease in overall volume with a PleurX catheter now present.     The spleen, liver, portal vein, gallbladder, bile ducts, and pancreas are within normal limits.     Adrenal glands appear normal.  Kidneys show normal size and enhancement with no stone, obstruction, or solid mass detected.  Ureters are normal caliber.  Wall of urinary bladder is smooth.  Uterus  is not identified, at no adnexal masses are seen.     Abdominal aorta has mild atherosclerosis but no aneurysm.  No enlarged retroperitoneal lymph nodes are seen.     No significant ascites is identified in the abdomen or pelvis.  Intestinal tract shows no obstruction or acute inflammatory process.  Small hiatal hernia is present.  Small bowel is normal caliber with Oral contrast going through it.  Normal appendix is visualized.  Diverticulosis is seen in the left colon.     Soft tissues of the abdominal wall show no significant abnormality.     Skeletal structures show no acute fracture.  Age-appropriate degenerative changes are noted in the spine.  The T2 vertebral body again shows a mixed lytic/sclerotic lesion consistent with osseous metastatic disease similar to the most recent CT exam.     Impression:     1. History adenocarcinoma of right lung, stage IV.  Ill-defined lesion in right upper lobe and ipsilateral mediastinal lymph node enlargement show mild interval decrease in size.  See summary below.  Several subcentimeter pulmonary nodules are again noted in left lung, possible metastatic disease.  T2 vertebral body lesion consistent with metastatic disease.  No new disease detected.  2. Persistent right pleural effusion, decreased in volume with PleurX catheter in place; this could be malignant effusion.  3. New development of extensive consolidation in right lung, correlate for radiation pneumonitis, infectious pneumonia, aspiration, etc.  4. Hysterectomy, diverticulosis, and other findings as above.  5.  RECIST SUMMARY:  Date of prior examination for comparison: 05/01/2020  Lesion 1: Right upper lobe.  5.5 cm. Series 2 image 27.  Prior measurement 7.8 cm.  Lesion 2: Mediastinal lymph node.  1.8 cm. Series 2 image 30.  Prior measurement 2.6 cm.        Reading Physician Reading Date Result Priority   Ambrocio Crooks MD  [phone] 8/3/2020 Routine   Darci Watters MD  [phone]  "8/3/2020       Narrative & Impression     EXAMINATION:  MRI BRAIN WITHOUT CONTRAST     CLINICAL HISTORY:  f/u brain mets; Malignant neoplasm of unspecified part of right bronchus or lung     TECHNIQUE:  Multiplanar multisequence MR imaging of the brain was performed without contrast.     *Patient is scheduled for MRI brain before and after the administration of contrast however exam was prematurely terminated prior to contrast administration secondary to patient discomfort and "burning" sensation.  Pre-contrast images evaluated and was ultimately determined that risk of continuing outweighed the potential benefit. *     COMPARISON:  MRI brain 05/09/2020     FINDINGS:  Intracranial compartment:     Ventricles and sulci are normal in size for age without evidence of hydrocephalus.     No extra-axial blood or fluid collections     Small rounded focus of abnormal T2/FLAIR hyperintense signal and diffusion restriction within the right cerebellum compatible with known metastatic lesion.  Decreased associated edema which is minimal on today's exam.  Small amount of associated gradient susceptibility which which likely represents a small amount of hemorrhage no new lesions; exam somewhat limited secondary to lack of intravenous contrast.  Stable patchy T2/FLAIR hyperintensities within the periventricular and subcortical white matter likely representing chronic microvascular ischemic change.  No acute hemorrhage, infarct, edema, midline shift, or herniation.     Normal vascular flow voids are preserved on B0 diffusion imaging.     Skull/extracranial contents (limited evaluation): Bone marrow signal intensity is normal.     Impression:     Study limited secondary to premature termination prior to contrast administration secondary to patient discomfort and "burning" sensation.     Grossly stable right cerebellar metastasis with improving associated vasogenic edema.  No new lesions identified.     Unchanged supratentorial " T2/FLAIR hyperintensities likely representative of mild chronic microvascular ischemic change.           Assessment:       1. Metastatic non-small cell lung cancer    2. Primary malignant neoplasm of lung with metastasis to brain    3. Chronic respiratory failure with hypoxia    4. Essential hypertension    5. Anxiety    6. Chronic hepatitis C without hepatic coma    7. Tobacco abuse    8. Malignant cachexia    9. Cough           Plan:       1. 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  Also has pleural effusion on right side s/p therapeutic thoracentesis 5/2020. PDL1 expressed in 100% of tumor cells from biopsy.  No mutation identified for EGFR/ALK/ROS1/BRAF by tissue.  -Continue single agent pembrolizumab  -Labs have been reviewed.  -Proceed with cycle 8 today - reschedule to 10/30 d/t storm  -Scans in Nov 2020, last scans 8/3/20  -Return to clinic in 3 weeks  -TSH and fT4 next due every other cycle  -Discussed with hepatology and no additional antiviral therapy required with her history of Hep C.  -Not eligible for [id number]    2. Arthralgias  -Has established care with palliative care, trial of percocet instead of tramadol, did not like percocet d/t nausea/constipation, would like to switch back to tramadol    3. Cough  -On tessalon & codeine cough syrup Q8H PRN, refilled today  -On gabapentin  -Previously also tried mucinex  -Had right pleurX placed 6/24 by Dr. Garcia, s/p removal 8/26/20  -Trialed prednisone for ?radiation pneumonitis - completed    4. HCPOA  -Patient appointed her daughter and niece     5. Anxiety/insomnia  -On wellbutrin, instructed not to take buspirone  -Also on trazodone PRN      Follow-up:   -Reschedule cycle 8 keytruda to 10/30  -Imaging 11/16 or 11/17 (CT C/A/P and brain MRI)  -RTC for labs (CBC, CMP) prior to MD visit in 3 weeks 11/18/20 and cycle 9 Keytruda    The following was staffed and discussed with supervising physician Dr. Mederos.    Lauren Rocha MD  PGY-VI  Hematology/Oncology Fellow

## 2020-10-27 NOTE — TELEPHONE ENCOUNTER
Spoke with patient via phone to her cell number [phone] and scheduled to meet her after her appointments are completed tomorrow to deliver her nutritional supplements and bring them to her family member's car. Patient will also bring her current electricity bill, and I will assist through the [facility] Patient Assistance Fund.

## 2020-10-28 ENCOUNTER — DOCUMENTATION ONLY (OUTPATIENT)
Dept: HEMATOLOGY/ONCOLOGY | Facility: CLINIC | Age: 60
End: 2020-10-28

## 2020-10-28 ENCOUNTER — OFFICE VISIT (OUTPATIENT)
Dept: HEMATOLOGY/ONCOLOGY | Facility: CLINIC | Age: 60
End: 2020-10-28
Payer: MEDICAID

## 2020-10-28 VITALS
HEIGHT: 66 IN | WEIGHT: 177.94 LBS | DIASTOLIC BLOOD PRESSURE: 76 MMHG | OXYGEN SATURATION: 98 % | TEMPERATURE: 98 F | BODY MASS INDEX: 28.6 KG/M2 | RESPIRATION RATE: 18 BRPM | HEART RATE: 89 BPM | SYSTOLIC BLOOD PRESSURE: 110 MMHG

## 2020-10-28 DIAGNOSIS — C34.90 PRIMARY MALIGNANT NEOPLASM OF LUNG WITH METASTASIS TO BRAIN: ICD-10-CM

## 2020-10-28 DIAGNOSIS — C34.90 METASTATIC NON-SMALL CELL LUNG CANCER: Primary | ICD-10-CM

## 2020-10-28 DIAGNOSIS — J96.11 CHRONIC RESPIRATORY FAILURE WITH HYPOXIA: ICD-10-CM

## 2020-10-28 DIAGNOSIS — I10 ESSENTIAL HYPERTENSION: ICD-10-CM

## 2020-10-28 DIAGNOSIS — R91.8 LUNG MASS: ICD-10-CM

## 2020-10-28 DIAGNOSIS — Z72.0 TOBACCO ABUSE: ICD-10-CM

## 2020-10-28 DIAGNOSIS — F41.9 ANXIETY: ICD-10-CM

## 2020-10-28 DIAGNOSIS — R64 MALIGNANT CACHEXIA: ICD-10-CM

## 2020-10-28 DIAGNOSIS — C79.31 PRIMARY MALIGNANT NEOPLASM OF LUNG WITH METASTASIS TO BRAIN: ICD-10-CM

## 2020-10-28 DIAGNOSIS — G89.3 CHRONIC NEOPLASM-RELATED PAIN: ICD-10-CM

## 2020-10-28 DIAGNOSIS — R05.9 COUGH: ICD-10-CM

## 2020-10-28 DIAGNOSIS — B18.2 CHRONIC HEPATITIS C WITHOUT HEPATIC COMA: ICD-10-CM

## 2020-10-28 DIAGNOSIS — J45.909 ASTHMA, UNSPECIFIED ASTHMA SEVERITY, UNSPECIFIED WHETHER COMPLICATED, UNSPECIFIED WHETHER PERSISTENT: ICD-10-CM

## 2020-10-28 PROCEDURE — 99999 PR PBB SHADOW E&M-EST. PATIENT-LVL V: ICD-10-PCS | Mod: PBBFAC,,, | Performed by: STUDENT IN AN ORGANIZED HEALTH CARE EDUCATION/TRAINING PROGRAM

## 2020-10-28 PROCEDURE — 99215 OFFICE O/P EST HI 40 MIN: CPT | Mod: PBBFAC | Performed by: STUDENT IN AN ORGANIZED HEALTH CARE EDUCATION/TRAINING PROGRAM

## 2020-10-28 PROCEDURE — 99215 OFFICE O/P EST HI 40 MIN: CPT | Mod: S$PBB,,, | Performed by: STUDENT IN AN ORGANIZED HEALTH CARE EDUCATION/TRAINING PROGRAM

## 2020-10-28 PROCEDURE — 99215 PR OFFICE/OUTPT VISIT, EST, LEVL V, 40-54 MIN: ICD-10-PCS | Mod: S$PBB,,, | Performed by: STUDENT IN AN ORGANIZED HEALTH CARE EDUCATION/TRAINING PROGRAM

## 2020-10-28 PROCEDURE — 99999 PR PBB SHADOW E&M-EST. PATIENT-LVL V: CPT | Mod: PBBFAC,,, | Performed by: STUDENT IN AN ORGANIZED HEALTH CARE EDUCATION/TRAINING PROGRAM

## 2020-10-28 RX ORDER — PROMETHAZINE HYDROCHLORIDE AND CODEINE PHOSPHATE 6.25; 1 MG/5ML; MG/5ML
5 SOLUTION ORAL EVERY 8 HOURS PRN
Qty: 473 ML | Refills: 1 | Status: SHIPPED | OUTPATIENT
Start: 2020-10-28 | End: 2020-11-18 | Stop reason: SDUPTHER

## 2020-10-28 RX ORDER — SODIUM CHLORIDE 0.9 % (FLUSH) 0.9 %
10 SYRINGE (ML) INJECTION
Status: CANCELLED | OUTPATIENT
Start: 2020-11-02

## 2020-10-28 RX ORDER — TRAMADOL HYDROCHLORIDE 50 MG/1
100 TABLET ORAL EVERY 8 HOURS PRN
Qty: 60 TABLET | Refills: 0 | Status: SHIPPED | OUTPATIENT
Start: 2020-10-28 | End: 2020-11-18 | Stop reason: SDUPTHER

## 2020-10-28 RX ORDER — HEPARIN 100 UNIT/ML
500 SYRINGE INTRAVENOUS
Status: CANCELLED | OUTPATIENT
Start: 2020-11-02

## 2020-10-28 NOTE — Clinical Note
-Reschedule cycle 8 keytruda to 10/30  -Imaging 11/16 or 11/17 (CT C/A/P and brain MRI)  -RTC for labs (CBC, CMP) prior to MD visit in 3 weeks 11/18/20 and cycle 9 Keytruda

## 2020-10-28 NOTE — PROGRESS NOTES
Received call from patient and met her downstairs after her appointments were completed. Delivered 3 cases of Boost Plus vanilla flavor, and gave to patient's daughter who placed them in her car. Patient reported that she is feeling good and doing well. She also brought her [employer] bill; her bills are a bit higher since she has had to use oxygen; will assist through the [facility] Patient Assistance Fund. Patient and daughter stated appreciation for assistance given. Will continue to follow and assist as needs are identified.

## 2020-10-29 ENCOUNTER — DOCUMENTATION ONLY (OUTPATIENT)
Dept: HEMATOLOGY/ONCOLOGY | Facility: CLINIC | Age: 60
End: 2020-10-29

## 2020-10-29 NOTE — PROGRESS NOTES
Prepared the check request forms, with signatures from my coworkers and Manager, and emailed to Sindy Forrest in Accounts Payable; requesting a check in the amount of $103.51 made payable to [employer] from the [facility] Patient Assistance Fund, with patient's name and [employer] account number noted in the jacquelin section of the check, and that the check be mailed directly to [employer]. Patient aware of and in agreement with this.  Will continue to follow and assist as needs are identified.

## 2020-11-02 ENCOUNTER — INFUSION (OUTPATIENT)
Dept: INFUSION THERAPY | Facility: HOSPITAL | Age: 60
End: 2020-11-02
Attending: INTERNAL MEDICINE
Payer: MEDICAID

## 2020-11-02 VITALS
OXYGEN SATURATION: 98 % | DIASTOLIC BLOOD PRESSURE: 58 MMHG | SYSTOLIC BLOOD PRESSURE: 100 MMHG | RESPIRATION RATE: 18 BRPM | TEMPERATURE: 99 F | HEART RATE: 88 BPM

## 2020-11-02 DIAGNOSIS — C79.31 PRIMARY MALIGNANT NEOPLASM OF LUNG WITH METASTASIS TO BRAIN: ICD-10-CM

## 2020-11-02 DIAGNOSIS — C34.90 PRIMARY MALIGNANT NEOPLASM OF LUNG WITH METASTASIS TO BRAIN: ICD-10-CM

## 2020-11-02 DIAGNOSIS — C34.91 ADENOCARCINOMA OF LUNG, STAGE 4, RIGHT: Primary | ICD-10-CM

## 2020-11-02 PROCEDURE — 25000003 PHARM REV CODE 250: Performed by: INTERNAL MEDICINE

## 2020-11-02 PROCEDURE — 96413 CHEMO IV INFUSION 1 HR: CPT

## 2020-11-02 PROCEDURE — 63600175 PHARM REV CODE 636 W HCPCS: Mod: JG | Performed by: INTERNAL MEDICINE

## 2020-11-02 RX ORDER — SODIUM CHLORIDE 0.9 % (FLUSH) 0.9 %
10 SYRINGE (ML) INJECTION
Status: DISCONTINUED | OUTPATIENT
Start: 2020-11-02 | End: 2020-11-02 | Stop reason: HOSPADM

## 2020-11-02 RX ORDER — HEPARIN 100 UNIT/ML
500 SYRINGE INTRAVENOUS
Status: DISCONTINUED | OUTPATIENT
Start: 2020-11-02 | End: 2020-11-02 | Stop reason: HOSPADM

## 2020-11-02 RX ADMIN — SODIUM CHLORIDE: 9 INJECTION, SOLUTION INTRAVENOUS at 02:11

## 2020-11-02 RX ADMIN — SODIUM CHLORIDE 200 MG: 9 INJECTION, SOLUTION INTRAVENOUS at 02:11

## 2020-11-02 NOTE — PLAN OF CARE
1440-Labs , hx, and medications reviewed. Assessment completed. Discussed plan of care with patient. Patient in agreement. Chair reclined and warm blanket and snack offered.

## 2020-11-02 NOTE — PLAN OF CARE
1500-Patient tolerated treatment well. Discharged without complaints or S/S of adverse event.  Instructed to call provider for any questions or concerns.

## 2020-11-05 DIAGNOSIS — F41.9 ANXIETY: ICD-10-CM

## 2020-11-05 RX ORDER — BUPROPION HYDROCHLORIDE 150 MG/1
150 TABLET, EXTENDED RELEASE ORAL DAILY
Qty: 30 TABLET | Refills: 6 | Status: SHIPPED | OUTPATIENT
Start: 2020-11-05 | End: 2021-01-01 | Stop reason: SDUPTHER

## 2020-11-06 ENCOUNTER — DOCUMENTATION ONLY (OUTPATIENT)
Dept: HEMATOLOGY/ONCOLOGY | Facility: CLINIC | Age: 60
End: 2020-11-06

## 2020-11-06 NOTE — PROGRESS NOTES
"Received call today from patient ([phone]), inquiring about her [facility] Patient Assistance Fund check for her electricity bill. She called [employer] today and was told they had not received the payment yet for her bill that is due on Monday 11/9.   I informed her that I would send an email to Sindy Forrest in Accounts Payable requesting an update, which I did. Sindy replied back - "Invoice was processed on 11/02/20. Check# [id number] was issued on 11/04/20.".  I called patient and explained this to her. Advised her to call [employer] to inform staff of the check payment on its way, and to call [employer] back next week to confirm receipt of the payment. Patient expressed understanding and appreciation.   Will continue to follow and assist as needs are identified.       "

## 2020-11-12 ENCOUNTER — TELEPHONE (OUTPATIENT)
Dept: HEMATOLOGY/ONCOLOGY | Facility: CLINIC | Age: 60
End: 2020-11-12

## 2020-11-12 NOTE — NURSING
Patient called again to request an appointment to establish care with a PCP.  Informed pt that if she needed to see an internist now I could get an appointment with the [facility] [place] [facility].  Pt would like to wait to establish care in January with a new PCP and not see a Fellow.

## 2020-11-17 ENCOUNTER — HOSPITAL ENCOUNTER (OUTPATIENT)
Dept: RADIOLOGY | Facility: HOSPITAL | Age: 60
Discharge: HOME | End: 2020-11-17
Attending: STUDENT IN AN ORGANIZED HEALTH CARE EDUCATION/TRAINING PROGRAM
Payer: MEDICAID

## 2020-11-17 DIAGNOSIS — C34.90 METASTATIC NON-SMALL CELL LUNG CANCER: ICD-10-CM

## 2020-11-17 PROCEDURE — 71260 CT THORAX DX C+: CPT | Mod: 26,,, | Performed by: RADIOLOGY

## 2020-11-17 PROCEDURE — 25500020 PHARM REV CODE 255: Performed by: STUDENT IN AN ORGANIZED HEALTH CARE EDUCATION/TRAINING PROGRAM

## 2020-11-17 PROCEDURE — 74178 CT CHEST ABDOMEN PELVIS W W/O CONTRAST (XPD): ICD-10-PCS | Mod: 26,,, | Performed by: RADIOLOGY

## 2020-11-17 PROCEDURE — 70553 MRI BRAIN STEM W/O & W/DYE: CPT | Mod: 26,,, | Performed by: RADIOLOGY

## 2020-11-17 PROCEDURE — 74178 CT ABD&PLV WO CNTR FLWD CNTR: CPT | Mod: TC

## 2020-11-17 PROCEDURE — 70553 MRI BRAIN STEM W/O & W/DYE: CPT | Mod: TC

## 2020-11-17 PROCEDURE — 70553 MRI BRAIN W WO CONTRAST: ICD-10-PCS | Mod: 26,,, | Performed by: RADIOLOGY

## 2020-11-17 PROCEDURE — 74178 CT ABD&PLV WO CNTR FLWD CNTR: CPT | Mod: 26,,, | Performed by: RADIOLOGY

## 2020-11-17 PROCEDURE — A9585 GADOBUTROL INJECTION: HCPCS | Performed by: STUDENT IN AN ORGANIZED HEALTH CARE EDUCATION/TRAINING PROGRAM

## 2020-11-17 PROCEDURE — 71260 CT CHEST ABDOMEN PELVIS W W/O CONTRAST (XPD): ICD-10-PCS | Mod: 26,,, | Performed by: RADIOLOGY

## 2020-11-17 RX ORDER — GADOBUTROL 604.72 MG/ML
9 INJECTION INTRAVENOUS
Status: COMPLETED | OUTPATIENT
Start: 2020-11-17 | End: 2020-11-17

## 2020-11-17 RX ADMIN — IOHEXOL 100 ML: 350 INJECTION, SOLUTION INTRAVENOUS at 12:11

## 2020-11-17 RX ADMIN — GADOBUTROL 9 ML: 604.72 INJECTION INTRAVENOUS at 11:11

## 2020-11-17 NOTE — PROGRESS NOTES
PATIENT: Janeth Boyce  MRN: [id number]  DATE: 11/18/2020      Diagnosis:   1. Metastatic non-small cell lung cancer    2. Primary malignant neoplasm of lung with metastasis to brain    3. Chronic hepatitis C without hepatic coma    4. Essential hypertension    5. Anxiety    6. Cough    7. Asthma, unspecified asthma severity, unspecified whether complicated, unspecified whether persistent    8. Lung mass    9. Chronic neoplasm-related pain        Chief Complaint: Metastatic non-small cell lung cancer      Oncologic History:    Oncologic History 1. Lung adenocarcinoma    Oncologic Treatment 1. Carboplatin AUC4 x1 dose 5/27/20 and palliative radiation (5/27/2020 - 6/2/2020; 20 Gy to right lung)  2. Pembrolizumab  Cycle 1 6/4/2020       Pathology 5/5/2020  Right lung, biopsy:  Positive for adenocarcinoma with areas of necrosis.  Interpretation:   Right lung, specimen for PD-L1 immunohistochemistry studies (clone [id number], [facility], [place], [place]) ([id number]): 100% tumor cells are positive for PD-L1 (membranous positivity).     BRAF Mutation Analysis (V600E), Tumor Result Summary: NO MUTATION IDENTIFIED.   Result: BRAF status: Wild-type   Interpretation :     EGFR Gene, Mutation Analysis, Tumor Result Summary: NO MUTATION IDENTIFIED.   Result: EGFR status: Wild-type     ALK (2p23), Lung Cancer, FISH, Ts Result Summary: NEGATIVE   Interpretation: No rearrangement of the ALK gene was identified.     Result: nuc micha(ALKx3)[29/50]   Of 50 nuclei, 0% had separation of the 3'ALK and 5'ALK signals and 58% had three intact 3'ALK/5'ALK signals. The normal cutoff is <50.0% for one 3'ALK, one 5'ALK and one 3'ALK/5'ALK signal and <18.0% for three 3'ALK/5'ALK signals.     ROS1 (6Q22), FISH, Ts Result Summary: NEGATIVE   Interpretation: No rearrangement of the ROS1 gene was identified.   Result nuc micha(ROS1x1)[44/50]           Subjective:    Interval History: Ms. Boyce is here for follow up    60 y.o. woman with  "lung adenocarcinoma complicated by single brain met to cerebellum.     Oncologic History  She has been smoking since 15 years old and estimates about 0.5ppd.  She has always had some level of dyspnea with exertion at baseline, but noticed it was worse in February 2020 with an associated cough with white and clear sputum.  She says she was evaluated at [facility] who told her the scans were suspicious for cancer.  She then headed to [facility] but because of COVID-19 her appointment was cancelled.  She initially presented to [facility] without any new symptoms since February and the cough is persistent.  Denied any unilateral weakness, dizziness, headaches, or gait instability.  Was able to perform all ADLs and could walk 1 block before getting tired and needing to rest.  On 5/1/2020, she was "admitted to observation with SOB and cough x 2 months. CT chest Large RUL mass with scattered nodules and mediastinal lymphadenopathy; small-moderate right pleural effusion....S/p right lung biopsy 5/4/2020. Home O2 arranged to use with activity. Admitted to Medical Oncology 5/25/2020-5/29/2020 for worsening shortness of breath.  CTPE negative for PE. Pulmonology attempted thoracentesis twice. Second attempt resulted in 400cc being drained, without relief.  She then received x1 dose of carboplatin and palliative RT to the right lung. Patient reported improvement in symptoms after XRT and was discharged.      Single agent pembrolizumab was started for PDL1>50%.  She received her first dose of pembrolizumab 6/4/2020.  She since had an admit for right PleurX.  -Scans 8/3 decreased lesions per RECIST   -8/26: pleurX removed  -11/18/20: cycle 9 Pembrolizumab - scans prior show stable disease    Interval History  Patient seen today alone however daughter updated in waiting room. Cough stable. Some anxiety, but not interested in seeing psychiatry. ECOG 2. Having arthralgias especially in knees. Good appetite. Chronic dyspnea. No " nausea/vomiting/diarrhea.    Past Medical History:   Past Medical History:   Diagnosis Date    Anxiety     Asthma     Hepatitis C 2019    treated    Hx of psychiatric care     Hypertension     Lung cancer     Psychiatric exam requested by authority     Psychiatric problem        Past Surgical HIstory:   Past Surgical History:   Procedure Laterality Date    BRONCHOSCOPY N/A 9/3/2020    Procedure: Bronchoscopy;  Surgeon: Renee Diagnostic Provider;  [facility] [facility] [address] [place] [address];  Service: Anesthesiology;  Laterality: N/A;     SECTION      KNEE SURGERY         Family History:   Family History   Problem Relation Age of Onset    Cancer Father        Social History:  reports that she quit smoking about 7 months ago. Her smoking use included cigarettes. She has a 20.00 pack-year smoking history. She has never used smokeless tobacco. She reports current alcohol use. She reports that she does not use drugs.    Allergies:  Review of patient's allergies indicates:  No Known Allergies    Medications:  Current Outpatient Medications   Medication Sig Dispense Refill    albuterol (PROVENTIL) 2.5 mg /3 mL (0.083 %) nebulizer solution Take 3 mls by nebulization every 3 hours while awake 180 mL 12    albuterol (PROVENTIL/VENTOLIN HFA) 90 mcg/actuation inhaler 2 puffs 4 (four) times daily as needed.      amLODIPine (NORVASC) 5 MG tablet Take 5 mg by mouth once daily.      benzonatate (TESSALON PERLES) 100 MG capsule Take 2 capsules (200 mg total) by mouth 3 (three) times daily as needed for Cough. 30 capsule 1    budesonide-formoterol 160-4.5 mcg (SYMBICORT) 160-4.5 mcg/actuation HFAA Inhale two puffs by mouth into the lungs twice daily 10.2 g 12    buPROPion (WELLBUTRIN SR) 150 MG TBSR 12 hr tablet Take 1 tablet (150 mg total) by mouth once daily. 30 tablet 6    diclofenac sodium (VOLTAREN) 1 % Gel Apply two grams topically four times a day 200 g 5    gabapentin (NEURONTIN) 400 MG capsule Take one capsule by  mouth three times daily 90 capsule 3    irbesartan-hydrochlorothiazide (AVALIDE) 300-12.5 mg per tablet Take one tablet by mouth every day 90 tablet 3    lidocaine (XYLOCAINE) 5 % Oint ointment Apply topically as needed. Apply 1 inch to biopsy site and cover with saran wrap.  Apply twice per day as needed for pain. 35.44 g 1    omeprazole (PRILOSEC) 20 MG capsule Take one capsule by mouth everyday. 90 capsule 3    promethazine-codeine 6.25-10 mg/5 ml (PHENERGAN WITH CODEINE) 6.25-10 mg/5 mL syrup Take 5 mLs by mouth every 8 (eight) hours as needed for Cough. 473 mL 1    senna-docusate 8.6-50 mg (PERICOLACE) 8.6-50 mg per tablet Take 1 tablet by mouth 2 (two) times daily. 60 tablet 0    traMADoL (ULTRAM) 50 mg tablet Take 2 tablets (100 mg total) by mouth every 8 (eight) hours as needed for Pain. 60 tablet 0    hydrOXYzine HCL (ATARAX) 25 MG tablet Take 1 tablet (25 mg total) by mouth daily as needed. 30 tablet 1    ondansetron (ZOFRAN-ODT) 8 MG TbDL Dissolve 1 tablet (8 mg total) by mouth every 8 (eight) hours as needed. (Patient not taking: Reported on 11/18/2020) 60 tablet 0    polyethylene glycol (GLYCOLAX) 17 gram/dose powder mix 1 capful (17 g) and take by mouth once daily. (Patient not taking: Reported on 11/18/2020) 510 g 0    prochlorperazine (COMPAZINE) 10 MG tablet Take 1 tablet (10 mg total) by mouth 3 (three) times daily as needed (for nausea. if zofran does not work can use this medication instead). (Patient not taking: Reported on 11/18/2020) 60 tablet 2    promethazine (PHENERGAN) 6.25 mg/5 mL syrup 5 mLs daily as needed.       No current facility-administered medications for this visit.        Review of Systems   Constitutional: Positive for activity change and fatigue. Negative for chills, fever and unexpected weight change.   HENT: Negative for nosebleeds.    Eyes: Negative for visual disturbance.   Respiratory: Positive for cough and shortness of breath.    Cardiovascular: Negative for  "chest pain and leg swelling.   Gastrointestinal: Negative for abdominal distention, abdominal pain, constipation, diarrhea and nausea.   Endocrine: Negative for cold intolerance and heat intolerance.   Genitourinary: Negative for difficulty urinating and dysuria.   Musculoskeletal: Positive for arthralgias (chronic) and myalgias.   Skin: Negative for color change.   Neurological: Positive for weakness. Negative for dizziness, light-headedness and numbness.   Hematological: Does not bruise/bleed easily.   Psychiatric/Behavioral: Negative for confusion.       ECOG Performance Status:   2  Objective:      Vitals:   Vitals:    11/18/20 1038   BP: 108/63   Pulse: 92   Resp: (!) 22   Temp: 98.1 °F (36.7 °C)   TempSrc: Oral   SpO2: 95%   Weight: 82 kg (180 lb 12.4 oz)   Height: 5' 6" (1.676 m)     BMI: Body mass index is 29.18 kg/m².    Physical Exam  Constitutional:       General: She is not in acute distress.     Appearance: She is well-developed.      Comments: In wheelchair but able to ambulate   HENT:      Head: Normocephalic.      Mouth/Throat:      Pharynx: No oropharyngeal exudate.   Eyes:      General: No scleral icterus.     Pupils: Pupils are equal, round, and reactive to light.   Neck:      Musculoskeletal: Normal range of motion.      Trachea: No tracheal deviation.   Cardiovascular:      Rate and Rhythm: Normal rate and regular rhythm.      Heart sounds: Normal heart sounds. No murmur. No friction rub. No gallop.    Pulmonary:      Effort: Pulmonary effort is normal. No respiratory distress.      Breath sounds: No wheezing or rales.      Comments: Diminished breath sounds, off oxygen  Abdominal:      General: Bowel sounds are normal. There is no distension.      Palpations: Abdomen is soft. There is no mass.      Tenderness: There is no abdominal tenderness. There is no guarding.   Musculoskeletal: Normal range of motion.   Skin:     General: Skin is warm and dry.   Neurological:      Mental Status: She is " alert.       Labs reviewed    Imaging:   PET and brain MRI reviewed        Assessment:       1. Metastatic non-small cell lung cancer    2. Primary malignant neoplasm of lung with metastasis to brain    3. Chronic hepatitis C without hepatic coma    4. Essential hypertension    5. Anxiety    6. Cough    7. Asthma, unspecified asthma severity, unspecified whether complicated, unspecified whether persistent    8. Lung mass    9. Chronic neoplasm-related pain           Plan:       1. 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  Also has pleural effusion on right side s/p therapeutic thoracentesis 5/2020. PDL1 expressed in 100% of tumor cells from biopsy.  No mutation identified for EGFR/ALK/ROS1/BRAF by tissue.  -Continue single agent pembrolizumab  -Labs have been reviewed.  -Proceed with cycle 9  -Scans in Feb 2021, scans reviewed today 11/18/20 show stable disease  -Return to clinic in 3 weeks  -TSH and fT4 next due every other cycle  -Discussed with hepatology and no additional antiviral therapy required with her history of Hep C.  -Not eligible for [id number]    2. Arthralgias  -Has established care with palliative care, trial of percocet instead of tramadol, did not like percocet d/t nausea/constipation, now back on tramadol PRN    3. Cough  -On tessalon & codeine cough syrup Q8H PRN, refilled today  -On gabapentin  -Previously also tried mucinex  -Had right pleurX placed 6/24 by Dr. Garcia, s/p removal 8/26/20    4. HCPOA  -Patient appointed her daughter and niece     5. Anxiety/insomnia  -On wellbutrin, instructed not to take buspirone  -Also on trazodone PRN  -On hydroxyzine PRN for anxiety as given by palliative care previously  -Refused psychiatry visit      Follow-up:   -RTC for labs (CBC, CMP, TSH, T4) prior to MD visit in 3 weeks 12/9/20 and cycle 10 Keytruda    The following was staffed and discussed with supervising physician Dr. Mederos.    Lauren Rocha MD PGY-VI  Hematology/Oncology  Fellow

## 2020-11-18 ENCOUNTER — INFUSION (OUTPATIENT)
Dept: INFUSION THERAPY | Facility: HOSPITAL | Age: 60
End: 2020-11-18
Attending: INTERNAL MEDICINE
Payer: MEDICAID

## 2020-11-18 ENCOUNTER — OFFICE VISIT (OUTPATIENT)
Dept: HEMATOLOGY/ONCOLOGY | Facility: CLINIC | Age: 60
End: 2020-11-18
Payer: MEDICAID

## 2020-11-18 VITALS
TEMPERATURE: 98 F | RESPIRATION RATE: 22 BRPM | WEIGHT: 180.75 LBS | HEIGHT: 66 IN | BODY MASS INDEX: 29.05 KG/M2 | OXYGEN SATURATION: 95 % | SYSTOLIC BLOOD PRESSURE: 108 MMHG | DIASTOLIC BLOOD PRESSURE: 63 MMHG | HEART RATE: 92 BPM

## 2020-11-18 VITALS
SYSTOLIC BLOOD PRESSURE: 108 MMHG | TEMPERATURE: 99 F | DIASTOLIC BLOOD PRESSURE: 62 MMHG | HEART RATE: 85 BPM | HEIGHT: 66 IN | RESPIRATION RATE: 20 BRPM | BODY MASS INDEX: 29.05 KG/M2 | OXYGEN SATURATION: 96 % | WEIGHT: 180.75 LBS

## 2020-11-18 DIAGNOSIS — J45.909 ASTHMA, UNSPECIFIED ASTHMA SEVERITY, UNSPECIFIED WHETHER COMPLICATED, UNSPECIFIED WHETHER PERSISTENT: ICD-10-CM

## 2020-11-18 DIAGNOSIS — R91.8 LUNG MASS: ICD-10-CM

## 2020-11-18 DIAGNOSIS — C34.90 PRIMARY MALIGNANT NEOPLASM OF LUNG WITH METASTASIS TO BRAIN: ICD-10-CM

## 2020-11-18 DIAGNOSIS — C34.91 ADENOCARCINOMA OF LUNG, STAGE 4, RIGHT: Primary | ICD-10-CM

## 2020-11-18 DIAGNOSIS — G89.3 CHRONIC NEOPLASM-RELATED PAIN: ICD-10-CM

## 2020-11-18 DIAGNOSIS — C79.31 PRIMARY MALIGNANT NEOPLASM OF LUNG WITH METASTASIS TO BRAIN: ICD-10-CM

## 2020-11-18 DIAGNOSIS — B18.2 CHRONIC HEPATITIS C WITHOUT HEPATIC COMA: ICD-10-CM

## 2020-11-18 DIAGNOSIS — I10 ESSENTIAL HYPERTENSION: ICD-10-CM

## 2020-11-18 DIAGNOSIS — C34.90 METASTATIC NON-SMALL CELL LUNG CANCER: Primary | ICD-10-CM

## 2020-11-18 DIAGNOSIS — R05.9 COUGH: ICD-10-CM

## 2020-11-18 DIAGNOSIS — F41.9 ANXIETY: ICD-10-CM

## 2020-11-18 PROCEDURE — 99999 PR PBB SHADOW E&M-EST. PATIENT-LVL IV: CPT | Mod: PBBFAC,,, | Performed by: STUDENT IN AN ORGANIZED HEALTH CARE EDUCATION/TRAINING PROGRAM

## 2020-11-18 PROCEDURE — 99214 PR OFFICE/OUTPT VISIT, EST, LEVL IV, 30-39 MIN: ICD-10-PCS | Mod: S$PBB,,, | Performed by: STUDENT IN AN ORGANIZED HEALTH CARE EDUCATION/TRAINING PROGRAM

## 2020-11-18 PROCEDURE — 99214 OFFICE O/P EST MOD 30 MIN: CPT | Mod: S$PBB,,, | Performed by: STUDENT IN AN ORGANIZED HEALTH CARE EDUCATION/TRAINING PROGRAM

## 2020-11-18 PROCEDURE — 96413 CHEMO IV INFUSION 1 HR: CPT

## 2020-11-18 PROCEDURE — 63600175 PHARM REV CODE 636 W HCPCS: Mod: JG | Performed by: INTERNAL MEDICINE

## 2020-11-18 PROCEDURE — 99214 OFFICE O/P EST MOD 30 MIN: CPT | Mod: PBBFAC,25 | Performed by: STUDENT IN AN ORGANIZED HEALTH CARE EDUCATION/TRAINING PROGRAM

## 2020-11-18 PROCEDURE — 99999 PR PBB SHADOW E&M-EST. PATIENT-LVL IV: ICD-10-PCS | Mod: PBBFAC,,, | Performed by: STUDENT IN AN ORGANIZED HEALTH CARE EDUCATION/TRAINING PROGRAM

## 2020-11-18 PROCEDURE — 25000003 PHARM REV CODE 250: Performed by: INTERNAL MEDICINE

## 2020-11-18 RX ORDER — HYDROXYZINE HYDROCHLORIDE 25 MG/1
25 TABLET, FILM COATED ORAL DAILY PRN
Qty: 30 TABLET | Refills: 1 | Status: SHIPPED | OUTPATIENT
Start: 2020-11-18 | End: 2021-01-27 | Stop reason: SDUPTHER

## 2020-11-18 RX ORDER — SODIUM CHLORIDE 0.9 % (FLUSH) 0.9 %
10 SYRINGE (ML) INJECTION
Status: DISCONTINUED | OUTPATIENT
Start: 2020-11-18 | End: 2020-11-18 | Stop reason: HOSPADM

## 2020-11-18 RX ORDER — TRAMADOL HYDROCHLORIDE 50 MG/1
100 TABLET ORAL EVERY 8 HOURS PRN
Qty: 60 TABLET | Refills: 0 | Status: SHIPPED | OUTPATIENT
Start: 2020-11-18 | End: 2020-12-09 | Stop reason: SDUPTHER

## 2020-11-18 RX ORDER — SODIUM CHLORIDE 0.9 % (FLUSH) 0.9 %
10 SYRINGE (ML) INJECTION
Status: CANCELLED | OUTPATIENT
Start: 2020-11-18

## 2020-11-18 RX ORDER — HEPARIN 100 UNIT/ML
500 SYRINGE INTRAVENOUS
Status: DISCONTINUED | OUTPATIENT
Start: 2020-11-18 | End: 2020-11-18 | Stop reason: HOSPADM

## 2020-11-18 RX ORDER — PROMETHAZINE HYDROCHLORIDE AND CODEINE PHOSPHATE 6.25; 1 MG/5ML; MG/5ML
5 SOLUTION ORAL EVERY 8 HOURS PRN
Qty: 473 ML | Refills: 1 | Status: CANCELLED | OUTPATIENT
Start: 2020-11-18

## 2020-11-18 RX ORDER — PROMETHAZINE HYDROCHLORIDE AND CODEINE PHOSPHATE 6.25; 1 MG/5ML; MG/5ML
5 SOLUTION ORAL EVERY 8 HOURS PRN
Qty: 473 ML | Refills: 1 | Status: SHIPPED | OUTPATIENT
Start: 2020-11-18 | End: 2020-12-09 | Stop reason: SDUPTHER

## 2020-11-18 RX ORDER — PROMETHAZINE HYDROCHLORIDE AND CODEINE PHOSPHATE 6.25; 1 MG/5ML; MG/5ML
5 SOLUTION ORAL EVERY 8 HOURS PRN
Qty: 473 ML | Refills: 1 | Status: SHIPPED | OUTPATIENT
Start: 2020-11-18 | End: 2020-11-18

## 2020-11-18 RX ORDER — HEPARIN 100 UNIT/ML
500 SYRINGE INTRAVENOUS
Status: CANCELLED | OUTPATIENT
Start: 2020-11-18

## 2020-11-18 RX ORDER — TRAMADOL HYDROCHLORIDE 50 MG/1
100 TABLET ORAL EVERY 8 HOURS PRN
Qty: 60 TABLET | Refills: 0 | Status: SHIPPED | OUTPATIENT
Start: 2020-11-18 | End: 2020-11-18

## 2020-11-18 RX ADMIN — SODIUM CHLORIDE: 9 INJECTION, SOLUTION INTRAVENOUS at 11:11

## 2020-11-18 RX ADMIN — SODIUM CHLORIDE 200 MG: 9 INJECTION, SOLUTION INTRAVENOUS at 11:11

## 2020-11-18 NOTE — TELEPHONE ENCOUNTER
"----- Message from Laura Clark sent at 11/18/2020 12:23 PM CST -----  Patient Assist    Name of caller: Janeth   Contact Preference:  [phone]  Does Patient feel the need to see the MD today? No   Physician: Josefina SAENZ MD  What is the nature of the call?    - pt called regarding a refill request. Please call and assist.   Erik for pain.     Additional Notes:   "Thank you for all that you do for our patients'"          "

## 2020-11-18 NOTE — TELEPHONE ENCOUNTER
----- Message from Urvashi Garcia sent at 11/18/2020  1:06 PM CST -----  Contact: [facility]  Type:  RX Refill Request    Who Called:  [facility]  Refill or New Rx: refill   RX Name and Strength: promethazine-codeine 6.25-10 mg/5 ml (PHENERGAN WITH CODEINE) 6.25-10 mg/5 mL syrup  How is the patient currently taking it? (ex. 1XDay):  Take 5 mLs by mouth every 8 (eight) hours as needed for Cough. - Oral  Is this a 30 day or 90 day RX: Quantity prescribed more than 7 day supply? Yes, medically necessary.  Preferred Pharmacy with phone number: [facility] [[email or web address] - [address] [phone] (Phone)  [phone] (Fax)  Local or Mail Order: local   Ordering Provider:    Would the patient rather a call back or a response via [facility]?  Call back   Best Call Back Number: [phone]  Additional Information:  none

## 2020-11-18 NOTE — TELEPHONE ENCOUNTER
----- Message from Tate Rubio sent at 11/18/2020 12:52 PM CST -----  Regarding: call back  Patient called in to speak with someone at the office regarding her recent appointment. She would like a call back from the office and can be reached at    [phone]

## 2020-11-18 NOTE — PLAN OF CARE
Pt received Keytruda today and tolerated well, without complications. VSS throughout infusion. Educated patient about Keytruda (indications, side effects, possible reactions, chemotherapy precautions) and verbalized understanding. PIV positive for blood return, saline locked and removed prior to DC, catheter tip intact. Pt DC with no distress noted, WC off unit via family, w/o event, pleased.

## 2020-12-02 ENCOUNTER — DOCUMENTATION ONLY (OUTPATIENT)
Dept: HEMATOLOGY/ONCOLOGY | Facility: CLINIC | Age: 60
End: 2020-12-02

## 2020-12-07 NOTE — PROGRESS NOTES
PATIENT: Janeth Boyce  MRN: [id number]  DATE: 12/9/2020      Diagnosis:   1. Metastatic non-small cell lung cancer    2. Primary malignant neoplasm of lung with metastasis to brain    3. Malignant cachexia    4. Tobacco abuse    5. Shortness of breath    6. Chronic hepatitis C without hepatic coma    7. Cough    8. Malignant pleural effusion    9. Essential hypertension    10. Anxiety        Chief Complaint: Metastatic non-small cell lung cancer      Oncologic History:    Oncologic History 1. Lung adenocarcinoma    Oncologic Treatment 1. Carboplatin AUC4 x1 dose 5/27/20 and palliative radiation (5/27/2020 - 6/2/2020; 20 Gy to right lung)  2. Pembrolizumab  Cycle 1 6/4/2020       Pathology 5/5/2020  Right lung, biopsy:  Positive for adenocarcinoma with areas of necrosis.  Interpretation:   Right lung, specimen for PD-L1 immunohistochemistry studies (clone [id number], [facility], [place], [place]) ([id number]): 100% tumor cells are positive for PD-L1 (membranous positivity).     BRAF Mutation Analysis (V600E), Tumor Result Summary: NO MUTATION IDENTIFIED.   Result: BRAF status: Wild-type   Interpretation :     EGFR Gene, Mutation Analysis, Tumor Result Summary: NO MUTATION IDENTIFIED.   Result: EGFR status: Wild-type     ALK (2p23), Lung Cancer, FISH, Ts Result Summary: NEGATIVE   Interpretation: No rearrangement of the ALK gene was identified.     Result: nuc micha(ALKx3)[29/50]   Of 50 nuclei, 0% had separation of the 3'ALK and 5'ALK signals and 58% had three intact 3'ALK/5'ALK signals. The normal cutoff is <50.0% for one 3'ALK, one 5'ALK and one 3'ALK/5'ALK signal and <18.0% for three 3'ALK/5'ALK signals.     ROS1 (6Q22), FISH, Ts Result Summary: NEGATIVE   Interpretation: No rearrangement of the ROS1 gene was identified.   Result nuc micha(ROS1x1)[44/50]           Subjective:    Interval History: Ms. Boyce is here for follow up    60 y.o. woman with lung adenocarcinoma complicated by single brain met to  "cerebellum.     Oncologic History  She has been smoking since 15 years old and estimates about 0.5ppd.  She has always had some level of dyspnea with exertion at baseline, but noticed it was worse in February 2020 with an associated cough with white and clear sputum.  She says she was evaluated at [facility] who told her the scans were suspicious for cancer.  She then headed to [facility] but because of COVID-19 her appointment was cancelled.  [place] [facility] [place] since [place] and the cough is persistent.  Denied any unilateral weakness, dizziness, headaches, or gait instability.  Was able to perform all ADLs and could walk 1 block before getting tired and needing to rest.  On 5/1/2020, she was "admitted to observation with SOB and cough x 2 months. CT chest Large RUL mass with scattered nodules and mediastinal lymphadenopathy; small-moderate right pleural effusion....S/p right lung biopsy 5/4/2020. Home O2 arranged to use with activity. Admitted to Medical Oncology 5/25/2020-5/29/2020 for worsening shortness of breath.  CTPE negative for PE. Pulmonology attempted thoracentesis twice. Second attempt resulted in 400cc being drained, without relief.  She then received x1 dose of carboplatin and palliative RT to the right lung. Patient reported improvement in symptoms after XRT and was discharged.      Single agent pembrolizumab was started for PDL1>50%.  She received her first dose of pembrolizumab 6/4/2020.  She since had an admit for right PleurX.  -Scans 8/3 decreased lesions per RECIST   -8/26: pleurX removed  -Scans prior to cycle 9 scans prior show stable disease  -12/9: here for cycle 10 single agent Pembro    Interval History  Patient seen today alone. Cough improved. Chronic dyspnea. Chronic anxiety. ECOG 2. Having arthralgias especially in left knee, taking tramadol TID PRN. Good appetite. No nausea/vomiting/diarrhea.    Past Medical History:   Past Medical History:   Diagnosis " Date    Anxiety     Asthma     Hepatitis C     treated    Hx of psychiatric care     Hypertension     Lung cancer     Psychiatric exam requested by authority     Psychiatric problem        Past Surgical HIstory:   Past Surgical History:   Procedure Laterality Date    BRONCHOSCOPY N/A 9/3/2020    Procedure: Bronchoscopy;  Surgeon: Renee Diagnostic Provider;  Location: [facility] OR [address] [place] [address];  Service: Anesthesiology;  Laterality: N/A;     SECTION      KNEE SURGERY         Family History:   Family History   Problem Relation Age of Onset    Cancer Father        Social History:  reports that she quit smoking about 8 months ago. Her smoking use included cigarettes. She has a 20.00 pack-year smoking history. She has never used smokeless tobacco. She reports current alcohol use. She reports that she does not use drugs.    Allergies:  Review of patient's allergies indicates:  No Known Allergies    Medications:  Current Outpatient Medications   Medication Sig Dispense Refill    albuterol (PROVENTIL) 2.5 mg /3 mL (0.083 %) nebulizer solution Take 3 mls by nebulization every 3 hours while awake 180 mL 12    albuterol (PROVENTIL/VENTOLIN HFA) 90 mcg/actuation inhaler 2 puffs 4 (four) times daily as needed.      amLODIPine (NORVASC) 5 MG tablet Take 5 mg by mouth once daily.      benzonatate (TESSALON PERLES) 100 MG capsule Take 2 capsules (200 mg total) by mouth 3 (three) times daily as needed for Cough. 30 capsule 1    budesonide-formoterol 160-4.5 mcg (SYMBICORT) 160-4.5 mcg/actuation HFAA Inhale two puffs by mouth into the lungs twice daily 10.2 g 12    buPROPion (WELLBUTRIN SR) 150 MG TBSR 12 hr tablet Take 1 tablet (150 mg total) by mouth once daily. 30 tablet 6    diclofenac sodium (VOLTAREN) 1 % Gel Apply two grams topically four times a day 200 g 5    gabapentin (NEURONTIN) 400 MG capsule Take one capsule by mouth three times daily 90 capsule 3    hydrOXYzine HCL (ATARAX) 25 MG tablet Take 1  tablet (25 mg total) by mouth daily as needed. 30 tablet 1    irbesartan-hydrochlorothiazide (AVALIDE) 300-12.5 mg per tablet Take one tablet by mouth every day 90 tablet 3    lidocaine (XYLOCAINE) 5 % Oint ointment Apply topically as needed. Apply 1 inch to biopsy site and cover with saran wrap.  Apply twice per day as needed for pain. 35.44 g 1    omeprazole (PRILOSEC) 20 MG capsule Take one capsule by mouth everyday. 90 capsule 3    promethazine (PHENERGAN) 6.25 mg/5 mL syrup 5 mLs daily as needed.      promethazine-codeine 6.25-10 mg/5 ml (PHENERGAN WITH CODEINE) 6.25-10 mg/5 mL syrup Take 5 mLs by mouth every 8 (eight) hours as needed for Cough. 473 mL 1    senna-docusate 8.6-50 mg (PERICOLACE) 8.6-50 mg per tablet Take 1 tablet by mouth 2 (two) times daily. 60 tablet 0    traMADoL (ULTRAM) 50 mg tablet Take 2 tablets (100 mg total) by mouth every 8 (eight) hours as needed for Pain. 60 tablet 0    ondansetron (ZOFRAN-ODT) 8 MG TbDL Dissolve 1 tablet (8 mg total) by mouth every 8 (eight) hours as needed. (Patient not taking: Reported on 11/18/2020) 60 tablet 0    polyethylene glycol (GLYCOLAX) 17 gram/dose powder mix 1 capful (17 g) and take by mouth once daily. (Patient not taking: Reported on 11/18/2020) 510 g 0    prochlorperazine (COMPAZINE) 10 MG tablet Take 1 tablet (10 mg total) by mouth 3 (three) times daily as needed (for nausea. if zofran does not work can use this medication instead). (Patient not taking: Reported on 11/18/2020) 60 tablet 2     No current facility-administered medications for this visit.        Review of Systems   Constitutional: Positive for fatigue. Negative for activity change, chills, fever and unexpected weight change.   HENT: Negative for nosebleeds.    Eyes: Negative for visual disturbance.   Respiratory: Positive for cough (improving) and shortness of breath.    Cardiovascular: Negative for chest pain and leg swelling.   Gastrointestinal: Negative for abdominal  "distention, abdominal pain, constipation, diarrhea and nausea.   Endocrine: Negative for cold intolerance and heat intolerance.   Genitourinary: Negative for difficulty urinating and dysuria.   Musculoskeletal: Positive for arthralgias (chronic) and myalgias.   Skin: Negative for color change.   Neurological: Negative for dizziness, weakness, light-headedness and numbness.   Hematological: Does not bruise/bleed easily.   Psychiatric/Behavioral: Negative for confusion.       ECOG Performance Status:   2  Objective:      Vitals:   Vitals:    12/09/20 0938   BP: 125/84   BP Location: Left arm   Patient Position: Sitting   BP Method: Medium (Automatic)   Pulse: 101   Resp: 20   Temp: 98 °F (36.7 °C)   TempSrc: Oral   SpO2: 97%   Weight: 83.1 kg (183 lb 3.2 oz)   Height: 5' 6" (1.676 m)     BMI: Body mass index is 29.57 kg/m².    Physical Exam  Constitutional:       General: She is not in acute distress.     Appearance: She is well-developed.      Comments: In wheelchair but able to ambulate   HENT:      Head: Normocephalic.      Mouth/Throat:      Pharynx: No oropharyngeal exudate.   Eyes:      General: No scleral icterus.     Pupils: Pupils are equal, round, and reactive to light.   Neck:      Musculoskeletal: Normal range of motion.      Trachea: No tracheal deviation.   Cardiovascular:      Rate and Rhythm: Normal rate and regular rhythm.      Heart sounds: Normal heart sounds. No murmur. No friction rub. No gallop.    Pulmonary:      Effort: Pulmonary effort is normal. No respiratory distress.      Breath sounds: No wheezing or rales.      Comments: Diminished breath sounds, off oxygen  Abdominal:      General: Bowel sounds are normal. There is no distension.      Palpations: Abdomen is soft. There is no mass.      Tenderness: There is no abdominal tenderness. There is no guarding.   Musculoskeletal: Normal range of motion.         General: Tenderness (left knee) present. No swelling.   Skin:     General: Skin is " warm and dry.   Neurological:      Mental Status: She is alert.       Labs reviewed    Imaging:   PET and brain MRI reviewed        Assessment:       1. Metastatic non-small cell lung cancer    2. Primary malignant neoplasm of lung with metastasis to brain    3. Malignant cachexia    4. Tobacco abuse    5. Shortness of breath    6. Chronic hepatitis C without hepatic coma    7. Cough    8. Malignant pleural effusion    9. Essential hypertension    10. Anxiety           Plan:       1. 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  Also has pleural effusion on right side s/p therapeutic thoracentesis 5/2020. PDL1 expressed in 100% of tumor cells from biopsy.  No mutation identified for EGFR/ALK/ROS1/BRAF by tissue.  -Continue single agent pembrolizumab  -Labs have been reviewed. Proceed with cycle 10  -Scans in Feb 2021, scans reviewed 11/18/20 show stable disease  -Return to clinic in 3 weeks  -TSH and fT4 next due every other cycle  -Discussed with hepatology and no additional antiviral therapy required with her history of Hep C.  -Not eligible for [id number]    2. Arthralgias  -Has established care with palliative care, trial of percocet instead of tramadol, did not like percocet d/t nausea/constipation, now back on tramadol PRN taking Q8H, encouraged to try taking tylenol and NSAIDs    3. Cough  -On tessalon & codeine cough syrup Q8H PRN  -On gabapentin  -Previously also tried mucinex  -Had right pleurX placed 6/24 by Dr. Garcia, s/p removal 8/26/20    4. HCPOA  -Patient appointed her daughter and niece     5. Anxiety/insomnia  -On wellbutrin, instructed not to take buspirone  -Also on trazodone PRN  -On hydroxyzine PRN for anxiety as given by palliative care previously  -Refused psychiatry visit      Follow-up:   -RTC for labs (CBC, CMP, TSH, T4) prior to MD visit in 4 weeks 1/6/21 and cycle 11 Keytruda    The following was staffed and discussed with supervising physician Dr. Mederos.    Lauren  Josefina HOWELL PGY-VI  Hematology/Oncology Fellow

## 2020-12-08 ENCOUNTER — LAB VISIT (OUTPATIENT)
Dept: LAB | Facility: HOSPITAL | Age: 60
End: 2020-12-08
Payer: MEDICAID

## 2020-12-08 ENCOUNTER — SOCIAL WORK (OUTPATIENT)
Dept: HEMATOLOGY/ONCOLOGY | Facility: CLINIC | Age: 60
End: 2020-12-08

## 2020-12-08 DIAGNOSIS — C34.91 ADENOCARCINOMA OF LUNG, STAGE 4, RIGHT: ICD-10-CM

## 2020-12-08 LAB
ALBUMIN SERPL BCP-MCNC: 3.2 G/DL (ref 3.5–5.2)
ALP SERPL-CCNC: 98 U/L (ref 55–135)
ALT SERPL W/O P-5'-P-CCNC: 7 U/L (ref 10–44)
ANION GAP SERPL CALC-SCNC: 10 MMOL/L (ref 8–16)
AST SERPL-CCNC: 11 U/L (ref 10–40)
BASOPHILS # BLD AUTO: 0.07 K/UL (ref 0–0.2)
BASOPHILS NFR BLD: 0.9 % (ref 0–1.9)
BILIRUB SERPL-MCNC: 0.3 MG/DL (ref 0.1–1)
BUN SERPL-MCNC: 28 MG/DL (ref 8–23)
CALCIUM SERPL-MCNC: 9.6 MG/DL (ref 8.7–10.5)
CHLORIDE SERPL-SCNC: 104 MMOL/L (ref 95–110)
CO2 SERPL-SCNC: 24 MMOL/L (ref 23–29)
CREAT SERPL-MCNC: 1.2 MG/DL (ref 0.5–1.4)
DIFFERENTIAL METHOD: ABNORMAL
EOSINOPHIL # BLD AUTO: 0.3 K/UL (ref 0–0.5)
EOSINOPHIL NFR BLD: 4.2 % (ref 0–8)
ERYTHROCYTE [DISTWIDTH] IN BLOOD BY AUTOMATED COUNT: 13.9 % (ref 11.5–14.5)
EST. GFR  (AFRICAN AMERICAN): 56.4 ML/MIN/1.73 M^2
EST. GFR  (NON AFRICAN AMERICAN): 48.9 ML/MIN/1.73 M^2
GLUCOSE SERPL-MCNC: 119 MG/DL (ref 70–110)
HCT VFR BLD AUTO: 36.1 % (ref 37–48.5)
HGB BLD-MCNC: 11.2 G/DL (ref 12–16)
IMM GRANULOCYTES # BLD AUTO: 0.04 K/UL (ref 0–0.04)
IMM GRANULOCYTES NFR BLD AUTO: 0.5 % (ref 0–0.5)
LYMPHOCYTES # BLD AUTO: 1.8 K/UL (ref 1–4.8)
LYMPHOCYTES NFR BLD: 22.4 % (ref 18–48)
MCH RBC QN AUTO: 27.5 PG (ref 27–31)
MCHC RBC AUTO-ENTMCNC: 31 G/DL (ref 32–36)
MCV RBC AUTO: 89 FL (ref 82–98)
MONOCYTES # BLD AUTO: 0.6 K/UL (ref 0.3–1)
MONOCYTES NFR BLD: 7.7 % (ref 4–15)
NEUTROPHILS # BLD AUTO: 5.1 K/UL (ref 1.8–7.7)
NEUTROPHILS NFR BLD: 64.3 % (ref 38–73)
NRBC BLD-RTO: 0 /100 WBC
PLATELET # BLD AUTO: 291 K/UL (ref 150–350)
PMV BLD AUTO: 9.3 FL (ref 9.2–12.9)
POTASSIUM SERPL-SCNC: 3.9 MMOL/L (ref 3.5–5.1)
PROT SERPL-MCNC: 7.9 G/DL (ref 6–8.4)
RBC # BLD AUTO: 4.07 M/UL (ref 4–5.4)
SODIUM SERPL-SCNC: 138 MMOL/L (ref 136–145)
WBC # BLD AUTO: 7.91 K/UL (ref 3.9–12.7)

## 2020-12-08 PROCEDURE — 80053 COMPREHEN METABOLIC PANEL: CPT

## 2020-12-08 PROCEDURE — 36415 COLL VENOUS BLD VENIPUNCTURE: CPT

## 2020-12-08 PROCEDURE — 85025 COMPLETE CBC W/AUTO DIFF WBC: CPT

## 2020-12-09 ENCOUNTER — INFUSION (OUTPATIENT)
Dept: INFUSION THERAPY | Facility: HOSPITAL | Age: 60
End: 2020-12-09
Attending: INTERNAL MEDICINE
Payer: MEDICAID

## 2020-12-09 ENCOUNTER — OFFICE VISIT (OUTPATIENT)
Dept: HEMATOLOGY/ONCOLOGY | Facility: CLINIC | Age: 60
End: 2020-12-09
Payer: MEDICAID

## 2020-12-09 VITALS
OXYGEN SATURATION: 97 % | BODY MASS INDEX: 29.44 KG/M2 | HEART RATE: 101 BPM | WEIGHT: 183.19 LBS | RESPIRATION RATE: 20 BRPM | SYSTOLIC BLOOD PRESSURE: 125 MMHG | TEMPERATURE: 98 F | DIASTOLIC BLOOD PRESSURE: 84 MMHG | HEIGHT: 66 IN

## 2020-12-09 VITALS
RESPIRATION RATE: 18 BRPM | SYSTOLIC BLOOD PRESSURE: 112 MMHG | OXYGEN SATURATION: 98 % | DIASTOLIC BLOOD PRESSURE: 73 MMHG | TEMPERATURE: 98 F | HEART RATE: 91 BPM

## 2020-12-09 DIAGNOSIS — R06.02 SHORTNESS OF BREATH: ICD-10-CM

## 2020-12-09 DIAGNOSIS — J91.0 MALIGNANT PLEURAL EFFUSION: ICD-10-CM

## 2020-12-09 DIAGNOSIS — I10 ESSENTIAL HYPERTENSION: ICD-10-CM

## 2020-12-09 DIAGNOSIS — G89.3 CHRONIC NEOPLASM-RELATED PAIN: ICD-10-CM

## 2020-12-09 DIAGNOSIS — C34.90 PRIMARY MALIGNANT NEOPLASM OF LUNG WITH METASTASIS TO BRAIN: ICD-10-CM

## 2020-12-09 DIAGNOSIS — J45.909 ASTHMA, UNSPECIFIED ASTHMA SEVERITY, UNSPECIFIED WHETHER COMPLICATED, UNSPECIFIED WHETHER PERSISTENT: ICD-10-CM

## 2020-12-09 DIAGNOSIS — R05.9 COUGH: ICD-10-CM

## 2020-12-09 DIAGNOSIS — C79.31 PRIMARY MALIGNANT NEOPLASM OF LUNG WITH METASTASIS TO BRAIN: ICD-10-CM

## 2020-12-09 DIAGNOSIS — Z72.0 TOBACCO ABUSE: ICD-10-CM

## 2020-12-09 DIAGNOSIS — F41.9 ANXIETY: ICD-10-CM

## 2020-12-09 DIAGNOSIS — R91.8 LUNG MASS: ICD-10-CM

## 2020-12-09 DIAGNOSIS — C34.91 ADENOCARCINOMA OF LUNG, STAGE 4, RIGHT: Primary | ICD-10-CM

## 2020-12-09 DIAGNOSIS — B18.2 CHRONIC HEPATITIS C WITHOUT HEPATIC COMA: ICD-10-CM

## 2020-12-09 DIAGNOSIS — C34.90 METASTATIC NON-SMALL CELL LUNG CANCER: Primary | ICD-10-CM

## 2020-12-09 DIAGNOSIS — R64 MALIGNANT CACHEXIA: ICD-10-CM

## 2020-12-09 PROCEDURE — 96413 CHEMO IV INFUSION 1 HR: CPT

## 2020-12-09 PROCEDURE — 99999 PR PBB SHADOW E&M-EST. PATIENT-LVL IV: CPT | Mod: PBBFAC,,, | Performed by: STUDENT IN AN ORGANIZED HEALTH CARE EDUCATION/TRAINING PROGRAM

## 2020-12-09 PROCEDURE — 63600175 PHARM REV CODE 636 W HCPCS: Mod: JG | Performed by: INTERNAL MEDICINE

## 2020-12-09 PROCEDURE — 99214 OFFICE O/P EST MOD 30 MIN: CPT | Mod: PBBFAC,25 | Performed by: STUDENT IN AN ORGANIZED HEALTH CARE EDUCATION/TRAINING PROGRAM

## 2020-12-09 PROCEDURE — 99999 PR PBB SHADOW E&M-EST. PATIENT-LVL IV: ICD-10-PCS | Mod: PBBFAC,,, | Performed by: STUDENT IN AN ORGANIZED HEALTH CARE EDUCATION/TRAINING PROGRAM

## 2020-12-09 PROCEDURE — 99215 OFFICE O/P EST HI 40 MIN: CPT | Mod: S$PBB,,, | Performed by: STUDENT IN AN ORGANIZED HEALTH CARE EDUCATION/TRAINING PROGRAM

## 2020-12-09 PROCEDURE — 99215 PR OFFICE/OUTPT VISIT, EST, LEVL V, 40-54 MIN: ICD-10-PCS | Mod: S$PBB,,, | Performed by: STUDENT IN AN ORGANIZED HEALTH CARE EDUCATION/TRAINING PROGRAM

## 2020-12-09 PROCEDURE — 25000003 PHARM REV CODE 250: Performed by: INTERNAL MEDICINE

## 2020-12-09 RX ORDER — SODIUM CHLORIDE 0.9 % (FLUSH) 0.9 %
10 SYRINGE (ML) INJECTION
Status: DISCONTINUED | OUTPATIENT
Start: 2020-12-09 | End: 2020-12-09 | Stop reason: HOSPADM

## 2020-12-09 RX ORDER — HEPARIN 100 UNIT/ML
500 SYRINGE INTRAVENOUS
Status: CANCELLED | OUTPATIENT
Start: 2020-12-09

## 2020-12-09 RX ORDER — TRAMADOL HYDROCHLORIDE 50 MG/1
100 TABLET ORAL EVERY 8 HOURS PRN
Qty: 60 TABLET | Refills: 0 | Status: SHIPPED | OUTPATIENT
Start: 2020-12-09 | End: 2021-01-06

## 2020-12-09 RX ORDER — SODIUM CHLORIDE 0.9 % (FLUSH) 0.9 %
10 SYRINGE (ML) INJECTION
Status: CANCELLED | OUTPATIENT
Start: 2020-12-09

## 2020-12-09 RX ORDER — HEPARIN 100 UNIT/ML
500 SYRINGE INTRAVENOUS
Status: DISCONTINUED | OUTPATIENT
Start: 2020-12-09 | End: 2020-12-09 | Stop reason: HOSPADM

## 2020-12-09 RX ORDER — PROMETHAZINE HYDROCHLORIDE AND CODEINE PHOSPHATE 6.25; 1 MG/5ML; MG/5ML
5 SOLUTION ORAL EVERY 8 HOURS PRN
Qty: 473 ML | Refills: 1 | Status: SHIPPED | OUTPATIENT
Start: 2020-12-09 | End: 2021-01-06 | Stop reason: SDUPTHER

## 2020-12-09 RX ADMIN — SODIUM CHLORIDE: 9 INJECTION, SOLUTION INTRAVENOUS at 10:12

## 2020-12-09 RX ADMIN — SODIUM CHLORIDE 200 MG: 9 INJECTION, SOLUTION INTRAVENOUS at 10:12

## 2020-12-09 NOTE — PLAN OF CARE
1130 pt tolerated keytruda infusion without issue, pt to rtc 1/7/21, no distress noted upon d/c to home

## 2020-12-09 NOTE — Clinical Note
Instructions (Optional): If symptoms worsen or do not improve
RTC for labs (CBC, CMP, TSH, T4) prior to MD visit in 4 weeks 1/6/21 and cycle 11 Keytruda
Scheduled For Follow Up In (Optional): PRN
Detail Level: Detailed

## 2020-12-09 NOTE — PLAN OF CARE
1020 pt here for Keytruda infusion D1C10, labs, hx, meds, allergies reviewed, pt with no complaints at this time, reclined in chair, continue to monitor

## 2020-12-13 NOTE — PROGRESS NOTES
Met with patient in clinic following her lab appointment to have her sign the [employer] Patient [employer] and [employer] application forms. Received copies of her current [employer] bill and her current Social Security award letter. Delivered 3 cases of vanilla flavored Boost Plus to her. Submitted check request for patient's [employer] bill, $119.89, via email to Sindy Forrest in Accounts [employer] and asked that it be mailed directly to [employer]. [employer] form given to ERICKA Pickens for Dr. Rocha to sign. Will continue to follow and assist as needs are identified.

## 2020-12-30 ENCOUNTER — DOCUMENTATION ONLY (OUTPATIENT)
Dept: HEMATOLOGY/ONCOLOGY | Facility: CLINIC | Age: 60
End: 2020-12-30

## 2020-12-30 NOTE — PROGRESS NOTES
Received call from patient requesting assistance with her current electricity bill ($130) and she needs 2 more cases of Boost Plus, vanilla flavor, nutritional supplements. Asked her to provide a copy of her electricity bill and she will bring it on 1/6/21 when she comes to clinic for her lab appt. At that time I will submit it to [employer] at [facility] along with patient's application. Sent message via Epic to my coworker Marivel Rojas LCSW, asking that she order the nutritional supplements. Will continue to follow and assist as needs are identified.

## 2021-01-01 ENCOUNTER — TELEPHONE (OUTPATIENT)
Dept: HEMATOLOGY/ONCOLOGY | Facility: CLINIC | Age: 61
End: 2021-01-01
Payer: MEDICAID

## 2021-01-01 ENCOUNTER — INFUSION (OUTPATIENT)
Dept: INFUSION THERAPY | Facility: HOSPITAL | Age: 61
End: 2021-01-01
Payer: MEDICAID

## 2021-01-01 ENCOUNTER — DOCUMENTATION ONLY (OUTPATIENT)
Dept: HEMATOLOGY/ONCOLOGY | Facility: CLINIC | Age: 61
End: 2021-01-01

## 2021-01-01 ENCOUNTER — TELEPHONE (OUTPATIENT)
Dept: HEMATOLOGY/ONCOLOGY | Facility: CLINIC | Age: 61
End: 2021-01-01

## 2021-01-01 ENCOUNTER — LAB VISIT (OUTPATIENT)
Dept: LAB | Facility: HOSPITAL | Age: 61
End: 2021-01-01
Payer: MEDICAID

## 2021-01-01 ENCOUNTER — OFFICE VISIT (OUTPATIENT)
Dept: HEMATOLOGY/ONCOLOGY | Facility: CLINIC | Age: 61
End: 2021-01-01
Payer: MEDICAID

## 2021-01-01 ENCOUNTER — HOSPITAL ENCOUNTER (OUTPATIENT)
Dept: RADIOLOGY | Facility: HOSPITAL | Age: 61
Discharge: HOME | End: 2021-06-08
Attending: STUDENT IN AN ORGANIZED HEALTH CARE EDUCATION/TRAINING PROGRAM
Payer: MEDICAID

## 2021-01-01 ENCOUNTER — HOSPITAL ENCOUNTER (OUTPATIENT)
Dept: PULMONOLOGY | Facility: CLINIC | Age: 61
Discharge: HOME | End: 2021-11-17
Payer: MEDICAID

## 2021-01-01 ENCOUNTER — HOSPITAL ENCOUNTER (OUTPATIENT)
Dept: RADIOLOGY | Facility: HOSPITAL | Age: 61
Discharge: HOME | End: 2021-09-22
Attending: STUDENT IN AN ORGANIZED HEALTH CARE EDUCATION/TRAINING PROGRAM
Payer: MEDICAID

## 2021-01-01 ENCOUNTER — HOSPITAL ENCOUNTER (OUTPATIENT)
Dept: RADIOLOGY | Facility: HOSPITAL | Age: 61
Discharge: HOME | End: 2021-12-08
Attending: STUDENT IN AN ORGANIZED HEALTH CARE EDUCATION/TRAINING PROGRAM
Payer: MEDICAID

## 2021-01-01 ENCOUNTER — IMMUNIZATION (OUTPATIENT)
Dept: PHARMACY | Facility: CLINIC | Age: 61
End: 2021-01-01
Payer: MEDICAID

## 2021-01-01 ENCOUNTER — TELEPHONE (OUTPATIENT)
Dept: ORTHOPEDICS | Facility: CLINIC | Age: 61
End: 2021-01-01

## 2021-01-01 ENCOUNTER — INFUSION (OUTPATIENT)
Dept: INFUSION THERAPY | Facility: HOSPITAL | Age: 61
End: 2021-01-01
Attending: STUDENT IN AN ORGANIZED HEALTH CARE EDUCATION/TRAINING PROGRAM
Payer: MEDICAID

## 2021-01-01 ENCOUNTER — HOSPITAL ENCOUNTER (OUTPATIENT)
Dept: RADIOLOGY | Facility: HOSPITAL | Age: 61
Discharge: HOME | End: 2021-03-02
Attending: RADIOLOGY
Payer: MEDICAID

## 2021-01-01 ENCOUNTER — OFFICE VISIT (OUTPATIENT)
Dept: PULMONOLOGY | Facility: CLINIC | Age: 61
End: 2021-01-01
Payer: MEDICAID

## 2021-01-01 ENCOUNTER — TELEPHONE (OUTPATIENT)
Dept: PULMONOLOGY | Facility: CLINIC | Age: 61
End: 2021-01-01
Payer: MEDICAID

## 2021-01-01 ENCOUNTER — HOSPITAL ENCOUNTER (INPATIENT)
Facility: HOSPITAL | Age: 61
LOS: 10 days | Discharge: HOME | DRG: 190 | End: 2022-01-05
Attending: EMERGENCY MEDICINE | Admitting: EMERGENCY MEDICINE
Payer: MEDICAID

## 2021-01-01 ENCOUNTER — HOSPITAL ENCOUNTER (INPATIENT)
Facility: HOSPITAL | Age: 61
LOS: 2 days | Discharge: HOME | DRG: 190 | End: 2021-12-16
Attending: EMERGENCY MEDICINE | Admitting: EMERGENCY MEDICINE
Payer: MEDICAID

## 2021-01-01 ENCOUNTER — LAB VISIT (OUTPATIENT)
Dept: LAB | Facility: HOSPITAL | Age: 61
End: 2021-01-01
Attending: STUDENT IN AN ORGANIZED HEALTH CARE EDUCATION/TRAINING PROGRAM
Payer: MEDICAID

## 2021-01-01 ENCOUNTER — HOSPITAL ENCOUNTER (OUTPATIENT)
Dept: RADIOLOGY | Facility: HOSPITAL | Age: 61
Discharge: HOME | End: 2021-03-02
Attending: STUDENT IN AN ORGANIZED HEALTH CARE EDUCATION/TRAINING PROGRAM
Payer: MEDICAID

## 2021-01-01 VITALS
HEIGHT: 66 IN | HEART RATE: 73 BPM | WEIGHT: 197.56 LBS | BODY MASS INDEX: 31.75 KG/M2 | RESPIRATION RATE: 22 BRPM | SYSTOLIC BLOOD PRESSURE: 160 MMHG | OXYGEN SATURATION: 99 % | DIASTOLIC BLOOD PRESSURE: 72 MMHG

## 2021-01-01 VITALS
HEART RATE: 70 BPM | DIASTOLIC BLOOD PRESSURE: 70 MMHG | HEIGHT: 66 IN | SYSTOLIC BLOOD PRESSURE: 138 MMHG | RESPIRATION RATE: 20 BRPM | BODY MASS INDEX: 31.75 KG/M2 | TEMPERATURE: 98 F | WEIGHT: 197.56 LBS | OXYGEN SATURATION: 96 %

## 2021-01-01 VITALS
HEART RATE: 78 BPM | RESPIRATION RATE: 18 BRPM | DIASTOLIC BLOOD PRESSURE: 74 MMHG | SYSTOLIC BLOOD PRESSURE: 136 MMHG | TEMPERATURE: 98 F

## 2021-01-01 VITALS — HEART RATE: 75 BPM | DIASTOLIC BLOOD PRESSURE: 76 MMHG | SYSTOLIC BLOOD PRESSURE: 137 MMHG | RESPIRATION RATE: 19 BRPM

## 2021-01-01 VITALS
BODY MASS INDEX: 31.29 KG/M2 | RESPIRATION RATE: 14 BRPM | OXYGEN SATURATION: 100 % | TEMPERATURE: 98 F | HEART RATE: 79 BPM | HEIGHT: 66 IN | WEIGHT: 194.69 LBS | SYSTOLIC BLOOD PRESSURE: 126 MMHG | DIASTOLIC BLOOD PRESSURE: 81 MMHG

## 2021-01-01 VITALS
BODY MASS INDEX: 31.18 KG/M2 | HEART RATE: 92 BPM | SYSTOLIC BLOOD PRESSURE: 106 MMHG | DIASTOLIC BLOOD PRESSURE: 62 MMHG | HEIGHT: 66 IN | TEMPERATURE: 99 F | WEIGHT: 194 LBS | RESPIRATION RATE: 20 BRPM | OXYGEN SATURATION: 97 %

## 2021-01-01 VITALS
DIASTOLIC BLOOD PRESSURE: 74 MMHG | WEIGHT: 197.31 LBS | SYSTOLIC BLOOD PRESSURE: 125 MMHG | HEART RATE: 90 BPM | BODY MASS INDEX: 31.71 KG/M2 | OXYGEN SATURATION: 99 % | RESPIRATION RATE: 16 BRPM | HEIGHT: 66 IN | TEMPERATURE: 98 F

## 2021-01-01 VITALS
WEIGHT: 198.63 LBS | DIASTOLIC BLOOD PRESSURE: 75 MMHG | SYSTOLIC BLOOD PRESSURE: 136 MMHG | HEIGHT: 66 IN | BODY MASS INDEX: 31.92 KG/M2 | TEMPERATURE: 98 F | HEART RATE: 77 BPM | RESPIRATION RATE: 18 BRPM

## 2021-01-01 VITALS
SYSTOLIC BLOOD PRESSURE: 146 MMHG | HEART RATE: 91 BPM | WEIGHT: 192 LBS | RESPIRATION RATE: 20 BRPM | HEIGHT: 66 IN | TEMPERATURE: 98 F | OXYGEN SATURATION: 97 % | DIASTOLIC BLOOD PRESSURE: 76 MMHG | BODY MASS INDEX: 30.86 KG/M2

## 2021-01-01 VITALS
HEART RATE: 74 BPM | HEIGHT: 66 IN | DIASTOLIC BLOOD PRESSURE: 64 MMHG | RESPIRATION RATE: 18 BRPM | BODY MASS INDEX: 31.29 KG/M2 | SYSTOLIC BLOOD PRESSURE: 126 MMHG | TEMPERATURE: 98 F | WEIGHT: 194.69 LBS

## 2021-01-01 VITALS
TEMPERATURE: 98 F | DIASTOLIC BLOOD PRESSURE: 80 MMHG | RESPIRATION RATE: 18 BRPM | OXYGEN SATURATION: 99 % | SYSTOLIC BLOOD PRESSURE: 130 MMHG | HEART RATE: 80 BPM

## 2021-01-01 VITALS
BODY MASS INDEX: 31.57 KG/M2 | TEMPERATURE: 98 F | OXYGEN SATURATION: 100 % | SYSTOLIC BLOOD PRESSURE: 135 MMHG | WEIGHT: 196.44 LBS | HEIGHT: 66 IN | RESPIRATION RATE: 16 BRPM | HEART RATE: 81 BPM | DIASTOLIC BLOOD PRESSURE: 73 MMHG

## 2021-01-01 VITALS
RESPIRATION RATE: 18 BRPM | WEIGHT: 188.69 LBS | HEART RATE: 77 BPM | DIASTOLIC BLOOD PRESSURE: 58 MMHG | BODY MASS INDEX: 30.33 KG/M2 | SYSTOLIC BLOOD PRESSURE: 93 MMHG | TEMPERATURE: 98 F | HEIGHT: 66 IN

## 2021-01-01 VITALS
WEIGHT: 198.63 LBS | SYSTOLIC BLOOD PRESSURE: 140 MMHG | OXYGEN SATURATION: 97 % | DIASTOLIC BLOOD PRESSURE: 71 MMHG | BODY MASS INDEX: 32.06 KG/M2 | RESPIRATION RATE: 16 BRPM | TEMPERATURE: 98 F | HEART RATE: 74 BPM

## 2021-01-01 VITALS
SYSTOLIC BLOOD PRESSURE: 112 MMHG | OXYGEN SATURATION: 95 % | HEIGHT: 62 IN | HEART RATE: 74 BPM | DIASTOLIC BLOOD PRESSURE: 72 MMHG | BODY MASS INDEX: 34.93 KG/M2 | TEMPERATURE: 98 F | RESPIRATION RATE: 14 BRPM

## 2021-01-01 VITALS
HEART RATE: 78 BPM | WEIGHT: 198.63 LBS | HEIGHT: 66 IN | TEMPERATURE: 98 F | DIASTOLIC BLOOD PRESSURE: 82 MMHG | SYSTOLIC BLOOD PRESSURE: 126 MMHG | BODY MASS INDEX: 31.92 KG/M2 | RESPIRATION RATE: 18 BRPM

## 2021-01-01 VITALS
RESPIRATION RATE: 18 BRPM | SYSTOLIC BLOOD PRESSURE: 134 MMHG | HEART RATE: 86 BPM | DIASTOLIC BLOOD PRESSURE: 72 MMHG | TEMPERATURE: 98 F

## 2021-01-01 VITALS
HEIGHT: 66 IN | RESPIRATION RATE: 18 BRPM | SYSTOLIC BLOOD PRESSURE: 144 MMHG | DIASTOLIC BLOOD PRESSURE: 83 MMHG | TEMPERATURE: 98 F | BODY MASS INDEX: 31.75 KG/M2 | HEART RATE: 93 BPM | WEIGHT: 197.56 LBS | OXYGEN SATURATION: 99 %

## 2021-01-01 VITALS
RESPIRATION RATE: 18 BRPM | TEMPERATURE: 98 F | DIASTOLIC BLOOD PRESSURE: 70 MMHG | HEART RATE: 78 BPM | BODY MASS INDEX: 31.89 KG/M2 | HEIGHT: 66 IN | WEIGHT: 198.44 LBS | OXYGEN SATURATION: 99 % | SYSTOLIC BLOOD PRESSURE: 140 MMHG

## 2021-01-01 VITALS
OXYGEN SATURATION: 97 % | HEART RATE: 73 BPM | BODY MASS INDEX: 35.15 KG/M2 | SYSTOLIC BLOOD PRESSURE: 132 MMHG | HEIGHT: 62 IN | OXYGEN SATURATION: 99 % | HEIGHT: 66 IN | WEIGHT: 194.25 LBS | DIASTOLIC BLOOD PRESSURE: 82 MMHG | BODY MASS INDEX: 31.22 KG/M2 | WEIGHT: 191 LBS | RESPIRATION RATE: 22 BRPM | DIASTOLIC BLOOD PRESSURE: 72 MMHG | HEART RATE: 88 BPM | SYSTOLIC BLOOD PRESSURE: 137 MMHG

## 2021-01-01 VITALS
TEMPERATURE: 98 F | OXYGEN SATURATION: 99 % | SYSTOLIC BLOOD PRESSURE: 98 MMHG | RESPIRATION RATE: 16 BRPM | DIASTOLIC BLOOD PRESSURE: 59 MMHG | BODY MASS INDEX: 30.33 KG/M2 | WEIGHT: 188.69 LBS | HEIGHT: 66 IN | HEART RATE: 85 BPM

## 2021-01-01 VITALS
SYSTOLIC BLOOD PRESSURE: 124 MMHG | HEART RATE: 70 BPM | OXYGEN SATURATION: 98 % | DIASTOLIC BLOOD PRESSURE: 74 MMHG | RESPIRATION RATE: 18 BRPM

## 2021-01-01 VITALS
SYSTOLIC BLOOD PRESSURE: 167 MMHG | WEIGHT: 198.44 LBS | TEMPERATURE: 98 F | HEART RATE: 90 BPM | DIASTOLIC BLOOD PRESSURE: 71 MMHG | OXYGEN SATURATION: 97 % | BODY MASS INDEX: 31.89 KG/M2 | RESPIRATION RATE: 20 BRPM | HEIGHT: 66 IN

## 2021-01-01 VITALS
DIASTOLIC BLOOD PRESSURE: 78 MMHG | HEART RATE: 79 BPM | SYSTOLIC BLOOD PRESSURE: 116 MMHG | TEMPERATURE: 98 F | RESPIRATION RATE: 18 BRPM

## 2021-01-01 DIAGNOSIS — G89.3 CHRONIC NEOPLASM-RELATED PAIN: ICD-10-CM

## 2021-01-01 DIAGNOSIS — C34.90 METASTATIC NON-SMALL CELL LUNG CANCER: Primary | ICD-10-CM

## 2021-01-01 DIAGNOSIS — C79.31 PRIMARY MALIGNANT NEOPLASM OF LUNG WITH METASTASIS TO BRAIN: ICD-10-CM

## 2021-01-01 DIAGNOSIS — C34.91 ADENOCARCINOMA OF LUNG, STAGE 4, RIGHT: Primary | ICD-10-CM

## 2021-01-01 DIAGNOSIS — C34.90 PRIMARY MALIGNANT NEOPLASM OF LUNG WITH METASTASIS TO BRAIN: ICD-10-CM

## 2021-01-01 DIAGNOSIS — C79.31 SECONDARY MALIGNANT NEOPLASM OF BRAIN: ICD-10-CM

## 2021-01-01 DIAGNOSIS — C34.91 ADENOCARCINOMA OF LUNG, STAGE 4, RIGHT: ICD-10-CM

## 2021-01-01 DIAGNOSIS — I10 ESSENTIAL HYPERTENSION: ICD-10-CM

## 2021-01-01 DIAGNOSIS — R07.9 CHEST PAIN: ICD-10-CM

## 2021-01-01 DIAGNOSIS — R07.9 CHEST PAIN, UNSPECIFIED TYPE: ICD-10-CM

## 2021-01-01 DIAGNOSIS — K59.1 FUNCTIONAL DIARRHEA: ICD-10-CM

## 2021-01-01 DIAGNOSIS — J45.909 ASTHMA, UNSPECIFIED ASTHMA SEVERITY, UNSPECIFIED WHETHER COMPLICATED, UNSPECIFIED WHETHER PERSISTENT: ICD-10-CM

## 2021-01-01 DIAGNOSIS — R06.02 SHORTNESS OF BREATH: ICD-10-CM

## 2021-01-01 DIAGNOSIS — K21.9 GASTROESOPHAGEAL REFLUX DISEASE, UNSPECIFIED WHETHER ESOPHAGITIS PRESENT: ICD-10-CM

## 2021-01-01 DIAGNOSIS — R09.02 HYPOXIA: ICD-10-CM

## 2021-01-01 DIAGNOSIS — J91.0 MALIGNANT PLEURAL EFFUSION: ICD-10-CM

## 2021-01-01 DIAGNOSIS — F41.9 ANXIETY: ICD-10-CM

## 2021-01-01 DIAGNOSIS — R64 MALIGNANT CACHEXIA: ICD-10-CM

## 2021-01-01 DIAGNOSIS — R91.8 LUNG MASS: ICD-10-CM

## 2021-01-01 DIAGNOSIS — C79.31 SECONDARY MALIGNANT NEOPLASM OF BRAIN: Primary | ICD-10-CM

## 2021-01-01 DIAGNOSIS — F51.02 ADJUSTMENT INSOMNIA: ICD-10-CM

## 2021-01-01 DIAGNOSIS — C34.90 METASTATIC NON-SMALL CELL LUNG CANCER: ICD-10-CM

## 2021-01-01 DIAGNOSIS — N17.9 AKI (ACUTE KIDNEY INJURY): ICD-10-CM

## 2021-01-01 DIAGNOSIS — J96.11 CHRONIC RESPIRATORY FAILURE WITH HYPOXIA: ICD-10-CM

## 2021-01-01 DIAGNOSIS — N17.9 AKI (ACUTE KIDNEY INJURY): Primary | ICD-10-CM

## 2021-01-01 DIAGNOSIS — J44.1 COPD EXACERBATION: ICD-10-CM

## 2021-01-01 DIAGNOSIS — J98.4 PNEUMONITIS: ICD-10-CM

## 2021-01-01 DIAGNOSIS — B18.2 CHRONIC HEPATITIS C WITHOUT HEPATIC COMA: ICD-10-CM

## 2021-01-01 DIAGNOSIS — R94.31 QT PROLONGATION: ICD-10-CM

## 2021-01-01 DIAGNOSIS — R05.9 COUGH: ICD-10-CM

## 2021-01-01 DIAGNOSIS — Z72.0 TOBACCO ABUSE: ICD-10-CM

## 2021-01-01 DIAGNOSIS — D64.9 NORMOCYTIC ANEMIA: ICD-10-CM

## 2021-01-01 DIAGNOSIS — R06.02 SOB (SHORTNESS OF BREATH): ICD-10-CM

## 2021-01-01 DIAGNOSIS — R91.8 MASS OF UPPER LOBE OF RIGHT LUNG: ICD-10-CM

## 2021-01-01 DIAGNOSIS — R05.9 COUGH: Primary | ICD-10-CM

## 2021-01-01 DIAGNOSIS — J96.21 ACUTE ON CHRONIC RESPIRATORY FAILURE WITH HYPOXEMIA: ICD-10-CM

## 2021-01-01 DIAGNOSIS — J40 BRONCHITIS: ICD-10-CM

## 2021-01-01 DIAGNOSIS — J44.1 COPD EXACERBATION: Primary | ICD-10-CM

## 2021-01-01 DIAGNOSIS — J44.1 CHRONIC OBSTRUCTIVE PULMONARY DISEASE WITH ACUTE EXACERBATION: Primary | ICD-10-CM

## 2021-01-01 DIAGNOSIS — J30.2 SEASONAL ALLERGIES: Primary | ICD-10-CM

## 2021-01-01 DIAGNOSIS — F51.02 ADJUSTMENT INSOMNIA: Primary | ICD-10-CM

## 2021-01-01 LAB
ADENOVIRUS: NOT DETECTED
ALBUMIN SERPL BCP-MCNC: 2.5 G/DL (ref 3.5–5.2)
ALBUMIN SERPL BCP-MCNC: 2.6 G/DL (ref 3.5–5.2)
ALBUMIN SERPL BCP-MCNC: 2.7 G/DL (ref 3.5–5.2)
ALBUMIN SERPL BCP-MCNC: 2.8 G/DL (ref 3.5–5.2)
ALBUMIN SERPL BCP-MCNC: 2.9 G/DL (ref 3.5–5.2)
ALBUMIN SERPL BCP-MCNC: 3 G/DL (ref 3.5–5.2)
ALBUMIN SERPL BCP-MCNC: 3.2 G/DL (ref 3.5–5.2)
ALBUMIN SERPL BCP-MCNC: 3.4 G/DL (ref 3.5–5.2)
ALBUMIN SERPL BCP-MCNC: 3.4 G/DL (ref 3.5–5.2)
ALBUMIN SERPL BCP-MCNC: 3.5 G/DL (ref 3.5–5.2)
ALLENS TEST: ABNORMAL
ALP SERPL-CCNC: 101 U/L (ref 55–135)
ALP SERPL-CCNC: 104 U/L (ref 55–135)
ALP SERPL-CCNC: 104 U/L (ref 55–135)
ALP SERPL-CCNC: 107 U/L (ref 55–135)
ALP SERPL-CCNC: 89 U/L (ref 55–135)
ALP SERPL-CCNC: 90 U/L (ref 55–135)
ALP SERPL-CCNC: 91 U/L (ref 55–135)
ALP SERPL-CCNC: 94 U/L (ref 55–135)
ALP SERPL-CCNC: 95 U/L (ref 55–135)
ALP SERPL-CCNC: 97 U/L (ref 55–135)
ALT SERPL W/O P-5'-P-CCNC: 11 U/L (ref 10–44)
ALT SERPL W/O P-5'-P-CCNC: 12 U/L (ref 10–44)
ALT SERPL W/O P-5'-P-CCNC: 13 U/L (ref 10–44)
ALT SERPL W/O P-5'-P-CCNC: 14 U/L (ref 10–44)
ALT SERPL W/O P-5'-P-CCNC: 8 U/L (ref 10–44)
ALT SERPL W/O P-5'-P-CCNC: 8 U/L (ref 10–44)
ALT SERPL W/O P-5'-P-CCNC: 9 U/L (ref 10–44)
ANION GAP SERPL CALC-SCNC: 10 MMOL/L (ref 8–16)
ANION GAP SERPL CALC-SCNC: 11 MMOL/L (ref 8–16)
ANION GAP SERPL CALC-SCNC: 12 MMOL/L (ref 8–16)
ANION GAP SERPL CALC-SCNC: 12 MMOL/L (ref 8–16)
ANION GAP SERPL CALC-SCNC: 13 MMOL/L (ref 8–16)
ANION GAP SERPL CALC-SCNC: 13 MMOL/L (ref 8–16)
ANION GAP SERPL CALC-SCNC: 5 MMOL/L (ref 8–16)
ANION GAP SERPL CALC-SCNC: 7 MMOL/L (ref 8–16)
ANION GAP SERPL CALC-SCNC: 7 MMOL/L (ref 8–16)
ANION GAP SERPL CALC-SCNC: 8 MMOL/L (ref 8–16)
ANION GAP SERPL CALC-SCNC: 9 MMOL/L (ref 8–16)
ASCENDING AORTA: 2.88 CM
AST SERPL-CCNC: 11 U/L (ref 10–40)
AST SERPL-CCNC: 12 U/L (ref 10–40)
AST SERPL-CCNC: 13 U/L (ref 10–40)
AST SERPL-CCNC: 14 U/L (ref 10–40)
AST SERPL-CCNC: 15 U/L (ref 10–40)
AST SERPL-CCNC: 16 U/L (ref 10–40)
AST SERPL-CCNC: 16 U/L (ref 10–40)
AST SERPL-CCNC: 19 U/L (ref 10–40)
AST SERPL-CCNC: 23 U/L (ref 10–40)
AST SERPL-CCNC: 9 U/L (ref 10–40)
AV INDEX (PROSTH): 0.86
AV MEAN GRADIENT: 5 MMHG
AV PEAK GRADIENT: 12 MMHG
AV VALVE AREA: 3.13 CM2
AV VELOCITY RATIO: 0.73
BACTERIA BLD CULT: NORMAL
BACTERIA BLD CULT: NORMAL
BACTERIA SPEC AEROBE CULT: NORMAL
BACTERIA SPEC AEROBE CULT: NORMAL
BASOPHILS # BLD AUTO: 0.01 K/UL (ref 0–0.2)
BASOPHILS # BLD AUTO: 0.02 K/UL (ref 0–0.2)
BASOPHILS # BLD AUTO: 0.03 K/UL (ref 0–0.2)
BASOPHILS # BLD AUTO: 0.03 K/UL (ref 0–0.2)
BASOPHILS # BLD AUTO: 0.04 K/UL (ref 0–0.2)
BASOPHILS # BLD AUTO: 0.04 K/UL (ref 0–0.2)
BASOPHILS # BLD AUTO: 0.05 K/UL (ref 0–0.2)
BASOPHILS # BLD AUTO: 0.07 K/UL (ref 0–0.2)
BASOPHILS # BLD AUTO: 0.07 K/UL (ref 0–0.2)
BASOPHILS # BLD AUTO: 0.08 K/UL (ref 0–0.2)
BASOPHILS # BLD AUTO: 0.09 K/UL (ref 0–0.2)
BASOPHILS # BLD AUTO: 0.09 K/UL (ref 0–0.2)
BASOPHILS # BLD AUTO: 0.11 K/UL (ref 0–0.2)
BASOPHILS NFR BLD: 0.1 % (ref 0–1.9)
BASOPHILS NFR BLD: 0.1 % (ref 0–1.9)
BASOPHILS NFR BLD: 0.2 % (ref 0–1.9)
BASOPHILS NFR BLD: 0.8 % (ref 0–1.9)
BASOPHILS NFR BLD: 0.8 % (ref 0–1.9)
BASOPHILS NFR BLD: 0.9 % (ref 0–1.9)
BASOPHILS NFR BLD: 0.9 % (ref 0–1.9)
BASOPHILS NFR BLD: 1 % (ref 0–1.9)
BASOPHILS NFR BLD: 1.1 % (ref 0–1.9)
BILIRUB SERPL-MCNC: 0.2 MG/DL (ref 0.1–1)
BILIRUB SERPL-MCNC: 0.3 MG/DL (ref 0.1–1)
BILIRUB SERPL-MCNC: 0.3 MG/DL (ref 0.1–1)
BILIRUB SERPL-MCNC: 0.4 MG/DL (ref 0.1–1)
BILIRUB SERPL-MCNC: 0.5 MG/DL (ref 0.1–1)
BILIRUB UR QL STRIP: NEGATIVE
BNP SERPL-MCNC: 33 PG/ML (ref 0–99)
BNP SERPL-MCNC: 97 PG/ML (ref 0–99)
BORDETELLA PARAPERTUSSIS (IS1001): NOT DETECTED
BORDETELLA PERTUSSIS (PTXP): NOT DETECTED
BSA FOR ECHO PROCEDURE: 2 M2
BUN SERPL-MCNC: 12 MG/DL (ref 8–23)
BUN SERPL-MCNC: 16 MG/DL (ref 8–23)
BUN SERPL-MCNC: 17 MG/DL (ref 8–23)
BUN SERPL-MCNC: 18 MG/DL (ref 8–23)
BUN SERPL-MCNC: 19 MG/DL (ref 8–23)
BUN SERPL-MCNC: 20 MG/DL (ref 8–23)
BUN SERPL-MCNC: 21 MG/DL (ref 8–23)
BUN SERPL-MCNC: 22 MG/DL (ref 8–23)
BUN SERPL-MCNC: 25 MG/DL (ref 6–30)
BUN SERPL-MCNC: 30 MG/DL (ref 8–23)
CALCIUM SERPL-MCNC: 10 MG/DL (ref 8.7–10.5)
CALCIUM SERPL-MCNC: 9.1 MG/DL (ref 8.7–10.5)
CALCIUM SERPL-MCNC: 9.1 MG/DL (ref 8.7–10.5)
CALCIUM SERPL-MCNC: 9.2 MG/DL (ref 8.7–10.5)
CALCIUM SERPL-MCNC: 9.4 MG/DL (ref 8.7–10.5)
CALCIUM SERPL-MCNC: 9.5 MG/DL (ref 8.7–10.5)
CALCIUM SERPL-MCNC: 9.5 MG/DL (ref 8.7–10.5)
CALCIUM SERPL-MCNC: 9.7 MG/DL (ref 8.7–10.5)
CALCIUM SERPL-MCNC: 9.8 MG/DL (ref 8.7–10.5)
CALCIUM SERPL-MCNC: 9.9 MG/DL (ref 8.7–10.5)
CHLAMYDIA PNEUMONIAE: NOT DETECTED
CHLORIDE SERPL-SCNC: 101 MMOL/L (ref 95–110)
CHLORIDE SERPL-SCNC: 103 MMOL/L (ref 95–110)
CHLORIDE SERPL-SCNC: 104 MMOL/L (ref 95–110)
CHLORIDE SERPL-SCNC: 105 MMOL/L (ref 95–110)
CHLORIDE SERPL-SCNC: 105 MMOL/L (ref 95–110)
CHLORIDE SERPL-SCNC: 106 MMOL/L (ref 95–110)
CHLORIDE SERPL-SCNC: 108 MMOL/L (ref 95–110)
CHLORIDE SERPL-SCNC: 99 MMOL/L (ref 95–110)
CLARITY UR REFRACT.AUTO: CLEAR
CO2 SERPL-SCNC: 21 MMOL/L (ref 23–29)
CO2 SERPL-SCNC: 22 MMOL/L (ref 23–29)
CO2 SERPL-SCNC: 23 MMOL/L (ref 23–29)
CO2 SERPL-SCNC: 23 MMOL/L (ref 23–29)
CO2 SERPL-SCNC: 24 MMOL/L (ref 23–29)
CO2 SERPL-SCNC: 25 MMOL/L (ref 23–29)
CO2 SERPL-SCNC: 26 MMOL/L (ref 23–29)
CO2 SERPL-SCNC: 26 MMOL/L (ref 23–29)
CO2 SERPL-SCNC: 27 MMOL/L (ref 23–29)
CO2 SERPL-SCNC: 28 MMOL/L (ref 23–29)
CO2 SERPL-SCNC: 29 MMOL/L (ref 23–29)
CO2 SERPL-SCNC: 31 MMOL/L (ref 23–29)
COLOR UR AUTO: YELLOW
CORONAVIRUS 229E, COMMON COLD VIRUS: NOT DETECTED
CORONAVIRUS HKU1, COMMON COLD VIRUS: NOT DETECTED
CORONAVIRUS NL63, COMMON COLD VIRUS: NOT DETECTED
CORONAVIRUS OC43, COMMON COLD VIRUS: NOT DETECTED
CREAT SERPL-MCNC: 0.9 MG/DL (ref 0.5–1.4)
CREAT SERPL-MCNC: 0.9 MG/DL (ref 0.5–1.4)
CREAT SERPL-MCNC: 1 MG/DL (ref 0.5–1.4)
CREAT SERPL-MCNC: 1.1 MG/DL (ref 0.5–1.4)
CREAT SERPL-MCNC: 1.2 MG/DL (ref 0.5–1.4)
CREAT SERPL-MCNC: 1.4 MG/DL (ref 0.5–1.4)
CREAT SERPL-MCNC: 1.6 MG/DL (ref 0.5–1.4)
CTP QC/QA: YES
CTP QC/QA: YES
CV ECHO LV RWT: 0.34 CM
DELSYS: ABNORMAL
DIFFERENTIAL METHOD: ABNORMAL
DOP CALC AO PEAK VEL: 1.72 M/S
DOP CALC AO VTI: 27.03 CM
DOP CALC LVOT AREA: 3.6 CM2
DOP CALC LVOT DIAMETER: 2.15 CM
DOP CALC LVOT PEAK VEL: 1.26 M/S
DOP CALC LVOT STROKE VOLUME: 84.69 CM3
DOP CALCLVOT PEAK VEL VTI: 23.34 CM
E WAVE DECELERATION TIME: 175.2 MSEC
E/A RATIO: 0.93
E/E' RATIO: 7 M/S
ECHO LV POSTERIOR WALL: 0.79 CM (ref 0.6–1.1)
EJECTION FRACTION: 60 %
EOSINOPHIL # BLD AUTO: 0 K/UL (ref 0–0.5)
EOSINOPHIL # BLD AUTO: 0.1 K/UL (ref 0–0.5)
EOSINOPHIL # BLD AUTO: 0.3 K/UL (ref 0–0.5)
EOSINOPHIL # BLD AUTO: 0.4 K/UL (ref 0–0.5)
EOSINOPHIL # BLD AUTO: 0.5 K/UL (ref 0–0.5)
EOSINOPHIL # BLD AUTO: 0.6 K/UL (ref 0–0.5)
EOSINOPHIL NFR BLD: 0 % (ref 0–8)
EOSINOPHIL NFR BLD: 0.2 % (ref 0–8)
EOSINOPHIL NFR BLD: 0.3 % (ref 0–8)
EOSINOPHIL NFR BLD: 0.3 % (ref 0–8)
EOSINOPHIL NFR BLD: 0.4 % (ref 0–8)
EOSINOPHIL NFR BLD: 2.7 % (ref 0–8)
EOSINOPHIL NFR BLD: 3.8 % (ref 0–8)
EOSINOPHIL NFR BLD: 5.4 % (ref 0–8)
EOSINOPHIL NFR BLD: 6 % (ref 0–8)
EOSINOPHIL NFR BLD: 6.3 % (ref 0–8)
EOSINOPHIL NFR BLD: 7.1 % (ref 0–8)
EP: 5
ERYTHROCYTE [DISTWIDTH] IN BLOOD BY AUTOMATED COUNT: 13.5 % (ref 11.5–14.5)
ERYTHROCYTE [DISTWIDTH] IN BLOOD BY AUTOMATED COUNT: 13.7 % (ref 11.5–14.5)
ERYTHROCYTE [DISTWIDTH] IN BLOOD BY AUTOMATED COUNT: 13.8 % (ref 11.5–14.5)
ERYTHROCYTE [DISTWIDTH] IN BLOOD BY AUTOMATED COUNT: 13.9 % (ref 11.5–14.5)
ERYTHROCYTE [DISTWIDTH] IN BLOOD BY AUTOMATED COUNT: 13.9 % (ref 11.5–14.5)
ERYTHROCYTE [DISTWIDTH] IN BLOOD BY AUTOMATED COUNT: 14 % (ref 11.5–14.5)
ERYTHROCYTE [DISTWIDTH] IN BLOOD BY AUTOMATED COUNT: 14.6 % (ref 11.5–14.5)
ERYTHROCYTE [DISTWIDTH] IN BLOOD BY AUTOMATED COUNT: 14.9 % (ref 11.5–14.5)
ERYTHROCYTE [DISTWIDTH] IN BLOOD BY AUTOMATED COUNT: 15 % (ref 11.5–14.5)
ERYTHROCYTE [DISTWIDTH] IN BLOOD BY AUTOMATED COUNT: 15 % (ref 11.5–14.5)
ERYTHROCYTE [DISTWIDTH] IN BLOOD BY AUTOMATED COUNT: 15.1 % (ref 11.5–14.5)
ERYTHROCYTE [DISTWIDTH] IN BLOOD BY AUTOMATED COUNT: 15.3 % (ref 11.5–14.5)
EST. GFR  (AFRICAN AMERICAN): 39.8 ML/MIN/1.73 M^2
EST. GFR  (AFRICAN AMERICAN): 46.8 ML/MIN/1.73 M^2
EST. GFR  (AFRICAN AMERICAN): 56 ML/MIN/1.73 M^2
EST. GFR  (AFRICAN AMERICAN): 56 ML/MIN/1.73 M^2
EST. GFR  (AFRICAN AMERICAN): 56.4 ML/MIN/1.73 M^2
EST. GFR  (AFRICAN AMERICAN): >60 ML/MIN/1.73 M^2
EST. GFR  (NON AFRICAN AMERICAN): 34.5 ML/MIN/1.73 M^2
EST. GFR  (NON AFRICAN AMERICAN): 40.6 ML/MIN/1.73 M^2
EST. GFR  (NON AFRICAN AMERICAN): 48.6 ML/MIN/1.73 M^2
EST. GFR  (NON AFRICAN AMERICAN): 48.6 ML/MIN/1.73 M^2
EST. GFR  (NON AFRICAN AMERICAN): 48.9 ML/MIN/1.73 M^2
EST. GFR  (NON AFRICAN AMERICAN): 53.9 ML/MIN/1.73 M^2
EST. GFR  (NON AFRICAN AMERICAN): 54.3 ML/MIN/1.73 M^2
EST. GFR  (NON AFRICAN AMERICAN): >60 ML/MIN/1.73 M^2
FIO2: 60
FLOW: 7
FLUBV RNA NPH QL NAA+NON-PROBE: NOT DETECTED
FRACTIONAL SHORTENING: 27 % (ref 28–44)
GLUCOSE SERPL-MCNC: 100 MG/DL (ref 70–110)
GLUCOSE SERPL-MCNC: 101 MG/DL (ref 70–110)
GLUCOSE SERPL-MCNC: 110 MG/DL (ref 70–110)
GLUCOSE SERPL-MCNC: 111 MG/DL (ref 70–110)
GLUCOSE SERPL-MCNC: 116 MG/DL (ref 70–110)
GLUCOSE SERPL-MCNC: 118 MG/DL (ref 70–110)
GLUCOSE SERPL-MCNC: 120 MG/DL (ref 70–110)
GLUCOSE SERPL-MCNC: 127 MG/DL (ref 70–110)
GLUCOSE SERPL-MCNC: 130 MG/DL (ref 70–110)
GLUCOSE SERPL-MCNC: 133 MG/DL (ref 70–110)
GLUCOSE SERPL-MCNC: 139 MG/DL (ref 70–110)
GLUCOSE SERPL-MCNC: 236 MG/DL (ref 70–110)
GLUCOSE SERPL-MCNC: 245 MG/DL (ref 70–110)
GLUCOSE SERPL-MCNC: 87 MG/DL (ref 70–110)
GLUCOSE SERPL-MCNC: 95 MG/DL (ref 70–110)
GLUCOSE UR QL STRIP: ABNORMAL
GRAM STN SPEC: NORMAL
HCO3 UR-SCNC: 24.3 MMOL/L (ref 24–28)
HCO3 UR-SCNC: 25.9 MMOL/L (ref 24–28)
HCO3 UR-SCNC: 34.4 MMOL/L (ref 24–28)
HCT VFR BLD AUTO: 30 % (ref 37–48.5)
HCT VFR BLD AUTO: 32.3 % (ref 37–48.5)
HCT VFR BLD AUTO: 32.8 % (ref 37–48.5)
HCT VFR BLD AUTO: 33 % (ref 37–48.5)
HCT VFR BLD AUTO: 33.1 % (ref 37–48.5)
HCT VFR BLD AUTO: 33.2 % (ref 37–48.5)
HCT VFR BLD AUTO: 33.4 % (ref 37–48.5)
HCT VFR BLD AUTO: 33.5 % (ref 37–48.5)
HCT VFR BLD AUTO: 33.8 % (ref 37–48.5)
HCT VFR BLD AUTO: 33.8 % (ref 37–48.5)
HCT VFR BLD AUTO: 33.9 % (ref 37–48.5)
HCT VFR BLD AUTO: 34.1 % (ref 37–48.5)
HCT VFR BLD AUTO: 35.1 % (ref 37–48.5)
HCT VFR BLD AUTO: 35.4 % (ref 37–48.5)
HCT VFR BLD CALC: 32 %PCV (ref 36–54)
HGB BLD-MCNC: 10 G/DL (ref 12–16)
HGB BLD-MCNC: 10.1 G/DL (ref 12–16)
HGB BLD-MCNC: 10.2 G/DL (ref 12–16)
HGB BLD-MCNC: 10.2 G/DL (ref 12–16)
HGB BLD-MCNC: 10.3 G/DL (ref 12–16)
HGB BLD-MCNC: 10.6 G/DL (ref 12–16)
HGB BLD-MCNC: 10.6 G/DL (ref 12–16)
HGB BLD-MCNC: 10.7 G/DL (ref 12–16)
HGB BLD-MCNC: 10.7 G/DL (ref 12–16)
HGB BLD-MCNC: 9.2 G/DL (ref 12–16)
HGB UR QL STRIP: NEGATIVE
HPIV1 RNA NPH QL NAA+NON-PROBE: NOT DETECTED
HPIV2 RNA NPH QL NAA+NON-PROBE: NOT DETECTED
HPIV3 RNA NPH QL NAA+NON-PROBE: NOT DETECTED
HPIV4 RNA NPH QL NAA+NON-PROBE: NOT DETECTED
HUMAN METAPNEUMOVIRUS: NOT DETECTED
IMM GRANULOCYTES # BLD AUTO: 0.02 K/UL (ref 0–0.04)
IMM GRANULOCYTES # BLD AUTO: 0.03 K/UL (ref 0–0.04)
IMM GRANULOCYTES # BLD AUTO: 0.05 K/UL (ref 0–0.04)
IMM GRANULOCYTES # BLD AUTO: 0.06 K/UL (ref 0–0.04)
IMM GRANULOCYTES # BLD AUTO: 0.08 K/UL (ref 0–0.04)
IMM GRANULOCYTES # BLD AUTO: 0.09 K/UL (ref 0–0.04)
IMM GRANULOCYTES # BLD AUTO: 0.1 K/UL (ref 0–0.04)
IMM GRANULOCYTES # BLD AUTO: 0.1 K/UL (ref 0–0.04)
IMM GRANULOCYTES # BLD AUTO: 0.14 K/UL (ref 0–0.04)
IMM GRANULOCYTES # BLD AUTO: 0.15 K/UL (ref 0–0.04)
IMM GRANULOCYTES # BLD AUTO: 0.17 K/UL (ref 0–0.04)
IMM GRANULOCYTES # BLD AUTO: 0.19 K/UL (ref 0–0.04)
IMM GRANULOCYTES # BLD AUTO: 0.24 K/UL (ref 0–0.04)
IMM GRANULOCYTES # BLD AUTO: 0.34 K/UL (ref 0–0.04)
IMM GRANULOCYTES NFR BLD AUTO: 0.3 % (ref 0–0.5)
IMM GRANULOCYTES NFR BLD AUTO: 0.4 % (ref 0–0.5)
IMM GRANULOCYTES NFR BLD AUTO: 0.7 % (ref 0–0.5)
IMM GRANULOCYTES NFR BLD AUTO: 0.8 % (ref 0–0.5)
IMM GRANULOCYTES NFR BLD AUTO: 0.9 % (ref 0–0.5)
IMM GRANULOCYTES NFR BLD AUTO: 1 % (ref 0–0.5)
IMM GRANULOCYTES NFR BLD AUTO: 1.2 % (ref 0–0.5)
IMM GRANULOCYTES NFR BLD AUTO: 1.2 % (ref 0–0.5)
IMM GRANULOCYTES NFR BLD AUTO: 1.8 % (ref 0–0.5)
INFLUENZA A (SUBTYPES H1,H1-2009,H3): NOT DETECTED
INTERVENTRICULAR SEPTUM: 0.69 CM (ref 0.6–1.1)
IP: 12
IVRT: 85.63 MSEC
KETONES UR QL STRIP: NEGATIVE
LA MAJOR: 4.93 CM
LA MINOR: 5.29 CM
LA WIDTH: 4.04 CM
LACTATE SERPL-SCNC: 1.4 MMOL/L (ref 0.5–2.2)
LACTATE SERPL-SCNC: 2.4 MMOL/L (ref 0.5–2.2)
LEFT ATRIUM SIZE: 3.07 CM
LEFT ATRIUM VOLUME INDEX MOD: 30 ML/M2
LEFT ATRIUM VOLUME INDEX: 27.5 ML/M2
LEFT ATRIUM VOLUME MOD: 58.77 CM3
LEFT ATRIUM VOLUME: 53.8 CM3
LEFT INTERNAL DIMENSION IN SYSTOLE: 3.34 CM (ref 2.1–4)
LEFT VENTRICLE DIASTOLIC VOLUME INDEX: 49.64 ML/M2
LEFT VENTRICLE DIASTOLIC VOLUME: 97.3 ML
LEFT VENTRICLE MASS INDEX: 54 G/M2
LEFT VENTRICLE SYSTOLIC VOLUME INDEX: 23.2 ML/M2
LEFT VENTRICLE SYSTOLIC VOLUME: 45.4 ML
LEFT VENTRICULAR INTERNAL DIMENSION IN DIASTOLE: 4.6 CM (ref 3.5–6)
LEFT VENTRICULAR MASS: 106.61 G
LEUKOCYTE ESTERASE UR QL STRIP: NEGATIVE
LV LATERAL E/E' RATIO: 5.6 M/S
LV SEPTAL E/E' RATIO: 9.33 M/S
LYMPHOCYTES # BLD AUTO: 0.7 K/UL (ref 1–4.8)
LYMPHOCYTES # BLD AUTO: 1 K/UL (ref 1–4.8)
LYMPHOCYTES # BLD AUTO: 1.2 K/UL (ref 1–4.8)
LYMPHOCYTES # BLD AUTO: 1.7 K/UL (ref 1–4.8)
LYMPHOCYTES # BLD AUTO: 1.8 K/UL (ref 1–4.8)
LYMPHOCYTES # BLD AUTO: 1.9 K/UL (ref 1–4.8)
LYMPHOCYTES # BLD AUTO: 2 K/UL (ref 1–4.8)
LYMPHOCYTES # BLD AUTO: 2.1 K/UL (ref 1–4.8)
LYMPHOCYTES # BLD AUTO: 2.1 K/UL (ref 1–4.8)
LYMPHOCYTES # BLD AUTO: 2.2 K/UL (ref 1–4.8)
LYMPHOCYTES # BLD AUTO: 2.3 K/UL (ref 1–4.8)
LYMPHOCYTES NFR BLD: 10.2 % (ref 18–48)
LYMPHOCYTES NFR BLD: 11.4 % (ref 18–48)
LYMPHOCYTES NFR BLD: 12.8 % (ref 18–48)
LYMPHOCYTES NFR BLD: 15.7 % (ref 18–48)
LYMPHOCYTES NFR BLD: 17.4 % (ref 18–48)
LYMPHOCYTES NFR BLD: 19 % (ref 18–48)
LYMPHOCYTES NFR BLD: 21.8 % (ref 18–48)
LYMPHOCYTES NFR BLD: 21.9 % (ref 18–48)
LYMPHOCYTES NFR BLD: 25.1 % (ref 18–48)
LYMPHOCYTES NFR BLD: 3.9 % (ref 18–48)
LYMPHOCYTES NFR BLD: 7.6 % (ref 18–48)
LYMPHOCYTES NFR BLD: 7.6 % (ref 18–48)
LYMPHOCYTES NFR BLD: 8.9 % (ref 18–48)
MAGNESIUM SERPL-MCNC: 1.8 MG/DL (ref 1.6–2.6)
MAGNESIUM SERPL-MCNC: 1.9 MG/DL (ref 1.6–2.6)
MAGNESIUM SERPL-MCNC: 2.1 MG/DL (ref 1.6–2.6)
MAGNESIUM SERPL-MCNC: 2.3 MG/DL (ref 1.6–2.6)
MCH RBC QN AUTO: 24.9 PG (ref 27–31)
MCH RBC QN AUTO: 25 PG (ref 27–31)
MCH RBC QN AUTO: 25.1 PG (ref 27–31)
MCH RBC QN AUTO: 25.1 PG (ref 27–31)
MCH RBC QN AUTO: 25.2 PG (ref 27–31)
MCH RBC QN AUTO: 25.3 PG (ref 27–31)
MCH RBC QN AUTO: 25.4 PG (ref 27–31)
MCH RBC QN AUTO: 25.4 PG (ref 27–31)
MCH RBC QN AUTO: 26.7 PG (ref 27–31)
MCH RBC QN AUTO: 27.1 PG (ref 27–31)
MCH RBC QN AUTO: 27.2 PG (ref 27–31)
MCH RBC QN AUTO: 27.7 PG (ref 27–31)
MCH RBC QN AUTO: 27.9 PG (ref 27–31)
MCH RBC QN AUTO: 28.2 PG (ref 27–31)
MCHC RBC AUTO-ENTMCNC: 29.6 G/DL (ref 32–36)
MCHC RBC AUTO-ENTMCNC: 29.8 G/DL (ref 32–36)
MCHC RBC AUTO-ENTMCNC: 30.1 G/DL (ref 32–36)
MCHC RBC AUTO-ENTMCNC: 30.2 G/DL (ref 32–36)
MCHC RBC AUTO-ENTMCNC: 30.3 G/DL (ref 32–36)
MCHC RBC AUTO-ENTMCNC: 30.7 G/DL (ref 32–36)
MCHC RBC AUTO-ENTMCNC: 30.7 G/DL (ref 32–36)
MCHC RBC AUTO-ENTMCNC: 30.8 G/DL (ref 32–36)
MCHC RBC AUTO-ENTMCNC: 31.1 G/DL (ref 32–36)
MCHC RBC AUTO-ENTMCNC: 31.1 G/DL (ref 32–36)
MCHC RBC AUTO-ENTMCNC: 31.3 G/DL (ref 32–36)
MCHC RBC AUTO-ENTMCNC: 31.7 G/DL (ref 32–36)
MCV RBC AUTO: 80 FL (ref 82–98)
MCV RBC AUTO: 82 FL (ref 82–98)
MCV RBC AUTO: 83 FL (ref 82–98)
MCV RBC AUTO: 83 FL (ref 82–98)
MCV RBC AUTO: 84 FL (ref 82–98)
MCV RBC AUTO: 88 FL (ref 82–98)
MCV RBC AUTO: 89 FL (ref 82–98)
MCV RBC AUTO: 89 FL (ref 82–98)
MCV RBC AUTO: 91 FL (ref 82–98)
MODE: ABNORMAL
MODE: ABNORMAL
MONOCYTES # BLD AUTO: 0.5 K/UL (ref 0.3–1)
MONOCYTES # BLD AUTO: 0.6 K/UL (ref 0.3–1)
MONOCYTES # BLD AUTO: 0.6 K/UL (ref 0.3–1)
MONOCYTES # BLD AUTO: 0.7 K/UL (ref 0.3–1)
MONOCYTES # BLD AUTO: 0.8 K/UL (ref 0.3–1)
MONOCYTES # BLD AUTO: 0.8 K/UL (ref 0.3–1)
MONOCYTES # BLD AUTO: 0.9 K/UL (ref 0.3–1)
MONOCYTES # BLD AUTO: 0.9 K/UL (ref 0.3–1)
MONOCYTES # BLD AUTO: 1 K/UL (ref 0.3–1)
MONOCYTES # BLD AUTO: 1.3 K/UL (ref 0.3–1)
MONOCYTES # BLD AUTO: 1.4 K/UL (ref 0.3–1)
MONOCYTES # BLD AUTO: 1.6 K/UL (ref 0.3–1)
MONOCYTES # BLD AUTO: 1.8 K/UL (ref 0.3–1)
MONOCYTES NFR BLD: 2.9 % (ref 4–15)
MONOCYTES NFR BLD: 5.4 % (ref 4–15)
MONOCYTES NFR BLD: 6.1 % (ref 4–15)
MONOCYTES NFR BLD: 6.4 % (ref 4–15)
MONOCYTES NFR BLD: 7.4 % (ref 4–15)
MONOCYTES NFR BLD: 7.5 % (ref 4–15)
MONOCYTES NFR BLD: 7.5 % (ref 4–15)
MONOCYTES NFR BLD: 7.8 % (ref 4–15)
MONOCYTES NFR BLD: 7.9 % (ref 4–15)
MONOCYTES NFR BLD: 8.2 % (ref 4–15)
MONOCYTES NFR BLD: 8.5 % (ref 4–15)
MONOCYTES NFR BLD: 8.6 % (ref 4–15)
MONOCYTES NFR BLD: 9.1 % (ref 4–15)
MV A" WAVE DURATION": 17.13 MSEC
MV PEAK A VEL: 0.6 M/S
MV PEAK E VEL: 0.56 M/S
MV STENOSIS PRESSURE HALF TIME: 50.81 MS
MV VALVE AREA P 1/2 METHOD: 4.33 CM2
MYCOPLASMA PNEUMONIAE: NOT DETECTED
NEUTROPHILS # BLD AUTO: 11.5 K/UL (ref 1.8–7.7)
NEUTROPHILS # BLD AUTO: 12.5 K/UL (ref 1.8–7.7)
NEUTROPHILS # BLD AUTO: 13.4 K/UL (ref 1.8–7.7)
NEUTROPHILS # BLD AUTO: 15 K/UL (ref 1.8–7.7)
NEUTROPHILS # BLD AUTO: 15.3 K/UL (ref 1.8–7.7)
NEUTROPHILS # BLD AUTO: 16.1 K/UL (ref 1.8–7.7)
NEUTROPHILS # BLD AUTO: 18.3 K/UL (ref 1.8–7.7)
NEUTROPHILS # BLD AUTO: 4.5 K/UL (ref 1.8–7.7)
NEUTROPHILS # BLD AUTO: 5 K/UL (ref 1.8–7.7)
NEUTROPHILS # BLD AUTO: 5.3 K/UL (ref 1.8–7.7)
NEUTROPHILS # BLD AUTO: 5.5 K/UL (ref 1.8–7.7)
NEUTROPHILS # BLD AUTO: 7.2 K/UL (ref 1.8–7.7)
NEUTROPHILS # BLD AUTO: 7.7 K/UL (ref 1.8–7.7)
NEUTROPHILS # BLD AUTO: 7.8 K/UL (ref 1.8–7.7)
NEUTROPHILS NFR BLD: 60.1 % (ref 38–73)
NEUTROPHILS NFR BLD: 61.2 % (ref 38–73)
NEUTROPHILS NFR BLD: 62.3 % (ref 38–73)
NEUTROPHILS NFR BLD: 68.4 % (ref 38–73)
NEUTROPHILS NFR BLD: 68.6 % (ref 38–73)
NEUTROPHILS NFR BLD: 70.2 % (ref 38–73)
NEUTROPHILS NFR BLD: 77.5 % (ref 38–73)
NEUTROPHILS NFR BLD: 78.9 % (ref 38–73)
NEUTROPHILS NFR BLD: 80.7 % (ref 38–73)
NEUTROPHILS NFR BLD: 81.5 % (ref 38–73)
NEUTROPHILS NFR BLD: 85.5 % (ref 38–73)
NEUTROPHILS NFR BLD: 85.7 % (ref 38–73)
NEUTROPHILS NFR BLD: 92.1 % (ref 38–73)
NITRITE UR QL STRIP: NEGATIVE
NRBC BLD-RTO: 0 /100 WBC
PCO2 BLDA: 32.6 MMHG (ref 35–45)
PCO2 BLDA: 43 MMHG (ref 35–45)
PCO2 BLDA: 62.1 MMHG (ref 35–45)
PH SMN: 7.35 [PH] (ref 7.35–7.45)
PH SMN: 7.36 [PH] (ref 7.35–7.45)
PH SMN: 7.51 [PH] (ref 7.35–7.45)
PH UR STRIP: 6 [PH] (ref 5–8)
PHOSPHATE SERPL-MCNC: 2.9 MG/DL (ref 2.7–4.5)
PHOSPHATE SERPL-MCNC: 3 MG/DL (ref 2.7–4.5)
PHOSPHATE SERPL-MCNC: 3.3 MG/DL (ref 2.7–4.5)
PHOSPHATE SERPL-MCNC: 3.6 MG/DL (ref 2.7–4.5)
PHOSPHATE SERPL-MCNC: 3.6 MG/DL (ref 2.7–4.5)
PHOSPHATE SERPL-MCNC: 3.7 MG/DL (ref 2.7–4.5)
PHOSPHATE SERPL-MCNC: 3.9 MG/DL (ref 2.7–4.5)
PISA TR MAX VEL: 1.86 M/S
PLATELET # BLD AUTO: 270 K/UL (ref 150–450)
PLATELET # BLD AUTO: 276 K/UL (ref 150–450)
PLATELET # BLD AUTO: 279 K/UL (ref 150–350)
PLATELET # BLD AUTO: 288 K/UL (ref 150–350)
PLATELET # BLD AUTO: 296 K/UL (ref 150–450)
PLATELET # BLD AUTO: 307 K/UL (ref 150–450)
PLATELET # BLD AUTO: 315 K/UL (ref 150–450)
PLATELET # BLD AUTO: 326 K/UL (ref 150–450)
PLATELET # BLD AUTO: 338 K/UL (ref 150–450)
PLATELET # BLD AUTO: 372 K/UL (ref 150–450)
PLATELET # BLD AUTO: 423 K/UL (ref 150–450)
PLATELET # BLD AUTO: 436 K/UL (ref 150–450)
PLATELET # BLD AUTO: 442 K/UL (ref 150–450)
PLATELET # BLD AUTO: 477 K/UL (ref 150–450)
PMV BLD AUTO: 8.9 FL (ref 9.2–12.9)
PMV BLD AUTO: 9 FL (ref 9.2–12.9)
PMV BLD AUTO: 9.2 FL (ref 9.2–12.9)
PMV BLD AUTO: 9.3 FL (ref 9.2–12.9)
PMV BLD AUTO: 9.3 FL (ref 9.2–12.9)
PMV BLD AUTO: 9.4 FL (ref 9.2–12.9)
PMV BLD AUTO: 9.5 FL (ref 9.2–12.9)
PMV BLD AUTO: 9.5 FL (ref 9.2–12.9)
PMV BLD AUTO: 9.6 FL (ref 9.2–12.9)
PMV BLD AUTO: 9.7 FL (ref 9.2–12.9)
PO2 BLDA: 106 MMHG (ref 80–100)
PO2 BLDA: 26 MMHG (ref 40–60)
PO2 BLDA: 87 MMHG (ref 40–60)
POC BE: -1 MMOL/L
POC BE: 3 MMOL/L
POC BE: 9 MMOL/L
POC IONIZED CALCIUM: 1.24 MMOL/L (ref 1.06–1.42)
POC PCO2 TEMP: 59.4 MMHG
POC PH TEMP: 7.37
POC PO2 TEMP: 24 MMHG
POC SATURATED O2: 43 % (ref 95–100)
POC SATURATED O2: 96 % (ref 95–100)
POC SATURATED O2: 99 % (ref 95–100)
POC TCO2 (MEASURED): 26 MMOL/L (ref 23–29)
POC TCO2: 26 MMOL/L (ref 24–29)
POC TCO2: 27 MMOL/L (ref 23–27)
POC TCO2: 36 MMOL/L (ref 24–29)
POC TEMPERATURE: ABNORMAL
POCT GLUCOSE: 116 MG/DL (ref 70–110)
POCT GLUCOSE: 118 MG/DL (ref 70–110)
POCT GLUCOSE: 119 MG/DL (ref 70–110)
POCT GLUCOSE: 121 MG/DL (ref 70–110)
POCT GLUCOSE: 129 MG/DL (ref 70–110)
POCT GLUCOSE: 146 MG/DL (ref 70–110)
POCT GLUCOSE: 149 MG/DL (ref 70–110)
POCT GLUCOSE: 153 MG/DL (ref 70–110)
POCT GLUCOSE: 170 MG/DL (ref 70–110)
POCT GLUCOSE: 173 MG/DL (ref 70–110)
POCT GLUCOSE: 176 MG/DL (ref 70–110)
POCT GLUCOSE: 187 MG/DL (ref 70–110)
POCT GLUCOSE: 198 MG/DL (ref 70–110)
POCT GLUCOSE: 265 MG/DL (ref 70–110)
POCT GLUCOSE: 276 MG/DL (ref 70–110)
POCT GLUCOSE: 92 MG/DL (ref 70–110)
POTASSIUM BLD-SCNC: 4.6 MMOL/L (ref 3.5–5.1)
POTASSIUM SERPL-SCNC: 3.5 MMOL/L (ref 3.5–5.1)
POTASSIUM SERPL-SCNC: 3.7 MMOL/L (ref 3.5–5.1)
POTASSIUM SERPL-SCNC: 3.9 MMOL/L (ref 3.5–5.1)
POTASSIUM SERPL-SCNC: 4.2 MMOL/L (ref 3.5–5.1)
POTASSIUM SERPL-SCNC: 4.2 MMOL/L (ref 3.5–5.1)
POTASSIUM SERPL-SCNC: 4.4 MMOL/L (ref 3.5–5.1)
POTASSIUM SERPL-SCNC: 4.4 MMOL/L (ref 3.5–5.1)
POTASSIUM SERPL-SCNC: 4.5 MMOL/L (ref 3.5–5.1)
POTASSIUM SERPL-SCNC: 4.6 MMOL/L (ref 3.5–5.1)
POTASSIUM SERPL-SCNC: 4.9 MMOL/L (ref 3.5–5.1)
PROCALCITONIN SERPL IA-MCNC: 0.03 NG/ML
PROCALCITONIN SERPL IA-MCNC: 0.03 NG/ML
PROT SERPL-MCNC: 6.9 G/DL (ref 6–8.4)
PROT SERPL-MCNC: 7 G/DL (ref 6–8.4)
PROT SERPL-MCNC: 7.1 G/DL (ref 6–8.4)
PROT SERPL-MCNC: 7.1 G/DL (ref 6–8.4)
PROT SERPL-MCNC: 7.2 G/DL (ref 6–8.4)
PROT SERPL-MCNC: 7.3 G/DL (ref 6–8.4)
PROT SERPL-MCNC: 7.4 G/DL (ref 6–8.4)
PROT SERPL-MCNC: 7.4 G/DL (ref 6–8.4)
PROT SERPL-MCNC: 7.6 G/DL (ref 6–8.4)
PROT SERPL-MCNC: 7.7 G/DL (ref 6–8.4)
PROT SERPL-MCNC: 7.8 G/DL (ref 6–8.4)
PROT SERPL-MCNC: 7.9 G/DL (ref 6–8.4)
PROT SERPL-MCNC: 8.1 G/DL (ref 6–8.4)
PROT SERPL-MCNC: 8.2 G/DL (ref 6–8.4)
PROT UR QL STRIP: NEGATIVE
PULM VEIN S/D RATIO: 0.62
PV PEAK D VEL: 0.87 M/S
PV PEAK S VEL: 0.54 M/S
RA MAJOR: 4.85 CM
RA PRESSURE: 3 MMHG
RA WIDTH: 3.36 CM
RBC # BLD AUTO: 3.62 M/UL (ref 4–5.4)
RBC # BLD AUTO: 3.62 M/UL (ref 4–5.4)
RBC # BLD AUTO: 3.67 M/UL (ref 4–5.4)
RBC # BLD AUTO: 3.76 M/UL (ref 4–5.4)
RBC # BLD AUTO: 3.78 M/UL (ref 4–5.4)
RBC # BLD AUTO: 3.83 M/UL (ref 4–5.4)
RBC # BLD AUTO: 3.94 M/UL (ref 4–5.4)
RBC # BLD AUTO: 3.96 M/UL (ref 4–5.4)
RBC # BLD AUTO: 3.97 M/UL (ref 4–5.4)
RBC # BLD AUTO: 4.02 M/UL (ref 4–5.4)
RBC # BLD AUTO: 4.02 M/UL (ref 4–5.4)
RBC # BLD AUTO: 4.04 M/UL (ref 4–5.4)
RBC # BLD AUTO: 4.22 M/UL (ref 4–5.4)
RBC # BLD AUTO: 4.25 M/UL (ref 4–5.4)
RESPIRATORY INFECTION PANEL SOURCE: NORMAL
RESPIRATORY INFECTION PANEL SOURCE: NORMAL
RIGHT VENTRICULAR END-DIASTOLIC DIMENSION: 3.25 CM
RSV RNA NPH QL NAA+NON-PROBE: NOT DETECTED
RV TISSUE DOPPLER FREE WALL SYSTOLIC VELOCITY 1 (APICAL 4 CHAMBER VIEW): 18.26 CM/S
RV+EV RNA NPH QL NAA+NON-PROBE: NOT DETECTED
SAMPLE: ABNORMAL
SAMPLE: NORMAL
SARS-COV-2 RDRP RESP QL NAA+PROBE: NEGATIVE
SARS-COV-2 RDRP RESP QL NAA+PROBE: NEGATIVE
SINUS: 3.09 CM
SITE: ABNORMAL
SODIUM BLD-SCNC: 139 MMOL/L (ref 136–145)
SODIUM SERPL-SCNC: 136 MMOL/L (ref 136–145)
SODIUM SERPL-SCNC: 137 MMOL/L (ref 136–145)
SODIUM SERPL-SCNC: 137 MMOL/L (ref 136–145)
SODIUM SERPL-SCNC: 138 MMOL/L (ref 136–145)
SODIUM SERPL-SCNC: 139 MMOL/L (ref 136–145)
SODIUM SERPL-SCNC: 142 MMOL/L (ref 136–145)
SODIUM SERPL-SCNC: 143 MMOL/L (ref 136–145)
SP GR UR STRIP: 1 (ref 1–1.03)
STJ: 2.69 CM
T4 FREE SERPL-MCNC: 0.99 NG/DL (ref 0.71–1.51)
T4 FREE SERPL-MCNC: 1.09 NG/DL (ref 0.71–1.51)
T4 FREE SERPL-MCNC: 1.16 NG/DL (ref 0.71–1.51)
TDI LATERAL: 0.1 M/S
TDI SEPTAL: 0.06 M/S
TDI: 0.08 M/S
TR MAX PG: 14 MMHG
TRICUSPID ANNULAR PLANE SYSTOLIC EXCURSION: 2.62 CM
TROPONIN I SERPL DL<=0.01 NG/ML-MCNC: 0.01 NG/ML (ref 0–0.03)
TROPONIN I SERPL DL<=0.01 NG/ML-MCNC: <0.006 NG/ML (ref 0–0.03)
TSH SERPL DL<=0.005 MIU/L-ACNC: 0.62 UIU/ML (ref 0.4–4)
TSH SERPL DL<=0.005 MIU/L-ACNC: 0.81 UIU/ML (ref 0.4–4)
TSH SERPL DL<=0.005 MIU/L-ACNC: 1.94 UIU/ML (ref 0.4–4)
TSH SERPL DL<=0.005 MIU/L-ACNC: 2.03 UIU/ML (ref 0.4–4)
TV REST PULMONARY ARTERY PRESSURE: 17 MMHG
URN SPEC COLLECT METH UR: ABNORMAL
WBC # BLD AUTO: 10.49 K/UL (ref 3.9–12.7)
WBC # BLD AUTO: 10.91 K/UL (ref 3.9–12.7)
WBC # BLD AUTO: 11.4 K/UL (ref 3.9–12.7)
WBC # BLD AUTO: 13.48 K/UL (ref 3.9–12.7)
WBC # BLD AUTO: 15.61 K/UL (ref 3.9–12.7)
WBC # BLD AUTO: 16.15 K/UL (ref 3.9–12.7)
WBC # BLD AUTO: 16.65 K/UL (ref 3.9–12.7)
WBC # BLD AUTO: 18.98 K/UL (ref 3.9–12.7)
WBC # BLD AUTO: 19.79 K/UL (ref 3.9–12.7)
WBC # BLD AUTO: 22.65 K/UL (ref 3.9–12.7)
WBC # BLD AUTO: 7.54 K/UL (ref 3.9–12.7)
WBC # BLD AUTO: 7.99 K/UL (ref 3.9–12.7)
WBC # BLD AUTO: 8.76 K/UL (ref 3.9–12.7)
WBC # BLD AUTO: 9 K/UL (ref 3.9–12.7)

## 2021-01-01 PROCEDURE — 94660 CPAP INITIATION&MGMT: CPT

## 2021-01-01 PROCEDURE — 27000221 HC OXYGEN, UP TO 24 HOURS

## 2021-01-01 PROCEDURE — 70553 MRI BRAIN STEM W/O & W/DYE: CPT | Mod: TC

## 2021-01-01 PROCEDURE — 25000003 PHARM REV CODE 250: Performed by: STUDENT IN AN ORGANIZED HEALTH CARE EDUCATION/TRAINING PROGRAM

## 2021-01-01 PROCEDURE — 83735 ASSAY OF MAGNESIUM: CPT

## 2021-01-01 PROCEDURE — 25000242 PHARM REV CODE 250 ALT 637 W/ HCPCS: Performed by: STUDENT IN AN ORGANIZED HEALTH CARE EDUCATION/TRAINING PROGRAM

## 2021-01-01 PROCEDURE — 99215 OFFICE O/P EST HI 40 MIN: CPT | Mod: S$PBB,,, | Performed by: STUDENT IN AN ORGANIZED HEALTH CARE EDUCATION/TRAINING PROGRAM

## 2021-01-01 PROCEDURE — 27100098 HC SPACER

## 2021-01-01 PROCEDURE — 99223 1ST HOSP IP/OBS HIGH 75: CPT | Mod: ,,, | Performed by: INTERNAL MEDICINE

## 2021-01-01 PROCEDURE — 99900035 HC TECH TIME PER 15 MIN (STAT)

## 2021-01-01 PROCEDURE — 94640 AIRWAY INHALATION TREATMENT: CPT

## 2021-01-01 PROCEDURE — 99232 PR SUBSEQUENT HOSPITAL CARE,LEVL II: ICD-10-PCS | Mod: ,,, | Performed by: HOSPITALIST

## 2021-01-01 PROCEDURE — 84100 ASSAY OF PHOSPHORUS: CPT | Performed by: STUDENT IN AN ORGANIZED HEALTH CARE EDUCATION/TRAINING PROGRAM

## 2021-01-01 PROCEDURE — 99239 PR HOSPITAL DISCHARGE DAY,>30 MIN: ICD-10-PCS | Mod: ,,, | Performed by: INTERNAL MEDICINE

## 2021-01-01 PROCEDURE — 25000242 PHARM REV CODE 250 ALT 637 W/ HCPCS

## 2021-01-01 PROCEDURE — 99999 PR PBB SHADOW E&M-EST. PATIENT-LVL III: ICD-10-PCS | Mod: PBBFAC,,, | Performed by: STUDENT IN AN ORGANIZED HEALTH CARE EDUCATION/TRAINING PROGRAM

## 2021-01-01 PROCEDURE — 63600175 PHARM REV CODE 636 W HCPCS: Performed by: EMERGENCY MEDICINE

## 2021-01-01 PROCEDURE — 99999 PR PBB SHADOW E&M-EST. PATIENT-LVL III: ICD-10-PCS | Mod: PBBFAC,,, | Performed by: INTERNAL MEDICINE

## 2021-01-01 PROCEDURE — 84443 ASSAY THYROID STIM HORMONE: CPT | Performed by: STUDENT IN AN ORGANIZED HEALTH CARE EDUCATION/TRAINING PROGRAM

## 2021-01-01 PROCEDURE — 99999 PR PBB SHADOW E&M-EST. PATIENT-LVL IV: CPT | Mod: PBBFAC,,, | Performed by: STUDENT IN AN ORGANIZED HEALTH CARE EDUCATION/TRAINING PROGRAM

## 2021-01-01 PROCEDURE — 25500020 PHARM REV CODE 255: Performed by: STUDENT IN AN ORGANIZED HEALTH CARE EDUCATION/TRAINING PROGRAM

## 2021-01-01 PROCEDURE — 63700000 PHARM REV CODE 250 ALT 637 W/O HCPCS

## 2021-01-01 PROCEDURE — 93010 EKG 12-LEAD: ICD-10-PCS | Mod: ,,, | Performed by: INTERNAL MEDICINE

## 2021-01-01 PROCEDURE — 87633 RESP VIRUS 12-25 TARGETS: CPT | Performed by: STUDENT IN AN ORGANIZED HEALTH CARE EDUCATION/TRAINING PROGRAM

## 2021-01-01 PROCEDURE — 94729 DIFFUSING CAPACITY: CPT | Mod: PBBFAC | Performed by: INTERNAL MEDICINE

## 2021-01-01 PROCEDURE — 99999 PR PBB SHADOW E&M-EST. PATIENT-LVL IV: ICD-10-PCS | Mod: PBBFAC,,, | Performed by: STUDENT IN AN ORGANIZED HEALTH CARE EDUCATION/TRAINING PROGRAM

## 2021-01-01 PROCEDURE — 63600175 PHARM REV CODE 636 W HCPCS: Mod: JG | Performed by: STUDENT IN AN ORGANIZED HEALTH CARE EDUCATION/TRAINING PROGRAM

## 2021-01-01 PROCEDURE — 36415 COLL VENOUS BLD VENIPUNCTURE: CPT | Performed by: STUDENT IN AN ORGANIZED HEALTH CARE EDUCATION/TRAINING PROGRAM

## 2021-01-01 PROCEDURE — 94761 N-INVAS EAR/PLS OXIMETRY MLT: CPT

## 2021-01-01 PROCEDURE — 80053 COMPREHEN METABOLIC PANEL: CPT

## 2021-01-01 PROCEDURE — 71260 CT THORAX DX C+: CPT | Mod: 26,,, | Performed by: RADIOLOGY

## 2021-01-01 PROCEDURE — 87070 CULTURE OTHR SPECIMN AEROBIC: CPT

## 2021-01-01 PROCEDURE — 25000003 PHARM REV CODE 250: Performed by: EMERGENCY MEDICINE

## 2021-01-01 PROCEDURE — 85025 COMPLETE CBC W/AUTO DIFF WBC: CPT

## 2021-01-01 PROCEDURE — 25000003 PHARM REV CODE 250

## 2021-01-01 PROCEDURE — 94729 PR C02/MEMBANE DIFFUSE CAPACITY: ICD-10-PCS | Mod: 26,S$PBB,, | Performed by: INTERNAL MEDICINE

## 2021-01-01 PROCEDURE — 82803 BLOOD GASES ANY COMBINATION: CPT

## 2021-01-01 PROCEDURE — A4216 STERILE WATER/SALINE, 10 ML: HCPCS | Performed by: STUDENT IN AN ORGANIZED HEALTH CARE EDUCATION/TRAINING PROGRAM

## 2021-01-01 PROCEDURE — 97535 SELF CARE MNGMENT TRAINING: CPT

## 2021-01-01 PROCEDURE — 96413 CHEMO IV INFUSION 1 HR: CPT

## 2021-01-01 PROCEDURE — 25000003 PHARM REV CODE 250: Performed by: INTERNAL MEDICINE

## 2021-01-01 PROCEDURE — 93010 ELECTROCARDIOGRAM REPORT: CPT | Mod: ,,, | Performed by: INTERNAL MEDICINE

## 2021-01-01 PROCEDURE — A9585 GADOBUTROL INJECTION: HCPCS | Performed by: STUDENT IN AN ORGANIZED HEALTH CARE EDUCATION/TRAINING PROGRAM

## 2021-01-01 PROCEDURE — 84484 ASSAY OF TROPONIN QUANT: CPT

## 2021-01-01 PROCEDURE — 87205 SMEAR GRAM STAIN: CPT

## 2021-01-01 PROCEDURE — 70553 MRI BRAIN STEM W/O & W/DYE: CPT | Mod: 26,,, | Performed by: RADIOLOGY

## 2021-01-01 PROCEDURE — 63600175 PHARM REV CODE 636 W HCPCS

## 2021-01-01 PROCEDURE — 84484 ASSAY OF TROPONIN QUANT: CPT | Performed by: STUDENT IN AN ORGANIZED HEALTH CARE EDUCATION/TRAINING PROGRAM

## 2021-01-01 PROCEDURE — 99233 PR SUBSEQUENT HOSPITAL CARE,LEVL III: ICD-10-PCS | Mod: ,,, | Performed by: INTERNAL MEDICINE

## 2021-01-01 PROCEDURE — 99215 PR OFFICE/OUTPT VISIT, EST, LEVL V, 40-54 MIN: ICD-10-PCS | Mod: S$PBB,,, | Performed by: STUDENT IN AN ORGANIZED HEALTH CARE EDUCATION/TRAINING PROGRAM

## 2021-01-01 PROCEDURE — 99214 OFFICE O/P EST MOD 30 MIN: CPT | Mod: PBBFAC,25

## 2021-01-01 PROCEDURE — 97161 PT EVAL LOW COMPLEX 20 MIN: CPT

## 2021-01-01 PROCEDURE — 99215 PR OFFICE/OUTPT VISIT, EST, LEVL V, 40-54 MIN: ICD-10-PCS | Mod: 25,S$PBB,, | Performed by: INTERNAL MEDICINE

## 2021-01-01 PROCEDURE — 63600175 PHARM REV CODE 636 W HCPCS: Performed by: INTERNAL MEDICINE

## 2021-01-01 PROCEDURE — 80053 COMPREHEN METABOLIC PANEL: CPT | Performed by: STUDENT IN AN ORGANIZED HEALTH CARE EDUCATION/TRAINING PROGRAM

## 2021-01-01 PROCEDURE — 74177 CT ABD & PELVIS W/CONTRAST: CPT | Mod: 26,,, | Performed by: RADIOLOGY

## 2021-01-01 PROCEDURE — 99213 OFFICE O/P EST LOW 20 MIN: CPT | Mod: PBBFAC,25 | Performed by: INTERNAL MEDICINE

## 2021-01-01 PROCEDURE — 25500020 PHARM REV CODE 255: Performed by: EMERGENCY MEDICINE

## 2021-01-01 PROCEDURE — 99213 OFFICE O/P EST LOW 20 MIN: CPT | Mod: PBBFAC,25 | Performed by: STUDENT IN AN ORGANIZED HEALTH CARE EDUCATION/TRAINING PROGRAM

## 2021-01-01 PROCEDURE — 84100 ASSAY OF PHOSPHORUS: CPT

## 2021-01-01 PROCEDURE — 83735 ASSAY OF MAGNESIUM: CPT | Performed by: STUDENT IN AN ORGANIZED HEALTH CARE EDUCATION/TRAINING PROGRAM

## 2021-01-01 PROCEDURE — 36415 COLL VENOUS BLD VENIPUNCTURE: CPT

## 2021-01-01 PROCEDURE — 97166 OT EVAL MOD COMPLEX 45 MIN: CPT

## 2021-01-01 PROCEDURE — 99285 EMERGENCY DEPT VISIT HI MDM: CPT | Mod: 25

## 2021-01-01 PROCEDURE — 99214 OFFICE O/P EST MOD 30 MIN: CPT | Mod: PBBFAC,25 | Performed by: STUDENT IN AN ORGANIZED HEALTH CARE EDUCATION/TRAINING PROGRAM

## 2021-01-01 PROCEDURE — 97110 THERAPEUTIC EXERCISES: CPT

## 2021-01-01 PROCEDURE — 85025 COMPLETE CBC W/AUTO DIFF WBC: CPT | Performed by: STUDENT IN AN ORGANIZED HEALTH CARE EDUCATION/TRAINING PROGRAM

## 2021-01-01 PROCEDURE — 99223 PR INITIAL HOSPITAL CARE,LEVL III: ICD-10-PCS | Mod: ,,, | Performed by: INTERNAL MEDICINE

## 2021-01-01 PROCEDURE — 27100171 HC OXYGEN HIGH FLOW UP TO 24 HOURS

## 2021-01-01 PROCEDURE — 74177 CT ABD & PELVIS W/CONTRAST: CPT | Mod: TC

## 2021-01-01 PROCEDURE — 94060 PR EVAL OF BRONCHOSPASM: ICD-10-PCS | Mod: 26,S$PBB,, | Performed by: INTERNAL MEDICINE

## 2021-01-01 PROCEDURE — 96374 THER/PROPH/DIAG INJ IV PUSH: CPT

## 2021-01-01 PROCEDURE — 94799 UNLISTED PULMONARY SVC/PX: CPT

## 2021-01-01 PROCEDURE — 63600175 PHARM REV CODE 636 W HCPCS: Performed by: STUDENT IN AN ORGANIZED HEALTH CARE EDUCATION/TRAINING PROGRAM

## 2021-01-01 PROCEDURE — 84145 PROCALCITONIN (PCT): CPT | Performed by: STUDENT IN AN ORGANIZED HEALTH CARE EDUCATION/TRAINING PROGRAM

## 2021-01-01 PROCEDURE — 99291 PR CRITICAL CARE, E/M 30-74 MINUTES: ICD-10-PCS | Mod: CS,,, | Performed by: EMERGENCY MEDICINE

## 2021-01-01 PROCEDURE — 99291 CRITICAL CARE FIRST HOUR: CPT | Mod: 25

## 2021-01-01 PROCEDURE — 96417 CHEMO IV INFUS EACH ADDL SEQ: CPT

## 2021-01-01 PROCEDURE — 99999 PR PBB SHADOW E&M-EST. PATIENT-LVL III: CPT | Mod: PBBFAC,,, | Performed by: INTERNAL MEDICINE

## 2021-01-01 PROCEDURE — 70553 MRI BRAIN W WO CONTRAST: ICD-10-PCS | Mod: 26,,, | Performed by: RADIOLOGY

## 2021-01-01 PROCEDURE — 20600001 HC STEP DOWN PRIVATE ROOM

## 2021-01-01 PROCEDURE — 96365 THER/PROPH/DIAG IV INF INIT: CPT

## 2021-01-01 PROCEDURE — 94727 PR PULM FUNCTION TEST BY GAS: ICD-10-PCS | Mod: 26,S$PBB,, | Performed by: INTERNAL MEDICINE

## 2021-01-01 PROCEDURE — 99232 SBSQ HOSP IP/OBS MODERATE 35: CPT | Mod: ,,, | Performed by: INTERNAL MEDICINE

## 2021-01-01 PROCEDURE — 71260 CT CHEST ABDOMEN PELVIS WITH CONTRAST (XPD): ICD-10-PCS | Mod: 26,,, | Performed by: RADIOLOGY

## 2021-01-01 PROCEDURE — 27000646 HC AEROBIKA DEVICE

## 2021-01-01 PROCEDURE — 94664 DEMO&/EVAL PT USE INHALER: CPT

## 2021-01-01 PROCEDURE — 87040 BLOOD CULTURE FOR BACTERIA: CPT | Performed by: STUDENT IN AN ORGANIZED HEALTH CARE EDUCATION/TRAINING PROGRAM

## 2021-01-01 PROCEDURE — 94727 GAS DIL/WSHOT DETER LNG VOL: CPT | Mod: 26,S$PBB,, | Performed by: INTERNAL MEDICINE

## 2021-01-01 PROCEDURE — 99223 PR INITIAL HOSPITAL CARE,LEVL III: ICD-10-PCS | Mod: ,,, | Performed by: HOSPITALIST

## 2021-01-01 PROCEDURE — 99999 PR PBB SHADOW E&M-EST. PATIENT-LVL IV: ICD-10-PCS | Mod: PBBFAC,,,

## 2021-01-01 PROCEDURE — 99233 SBSQ HOSP IP/OBS HIGH 50: CPT | Mod: ,,, | Performed by: INTERNAL MEDICINE

## 2021-01-01 PROCEDURE — 99215 OFFICE O/P EST HI 40 MIN: CPT | Mod: PBBFAC,25 | Performed by: STUDENT IN AN ORGANIZED HEALTH CARE EDUCATION/TRAINING PROGRAM

## 2021-01-01 PROCEDURE — 74177 CT CHEST ABDOMEN PELVIS WITH CONTRAST (XPD): ICD-10-PCS | Mod: 26,,, | Performed by: RADIOLOGY

## 2021-01-01 PROCEDURE — 27000190 HC CPAP FULL FACE MASK W/VALVE

## 2021-01-01 PROCEDURE — 83880 ASSAY OF NATRIURETIC PEPTIDE: CPT

## 2021-01-01 PROCEDURE — 12000002 HC ACUTE/MED SURGE SEMI-PRIVATE ROOM

## 2021-01-01 PROCEDURE — 93010 ELECTROCARDIOGRAM REPORT: CPT | Mod: 76,,, | Performed by: INTERNAL MEDICINE

## 2021-01-01 PROCEDURE — 99232 SBSQ HOSP IP/OBS MODERATE 35: CPT | Mod: ,,, | Performed by: HOSPITALIST

## 2021-01-01 PROCEDURE — 25500020 PHARM REV CODE 255: Performed by: RADIOLOGY

## 2021-01-01 PROCEDURE — 99285 PR EMERGENCY DEPT VISIT,LEVEL V: ICD-10-PCS | Mod: CS,,, | Performed by: EMERGENCY MEDICINE

## 2021-01-01 PROCEDURE — 99233 PR SUBSEQUENT HOSPITAL CARE,LEVL III: ICD-10-PCS | Mod: ,,, | Performed by: HOSPITALIST

## 2021-01-01 PROCEDURE — 94060 EVALUATION OF WHEEZING: CPT | Mod: 26,S$PBB,, | Performed by: INTERNAL MEDICINE

## 2021-01-01 PROCEDURE — A9585 GADOBUTROL INJECTION: HCPCS | Performed by: RADIOLOGY

## 2021-01-01 PROCEDURE — 25000242 PHARM REV CODE 250 ALT 637 W/ HCPCS: Performed by: INTERNAL MEDICINE

## 2021-01-01 PROCEDURE — 96360 HYDRATION IV INFUSION INIT: CPT

## 2021-01-01 PROCEDURE — 99233 SBSQ HOSP IP/OBS HIGH 50: CPT | Mod: ,,, | Performed by: HOSPITALIST

## 2021-01-01 PROCEDURE — 99999 PR PBB SHADOW E&M-EST. PATIENT-LVL IV: CPT | Mod: PBBFAC,,,

## 2021-01-01 PROCEDURE — 94727 GAS DIL/WSHOT DETER LNG VOL: CPT | Mod: PBBFAC | Performed by: INTERNAL MEDICINE

## 2021-01-01 PROCEDURE — 93005 ELECTROCARDIOGRAM TRACING: CPT

## 2021-01-01 PROCEDURE — 36600 WITHDRAWAL OF ARTERIAL BLOOD: CPT

## 2021-01-01 PROCEDURE — 94760 N-INVAS EAR/PLS OXIMETRY 1: CPT

## 2021-01-01 PROCEDURE — 83605 ASSAY OF LACTIC ACID: CPT

## 2021-01-01 PROCEDURE — 99232 PR SUBSEQUENT HOSPITAL CARE,LEVL II: ICD-10-PCS | Mod: ,,, | Performed by: INTERNAL MEDICINE

## 2021-01-01 PROCEDURE — 99215 PR OFFICE/OUTPT VISIT, EST, LEVL V, 40-54 MIN: ICD-10-PCS | Mod: S$PBB,,,

## 2021-01-01 PROCEDURE — 99239 HOSP IP/OBS DSCHRG MGMT >30: CPT | Mod: ,,, | Performed by: INTERNAL MEDICINE

## 2021-01-01 PROCEDURE — 94729 DIFFUSING CAPACITY: CPT | Mod: 26,S$PBB,, | Performed by: INTERNAL MEDICINE

## 2021-01-01 PROCEDURE — 84439 ASSAY OF FREE THYROXINE: CPT | Performed by: STUDENT IN AN ORGANIZED HEALTH CARE EDUCATION/TRAINING PROGRAM

## 2021-01-01 PROCEDURE — U0002 COVID-19 LAB TEST NON-CDC: HCPCS | Performed by: STUDENT IN AN ORGANIZED HEALTH CARE EDUCATION/TRAINING PROGRAM

## 2021-01-01 PROCEDURE — 63700000 PHARM REV CODE 250 ALT 637 W/O HCPCS: Performed by: STUDENT IN AN ORGANIZED HEALTH CARE EDUCATION/TRAINING PROGRAM

## 2021-01-01 PROCEDURE — 99999 PR PBB SHADOW E&M-EST. PATIENT-LVL III: CPT | Mod: PBBFAC,,, | Performed by: STUDENT IN AN ORGANIZED HEALTH CARE EDUCATION/TRAINING PROGRAM

## 2021-01-01 PROCEDURE — 87040 BLOOD CULTURE FOR BACTERIA: CPT | Performed by: EMERGENCY MEDICINE

## 2021-01-01 PROCEDURE — 99285 EMERGENCY DEPT VISIT HI MDM: CPT | Mod: CS,,, | Performed by: EMERGENCY MEDICINE

## 2021-01-01 PROCEDURE — 99223 1ST HOSP IP/OBS HIGH 75: CPT | Mod: ,,, | Performed by: HOSPITALIST

## 2021-01-01 PROCEDURE — 71260 CT THORAX DX C+: CPT | Mod: TC

## 2021-01-01 PROCEDURE — 80047 BASIC METABLC PNL IONIZED CA: CPT

## 2021-01-01 PROCEDURE — 99215 OFFICE O/P EST HI 40 MIN: CPT | Mod: 25,S$PBB,, | Performed by: INTERNAL MEDICINE

## 2021-01-01 PROCEDURE — 99999 PR PBB SHADOW E&M-EST. PATIENT-LVL V: CPT | Mod: PBBFAC,,, | Performed by: STUDENT IN AN ORGANIZED HEALTH CARE EDUCATION/TRAINING PROGRAM

## 2021-01-01 PROCEDURE — U0002 COVID-19 LAB TEST NON-CDC: HCPCS | Performed by: EMERGENCY MEDICINE

## 2021-01-01 PROCEDURE — 97116 GAIT TRAINING THERAPY: CPT

## 2021-01-01 PROCEDURE — 85347 COAGULATION TIME ACTIVATED: CPT

## 2021-01-01 PROCEDURE — 81003 URINALYSIS AUTO W/O SCOPE: CPT | Performed by: EMERGENCY MEDICINE

## 2021-01-01 PROCEDURE — 83880 ASSAY OF NATRIURETIC PEPTIDE: CPT | Performed by: STUDENT IN AN ORGANIZED HEALTH CARE EDUCATION/TRAINING PROGRAM

## 2021-01-01 PROCEDURE — 99999 PR PBB SHADOW E&M-EST. PATIENT-LVL V: ICD-10-PCS | Mod: PBBFAC,,, | Performed by: STUDENT IN AN ORGANIZED HEALTH CARE EDUCATION/TRAINING PROGRAM

## 2021-01-01 PROCEDURE — 83605 ASSAY OF LACTIC ACID: CPT | Performed by: EMERGENCY MEDICINE

## 2021-01-01 PROCEDURE — 99291 CRITICAL CARE FIRST HOUR: CPT | Mod: CS,,, | Performed by: EMERGENCY MEDICINE

## 2021-01-01 PROCEDURE — 97165 OT EVAL LOW COMPLEX 30 MIN: CPT

## 2021-01-01 PROCEDURE — 85027 COMPLETE CBC AUTOMATED: CPT | Performed by: STUDENT IN AN ORGANIZED HEALTH CARE EDUCATION/TRAINING PROGRAM

## 2021-01-01 PROCEDURE — 99215 OFFICE O/P EST HI 40 MIN: CPT | Mod: S$PBB,,,

## 2021-01-01 PROCEDURE — 94060 EVALUATION OF WHEEZING: CPT | Mod: PBBFAC | Performed by: INTERNAL MEDICINE

## 2021-01-01 PROCEDURE — 99214 OFFICE O/P EST MOD 30 MIN: CPT | Mod: PBBFAC | Performed by: STUDENT IN AN ORGANIZED HEALTH CARE EDUCATION/TRAINING PROGRAM

## 2021-01-01 PROCEDURE — 96367 TX/PROPH/DG ADDL SEQ IV INF: CPT

## 2021-01-01 RX ORDER — MAGNESIUM SULFATE HEPTAHYDRATE 40 MG/ML
2 INJECTION, SOLUTION INTRAVENOUS ONCE
Status: COMPLETED | OUTPATIENT
Start: 2021-01-01 | End: 2021-01-01

## 2021-01-01 RX ORDER — GLUCAGON 1 MG
1 KIT INJECTION
Status: DISCONTINUED | OUTPATIENT
Start: 2021-01-01 | End: 2022-01-01 | Stop reason: HOSPADM

## 2021-01-01 RX ORDER — HEPARIN 100 UNIT/ML
500 SYRINGE INTRAVENOUS
Status: DISCONTINUED | OUTPATIENT
Start: 2021-01-01 | End: 2021-01-01 | Stop reason: HOSPADM

## 2021-01-01 RX ORDER — LEVOFLOXACIN 750 MG/1
750 TABLET ORAL DAILY
Qty: 2 TABLET | Refills: 0 | Status: ON HOLD | OUTPATIENT
Start: 2021-01-01 | End: 2021-01-01

## 2021-01-01 RX ORDER — SODIUM CHLORIDE 0.9 % (FLUSH) 0.9 %
10 SYRINGE (ML) INJECTION
Status: CANCELLED | OUTPATIENT
Start: 2021-01-01

## 2021-01-01 RX ORDER — BUPROPION HYDROCHLORIDE 150 MG/1
150 TABLET, EXTENDED RELEASE ORAL DAILY
Qty: 30 TABLET | Refills: 6 | Status: SHIPPED | OUTPATIENT
Start: 2021-01-01 | End: 2021-01-01 | Stop reason: SDUPTHER

## 2021-01-01 RX ORDER — GADOBUTROL 604.72 MG/ML
10 INJECTION INTRAVENOUS
Status: COMPLETED | OUTPATIENT
Start: 2021-01-01 | End: 2021-01-01

## 2021-01-01 RX ORDER — PREDNISONE 20 MG/1
40 TABLET ORAL DAILY
Qty: 10 TABLET | Refills: 0 | Status: SHIPPED | OUTPATIENT
Start: 2021-01-01 | End: 2021-01-01 | Stop reason: ALTCHOICE

## 2021-01-01 RX ORDER — SODIUM CHLORIDE 0.9 % (FLUSH) 0.9 %
10 SYRINGE (ML) INJECTION
Status: DISCONTINUED | OUTPATIENT
Start: 2021-01-01 | End: 2021-01-01 | Stop reason: HOSPADM

## 2021-01-01 RX ORDER — PREDNISONE 20 MG/1
40 TABLET ORAL DAILY
Status: DISCONTINUED | OUTPATIENT
Start: 2021-01-01 | End: 2021-01-01 | Stop reason: HOSPADM

## 2021-01-01 RX ORDER — FLUTICASONE FUROATE, UMECLIDINIUM BROMIDE AND VILANTEROL TRIFENATATE 200; 62.5; 25 UG/1; UG/1; UG/1
1 POWDER RESPIRATORY (INHALATION) DAILY
Qty: 90 EACH | Refills: 3 | Status: SHIPPED | OUTPATIENT
Start: 2021-01-01 | End: 2021-01-01 | Stop reason: SDUPTHER

## 2021-01-01 RX ORDER — ACETAMINOPHEN 325 MG/1
650 TABLET ORAL EVERY 4 HOURS PRN
Status: DISCONTINUED | OUTPATIENT
Start: 2021-01-01 | End: 2022-01-01 | Stop reason: HOSPADM

## 2021-01-01 RX ORDER — POLYETHYLENE GLYCOL 3350 17 G/17G
17 POWDER, FOR SOLUTION ORAL DAILY PRN
Status: DISCONTINUED | OUTPATIENT
Start: 2021-01-01 | End: 2022-01-01 | Stop reason: HOSPADM

## 2021-01-01 RX ORDER — IPRATROPIUM BROMIDE AND ALBUTEROL SULFATE 2.5; .5 MG/3ML; MG/3ML
3 SOLUTION RESPIRATORY (INHALATION)
Status: DISCONTINUED | OUTPATIENT
Start: 2021-01-01 | End: 2021-01-01

## 2021-01-01 RX ORDER — TRAMADOL HYDROCHLORIDE 50 MG/1
50 TABLET ORAL EVERY 8 HOURS PRN
Qty: 90 TABLET | Refills: 0 | Status: SHIPPED | OUTPATIENT
Start: 2021-01-01 | End: 2021-01-01 | Stop reason: SDUPTHER

## 2021-01-01 RX ORDER — MUPIROCIN 20 MG/G
OINTMENT TOPICAL 2 TIMES DAILY
Status: DISCONTINUED | OUTPATIENT
Start: 2021-01-01 | End: 2021-01-01 | Stop reason: HOSPADM

## 2021-01-01 RX ORDER — AZITHROMYCIN 250 MG/1
500 TABLET, FILM COATED ORAL DAILY
Status: COMPLETED | OUTPATIENT
Start: 2021-01-01 | End: 2021-01-01

## 2021-01-01 RX ORDER — AMOXICILLIN 250 MG
1 CAPSULE ORAL DAILY PRN
Status: DISCONTINUED | OUTPATIENT
Start: 2021-01-01 | End: 2022-01-01 | Stop reason: HOSPADM

## 2021-01-01 RX ORDER — PROMETHAZINE HYDROCHLORIDE AND CODEINE PHOSPHATE 6.25; 1 MG/5ML; MG/5ML
5 SOLUTION ORAL EVERY 8 HOURS PRN
Status: DISCONTINUED | OUTPATIENT
Start: 2021-01-01 | End: 2022-01-01 | Stop reason: HOSPADM

## 2021-01-01 RX ORDER — HYDROXYZINE HYDROCHLORIDE 25 MG/1
25 TABLET, FILM COATED ORAL 3 TIMES DAILY PRN
Status: DISCONTINUED | OUTPATIENT
Start: 2021-01-01 | End: 2021-01-01 | Stop reason: HOSPADM

## 2021-01-01 RX ORDER — PROMETHAZINE HYDROCHLORIDE AND CODEINE PHOSPHATE 6.25; 1 MG/5ML; MG/5ML
5 SOLUTION ORAL EVERY 8 HOURS PRN
Qty: 473 ML | Refills: 1 | Status: SHIPPED | OUTPATIENT
Start: 2021-01-01 | End: 2021-01-01 | Stop reason: SDUPTHER

## 2021-01-01 RX ORDER — HEPARIN 100 UNIT/ML
500 SYRINGE INTRAVENOUS
Status: CANCELLED | OUTPATIENT
Start: 2021-01-01

## 2021-01-01 RX ORDER — DIAZEPAM 2 MG/1
2 TABLET ORAL ONCE
Status: COMPLETED | OUTPATIENT
Start: 2021-01-01 | End: 2021-01-01

## 2021-01-01 RX ORDER — FLUTICASONE FUROATE AND VILANTEROL 100; 25 UG/1; UG/1
1 POWDER RESPIRATORY (INHALATION) DAILY
Refills: 2 | Status: DISCONTINUED | OUTPATIENT
Start: 2021-01-01 | End: 2022-01-01 | Stop reason: HOSPADM

## 2021-01-01 RX ORDER — ALBUTEROL SULFATE 90 UG/1
2 AEROSOL, METERED RESPIRATORY (INHALATION) EVERY 6 HOURS PRN
Qty: 18 G | Refills: 3 | Status: ON HOLD | OUTPATIENT
Start: 2021-01-01 | End: 2022-01-01 | Stop reason: SDUPTHER

## 2021-01-01 RX ORDER — IBUPROFEN 200 MG
24 TABLET ORAL
Status: DISCONTINUED | OUTPATIENT
Start: 2021-01-01 | End: 2022-01-01 | Stop reason: HOSPADM

## 2021-01-01 RX ORDER — CEFTRIAXONE 1 G/1
1 INJECTION, POWDER, FOR SOLUTION INTRAMUSCULAR; INTRAVENOUS
Status: DISCONTINUED | OUTPATIENT
Start: 2021-01-01 | End: 2021-01-01

## 2021-01-01 RX ORDER — BENZONATATE 100 MG/1
100 CAPSULE ORAL 3 TIMES DAILY PRN
Qty: 30 CAPSULE | Refills: 0 | Status: ON HOLD | OUTPATIENT
Start: 2021-01-01 | End: 2022-01-01 | Stop reason: HOSPADM

## 2021-01-01 RX ORDER — GUAIFENESIN 600 MG/1
600 TABLET, EXTENDED RELEASE ORAL 2 TIMES DAILY
Qty: 14 TABLET | Refills: 0 | Status: SHIPPED | OUTPATIENT
Start: 2021-01-01 | End: 2021-01-01

## 2021-01-01 RX ORDER — ALBUTEROL SULFATE 90 UG/1
2 AEROSOL, METERED RESPIRATORY (INHALATION) EVERY 6 HOURS PRN
Qty: 18 G | Refills: 2 | Status: SHIPPED | OUTPATIENT
Start: 2021-01-01 | End: 2021-01-01 | Stop reason: SDUPTHER

## 2021-01-01 RX ORDER — BUPROPION HYDROCHLORIDE 150 MG/1
150 TABLET ORAL DAILY
Status: DISCONTINUED | OUTPATIENT
Start: 2021-01-01 | End: 2021-01-01 | Stop reason: HOSPADM

## 2021-01-01 RX ORDER — PROMETHAZINE HYDROCHLORIDE AND CODEINE PHOSPHATE 6.25; 1 MG/5ML; MG/5ML
5 SOLUTION ORAL EVERY 8 HOURS PRN
Qty: 473 ML | Refills: 0 | Status: SHIPPED | OUTPATIENT
Start: 2021-01-01 | End: 2021-01-01 | Stop reason: SDUPTHER

## 2021-01-01 RX ORDER — PROMETHAZINE HYDROCHLORIDE AND CODEINE PHOSPHATE 6.25; 1 MG/5ML; MG/5ML
5 SOLUTION ORAL EVERY 8 HOURS PRN
Qty: 473 ML | Refills: 1 | Status: SHIPPED | OUTPATIENT
Start: 2021-01-01 | End: 2021-01-01

## 2021-01-01 RX ORDER — IPRATROPIUM BROMIDE AND ALBUTEROL SULFATE 2.5; .5 MG/3ML; MG/3ML
3 SOLUTION RESPIRATORY (INHALATION) EVERY 4 HOURS
Status: DISCONTINUED | OUTPATIENT
Start: 2021-01-01 | End: 2022-01-01

## 2021-01-01 RX ORDER — NALOXONE HCL 0.4 MG/ML
0.02 VIAL (ML) INJECTION
Status: DISCONTINUED | OUTPATIENT
Start: 2021-01-01 | End: 2022-01-01 | Stop reason: HOSPADM

## 2021-01-01 RX ORDER — IPRATROPIUM BROMIDE AND ALBUTEROL SULFATE 2.5; .5 MG/3ML; MG/3ML
3 SOLUTION RESPIRATORY (INHALATION) EVERY 6 HOURS
Status: DISCONTINUED | OUTPATIENT
Start: 2021-01-01 | End: 2021-01-01 | Stop reason: ALTCHOICE

## 2021-01-01 RX ORDER — BENZONATATE 100 MG/1
100 CAPSULE ORAL 3 TIMES DAILY PRN
Status: DISCONTINUED | OUTPATIENT
Start: 2021-01-01 | End: 2021-01-01 | Stop reason: HOSPADM

## 2021-01-01 RX ORDER — IBUPROFEN 200 MG
16 TABLET ORAL
Status: DISCONTINUED | OUTPATIENT
Start: 2021-01-01 | End: 2021-01-01 | Stop reason: HOSPADM

## 2021-01-01 RX ORDER — IPRATROPIUM BROMIDE AND ALBUTEROL SULFATE 2.5; .5 MG/3ML; MG/3ML
3 SOLUTION RESPIRATORY (INHALATION)
Status: COMPLETED | OUTPATIENT
Start: 2021-01-01 | End: 2021-01-01

## 2021-01-01 RX ORDER — DIAZEPAM 5 MG/1
5 TABLET ORAL NIGHTLY PRN
Status: DISCONTINUED | OUTPATIENT
Start: 2021-01-01 | End: 2021-01-01 | Stop reason: HOSPADM

## 2021-01-01 RX ORDER — ALBUTEROL SULFATE 90 UG/1
2 AEROSOL, METERED RESPIRATORY (INHALATION) EVERY 6 HOURS PRN
Status: DISCONTINUED | OUTPATIENT
Start: 2021-01-01 | End: 2021-01-01

## 2021-01-01 RX ORDER — TRAMADOL HYDROCHLORIDE 50 MG/1
50 TABLET ORAL EVERY 8 HOURS PRN
Qty: 90 TABLET | Refills: 0 | OUTPATIENT
Start: 2021-01-01

## 2021-01-01 RX ORDER — GABAPENTIN 400 MG/1
400 CAPSULE ORAL 3 TIMES DAILY
Status: DISCONTINUED | OUTPATIENT
Start: 2021-01-01 | End: 2021-01-01 | Stop reason: HOSPADM

## 2021-01-01 RX ORDER — TRAMADOL HYDROCHLORIDE 50 MG/1
50 TABLET ORAL EVERY 8 HOURS PRN
Qty: 90 TABLET | Refills: 0 | Status: CANCELLED | OUTPATIENT
Start: 2021-01-01

## 2021-01-01 RX ORDER — FLUTICASONE FUROATE, UMECLIDINIUM BROMIDE AND VILANTEROL TRIFENATATE 200; 62.5; 25 UG/1; UG/1; UG/1
1 POWDER RESPIRATORY (INHALATION) DAILY
Qty: 90 EACH | Refills: 3 | Status: SHIPPED | OUTPATIENT
Start: 2021-01-01 | End: 2021-01-01 | Stop reason: HOSPADM

## 2021-01-01 RX ORDER — PREDNISONE 20 MG/1
40 TABLET ORAL DAILY
Qty: 4 TABLET | Refills: 0 | Status: SHIPPED | OUTPATIENT
Start: 2021-01-01 | End: 2021-01-01

## 2021-01-01 RX ORDER — DICLOFENAC SODIUM 10 MG/G
2 GEL TOPICAL 2 TIMES DAILY
Status: DISCONTINUED | OUTPATIENT
Start: 2021-01-01 | End: 2021-01-01 | Stop reason: HOSPADM

## 2021-01-01 RX ORDER — DICLOFENAC SODIUM 10 MG/G
2 GEL TOPICAL 2 TIMES DAILY
Status: DISCONTINUED | OUTPATIENT
Start: 2021-01-01 | End: 2022-01-01 | Stop reason: HOSPADM

## 2021-01-01 RX ORDER — TALC
6 POWDER (GRAM) TOPICAL NIGHTLY PRN
Status: DISCONTINUED | OUTPATIENT
Start: 2021-01-01 | End: 2021-01-01 | Stop reason: HOSPADM

## 2021-01-01 RX ORDER — SODIUM CHLORIDE 0.9 % (FLUSH) 0.9 %
10 SYRINGE (ML) INJECTION EVERY 12 HOURS PRN
Status: DISCONTINUED | OUTPATIENT
Start: 2021-01-01 | End: 2021-01-01 | Stop reason: HOSPADM

## 2021-01-01 RX ORDER — DICLOFENAC SODIUM 30 MG/G
GEL TOPICAL 2 TIMES DAILY
Status: ON HOLD | COMMUNITY
Start: 2021-01-01 | End: 2022-01-01

## 2021-01-01 RX ORDER — BUPROPION HYDROCHLORIDE 150 MG/1
150 TABLET, EXTENDED RELEASE ORAL DAILY
Qty: 30 TABLET | Refills: 6 | Status: ON HOLD | OUTPATIENT
Start: 2021-01-01 | End: 2022-01-01 | Stop reason: SDUPTHER

## 2021-01-01 RX ORDER — LOSARTAN POTASSIUM AND HYDROCHLOROTHIAZIDE 12.5; 1 MG/1; MG/1
1 TABLET ORAL DAILY
Status: DISCONTINUED | OUTPATIENT
Start: 2021-01-01 | End: 2021-01-01 | Stop reason: HOSPADM

## 2021-01-01 RX ORDER — TRAMADOL HYDROCHLORIDE 50 MG/1
50 TABLET ORAL EVERY 8 HOURS PRN
Qty: 90 TABLET | Refills: 0 | Status: SHIPPED | OUTPATIENT
Start: 2021-01-01 | End: 2021-01-01

## 2021-01-01 RX ORDER — GADOBUTROL 604.72 MG/ML
8 INJECTION INTRAVENOUS
Status: COMPLETED | OUTPATIENT
Start: 2021-01-01 | End: 2021-01-01

## 2021-01-01 RX ORDER — PROMETHAZINE HYDROCHLORIDE AND CODEINE PHOSPHATE 6.25; 1 MG/5ML; MG/5ML
5 SOLUTION ORAL EVERY 8 HOURS PRN
Qty: 473 ML | Refills: 1 | Status: CANCELLED | OUTPATIENT
Start: 2021-01-01

## 2021-01-01 RX ORDER — LOSARTAN POTASSIUM AND HYDROCHLOROTHIAZIDE 12.5; 1 MG/1; MG/1
1 TABLET ORAL DAILY
Status: DISCONTINUED | OUTPATIENT
Start: 2021-01-01 | End: 2022-01-01 | Stop reason: HOSPADM

## 2021-01-01 RX ORDER — PREDNISONE 20 MG/1
40 TABLET ORAL DAILY
Status: DISCONTINUED | OUTPATIENT
Start: 2021-01-01 | End: 2021-01-01

## 2021-01-01 RX ORDER — TRAMADOL HYDROCHLORIDE 50 MG/1
50 TABLET ORAL EVERY 8 HOURS PRN
Qty: 90 TABLET | Refills: 0 | Status: SHIPPED | OUTPATIENT
Start: 2021-01-01 | End: 2022-01-01 | Stop reason: SDUPTHER

## 2021-01-01 RX ORDER — TIOTROPIUM BROMIDE 18 UG/1
18 CAPSULE ORAL; RESPIRATORY (INHALATION) DAILY
Qty: 30 CAPSULE | Refills: 3 | Status: ON HOLD | OUTPATIENT
Start: 2021-01-01 | End: 2022-01-01 | Stop reason: SDUPTHER

## 2021-01-01 RX ORDER — AZITHROMYCIN 250 MG/1
TABLET, FILM COATED ORAL
Qty: 6 TABLET | Refills: 0 | Status: SHIPPED | OUTPATIENT
Start: 2021-01-01 | End: 2021-01-01

## 2021-01-01 RX ORDER — PROMETHAZINE HYDROCHLORIDE AND CODEINE PHOSPHATE 6.25; 1 MG/5ML; MG/5ML
5 SOLUTION ORAL EVERY 8 HOURS PRN
Qty: 473 ML | Refills: 0 | Status: SHIPPED | OUTPATIENT
Start: 2021-01-01 | End: 2022-01-01 | Stop reason: SDUPTHER

## 2021-01-01 RX ORDER — INSULIN ASPART 100 [IU]/ML
0-5 INJECTION, SOLUTION INTRAVENOUS; SUBCUTANEOUS
Status: DISCONTINUED | OUTPATIENT
Start: 2021-01-01 | End: 2022-01-01 | Stop reason: HOSPADM

## 2021-01-01 RX ORDER — AMOXICILLIN 250 MG
1 CAPSULE ORAL 2 TIMES DAILY
Status: DISCONTINUED | OUTPATIENT
Start: 2021-01-01 | End: 2021-01-01 | Stop reason: HOSPADM

## 2021-01-01 RX ORDER — SODIUM CHLORIDE 0.9 % (FLUSH) 0.9 %
10 SYRINGE (ML) INJECTION EVERY 12 HOURS PRN
Status: DISCONTINUED | OUTPATIENT
Start: 2021-01-01 | End: 2022-01-01 | Stop reason: HOSPADM

## 2021-01-01 RX ORDER — PANTOPRAZOLE SODIUM 40 MG/1
40 TABLET, DELAYED RELEASE ORAL DAILY
Refills: 3 | Status: DISCONTINUED | OUTPATIENT
Start: 2021-01-01 | End: 2022-01-01 | Stop reason: HOSPADM

## 2021-01-01 RX ORDER — LIDOCAINE 50 MG/G
OINTMENT TOPICAL
Status: DISCONTINUED | OUTPATIENT
Start: 2021-01-01 | End: 2021-01-01 | Stop reason: HOSPADM

## 2021-01-01 RX ORDER — FUROSEMIDE 10 MG/ML
40 INJECTION INTRAMUSCULAR; INTRAVENOUS ONCE
Status: COMPLETED | OUTPATIENT
Start: 2021-01-01 | End: 2021-01-01

## 2021-01-01 RX ORDER — DIAZEPAM 5 MG/1
5 TABLET ORAL NIGHTLY PRN
Qty: 15 TABLET | Refills: 0 | Status: SHIPPED | OUTPATIENT
Start: 2021-01-01 | End: 2022-01-01 | Stop reason: SDUPTHER

## 2021-01-01 RX ORDER — BUDESONIDE AND FORMOTEROL FUMARATE DIHYDRATE 160; 4.5 UG/1; UG/1
2 AEROSOL RESPIRATORY (INHALATION) EVERY 12 HOURS
Qty: 10 G | Refills: 2 | Status: ON HOLD
Start: 2021-01-01 | End: 2022-01-01 | Stop reason: SDUPTHER

## 2021-01-01 RX ORDER — METHYLPREDNISOLONE SOD SUCC 125 MG
125 VIAL (EA) INJECTION
Status: COMPLETED | OUTPATIENT
Start: 2021-01-01 | End: 2021-01-01

## 2021-01-01 RX ORDER — IPRATROPIUM BROMIDE AND ALBUTEROL SULFATE 2.5; .5 MG/3ML; MG/3ML
3 SOLUTION RESPIRATORY (INHALATION)
Status: DISCONTINUED | OUTPATIENT
Start: 2021-01-01 | End: 2021-01-01 | Stop reason: HOSPADM

## 2021-01-01 RX ORDER — PROMETHAZINE HYDROCHLORIDE AND CODEINE PHOSPHATE 6.25; 1 MG/5ML; MG/5ML
5 SOLUTION ORAL EVERY 8 HOURS PRN
Qty: 473 ML | Refills: 0 | OUTPATIENT
Start: 2021-01-01

## 2021-01-01 RX ORDER — CETIRIZINE HYDROCHLORIDE 10 MG/1
10 TABLET ORAL DAILY
Qty: 30 TABLET | Refills: 0 | Status: ON HOLD | OUTPATIENT
Start: 2021-01-01 | End: 2022-01-01

## 2021-01-01 RX ORDER — HYDROXYZINE HYDROCHLORIDE 25 MG/1
25 TABLET, FILM COATED ORAL DAILY PRN
Qty: 30 TABLET | Refills: 1 | Status: SHIPPED | OUTPATIENT
Start: 2021-01-01 | End: 2021-01-01

## 2021-01-01 RX ORDER — IBUPROFEN 200 MG
16 TABLET ORAL
Status: DISCONTINUED | OUTPATIENT
Start: 2021-01-01 | End: 2022-01-01 | Stop reason: HOSPADM

## 2021-01-01 RX ORDER — IPRATROPIUM BROMIDE AND ALBUTEROL SULFATE 2.5; .5 MG/3ML; MG/3ML
3 SOLUTION RESPIRATORY (INHALATION) EVERY 6 HOURS PRN
Status: DISCONTINUED | OUTPATIENT
Start: 2021-01-01 | End: 2021-01-01

## 2021-01-01 RX ORDER — DIAZEPAM 2 MG/1
2 TABLET ORAL EVERY 12 HOURS PRN
Status: DISCONTINUED | OUTPATIENT
Start: 2021-01-01 | End: 2021-01-01

## 2021-01-01 RX ORDER — GLUCAGON 1 MG
1 KIT INJECTION
Status: DISCONTINUED | OUTPATIENT
Start: 2021-01-01 | End: 2021-01-01 | Stop reason: HOSPADM

## 2021-01-01 RX ORDER — IBUPROFEN 200 MG
24 TABLET ORAL
Status: DISCONTINUED | OUTPATIENT
Start: 2021-01-01 | End: 2021-01-01 | Stop reason: HOSPADM

## 2021-01-01 RX ORDER — FLUTICASONE FUROATE AND VILANTEROL 200; 25 UG/1; UG/1
1 POWDER RESPIRATORY (INHALATION) DAILY
Status: DISCONTINUED | OUTPATIENT
Start: 2021-01-01 | End: 2021-01-01 | Stop reason: HOSPADM

## 2021-01-01 RX ORDER — TRAMADOL HYDROCHLORIDE 50 MG/1
50 TABLET ORAL DAILY PRN
Status: DISCONTINUED | OUTPATIENT
Start: 2021-01-01 | End: 2021-01-01 | Stop reason: HOSPADM

## 2021-01-01 RX ORDER — HEPARIN SODIUM 5000 [USP'U]/ML
5000 INJECTION, SOLUTION INTRAVENOUS; SUBCUTANEOUS EVERY 8 HOURS
Status: DISCONTINUED | OUTPATIENT
Start: 2021-01-01 | End: 2021-01-01 | Stop reason: HOSPADM

## 2021-01-01 RX ORDER — PROMETHAZINE HYDROCHLORIDE 6.25 MG/5ML
5 SYRUP ORAL EVERY 6 HOURS PRN
Qty: 1 BOTTLE | Refills: 2 | Status: SHIPPED | OUTPATIENT
Start: 2021-01-01 | End: 2021-01-01 | Stop reason: SDUPTHER

## 2021-01-01 RX ORDER — AZITHROMYCIN 250 MG/1
500 TABLET, FILM COATED ORAL
Status: COMPLETED | OUTPATIENT
Start: 2021-01-01 | End: 2021-01-01

## 2021-01-01 RX ORDER — ONDANSETRON 8 MG/1
8 TABLET, ORALLY DISINTEGRATING ORAL EVERY 8 HOURS PRN
Status: DISCONTINUED | OUTPATIENT
Start: 2021-01-01 | End: 2022-01-01 | Stop reason: HOSPADM

## 2021-01-01 RX ORDER — GUAIFENESIN 100 MG/5ML
200 SOLUTION ORAL EVERY 8 HOURS PRN
Status: DISCONTINUED | OUTPATIENT
Start: 2021-01-01 | End: 2021-01-01 | Stop reason: HOSPADM

## 2021-01-01 RX ORDER — TRAMADOL HYDROCHLORIDE 50 MG/1
50 TABLET ORAL EVERY 8 HOURS PRN
Status: DISCONTINUED | OUTPATIENT
Start: 2021-01-01 | End: 2022-01-01 | Stop reason: HOSPADM

## 2021-01-01 RX ORDER — CEFTRIAXONE 1 G/1
1 INJECTION, POWDER, FOR SOLUTION INTRAMUSCULAR; INTRAVENOUS
Status: DISCONTINUED | OUTPATIENT
Start: 2021-01-01 | End: 2021-01-01 | Stop reason: HOSPADM

## 2021-01-01 RX ORDER — PROMETHAZINE HYDROCHLORIDE AND CODEINE PHOSPHATE 6.25; 1 MG/5ML; MG/5ML
5 SOLUTION ORAL EVERY 8 HOURS PRN
Qty: 473 ML | Refills: 0 | Status: SHIPPED | OUTPATIENT
Start: 2021-01-01 | End: 2021-01-01

## 2021-01-01 RX ORDER — ENOXAPARIN SODIUM 100 MG/ML
40 INJECTION SUBCUTANEOUS EVERY 24 HOURS
Status: DISCONTINUED | OUTPATIENT
Start: 2021-01-01 | End: 2022-01-01 | Stop reason: HOSPADM

## 2021-01-01 RX ORDER — DIAZEPAM 5 MG/1
5 TABLET ORAL NIGHTLY PRN
Qty: 15 TABLET | Refills: 0 | Status: SHIPPED | OUTPATIENT
Start: 2021-01-01 | End: 2021-01-01 | Stop reason: SDUPTHER

## 2021-01-01 RX ORDER — PREDNISONE 10 MG/1
10 TABLET ORAL
Status: COMPLETED | OUTPATIENT
Start: 2021-01-01 | End: 2021-01-01

## 2021-01-01 RX ORDER — HYDROXYZINE HYDROCHLORIDE 25 MG/1
25 TABLET, FILM COATED ORAL DAILY PRN
Status: DISCONTINUED | OUTPATIENT
Start: 2021-01-01 | End: 2022-01-01 | Stop reason: HOSPADM

## 2021-01-01 RX ORDER — CETIRIZINE HYDROCHLORIDE 10 MG/1
10 TABLET ORAL DAILY
Status: DISCONTINUED | OUTPATIENT
Start: 2021-01-01 | End: 2021-01-01 | Stop reason: HOSPADM

## 2021-01-01 RX ORDER — ALBUTEROL SULFATE 0.83 MG/ML
SOLUTION RESPIRATORY (INHALATION)
Qty: 180 ML | Refills: 12 | Status: SHIPPED | OUTPATIENT
Start: 2021-01-01 | End: 2021-01-01

## 2021-01-01 RX ORDER — SODIUM CHLORIDE 0.9 % (FLUSH) 0.9 %
3 SYRINGE (ML) INJECTION
Status: DISCONTINUED | OUTPATIENT
Start: 2021-01-01 | End: 2022-01-01 | Stop reason: HOSPADM

## 2021-01-01 RX ORDER — DIAZEPAM 5 MG/1
5 TABLET ORAL NIGHTLY PRN
Status: DISCONTINUED | OUTPATIENT
Start: 2021-01-01 | End: 2022-01-01 | Stop reason: HOSPADM

## 2021-01-01 RX ORDER — IPRATROPIUM BROMIDE AND ALBUTEROL SULFATE 2.5; .5 MG/3ML; MG/3ML
3 SOLUTION RESPIRATORY (INHALATION) EVERY 6 HOURS PRN
Qty: 90 EACH | Refills: 3 | Status: ON HOLD | OUTPATIENT
Start: 2021-01-01 | End: 2022-01-01

## 2021-01-01 RX ORDER — BUPROPION HYDROCHLORIDE 150 MG/1
150 TABLET ORAL DAILY
Status: DISCONTINUED | OUTPATIENT
Start: 2021-01-01 | End: 2022-01-01 | Stop reason: HOSPADM

## 2021-01-01 RX ORDER — AMLODIPINE BESYLATE 5 MG/1
5 TABLET ORAL DAILY
Status: DISCONTINUED | OUTPATIENT
Start: 2021-01-01 | End: 2022-01-01 | Stop reason: HOSPADM

## 2021-01-01 RX ORDER — NALOXONE HCL 0.4 MG/ML
0.02 VIAL (ML) INJECTION
Status: DISCONTINUED | OUTPATIENT
Start: 2021-01-01 | End: 2021-01-01 | Stop reason: HOSPADM

## 2021-01-01 RX ORDER — ALBUTEROL SULFATE 90 UG/1
2 AEROSOL, METERED RESPIRATORY (INHALATION) EVERY 6 HOURS PRN
Qty: 18 G | Refills: 3 | Status: SHIPPED | OUTPATIENT
Start: 2021-01-01 | End: 2021-01-01 | Stop reason: SDUPTHER

## 2021-01-01 RX ORDER — METHYLPREDNISOLONE SODIUM SUCCINATE 125 MG/2ML
50 INJECTION INTRAMUSCULAR; INTRAVENOUS DAILY
Status: DISCONTINUED | OUTPATIENT
Start: 2021-01-01 | End: 2021-01-01

## 2021-01-01 RX ORDER — PANTOPRAZOLE SODIUM 40 MG/1
40 TABLET, DELAYED RELEASE ORAL DAILY
Refills: 3 | Status: DISCONTINUED | OUTPATIENT
Start: 2021-01-01 | End: 2021-01-01 | Stop reason: HOSPADM

## 2021-01-01 RX ORDER — CEFTRIAXONE 1 G/1
1 INJECTION, POWDER, FOR SOLUTION INTRAMUSCULAR; INTRAVENOUS
Status: COMPLETED | OUTPATIENT
Start: 2021-01-01 | End: 2021-01-01

## 2021-01-01 RX ORDER — AMLODIPINE BESYLATE 5 MG/1
5 TABLET ORAL DAILY
Status: DISCONTINUED | OUTPATIENT
Start: 2021-01-01 | End: 2021-01-01 | Stop reason: HOSPADM

## 2021-01-01 RX ORDER — LEVOFLOXACIN 750 MG/1
750 TABLET ORAL DAILY
Status: COMPLETED | OUTPATIENT
Start: 2021-01-01 | End: 2021-01-01

## 2021-01-01 RX ORDER — GABAPENTIN 400 MG/1
400 CAPSULE ORAL 3 TIMES DAILY
Status: DISCONTINUED | OUTPATIENT
Start: 2021-01-01 | End: 2022-01-01 | Stop reason: HOSPADM

## 2021-01-01 RX ORDER — DIAZEPAM 2 MG/1
2 TABLET ORAL NIGHTLY PRN
Status: DISCONTINUED | OUTPATIENT
Start: 2021-01-01 | End: 2021-01-01

## 2021-01-01 RX ADMIN — HEPARIN SODIUM 5000 UNITS: 5000 INJECTION INTRAVENOUS; SUBCUTANEOUS at 10:12

## 2021-01-01 RX ADMIN — IPRATROPIUM BROMIDE AND ALBUTEROL SULFATE 3 ML: .5; 3 SOLUTION RESPIRATORY (INHALATION) at 07:12

## 2021-01-01 RX ADMIN — IPRATROPIUM BROMIDE AND ALBUTEROL SULFATE 3 ML: .5; 3 SOLUTION RESPIRATORY (INHALATION) at 09:12

## 2021-01-01 RX ADMIN — FLUTICASONE FUROATE AND VILANTEROL TRIFENATATE 1 PUFF: 100; 25 POWDER RESPIRATORY (INHALATION) at 07:12

## 2021-01-01 RX ADMIN — IPRATROPIUM BROMIDE AND ALBUTEROL SULFATE 3 ML: 2.5; .5 SOLUTION RESPIRATORY (INHALATION) at 04:12

## 2021-01-01 RX ADMIN — CEFTRIAXONE 1 G: 1 INJECTION, SOLUTION INTRAVENOUS at 10:12

## 2021-01-01 RX ADMIN — IPRATROPIUM BROMIDE AND ALBUTEROL SULFATE 3 ML: .5; 2.5 SOLUTION RESPIRATORY (INHALATION) at 10:12

## 2021-01-01 RX ADMIN — IPRATROPIUM BROMIDE AND ALBUTEROL SULFATE 3 ML: 2.5; .5 SOLUTION RESPIRATORY (INHALATION) at 01:12

## 2021-01-01 RX ADMIN — Medication 1 CAPSULE: at 09:12

## 2021-01-01 RX ADMIN — GABAPENTIN 400 MG: 400 CAPSULE ORAL at 03:12

## 2021-01-01 RX ADMIN — Medication 6 MG: at 10:12

## 2021-01-01 RX ADMIN — IPRATROPIUM BROMIDE AND ALBUTEROL SULFATE 3 ML: .5; 3 SOLUTION RESPIRATORY (INHALATION) at 12:12

## 2021-01-01 RX ADMIN — TIOTROPIUM BROMIDE INHALATION SPRAY 2 PUFF: 1.56 SPRAY, METERED RESPIRATORY (INHALATION) at 08:12

## 2021-01-01 RX ADMIN — LEVOFLOXACIN 750 MG: 750 TABLET, FILM COATED ORAL at 04:12

## 2021-01-01 RX ADMIN — SENNOSIDES AND DOCUSATE SODIUM 1 TABLET: 50; 8.6 TABLET ORAL at 09:12

## 2021-01-01 RX ADMIN — DICLOFENAC SODIUM 2 G: 10 GEL TOPICAL at 09:12

## 2021-01-01 RX ADMIN — TIOTROPIUM BROMIDE INHALATION SPRAY 2 PUFF: 1.56 SPRAY, METERED RESPIRATORY (INHALATION) at 11:12

## 2021-01-01 RX ADMIN — GABAPENTIN 400 MG: 400 CAPSULE ORAL at 09:12

## 2021-01-01 RX ADMIN — BUPROPION HYDROCHLORIDE 150 MG: 150 TABLET, FILM COATED, EXTENDED RELEASE ORAL at 09:12

## 2021-01-01 RX ADMIN — IOHEXOL 75 ML: 350 INJECTION, SOLUTION INTRAVENOUS at 12:06

## 2021-01-01 RX ADMIN — DIAZEPAM 2 MG: 2 TABLET ORAL at 09:12

## 2021-01-01 RX ADMIN — SODIUM CHLORIDE 200 MG: 9 INJECTION, SOLUTION INTRAVENOUS at 11:03

## 2021-01-01 RX ADMIN — PANTOPRAZOLE SODIUM 40 MG: 40 TABLET, DELAYED RELEASE ORAL at 01:12

## 2021-01-01 RX ADMIN — METHYLPREDNISOLONE SODIUM SUCCINATE 80 MG: 40 INJECTION, POWDER, FOR SOLUTION INTRAMUSCULAR; INTRAVENOUS at 09:12

## 2021-01-01 RX ADMIN — IPRATROPIUM BROMIDE AND ALBUTEROL SULFATE 3 ML: .5; 3 SOLUTION RESPIRATORY (INHALATION) at 11:12

## 2021-01-01 RX ADMIN — SODIUM CHLORIDE 200 MG: 9 INJECTION, SOLUTION INTRAVENOUS at 09:07

## 2021-01-01 RX ADMIN — CEFTRIAXONE 1 G: 1 INJECTION, POWDER, FOR SOLUTION INTRAMUSCULAR; INTRAVENOUS at 09:12

## 2021-01-01 RX ADMIN — SODIUM CHLORIDE 400 MG: 9 INJECTION, SOLUTION INTRAVENOUS at 12:11

## 2021-01-01 RX ADMIN — FLUTICASONE FUROATE AND VILANTEROL TRIFENATATE 1 PUFF: 200; 25 POWDER RESPIRATORY (INHALATION) at 08:12

## 2021-01-01 RX ADMIN — FLUTICASONE FUROATE AND VILANTEROL TRIFENATATE 1 PUFF: 200; 25 POWDER RESPIRATORY (INHALATION) at 07:12

## 2021-01-01 RX ADMIN — SODIUM CHLORIDE 999 ML: 9 INJECTION, SOLUTION INTRAVENOUS at 10:02

## 2021-01-01 RX ADMIN — GADOBUTROL 10 ML: 604.72 INJECTION INTRAVENOUS at 01:09

## 2021-01-01 RX ADMIN — AMLODIPINE BESYLATE 5 MG: 5 TABLET ORAL at 09:12

## 2021-01-01 RX ADMIN — MAGNESIUM SULFATE 2 G: 2 INJECTION INTRAVENOUS at 04:12

## 2021-01-01 RX ADMIN — CETIRIZINE HYDROCHLORIDE 10 MG: 10 TABLET, FILM COATED ORAL at 09:12

## 2021-01-01 RX ADMIN — AMLODIPINE BESYLATE 5 MG: 5 TABLET ORAL at 01:12

## 2021-01-01 RX ADMIN — MUPIROCIN: 20 OINTMENT TOPICAL at 09:12

## 2021-01-01 RX ADMIN — BUPROPION HYDROCHLORIDE 150 MG: 150 TABLET, FILM COATED, EXTENDED RELEASE ORAL at 10:12

## 2021-01-01 RX ADMIN — GABAPENTIN 400 MG: 400 CAPSULE ORAL at 08:12

## 2021-01-01 RX ADMIN — SODIUM CHLORIDE 200 MG: 9 INJECTION, SOLUTION INTRAVENOUS at 02:09

## 2021-01-01 RX ADMIN — IPRATROPIUM BROMIDE AND ALBUTEROL SULFATE 3 ML: 2.5; .5 SOLUTION RESPIRATORY (INHALATION) at 03:12

## 2021-01-01 RX ADMIN — ENOXAPARIN SODIUM 40 MG: 100 INJECTION SUBCUTANEOUS at 05:12

## 2021-01-01 RX ADMIN — DIAZEPAM 5 MG: 5 TABLET ORAL at 10:12

## 2021-01-01 RX ADMIN — CEFTRIAXONE 1 G: 1 INJECTION, POWDER, FOR SOLUTION INTRAMUSCULAR; INTRAVENOUS at 12:12

## 2021-01-01 RX ADMIN — TRAMADOL HYDROCHLORIDE 50 MG: 50 TABLET, FILM COATED ORAL at 10:12

## 2021-01-01 RX ADMIN — SODIUM CHLORIDE 200 MG: 9 INJECTION, SOLUTION INTRAVENOUS at 12:10

## 2021-01-01 RX ADMIN — IPRATROPIUM BROMIDE AND ALBUTEROL SULFATE 3 ML: 2.5; .5 SOLUTION RESPIRATORY (INHALATION) at 07:12

## 2021-01-01 RX ADMIN — SODIUM CHLORIDE: 9 INJECTION, SOLUTION INTRAVENOUS at 10:06

## 2021-01-01 RX ADMIN — IPRATROPIUM BROMIDE AND ALBUTEROL SULFATE 3 ML: .5; 3 SOLUTION RESPIRATORY (INHALATION) at 08:12

## 2021-01-01 RX ADMIN — SODIUM CHLORIDE: 9 INJECTION, SOLUTION INTRAVENOUS at 09:07

## 2021-01-01 RX ADMIN — DIAZEPAM 2 MG: 2 TABLET ORAL at 11:12

## 2021-01-01 RX ADMIN — PANTOPRAZOLE SODIUM 40 MG: 40 TABLET, DELAYED RELEASE ORAL at 09:12

## 2021-01-01 RX ADMIN — DIAZEPAM 5 MG: 5 TABLET ORAL at 09:12

## 2021-01-01 RX ADMIN — FUROSEMIDE 40 MG: 10 INJECTION, SOLUTION INTRAMUSCULAR; INTRAVENOUS at 09:12

## 2021-01-01 RX ADMIN — CETIRIZINE HYDROCHLORIDE 10 MG: 10 TABLET, FILM COATED ORAL at 01:12

## 2021-01-01 RX ADMIN — PROMETHAZINE HYDROCHLORIDE AND CODEINE PHOSPHATE 5 ML: 10; 6.25 SOLUTION ORAL at 08:12

## 2021-01-01 RX ADMIN — SODIUM CHLORIDE: 9 INJECTION, SOLUTION INTRAVENOUS at 11:10

## 2021-01-01 RX ADMIN — SODIUM CHLORIDE: 9 INJECTION, SOLUTION INTRAVENOUS at 02:09

## 2021-01-01 RX ADMIN — SODIUM CHLORIDE: 0.9 INJECTION, SOLUTION INTRAVENOUS at 10:03

## 2021-01-01 RX ADMIN — LEVOFLOXACIN 750 MG: 750 TABLET, FILM COATED ORAL at 09:12

## 2021-01-01 RX ADMIN — ENOXAPARIN SODIUM 40 MG: 100 INJECTION SUBCUTANEOUS at 04:12

## 2021-01-01 RX ADMIN — TIOTROPIUM BROMIDE INHALATION SPRAY 2 PUFF: 3.12 SPRAY, METERED RESPIRATORY (INHALATION) at 07:12

## 2021-01-01 RX ADMIN — PANTOPRAZOLE SODIUM 40 MG: 40 TABLET, DELAYED RELEASE ORAL at 10:12

## 2021-01-01 RX ADMIN — IPRATROPIUM BROMIDE AND ALBUTEROL SULFATE 3 ML: .5; 3 SOLUTION RESPIRATORY (INHALATION) at 03:12

## 2021-01-01 RX ADMIN — PREDNISONE 40 MG: 20 TABLET ORAL at 09:12

## 2021-01-01 RX ADMIN — FLUTICASONE FUROATE AND VILANTEROL TRIFENATATE 1 PUFF: 100; 25 POWDER RESPIRATORY (INHALATION) at 08:12

## 2021-01-01 RX ADMIN — PROMETHAZINE HYDROCHLORIDE AND CODEINE PHOSPHATE 5 ML: 10; 6.25 SOLUTION ORAL at 09:12

## 2021-01-01 RX ADMIN — POTASSIUM BICARBONATE 50 MEQ: 978 TABLET, EFFERVESCENT ORAL at 09:12

## 2021-01-01 RX ADMIN — GABAPENTIN 400 MG: 400 CAPSULE ORAL at 04:12

## 2021-01-01 RX ADMIN — IPRATROPIUM BROMIDE AND ALBUTEROL SULFATE 3 ML: 2.5; .5 SOLUTION RESPIRATORY (INHALATION) at 08:12

## 2021-01-01 RX ADMIN — SENNOSIDES AND DOCUSATE SODIUM 1 TABLET: 50; 8.6 TABLET ORAL at 01:12

## 2021-01-01 RX ADMIN — LOSARTAN POTASSIUM AND HYDROCHLOROTHIAZIDE 1 TABLET: 12.5; 1 TABLET ORAL at 09:12

## 2021-01-01 RX ADMIN — SODIUM CHLORIDE 200 MG: 9 INJECTION, SOLUTION INTRAVENOUS at 08:04

## 2021-01-01 RX ADMIN — LEVOFLOXACIN 750 MG: 750 TABLET, FILM COATED ORAL at 08:12

## 2021-01-01 RX ADMIN — ACETAMINOPHEN 650 MG: 325 TABLET ORAL at 12:12

## 2021-01-01 RX ADMIN — SODIUM CHLORIDE 400 MG: 9 INJECTION, SOLUTION INTRAVENOUS at 04:12

## 2021-01-01 RX ADMIN — MULTIPLE VITAMINS W/ MINERALS TAB 1 TABLET: TAB at 09:12

## 2021-01-01 RX ADMIN — HEPARIN SODIUM 5000 UNITS: 5000 INJECTION INTRAVENOUS; SUBCUTANEOUS at 05:12

## 2021-01-01 RX ADMIN — MULTIPLE VITAMINS W/ MINERALS TAB 1 TABLET: TAB at 01:12

## 2021-01-01 RX ADMIN — Medication 10 ML: at 10:03

## 2021-01-01 RX ADMIN — INSULIN ASPART 1 UNITS: 100 INJECTION, SOLUTION INTRAVENOUS; SUBCUTANEOUS at 09:12

## 2021-01-01 RX ADMIN — BENZONATATE 100 MG: 100 CAPSULE ORAL at 04:12

## 2021-01-01 RX ADMIN — MUPIROCIN: 20 OINTMENT TOPICAL at 10:12

## 2021-01-01 RX ADMIN — HYDROXYZINE HYDROCHLORIDE 25 MG: 25 TABLET ORAL at 11:12

## 2021-01-01 RX ADMIN — SODIUM CHLORIDE: 9 INJECTION, SOLUTION INTRAVENOUS at 04:12

## 2021-01-01 RX ADMIN — SODIUM CHLORIDE: 9 INJECTION, SOLUTION INTRAVENOUS at 08:04

## 2021-01-01 RX ADMIN — IOHEXOL 100 ML: 350 INJECTION, SOLUTION INTRAVENOUS at 11:12

## 2021-01-01 RX ADMIN — GUAIFENESIN 200 MG: 100 SOLUTION ORAL at 04:12

## 2021-01-01 RX ADMIN — ACETAMINOPHEN 650 MG: 325 TABLET ORAL at 01:12

## 2021-01-01 RX ADMIN — SODIUM CHLORIDE: 9 INJECTION, SOLUTION INTRAVENOUS at 09:03

## 2021-01-01 RX ADMIN — GABAPENTIN 400 MG: 400 CAPSULE ORAL at 10:12

## 2021-01-01 RX ADMIN — IPRATROPIUM BROMIDE AND ALBUTEROL SULFATE 3 ML: 2.5; .5 SOLUTION RESPIRATORY (INHALATION) at 12:12

## 2021-01-01 RX ADMIN — TIOTROPIUM BROMIDE INHALATION SPRAY 2 PUFF: 3.12 SPRAY, METERED RESPIRATORY (INHALATION) at 08:12

## 2021-01-01 RX ADMIN — SODIUM CHLORIDE: 9 INJECTION, SOLUTION INTRAVENOUS at 09:05

## 2021-01-01 RX ADMIN — METHYLPREDNISOLONE SODIUM SUCCINATE 80 MG: 40 INJECTION, POWDER, FOR SOLUTION INTRAMUSCULAR; INTRAVENOUS at 08:12

## 2021-01-01 RX ADMIN — DICLOFENAC SODIUM 2 G: 10 GEL TOPICAL at 08:12

## 2021-01-01 RX ADMIN — TRAMADOL HYDROCHLORIDE 50 MG: 50 TABLET ORAL at 11:12

## 2021-01-01 RX ADMIN — METHYLPREDNISOLONE SODIUM SUCCINATE 125 MG: 125 INJECTION, POWDER, FOR SOLUTION INTRAMUSCULAR; INTRAVENOUS at 10:12

## 2021-01-01 RX ADMIN — BUPROPION HYDROCHLORIDE 150 MG: 150 TABLET, FILM COATED, EXTENDED RELEASE ORAL at 01:12

## 2021-01-01 RX ADMIN — IOHEXOL 100 ML: 350 INJECTION, SOLUTION INTRAVENOUS at 02:03

## 2021-01-01 RX ADMIN — SODIUM CHLORIDE: 9 INJECTION, SOLUTION INTRAVENOUS at 09:08

## 2021-01-01 RX ADMIN — MAGNESIUM SULFATE 2 G: 2 INJECTION INTRAVENOUS at 09:12

## 2021-01-01 RX ADMIN — GADOBUTROL 10 ML: 604.72 INJECTION INTRAVENOUS at 02:06

## 2021-01-01 RX ADMIN — IOHEXOL 100 ML: 350 INJECTION, SOLUTION INTRAVENOUS at 10:12

## 2021-01-01 RX ADMIN — AZITHROMYCIN MONOHYDRATE 500 MG: 500 INJECTION, POWDER, LYOPHILIZED, FOR SOLUTION INTRAVENOUS at 12:12

## 2021-01-01 RX ADMIN — TIOTROPIUM BROMIDE INHALATION SPRAY 2 PUFF: 3.12 SPRAY, METERED RESPIRATORY (INHALATION) at 03:12

## 2021-01-01 RX ADMIN — GADOBUTROL 8 ML: 604.72 INJECTION INTRAVENOUS at 03:03

## 2021-01-01 RX ADMIN — METHYLPREDNISOLONE SODIUM SUCCINATE 80 MG: 40 INJECTION, POWDER, FOR SOLUTION INTRAMUSCULAR; INTRAVENOUS at 12:12

## 2021-01-01 RX ADMIN — PANTOPRAZOLE SODIUM 40 MG: 40 TABLET, DELAYED RELEASE ORAL at 08:12

## 2021-01-01 RX ADMIN — AZITHROMYCIN MONOHYDRATE 500 MG: 250 TABLET ORAL at 09:12

## 2021-01-01 RX ADMIN — PREDNISONE 10 MG: 10 TABLET ORAL at 01:12

## 2021-01-01 RX ADMIN — IOHEXOL 100 ML: 350 INJECTION, SOLUTION INTRAVENOUS at 01:09

## 2021-01-01 RX ADMIN — IPRATROPIUM BROMIDE AND ALBUTEROL SULFATE 3 ML: .5; 3 SOLUTION RESPIRATORY (INHALATION) at 02:12

## 2021-01-01 RX ADMIN — LOSARTAN POTASSIUM AND HYDROCHLOROTHIAZIDE 1 TABLET: 12.5; 1 TABLET ORAL at 01:12

## 2021-01-01 RX ADMIN — SODIUM CHLORIDE 200 MG: 9 INJECTION, SOLUTION INTRAVENOUS at 10:06

## 2021-01-01 RX ADMIN — FLUTICASONE FUROATE AND VILANTEROL TRIFENATATE 1 PUFF: 100; 25 POWDER RESPIRATORY (INHALATION) at 09:12

## 2021-01-01 RX ADMIN — SENNOSIDES AND DOCUSATE SODIUM 1 TABLET: 50; 8.6 TABLET ORAL at 10:12

## 2021-01-01 RX ADMIN — SODIUM CHLORIDE: 9 INJECTION, SOLUTION INTRAVENOUS at 11:11

## 2021-01-01 RX ADMIN — IPRATROPIUM BROMIDE AND ALBUTEROL SULFATE 3 ML: .5; 3 SOLUTION RESPIRATORY (INHALATION) at 01:12

## 2021-01-01 RX ADMIN — GUAIFENESIN 200 MG: 100 SOLUTION ORAL at 09:12

## 2021-01-01 RX ADMIN — Medication 1 CAPSULE: at 03:12

## 2021-01-01 RX ADMIN — ENOXAPARIN SODIUM 40 MG: 100 INJECTION SUBCUTANEOUS at 06:12

## 2021-01-01 RX ADMIN — LEVOFLOXACIN 750 MG: 750 TABLET, FILM COATED ORAL at 10:12

## 2021-01-01 RX ADMIN — PROMETHAZINE HYDROCHLORIDE AND CODEINE PHOSPHATE 5 ML: 10; 6.25 SOLUTION ORAL at 11:12

## 2021-01-01 RX ADMIN — DICLOFENAC SODIUM 2 G: 10 GEL TOPICAL at 10:12

## 2021-01-01 RX ADMIN — SODIUM CHLORIDE 200 MG: 9 INJECTION, SOLUTION INTRAVENOUS at 09:08

## 2021-01-01 RX ADMIN — AZITHROMYCIN MONOHYDRATE 500 MG: 250 TABLET ORAL at 12:12

## 2021-01-01 RX ADMIN — SODIUM CHLORIDE 200 MG: 9 INJECTION, SOLUTION INTRAVENOUS at 09:03

## 2021-01-01 RX ADMIN — BUPROPION HYDROCHLORIDE 150 MG: 150 TABLET, FILM COATED, EXTENDED RELEASE ORAL at 08:12

## 2021-01-01 RX ADMIN — DIAZEPAM 5 MG: 5 TABLET ORAL at 08:12

## 2021-01-01 RX ADMIN — IPRATROPIUM BROMIDE AND ALBUTEROL SULFATE 3 ML: 2.5; .5 SOLUTION RESPIRATORY (INHALATION) at 02:12

## 2021-01-01 RX ADMIN — SODIUM CHLORIDE 200 MG: 9 INJECTION, SOLUTION INTRAVENOUS at 09:05

## 2021-01-01 RX ADMIN — IPRATROPIUM BROMIDE AND ALBUTEROL SULFATE 3 ML: 2.5; .5 SOLUTION RESPIRATORY (INHALATION) at 05:12

## 2021-01-05 ENCOUNTER — DOCUMENTATION ONLY (OUTPATIENT)
Dept: HEMATOLOGY/ONCOLOGY | Facility: CLINIC | Age: 61
End: 2021-01-05

## 2021-01-05 DIAGNOSIS — J45.909 ASTHMA, UNSPECIFIED ASTHMA SEVERITY, UNSPECIFIED WHETHER COMPLICATED, UNSPECIFIED WHETHER PERSISTENT: ICD-10-CM

## 2021-01-05 DIAGNOSIS — R91.8 LUNG MASS: ICD-10-CM

## 2021-01-05 DIAGNOSIS — G89.3 CHRONIC NEOPLASM-RELATED PAIN: ICD-10-CM

## 2021-01-05 RX ORDER — PROMETHAZINE HYDROCHLORIDE AND CODEINE PHOSPHATE 6.25; 1 MG/5ML; MG/5ML
5 SOLUTION ORAL EVERY 8 HOURS PRN
Qty: 473 ML | Refills: 1 | Status: CANCELLED | OUTPATIENT
Start: 2021-01-05

## 2021-01-05 RX ORDER — TRAMADOL HYDROCHLORIDE 50 MG/1
100 TABLET ORAL EVERY 8 HOURS PRN
Qty: 60 TABLET | Refills: 0 | Status: CANCELLED | OUTPATIENT
Start: 2021-01-05

## 2021-01-06 ENCOUNTER — OFFICE VISIT (OUTPATIENT)
Dept: HEMATOLOGY/ONCOLOGY | Facility: CLINIC | Age: 61
End: 2021-01-06
Payer: MEDICAID

## 2021-01-06 ENCOUNTER — LAB VISIT (OUTPATIENT)
Dept: LAB | Facility: HOSPITAL | Age: 61
End: 2021-01-06
Payer: MEDICAID

## 2021-01-06 VITALS
HEART RATE: 98 BPM | SYSTOLIC BLOOD PRESSURE: 137 MMHG | RESPIRATION RATE: 18 BRPM | OXYGEN SATURATION: 98 % | WEIGHT: 184.75 LBS | TEMPERATURE: 98 F | HEIGHT: 66 IN | DIASTOLIC BLOOD PRESSURE: 66 MMHG | BODY MASS INDEX: 29.69 KG/M2

## 2021-01-06 DIAGNOSIS — C34.91 ADENOCARCINOMA OF LUNG, STAGE 4, RIGHT: ICD-10-CM

## 2021-01-06 DIAGNOSIS — B18.2 CHRONIC HEPATITIS C WITHOUT HEPATIC COMA: ICD-10-CM

## 2021-01-06 DIAGNOSIS — C34.91 ADENOCARCINOMA OF LUNG, STAGE 4, RIGHT: Primary | ICD-10-CM

## 2021-01-06 DIAGNOSIS — C79.31 SECONDARY MALIGNANT NEOPLASM OF BRAIN: ICD-10-CM

## 2021-01-06 DIAGNOSIS — R91.8 LUNG MASS: ICD-10-CM

## 2021-01-06 DIAGNOSIS — D72.829 LEUKOCYTOSIS, UNSPECIFIED TYPE: ICD-10-CM

## 2021-01-06 DIAGNOSIS — J45.909 ASTHMA, UNSPECIFIED ASTHMA SEVERITY, UNSPECIFIED WHETHER COMPLICATED, UNSPECIFIED WHETHER PERSISTENT: ICD-10-CM

## 2021-01-06 DIAGNOSIS — R06.02 SHORTNESS OF BREATH: ICD-10-CM

## 2021-01-06 DIAGNOSIS — G89.3 CHRONIC NEOPLASM-RELATED PAIN: ICD-10-CM

## 2021-01-06 DIAGNOSIS — I10 ESSENTIAL HYPERTENSION: ICD-10-CM

## 2021-01-06 DIAGNOSIS — F41.9 ANXIETY: ICD-10-CM

## 2021-01-06 DIAGNOSIS — M17.0 OSTEOARTHRITIS OF BOTH KNEES, UNSPECIFIED OSTEOARTHRITIS TYPE: ICD-10-CM

## 2021-01-06 LAB
ALBUMIN SERPL BCP-MCNC: 3.4 G/DL (ref 3.5–5.2)
ALP SERPL-CCNC: 97 U/L (ref 55–135)
ALT SERPL W/O P-5'-P-CCNC: 8 U/L (ref 10–44)
ANION GAP SERPL CALC-SCNC: 10 MMOL/L (ref 8–16)
AST SERPL-CCNC: 13 U/L (ref 10–40)
BASOPHILS # BLD AUTO: 0.01 K/UL (ref 0–0.2)
BASOPHILS NFR BLD: 0.1 % (ref 0–1.9)
BILIRUB SERPL-MCNC: 0.4 MG/DL (ref 0.1–1)
BUN SERPL-MCNC: 26 MG/DL (ref 8–23)
CALCIUM SERPL-MCNC: 9.7 MG/DL (ref 8.7–10.5)
CHLORIDE SERPL-SCNC: 108 MMOL/L (ref 95–110)
CO2 SERPL-SCNC: 23 MMOL/L (ref 23–29)
CREAT SERPL-MCNC: 1.1 MG/DL (ref 0.5–1.4)
DIFFERENTIAL METHOD: ABNORMAL
EOSINOPHIL # BLD AUTO: 0 K/UL (ref 0–0.5)
EOSINOPHIL NFR BLD: 0 % (ref 0–8)
ERYTHROCYTE [DISTWIDTH] IN BLOOD BY AUTOMATED COUNT: 13.4 % (ref 11.5–14.5)
EST. GFR  (AFRICAN AMERICAN): >60 ML/MIN/1.73 M^2
EST. GFR  (NON AFRICAN AMERICAN): 54.3 ML/MIN/1.73 M^2
GLUCOSE SERPL-MCNC: 197 MG/DL (ref 70–110)
HCT VFR BLD AUTO: 34.6 % (ref 37–48.5)
HGB BLD-MCNC: 10.9 G/DL (ref 12–16)
IMM GRANULOCYTES # BLD AUTO: 0.1 K/UL (ref 0–0.04)
IMM GRANULOCYTES NFR BLD AUTO: 0.7 % (ref 0–0.5)
LYMPHOCYTES # BLD AUTO: 1 K/UL (ref 1–4.8)
LYMPHOCYTES NFR BLD: 7.2 % (ref 18–48)
MCH RBC QN AUTO: 28 PG (ref 27–31)
MCHC RBC AUTO-ENTMCNC: 31.5 G/DL (ref 32–36)
MCV RBC AUTO: 89 FL (ref 82–98)
MONOCYTES # BLD AUTO: 0.3 K/UL (ref 0.3–1)
MONOCYTES NFR BLD: 2.4 % (ref 4–15)
NEUTROPHILS # BLD AUTO: 12.6 K/UL (ref 1.8–7.7)
NEUTROPHILS NFR BLD: 89.6 % (ref 38–73)
NRBC BLD-RTO: 0 /100 WBC
PLATELET # BLD AUTO: 289 K/UL (ref 150–350)
PMV BLD AUTO: 9.3 FL (ref 9.2–12.9)
POTASSIUM SERPL-SCNC: 3.7 MMOL/L (ref 3.5–5.1)
PROT SERPL-MCNC: 8.2 G/DL (ref 6–8.4)
RBC # BLD AUTO: 3.89 M/UL (ref 4–5.4)
SODIUM SERPL-SCNC: 141 MMOL/L (ref 136–145)
T4 FREE SERPL-MCNC: 1.15 NG/DL (ref 0.71–1.51)
TSH SERPL DL<=0.005 MIU/L-ACNC: 0.41 UIU/ML (ref 0.4–4)
WBC # BLD AUTO: 14.05 K/UL (ref 3.9–12.7)

## 2021-01-06 PROCEDURE — 99999 PR PBB SHADOW E&M-EST. PATIENT-LVL IV: ICD-10-PCS | Mod: PBBFAC,,, | Performed by: STUDENT IN AN ORGANIZED HEALTH CARE EDUCATION/TRAINING PROGRAM

## 2021-01-06 PROCEDURE — 99999 PR PBB SHADOW E&M-EST. PATIENT-LVL IV: CPT | Mod: PBBFAC,,, | Performed by: STUDENT IN AN ORGANIZED HEALTH CARE EDUCATION/TRAINING PROGRAM

## 2021-01-06 PROCEDURE — 99215 OFFICE O/P EST HI 40 MIN: CPT | Mod: S$PBB,,, | Performed by: STUDENT IN AN ORGANIZED HEALTH CARE EDUCATION/TRAINING PROGRAM

## 2021-01-06 PROCEDURE — 99215 PR OFFICE/OUTPT VISIT, EST, LEVL V, 40-54 MIN: ICD-10-PCS | Mod: S$PBB,,, | Performed by: STUDENT IN AN ORGANIZED HEALTH CARE EDUCATION/TRAINING PROGRAM

## 2021-01-06 PROCEDURE — 84439 ASSAY OF FREE THYROXINE: CPT

## 2021-01-06 PROCEDURE — 99214 OFFICE O/P EST MOD 30 MIN: CPT | Mod: PBBFAC | Performed by: STUDENT IN AN ORGANIZED HEALTH CARE EDUCATION/TRAINING PROGRAM

## 2021-01-06 PROCEDURE — 36415 COLL VENOUS BLD VENIPUNCTURE: CPT

## 2021-01-06 PROCEDURE — 84443 ASSAY THYROID STIM HORMONE: CPT

## 2021-01-06 PROCEDURE — 80053 COMPREHEN METABOLIC PANEL: CPT

## 2021-01-06 PROCEDURE — 85025 COMPLETE CBC W/AUTO DIFF WBC: CPT

## 2021-01-06 RX ORDER — TRAMADOL HYDROCHLORIDE 50 MG/1
50 TABLET ORAL EVERY 8 HOURS PRN
Qty: 90 TABLET | Refills: 0 | Status: SHIPPED | OUTPATIENT
Start: 2021-01-06 | End: 2021-01-27 | Stop reason: SDUPTHER

## 2021-01-06 RX ORDER — PROMETHAZINE HYDROCHLORIDE AND CODEINE PHOSPHATE 6.25; 1 MG/5ML; MG/5ML
5 SOLUTION ORAL EVERY 8 HOURS PRN
Qty: 473 ML | Refills: 1 | Status: SHIPPED | OUTPATIENT
Start: 2021-01-06 | End: 2021-01-27 | Stop reason: SDUPTHER

## 2021-01-06 RX ORDER — SODIUM CHLORIDE 0.9 % (FLUSH) 0.9 %
10 SYRINGE (ML) INJECTION
Status: CANCELLED | OUTPATIENT
Start: 2021-01-07

## 2021-01-06 RX ORDER — HEPARIN 100 UNIT/ML
500 SYRINGE INTRAVENOUS
Status: CANCELLED | OUTPATIENT
Start: 2021-01-07

## 2021-01-06 RX ORDER — PROMETHAZINE HYDROCHLORIDE AND CODEINE PHOSPHATE 6.25; 1 MG/5ML; MG/5ML
5 SOLUTION ORAL EVERY 8 HOURS PRN
Qty: 473 ML | Refills: 1 | Status: CANCELLED | OUTPATIENT
Start: 2021-01-06

## 2021-01-07 ENCOUNTER — INFUSION (OUTPATIENT)
Dept: INFUSION THERAPY | Facility: HOSPITAL | Age: 61
End: 2021-01-07
Payer: MEDICAID

## 2021-01-07 VITALS
TEMPERATURE: 98 F | BODY MASS INDEX: 29.69 KG/M2 | DIASTOLIC BLOOD PRESSURE: 67 MMHG | HEART RATE: 80 BPM | SYSTOLIC BLOOD PRESSURE: 123 MMHG | WEIGHT: 184.75 LBS | HEIGHT: 66 IN | RESPIRATION RATE: 18 BRPM

## 2021-01-07 DIAGNOSIS — C34.90 PRIMARY MALIGNANT NEOPLASM OF LUNG WITH METASTASIS TO BRAIN: ICD-10-CM

## 2021-01-07 DIAGNOSIS — C79.31 PRIMARY MALIGNANT NEOPLASM OF LUNG WITH METASTASIS TO BRAIN: ICD-10-CM

## 2021-01-07 DIAGNOSIS — C34.91 ADENOCARCINOMA OF LUNG, STAGE 4, RIGHT: Primary | ICD-10-CM

## 2021-01-07 PROCEDURE — 96413 CHEMO IV INFUSION 1 HR: CPT

## 2021-01-07 PROCEDURE — 63600175 PHARM REV CODE 636 W HCPCS: Mod: JG | Performed by: STUDENT IN AN ORGANIZED HEALTH CARE EDUCATION/TRAINING PROGRAM

## 2021-01-07 PROCEDURE — 25000003 PHARM REV CODE 250: Performed by: STUDENT IN AN ORGANIZED HEALTH CARE EDUCATION/TRAINING PROGRAM

## 2021-01-07 RX ORDER — SODIUM CHLORIDE 0.9 % (FLUSH) 0.9 %
10 SYRINGE (ML) INJECTION
Status: DISCONTINUED | OUTPATIENT
Start: 2021-01-07 | End: 2021-01-07 | Stop reason: HOSPADM

## 2021-01-07 RX ORDER — HEPARIN 100 UNIT/ML
500 SYRINGE INTRAVENOUS
Status: DISCONTINUED | OUTPATIENT
Start: 2021-01-07 | End: 2021-01-07 | Stop reason: HOSPADM

## 2021-01-07 RX ADMIN — SODIUM CHLORIDE 200 MG: 9 INJECTION, SOLUTION INTRAVENOUS at 11:01

## 2021-01-07 RX ADMIN — SODIUM CHLORIDE: 9 INJECTION, SOLUTION INTRAVENOUS at 11:01

## 2021-01-27 ENCOUNTER — OFFICE VISIT (OUTPATIENT)
Dept: HEMATOLOGY/ONCOLOGY | Facility: CLINIC | Age: 61
End: 2021-01-27
Payer: MEDICAID

## 2021-01-27 ENCOUNTER — INFUSION (OUTPATIENT)
Dept: INFUSION THERAPY | Facility: HOSPITAL | Age: 61
End: 2021-01-27
Payer: MEDICAID

## 2021-01-27 VITALS
TEMPERATURE: 98 F | WEIGHT: 191.38 LBS | RESPIRATION RATE: 20 BRPM | HEIGHT: 66 IN | BODY MASS INDEX: 30.76 KG/M2 | SYSTOLIC BLOOD PRESSURE: 126 MMHG | DIASTOLIC BLOOD PRESSURE: 73 MMHG | HEART RATE: 80 BPM | OXYGEN SATURATION: 98 %

## 2021-01-27 VITALS
DIASTOLIC BLOOD PRESSURE: 73 MMHG | SYSTOLIC BLOOD PRESSURE: 126 MMHG | TEMPERATURE: 98 F | OXYGEN SATURATION: 98 % | HEIGHT: 66 IN | HEART RATE: 80 BPM | RESPIRATION RATE: 20 BRPM | WEIGHT: 191.38 LBS | BODY MASS INDEX: 30.76 KG/M2

## 2021-01-27 DIAGNOSIS — I10 ESSENTIAL HYPERTENSION: ICD-10-CM

## 2021-01-27 DIAGNOSIS — R91.8 LUNG MASS: ICD-10-CM

## 2021-01-27 DIAGNOSIS — C34.91 ADENOCARCINOMA OF LUNG, STAGE 4, RIGHT: Primary | ICD-10-CM

## 2021-01-27 DIAGNOSIS — F41.9 ANXIETY: ICD-10-CM

## 2021-01-27 DIAGNOSIS — J91.0 MALIGNANT PLEURAL EFFUSION: ICD-10-CM

## 2021-01-27 DIAGNOSIS — R64 MALIGNANT CACHEXIA: ICD-10-CM

## 2021-01-27 DIAGNOSIS — C34.90 PRIMARY MALIGNANT NEOPLASM OF LUNG WITH METASTASIS TO BRAIN: Primary | ICD-10-CM

## 2021-01-27 DIAGNOSIS — C34.90 METASTATIC NON-SMALL CELL LUNG CANCER: ICD-10-CM

## 2021-01-27 DIAGNOSIS — C79.31 PRIMARY MALIGNANT NEOPLASM OF LUNG WITH METASTASIS TO BRAIN: ICD-10-CM

## 2021-01-27 DIAGNOSIS — B18.2 CHRONIC HEPATITIS C WITHOUT HEPATIC COMA: ICD-10-CM

## 2021-01-27 DIAGNOSIS — C34.90 PRIMARY MALIGNANT NEOPLASM OF LUNG WITH METASTASIS TO BRAIN: ICD-10-CM

## 2021-01-27 DIAGNOSIS — C79.31 SECONDARY MALIGNANT NEOPLASM OF BRAIN: ICD-10-CM

## 2021-01-27 DIAGNOSIS — G89.3 CHRONIC NEOPLASM-RELATED PAIN: ICD-10-CM

## 2021-01-27 DIAGNOSIS — C79.31 PRIMARY MALIGNANT NEOPLASM OF LUNG WITH METASTASIS TO BRAIN: Primary | ICD-10-CM

## 2021-01-27 DIAGNOSIS — J45.909 ASTHMA, UNSPECIFIED ASTHMA SEVERITY, UNSPECIFIED WHETHER COMPLICATED, UNSPECIFIED WHETHER PERSISTENT: ICD-10-CM

## 2021-01-27 PROCEDURE — 99215 OFFICE O/P EST HI 40 MIN: CPT | Mod: PBBFAC,25 | Performed by: STUDENT IN AN ORGANIZED HEALTH CARE EDUCATION/TRAINING PROGRAM

## 2021-01-27 PROCEDURE — 96413 CHEMO IV INFUSION 1 HR: CPT

## 2021-01-27 PROCEDURE — 25000003 PHARM REV CODE 250: Performed by: STUDENT IN AN ORGANIZED HEALTH CARE EDUCATION/TRAINING PROGRAM

## 2021-01-27 PROCEDURE — 63600175 PHARM REV CODE 636 W HCPCS: Mod: JG | Performed by: STUDENT IN AN ORGANIZED HEALTH CARE EDUCATION/TRAINING PROGRAM

## 2021-01-27 PROCEDURE — 99215 OFFICE O/P EST HI 40 MIN: CPT | Mod: S$PBB,,, | Performed by: STUDENT IN AN ORGANIZED HEALTH CARE EDUCATION/TRAINING PROGRAM

## 2021-01-27 PROCEDURE — 99215 PR OFFICE/OUTPT VISIT, EST, LEVL V, 40-54 MIN: ICD-10-PCS | Mod: S$PBB,,, | Performed by: STUDENT IN AN ORGANIZED HEALTH CARE EDUCATION/TRAINING PROGRAM

## 2021-01-27 PROCEDURE — 99999 PR PBB SHADOW E&M-EST. PATIENT-LVL V: ICD-10-PCS | Mod: PBBFAC,,, | Performed by: STUDENT IN AN ORGANIZED HEALTH CARE EDUCATION/TRAINING PROGRAM

## 2021-01-27 PROCEDURE — 99999 PR PBB SHADOW E&M-EST. PATIENT-LVL V: CPT | Mod: PBBFAC,,, | Performed by: STUDENT IN AN ORGANIZED HEALTH CARE EDUCATION/TRAINING PROGRAM

## 2021-01-27 RX ORDER — HEPARIN 100 UNIT/ML
500 SYRINGE INTRAVENOUS
Status: CANCELLED | OUTPATIENT
Start: 2021-01-28

## 2021-01-27 RX ORDER — HYDROXYZINE HYDROCHLORIDE 25 MG/1
25 TABLET, FILM COATED ORAL DAILY PRN
Qty: 30 TABLET | Refills: 1 | Status: SHIPPED | OUTPATIENT
Start: 2021-01-27 | End: 2021-01-01

## 2021-01-27 RX ORDER — TRAMADOL HYDROCHLORIDE 50 MG/1
50 TABLET ORAL EVERY 8 HOURS PRN
Qty: 90 TABLET | Refills: 0 | Status: SHIPPED | OUTPATIENT
Start: 2021-01-27 | End: 2021-01-01 | Stop reason: SDUPTHER

## 2021-01-27 RX ORDER — PROMETHAZINE HYDROCHLORIDE AND CODEINE PHOSPHATE 6.25; 1 MG/5ML; MG/5ML
5 SOLUTION ORAL EVERY 8 HOURS PRN
Qty: 473 ML | Refills: 1 | Status: SHIPPED | OUTPATIENT
Start: 2021-01-27 | End: 2021-01-01 | Stop reason: SDUPTHER

## 2021-01-27 RX ORDER — SODIUM CHLORIDE 0.9 % (FLUSH) 0.9 %
10 SYRINGE (ML) INJECTION
Status: CANCELLED | OUTPATIENT
Start: 2021-01-28

## 2021-01-27 RX ADMIN — SODIUM CHLORIDE 200 MG: 9 INJECTION, SOLUTION INTRAVENOUS at 01:01

## 2021-01-27 RX ADMIN — SODIUM CHLORIDE: 9 INJECTION, SOLUTION INTRAVENOUS at 01:01

## 2021-01-28 ENCOUNTER — TELEPHONE (OUTPATIENT)
Dept: HEMATOLOGY/ONCOLOGY | Facility: CLINIC | Age: 61
End: 2021-01-28

## 2021-02-12 ENCOUNTER — DOCUMENTATION ONLY (OUTPATIENT)
Dept: HEMATOLOGY/ONCOLOGY | Facility: CLINIC | Age: 61
End: 2021-02-12

## 2021-02-17 ENCOUNTER — DOCUMENTATION ONLY (OUTPATIENT)
Dept: HEMATOLOGY/ONCOLOGY | Facility: CLINIC | Age: 61
End: 2021-02-17

## 2021-02-18 NOTE — TELEPHONE ENCOUNTER
"----- Message from Corinne E Coniglio, RN sent at 6/30/2020  9:08 AM CDT -----  This was forwarded to Navigation by accident Corinne  ----- Message -----  From: Kait Ariza  Sent: 6/30/2020   9:04 AM CDT  To: [facility]    Consult/Advisory:    Name Of Caller:  Contact Preference?: [phone]    Provider Name:  Dr. Peterson    What is the nature of the call?:  Patient request a callback to discuss anxiety medication    Additional Notes:  "Thank you for all that you do for our patients'"             " Consent: The rationale for the repair was explained to the patient and consent was obtained. The risks, benefits and alternatives to therapy were discussed in detail. Specifically, the risks of infection, scarring, bleeding, prolonged wound healing, incomplete removal, allergy to anesthesia, nerve injury and recurrence were addressed. Prior to the procedure, the treatment site was clearly identified and confirmed by the patient. All components of Universal Protocol/PAUSE Rule completed.

## 2021-02-24 PROBLEM — K59.1 FUNCTIONAL DIARRHEA: Status: ACTIVE | Noted: 2021-01-01

## 2021-02-24 PROBLEM — N17.9 AKI (ACUTE KIDNEY INJURY): Status: ACTIVE | Noted: 2021-01-01

## 2021-06-23 PROBLEM — J40 BRONCHITIS: Status: ACTIVE | Noted: 2021-01-01

## 2021-10-22 NOTE — PROGRESS NOTES
History & Physical  [facility]        SUBJECTIVE:     Chief Complaint:   Malignant pleural effusion    History of Present Illness:  Janeth Boyce is a 59 y.o. female who presents for follow up of malignant pleural effusion associated with Stage IV adenocarcinoma of the right lung. We placed a pleurx catheter on 20. The catheter is no longer draining fluid. On the last five attempts at drainage, it has yielded no fluid. CT & CXR from this month show satisfactory positioning of the pleurx catheter, but there is trace if any fluid remaining in the pleural space.    The pt also notes that she was experiencing worsening cough symptoms. She was started on a course of steroids by her oncology team, & this seemed to help her cough.    Review of patient's allergies indicates:  No Known Allergies    Past Medical History:   Diagnosis Date    Anxiety     Asthma     Hepatitis C 2019    treated    Hx of psychiatric care     Hypertension     Lung cancer     Psychiatric exam requested by authority     Psychiatric problem      Past Surgical History:   Procedure Laterality Date     SECTION      KNEE SURGERY       History reviewed. No pertinent family history.  Social History     Socioeconomic History    Marital status: Single     Spouse name: Not on file    Number of children: 3    Years of education: Not on file    Highest education level: Not on file   Occupational History    Not on file   Social Needs    Financial resource strain: Not on file    Food insecurity     Worry: Not on file     Inability: Not on file    Transportation needs     Medical: Not on file     Non-medical: Not on file   Tobacco Use    Smoking status: Former Smoker     Packs/day: 0.50     Years: 40.00     Pack years: 20.00     Types: Cigarettes     Quit date: 2020     Years since quittin.3    Smokeless tobacco: Never Used   Substance and Sexual Activity    Alcohol use: Yes     Comment: occasional    Drug  "use: No    Sexual activity: Not on file   Lifestyle    Physical activity     Days per week: Not on file     Minutes per session: Not on file    Stress: Not on file   Relationships    Social connections     Talks on phone: Not on file     Gets together: Not on file     Attends Mandaen service: Not on file     Active member of club or organization: Not on file     Attends meetings of clubs or organizations: Not on file     Relationship status: Not on file   Other Topics Concern    Patient feels they ought to cut down on drinking/drug use Not Asked    Patient annoyed by others criticizing their drinking/drug use Not Asked    Patient has felt bad or guilty about drinking/drug use Not Asked    Patient has had a drink/used drugs as an eye opener in the AM Not Asked   Social History Narrative    Not on file     Review of Systems:  CV: no syncope  ENT: no sore throat  Resp: +cough but improved  Eyes: no eye pain  Gastrointestinal: no nausea or vomiting  Integument/Breast: no rash  Musculoskeletal: +msk pains  Neurological: no headaches  Behavioral/Psych: no confusion or depression  Heme: no bleeding      OBJECTIVE:     Vital Signs  Vitals:    08/26/20 1052   BP: 130/76   Pulse: 81   SpO2: (!) 93%   Weight: 77.6 kg (171 lb)   Height: 5' 5" (1.651 m)     Body mass index is 28.46 kg/m².    Physical Exam:  General: no distress  Eyes:  conjunctivae/corneas clear  Nose: no discharge  Neck: trachea midline with no masses appreciated  Lungs:  normal respiratory effort, no wheezes, no rales. Diminished in the right lung fields.  Heart: regular rate and rhythm and no murmur  Abdomen: non-distended  Extremities: no cyanosis, no edema, no clubbing  Skin: No rashes or lesions. good skin turgor. pleurx catheter secure & in place. Wound appears clean & dry without erythema.  Neurologic: alert, oriented, thought content appropriate    Laboratory:  Lab Results   Component Value Date    WBC 8.84 08/25/2020    HGB 9.0 (L) " 08/25/2020    HCT 30.7 (L) 08/25/2020    MCV 87 08/25/2020     08/25/2020       Chest Imaging, My Impression:   Chest ct 8/2020: there is dense consolidation vs atelectasis of the right upper & middle lobe, this is worse in comparison to her prior CT scan 5/2020.    Diagnostic Results:  CT: Reviewed  PET Scan: Reviewed    ASSESSMENT/PLAN:     Problem Noted   Malignant Pleural Effusion            Secondary Malignant Neoplasm of Brain 5/18/2020           Adenocarcinoma of Lung, Stage 4, Right 5/11/2020           Cough 5/2/2020             Given positive response of cough to prednisone & history of radiation to the lung, I recommend a prolonged course of prednisone. However, I am concerned that the worsening consolidation seen on CT could reflect progression of cancer. Will disccuss with pt's oncologist.    Pt requested to have pleurx removed because there is no fluid to be drained on the last five occasions. Scheduled for next Thursday morning.      Dash Garcia MD  [facility]       Dose Setting #2 (Mj/Cm2): 450

## 2021-12-14 PROBLEM — J44.1 COPD EXACERBATION: Status: ACTIVE | Noted: 2021-01-01

## 2021-12-14 PROBLEM — J18.9 COMMUNITY ACQUIRED PNEUMONIA OF LEFT LOWER LOBE OF LUNG: Status: ACTIVE | Noted: 2021-01-01

## 2021-12-22 PROBLEM — D64.9 NORMOCYTIC ANEMIA: Status: ACTIVE | Noted: 2021-01-01

## 2021-12-27 NOTE — ASSESSMENT & PLAN NOTE
Ms. Janeth Boyce is a 61 year old female with stage IV lung adenocarcinoma with single metastasis to the brain s/p radiation. She is doing well on pembrolizumab q6 weeks, cycle 23 given on 12/21. She was admitted to hospital medicine with COPD exacerbation.     On admission, she had CT Chest with concern for possible tumor thrombus in the truncus anterior branch. Pulmonology was consulted and did not feel she had thrombus due to her improvement with nebulizers, lack of evidence of pulmonary hypertension, lack of tachycardia, and normal troponin and BNP.     Recommendations:  - agree with pulmonology consultation, they will follow up with full note today  - reviewed CT Chest, she does have some ground glass opacities but these are primarily in the lower lobes and is not entirely consistent with immunotherapy related pneumonitis (especially as she has already improved without steroids)  - recommend COPD management per IM and pulmonology (steroids, antibiotics, etc)  - agree with US to r/o DVT and echocardiogram  - will notify Dr. Peterson of her admission

## 2021-12-27 NOTE — ED NOTES
Patient reports shortness of breath for the past several days. States that she is usually on 2L NC at home. Patient has history of COPD and Lung Cancer. Patient room air sat was 78%.

## 2021-12-27 NOTE — PROGRESS NOTES
12/27/21 0700   Vital Signs   Pulse 83   Heart Rate Source Monitor   Resp (!) 36   SpO2 96 %   Pulse Oximetry Type Intermittent   Flow (L/min) 15   O2 Device (Oxygen Therapy) High Flow nasal Cannula   /70   MAP (mmHg) 92   BP Location Right arm   BP Method Automatic   Patient Position Sitting        Cardiac/Telemetry Details / Alarms   Cardiac/Telemetry Monitor On Yes   Cardiac/Telemetry Audible Yes   Cardiac/Telemetry Alarms Set Yes   Assessments (Pre/Post)   Level of Consciousness (AVPU) alert   Patient admitted to CSU. Patient arrived to floor from ED no evidence of distress; patient AAO x4 at this time. Patient placed on tele. Vital signs obtained. Patient voices no complaints at this time. Plan of care initiated with patient. Bed in lowest position, locked, SR up x2, call bell in reach. Will continue to monitor patient throughout my shift.

## 2021-12-27 NOTE — ASSESSMENT & PLAN NOTE
Follows with Dr. Peterson. S/p Carboplatin AUC4 x1 dose 5/27/20 and palliative radiation (5/27/2020 - 6/2/2020; 20 Gy to right lung). Also received SRS to right cerebellar metastasis 6/3/2020. Pembrolizumab started 6/4/2020, C23 on 12/21/2021.  - Hem Onc consulted by ED  - continue gabapentin  - continue tramadol prn

## 2021-12-27 NOTE — CONSULTS
[facility]  Hematology/Oncology  Consult Note    Patient Name: Janeth Boyce  MRN: [id number]  Admission Date: 12/26/2021  Hospital Length of Stay: 0 days  Code Status: Full Code   Attending Provider: Fernando Topete MD  Consulting Provider: Tania Landry DO  Primary Care Physician: Freeman Caballero MD  Principal Problem:COPD exacerbation    Inpatient consult to Hematology/Oncology  Consult performed by: Tania Landry DO  Consult ordered by: Solomon Wills MD        Subjective:     HPI:  Ms. Janeth Boyce is a 61 year old female with lung adenocarcinoma on pembrolizumab who was admitted to medicine for COPD exacerbation. Oncology was consulted for recommendations regarding her cancer.      Ms. Boyce has been doing well from an oncologic perspective and was completed cycle 23 of pembrolizumab on 12/21/21. She was recently admitted to the hospital for COPD exacerbation from 12/14/2021-12/16/2021. She was treated empirically for pneumonia as well at that time. She reports that she felt like she was not yet back to her baseline at the time of discharge and has continued to have some dyspnea and wheezing at home. She was hypoxic on presentation to the ED but has improved since admission with nebulizers and antibiotics. She is back to her home oxygen requirement.     Oncologic History  She has been smoking since 15 years old and estimates about 0.5ppd.  She has always had some level of dyspnea with exertion at baseline, but noticed it was worse in February 2020 with an associated cough with white and clear sputum.  She says she was evaluated at [facility] who told her the scans were suspicious for cancer.  She then headed to [facility] but because of COVID-19 her appointment was cancelled.  She initially presented to [facility] without any new symptoms since February and the cough is persistent.  Denied any unilateral weakness, dizziness, headaches, or gait instability.  Was able to  "perform all ADLs and could walk 1 block before getting tired and needing to rest.  On 5/1/2020, she was "admitted to observation with SOB and cough x 2 months. CT chest Large RUL mass with scattered nodules and mediastinal lymphadenopathy; small-moderate right pleural effusion....S/p right lung biopsy 5/4/2020. Home O2 arranged to use with activity. Admitted to Medical Oncology 5/25/2020-5/29/2020 for worsening shortness of breath.  CTPE negative for PE. Pulmonology attempted thoracentesis twice. Second attempt resulted in 400cc being drained, without relief.  She then received x1 dose of carboplatin and palliative RT to the right lung. Patient reported improvement in symptoms after XRT and was discharged.       Single agent pembrolizumab was started for PDL1>50%.  She received her first dose of pembrolizumab 6/4/2020.  She since had an admit for right PleurX.  -Scans 8/3 decreased lesions per RECIST   -8/26: pleurX removed  -Scans prior to cycle 13 scans prior show stable disease  -7/14: here for cycle 18 single agent Pembro      Oncology Treatment Plan:   OP PEMBROLIZUMAB 200MG Q3W (C22 start q6w dose)    Medications:  Continuous Infusions:  Scheduled Meds:   albuterol-ipratropium  3 mL Nebulization Q4H WAKE    buPROPion  150 mg Oral Daily    enoxaparin  40 mg Subcutaneous Daily    fluticasone furoate-vilanteroL  1 puff Inhalation Daily    furosemide (LASIX) injection  40 mg Intravenous Once    gabapentin  400 mg Oral TID    pantoprazole  40 mg Oral Daily    predniSONE  40 mg Oral Daily     PRN Meds:acetaminophen, dextrose 50%, dextrose 50%, glucagon (human recombinant), glucose, glucose, hydrOXYzine HCL, insulin aspart U-100, naloxone, ondansetron, promethazine-codeine 6.25-10 mg/5 ml, sodium chloride 0.9%, sodium chloride 0.9%, traMADoL     Review of patient's allergies indicates:  No Known Allergies     Past Medical History:   Diagnosis Date    Anxiety     Asthma     COPD exacerbation 12/14/2021    " Hepatitis C 2019    treated    Hx of psychiatric care     Hypertension     Lung cancer     Psychiatric exam requested by authority     Psychiatric problem      Past Surgical History:   Procedure Laterality Date    BRONCHOSCOPY N/A 9/3/2020    Procedure: Bronchoscopy;  Surgeon: Renee Diagnostic Provider;  Location: [facility] OR [address] [place] [address];  Service: Anesthesiology;  Laterality: N/A;     SECTION      KNEE SURGERY       Family History     Problem Relation (Age of Onset)    Cancer Father        Tobacco Use    Smoking status: Former Smoker     Packs/day: 0.50     Years: 40.00     Pack years: 20.00     Types: Cigarettes     Quit date: 2020     Years since quittin.7    Smokeless tobacco: Never Used   Substance and Sexual Activity    Alcohol use: Yes     Comment: occasional    Drug use: No    Sexual activity: Not on file       Review of Systems   Constitutional: Negative for chills and fever.   HENT: Negative for rhinorrhea and sore throat.    Eyes: Negative for pain and redness.   Respiratory: Positive for cough and shortness of breath.    Cardiovascular: Negative for chest pain, palpitations and leg swelling.   Gastrointestinal: Negative for abdominal pain, constipation, diarrhea, nausea and vomiting.   Endocrine: Negative for polydipsia and polyuria.   Genitourinary: Negative for dysuria and hematuria.   Musculoskeletal: Negative for back pain and neck pain.   Skin: Negative for color change and rash.   Neurological: Negative for syncope, light-headedness and headaches.   Hematological: Negative for adenopathy. Does not bruise/bleed easily.   Psychiatric/Behavioral: Negative for confusion. The patient is not nervous/anxious.      Objective:     Vital Signs (Most Recent):  Temp: 97.4 °F (36.3 °C) (21)  Pulse: 92 (21)  Resp: (!) 22 (21)  BP: 118/71 (21)  SpO2: (!) 94 % (21) Vital Signs (24h Range):  Temp:  [97.4 °F (36.3 °C)-98.8 °F (37.1 °C)] 97.4  °F (36.3 °C)  Pulse:  [79-99] 92  Resp:  [20-36] 22  SpO2:  [78 %-99 %] 94 %  BP: (118-136)/(62-87) 118/71     Weight: 86.2 kg (190 lb 0.6 oz)  Body mass index is 30.67 kg/m².  Body surface area is 2 meters squared.      Intake/Output Summary (Last 24 hours) at 12/27/2021 0904  Last data filed at 12/27/2021 0336  Gross per 24 hour   Intake 299.15 ml   Output 350 ml   Net -50.85 ml       Physical Exam  Constitutional:       General: She is not in acute distress.     Appearance: She is well-developed and well-nourished. She is not diaphoretic.   HENT:      Head: Normocephalic and atraumatic.      Mouth/Throat:      Mouth: Oropharynx is clear and moist.   Eyes:      General: No scleral icterus.     Extraocular Movements: EOM normal.      Conjunctiva/sclera: Conjunctivae normal.   Cardiovascular:      Rate and Rhythm: Normal rate and regular rhythm.      Heart sounds: Normal heart sounds. No murmur heard.  No friction rub. No gallop.    Pulmonary:      Effort: Pulmonary effort is normal. No respiratory distress.      Breath sounds: Wheezing present.   Abdominal:      General: Bowel sounds are normal. There is no distension.      Palpations: Abdomen is soft.      Tenderness: There is no abdominal tenderness. There is no guarding.   Musculoskeletal:         General: No tenderness or edema. Normal range of motion.      Cervical back: Normal range of motion and neck supple.   Skin:     General: Skin is warm and dry.      Findings: No rash.   Neurological:      Mental Status: She is alert and oriented to person, place, and time.   Psychiatric:         Mood and Affect: Mood and affect normal.         Behavior: Behavior normal.         Significant Labs:   CBC:   Recent Labs   Lab 12/26/21 2136 12/26/21 2141   WBC  --  16.65*   HGB  --  10.0*   HCT 32* 33.1*   PLT  --  372    and CMP:   Recent Labs   Lab 12/26/21 2141      K 4.5      CO2 24   *   BUN 20   CREATININE 1.2   CALCIUM 9.4   PROT 7.4   ALBUMIN  2.7*   BILITOT 0.2   ALKPHOS 90   AST 19   ALT 13   ANIONGAP 9   EGFRNONAA 48.6*       Diagnostic Results:  I have reviewed all pertinent imaging results/findings within the past 24 hours.    Assessment/Plan:     Adenocarcinoma of lung, stage 4, right  Ms. Janeth Boyce is a 61 year old female with stage IV lung adenocarcinoma with single metastasis to the brain s/p radiation. She is doing well on pembrolizumab q6 weeks, cycle 23 given on 12/21. She was admitted to hospital medicine with COPD exacerbation.     On admission, she had CT Chest with concern for possible tumor thrombus in the truncus anterior branch. Pulmonology was consulted and did not feel she had thrombus due to her improvement with nebulizers, lack of evidence of pulmonary hypertension, lack of tachycardia, and normal troponin and BNP.     Recommendations:  - agree with pulmonology consultation, they will follow up with full note today  - reviewed CT Chest, she does have some ground glass opacities but these are primarily in the lower lobes and is not entirely consistent with immunotherapy related pneumonitis (especially as she has already improved without steroids)  - recommend COPD management per IM and pulmonology (steroids, antibiotics, etc)  - agree with US to r/o DVT and echocardiogram  - will notify Dr. Peterson of her admission           Thank you for your consult. I will sign off. Please contact us if you have any additional questions.    Tania Landry, DO  Hematology/Oncology  [facility]

## 2021-12-27 NOTE — CONSULTS
[facility]  Pulmonology  Consult Note    Patient Name: Janeth Boyce  MRN: [id number]  Admission Date: 12/26/2021  Hospital Length of Stay: 0 days  Code Status: Full Code  Attending Physician: Fernando Topete MD  Primary Care Provider: Freeman Caballero MD   Principal Problem: COPD exacerbation    Inpatient consult to Pulmonology  Consult performed by: Molly Rouse MD  Consult ordered by: Solomon Wills MD        Subjective:     HPI:  61 year old F with stage IV lung adenocarcinoma with malignant pleural effusion (achieved pleurodesis with PleurX, subsequently removed) and brain mets s/p radiation with resolution of metastatic disease (on palliative pembrolizumab) and COPD (with moderately severe obstruction, superimposed restriction and severely reduced DLCO) with chronic hypoxemic respiratory failure on 2L home O2. Patient was recently admitted from 12/14-12/16 for a COPD exacerbation and CAP and was discharged with 2 days, to complete a total of 5 days, of prednisone and antibiotics. Patient reports that she began to feel worse about 2 days after her discharge and has continued to have shortness of breath, worsened with exertion and has had a cough with clear sputum. She denies fevers or chills or sick contacts.     Patient had a CTA of her lung performed for concerns of PE which revealed similar findings to prior. Patient has RUL adenocarcinoma, which distorts her anatomy. Critical care was consulted in the ED to make a determination regarding anticoagulation. Pulmnology was consulted subsequent to that to assist in this same matter.       Past Medical History:   Diagnosis Date    Anxiety     Asthma     COPD exacerbation 12/14/2021    Hepatitis C 2019    treated    Hx of psychiatric care     Hypertension     Lung cancer     Psychiatric exam requested by authority     Psychiatric problem        Past Surgical History:   Procedure Laterality Date    BRONCHOSCOPY N/A  9/3/2020    Procedure: Bronchoscopy;  Surgeon: Renee Diagnostic Provider;  Location: [facility] OR [address] [place] [address];  Service: Anesthesiology;  Laterality: N/A;     SECTION      KNEE SURGERY         Review of patient's allergies indicates:  No Known Allergies    Family History     Problem Relation (Age of Onset)    Cancer Father        Tobacco Use    Smoking status: Former Smoker     Packs/day: 0.50     Years: 40.00     Pack years: 20.00     Types: Cigarettes     Quit date: 2020     Years since quittin.7    Smokeless tobacco: Never Used   Substance and Sexual Activity    Alcohol use: Yes     Comment: occasional    Drug use: No    Sexual activity: Not on file         Review of Systems   Constitutional: Positive for activity change and fatigue. Negative for appetite change, chills, diaphoresis, fever and unexpected weight change.   Respiratory: Positive for cough, chest tightness, shortness of breath and wheezing.    Cardiovascular: Negative for chest pain, palpitations and leg swelling.   Gastrointestinal: Negative for abdominal distention, nausea and vomiting.   Genitourinary: Negative.    Musculoskeletal: Negative.    Psychiatric/Behavioral: Negative.      Objective:     Vital Signs (Most Recent):  Temp: 97.4 °F (36.3 °C) (21)  Pulse: 70 (21)  Resp: 18 (21)  BP: 118/71 (21)  SpO2: 96 % (21) Vital Signs (24h Range):  Temp:  [97.4 °F (36.3 °C)-98.8 °F (37.1 °C)] 97.4 °F (36.3 °C)  Pulse:  [70-99] 70  Resp:  [18-36] 18  SpO2:  [78 %-99 %] 96 %  BP: (118-136)/(62-87) 118/71     Weight: 86.2 kg (190 lb 0.6 oz)  Body mass index is 30.67 kg/m².      Intake/Output Summary (Last 24 hours) at 2021 0940  Last data filed at 2021 0336  Gross per 24 hour   Intake 299.15 ml   Output 350 ml   Net -50.85 ml       Physical Exam  Vitals and nursing note reviewed.   Constitutional:       Appearance: She is well-developed and well-nourished. She is obese. She is  not ill-appearing, toxic-appearing or diaphoretic.   HENT:      Head: Normocephalic and atraumatic.   Eyes:      Conjunctiva/sclera: Conjunctivae normal.      Pupils: Pupils are equal, round, and reactive to light.   Cardiovascular:      Rate and Rhythm: Normal rate and regular rhythm.      Heart sounds: Normal heart sounds.   Pulmonary:      Effort: Pulmonary effort is normal. No respiratory distress.      Breath sounds: Wheezing present. No rales.      Comments: 5L NC  Abdominal:      General: Bowel sounds are normal.      Palpations: Abdomen is soft.      Tenderness: There is no abdominal tenderness.   Musculoskeletal:      Right lower leg: No edema.      Left lower leg: No edema.   Skin:     Capillary Refill: Capillary refill takes 2 to 3 seconds.   Neurological:      Mental Status: She is alert and oriented to person, place, and time.   Psychiatric:         Mood and Affect: Mood and affect normal.         Behavior: Behavior normal.         Thought Content: Thought content normal.         Judgment: Judgment normal.         Vents:  Oxygen Concentration (%): 50 (12/26/21 2154)    Lines/Drains/Airways     Drain            Female External Urinary Catheter 12/26/21 2147 <1 day          Peripheral Intravenous Line                 Peripheral IV - Single Lumen 12/26/21 2135 18 G Right Wrist <1 day                Significant Labs:    CBC/Anemia Profile:  Recent Labs   Lab 12/26/21 2136 12/26/21 2141   WBC  --  16.65*   HGB  --  10.0*   HCT 32* 33.1*   PLT  --  372   MCV  --  84   RDW  --  14.9*        Chemistries:  Recent Labs   Lab 12/26/21 2141      K 4.5      CO2 24   BUN 20   CREATININE 1.2   CALCIUM 9.4   ALBUMIN 2.7*   PROT 7.4   BILITOT 0.2   ALKPHOS 90   ALT 13   AST 19       All pertinent labs within the past 24 hours have been reviewed.    Significant Imaging:   I have reviewed all pertinent imaging results/findings within the past 24 hours.    Assessment/Plan:     * COPD exacerbation  Patient's  acute on chronic hypoxemic respiratory failure after short admission with 5 day course of prednisone and antibiotics. Patient began to feel worse after completing her course of both. Continues to have wheezing and cough of clear sputum.      CT Chest largely unchanged from prior, but now having had LLL ground glass without focal lobular opacification and prior RUL mass which distorts vascular anatomy largely unchanged.     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     Feel her presentation is COPD exacerbation in addition to PDL1 pneumonitis (as it appears her ground glass infiltrates on CT resemble a pattern of a radiation beam and PDL1i can cause pneumonitis in that same distribution)    Recommendations  - LAMA/LABA/ICS while inpatient  - schedule duonebs q6h for 1 day and then PRN for wheezing   - discharge with albuterol/duonebs PRN   - start 50mg methlyprednisolone daily for COPD+PDL1i pneumonitis   - will discuss with oncology, as patient likely needs pembrolizumab held  - start levaquin for 7 days given severe COPD exacerbation  - obtain resp infection panel, procal, and   - in regards to anticoagulation, do not feel patient having VTE at this time    - agree with LE , if DVT then reasonable to anticoagulate             Thank you for your consult. I will follow-up with patient. Please contact us if you have any additional questions.     Molly Rouse MD  Pulmonology  [facility]

## 2021-12-27 NOTE — PHARMACY MED REC
"Admission Medication History     The home medication history was taken by Juliane Arriaza.    You may go to "Admission" then "Reconcile Home Medications" tabs to review and/or act upon these items.      The home medication list has been updated by the Pharmacy department.    Please read ALL comments highlighted in yellow.    Please address this information as you see fit.     Feel free to contact us if you have any questions or require assistance.    MED REC RECENTLY COMPLETED ON 12/16/21 BY LUCIO AYALA.    Medications listed below were obtained from: Analytic software- [employer] and Medical records  PTA Medications   Medication Sig    albuterol (PROVENTIL/VENTOLIN HFA) 90 mcg/actuation inhaler Inhale 2 puffs into the lungs every 6 (six) hours as needed for Shortness of Breath.    albuterol-ipratropium (DUO-NEB) 2.5 mg-0.5 mg/3 mL nebulizer solution Take 3 mLs by nebulization every 6 (six) hours as needed for Wheezing. Rescue    amLODIPine (NORVASC) 5 MG tablet Take 5 mg by mouth once daily.    budesonide-formoterol 160-4.5 mcg (SYMBICORT) 160-4.5 mcg/actuation HFAA Inhale 2 puffs into the lungs every 12 (twelve) hours. Controller    buPROPion (WELLBUTRIN SR) 150 MG TBSR 12 hr tablet Take 1 tablet (150 mg total) by mouth once daily.    diclofenac sodium (SOLARAZE) 3 % gel Apply topically 2 (two) times a day.    gabapentin (NEURONTIN) 400 MG capsule Take one capsule by mouth three times daily    hydrOXYzine HCL (ATARAX) 25 MG tablet TAKE ONE TABLET BY MOUTH EVERY DAY AS NEEDED FOR ANXIETY    irbesartan-hydrochlorothiazide (AVALIDE) 300-12.5 mg per tablet Take one tablet by mouth every day    omeprazole (PRILOSEC) 20 MG capsule Take one capsule by mouth everyday.    tiotropium (SPIRIVA) 18 mcg inhalation capsule Inhale 1 capsule (18 mcg total) into the lungs once daily. Controller    traMADoL (ULTRAM) 50 mg tablet Take 1 tablet (50 mg total) by mouth every 8 (eight) hours as needed for Pain. Fill 12/21/21 "    [] benzonatate (TESSALON) 100 MG capsule Take 1 capsule (100 mg total) by mouth 3 (three) times daily as needed for Cough.    cetirizine (ZYRTEC) 10 MG tablet Take 1 tablet (10 mg total) by mouth once daily.    diazePAM (VALIUM) 5 MG tablet Take 1 tablet (5 mg total) by mouth nightly as needed for Insomnia (do not take more than 3 times per week.). Fill 10/13/21    lidocaine (XYLOCAINE) 5 % Oint ointment Apply topically as needed. Apply 1 inch to biopsy site and cover with saran wrap.  Apply twice per day as needed for pain.    multivitamin with minerals tablet Take 1 tablet by mouth once daily.    ondansetron (ZOFRAN-ODT) 8 MG TbDL Dissolve 1 tablet (8 mg total) by mouth every 8 (eight) hours as needed.    polyethylene glycol (GLYCOLAX) 17 gram/dose powder mix 1 capful (17 g) and take by mouth once daily.    promethazine-codeine 6.25-10 mg/5 ml (PHENERGAN WITH CODEINE) 6.25-10 mg/5 mL syrup Take 5 mLs by mouth every 8 (eight) hours as needed for Cough. Fill 21    senna-docusate 8.6-50 mg (PERICOLACE) 8.6-50 mg per tablet Take 1 tablet by mouth 2 (two) times daily.         Juliane Arriaza  EXT [id number]                  .

## 2021-12-27 NOTE — ED NOTES
I-STAT Chem-8+ Results:   Value Reference Range   Sodium 139 136-145 mmol/L   Potassium  4.6 3.5-5.1 mmol/L   Chloride 103  mmol/L   Ionized Calcium 1.24 1.06-1.42 mmol/L   CO2 (measured) 26 23-29 mmol/L   Glucose 245  mg/dL   BUN 25 6-30 mg/dL   Creatinine 1.1 0.5-1.4 mg/dL   Hematocrit 32 36-54%

## 2021-12-27 NOTE — SUBJECTIVE & OBJECTIVE
Past Medical History:   Diagnosis Date    Anxiety     Asthma     COPD exacerbation 2021    Hepatitis C 2019    treated    Hx of psychiatric care     Hypertension     Lung cancer     Psychiatric exam requested by authority     Psychiatric problem        Past Surgical History:   Procedure Laterality Date    BRONCHOSCOPY N/A 9/3/2020    Procedure: Bronchoscopy;  Surgeon: Renee Diagnostic Provider;  Location: [facility] OR [address] [place] [address];  Service: Anesthesiology;  Laterality: N/A;     SECTION      KNEE SURGERY         Review of patient's allergies indicates:  No Known Allergies    No current facility-administered medications on file prior to encounter.     Current Outpatient Medications on File Prior to Encounter   Medication Sig    albuterol (PROVENTIL/VENTOLIN HFA) 90 mcg/actuation inhaler Inhale 2 puffs into the lungs every 6 (six) hours as needed for Shortness of Breath.    albuterol-ipratropium (DUO-NEB) 2.5 mg-0.5 mg/3 mL nebulizer solution Take 3 mLs by nebulization every 6 (six) hours as needed for Wheezing. Rescue    amLODIPine (NORVASC) 5 MG tablet Take 5 mg by mouth once daily.    [] benzonatate (TESSALON) 100 MG capsule Take 1 capsule (100 mg total) by mouth 3 (three) times daily as needed for Cough.    budesonide-formoterol 160-4.5 mcg (SYMBICORT) 160-4.5 mcg/actuation HFAA Inhale 2 puffs into the lungs every 12 (twelve) hours. Controller    buPROPion (WELLBUTRIN SR) 150 MG TBSR 12 hr tablet Take 1 tablet (150 mg total) by mouth once daily.    cetirizine (ZYRTEC) 10 MG tablet Take 1 tablet (10 mg total) by mouth once daily.    diazePAM (VALIUM) 5 MG tablet Take 1 tablet (5 mg total) by mouth nightly as needed for Insomnia (do not take more than 3 times per week.). Fill 10/13/21    diclofenac sodium (VOLTAREN) 1 % Gel Apply two grams topically four times a day    gabapentin (NEURONTIN) 400 MG capsule Take one capsule by mouth three times daily    hydrOXYzine HCL (ATARAX) 25 MG  tablet TAKE ONE TABLET BY MOUTH EVERY DAY AS NEEDED FOR ANXIETY    irbesartan-hydrochlorothiazide (AVALIDE) 300-12.5 mg per tablet Take one tablet by mouth every day    levoFLOXacin (LEVAQUIN) 750 MG tablet Take 1 tablet (750 mg total) by mouth once daily.    lidocaine (XYLOCAINE) 5 % Oint ointment Apply topically as needed. Apply 1 inch to biopsy site and cover with saran wrap.  Apply twice per day as needed for pain.    multivitamin with minerals tablet Take 1 tablet by mouth once daily.    omeprazole (PRILOSEC) 20 MG capsule Take one capsule by mouth everyday.    ondansetron (ZOFRAN-ODT) 8 MG TbDL Dissolve 1 tablet (8 mg total) by mouth every 8 (eight) hours as needed.    polyethylene glycol (GLYCOLAX) 17 gram/dose powder mix 1 capful (17 g) and take by mouth once daily.    promethazine-codeine 6.25-10 mg/5 ml (PHENERGAN WITH CODEINE) 6.25-10 mg/5 mL syrup Take 5 mLs by mouth every 8 (eight) hours as needed for Cough. Fill 21    senna-docusate 8.6-50 mg (PERICOLACE) 8.6-50 mg per tablet Take 1 tablet by mouth 2 (two) times daily.    tiotropium (SPIRIVA) 18 mcg inhalation capsule Inhale 1 capsule (18 mcg total) into the lungs once daily. Controller    traMADoL (ULTRAM) 50 mg tablet Take 1 tablet (50 mg total) by mouth every 8 (eight) hours as needed for Pain. Fill 21     Family History     Problem Relation (Age of Onset)    Cancer Father        Tobacco Use    Smoking status: Former Smoker     Packs/day: 0.50     Years: 40.00     Pack years: 20.00     Types: Cigarettes     Quit date: 2020     Years since quittin.7    Smokeless tobacco: Never Used   Substance and Sexual Activity    Alcohol use: Yes     Comment: occasional    Drug use: No    Sexual activity: Not on file     Review of Systems   Constitutional: Positive for activity change, chills and fatigue. Negative for fever.   HENT: Negative for sore throat and trouble swallowing.    Eyes: Negative for visual disturbance.    Respiratory: Positive for cough and shortness of breath.    Cardiovascular: Positive for chest pain. Negative for palpitations.   Gastrointestinal: Positive for diarrhea. Negative for abdominal pain, constipation, nausea and vomiting.   Genitourinary: Negative for dysuria and hematuria.   Musculoskeletal: Negative for gait problem.   Skin: Negative for color change.   Neurological: Negative for dizziness and speech difficulty.   Psychiatric/Behavioral: Negative for agitation.     Objective:     Vital Signs (Most Recent):  Temp: 98.8 °F (37.1 °C) (12/26/21 2046)  Pulse: 79 (12/27/21 0400)  Resp: 20 (12/27/21 0400)  BP: 133/87 (12/27/21 0400)  SpO2: 97 % (12/27/21 0400) Vital Signs (24h Range):  Temp:  [98.8 °F (37.1 °C)] 98.8 °F (37.1 °C)  Pulse:  [79-99] 79  Resp:  [20-33] 20  SpO2:  [78 %-99 %] 97 %  BP: (121-136)/(62-87) 133/87     Weight: 85.7 kg (189 lb)  Body mass index is 30.51 kg/m².    Physical Exam  Constitutional:       General: She is not in acute distress.  HENT:      Head: Normocephalic and atraumatic.      Nose: Nose normal.      Mouth/Throat:      Mouth: Mucous membranes are moist.   Eyes:      Extraocular Movements: Extraocular movements intact.      Conjunctiva/sclera: Conjunctivae normal.   Cardiovascular:      Rate and Rhythm: Normal rate and regular rhythm.      Heart sounds: Normal heart sounds.   Pulmonary:      Effort: Pulmonary effort is normal. No respiratory distress.      Breath sounds: Wheezing present.   Abdominal:      General: Bowel sounds are normal.      Palpations: Abdomen is soft.      Tenderness: There is no abdominal tenderness.   Musculoskeletal:         General: Normal range of motion.      Cervical back: Normal range of motion and neck supple.   Skin:     General: Skin is warm and dry.   Neurological:      Mental Status: She is alert and oriented to person, place, and time.   Psychiatric:         Mood and Affect: Mood normal.         Behavior: Behavior normal.              Significant Labs:   All pertinent labs within the past 24 hours have been reviewed.  CBC:   Recent Labs   Lab 12/26/21 2136 12/26/21 2141   WBC  --  16.65*   HGB  --  10.0*   HCT 32* 33.1*   PLT  --  372     CMP:   Recent Labs   Lab 12/26/21 2141      K 4.5      CO2 24   *   BUN 20   CREATININE 1.2   CALCIUM 9.4   PROT 7.4   ALBUMIN 2.7*   BILITOT 0.2   ALKPHOS 90   AST 19   ALT 13   ANIONGAP 9   EGFRNONAA 48.6*       Significant Imaging: I have reviewed all pertinent imaging results/findings within the past 24 hours.

## 2021-12-27 NOTE — ASSESSMENT & PLAN NOTE
Normotensive on admission. On amlodipine and irbesartan-hydrochlorothiazide.  - monitor BP  - resume home meds when appropriate

## 2021-12-27 NOTE — PLAN OF CARE
[facility] - Cardiology Stepdown  Initial Discharge Assessment       Primary Care Provider: Freeman Caballero MD    Admission Diagnosis: Shortness of breath [R06.02]  Hypoxia [R09.02]  DVT (deep venous thrombosis) [I82.409]  COPD exacerbation [J44.1]  Chest pain [R07.9]    Admission Date: 12/26/2021  Expected Discharge Date: 12/29/2021    Discharge Barriers Identified: None    Payor: MEDICAID / Plan: [place] / Product Type: Managed Medicaid /     Extended Emergency Contact Information  Primary Emergency Contact: Ayan Garcia   [facility]  Home Phone: [phone]  Relation: Sister  Secondary Emergency Contact: Austen Kell  Mobile Phone: [phone]  Relation: Daughter    Discharge Plan A: Home  Discharge Plan B: Other (TBD)      [facility] [address]  [address] [facility] [address]  Phone: [phone] Fax: [phone]    [facility]  [address]  [facility] [address]  Phone: [phone] Fax: [phone]      Initial Assessment (most recent)     Adult Discharge Assessment - 12/27/21 1240        Discharge Assessment    Assessment Type Discharge Planning Assessment     Confirmed/corrected address, phone number and insurance Yes     Confirmed Demographics Correct on Facesheet     Source of Information patient     Communicated TIFFANIE with patient/caregiver Yes     Lives With alone     Do you expect to return to your current living situation? Yes     Do you have help at home or someone to help you manage your care at home? No     Prior to hospitilization cognitive status: Alert/Oriented     Current cognitive status: Alert/Oriented     Walking or Climbing Stairs Difficulty ambulation difficulty, requires equipment     Dressing/Bathing Difficulty none     Equipment Currently Used at Home oxygen   requesting a cane    Readmission within 30 days? Yes     Do you currently have service(s) that help you manage your  care at home? No     Do you take prescription medications? Yes     Do you have prescription coverage? Yes     Do you have any problems affording any of your prescribed medications? No     Is the patient taking medications as prescribed? yes     Who is going to help you get home at discharge? family     Are you on dialysis? No     Do you take coumadin? No     Discharge Plan A Home     Discharge Plan B Other   TBD    DME Needed Upon Discharge  cane, straight   Pt requesting cane    Discharge Plan discussed with: Patient     Discharge Barriers Identified None                  Bekah Henriquez RN, CM   Ext: [phone]

## 2021-12-27 NOTE — ASSESSMENT & PLAN NOTE
Patient's acute on chronic hypoxemic respiratory failure after short admission with 5 day course of prednisone and antibiotics. Patient began to feel worse after completing her course of both. Continues to have wheezing and cough of clear sputum.      CT Chest largely unchanged from prior, but now having had LLL ground glass without focal lobular opacification and prior RUL mass which distorts vascular anatomy largely unchanged.     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     Feel her presentation is COPD exacerbation in addition to PDL1 pneumonitis (as it appears her ground glass infiltrates on CT resemble a pattern of a radiation beam and PDL1i can cause pneumonitis in that same distribution)    Recommendations  - LAMA/LABA/ICS while inpatient  - schedule duonebs q6h for 1 day and then PRN for wheezing   - discharge with albuterol/duonebs PRN   - start 50mg methlyprednisolone daily for COPD+PDL1i pneumonitis   - will discuss with oncology, as patient likely needs pembrolizumab held  - start levaquin for 7 days given severe COPD exacerbation  - obtain resp infection panel, procal, and   - in regards to anticoagulation, do not feel patient having VTE at this time    - agree with LE , if DVT then reasonable to anticoagulate

## 2021-12-27 NOTE — HPI
"Ms. Janeth Boyce is a 61 year old female with lung adenocarcinoma on pembrolizumab who was admitted to medicine for COPD exacerbation. Oncology was consulted for recommendations regarding her cancer.      Ms. Boyce has been doing well from an oncologic perspective and was completed cycle 23 of pembrolizumab on 12/21/21. She was recently admitted to the hospital for COPD exacerbation from 12/14/2021-12/16/2021. She was treated empirically for pneumonia as well at that time. She reports that she felt like she was not yet back to her baseline at the time of discharge and has continued to have some dyspnea and wheezing at home. She was hypoxic on presentation to the ED but has improved since admission with nebulizers and antibiotics. She is back to her home oxygen requirement.     Oncologic History  She has been smoking since 15 years old and estimates about 0.5ppd.  She has always had some level of dyspnea with exertion at baseline, but noticed it was worse in February 2020 with an associated cough with white and clear sputum.  She says she was evaluated at [facility] who told her the scans were suspicious for cancer.  She then headed to [facility] but because of COVID-19 her appointment was cancelled.  She initially presented to [facility] without any new symptoms since February and the cough is persistent.  Denied any unilateral weakness, dizziness, headaches, or gait instability.  Was able to perform all ADLs and could walk 1 block before getting tired and needing to rest.  On 5/1/2020, she was "admitted to observation with SOB and cough x 2 months. CT chest Large RUL mass with scattered nodules and mediastinal lymphadenopathy; small-moderate right pleural effusion....S/p right lung biopsy 5/4/2020. Home O2 arranged to use with activity. Admitted to Medical Oncology 5/25/2020-5/29/2020 for worsening shortness of breath.  CTPE negative for PE. Pulmonology attempted thoracentesis twice. Second attempt resulted in 400cc being " drained, without relief.  She then received x1 dose of carboplatin and palliative RT to the right lung. Patient reported improvement in symptoms after XRT and was discharged.       Single agent pembrolizumab was started for PDL1>50%.  She received her first dose of pembrolizumab 6/4/2020.  She since had an admit for right PleurX.  -Scans 8/3 decreased lesions per RECIST   -8/26: pleurX removed  -Scans prior to cycle 13 scans prior show stable disease  -7/14: here for cycle 18 single agent Pembro

## 2021-12-27 NOTE — PLAN OF CARE
I have reviewed the notes, assessments, and/or procedures performed by the night hospitalist team, I concur with her/his documentation of Janeth Boyce. Patient seen and examined at bedside.    61F with stage IV lung adenocarcinoma with malignant pleural effusion (achieved pleurodesis with PleurX, subsequently removed) and brain mets s/p radiation with resolution of metastatic disease (on palliative pembrolizumab) and COPD (with moderately severe obstruction, superimposed restriction and severely reduced DLCO) with chronic hypoxemic respiratory failure on 2L home O2 here for evaluation of suspected exacerbation of COPD. Will treat with aggressive pulmonary management including IV corticosteroids and levaquin per pulm recs. Will f/u with H/O and patient's oncologist, Dr. Peterson, regarding the pembroluzimab given possible concern for immunotherapy-related pneumonitis.

## 2021-12-27 NOTE — HPI
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 She was recently admitted 12/14-12/16 for pneumonia and COPD exacerbation. She states that the SOB of began two days ago and acutely worsened today. She reports an associated cough that is productive with white sputum. She states that she has CP and incontinence associated with the cough. She also reports PRITCHETT, stating that she cannot walk to the bathroom without getting significantly SOB. She reports chills but has not checked her temperature. She had one episode of diarrhea today, denies nausea/vomiting.     In the ED, her O2 sat was initially 78% and she was placed on bipap. She was able to be weaned to 6L NC satting 96%. Afebrile and /78. Labs significant for WBC 16.65, lactate 2.4. Trop and BNP normal. UA unremarkable. CXR with progression of airspace disease in the left hemithorax, stable appearance of airspace disease in the right hemithorax. CTA chest concerning for occlusion of RUL pulmonary artery branch, intraluminal infiltration or tumor thrombus vs thromboembolus. Pulmonology consulted by ED, do not think anticoagulation is warranted and agree with treatment as COPD exacerbation. Admitted to hospital medicine for further management.

## 2021-12-27 NOTE — HPI
61 year old F with stage IV lung adenocarcinoma with malignant pleural effusion (achieved pleurodesis with PleurX, subsequently removed) and brain mets s/p radiation with resolution of metastatic disease (on palliative pembrolizumab) and COPD (with moderately severe obstruction, superimposed restriction and severely reduced DLCO) with chronic hypoxemic respiratory failure on 2L home O2. Patient was recently admitted from 12/14-12/16 for a COPD exacerbation and CAP and was discharged with 2 days, to complete a total of 5 days, of prednisone and antibiotics. Patient reports that she began to feel worse about 2 days after her discharge and has continued to have shortness of breath, worsened with exertion and has had a cough with clear sputum. She denies fevers or chills or sick contacts.     Patient had a CTA of her lung performed for concerns of PE which revealed similar findings to prior. Patient has RUL adenocarcinoma, which distorts her anatomy. Critical care was consulted in the ED to make a determination regarding anticoagulation. Pulmnology was consulted subsequent to that to assist in this same matter.

## 2021-12-27 NOTE — SUBJECTIVE & OBJECTIVE
Past Medical History:   Diagnosis Date    Anxiety     Asthma     COPD exacerbation 2021    Hepatitis C 2019    treated    Hx of psychiatric care     Hypertension     Lung cancer     Psychiatric exam requested by authority     Psychiatric problem        Past Surgical History:   Procedure Laterality Date    BRONCHOSCOPY N/A 9/3/2020    Procedure: Bronchoscopy;  Surgeon: Renee Diagnostic Provider;  Location: [facility] OR [address] [place] [address];  Service: Anesthesiology;  Laterality: N/A;     SECTION      KNEE SURGERY         Review of patient's allergies indicates:  No Known Allergies    Family History     Problem Relation (Age of Onset)    Cancer Father        Tobacco Use    Smoking status: Former Smoker     Packs/day: 0.50     Years: 40.00     Pack years: 20.00     Types: Cigarettes     Quit date: 2020     Years since quittin.7    Smokeless tobacco: Never Used   Substance and Sexual Activity    Alcohol use: Yes     Comment: occasional    Drug use: No    Sexual activity: Not on file         Review of Systems   Constitutional: Positive for activity change and fatigue. Negative for appetite change, chills, diaphoresis, fever and unexpected weight change.   Respiratory: Positive for cough, chest tightness, shortness of breath and wheezing.    Cardiovascular: Negative for chest pain, palpitations and leg swelling.   Gastrointestinal: Negative for abdominal distention, nausea and vomiting.   Genitourinary: Negative.    Musculoskeletal: Negative.    Psychiatric/Behavioral: Negative.      Objective:     Vital Signs (Most Recent):  Temp: 97.4 °F (36.3 °C) (21 0901)  Pulse: 70 (21 0936)  Resp: 18 (21 09)  BP: 118/71 (21 09)  SpO2: 96 % (21 09) Vital Signs (24h Range):  Temp:  [97.4 °F (36.3 °C)-98.8 °F (37.1 °C)] 97.4 °F (36.3 °C)  Pulse:  [70-99] 70  Resp:  [18-36] 18  SpO2:  [78 %-99 %] 96 %  BP: (118-136)/(62-87) 118/71     Weight: 86.2 kg (190 lb 0.6 oz)  Body mass  index is 30.67 kg/m².      Intake/Output Summary (Last 24 hours) at 12/27/2021 0940  Last data filed at 12/27/2021 0336  Gross per 24 hour   Intake 299.15 ml   Output 350 ml   Net -50.85 ml       Physical Exam  Vitals and nursing note reviewed.   Constitutional:       Appearance: She is well-developed and well-nourished. She is obese. She is not ill-appearing, toxic-appearing or diaphoretic.   HENT:      Head: Normocephalic and atraumatic.   Eyes:      Conjunctiva/sclera: Conjunctivae normal.      Pupils: Pupils are equal, round, and reactive to light.   Cardiovascular:      Rate and Rhythm: Normal rate and regular rhythm.      Heart sounds: Normal heart sounds.   Pulmonary:      Effort: Pulmonary effort is normal. No respiratory distress.      Breath sounds: Wheezing present. No rales.      Comments: 5L NC  Abdominal:      General: Bowel sounds are normal.      Palpations: Abdomen is soft.      Tenderness: There is no abdominal tenderness.   Musculoskeletal:      Right lower leg: No edema.      Left lower leg: No edema.   Skin:     Capillary Refill: Capillary refill takes 2 to 3 seconds.   Neurological:      Mental Status: She is alert and oriented to person, place, and time.   Psychiatric:         Mood and Affect: Mood and affect normal.         Behavior: Behavior normal.         Thought Content: Thought content normal.         Judgment: Judgment normal.         Vents:  Oxygen Concentration (%): 50 (12/26/21 2154)    Lines/Drains/Airways     Drain            Female External Urinary Catheter 12/26/21 2147 <1 day          Peripheral Intravenous Line                 Peripheral IV - Single Lumen 12/26/21 2135 18 G Right Wrist <1 day                Significant Labs:    CBC/Anemia Profile:  Recent Labs   Lab 12/26/21 2136 12/26/21 2141   WBC  --  16.65*   HGB  --  10.0*   HCT 32* 33.1*   PLT  --  372   MCV  --  84   RDW  --  14.9*        Chemistries:  Recent Labs   Lab 12/26/21 2141      K 4.5      CO2 24    BUN 20   CREATININE 1.2   CALCIUM 9.4   ALBUMIN 2.7*   PROT 7.4   BILITOT 0.2   ALKPHOS 90   ALT 13   AST 19       All pertinent labs within the past 24 hours have been reviewed.    Significant Imaging:   I have reviewed all pertinent imaging results/findings within the past 24 hours.

## 2021-12-27 NOTE — H&P
[facility] - Cardiology [facility]  History & Physical    Patient Name: Janeth Boyce  MRN: [id number]  Patient Class: IP- Inpatient  Admission Date: 12/26/2021  Attending Physician: Fernando Topete MD   Primary Care Provider: Freeman Caballero MD         Patient information was obtained from patient, past medical records and ER records.     Subjective:     Principal Problem:COPD exacerbation    Chief Complaint:   Chief Complaint   Patient presents with    Shortness of Breath     Pt c/o SOB and left sided chest pain starting today. Hx of COPD and lung cancer. She was 78% Ra upon arrival.         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 She was recently admitted 12/14-12/16 for pneumonia and COPD exacerbation. She states that the SOB of began two days ago and acutely worsened today. She reports an associated cough that is productive with white sputum. She states that she has CP and incontinence associated with the cough. She also reports PRITCHETT, stating that she cannot walk to the bathroom without getting significantly SOB. She reports chills but has not checked her temperature. She had one episode of diarrhea today, denies nausea/vomiting.     In the ED, her O2 sat was initially 78% and she was placed on bipap. She was able to be weaned to 6L NC satting 96%. Afebrile and /78. Labs significant for WBC 16.65, lactate 2.4. Trop and BNP normal. UA unremarkable. CXR with progression of airspace disease in the left hemithorax, stable appearance of airspace disease in the right hemithorax. CTA chest concerning for occlusion of RUL pulmonary artery branch, intraluminal infiltration or tumor thrombus vs thromboembolus. Pulmonology consulted by ED, do not think anticoagulation is warranted and  agree with treatment as COPD exacerbation. Admitted to hospital medicine for further management.       Past Medical History:   Diagnosis Date    Anxiety     Asthma     COPD exacerbation 2021    Hepatitis C 2019    treated    Hx of psychiatric care     Hypertension     Lung cancer     Psychiatric exam requested by authority     Psychiatric problem        Past Surgical History:   Procedure Laterality Date    BRONCHOSCOPY N/A 9/3/2020    Procedure: Bronchoscopy;  Surgeon: Renee Diagnostic Provider;  Location: [facility] OR [address] [place] [address];  Service: Anesthesiology;  Laterality: N/A;     SECTION      KNEE SURGERY         Review of patient's allergies indicates:  No Known Allergies    No current facility-administered medications on file prior to encounter.     Current Outpatient Medications on File Prior to Encounter   Medication Sig    albuterol (PROVENTIL/VENTOLIN HFA) 90 mcg/actuation inhaler Inhale 2 puffs into the lungs every 6 (six) hours as needed for Shortness of Breath.    albuterol-ipratropium (DUO-NEB) 2.5 mg-0.5 mg/3 mL nebulizer solution Take 3 mLs by nebulization every 6 (six) hours as needed for Wheezing. Rescue    amLODIPine (NORVASC) 5 MG tablet Take 5 mg by mouth once daily.    [] benzonatate (TESSALON) 100 MG capsule Take 1 capsule (100 mg total) by mouth 3 (three) times daily as needed for Cough.    budesonide-formoterol 160-4.5 mcg (SYMBICORT) 160-4.5 mcg/actuation HFAA Inhale 2 puffs into the lungs every 12 (twelve) hours. Controller    buPROPion (WELLBUTRIN SR) 150 MG TBSR 12 hr tablet Take 1 tablet (150 mg total) by mouth once daily.    cetirizine (ZYRTEC) 10 MG tablet Take 1 tablet (10 mg total) by mouth once daily.    diazePAM (VALIUM) 5 MG tablet Take 1 tablet (5 mg total) by mouth nightly as needed for Insomnia (do not take more than 3 times per week.). Fill 10/13/21    diclofenac sodium (VOLTAREN) 1 % Gel Apply two grams topically four times a day    gabapentin  (NEURONTIN) 400 MG capsule Take one capsule by mouth three times daily    hydrOXYzine HCL (ATARAX) 25 MG tablet TAKE ONE TABLET BY MOUTH EVERY DAY AS NEEDED FOR ANXIETY    irbesartan-hydrochlorothiazide (AVALIDE) 300-12.5 mg per tablet Take one tablet by mouth every day    levoFLOXacin (LEVAQUIN) 750 MG tablet Take 1 tablet (750 mg total) by mouth once daily.    lidocaine (XYLOCAINE) 5 % Oint ointment Apply topically as needed. Apply 1 inch to biopsy site and cover with saran wrap.  Apply twice per day as needed for pain.    multivitamin with minerals tablet Take 1 tablet by mouth once daily.    omeprazole (PRILOSEC) 20 MG capsule Take one capsule by mouth everyday.    ondansetron (ZOFRAN-ODT) 8 MG TbDL Dissolve 1 tablet (8 mg total) by mouth every 8 (eight) hours as needed.    polyethylene glycol (GLYCOLAX) 17 gram/dose powder mix 1 capful (17 g) and take by mouth once daily.    promethazine-codeine 6.25-10 mg/5 ml (PHENERGAN WITH CODEINE) 6.25-10 mg/5 mL syrup Take 5 mLs by mouth every 8 (eight) hours as needed for Cough. Fill 21    senna-docusate 8.6-50 mg (PERICOLACE) 8.6-50 mg per tablet Take 1 tablet by mouth 2 (two) times daily.    tiotropium (SPIRIVA) 18 mcg inhalation capsule Inhale 1 capsule (18 mcg total) into the lungs once daily. Controller    traMADoL (ULTRAM) 50 mg tablet Take 1 tablet (50 mg total) by mouth every 8 (eight) hours as needed for Pain. Fill 21     Family History     Problem Relation (Age of Onset)    Cancer Father        Tobacco Use    Smoking status: Former Smoker     Packs/day: 0.50     Years: 40.00     Pack years: 20.00     Types: Cigarettes     Quit date: 2020     Years since quittin.7    Smokeless tobacco: Never Used   Substance and Sexual Activity    Alcohol use: Yes     Comment: occasional    Drug use: No    Sexual activity: Not on file     Review of Systems   Constitutional: Positive for activity change, chills and fatigue. Negative for fever.    HENT: Negative for sore throat and trouble swallowing.    Eyes: Negative for visual disturbance.   Respiratory: Positive for cough and shortness of breath.    Cardiovascular: Positive for chest pain. Negative for palpitations.   Gastrointestinal: Positive for diarrhea. Negative for abdominal pain, constipation, nausea and vomiting.   Genitourinary: Negative for dysuria and hematuria.   Musculoskeletal: Negative for gait problem.   Skin: Negative for color change.   Neurological: Negative for dizziness and speech difficulty.   Psychiatric/Behavioral: Negative for agitation.     Objective:     Vital Signs (Most Recent):  Temp: 98.8 °F (37.1 °C) (12/26/21 2046)  Pulse: 79 (12/27/21 0400)  Resp: 20 (12/27/21 0400)  BP: 133/87 (12/27/21 0400)  SpO2: 97 % (12/27/21 0400) Vital Signs (24h Range):  Temp:  [98.8 °F (37.1 °C)] 98.8 °F (37.1 °C)  Pulse:  [79-99] 79  Resp:  [20-33] 20  SpO2:  [78 %-99 %] 97 %  BP: (121-136)/(62-87) 133/87     Weight: 85.7 kg (189 lb)  Body mass index is 30.51 kg/m².    Physical Exam  Constitutional:       General: She is not in acute distress.  HENT:      Head: Normocephalic and atraumatic.      Nose: Nose normal.      Mouth/Throat:      Mouth: Mucous membranes are moist.   Eyes:      Extraocular Movements: Extraocular movements intact.      Conjunctiva/sclera: Conjunctivae normal.   Cardiovascular:      Rate and Rhythm: Normal rate and regular rhythm.      Heart sounds: Normal heart sounds.   Pulmonary:      Effort: Pulmonary effort is normal. No respiratory distress.      Breath sounds: Wheezing present.   Abdominal:      General: Bowel sounds are normal.      Palpations: Abdomen is soft.      Tenderness: There is no abdominal tenderness.   Musculoskeletal:         General: Normal range of motion.      Cervical back: Normal range of motion and neck supple.   Skin:     General: Skin is warm and dry.   Neurological:      Mental Status: She is alert and oriented to person, place, and time.    Psychiatric:         Mood and Affect: Mood normal.         Behavior: Behavior normal.             Significant Labs:   All pertinent labs within the past 24 hours have been reviewed.  CBC:   Recent Labs   Lab 12/26/21 2136 12/26/21 2141   WBC  --  16.65*   HGB  --  10.0*   HCT 32* 33.1*   PLT  --  372     CMP:   Recent Labs   Lab 12/26/21  2141      K 4.5      CO2 24   *   BUN 20   CREATININE 1.2   CALCIUM 9.4   PROT 7.4   ALBUMIN 2.7*   BILITOT 0.2   ALKPHOS 90   AST 19   ALT 13   ANIONGAP 9   EGFRNONAA 48.6*       Significant Imaging: I have reviewed all pertinent imaging results/findings within the past 24 hours.    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 Recently admitted 12/14-12/16 for pneumonia and COPD exacerbation. In the ED, her O2 sat was initially 78% and she was placed on bipap. She was able to be weaned to 6L NC satting 96%. Labs significant for WBC 16.65, but recently on steroids, and lactate 2.4. CXR with progression of airspace disease in the left hemithorax, stable appearance of airspace disease in the right hemithorax. CTA chest concerning for occlusion of RUL pulmonary artery branch, intraluminal infiltration or tumor thrombus vs thromboembolus. Pulmonology consulted by ED, do not think anticoagulation is warranted. She reports some improvement in symptoms while in ED. Admitted to hospital medicine for management of COPD exacerbation.  - symbicort not on formulary, will continue with breo inhaler   - prednisone 40 mg   - duo nebs scheduled  - will hold off on abx as pt completed a course of abx with recent admission, low suspicion for acute infection   - continue phenergan-codeine prn for cough  - IV lasix 40 mg once   - wean O2 as tolerated   - f/u pulm recs    - f/u TTE  - f/u U/S LE veins      Chronic respiratory failure with hypoxia  Patient with Hypoxic Respiratory failure which is Acute on chronic.  she is on home oxygen at 2 LPM. Supplemental oxygen was provided and noted- Oxygen Concentration (%):  [50-60] 50.   Signs/symptoms of respiratory failure include- wheezing. Contributing diagnoses includes - COPD Labs and images were reviewed. Patient Has not had a recent ABG. Will treat underlying causes and adjust management of respiratory failure as follows-      See COPD exacerbation.     GERD (gastroesophageal reflux disease)  - protonix daily      Anxiety  - continue bupropion   - continue atarax prn     Adenocarcinoma of lung, stage 4, right  Follows with Dr. Peterson. S/p Carboplatin AUC4 x1 dose 5/27/20 and palliative radiation (5/27/2020 - 6/2/2020; 20 Gy to right lung). Also received SRS to right cerebellar metastasis 6/3/2020. Pembrolizumab started 6/4/2020, C23 on 12/21/2021.  - Hem Onc consulted by ED  - continue gabapentin  - continue tramadol prn       Essential hypertension  Normotensive on admission. On amlodipine and irbesartan-hydrochlorothiazide.  - monitor BP  - resume home meds when appropriate       VTE Risk Mitigation (From admission, onward)         Ordered     enoxaparin injection 40 mg  Daily         12/27/21 0536     IP VTE HIGH RISK PATIENT  Once         12/27/21 0536     Place sequential compression device  Until discontinued         12/27/21 0536                   Ariella Benavides MD  Department of Hospital Medicine   [facility] - [facility] Step[facility]

## 2021-12-27 NOTE — ASSESSMENT & PLAN NOTE
Patient with Hypoxic Respiratory failure which is Acute on chronic.  she is on home oxygen at 2 LPM. Supplemental oxygen was provided and noted- Oxygen Concentration (%):  [50-60] 50.   Signs/symptoms of respiratory failure include- wheezing. Contributing diagnoses includes - COPD Labs and images were reviewed. Patient Has not had a recent ABG. Will treat underlying causes and adjust management of respiratory failure as follows-      See COPD exacerbation.

## 2021-12-27 NOTE — PLAN OF CARE
Critical Care called for recommendations on anticoagulation based on findings on CTA performed in the Emergency Room.    Chart and Imaging reviewed.    CTA shows occlusion of one of the RUL pulmonary artery branches which was read as not being able to rule out thromboembolism. However, the imaging is unchanged from recent CT Chests w/ contrast, trop/bnp are both normal, and there is no e/o pulmonary HTN on CTA (pulmonary trunk/ascending thoracic aorta ration and RV/LV ratio both normal). Patient is also not tachycardic and back to his home oxygen regimen.    Patient had cerebellar mets on diagnosis 6/2020, however, she underwent radiation and recent MRI w/o recurrence of intracranial metastatic disease. So she could possibly tolerate therapeutic anticoagulation. I don't see enough evidence that this is an acute embolism, however, to warrant anticoagulation    - Agree w/ COPD exac Rx  - Agree w/ LE US and a TTE  - Rec chemical DVT ppx    Patient will be seen by the pulmonary consult service in the am.    Nicholas Sibley MD  Pulmonary/Critical Care

## 2021-12-27 NOTE — ASSESSMENT & PLAN NOTE
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 Recently admitted 12/14-12/16 for pneumonia and COPD exacerbation. In the ED, her O2 sat was initially 78% and she was placed on bipap. She was able to be weaned to 6L NC satting 96%. Labs significant for WBC 16.65, but recently on steroids, and lactate 2.4. CXR with progression of airspace disease in the left hemithorax, stable appearance of airspace disease in the right hemithorax. CTA chest concerning for occlusion of RUL pulmonary artery branch, intraluminal infiltration or tumor thrombus vs thromboembolus. Pulmonology consulted by ED, do not think anticoagulation is warranted. She reports some improvement in symptoms while in ED. Admitted to hospital medicine for management of COPD exacerbation.  - symbicort not on formulary, will continue with breo inhaler   - prednisone 40 mg   - duo nebs scheduled  - will hold off on abx as pt completed a course of abx with recent admission, low suspicion for acute infection   - continue phenergan-codeine prn for cough  - IV lasix 40 mg once   - wean O2 as tolerated   - f/u pulm recs   - f/u TTE  - f/u U/S LE veins

## 2021-12-27 NOTE — SUBJECTIVE & OBJECTIVE
Oncology Treatment Plan:   OP PEMBROLIZUMAB 200MG Q3W (C22 start q6w dose)    Medications:  Continuous Infusions:  Scheduled Meds:   albuterol-ipratropium  3 mL Nebulization Q4H WAKE    buPROPion  150 mg Oral Daily    enoxaparin  40 mg Subcutaneous Daily    fluticasone furoate-vilanteroL  1 puff Inhalation Daily    furosemide (LASIX) injection  40 mg Intravenous Once    gabapentin  400 mg Oral TID    pantoprazole  40 mg Oral Daily    predniSONE  40 mg Oral Daily     PRN Meds:acetaminophen, dextrose 50%, dextrose 50%, glucagon (human recombinant), glucose, glucose, hydrOXYzine HCL, insulin aspart U-100, naloxone, ondansetron, promethazine-codeine 6.25-10 mg/5 ml, sodium chloride 0.9%, sodium chloride 0.9%, traMADoL     Review of patient's allergies indicates:  No Known Allergies     Past Medical History:   Diagnosis Date    Anxiety     Asthma     COPD exacerbation 2021    Hepatitis C 2019    treated    Hx of psychiatric care     Hypertension     Lung cancer     Psychiatric exam requested by authority     Psychiatric problem      Past Surgical History:   Procedure Laterality Date    BRONCHOSCOPY N/A 9/3/2020    Procedure: Bronchoscopy;  Surgeon: [facility] Diagnostic Provider;  Location: [facility] OR [address] [place] [address];  Service: Anesthesiology;  Laterality: N/A;     SECTION      KNEE SURGERY       Family History     Problem Relation (Age of Onset)    Cancer Father        Tobacco Use    Smoking status: Former Smoker     Packs/day: 0.50     Years: 40.00     Pack years: 20.00     Types: Cigarettes     Quit date: 2020     Years since quittin.7    Smokeless tobacco: Never Used   Substance and Sexual Activity    Alcohol use: Yes     Comment: occasional    Drug use: No    Sexual activity: Not on file       Review of Systems   Constitutional: Negative for chills and fever.   HENT: Negative for rhinorrhea and sore throat.    Eyes: Negative for pain and redness.   Respiratory: Positive for cough and  shortness of breath.    Cardiovascular: Negative for chest pain, palpitations and leg swelling.   Gastrointestinal: Negative for abdominal pain, constipation, diarrhea, nausea and vomiting.   Endocrine: Negative for polydipsia and polyuria.   Genitourinary: Negative for dysuria and hematuria.   Musculoskeletal: Negative for back pain and neck pain.   Skin: Negative for color change and rash.   Neurological: Negative for syncope, light-headedness and headaches.   Hematological: Negative for adenopathy. Does not bruise/bleed easily.   Psychiatric/Behavioral: Negative for confusion. The patient is not nervous/anxious.      Objective:     Vital Signs (Most Recent):  Temp: 97.4 °F (36.3 °C) (12/27/21 0901)  Pulse: 92 (12/27/21 0901)  Resp: (!) 22 (12/27/21 0901)  BP: 118/71 (12/27/21 0901)  SpO2: (!) 94 % (12/27/21 0901) Vital Signs (24h Range):  Temp:  [97.4 °F (36.3 °C)-98.8 °F (37.1 °C)] 97.4 °F (36.3 °C)  Pulse:  [79-99] 92  Resp:  [20-36] 22  SpO2:  [78 %-99 %] 94 %  BP: (118-136)/(62-87) 118/71     Weight: 86.2 kg (190 lb 0.6 oz)  Body mass index is 30.67 kg/m².  Body surface area is 2 meters squared.      Intake/Output Summary (Last 24 hours) at 12/27/2021 0904  Last data filed at 12/27/2021 0336  Gross per 24 hour   Intake 299.15 ml   Output 350 ml   Net -50.85 ml       Physical Exam  Constitutional:       General: She is not in acute distress.     Appearance: She is well-developed and well-nourished. She is not diaphoretic.   HENT:      Head: Normocephalic and atraumatic.      Mouth/Throat:      Mouth: Oropharynx is clear and moist.   Eyes:      General: No scleral icterus.     Extraocular Movements: EOM normal.      Conjunctiva/sclera: Conjunctivae normal.   Cardiovascular:      Rate and Rhythm: Normal rate and regular rhythm.      Heart sounds: Normal heart sounds. No murmur heard.  No friction rub. No gallop.    Pulmonary:      Effort: Pulmonary effort is normal. No respiratory distress.      Breath sounds:  Wheezing present.   Abdominal:      General: Bowel sounds are normal. There is no distension.      Palpations: Abdomen is soft.      Tenderness: There is no abdominal tenderness. There is no guarding.   Musculoskeletal:         General: No tenderness or edema. Normal range of motion.      Cervical back: Normal range of motion and neck supple.   Skin:     General: Skin is warm and dry.      Findings: No rash.   Neurological:      Mental Status: She is alert and oriented to person, place, and time.   Psychiatric:         Mood and Affect: Mood and affect normal.         Behavior: Behavior normal.         Significant Labs:   CBC:   Recent Labs   Lab 12/26/21 2136 12/26/21 2141   WBC  --  16.65*   HGB  --  10.0*   HCT 32* 33.1*   PLT  --  372    and CMP:   Recent Labs   Lab 12/26/21 2141      K 4.5      CO2 24   *   BUN 20   CREATININE 1.2   CALCIUM 9.4   PROT 7.4   ALBUMIN 2.7*   BILITOT 0.2   ALKPHOS 90   AST 19   ALT 13   ANIONGAP 9   EGFRNONAA 48.6*       Diagnostic Results:  I have reviewed all pertinent imaging results/findings within the past 24 hours.

## 2021-12-27 NOTE — ED NOTES
Pt reminded of need for urine sample. Pt c/o bipap mask being too tight- Resp called to bedside to adjust mask multiple times.

## 2021-12-27 NOTE — ED PROVIDER NOTES
Encounter Date: 2021       History     Chief Complaint   Patient presents with    Shortness of Breath     Pt c/o SOB and left sided chest pain starting today. Hx of COPD and lung cancer. She was 78% Ra upon arrival.        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  Patient states she had similar, however milder symptoms 2 days ago and she did not present to the ED.  Patient also complains of some substernal chest pain with some radiation to the left side.  Patient denies nausea vomiting diarrhea constipation weakness fatigue fevers and chills.  No other complaints at this time.    The history is provided by the patient and medical records. No  was used.     Review of patient's allergies indicates:  No Known Allergies  Past Medical History:   Diagnosis Date    Anxiety     Asthma     COPD exacerbation 2021    Hepatitis C 2019    treated    Hx of psychiatric care     Hypertension     Lung cancer     Psychiatric exam requested by authority     Psychiatric problem      Past Surgical History:   Procedure Laterality Date    BRONCHOSCOPY N/A 9/3/2020    Procedure: Bronchoscopy;  Surgeon: Renee Diagnostic Provider;  Location: [facility] OR [address] [place] [address];  Service: Anesthesiology;  Laterality: N/A;     SECTION      KNEE SURGERY       Family History   Problem Relation Age of Onset    Cancer Father      Social History     Tobacco Use    Smoking status: Former Smoker     Packs/day: 0.50     Years: 40.00     Pack years: 20.00     Types: Cigarettes     Quit date: 2020     Years since quittin.7    Smokeless tobacco: Never Used   Substance Use Topics    Alcohol use: Yes     Comment: occasional    Drug use: No      Review of Systems   Constitutional: Negative for activity change, chills and fever.   HENT: Negative for congestion, ear pain and sore throat.    Respiratory: Positive for chest tightness and shortness of breath. Negative for stridor.    Cardiovascular: Positive for chest pain. Negative for palpitations.   Gastrointestinal: Negative for abdominal pain, nausea and vomiting.   Genitourinary: Negative for dysuria.   Musculoskeletal: Negative for back pain.   Skin: Negative for rash.   Neurological: Negative for dizziness, syncope, weakness and headaches.   Hematological: Does not bruise/bleed easily.       Physical Exam     Initial Vitals   BP Pulse Resp Temp SpO2   12/26/21 2046 12/26/21 2046 12/26/21 2046 12/26/21 2046 12/26/21 2043   126/70 98 (!) 30 98.8 °F (37.1 °C) (!) 78 %      MAP       --                Physical Exam    Nursing note and vitals reviewed.  Constitutional: Vital signs are normal. She appears well-developed and well-nourished. She is not diaphoretic. No distress.   HENT:   Head: Normocephalic and atraumatic.   Right Ear: External ear normal.   Left Ear: External ear normal.   Eyes: Conjunctivae and EOM are normal.   Neck: Trachea normal. Neck supple. No thyroid mass present.   Cardiovascular: Regular rhythm, normal heart sounds and intact distal pulses. Exam reveals no gallop and no friction rub.    No murmur heard.  Tachycardic.   Pulmonary/Chest: She is in respiratory distress. She has wheezes. She has no rhonchi. She has no rales.   Wheezes present bilaterally in upper lung fields.  Crackles present bilaterally in lower lung fields.   Abdominal: Abdomen is soft. Normal appearance and bowel sounds are normal. She exhibits no distension. There is no abdominal tenderness. There is no rebound and no guarding.   Musculoskeletal:      Cervical back: Neck supple.     Neurological: She is alert and oriented to person, place, and time. She has normal strength. No cranial nerve deficit or sensory  deficit. GCS score is 15. GCS eye subscore is 4. GCS verbal subscore is 5. GCS motor subscore is 6.   Skin: Skin is warm and dry. Capillary refill takes less than 2 seconds. No rash noted.   Psychiatric: She has a normal mood and affect.         ED Course   Procedures  Labs Reviewed   CBC W/ AUTO DIFFERENTIAL - Abnormal; Notable for the following components:       Result Value    WBC 16.65 (*)     RBC 3.96 (*)     Hemoglobin 10.0 (*)     Hematocrit 33.1 (*)     MCH 25.3 (*)     MCHC 30.2 (*)     RDW 14.9 (*)     Immature Granulocytes 0.9 (*)     Gran # (ANC) 15.3 (*)     Immature Grans (Abs) 0.15 (*)     Lymph # 0.7 (*)     Gran % 92.1 (*)     Lymph % 3.9 (*)     Mono % 2.9 (*)     All other components within normal limits   COMPREHENSIVE METABOLIC PANEL - Abnormal; Notable for the following components:    Glucose 236 (*)     Albumin 2.7 (*)     eGFR if  56.0 (*)     eGFR if non  48.6 (*)     All other components within normal limits   LACTIC ACID, PLASMA - Abnormal; Notable for the following components:    Lactate (Lactic Acid) 2.4 (*)     All other components within normal limits   URINALYSIS, REFLEX TO URINE CULTURE - Abnormal; Notable for the following components:    Glucose, UA 1+ (*)     All other components within normal limits    Narrative:     Specimen Source->Urine   ISTAT PROCEDURE - Abnormal; Notable for the following components:    POC PO2 87 (*)     All other components within normal limits   ISTAT PROCEDURE - Abnormal; Notable for the following components:    POC Glucose 245 (*)     POC Hematocrit 32 (*)     All other components within normal limits   ISTAT PROCEDURE - Abnormal; Notable for the following components:    POC PH 7.509 (*)     POC PCO2 32.6 (*)     POC PO2 106 (*)     All other components within normal limits   SARS-COV-2 RDRP GENE - Normal    Narrative:     This test utilizes isothermal nucleic acid amplification   technology to detect the SARS-CoV-2 RdRp  nucleic acid segment.   The analytical sensitivity (limit of detection) is 125 genome   equivalents/mL.   A POSITIVE result implies infection with the SARS-CoV-2 virus;   the patient is presumed to be contagious.     A NEGATIVE result means that SARS-CoV-2 nucleic acids are not   present above the limit of detection. A NEGATIVE result should be   treated as presumptive. It does not rule out the possibility of   COVID-19 and should not be the sole basis for treatment decisions.   If COVID-19 is strongly suspected based on clinical and exposure   history, re-testing using an alternate molecular assay should be   considered.   This test is only for use under the Food and Drug   Administration s Emergency Use Authorization (EUA).   Commercial kits are provided by [employer].   Performance characteristics of the EUA have been independently   verified by [facility] Department of   Pathology and Laboratory Medicine.   _________________________________________________________________   The authorized Fact Sheet for Healthcare Providers and the authorized Fact   Sheet for Patients of the ID NOW COVID-19 are available on the FDA   website:     https://www.fda.gov/media/453560/download  https://www.fda.gov/media/011596/download         CULTURE, BLOOD   CULTURE, BLOOD   TROPONIN I   B-TYPE NATRIURETIC PEPTIDE   LACTIC ACID, PLASMA   POCT GLUCOSE, HAND-HELD DEVICE   ISTAT CHEM8   POCT GLUCOSE MONITORING CONTINUOUS     EKG Readings: (Independently Interpreted)   Initial Reading: No STEMI. Rhythm: Normal Sinus Rhythm. Heart Rate: 90. Ectopy: No Ectopy. Conduction: Normal. Clinical Impression: Normal Sinus Rhythm     ECG Results          EKG 12-lead (In process)  Result time 12/26/21 21:02:31    In process by Interface, Lab In [facility] (12/26/21 21:02:31)                 Narrative:    Test Reason : R07.9,    Vent. Rate : 095 BPM     Atrial Rate : 095 BPM     P-R Int : 142 ms          QRS Dur : 064 ms      QT Int  : 330 ms       P-R-T Axes : 068 051 025 degrees     QTc Int : 414 ms    Normal sinus rhythm  Nonspecific ST abnormality  Abnormal ECG  When compared with ECG of 14-DEC-2021 10:07,  Non-specific change in ST segment in Inferior leads  Nonspecific T wave abnormality, improved in Inferior leads    Referred By: [email or web address]   SELF           Confirmed By:                             Imaging Results           CTA Chest Non-Coronary (PE Study) (Final result)  Result time 12/27/21 01:47:48    Final result by Guzman Wiley MD (12/27/21 01:47:48)                 Impression:      1. Obstruction of the truncus anterior branch just proximal to the segmental arteries with minimal flow distally, unchanged.  Possibly intraluminal infiltration or tumor thrombus. However, cannot rule out underlying thromboembolus.  2. Patchy ground-glass opacities throughout the lower lobe, progressed compared to prior study.  Possibly progression.  Also concern for aspiration pneumonia or atypical pneumonia.  3. Stable right upper lobe pulmonary mass with adjacent post radiation changes.  4. Additional findings as above.  RECIST SUMMARY:      Date of prior examination for comparison: 12/08/2021    Lesion 1: Right upper lobe mass.  4.4 cm. Series 2 image 90.  Prior measurement 4.7  Cm.      Lesion 2: Pre-vascular lymph node.  1.4 cm. Series 2 image 104.  Prior measurement 1.2 cm.      Lesion 3: Left hilar estevan conglomeration.  1.6 cm.  Series 2 image 180.  Prior measurement 2.0 cm.    This report was flagged in Epic as abnormal.    This critical information above was relayed by Dr. Herrera  by telephone to Solomon Wills MD on 12/27/2021 at 0018.    Electronically signed by resident: Lisa Herrera  Date:    12/27/2021  Time:    00:06    Electronically signed by: Guzman Wiley  Date:    12/27/2021  Time:    01:47             Narrative:    EXAMINATION:  CTA CHEST NON CORONARY    CLINICAL HISTORY:  Pulmonary embolism (PE) suspected, unknown  D-dimer;    TECHNIQUE:  Low dose axial images, sagittal and coronal reformations were obtained from the thoracic inlet to the lung bases following the IV administration of 100 mL of Omnipaque 350.  Contrast timing was optimized to evaluate the pulmonary arteries.    COMPARISON:  CT chest abdomen pelvis 12/08/2021, 09/22/2021    FINDINGS:  Base of Neck:  No significant abnormality.    Thoracic soft tissues:  No significant abnormality.    Aorta: Left-sided aortic arch with normal branching pattern.  The thoracic aorta is normal in caliber .    Heart: Heart is not enlarged.  No pericardial effusion. Coronary artery calcific atherosclerosis.    Pulmonary vasculature: The pulmonary arteries distribute normally.  Obstruction of the truncus anterior chest proximal to the segmental arteries with minimal flow distally, similar to prior study (series 2 image 101).  Possibly intraluminal infiltration or tumor thrombus.  However, cannot rule out underlying thromboembolus.    Yesica/Mediastinum:  Stable subcarinal lymph node measuring 1.4 cm.  Prevascular lymph node increased in size measuring 1.3 cm, previously 1.1 cm.  Stable left hilar estevan conglomeration.  Stable enlarged axillary lymph nodes.    Airways:  Trachea is midline and proximal airways are patent without significant abnormality.    Lungs: Redemonstration of ill-defined right apical mass with associated significant volume loss throughout the right lung and rightward mediastinal shift.  Reticular and ground-glass opacities within the right lung, appear unchanged compared to prior study.  Nodular thickening of the pleura in the left apex.  Stable nodule in the right upper lobe.  No pleural effusions or pneumothorax.  Patchy ground-glass opacities throughout the left lower lobe, significant progression compared to prior study.    Esophagus: High density material within the proximal esophagus, concern for aspiration.    Upper abdomen: Small hiatal hernia.    Bones:   Degenerative changes of the visualized osseous structures.  Stable sclerosis and endplate deformities of the T12 vertebral body.  No acute fracture or lytic or sclerotic lesions.                               X-Ray Chest AP Portable (Final result)  Result time 12/26/21 22:00:07    Final result by Eric David MD (12/26/21 22:00:07)                 Impression:      Progression of airspace disease in the left hemithorax.    Stable appearance of airspace disease in the right hemithorax.      Electronically signed by: Eric David MD  Date:    12/26/2021  Time:    22:00             Narrative:    EXAMINATION:  XR CHEST AP PORTABLE    CLINICAL HISTORY:  Shortness of breath.    TECHNIQUE:  Single frontal view of the chest was performed.    COMPARISON:  12/14/2021.    FINDINGS:  Monitoring EKG leads are present.  The trachea is unremarkable.  The appearance of the cardiomediastinal silhouette is unchanged.    There is unchanged appearance of the right hemithorax with airspace opacities throughout the right hemithorax.  There is unchanged appearance of some component of aerated lung along the lateral aspect in the basal segment of the right hemithorax. There is unchanged appearance of a right-sided pleural effusion.    There has been progression of the airspace disease in the left hemithorax.  The left midlung zone is now involved compared to the prior examination.    There are degenerative changes in the osseous structures.  There is a chronic fracture involving the right proximal humerus.                                 Medications   traMADoL tablet 50 mg (has no administration in time range)   promethazine-codeine 6.25-10 mg/5 ml syrup 5 mL (has no administration in time range)   ondansetron disintegrating tablet 8 mg (has no administration in time range)   hydrOXYzine HCL tablet 25 mg (has no administration in time range)   pantoprazole EC tablet 40 mg (40 mg Oral Given 12/27/21 0907)   gabapentin capsule 400 mg (400 mg  Oral Given 12/27/21 0907)   buPROPion TB24 tablet 150 mg (150 mg Oral Given 12/27/21 0907)   fluticasone furoate-vilanteroL 100-25 mcg/dose diskus inhaler 1 puff (has no administration in time range)   sodium chloride 0.9% flush 10 mL (has no administration in time range)   glucose chewable tablet 16 g (has no administration in time range)   glucose chewable tablet 24 g (has no administration in time range)   dextrose 50% injection 12.5 g (has no administration in time range)   dextrose 50% injection 25 g (has no administration in time range)   glucagon (human recombinant) injection 1 mg (has no administration in time range)   naloxone 0.4 mg/mL injection 0.02 mg (has no administration in time range)   enoxaparin injection 40 mg (has no administration in time range)   acetaminophen tablet 650 mg (has no administration in time range)   insulin aspart U-100 pen 0-5 Units (has no administration in time range)   albuterol-ipratropium 2.5 mg-0.5 mg/3 mL nebulizer solution 3 mL (has no administration in time range)   sodium chloride 0.9% flush 3 mL (has no administration in time range)   predniSONE tablet 40 mg (40 mg Oral Given 12/27/21 0907)   albuterol-ipratropium 2.5 mg-0.5 mg/3 mL nebulizer solution 3 mL (3 mLs Nebulization Given 12/26/21 2155)   cefTRIAXone (ROCEPHIN) 1 g/50 mL D5W IVPB ( Intravenous Stopped 12/26/21 2335)   azithromycin 500 mg in dextrose 5 % 250 mL IVPB (ready to mix system) ( Intravenous Stopped 12/27/21 0124)   iohexoL (OMNIPAQUE 350) injection 100 mL (100 mLs Intravenous Given 12/26/21 2358)   predniSONE tablet 10 mg (10 mg Oral Given 12/27/21 0126)   furosemide injection 40 mg (40 mg Intravenous Given 12/27/21 0907)     Medical Decision Making:   History:   Old Medical Records: I decided to obtain old medical records.  Old Records Summarized: records from previous admission(s).  Initial Assessment:   59-year-old female in moderate distress due to symptoms presents the ED complaining of  shortness of breath.  Patient is able to converse, breath sounds are equal bilaterally and distal pulses are present.  Differential Diagnosis:   Including, but not limited to:  ACS  COPD exacerbation  CHF exacerbation  Pulmonary embolism  Pneumonia  Pneumothorax  Tumor load  Independently Interpreted Test(s):   I have ordered and independently interpreted EKG Reading(s) - see prior notes  Clinical Tests:   Lab Tests: Reviewed and Ordered  Radiological Study: Ordered and Reviewed  Medical Tests: Reviewed and Ordered  ED Management:  2328:  Patient appears much more comfortable.  Gave patient 3 DuoNeb treatments.  Will attempt to wean patient off BiPAP if successful I will provide oral steroids if not will dose patient with Solu-Medrol.  Lactate was elevated, gave patient 1 L of fluids.  Will reassess patient following laboratory results.    0107:  Patient is weaned off of BiPAP to 10 L bubble flow and was satting in the high 90s.  Will drop patient to 2 L bubble flow.  Will give patient oral prednisone.  Consult pulmonology for possible right upper lobe segmental PE versus right upper lobe adenocarcinoma.  Will defer anticoagulation to critical care.  Will discuss with case management to admit to floor.    0318:  Per pulmonology and Critical Care, less likely acute pulmonary embolism due to lack elevations in BNP and troponin.  They do not recommend any anticoagulation.  Will admit to Heme-Onc for COPD exacerbation with tumor burden.    0448: Will admit patient to hospital medicine as an inpatient to Dr. Fernando Topete for COPD exacerbation.  Other:   I have discussed this case with another health care provider.            Attending Attestation:   Physician Attestation Statement for Resident:  As the supervising MD   Physician Attestation Statement: I have personally seen and examined this patient.   I agree with the above history. -: 61-year-old female with a history of COPD, hypertension, anxiety, lung  adenocarcinoma, presenting with shortness of breath.  On 2 L of oxygen at home.  Prior malignant pleural effusion requiring thoracentesis.   As the supervising MD I agree with the above PE.   -: Tachypnea  Hypoxia on home oxygen does  Diminished breath sounds to bases and and right lung, wheezing  No JVD  Awake, alert, answering questions appropriately   As the supervising MD I agree with the above treatment, course, plan, and disposition.   -: Placed on BiPAP on presentation for work of breathing.  Hypoxic to 70s on home oxygen dose.   Discussed case with critical care - recommend holding anticoagulation, lower suspicion for acute PE, dopplers pending.  Discussed case with heme/onc - recommend  admission.  Lactate down-trending, lower suspicion for sepsis or septic shock.  Doubt ACS given neg initial CTNI and EKG.  Discussed case with hospital medicine, admitted for further care.  Covered with abx, likely combination of tumor burden versus COPD versus PNA.    I have reviewed and agree with the residents interpretation of the following: lab data, EKG, CT scans and x-rays.  I have reviewed the following: old records at this facility.        Attending Critical Care:   Critical Care Times:   Direct Patient Care (initial evaluation, reassessments, and time considering the case)................................................................15 minutes.   Additional History from reviewing old medical records or taking additional history from the family, EMS, PCP, etc.......................4 minutes.   Ordering, Reviewing, and Interpreting Diagnostic Studies...............................................................................................................6 minutes.   Documentation..................................................................................................................................................................................5 minutes.   Consultation with other Physicians.  .................................................................................................................................................6 minutes.   ==============================================================  · Total Critical Care Time - exclusive of procedural time: 36 minutes.  ==============================================================  Critical care was necessary to treat or prevent imminent or life-threatening deterioration of the following conditions: airway compromise.   Critical care was time spent personally by me on the following activities: obtaining history from patient or relative, examination of patient, review of old charts, ordering lab, x-rays, and/or EKG, development of treatment plan with patient or relative, ordering and performing treatments and interventions, evaluation of patient's response to treatment, discussion with consultants and re-evaluation of patient's conition.   Critical Care Condition: potentially life-threatening           ED Course as of 12/27/21 0913   Sun Dec 26, 2021   2136 Daughter Kell:  [phone]  Natalie Stern:  [phone] [AB]   2225 WBC(!): 16.65  Leukocytosis  [AB]   2225 Lactate, Santosh(!): 2.4 [AB]   2227 Lactate, Santosh(!): 2.4 [AB]      ED Course User Index  [AB] Celio Boyd MD             Clinical Impression:   Final diagnoses:  [R07.9] Chest pain  [R09.02] Hypoxia  [R06.02] Shortness of breath  [J44.1] COPD exacerbation          ED Disposition Condition    Admit               Solomon Wills MD  Resident  12/27/21 0450       Celio Boyd MD  12/27/21 2995

## 2021-12-28 NOTE — CARE UPDATE
Contacted be bedside nurse regarding restarting patients home dose Valium and trazadone.  checked and patient last filled in October of this year for Valium 5 mg x15 tablets. Will give 2 mg Valium now and defer restarting aforementioned regimen to primary/day team.

## 2021-12-28 NOTE — ASSESSMENT & PLAN NOTE
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 Recently admitted 12/14-12/16 for pneumonia and COPD exacerbation. In the ED, her O2 sat was initially 78% and she was placed on bipap. She was able to be weaned to 6L NC satting 96%. Labs significant for WBC 16.65, but recently on steroids, and lactate 2.4. CXR with progression of airspace disease in the left hemithorax, stable appearance of airspace disease in the right hemithorax. CTA chest concerning for occlusion of RUL pulmonary artery branch, intraluminal infiltration or tumor thrombus vs thromboembolus. Pulmonology consulted by ED, do not think anticoagulation is warranted. She reports some improvement in symptoms while in ED. Admitted to hospital medicine for management of COPD exacerbation.   -Lower extremity ultrasound negative for DVT. TTE normal with EF of 60%.  -Hem/Onc consulted as suspicion for Keytruda induced pneumonitis, but per evaluation: ground glass opacities primarily in the lower lobes and is not entirely consistent with immunotherapy related pneumonitis (especially as she has already improved without steroids)  -Pulm consulted: high suspicion for  COPD exacerbation in addition to PDL1 pneumonitis , recommended holding keytruda and administer solumedrol 80mg IV daily, duonebs + levofloxacin x 5 days, procalcitonin and RIP    Microbiology Results (last 7 days)     Procedure Component Value Units Date/Time    Respiratory Infection Panel (PCR), Nasopharyngeal [[id number]]     Order Status: No result Specimen: Nasopharyngeal Swab     Blood culture x two cultures. Draw prior to antibiotics. [[id number]] Collected: 12/26/21 8717    Order Status: Completed Specimen: Blood from Peripheral, Hand, Left Updated: 12/28/21 0613     Blood Culture, Routine No Growth to date      No Growth to date     Narrative:      Aerobic and anaerobic    Blood culture x two cultures. Draw prior to antibiotics. [[id number]] Collected: 12/26/21 2135    Order Status: Completed Specimen: Blood from Peripheral, Hand, Right Updated: 12/28/21 0613     Blood Culture, Routine No Growth to date      No Growth to date    Narrative:      Aerobic and anaerobic    Respiratory Infection Panel (PCR), Nasopharyngeal [[id number]] Collected: 12/27/21 1725    Order Status: Canceled Specimen: Nasopharyngeal Swab         Antibiotics (From admission, onward)            Start     Stop Route Frequency Ordered    12/27/21 1600  levoFLOXacin tablet 750 mg         01/01 0859 Oral Daily 12/27/21 1500            PLAN  - symbicort not on formulary, will continue with breo inhaler   - 50mg methlyprednisolone daily   - duo nebs PRN  - levofloxacin 7 day course for severe COPD exacerabation  - continue phenergan-codeine prn for cough  - wean O2 as tolerated

## 2021-12-28 NOTE — PLAN OF CARE
Problem: Adult Inpatient Plan of Care  Goal: Plan of Care Review  Outcome: Ongoing, Progressing  Flowsheets (Taken 12/28/2021 3256)  Plan of Care Reviewed With: patient  Goal: Patient-Specific Goal (Individualized)  Outcome: Ongoing, Progressing  Goal: Absence of Hospital-Acquired Illness or Injury  Outcome: Ongoing, Progressing  Goal: Optimal Comfort and Wellbeing  Outcome: Ongoing, Progressing  Goal: Readiness for Transition of Care  Outcome: Ongoing, Progressing     Problem: Adjustment to Illness COPD (Chronic Obstructive Pulmonary Disease)  Goal: Optimal Chronic Illness Coping  Outcome: Ongoing, Progressing     Problem: Functional Ability Impaired COPD (Chronic Obstructive Pulmonary Disease)  Goal: Optimal Level of Functional [place]  Outcome: Ongoing, Progressing     Problem: Infection COPD (Chronic Obstructive Pulmonary Disease)  Goal: Absence of Infection Signs and Symptoms  Outcome: Ongoing, Progressing     Problem: Oral Intake Inadequate COPD (Chronic Obstructive Pulmonary Disease)  Goal: Improved Nutrition Intake  Outcome: Ongoing, Progressing     Problem: Respiratory Compromise COPD (Chronic Obstructive Pulmonary Disease)  Goal: Effective Oxygenation and Ventilation  Outcome: Ongoing, Progressing     Problem: Fluid and Electrolyte Imbalance (Acute Kidney Injury/Impairment)  Goal: Fluid and Electrolyte Balance  Outcome: Ongoing, Progressing     Problem: Oral Intake Inadequate (Acute Kidney Injury/Impairment)  Goal: Optimal Nutrition Intake  Outcome: Ongoing, Progressing     Problem: Renal Function Impairment (Acute Kidney Injury/Impairment)  Goal: Effective Renal Function  Outcome: Ongoing, Progressing     Problem: Fluid Imbalance (Pneumonia)  Goal: Fluid Balance  Outcome: Ongoing, Progressing     Problem: Infection (Pneumonia)  Goal: Resolution of Infection Signs and Symptoms  Outcome: Ongoing, Progressing     Problem: Respiratory Compromise (Pneumonia)  Goal: Effective Oxygenation and  Ventilation  Outcome: Ongoing, Progressing     Problem: Fall Injury Risk  Goal: Absence of Fall and Fall-Related Injury  Outcome: Ongoing, Progressing

## 2021-12-28 NOTE — HOSPITAL COURSE
Patient admitted to hospital medicine for evaluation of dyspnea. CT Chest notable for new nonspecific 2.4 cm ground-glass opacity within the left lower lobe. Lower extremity ultrasound negative for DVT. TTE normal with EF of 60%. Hem/Onc consulted as suspicion for Keytruda induced pneumonitis, but per evaluation: ground glass opacities primarily in the lower lobes and is not entirely consistent with immunotherapy related pneumonitis (especially as she has already improved without steroids). Pulm consulted: high suspicion for  COPD exacerbation in addition to PDL1 pneumonitis , recommended holding keytruda and administer solumedrol 80mg IV daily, duonebs + levofloxacin x 5 days. Some improvement in respiratory status over course of hospitalization, increased Duonebs frequency to q4h. Trial of BiPap overnight on 01/01 improved respiratory requirements. Transitioned steroids to Prednisone 60mg PO with taper. Repeat CXR on 01/01 showed decreased left middle and lower lung zone airspace opacities. Fungitel assay ordered. Continuing to wean O2. Patient stable for discharge on home oxygen on 1/5

## 2021-12-28 NOTE — PLAN OF CARE
Problem: Adult Inpatient Plan of Care  Goal: Patient-Specific Goal (Individualized)  Outcome: Ongoing, Progressing  Flowsheets (Taken 12/27/2021 6591)  Anxieties, Fears or Concerns: None  Individualized Care Needs: Monitor SPO2. Pt on 4 L via High Flow nasal cannula. Maintain BG protocol. Manage anxiety with PRN atarax. Manage pain with PRN tramadol.  Patient-Specific Goals (Include Timeframe): Pt will be free of falls throughout shift.

## 2021-12-28 NOTE — PROGRESS NOTES
[facility] - [facility]iology Stepdown  Pulmonology  Progress Note    Patient Name: Janeth Boyce  MRN: [id number]  Admission Date: 12/26/2021  Hospital Length of Stay: 1 days  Code Status: Full Code  Attending Provider: Fernando Topete MD  Primary Care Provider: Freeman Caballero MD   Principal Problem: COPD exacerbation    Subjective:     Interval History: Patient on 10L NC today, but feels well overall. SOB is about the same. Worried about possibly having to stop Keytruda.     Objective:     Vital Signs (Most Recent):  Temp: 98.3 °F (36.8 °C) (12/28/21 1158)  Pulse: 92 (12/28/21 1425)  Resp: 19 (12/28/21 1425)  BP: 128/76 (12/28/21 1158)  SpO2: (!) 94 % (12/28/21 1425) Vital Signs (24h Range):  Temp:  [95.7 °F (35.4 °C)-98.3 °F (36.8 °C)] 98.3 °F (36.8 °C)  Pulse:  [] 92  Resp:  [16-21] 19  SpO2:  [89 %-98 %] 94 %  BP: (108-128)/(64-82) 128/76     Weight: 86.2 kg (190 lb)  Body mass index is 30.67 kg/m².    No intake or output data in the 24 hours ending 12/28/21 1626    Physical Exam  Vitals and nursing note reviewed.   Constitutional:       Appearance: She is well-developed. She is obese. She is not ill-appearing, toxic-appearing or diaphoretic.   HENT:      Head: Normocephalic and atraumatic.   Eyes:      Conjunctiva/sclera: Conjunctivae normal.      Pupils: Pupils are equal, round, and reactive to light.   Cardiovascular:      Rate and Rhythm: Normal rate and regular rhythm.      Heart sounds: Normal heart sounds.   Pulmonary:      Effort: Pulmonary effort is normal. No respiratory distress.      Breath sounds: Wheezing present. No rales.      Comments: 10L NC  Abdominal:      General: Bowel sounds are normal.      Palpations: Abdomen is soft.      Tenderness: There is no abdominal tenderness.   Musculoskeletal:      Right lower leg: No edema.      Left lower leg: No edema.   Skin:     Capillary Refill: Capillary refill takes 2 to 3 seconds.   Neurological:      Mental Status: She is alert and oriented to  person, place, and time.   Psychiatric:         Behavior: Behavior normal.         Thought Content: Thought content normal.         Judgment: Judgment normal.         Vents:  Oxygen Concentration (%): 50 (12/26/21 2154)    Lines/Drains/Airways     Drain            Female External Urinary Catheter 12/26/21 2147 1 day          Peripheral Intravenous Line                 Peripheral IV - Single Lumen 12/26/21 2135 18 G Right Wrist 1 day                Significant Labs:    CBC/Anemia Profile:  Recent Labs   Lab 12/26/21 2136 12/26/21 2141 12/28/21 0329   WBC  --  16.65* 22.65*   HGB  --  10.0* 10.1*   HCT 32* 33.1* 33.5*   PLT  --  372 442   MCV  --  84 83   RDW  --  14.9* 15.3*        Chemistries:  Recent Labs   Lab 12/26/21 2141 12/28/21 0329    138   K 4.5 4.2    99   CO2 24 28   BUN 20 22   CREATININE 1.2 1.1   CALCIUM 9.4 9.5   ALBUMIN 2.7* 2.8*   PROT 7.4 7.1   BILITOT 0.2 0.2   ALKPHOS 90 94   ALT 13 12   AST 19 16   MG  --  1.9   PHOS  --  3.6       All pertinent labs within the past 24 hours have been reviewed.    Significant Imaging:  I have reviewed all pertinent imaging results/findings within the past 24 hours.    Assessment/Plan:     * COPD exacerbation  Patient's acute on chronic hypoxemic respiratory failure after short admission with 5 day course of prednisone and antibiotics. Patient began to feel worse after completing her course of both. Continues to have wheezing and cough of clear sputum.      CT Chest largely unchanged from prior, but now having had LLL ground glass without focal lobular opacification and prior RUL mass which distorts vascular anatomy largely unchanged.     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     Feel her presentation is COPD exacerbation in addition to PDL1 pneumonitis (as it appears her ground glass infiltrates on CT resemble a pattern  of a radiation beam and PDL1i can cause pneumonitis in that same distribution)    Recommendations  - LAMA/LABA/ICS while inpatient  - schedule duonebs q6h for 1 day and then PRN for wheezing   - discharge with albuterol/duonebs PRN   - Patient with worsening hypoxia. 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 Continue to monitor. May ultimately need to discontinue pembro  - Continue levaquin for 7 days for severe COPD exac    - in regards to anticoagulation, do not think pt has VTE at this time. LE DVT US neg, agree with lovenox ppx         Rena Uriostegui MD  Pulmonary and Critical Care Fellow  12/28/2021  4:32 PM

## 2021-12-28 NOTE — PROGRESS NOTES
[facility] - Cardiology [facility] Medicine  Progress Note    Patient Name: Janeth Boyce  MRN: [id number]  Patient Class: IP- Inpatient   Admission Date: 12/26/2021  Length of Stay: 1 days  Attending Physician: Fernando Topete MD  Primary Care Provider: Freeman Caballero MD        Subjective:     Principal Problem:COPD exacerbation        HPI:  Janeth Boyce is a 60 y/o F with a PMH of COPD on home oxygen 2 L nasal cannula, stage IV small cell adenocarcinoma of the lung with Mets to right cerebellum (s/p palliative XRT, on pembrolizumab), malignant pleural effusion who presents with SOB. She was recently admitted 12/14-12/16 for pneumonia and COPD exacerbation. She states that the SOB of began two days ago and acutely worsened today. She reports an associated cough that is productive with white sputum. She states that she has CP and incontinence associated with the cough. She also reports PRITCHETT, stating that she cannot walk to the bathroom without getting significantly SOB. She reports chills but has not checked her temperature. She had one episode of diarrhea today, denies nausea/vomiting.     In the ED, her O2 sat was initially 78% and she was placed on bipap. She was able to be weaned to 6L NC satting 96%. Afebrile and /78. Labs significant for WBC 16.65, lactate 2.4. Trop and BNP normal. UA unremarkable. CXR with progression of airspace disease in the left hemithorax, stable appearance of airspace disease in the right hemithorax. CTA chest concerning for occlusion of RUL pulmonary artery branch, intraluminal infiltration or tumor thrombus vs thromboembolus. Pulmonology consulted by ED, do not think anticoagulation is warranted and agree with treatment as COPD exacerbation. Admitted to hospital medicine for further management.       Overview/Hospital Course:  Patient admitted to hospital medicine for evaluation of dyspnea. CT Chest notable for new nonspecific 2.4 cm ground-glass opacity within  the left lower lobe. Lower extremity ultrasound negative for DVT. TTE normal with EF of 60%. Hem/Onc consulted as suspicion for Keytruda induced pneumonitis, but per evaluation: ground glass opacities primarily in the lower lobes and is not entirely consistent with immunotherapy related pneumonitis (especially as she has already improved without steroids). Pulm consulted: high suspicion for  COPD exacerbation in addition to PDL1 pneumonitis , recommended holding keytruda and administer solumedrol 80mg IV daily, duonebs + levofloxacin x 5 days.      Interval History: Patient seen and examined. No acute events overnight, afebrile, vital signs stable. Currently breathing on 10L via HFNC. Increasing oxygen requirements overnights with sats reported in the 80s. No evidence of DVT on LE U/S yesterday. TTE normal with EF of 60%.     Review of Systems   Constitutional: Positive for activity change and fatigue. Negative for appetite change, chills, diaphoresis, fever and unexpected weight change.   HENT: Negative for congestion and rhinorrhea.    Eyes: Negative.    Respiratory: Positive for cough, shortness of breath and wheezing. Negative for chest tightness.    Cardiovascular: Negative for chest pain, palpitations and leg swelling.   Gastrointestinal: Negative for abdominal distention, nausea and vomiting.   Endocrine: Negative.    Genitourinary: Negative.    Musculoskeletal: Negative.    Skin: Negative.    Allergic/Immunologic: Negative.    Neurological: Negative for dizziness and numbness.   Psychiatric/Behavioral: Negative.      Objective:     Vital Signs (Most Recent):  Temp: 97.1 °F (36.2 °C) (12/28/21 0744)  Pulse: 94 (12/28/21 0750)  Resp: 18 (12/28/21 0750)  BP: 115/68 (12/28/21 0744)  SpO2: (!) 92 % (12/28/21 0750) Vital Signs (24h Range):  Temp:  [95.7 °F (35.4 °C)-98.1 °F (36.7 °C)] 97.1 °F (36.2 °C)  Pulse:  [] 94  Resp:  [16-21] 18  SpO2:  [89 %-98 %] 92 %  BP: (108-125)/(64-82) 115/68     Weight: 86.2  kg (190 lb)  Body mass index is 30.67 kg/m².    Intake/Output Summary (Last 24 hours) at 12/28/2021 1054  Last data filed at 12/27/2021 1615  Gross per 24 hour   Intake --   Output 1600 ml   Net -1600 ml      Physical Exam  Vitals and nursing note reviewed.   Constitutional:       General: She is not in acute distress.     Appearance: Normal appearance. She is not ill-appearing, toxic-appearing or diaphoretic.   HENT:      Head: Normocephalic and atraumatic.      Mouth/Throat:      Mouth: Mucous membranes are moist.   Eyes:      General: No scleral icterus.        Right eye: No discharge.         Left eye: No discharge.   Cardiovascular:      Rate and Rhythm: Normal rate and regular rhythm.      Pulses: Normal pulses.      Heart sounds: Normal heart sounds.   Pulmonary:      Effort: Pulmonary effort is normal. No respiratory distress.      Breath sounds: Wheezing present. No rhonchi or rales.   Abdominal:      General: Abdomen is flat. Bowel sounds are normal. There is no distension.   Musculoskeletal:         General: No swelling.      Cervical back: Normal range of motion.      Right lower leg: No edema.      Left lower leg: No edema.   Skin:     General: Skin is warm and dry.      Capillary Refill: Capillary refill takes less than 2 seconds.      Coloration: Skin is not jaundiced.   Neurological:      General: No focal deficit present.      Mental Status: She is alert and oriented to person, place, and time. Mental status is at baseline.         Significant Labs:   All pertinent labs within the past 24 hours have been reviewed.  CBC:   Recent Labs   Lab 12/26/21 2136 12/26/21 2141 12/28/21  0329   WBC  --  16.65* 22.65*   HGB  --  10.0* 10.1*   HCT 32* 33.1* 33.5*   PLT  --  372 442     CMP:   Recent Labs   Lab 12/26/21 2141 12/28/21  0329    138   K 4.5 4.2    99   CO2 24 28   * 118*   BUN 20 22   CREATININE 1.2 1.1   CALCIUM 9.4 9.5   PROT 7.4 7.1   ALBUMIN 2.7* 2.8*   BILITOT 0.2 0.2    ALKPHOS 90 94   AST 19 16   ALT 13 12   ANIONGAP 9 11   EGFRNONAA 48.6* 53.9*     Magnesium:   Recent Labs   Lab 12/28/21  0329   MG 1.9         Significant Imaging: I have reviewed all pertinent imaging results/findings within the past 24 hours.     US Lower Extremity Veins Bilateral   Impression:       No evidence of deep venous thrombosis in either lower extremity.            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 Recently admitted 12/14-12/16 for pneumonia and COPD exacerbation. In the ED, her O2 sat was initially 78% and she was placed on bipap. She was able to be weaned to 6L NC satting 96%. Labs significant for WBC 16.65, but recently on steroids, and lactate 2.4. CXR with progression of airspace disease in the left hemithorax, stable appearance of airspace disease in the right hemithorax. CTA chest concerning for occlusion of RUL pulmonary artery branch, intraluminal infiltration or tumor thrombus vs thromboembolus. Pulmonology consulted by ED, do not think anticoagulation is warranted. She reports some improvement in symptoms while in ED. Admitted to hospital medicine for management of COPD exacerbation.   -Lower extremity ultrasound negative for DVT. TTE normal with EF of 60%.  -Hem/Onc consulted as suspicion for Keytruda induced pneumonitis, but per evaluation: ground glass opacities primarily in the lower lobes and is not entirely consistent with immunotherapy related pneumonitis (especially as she has already improved without steroids)  -Pulm consulted: high suspicion for  COPD exacerbation in addition to PDL1 pneumonitis , recommended holding keytruda and administer solumedrol 80mg IV daily, duonebs + levofloxacin x 5 days, procalcitonin and RIP    Microbiology Results (last 7 days)   "   Procedure Component Value Units Date/Time    Respiratory Infection Panel (PCR), Nasopharyngeal [[id number]]     Order Status: No result Specimen: Nasopharyngeal Swab     Blood culture x two cultures. Draw prior to antibiotics. [[id number]] Collected: 12/26/21 2304    Order Status: Completed Specimen: Blood from Peripheral, Hand, Left Updated: 12/28/21 0613     Blood Culture, Routine No Growth to date      No Growth to date    Narrative:      Aerobic and anaerobic    Blood culture x two cultures. Draw prior to antibiotics. [[id number]] Collected: 12/26/21 2135    Order Status: Completed Specimen: Blood from Peripheral, Hand, Right Updated: 12/28/21 0613     Blood Culture, Routine No Growth to date      No Growth to date    Narrative:      Aerobic and anaerobic    Respiratory Infection Panel (PCR), Nasopharyngeal [[id number]] Collected: 12/27/21 1725    Order Status: Canceled Specimen: Nasopharyngeal Swab         Antibiotics (From admission, onward)            Start     Stop Route Frequency Ordered    12/27/21 1600  levoFLOXacin tablet 750 mg         01/01 0859 Oral Daily 12/27/21 1500            PLAN  - symbicort not on formulary, will continue with breo inhaler   - 50mg methlyprednisolone daily   - duo nebs PRN  - levofloxacin 7 day course for severe COPD exacerabation  - continue phenergan-codeine prn for cough  - wean O2 as tolerated         Pneumonitis  See "COPD exacerbation" A&P      Acute on chronic respiratory failure with hypoxemia  See "COPD exacerbation" A&P    Chronic respiratory failure with hypoxia  Patient with Hypoxic Respiratory failure which is Acute on chronic.  she is on home oxygen at 2 LPM. Supplemental oxygen was provided and noted- Oxygen Concentration (%):  [50-60] 50.   Signs/symptoms of respiratory failure include- wheezing. Contributing diagnoses includes - COPD Labs and images were reviewed. Patient Has not had a recent ABG. Will treat underlying causes and adjust management of respiratory " failure as follows-      See COPD exacerbation.     GERD (gastroesophageal reflux disease)  - protonix daily      Anxiety  -Continue Valium 2mg  - continue bupropion   - continue atarax prn     Adenocarcinoma of lung, stage 4, right  Follows with Dr. Peterson. S/p Carboplatin AUC4 x1 dose 5/27/20 and palliative radiation (5/27/2020 - 6/2/2020; 20 Gy to right lung). Also received SRS to right cerebellar metastasis 6/3/2020. Pembrolizumab started 6/4/2020, C23 on 12/21/2021.  - Hem Onc consulted by ED  - continue gabapentin  - continue tramadol prn       Essential hypertension  Normotensive on admission. On amlodipine and irbesartan-hydrochlorothiazide.  - monitor BP  - resume home meds when appropriate     VTE Risk Mitigation (From admission, onward)         Ordered     enoxaparin injection 40 mg  Daily         12/27/21 0536     IP VTE HIGH RISK PATIENT  Once         12/27/21 0536     Place sequential compression device  Until discontinued         12/27/21 0536                Discharge Planning   TIFFANIE: 12/29/2021     Code Status: Full Code   Is the patient medically ready for discharge?:     Reason for patient still in hospital (select all that apply): Patient trending condition  Discharge Plan A: Home                  Terry Restrepo DO  Department of Hospital Medicine   [facility] - Cardiology Stepdown

## 2021-12-28 NOTE — ASSESSMENT & PLAN NOTE
Patient's acute on chronic hypoxemic respiratory failure after short admission with 5 day course of prednisone and antibiotics. Patient began to feel worse after completing her course of both. Continues to have wheezing and cough of clear sputum.      CT Chest largely unchanged from prior, but now having had LLL ground glass without focal lobular opacification and prior RUL mass which distorts vascular anatomy largely unchanged.     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     Feel her presentation is COPD exacerbation in addition to PDL1 pneumonitis (as it appears her ground glass infiltrates on CT resemble a pattern of a radiation beam and PDL1i can cause pneumonitis in that same distribution)    Recommendations  - LAMA/LABA/ICS while inpatient  - schedule duonebs q6h for 1 day and then PRN for wheezing   - discharge with albuterol/duonebs PRN   - Patient with worsening hypoxia. 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 Continue to monitor. May ultimately need to discontinue pembro  - Continue levaquin for 7 days for severe COPD exac    - in regards to anticoagulation, do not think pt has VTE at this time. LE DVT US neg, agree with lovenox ppx

## 2021-12-28 NOTE — NURSING
Patient continue with productive cough, sputum dark in color continue to be independently with bath room privileges.commode at bedside.call light within reach..

## 2021-12-28 NOTE — CARE UPDATE
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 Patient's next dose of immunotherapy will not be due until Feb 1st so will defer decision on whether to continue current treatment plan to her outpatient oncologist.     Tania Landry, DO  PGY-IV  Hematology/Oncology Fellow

## 2021-12-28 NOTE — SUBJECTIVE & OBJECTIVE
Interval History: Patient seen and examined. No acute events overnight, afebrile, vital signs stable. Currently breathing on 10L via HFNC. Increasing oxygen requirements overnights with sats reported in the 80s. No evidence of DVT on LE U/S yesterday. TTE normal with EF of 60%.     Review of Systems   Constitutional: Positive for activity change and fatigue. Negative for appetite change, chills, diaphoresis, fever and unexpected weight change.   HENT: Negative for congestion and rhinorrhea.    Eyes: Negative.    Respiratory: Positive for cough, shortness of breath and wheezing. Negative for chest tightness.    Cardiovascular: Negative for chest pain, palpitations and leg swelling.   Gastrointestinal: Negative for abdominal distention, nausea and vomiting.   Endocrine: Negative.    Genitourinary: Negative.    Musculoskeletal: Negative.    Skin: Negative.    Allergic/Immunologic: Negative.    Neurological: Negative for dizziness and numbness.   Psychiatric/Behavioral: Negative.      Objective:     Vital Signs (Most Recent):  Temp: 97.1 °F (36.2 °C) (12/28/21 0744)  Pulse: 94 (12/28/21 0750)  Resp: 18 (12/28/21 0750)  BP: 115/68 (12/28/21 0744)  SpO2: (!) 92 % (12/28/21 0750) Vital Signs (24h Range):  Temp:  [95.7 °F (35.4 °C)-98.1 °F (36.7 °C)] 97.1 °F (36.2 °C)  Pulse:  [] 94  Resp:  [16-21] 18  SpO2:  [89 %-98 %] 92 %  BP: (108-125)/(64-82) 115/68     Weight: 86.2 kg (190 lb)  Body mass index is 30.67 kg/m².    Intake/Output Summary (Last 24 hours) at 12/28/2021 1054  Last data filed at 12/27/2021 1615  Gross per 24 hour   Intake --   Output 1600 ml   Net -1600 ml      Physical Exam  Vitals and nursing note reviewed.   Constitutional:       General: She is not in acute distress.     Appearance: Normal appearance. She is not ill-appearing, toxic-appearing or diaphoretic.   HENT:      Head: Normocephalic and atraumatic.      Mouth/Throat:      Mouth: Mucous membranes are moist.   Eyes:      General: No scleral  icterus.        Right eye: No discharge.         Left eye: No discharge.   Cardiovascular:      Rate and Rhythm: Normal rate and regular rhythm.      Pulses: Normal pulses.      Heart sounds: Normal heart sounds.   Pulmonary:      Effort: Pulmonary effort is normal. No respiratory distress.      Breath sounds: Wheezing present. No rhonchi or rales.   Abdominal:      General: Abdomen is flat. Bowel sounds are normal. There is no distension.   Musculoskeletal:         General: No swelling.      Cervical back: Normal range of motion.      Right lower leg: No edema.      Left lower leg: No edema.   Skin:     General: Skin is warm and dry.      Capillary Refill: Capillary refill takes less than 2 seconds.      Coloration: Skin is not jaundiced.   Neurological:      General: No focal deficit present.      Mental Status: She is alert and oriented to person, place, and time. Mental status is at baseline.         Significant Labs:   All pertinent labs within the past 24 hours have been reviewed.  CBC:   Recent Labs   Lab 12/26/21 2136 12/26/21 2141 12/28/21  0329   WBC  --  16.65* 22.65*   HGB  --  10.0* 10.1*   HCT 32* 33.1* 33.5*   PLT  --  372 442     CMP:   Recent Labs   Lab 12/26/21 2141 12/28/21  0329    138   K 4.5 4.2    99   CO2 24 28   * 118*   BUN 20 22   CREATININE 1.2 1.1   CALCIUM 9.4 9.5   PROT 7.4 7.1   ALBUMIN 2.7* 2.8*   BILITOT 0.2 0.2   ALKPHOS 90 94   AST 19 16   ALT 13 12   ANIONGAP 9 11   EGFRNONAA 48.6* 53.9*     Magnesium:   Recent Labs   Lab 12/28/21  0329   MG 1.9         Significant Imaging: I have reviewed all pertinent imaging results/findings within the past 24 hours.     US Lower Extremity Veins Bilateral   Impression:       No evidence of deep venous thrombosis in either lower extremity.

## 2021-12-28 NOTE — SUBJECTIVE & OBJECTIVE
Interval History: Patient on 10L NC today, but feels well overall. SOB is about the same. Worried about possibly having to stop Keytruda.     Objective:     Vital Signs (Most Recent):  Temp: 98.3 °F (36.8 °C) (12/28/21 1158)  Pulse: 92 (12/28/21 1425)  Resp: 19 (12/28/21 1425)  BP: 128/76 (12/28/21 1158)  SpO2: (!) 94 % (12/28/21 1425) Vital Signs (24h Range):  Temp:  [95.7 °F (35.4 °C)-98.3 °F (36.8 °C)] 98.3 °F (36.8 °C)  Pulse:  [] 92  Resp:  [16-21] 19  SpO2:  [89 %-98 %] 94 %  BP: (108-128)/(64-82) 128/76     Weight: 86.2 kg (190 lb)  Body mass index is 30.67 kg/m².    No intake or output data in the 24 hours ending 12/28/21 1626    Physical Exam  Vitals and nursing note reviewed.   Constitutional:       Appearance: She is well-developed. She is obese. She is not ill-appearing, toxic-appearing or diaphoretic.   HENT:      Head: Normocephalic and atraumatic.   Eyes:      Conjunctiva/sclera: Conjunctivae normal.      Pupils: Pupils are equal, round, and reactive to light.   Cardiovascular:      Rate and Rhythm: Normal rate and regular rhythm.      Heart sounds: Normal heart sounds.   Pulmonary:      Effort: Pulmonary effort is normal. No respiratory distress.      Breath sounds: Wheezing present. No rales.      Comments: 10L NC  Abdominal:      General: Bowel sounds are normal.      Palpations: Abdomen is soft.      Tenderness: There is no abdominal tenderness.   Musculoskeletal:      Right lower leg: No edema.      Left lower leg: No edema.   Skin:     Capillary Refill: Capillary refill takes 2 to 3 seconds.   Neurological:      Mental Status: She is alert and oriented to person, place, and time.   Psychiatric:         Behavior: Behavior normal.         Thought Content: Thought content normal.         Judgment: Judgment normal.         Vents:  Oxygen Concentration (%): 50 (12/26/21 2154)    Lines/Drains/Airways     Drain            Female External Urinary Catheter 12/26/21 2147 1 day          Peripheral  Intravenous Line                 Peripheral IV - Single Lumen 12/26/21 2135 18 G Right Wrist 1 day                Significant Labs:    CBC/Anemia Profile:  Recent Labs   Lab 12/26/21 2136 12/26/21 2141 12/28/21 0329   WBC  --  16.65* 22.65*   HGB  --  10.0* 10.1*   HCT 32* 33.1* 33.5*   PLT  --  372 442   MCV  --  84 83   RDW  --  14.9* 15.3*        Chemistries:  Recent Labs   Lab 12/26/21 2141 12/28/21 0329    138   K 4.5 4.2    99   CO2 24 28   BUN 20 22   CREATININE 1.2 1.1   CALCIUM 9.4 9.5   ALBUMIN 2.7* 2.8*   PROT 7.4 7.1   BILITOT 0.2 0.2   ALKPHOS 90 94   ALT 13 12   AST 19 16   MG  --  1.9   PHOS  --  3.6       All pertinent labs within the past 24 hours have been reviewed.    Significant Imaging:  I have reviewed all pertinent imaging results/findings within the past 24 hours.

## 2021-12-29 NOTE — PROGRESS NOTES
[facility] - Cardiology Stepdown  Pulmonology  Progress Note    Patient Name: Janeth Boyce  MRN: [id number]  Admission Date: 12/26/2021  Hospital Length of Stay: 2 days  Code Status: Full Code  Attending Provider: Fernando Topete MD  Primary Care Provider: Freeman Caballero MD   Principal Problem: COPD exacerbation    Subjective:     Interval History: Patient down to 5L NC today. In good spirits, dyspnea improving. Still coughing up white sputum at night.     Objective:     Vital Signs (Most Recent):  Temp: 96.4 °F (35.8 °C) (12/29/21 1131)  Pulse: 93 (12/29/21 1131)  Resp: 18 (12/29/21 1131)  BP: (!) 136/96 (12/29/21 1131)  SpO2: (!) 94 % (12/29/21 1131) Vital Signs (24h Range):  Temp:  [96.4 °F (35.8 °C)-98.2 °F (36.8 °C)] 96.4 °F (35.8 °C)  Pulse:  [] 93  Resp:  [16-24] 18  SpO2:  [89 %-97 %] 94 %  BP: (112-141)/(65-96) 136/96     Weight: 86.4 kg (190 lb 7.6 oz)  Body mass index is 30.74 kg/m².      Intake/Output Summary (Last 24 hours) at 12/29/2021 1205  Last data filed at 12/29/2021 0406  Gross per 24 hour   Intake 240 ml   Output 1600 ml   Net -1360 ml       Physical Exam  Vitals and nursing note reviewed.   Constitutional:       Appearance: She is well-developed. She is obese. She is not ill-appearing, toxic-appearing or diaphoretic.   HENT:      Head: Normocephalic and atraumatic.   Eyes:      Conjunctiva/sclera: Conjunctivae normal.      Pupils: Pupils are equal, round, and reactive to light.   Cardiovascular:      Rate and Rhythm: Normal rate and regular rhythm.      Heart sounds: Normal heart sounds.   Pulmonary:      Effort: Pulmonary effort is normal. No respiratory distress.      Breath sounds: Wheezing present. No rales.      Comments: 5L NC  Abdominal:      General: Bowel sounds are normal.      Palpations: Abdomen is soft.      Tenderness: There is no abdominal tenderness.   Musculoskeletal:      Right lower leg: No edema.      Left lower leg: No edema.   Neurological:      Mental Status:  She is alert and oriented to person, place, and time.   Psychiatric:         Behavior: Behavior normal.         Thought Content: Thought content normal.         Judgment: Judgment normal.         Vents:  Oxygen Concentration (%): 50 (12/26/21 2154)    Lines/Drains/Airways     Peripheral Intravenous Line                 Peripheral IV - Single Lumen 12/26/21 2135 18 G Right Wrist 2 days                Significant Labs:    CBC/Anemia Profile:  Recent Labs   Lab 12/28/21 0329 12/29/21  0454   WBC 22.65* 15.61*   HGB 10.1* 10.0*   HCT 33.5* 33.0*    436   MCV 83 84   RDW 15.3* 15.0*        Chemistries:  Recent Labs   Lab 12/28/21 0329 12/29/21  0454    136   K 4.2 4.9   CL 99 101   CO2 28 28   BUN 22 20   CREATININE 1.1 1.0   CALCIUM 9.5 9.8   ALBUMIN 2.8* 2.6*   PROT 7.1 7.4   BILITOT 0.2 0.3   ALKPHOS 94 94   ALT 12 12   AST 16 16   MG 1.9 1.9   PHOS 3.6 3.6       All pertinent labs within the past 24 hours have been reviewed.    Significant Imaging:  I have reviewed all pertinent imaging results/findings within the past 24 hours.    Assessment/Plan:     * COPD exacerbation  Patient's acute on chronic hypoxemic respiratory failure after short admission with 5 day course of prednisone and antibiotics. Patient began to feel worse after completing her course of both. Continues to have wheezing and cough of clear sputum.      CT Chest largely unchanged from prior, but now having had LLL ground glass without focal lobular opacification and prior RUL mass which distorts vascular anatomy largely unchanged.     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     Feel her presentation is COPD exacerbation in addition to PDL1 pneumonitis (as it appears her ground glass infiltrates on CT resemble a pattern of a radiation beam and PDL1i can cause pneumonitis in that same  distribution)    Recommendations  - LAMA/LABA/ICS while inpatient  - schedule duonebs q6h for 1 day and then PRN for wheezing   - discharge with albuterol/duonebs PRN   - Patient with worsening hypoxia. 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 Continue to monitor. May ultimately need to discontinue pembro.   - Improving, continue 80 IV for now, likely will need prolonged steroid course   - Continue levaquin for 5 days     - in regards to anticoagulation, do not think pt has VTE at this time. LE DVT US neg, agree with lovenox ppx             We will follow with you, please call with any questions.    Rena Uriostegui MD  Pulmonary and Critical Care Fellow  12/29/2021  12:11 PM

## 2021-12-29 NOTE — PLAN OF CARE
Problem: Adult Inpatient Plan of Care  Goal: Plan of Care Review  Outcome: Ongoing, Progressing  Flowsheets (Taken 12/29/2021 7780)  Plan of Care Reviewed With: patient  Goal: Patient-Specific Goal (Individualized)  Outcome: Ongoing, Progressing  Goal: Absence of Hospital-Acquired Illness or Injury  Outcome: Ongoing, Progressing  Goal: Optimal Comfort and Wellbeing  Outcome: Ongoing, Progressing  Goal: Readiness for Transition of Care  Outcome: Ongoing, Progressing     Problem: Adjustment to Illness COPD (Chronic Obstructive Pulmonary Disease)  Goal: Optimal Chronic Illness Coping  Outcome: Ongoing, Progressing     Problem: Functional Ability Impaired COPD (Chronic Obstructive Pulmonary Disease)  Goal: Optimal Level of Functional [place]  Outcome: Ongoing, Progressing     Problem: Infection COPD (Chronic Obstructive Pulmonary Disease)  Goal: Absence of Infection Signs and Symptoms  Outcome: Ongoing, Progressing     Problem: Oral Intake Inadequate COPD (Chronic Obstructive Pulmonary Disease)  Goal: Improved Nutrition Intake  Outcome: Ongoing, Progressing     Problem: Respiratory Compromise COPD (Chronic Obstructive Pulmonary Disease)  Goal: Effective Oxygenation and Ventilation  Outcome: Ongoing, Progressing     Problem: Fluid and Electrolyte Imbalance (Acute Kidney Injury/Impairment)  Goal: Fluid and Electrolyte Balance  Outcome: Ongoing, Progressing     Problem: Oral Intake Inadequate (Acute Kidney Injury/Impairment)  Goal: Optimal Nutrition Intake  Outcome: Ongoing, Progressing     Problem: Renal Function Impairment (Acute Kidney Injury/Impairment)  Goal: Effective Renal Function  Outcome: Ongoing, Progressing     Problem: Fluid Imbalance (Pneumonia)  Goal: Fluid Balance  Outcome: Ongoing, Progressing     Problem: Infection (Pneumonia)  Goal: Resolution of Infection Signs and Symptoms  Outcome: Ongoing, Progressing     Problem: Respiratory Compromise (Pneumonia)  Goal: Effective Oxygenation and  Ventilation  Outcome: Ongoing, Progressing     Problem: Fall Injury Risk  Goal: Absence of Fall and Fall-Related Injury  Outcome: Ongoing, Progressing

## 2021-12-29 NOTE — NURSING
Patient sitting at bedside. alert wake no objective coughing. No signs  and sympotoms of pain. call light within reach. Safety measure implement.

## 2021-12-29 NOTE — PLAN OF CARE
Problem: Occupational Therapy Goal  Goal: Occupational Therapy Goal  Description: Goals to be met by: 1/12/22     Patient will increase functional independence with ADLs by performing:    Feeding with Set-up Assistance.  UE Dressing with Stand-by Assistance.  LE Dressing with Stand-by Assistance.  Grooming while standing at sink with Stand-by Assistance.  Toileting from toilet with Stand-by Assistance for hygiene and clothing management.   Toilet transfer to toilet with Stand-by Assistance.    Outcome: Ongoing, Progressing     Evaluation complete-see note for details. Goals and POC established.    COBY Kern/L  12/29/2021   Pager #: [phone]

## 2021-12-29 NOTE — PLAN OF CARE
Pt maintained free from falls/trauma/injuries and skin breakdown. Pt c/o knee pain and tylenol given and helped. BG monitored and no insulin needed. Plan of care reviewed. Pt verbalized understanding. All questions and concerns addressed. Will continue to monitor.

## 2021-12-29 NOTE — SUBJECTIVE & OBJECTIVE
Interval History: Patient seen and examined. No acute events overnight, afebrile, vital signs stable. Currently breathing on 5L via NC      Review of Systems   Constitutional: Positive for activity change and fatigue. Negative for appetite change, chills, diaphoresis, fever and unexpected weight change.   HENT: Negative for congestion and rhinorrhea.    Eyes: Negative.    Respiratory: Positive for cough, shortness of breath and wheezing. Negative for chest tightness.    Cardiovascular: Negative for chest pain, palpitations and leg swelling.   Gastrointestinal: Negative for abdominal distention, nausea and vomiting.   Endocrine: Negative.    Genitourinary: Negative.    Musculoskeletal: Positive for arthralgias and back pain.   Skin: Negative.    Allergic/Immunologic: Negative.    Neurological: Negative for dizziness and numbness.   Psychiatric/Behavioral: Negative.      Objective:     Vital Signs (Most Recent):  Temp: 96.4 °F (35.8 °C) (12/29/21 1131)  Pulse: 93 (12/29/21 1131)  Resp: 18 (12/29/21 1131)  BP: (!) 136/96 (12/29/21 1131)  SpO2: (!) 94 % (12/29/21 1131) Vital Signs (24h Range):  Temp:  [96.4 °F (35.8 °C)-98.3 °F (36.8 °C)] 96.4 °F (35.8 °C)  Pulse:  [] 93  Resp:  [16-24] 18  SpO2:  [89 %-97 %] 94 %  BP: (112-141)/(65-96) 136/96     Weight: 86.4 kg (190 lb 7.6 oz)  Body mass index is 30.74 kg/m².    Intake/Output Summary (Last 24 hours) at 12/29/2021 1145  Last data filed at 12/29/2021 0406  Gross per 24 hour   Intake 240 ml   Output 1600 ml   Net -1360 ml      Physical Exam  Vitals and nursing note reviewed.   Constitutional:       General: She is not in acute distress.     Appearance: Normal appearance. She is not ill-appearing, toxic-appearing or diaphoretic.   HENT:      Head: Normocephalic and atraumatic.      Mouth/Throat:      Mouth: Mucous membranes are moist.   Eyes:      General: No scleral icterus.        Right eye: No discharge.         Left eye: No discharge.   Cardiovascular:      Rate  and Rhythm: Normal rate and regular rhythm.      Pulses: Normal pulses.      Heart sounds: Normal heart sounds.   Pulmonary:      Effort: Pulmonary effort is normal. No respiratory distress.      Breath sounds: Wheezing present. No rhonchi or rales.   Abdominal:      General: Abdomen is flat. Bowel sounds are normal. There is no distension.   Musculoskeletal:         General: No swelling.      Cervical back: Normal range of motion.      Right lower leg: No edema.      Left lower leg: No edema.   Skin:     General: Skin is warm and dry.      Capillary Refill: Capillary refill takes less than 2 seconds.      Coloration: Skin is not jaundiced.   Neurological:      General: No focal deficit present.      Mental Status: She is alert and oriented to person, place, and time. Mental status is at baseline.         Significant Labs:   All pertinent labs within the past 24 hours have been reviewed.  CBC:   Recent Labs   Lab 12/28/21 0329 12/29/21  0454   WBC 22.65* 15.61*   HGB 10.1* 10.0*   HCT 33.5* 33.0*    436     CMP:   Recent Labs   Lab 12/28/21 0329 12/29/21  0454    136   K 4.2 4.9   CL 99 101   CO2 28 28   * 120*   BUN 22 20   CREATININE 1.1 1.0   CALCIUM 9.5 9.8   PROT 7.1 7.4   ALBUMIN 2.8* 2.6*   BILITOT 0.2 0.3   ALKPHOS 94 94   AST 16 16   ALT 12 12   ANIONGAP 11 7*   EGFRNONAA 53.9* >60.0     Magnesium:   Recent Labs   Lab 12/28/21 0329 12/29/21  0454   MG 1.9 1.9       Significant Imaging: I have reviewed all pertinent imaging results/findings within the past 24 hours.

## 2021-12-29 NOTE — RESPIRATORY THERAPY
RAPID RESPONSE RESPIRATORY THERAPY PROACTIVE ROUNDING NOTE             Time of visit: 08      Code Status: Full Code   : 1960  Bed: 345/345 A:   MRN: [id number]  Time spent at the bedside: < 15 min    SITUATION    Evaluated patient for: HFNC Compliance     BACKGROUND    Patient has a past medical history of Anxiety, Asthma, COPD exacerbation, Hepatitis C, psychiatric care, Hypertension, Lung cancer, Psychiatric exam requested by authority, and Psychiatric problem.    24 Hours Vitals Range:  Temp:  [97.4 °F (36.3 °C)-98.3 °F (36.8 °C)]   Pulse:  []   Resp:  [16-24]   BP: (112-141)/(65-84)   SpO2:  [89 %-97 %]     Labs:    Recent Labs     21  0329 21  0454    138 136   K 4.5 4.2 4.9    99 101   CO2 24 28 28   CREATININE 1.2 1.1 1.0   * 118* 120*   PHOS  --  3.6 3.6   MG  --  1.9 1.9        Recent Labs     21  0626   PH 7.360 7.509*   PCO2 43.0 32.6*   PO2 87* 106*   HCO3 24.3 25.9   POCSATURATED 96 99   BE -1 3       ASSESSMENT/INTERVENTIONS    Upon arrival in room pt resting comfortably with not resp distress at this time.     Last VS   Temp: 97.4 °F (36.3 °C) (811)  Pulse: 98 (855)  Resp: 18 (855)  BP: 127/75 (811)  SpO2: 92 % (855)    Level of Consciousness: Level of Consciousness (AVPU): alert  Respiratory Effort: Respiratory Effort: Unlabored Expansion/Accessory Muscle Usage: Expansion/Accessory Muscles/Retractions: no retractions,no use of accessory muscles  All Lung Field Breath Sounds: All Lung Fields Breath Sounds: Anterior:,Posterior:  CHRISTINA Breath Sounds: grunting, expiratory  LLL Breath Sounds: diminished  RUL Breath Sounds: diminished,grunting, expiratory  RML Breath Sounds: diminished  RLL Breath Sounds: diminished  O2 Device/Concentration: Flow (L/min): 5, Oxygen Concentration (%): 50, O2 Device (Oxygen Therapy): nasal cannula  Was the O2 device able to be weaned? No  Ambu at bedside: Ambu bag  with the patient?: Yes, Adult Ambu    Active Orders   Respiratory Care    Inhalation Treatment Q4H WAKE     Frequency: Q4H WAKE     Number of Occurrences: Until Specified    Inhalation Treatment Q6H     Frequency: Q6H     Number of Occurrences: Until Specified    Oxygen Continuous     Frequency: Continuous     Number of Occurrences: Until Specified     Order Questions:      Device type: Low flow      Device: Nasal Cannula (1- 5 Liters)      LPM: 5      Titrate O2 per Oxygen Titration Protocol: Yes      To maintain SpO2 goal of: >= 90%      Notify MD of: Inability to achieve desired SpO2; Sudden change in patient status and requires 20% increase in FiO2; Patient requires >60% FiO2    Pulse Oximetry Q4H     Frequency: Q4H     Number of Occurrences: Until Specified     Order Comments: Q4h with Vitals. Notify MD if < or = 88%         RECOMMENDATIONS    We recommend: RRT Recs: Continue POC per primary team.    ESCALATION        FOLLOW-UP    Please call back the Rapid Response RT, Christopher Farmer RRT at x [phone] for any questions or concerns.

## 2021-12-29 NOTE — ASSESSMENT & PLAN NOTE
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 Recently admitted 12/14-12/16 for pneumonia and COPD exacerbation. In the ED, her O2 sat was initially 78% and she was placed on bipap. She was able to be weaned to 6L NC satting 96%. Labs significant for WBC 16.65, but recently on steroids, and lactate 2.4. CXR with progression of airspace disease in the left hemithorax, stable appearance of airspace disease in the right hemithorax. CTA chest concerning for occlusion of RUL pulmonary artery branch, intraluminal infiltration or tumor thrombus vs thromboembolus. Pulmonology consulted by ED, do not think anticoagulation is warranted. She reports some improvement in symptoms while in ED. Admitted to hospital medicine for management of COPD exacerbation.   -Lower extremity ultrasound negative for DVT. TTE normal with EF of 60%.  -Hem/Onc consulted as suspicion for Keytruda induced pneumonitis, but per evaluation: ground glass opacities primarily in the lower lobes and is not entirely consistent with immunotherapy related pneumonitis (especially as she has already improved without steroids)  -Pulm consulted: high suspicion for  COPD exacerbation in addition to PDL1 pneumonitis , recommended holding keytruda and administer solumedrol 80mg IV daily, duonebs + levofloxacin x 5 days, procalcitonin and RIP    Microbiology Results (last 7 days)     Procedure Component Value Units Date/Time    Respiratory Infection Panel (PCR), Nasopharyngeal [[id number]] Collected: 12/29/21 1000    Order Status: Completed Specimen: Nasopharyngeal Swab Updated: 12/29/21 1057     Respiratory Infection Panel Source NP Swab    Narrative:      For all other respiratory sources, order [id number] -  Respiratory Viral Panel by PCR    Respiratory Infection Panel (PCR), Nasopharyngeal  [[id number]] Collected: 12/29/21 1000    Order Status: Completed Specimen: Nasopharyngeal Swab Updated: 12/29/21 1045     Respiratory Infection Panel Source NP Swab    Narrative:      For all other respiratory sources, order [id number] -  Respiratory Viral Panel by PCR    Blood culture x two cultures. Draw prior to antibiotics. [[id number]] Collected: 12/26/21 2304    Order Status: Completed Specimen: Blood from Peripheral, Hand, Left Updated: 12/29/21 0612     Blood Culture, Routine No Growth to date      No Growth to date      No Growth to date    Narrative:      Aerobic and anaerobic    Blood culture x two cultures. Draw prior to antibiotics. [[id number]] Collected: 12/26/21 2135    Order Status: Completed Specimen: Blood from Peripheral, Hand, Right Updated: 12/29/21 0612     Blood Culture, Routine No Growth to date      No Growth to date      No Growth to date    Narrative:      Aerobic and anaerobic    Respiratory Infection Panel (PCR), Nasopharyngeal [[id number]] Collected: 12/27/21 1725    Order Status: Canceled Specimen: Nasopharyngeal Swab         Antibiotics (From admission, onward)            Start     Stop Route Frequency Ordered    12/27/21 1600  levoFLOXacin tablet 750 mg         01/01 0859 Oral Daily 12/27/21 1500            PLAN  - symbicort not on formulary, will continue with breo inhaler   - Per pulm consult, for severe COPD exacerbation:   -duo nebs scheduled    -levofloxacin 5 day course    -80mg methlyprednisolone daily   -Per Hem/Onc next dose of immunotherapy is not due until Feb 1st so will defer decision on whether to continue current treatment plan to her outpatient oncologist.   - continue phenergan-codeine prn for cough  - wean O2 as tolerated

## 2021-12-29 NOTE — PT/OT/SLP PROGRESS
Physical Therapy      Patient Name:  Janeth Boyce   MRN:  [id number]    Patient not seen today secondary to Other (Comment) (Pt sitting EOB and politely requesting PT return tomorrow for evaluation.). Will follow-up tomorrow as appropriate.    12/29/2021

## 2021-12-29 NOTE — ASSESSMENT & PLAN NOTE
Patient's acute on chronic hypoxemic respiratory failure after short admission with 5 day course of prednisone and antibiotics. Patient began to feel worse after completing her course of both. Continues to have wheezing and cough of clear sputum.      CT Chest largely unchanged from prior, but now having had LLL ground glass without focal lobular opacification and prior RUL mass which distorts vascular anatomy largely unchanged.     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     Feel her presentation is COPD exacerbation in addition to PDL1 pneumonitis (as it appears her ground glass infiltrates on CT resemble a pattern of a radiation beam and PDL1i can cause pneumonitis in that same distribution)    Recommendations  - LAMA/LABA/ICS while inpatient  - schedule duonebs q6h for 1 day and then PRN for wheezing   - discharge with albuterol/duonebs PRN   - Patient with worsening hypoxia. 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 Continue to monitor. May ultimately need to discontinue pembro.   - Improving, continue 80 IV for now, likely will need prolonged steroid course   - Continue levaquin for 5 days     - in regards to anticoagulation, do not think pt has VTE at this time. LE DVT US neg, agree with lovenox ppx

## 2021-12-29 NOTE — PROGRESS NOTES
[facility] Medicine  Progress Note    Patient Name: Janeth Boyce  MRN: [id number]  Patient Class: IP- Inpatient   Admission Date: 12/26/2021  Length of Stay: 2 days  Attending Physician: Fernando Topete MD  Primary Care Provider: Freeman Caballero MD        Subjective:     Principal Problem:COPD exacerbation        HPI:  Janeth Boyce is a 62 y/o F with a PMH of COPD on home oxygen 2 L nasal cannula, stage IV small cell adenocarcinoma of the lung with Mets to right cerebellum (s/p palliative XRT, on pembrolizumab), malignant pleural effusion who presents with SOB. She was recently admitted 12/14-12/16 for pneumonia and COPD exacerbation. She states that the SOB of began two days ago and acutely worsened today. She reports an associated cough that is productive with white sputum. She states that she has CP and incontinence associated with the cough. She also reports PRITCHETT, stating that she cannot walk to the bathroom without getting significantly SOB. She reports chills but has not checked her temperature. She had one episode of diarrhea today, denies nausea/vomiting.     In the ED, her O2 sat was initially 78% and she was placed on bipap. She was able to be weaned to 6L NC satting 96%. Afebrile and /78. Labs significant for WBC 16.65, lactate 2.4. Trop and BNP normal. UA unremarkable. CXR with progression of airspace disease in the left hemithorax, stable appearance of airspace disease in the right hemithorax. CTA chest concerning for occlusion of RUL pulmonary artery branch, intraluminal infiltration or tumor thrombus vs thromboembolus. Pulmonology consulted by ED, do not think anticoagulation is warranted and agree with treatment as COPD exacerbation. Admitted to hospital medicine for further management.       Overview/Hospital Course:  Patient admitted to hospital medicine for evaluation of dyspnea. CT Chest notable for new nonspecific 2.4 cm ground-glass opacity within  the left lower lobe. Lower extremity ultrasound negative for DVT. TTE normal with EF of 60%. Hem/Onc consulted as suspicion for Keytruda induced pneumonitis, but per evaluation: ground glass opacities primarily in the lower lobes and is not entirely consistent with immunotherapy related pneumonitis (especially as she has already improved without steroids). Pulm consulted: high suspicion for  COPD exacerbation in addition to PDL1 pneumonitis , recommended holding keytruda and administer solumedrol 80mg IV daily, duonebs + levofloxacin x 5 days.      Interval History: Patient seen and examined. No acute events overnight, afebrile, vital signs stable. Currently breathing on 5L via NC      Review of Systems   Constitutional: Positive for activity change and fatigue. Negative for appetite change, chills, diaphoresis, fever and unexpected weight change.   HENT: Negative for congestion and rhinorrhea.    Eyes: Negative.    Respiratory: Positive for cough, shortness of breath and wheezing. Negative for chest tightness.    Cardiovascular: Negative for chest pain, palpitations and leg swelling.   Gastrointestinal: Negative for abdominal distention, nausea and vomiting.   Endocrine: Negative.    Genitourinary: Negative.    Musculoskeletal: Positive for arthralgias and back pain.   Skin: Negative.    Allergic/Immunologic: Negative.    Neurological: Negative for dizziness and numbness.   Psychiatric/Behavioral: Negative.      Objective:     Vital Signs (Most Recent):  Temp: 96.4 °F (35.8 °C) (12/29/21 1131)  Pulse: 93 (12/29/21 1131)  Resp: 18 (12/29/21 1131)  BP: (!) 136/96 (12/29/21 1131)  SpO2: (!) 94 % (12/29/21 1131) Vital Signs (24h Range):  Temp:  [96.4 °F (35.8 °C)-98.3 °F (36.8 °C)] 96.4 °F (35.8 °C)  Pulse:  [] 93  Resp:  [16-24] 18  SpO2:  [89 %-97 %] 94 %  BP: (112-141)/(65-96) 136/96     Weight: 86.4 kg (190 lb 7.6 oz)  Body mass index is 30.74 kg/m².    Intake/Output Summary (Last 24 hours) at 12/29/2021  1145  Last data filed at 12/29/2021 0406  Gross per 24 hour   Intake 240 ml   Output 1600 ml   Net -1360 ml      Physical Exam  Vitals and nursing note reviewed.   Constitutional:       General: She is not in acute distress.     Appearance: Normal appearance. She is not ill-appearing, toxic-appearing or diaphoretic.   HENT:      Head: Normocephalic and atraumatic.      Mouth/Throat:      Mouth: Mucous membranes are moist.   Eyes:      General: No scleral icterus.        Right eye: No discharge.         Left eye: No discharge.   Cardiovascular:      Rate and Rhythm: Normal rate and regular rhythm.      Pulses: Normal pulses.      Heart sounds: Normal heart sounds.   Pulmonary:      Effort: Pulmonary effort is normal. No respiratory distress.      Breath sounds: Wheezing present. No rhonchi or rales.   Abdominal:      General: Abdomen is flat. Bowel sounds are normal. There is no distension.   Musculoskeletal:         General: No swelling.      Cervical back: Normal range of motion.      Right lower leg: No edema.      Left lower leg: No edema.   Skin:     General: Skin is warm and dry.      Capillary Refill: Capillary refill takes less than 2 seconds.      Coloration: Skin is not jaundiced.   Neurological:      General: No focal deficit present.      Mental Status: She is alert and oriented to person, place, and time. Mental status is at baseline.         Significant Labs:   All pertinent labs within the past 24 hours have been reviewed.  CBC:   Recent Labs   Lab 12/28/21 0329 12/29/21 0454   WBC 22.65* 15.61*   HGB 10.1* 10.0*   HCT 33.5* 33.0*    436     CMP:   Recent Labs   Lab 12/28/21 0329 12/29/21  0454    136   K 4.2 4.9   CL 99 101   CO2 28 28   * 120*   BUN 22 20   CREATININE 1.1 1.0   CALCIUM 9.5 9.8   PROT 7.1 7.4   ALBUMIN 2.8* 2.6*   BILITOT 0.2 0.3   ALKPHOS 94 94   AST 16 16   ALT 12 12   ANIONGAP 11 7*   EGFRNONAA 53.9* >60.0     Magnesium:   Recent Labs   Lab 12/28/21 0329  12/29/21  0454   MG 1.9 1.9       Significant Imaging: I have reviewed all pertinent imaging results/findings within the past 24 hours.      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 Recently admitted 12/14-12/16 for pneumonia and COPD exacerbation. In the ED, her O2 sat was initially 78% and she was placed on bipap. She was able to be weaned to 6L NC satting 96%. Labs significant for WBC 16.65, but recently on steroids, and lactate 2.4. CXR with progression of airspace disease in the left hemithorax, stable appearance of airspace disease in the right hemithorax. CTA chest concerning for occlusion of RUL pulmonary artery branch, intraluminal infiltration or tumor thrombus vs thromboembolus. Pulmonology consulted by ED, do not think anticoagulation is warranted. She reports some improvement in symptoms while in ED. Admitted to hospital medicine for management of COPD exacerbation.   -Lower extremity ultrasound negative for DVT. TTE normal with EF of 60%.  -Hem/Onc consulted as suspicion for Keytruda induced pneumonitis, but per evaluation: ground glass opacities primarily in the lower lobes and is not entirely consistent with immunotherapy related pneumonitis (especially as she has already improved without steroids)  -Pulm consulted: high suspicion for  COPD exacerbation in addition to PDL1 pneumonitis , recommended holding keytruda and administer solumedrol 80mg IV daily, duonebs + levofloxacin x 5 days, procalcitonin and RIP    Microbiology Results (last 7 days)     Procedure Component Value Units Date/Time    Respiratory Infection Panel (PCR), Nasopharyngeal [[id number]] Collected: 12/29/21 1000    Order Status: Completed Specimen: Nasopharyngeal Swab Updated: 12/29/21 1057     Respiratory Infection  "Panel Source NP Swab    Narrative:      For all other respiratory sources, order [id number] -  Respiratory Viral Panel by PCR    Respiratory Infection Panel (PCR), Nasopharyngeal [[id number]] Collected: 12/29/21 1000    Order Status: Completed Specimen: Nasopharyngeal Swab Updated: 12/29/21 1045     Respiratory Infection Panel Source NP Swab    Narrative:      For all other respiratory sources, order [id number] -  Respiratory Viral Panel by PCR    Blood culture x two cultures. Draw prior to antibiotics. [[id number]] Collected: 12/26/21 2304    Order Status: Completed Specimen: Blood from Peripheral, Hand, Left Updated: 12/29/21 0612     Blood Culture, Routine No Growth to date      No Growth to date      No Growth to date    Narrative:      Aerobic and anaerobic    Blood culture x two cultures. Draw prior to antibiotics. [[id number]] Collected: 12/26/21 2135    Order Status: Completed Specimen: Blood from Peripheral, Hand, Right Updated: 12/29/21 0612     Blood Culture, Routine No Growth to date      No Growth to date      No Growth to date    Narrative:      Aerobic and anaerobic    Respiratory Infection Panel (PCR), Nasopharyngeal [[id number]] Collected: 12/27/21 1725    Order Status: Canceled Specimen: Nasopharyngeal Swab         Antibiotics (From admission, onward)            Start     Stop Route Frequency Ordered    12/27/21 1600  levoFLOXacin tablet 750 mg         01/01 0859 Oral Daily 12/27/21 1500            PLAN  - symbicort not on formulary, will continue with breo inhaler   - Per pulm consult, for severe COPD exacerbation:   -duo nebs scheduled    -levofloxacin 5 day course    -80mg methlyprednisolone daily   -Per Hem/Onc next dose of immunotherapy is not due until Feb 1st so will defer decision on whether to continue current treatment plan to her outpatient oncologist.   - continue phenergan-codeine prn for cough  - wean O2 as tolerated         Pneumonitis  See "COPD exacerbation" A&P      Acute on chronic " "respiratory failure with hypoxemia  See "COPD exacerbation" A&P    Chronic respiratory failure with hypoxia  Patient with Hypoxic Respiratory failure which is Acute on chronic.  she is on home oxygen at 2 LPM. Supplemental oxygen was provided and noted- Oxygen Concentration (%):  [50-60] 50.   Signs/symptoms of respiratory failure include- wheezing. Contributing diagnoses includes - COPD Labs and images were reviewed. Patient Has not had a recent ABG. Will treat underlying causes and adjust management of respiratory failure as follows-      See COPD exacerbation.     GERD (gastroesophageal reflux disease)  - protonix daily      Anxiety  -Continue Valium 2mg  - continue bupropion   - continue atarax prn     Adenocarcinoma of lung, stage 4, right  Follows with Dr. Peterson. S/p Carboplatin AUC4 x1 dose 5/27/20 and palliative radiation (5/27/2020 - 6/2/2020; 20 Gy to right lung). Also received SRS to right cerebellar metastasis 6/3/2020. Pembrolizumab started 6/4/2020, C23 on 12/21/2021.  - Hem Onc consulted by ED  - continue gabapentin  - continue tramadol prn       Essential hypertension  Normotensive on admission. On amlodipine and irbesartan-hydrochlorothiazide.  - monitor BP  - resume home meds when appropriate     VTE Risk Mitigation (From admission, onward)         Ordered     enoxaparin injection 40 mg  Daily         12/27/21 0536     IP VTE HIGH RISK PATIENT  Once         12/27/21 0536     Place sequential compression device  Until discontinued         12/27/21 0536                Discharge Planning   TIFFANIE: 12/29/2021     Code Status: Full Code   Is the patient medically ready for discharge?:     Reason for patient still in hospital (select all that apply): Patient unstable, Patient trending condition and Consult recommendations  Discharge Plan A: Home                  Terry Restrepo DO  Department of Hospital Medicine   [facility] Step[facility]    "

## 2021-12-29 NOTE — PLAN OF CARE
Pt maintained free from falls/trauma/injuries and skin breakdown. Pt denied any pain or discomfort. BG monitored and no insulin coverage needed. PO abx and IV steroid given. Plan of care reviewed. PCR nasal swab obtained. Pt verbalized understanding. All questions and concerns addressed. Will continue to monitor.

## 2021-12-29 NOTE — PT/OT/SLP EVAL
"Occupational Therapy   Evaluation/Treatment    Name: Janeth Boyce  MRN: [id number]  Admitting Diagnosis:  COPD exacerbation  Recent Surgery: * No surgery found *      Recommendations:     Discharge Recommendations: home health OT  Discharge Equipment Recommendations:  none  Barriers to discharge:  None    Assessment:     Janeth Boyce is a 61 y.o. female with a medical diagnosis of COPD exacerbation.  She presents with good motivation and participation. Pt is limited by impaired cardiopulmonary response to activity at this time and displays global deconditioning.  Performance deficits affecting function: weakness,impaired endurance,impaired self care skills,impaired functional mobilty,gait instability,impaired balance,impaired cardiopulmonary response to activity.  Pt would benefit from skilled OT services in order to maximize independence with ADLs and facilitate safe discharge. Pt would benefit from home health OT once medically stable for discharge.     Rehab Prognosis: Good; patient would benefit from acute skilled OT services to address these deficits and reach maximum level of function.       Plan:     Patient to be seen 3 x/week to address the above listed problems via self-care/home management,therapeutic activities,therapeutic exercises,neuromuscular re-education  · Plan of Care Expires: 01/28/22  · Plan of Care Reviewed with: patient,daughter    Subjective     Chief Complaint: "It goes this low when I get up. You can just adjust the oxygen"  Patient/Family Comments/goals: to return home    Occupational Profile:  Living Environment: Pt lives alone in a H with 0 MIKO. Pt has a tub/shower combo  Previous level of function: recently requiring assistance for last ~2 weeks from daughter to complete ADLs; uses a SPC PRN; on 2L O2 at baseline  Roles and Routines: does not drive, daughter brings her to grocery where she uses scooter, uses wheelchair for doctors appointments; reports no falls  Equipment Used " at Home:  cane, straight,oxygen  Assistance upon Discharge: Upon discharge, pt will have assistance from daughter    Pain/Comfort:  · Pain Rating 1: 0/10  · Pain Rating Post-Intervention 1: 0/10    Patients cultural, spiritual, [employer] conflicts given the current situation: no    Objective:     Communicated with: RN prior to session.  Patient found EOB with oxygen,telemetry upon OT entry to room.    General Precautions: Standard, fall,respiratory   Orthopedic Precautions:N/A   Braces: N/A  Respiratory Status: Nasal cannula, flow 4 L/min pt found on 4L O2 and mobility/ADLs completed on 4L. Upon return to EOB, SpO2 noted to be in 60s pt with no SOB or symptoms of distress. Writing therapist called for RN and titrated oxygen to 5L where SpO2 azeem to mid-high 70s. RN arrived to bedside and O2 titrated to 6L where SpO2 azeem to 88%. Pt remained on 6L O2 at conclusion of session    Occupational Performance:    Bed Mobility:    · Did not observe; pt found and returned to EOB    Functional Mobility/Transfers:  · Patient completed Sit <> Stand Transfer with stand by assistance  with  no assistive device   · Patient completed Toilet Transfer Step Transfer technique with stand by assistance with  no AD  · Functional Mobility: Pt ambulated bed<>bathroom with CGA and no AD in order to maximize functional activity tolerance and standing balance required for engagement in occupations of choice.    · Mild instability, pt reaching for walls to steady self    Activities of Daily Living:  · Toileting: stand by assistance to doff/don pants and complete pericare   · Grooming: stand by assistance to perform hand hygiene standing at sink    Cognitive/Visual Perceptual:  Cognitive/Psychosocial Skills:     -       Oriented to: Person, Place, Time and Situation   -       Follows Commands/attention:Follows multistep  commands  -       Communication: clear/fluent  -       Memory: No Deficits noted  -       Safety awareness/insight to  disability: intact   -       Mood/Affect/Coping skills/emotional control: Appropriate to situation    Physical Exam:  Sensation:    -       Intact  Upper Extremity Range of Motion:     -       Right Upper Extremity: WFL  -       Left Upper Extremity: WFL  Upper Extremity Strength:    -       Right Upper Extremity: WFL  -       Left Upper Extremity: WFL   Strength:    -       Right Upper Extremity: WFL  -       Left Upper Extremity: WFL  Fine Motor Coordination:    -       Intact    AMPAC 6 Click ADL:  AMPAC Total Score: 20    Treatment & Education:  -Therapist provided facilitation and instruction of proper body mechanics, energy conservation, and fall prevention strategies during tasks listed above.  -Pt educated on role of OT, POC and goals for therapy  -Pt educated on importance of OOB activities with staff member assistance and sitting OOB majority of the day.   -Pt verbalized understanding. Pt expressed no further concerns/questions  -Whiteboard updated  Education:    Patient left EOB with all lines intact, call button in reach and daughter present    GOALS:   Multidisciplinary Problems     Occupational Therapy Goals        Problem: Occupational Therapy Goal    Goal Priority Disciplines Outcome Interventions   Occupational Therapy Goal     OT, PT/OT Ongoing, Progressing    Description: Goals to be met by: 1/12/22     Patient will increase functional independence with ADLs by performing:    Feeding with Set-up Assistance.  UE Dressing with Stand-by Assistance.  LE Dressing with Stand-by Assistance.  Grooming while standing at sink with Stand-by Assistance.  Toileting from toilet with Stand-by Assistance for hygiene and clothing management.   Toilet transfer to toilet with Stand-by Assistance.                     History:     Past Medical History:   Diagnosis Date    Anxiety     Asthma     COPD exacerbation 12/14/2021    Hepatitis C 2019    treated    Hx of psychiatric care     Hypertension     Lung  cancer     Psychiatric exam requested by authority     Psychiatric problem        Past Surgical History:   Procedure Laterality Date    BRONCHOSCOPY N/A 9/3/2020    Procedure: Bronchoscopy;  Surgeon: Renee Diagnostic Provider;  Location: [facility] OR [address] [place] [address];  Service: Anesthesiology;  Laterality: N/A;     SECTION      KNEE SURGERY         Time Tracking:     OT Date of Treatment: 21  OT Start Time: 1055  OT Stop Time: 1111  OT Total Time (min): 16 min    Billable Minutes:Evaluation 8  Self Care/Home Management 8    2021

## 2021-12-30 NOTE — SUBJECTIVE & OBJECTIVE
Interval History: Patient seen and examined. No acute events overnight, afebrile, vital signs stable. She was breathing on 5L via HFNC, and desatted as low as 60s after brief conversation, so unable to wean O2 this morning.    Review of Systems   Constitutional: Positive for activity change and fatigue. Negative for appetite change, chills, diaphoresis, fever and unexpected weight change.   HENT: Positive for voice change. Negative for congestion and rhinorrhea.    Eyes: Negative.    Respiratory: Positive for cough, shortness of breath and wheezing. Negative for chest tightness.    Cardiovascular: Negative for chest pain, palpitations and leg swelling.   Gastrointestinal: Negative for abdominal distention, nausea and vomiting.   Endocrine: Negative.    Genitourinary: Negative.    Musculoskeletal: Positive for arthralgias and back pain.   Skin: Negative.    Allergic/Immunologic: Negative.    Neurological: Negative for dizziness and numbness.   Psychiatric/Behavioral: Negative for agitation and confusion. The patient is nervous/anxious (on Valium).      Objective:     Vital Signs (Most Recent):  Temp: 97.9 °F (36.6 °C) (12/30/21 1133)  Pulse: 85 (12/30/21 1133)  Resp: 17 (12/30/21 1142)  BP: 133/79 (12/30/21 1133)  SpO2: (!) 90 % (12/30/21 1133) Vital Signs (24h Range):  Temp:  [97.5 °F (36.4 °C)-98.3 °F (36.8 °C)] 97.9 °F (36.6 °C)  Pulse:  [] 85  Resp:  [14-22] 17  SpO2:  [78 %-98 %] 90 %  BP: (118-137)/(65-87) 133/79     Weight: 86.3 kg (190 lb 4.1 oz)  Body mass index is 30.71 kg/m².    Intake/Output Summary (Last 24 hours) at 12/30/2021 1223  Last data filed at 12/30/2021 0400  Gross per 24 hour   Intake 240 ml   Output 650 ml   Net -410 ml      Physical Exam  Vitals and nursing note reviewed.   Constitutional:       General: She is not in acute distress.     Appearance: Normal appearance. She is not ill-appearing, toxic-appearing or diaphoretic.   HENT:      Head: Normocephalic and atraumatic.       Mouth/Throat:      Mouth: Mucous membranes are moist.   Eyes:      General: No scleral icterus.        Right eye: No discharge.         Left eye: No discharge.   Cardiovascular:      Rate and Rhythm: Normal rate and regular rhythm.      Pulses: Normal pulses.      Heart sounds: Normal heart sounds.   Pulmonary:      Effort: Pulmonary effort is normal. No respiratory distress.      Breath sounds: Wheezing present. No rhonchi or rales.   Abdominal:      General: Abdomen is flat. Bowel sounds are normal. There is no distension.   Musculoskeletal:         General: No swelling.      Cervical back: Normal range of motion.      Right lower leg: No edema.      Left lower leg: No edema.   Skin:     General: Skin is warm and dry.      Capillary Refill: Capillary refill takes less than 2 seconds.      Coloration: Skin is not jaundiced.   Neurological:      General: No focal deficit present.      Mental Status: She is alert and oriented to person, place, and time. Mental status is at baseline.         Significant Labs:   All pertinent labs within the past 24 hours have been reviewed.  CBC:   Recent Labs   Lab 12/29/21  0454 12/30/21  0539   WBC 15.61* 19.79*   HGB 10.0* 10.0*   HCT 33.0* 33.8*    423     CMP:   Recent Labs   Lab 12/29/21  0454 12/30/21  0539    136   K 4.9 4.6    101   CO2 28 26   * 101   BUN 20 21   CREATININE 1.0 1.0   CALCIUM 9.8 9.4   PROT 7.4 7.0   ALBUMIN 2.6* 2.5*   BILITOT 0.3 0.2   ALKPHOS 94 90   AST 16 12   ALT 12 14   ANIONGAP 7* 9   EGFRNONAA >60.0 >60.0     POCT Glucose:   Recent Labs   Lab 12/29/21 2029 12/30/21  0805 12/30/21  1136   POCTGLUCOSE 198* 119* 153*       Significant Imaging: I have reviewed all pertinent imaging results/findings within the past 24 hours.

## 2021-12-30 NOTE — ASSESSMENT & PLAN NOTE
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 Recently admitted 12/14-12/16 for pneumonia and COPD exacerbation. In the ED, her O2 sat was initially 78% and she was placed on bipap. She was able to be weaned to 6L NC satting 96%. Labs significant for WBC 16.65, but recently on steroids, and lactate 2.4. CXR with progression of airspace disease in the left hemithorax, stable appearance of airspace disease in the right hemithorax. CTA chest concerning for occlusion of RUL pulmonary artery branch, intraluminal infiltration or tumor thrombus vs thromboembolus. Pulmonology consulted by ED, do not think anticoagulation is warranted. She reports some improvement in symptoms while in ED. Admitted to hospital medicine for management of COPD exacerbation.   -Lower extremity ultrasound negative for DVT. TTE normal with EF of 60%.  -Hem/Onc consulted as suspicion for Keytruda induced pneumonitis, but per evaluation: ground glass opacities primarily in the lower lobes and is not entirely consistent with immunotherapy related pneumonitis (especially as she has already improved without steroids)  -Pulm consulted: high suspicion for  COPD exacerbation in addition to PDL1 pneumonitis , recommended holding keytruda and administer solumedrol 80mg IV daily, duonebs + levofloxacin x 5 days, procalcitonin and RIP    Microbiology Results (last 7 days)     Procedure Component Value Units Date/Time    Blood culture x two cultures. Draw prior to antibiotics. [[id number]] Collected: 12/26/21 2301    Order Status: Completed Specimen: Blood from Peripheral, Hand, Left Updated: 12/30/21 0612     Blood Culture, Routine No Growth to date      No Growth to date      No Growth to date      No Growth to date    Narrative:      Aerobic and anaerobic    Blood culture x two cultures.  Draw prior to antibiotics. [[id number]] Collected: 12/26/21 2135    Order Status: Completed Specimen: Blood from Peripheral, Hand, Right Updated: 12/30/21 0612     Blood Culture, Routine No Growth to date      No Growth to date      No Growth to date      No Growth to date    Narrative:      Aerobic and anaerobic    Respiratory Infection Panel (PCR), Nasopharyngeal [[id number]] Collected: 12/29/21 1000    Order Status: Completed Specimen: Nasopharyngeal Swab Updated: 12/29/21 1612     Respiratory Infection Panel Source NP Swab     Adenovirus Not Detected     Coronavirus 229E, Common Cold Virus Not Detected     Coronavirus HKU1, Common Cold Virus Not Detected     Coronavirus NL63, Common Cold Virus Not Detected     Coronavirus OC43, Common Cold Virus Not Detected     Comment: The Coronavirus strains detected in this test cause the common cold.  These strains are not the COVID-19 (novel Coronavirus)strain   associated with the respiratory disease outbreak.          Human Metapneumovirus Not Detected     Human Rhinovirus/Enterovirus Not Detected     Influenza A (subtypes H1, H1-2009,H3) Not Detected     Influenza B Not Detected     Parainfluenza Virus 1 Not Detected     Parainfluenza Virus 2 Not Detected     Parainfluenza Virus 3 Not Detected     Parainfluenza Virus 4 Not Detected     Respiratory Syncytial Virus Not Detected     Bordetella Parapertussis (XA9677) Not Detected     Bordetella pertussis (ptxP) Not Detected     Chlamydia pneumoniae Not Detected     Mycoplasma pneumoniae Not Detected    Narrative:      For all other respiratory sources, order [id number] -  Respiratory Viral Panel by PCR    Respiratory Infection Panel (PCR), Nasopharyngeal [[id number]] Collected: 12/29/21 1000    Order Status: Completed Specimen: Nasopharyngeal Swab Updated: 12/29/21 1045     Respiratory Infection Panel Source NP Swab    Narrative:      For all other respiratory sources, order [id number] -  Respiratory Viral Panel by PCR     Respiratory Infection Panel (PCR), Nasopharyngeal [[id number]] Collected: 12/27/21 1720    Order Status: Canceled Specimen: Nasopharyngeal Swab         Antibiotics (From admission, onward)            Start     Stop Route Frequency Ordered    12/27/21 1600  levoFLOXacin tablet 750 mg         01/01 0859 Oral Daily 12/27/21 1500            PLAN  - symbicort not on formulary, will continue with breo inhaler   - Per pulm consult, for severe COPD exacerbation:   -duo nebs scheduled    -levofloxacin 5 day course    -80mg methlyprednisolone daily   -Per Hem/Onc next dose of immunotherapy is not due until Feb 1st so will defer decision on whether to continue current treatment plan to her outpatient oncologist.   - continue phenergan-codeine prn for cough  - wean O2 as tolerated

## 2021-12-30 NOTE — NURSING
Patient sitting at beds side having conversation with  family via telephone.no signs or symptoms of SOB.call light place within reach. Safety implement.

## 2021-12-30 NOTE — PLAN OF CARE
PT Eval complete and POC established.    2021      Problem: Physical Therapy Goal  Goal: Physical Therapy Goal  Description: Goals to be met by: 2021     Patient will increase functional independence with mobility by performin. Supine to sit with [place]  2. Sit to supine with [place]  3. Sit to stand transfer with Modified [place]  4. Gait  x 200 feet with Modified [place] using Least Restrictive Assistive Device.     Outcome: Ongoing, Progressing

## 2021-12-30 NOTE — PLAN OF CARE
"[facility]  Discharge Reassessment    Primary Care Provider: Freeman Caballero MD    Expected Discharge Date: 12/30/2021    Per progress note 12/30: "Patient seen and examined. No acute events overnight, afebrile, vital signs stable. She was breathing on 5L via HFNC, and desatted as low as 60s after brief conversation, so unable to wean O2 this morning."    Reassessment (most recent)     Discharge Reassessment - 12/30/21 1501        Discharge Reassessment    Assessment Type Discharge Planning Reassessment     Discharge Plan A Home Health     Discharge Plan B Home     DME Needed Upon Discharge  cane, straight     Discharge Barriers Identified None     Why the patient remains in the hospital Requires continued medical care               Bekah Henriquez RN, CM   Ext: [phone]    "

## 2021-12-30 NOTE — PROGRESS NOTES
[facility] Medicine  Progress Note    Patient Name: Janeth Boyce  MRN: [id number]  Patient Class: IP- Inpatient   Admission Date: 12/26/2021  Length of Stay: 3 days  Attending Physician: Kelly Schwab MD  Primary Care Provider: Freeman Caballero MD        Subjective:     Principal Problem:COPD exacerbation        HPI:  Janeth Boyce is a 62 y/o F with a PMH of COPD on home oxygen 2 L nasal cannula, stage IV small cell adenocarcinoma of the lung with Mets to right cerebellum (s/p palliative XRT, on pembrolizumab), malignant pleural effusion who presents with SOB. She was recently admitted 12/14-12/16 for pneumonia and COPD exacerbation. She states that the SOB of began two days ago and acutely worsened today. She reports an associated cough that is productive with white sputum. She states that she has CP and incontinence associated with the cough. She also reports PRITCHETT, stating that she cannot walk to the bathroom without getting significantly SOB. She reports chills but has not checked her temperature. She had one episode of diarrhea today, denies nausea/vomiting.     In the ED, her O2 sat was initially 78% and she was placed on bipap. She was able to be weaned to 6L NC satting 96%. Afebrile and /78. Labs significant for WBC 16.65, lactate 2.4. Trop and BNP normal. UA unremarkable. CXR with progression of airspace disease in the left hemithorax, stable appearance of airspace disease in the right hemithorax. CTA chest concerning for occlusion of RUL pulmonary artery branch, intraluminal infiltration or tumor thrombus vs thromboembolus. Pulmonology consulted by ED, do not think anticoagulation is warranted and agree with treatment as COPD exacerbation. Admitted to hospital medicine for further management.       Overview/Hospital Course:  Patient admitted to hospital medicine for evaluation of dyspnea. CT Chest notable for new nonspecific 2.4 cm ground-glass opacity within the  left lower lobe. Lower extremity ultrasound negative for DVT. TTE normal with EF of 60%. Hem/Onc consulted as suspicion for Keytruda induced pneumonitis, but per evaluation: ground glass opacities primarily in the lower lobes and is not entirely consistent with immunotherapy related pneumonitis (especially as she has already improved without steroids). Pulm consulted: high suspicion for  COPD exacerbation in addition to PDL1 pneumonitis , recommended holding keytruda and administer solumedrol 80mg IV daily, duonebs + levofloxacin x 5 days. Some improvement in respiratory status over course of hospitalization, increased Duonebs frequency to q4h.      Interval History: Patient seen and examined. No acute events overnight, afebrile, vital signs stable. She was breathing on 5L via HFNC, and desatted as low as 60s after brief conversation, so unable to wean O2 this morning.    Review of Systems   Constitutional: Positive for activity change and fatigue. Negative for appetite change, chills, diaphoresis, fever and unexpected weight change.   HENT: Positive for voice change. Negative for congestion and rhinorrhea.    Eyes: Negative.    Respiratory: Positive for cough, shortness of breath and wheezing. Negative for chest tightness.    Cardiovascular: Negative for chest pain, palpitations and leg swelling.   Gastrointestinal: Negative for abdominal distention, nausea and vomiting.   Endocrine: Negative.    Genitourinary: Negative.    Musculoskeletal: Positive for arthralgias and back pain.   Skin: Negative.    Allergic/Immunologic: Negative.    Neurological: Negative for dizziness and numbness.   Psychiatric/Behavioral: Negative for agitation and confusion. The patient is nervous/anxious (on Valium).      Objective:     Vital Signs (Most Recent):  Temp: 97.9 °F (36.6 °C) (12/30/21 1133)  Pulse: 85 (12/30/21 1133)  Resp: 17 (12/30/21 1142)  BP: 133/79 (12/30/21 1133)  SpO2: (!) 90 % (12/30/21 1133) Vital Signs (24h  Range):  Temp:  [97.5 °F (36.4 °C)-98.3 °F (36.8 °C)] 97.9 °F (36.6 °C)  Pulse:  [] 85  Resp:  [14-22] 17  SpO2:  [78 %-98 %] 90 %  BP: (118-137)/(65-87) 133/79     Weight: 86.3 kg (190 lb 4.1 oz)  Body mass index is 30.71 kg/m².    Intake/Output Summary (Last 24 hours) at 12/30/2021 1223  Last data filed at 12/30/2021 0400  Gross per 24 hour   Intake 240 ml   Output 650 ml   Net -410 ml      Physical Exam  Vitals and nursing note reviewed.   Constitutional:       General: She is not in acute distress.     Appearance: Normal appearance. She is not ill-appearing, toxic-appearing or diaphoretic.   HENT:      Head: Normocephalic and atraumatic.      Mouth/Throat:      Mouth: Mucous membranes are moist.   Eyes:      General: No scleral icterus.        Right eye: No discharge.         Left eye: No discharge.   Cardiovascular:      Rate and Rhythm: Normal rate and regular rhythm.      Pulses: Normal pulses.      Heart sounds: Normal heart sounds.   Pulmonary:      Effort: Pulmonary effort is normal. No respiratory distress.      Breath sounds: Wheezing present. No rhonchi or rales.   Abdominal:      General: Abdomen is flat. Bowel sounds are normal. There is no distension.   Musculoskeletal:         General: No swelling.      Cervical back: Normal range of motion.      Right lower leg: No edema.      Left lower leg: No edema.   Skin:     General: Skin is warm and dry.      Capillary Refill: Capillary refill takes less than 2 seconds.      Coloration: Skin is not jaundiced.   Neurological:      General: No focal deficit present.      Mental Status: She is alert and oriented to person, place, and time. Mental status is at baseline.         Significant Labs:   All pertinent labs within the past 24 hours have been reviewed.  CBC:   Recent Labs   Lab 12/29/21 0454 12/30/21  0539   WBC 15.61* 19.79*   HGB 10.0* 10.0*   HCT 33.0* 33.8*    423     CMP:   Recent Labs   Lab 12/29/21 0454 12/30/21  0539    136    K 4.9 4.6    101   CO2 28 26   * 101   BUN 20 21   CREATININE 1.0 1.0   CALCIUM 9.8 9.4   PROT 7.4 7.0   ALBUMIN 2.6* 2.5*   BILITOT 0.3 0.2   ALKPHOS 94 90   AST 16 12   ALT 12 14   ANIONGAP 7* 9   EGFRNONAA >60.0 >60.0     POCT Glucose:   Recent Labs   Lab 12/29/21 2029 12/30/21  0805 12/30/21  1136   POCTGLUCOSE 198* 119* 153*       Significant Imaging: I have reviewed all pertinent imaging results/findings within the past 24 hours.      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 Recently admitted 12/14-12/16 for pneumonia and COPD exacerbation. In the ED, her O2 sat was initially 78% and she was placed on bipap. She was able to be weaned to 6L NC satting 96%. Labs significant for WBC 16.65, but recently on steroids, and lactate 2.4. CXR with progression of airspace disease in the left hemithorax, stable appearance of airspace disease in the right hemithorax. CTA chest concerning for occlusion of RUL pulmonary artery branch, intraluminal infiltration or tumor thrombus vs thromboembolus. Pulmonology consulted by ED, do not think anticoagulation is warranted. She reports some improvement in symptoms while in ED. Admitted to hospital medicine for management of COPD exacerbation.   -Lower extremity ultrasound negative for DVT. TTE normal with EF of 60%.  -Hem/Onc consulted as suspicion for Keytruda induced pneumonitis, but per evaluation: ground glass opacities primarily in the lower lobes and is not entirely consistent with immunotherapy related pneumonitis (especially as she has already improved without steroids)  -Pulm consulted: high suspicion for  COPD exacerbation in addition to PDL1 pneumonitis , recommended holding keytruda and administer solumedrol 80mg IV daily, duonebs +  levofloxacin x 5 days, procalcitonin and RIP    Microbiology Results (last 7 days)     Procedure Component Value Units Date/Time    Blood culture x two cultures. Draw prior to antibiotics. [[id number]] Collected: 12/26/21 2304    Order Status: Completed Specimen: Blood from Peripheral, Hand, Left Updated: 12/30/21 0612     Blood Culture, Routine No Growth to date      No Growth to date      No Growth to date      No Growth to date    Narrative:      Aerobic and anaerobic    Blood culture x two cultures. Draw prior to antibiotics. [[id number]] Collected: 12/26/21 2135    Order Status: Completed Specimen: Blood from Peripheral, Hand, Right Updated: 12/30/21 0612     Blood Culture, Routine No Growth to date      No Growth to date      No Growth to date      No Growth to date    Narrative:      Aerobic and anaerobic    Respiratory Infection Panel (PCR), Nasopharyngeal [[id number]] Collected: 12/29/21 1000    Order Status: Completed Specimen: Nasopharyngeal Swab Updated: 12/29/21 1612     Respiratory Infection Panel Source NP Swab     Adenovirus Not Detected     Coronavirus 229E, Common Cold Virus Not Detected     Coronavirus HKU1, Common Cold Virus Not Detected     Coronavirus NL63, Common Cold Virus Not Detected     Coronavirus OC43, Common Cold Virus Not Detected     Comment: The Coronavirus strains detected in this test cause the common cold.  These strains are not the COVID-19 (novel Coronavirus)strain   associated with the respiratory disease outbreak.          Human Metapneumovirus Not Detected     Human Rhinovirus/Enterovirus Not Detected     Influenza A (subtypes H1, H1-2009,H3) Not Detected     Influenza B Not Detected     Parainfluenza Virus 1 Not Detected     Parainfluenza Virus 2 Not Detected     Parainfluenza Virus 3 Not Detected     Parainfluenza Virus 4 Not Detected     Respiratory Syncytial Virus Not Detected     Bordetella Parapertussis (TZ2635) Not Detected     Bordetella pertussis (ptxP) Not Detected      "Chlamydia pneumoniae Not Detected     Mycoplasma pneumoniae Not Detected    Narrative:      For all other respiratory sources, order [id number] -  Respiratory Viral Panel by PCR    Respiratory Infection Panel (PCR), Nasopharyngeal [[id number]] Collected: 12/29/21 1000    Order Status: Completed Specimen: Nasopharyngeal Swab Updated: 12/29/21 1045     Respiratory Infection Panel Source NP Swab    Narrative:      For all other respiratory sources, order [id number] -  Respiratory Viral Panel by PCR    Respiratory Infection Panel (PCR), Nasopharyngeal [[id number]] Collected: 12/27/21 1725    Order Status: Canceled Specimen: Nasopharyngeal Swab         Antibiotics (From admission, onward)            Start     Stop Route Frequency Ordered    12/27/21 1600  levoFLOXacin tablet 750 mg         01/01 0859 Oral Daily 12/27/21 1500            PLAN  - symbicort not on formulary, will continue with breo inhaler   - Per pulm consult, for severe COPD exacerbation:   -duo nebs scheduled    -levofloxacin 5 day course    -80mg methlyprednisolone daily   -Per Hem/Onc next dose of immunotherapy is not due until Feb 1st so will defer decision on whether to continue current treatment plan to her outpatient oncologist.   - continue phenergan-codeine prn for cough  - wean O2 as tolerated         Pneumonitis  See "COPD exacerbation" A&P      Acute on chronic respiratory failure with hypoxemia  See "COPD exacerbation" A&P    Chronic respiratory failure with hypoxia  Patient with Hypoxic Respiratory failure which is Acute on chronic.  she is on home oxygen at 2 LPM. Supplemental oxygen was provided and noted- Oxygen Concentration (%):  [50-60] 50.   Signs/symptoms of respiratory failure include- wheezing. Contributing diagnoses includes - COPD Labs and images were reviewed. Patient Has not had a recent ABG. Will treat underlying causes and adjust management of respiratory failure as follows-      See COPD exacerbation.     GERD (gastroesophageal " reflux disease)  - protonix daily      Anxiety  -Continue Valium 5mg  - continue bupropion   - continue atarax prn     Adenocarcinoma of lung, stage 4, right  Follows with Dr. Peterson. S/p Carboplatin AUC4 x1 dose 5/27/20 and palliative radiation (5/27/2020 - 6/2/2020; 20 Gy to right lung). Also received SRS to right cerebellar metastasis 6/3/2020. Pembrolizumab started 6/4/2020, C23 on 12/21/2021.  - Hem Onc consulted by ED  - continue gabapentin  - continue tramadol prn       Essential hypertension  Normotensive on admission. On amlodipine and irbesartan-hydrochlorothiazide.  - monitor BP  - resume home meds when appropriate     VTE Risk Mitigation (From admission, onward)         Ordered     enoxaparin injection 40 mg  Daily         12/27/21 0536     IP VTE HIGH RISK PATIENT  Once         12/27/21 0536     Place sequential compression device  Until discontinued         12/27/21 0536                Discharge Planning   TIFFANIE: 12/30/2021     Code Status: Full Code   Is the patient medically ready for discharge?:     Reason for patient still in hospital (select all that apply): Patient trending condition and Treatment  Discharge Plan A: Home                  Terry Restrepo DO  Department of Hospital Medicine   [facility]

## 2021-12-30 NOTE — PT/OT/SLP EVAL
Physical Therapy Evaluation    Patient Name:  Janeth Boyce   MRN:  [id number]    Recommendations:     Discharge Recommendations:  home health PT,home health OT   Discharge Equipment Recommendations: none   Barriers to discharge: None    Assessment:     Janeth Boyce is a 61 y.o. female admitted with a medical diagnosis of COPD exacerbation.  She presents with the following impairments/functional limitations:  impaired cardiopulmonary response to activity,impaired endurance,weakness,impaired self care skills,impaired functional mobilty,gait instability,impaired balance,decreased lower extremity function. Pt would benefit from HHPT/OT for: Dynamic/static standing/sitting balance through skilled balance training, strengthening with the use of skilled therapeutic exercises interventions, and mobility through adaptive equipment training. Pt continues to benefit from a collaborative PT/OT program to improve quality of life and focus on recovery of impairments.      Rehab Prognosis: Good; patient would benefit from acute skilled PT services to address these deficits and reach maximum level of function.    Recent Surgery: * No surgery found *      Plan:     During this hospitalization, patient to be seen 3 x/week to address the identified rehab impairments via gait training,therapeutic activities,therapeutic exercises,neuromuscular re-education and progress toward the following goals:    · Plan of Care Expires:  01/29/22    Subjective     Chief Complaint: SOB and coughing with activity  Patient/Family Comments/goals: pt is requesting at home small portable oxygen  Pain/Comfort:  · Pain Rating 1: 0/10  · Pain Rating Post-Intervention 1: 0/10    Patients cultural, spiritual, [employer] conflicts given the current situation: no    Living Environment:  Pt lives alone in a [facility] with 0STE and a tub/shower.  Prior to admission, patients level of function was independent for all functional mobility and ADLs, except she  occasionally uses a SPC when feeling poorly. She does not drive and enjoys spending time with her family and dog.  Equipment used at home: cane, straight,oxygen.  DME owned (not currently used): none.  Upon discharge, patient will have assistance from family.    Objective:     Communicated with RN prior to session.  Patient found sitting EOB with oxygen,telemetry,peripheral IV  upon PT entry to room.    General Precautions: Standard, fall,respiratory   Orthopedic Precautions:N/A   Braces: N/A  Respiratory Status: Nasal cannula, flow 4 L/min    Exams:  · Cognitive Exam:  Patient is oriented to Person, Place, Time and Situation  · Fine Motor Coordination:  Intact BLE heel/shin  · Gross Motor Coordination:  WFL  · Postural Exam:  Patient presented with the following abnormalities:    · -       Rounded shoulders  · -       Forward head  · -       Abnormal trunk flexion  · Sensation:    · -       Intact, denies numbness/tingling  · RLE ROM: WFL  · RLE Strength: Deficits: grossly 4/5, unable to provide max resistance 2/2 pain from arthritis   · LLE ROM: WFL  · LLE Strength: Deficits: grossly 4/5, unable to provide max resistance 2/2 pain from arthritis     Functional Mobility:  · Bed Mobility:   N/T pt found sitting EOB  · Transfers:     · Sit to Stand:  supervision with no AD  · Gait: ~120 ft, no AD, SBA progressed to CGA with coughing and mild dizziness. Pt begin holding onto railing in bacon way for comfort and safety.  · Balance: indep for sitting balance and SBA to CGA for standing balance    O2 Sats:  · Pt on 4 L O2 upon entering room.   · Pt ambulated on 4 L, began coughing and got dizzy. Upon returning to room, pt was at 75% O2 sat. Oxygen increased to 6 L and pt returned to 89-90%. O2 returned to 4 L O2.  · Pt left on 4L O2 and satting at 89-90%.     Therapeutic Activities and Exercises:  Patient educated on role of therapy, goals of session, and benefits of mobilizing.   Discussed PT plan of care during  hospitalization.   Patient educated on calling for assistance.   Patient educated on how their diagnosis impacts their mobility within PT scope of practice.   Communication board up to date.  All questions answered within PT scope of practice.      AM-PAC 6 CLICK MOBILITY  Total Score:17     Patient left sitting EOB with all lines intact and call button in reach. Rn unable to be found or reached by phone to be notified. Pt stable upon exit.    GOALS:   Multidisciplinary Problems     Physical Therapy Goals        Problem: Physical Therapy Goal    Goal Priority Disciplines Outcome Goal Variances Interventions   Physical Therapy Goal     PT, PT/OT Ongoing, Progressing     Description: Goals to be met by: 2021     Patient will increase functional independence with mobility by performin. Supine to sit with [place]  2. Sit to supine with [place]  3. Sit to stand transfer with Modified [place]  4. Gait  x 200 feet with Modified [place] using Least Restrictive Assistive Device.                      History:     Past Medical History:   Diagnosis Date    Anxiety     Asthma     COPD exacerbation 2021    Hepatitis C 2019    treated    Hx of psychiatric care     Hypertension     Lung cancer     Psychiatric exam requested by authority     Psychiatric problem        Past Surgical History:   Procedure Laterality Date    BRONCHOSCOPY N/A 9/3/2020    Procedure: Bronchoscopy;  Surgeon: Renee Diagnostic Provider;  Location: [facility] OR [address] [place] [address];  Service: Anesthesiology;  Laterality: N/A;     SECTION      KNEE SURGERY         Time Tracking:     PT Received On: 21  PT Start Time: 1557     PT Stop Time: 1621  PT Total Time (min): 24 min     Billable Minutes: Evaluation 12 and Therapeutic Exercise 10      2021

## 2021-12-31 NOTE — ASSESSMENT & PLAN NOTE
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 Recently admitted 12/14-12/16 for pneumonia and COPD exacerbation. In the ED, her O2 sat was initially 78% and she was placed on bipap. She was able to be weaned to 6L NC satting 96%. Labs significant for WBC 16.65, but recently on steroids, and lactate 2.4. CXR with progression of airspace disease in the left hemithorax, stable appearance of airspace disease in the right hemithorax. CTA chest concerning for occlusion of RUL pulmonary artery branch, intraluminal infiltration or tumor thrombus vs thromboembolus. Pulmonology consulted by ED, do not think anticoagulation is warranted. She reports some improvement in symptoms while in ED. Admitted to hospital medicine for management of COPD exacerbation.   -Lower extremity ultrasound negative for DVT. TTE normal with EF of 60%.  -Hem/Onc consulted as suspicion for Keytruda induced pneumonitis, but per evaluation: ground glass opacities primarily in the lower lobes and is not entirely consistent with immunotherapy related pneumonitis (especially as she has already improved without steroids)  -Pulm consulted: high suspicion for  COPD exacerbation in addition to PDL1 pneumonitis , recommended holding keytruda and administer solumedrol 80mg IV daily, duonebs + levofloxacin x 5 days, procalcitonin and RIP    Microbiology Results (last 7 days)     Procedure Component Value Units Date/Time    Blood culture x two cultures. Draw prior to antibiotics. [[id number]] Collected: 12/26/21 2301    Order Status: Completed Specimen: Blood from Peripheral, Hand, Left Updated: 12/31/21 0612     Blood Culture, Routine No Growth to date      No Growth to date      No Growth to date      No Growth to date      No Growth to date    Narrative:      Aerobic and anaerobic    Blood  culture x two cultures. Draw prior to antibiotics. [[id number]] Collected: 12/26/21 2135    Order Status: Completed Specimen: Blood from Peripheral, Hand, Right Updated: 12/31/21 0612     Blood Culture, Routine No Growth to date      No Growth to date      No Growth to date      No Growth to date      No Growth to date    Narrative:      Aerobic and anaerobic    Respiratory Infection Panel (PCR), Nasopharyngeal [[id number]] Collected: 12/29/21 1000    Order Status: Completed Specimen: Nasopharyngeal Swab Updated: 12/29/21 1612     Respiratory Infection Panel Source NP Swab     Adenovirus Not Detected     Coronavirus 229E, Common Cold Virus Not Detected     Coronavirus HKU1, Common Cold Virus Not Detected     Coronavirus NL63, Common Cold Virus Not Detected     Coronavirus OC43, Common Cold Virus Not Detected     Comment: The Coronavirus strains detected in this test cause the common cold.  These strains are not the COVID-19 (novel Coronavirus)strain   associated with the respiratory disease outbreak.          Human Metapneumovirus Not Detected     Human Rhinovirus/Enterovirus Not Detected     Influenza A (subtypes H1, H1-2009,H3) Not Detected     Influenza B Not Detected     Parainfluenza Virus 1 Not Detected     Parainfluenza Virus 2 Not Detected     Parainfluenza Virus 3 Not Detected     Parainfluenza Virus 4 Not Detected     Respiratory Syncytial Virus Not Detected     Bordetella Parapertussis (ZN3925) Not Detected     Bordetella pertussis (ptxP) Not Detected     Chlamydia pneumoniae Not Detected     Mycoplasma pneumoniae Not Detected    Narrative:      For all other respiratory sources, order [id number] -  Respiratory Viral Panel by PCR    Respiratory Infection Panel (PCR), Nasopharyngeal [[id number]] Collected: 12/29/21 1000    Order Status: Completed Specimen: Nasopharyngeal Swab Updated: 12/29/21 1045     Respiratory Infection Panel Source NP Swab    Narrative:      For all other respiratory sources, order [id number]  -  Respiratory Viral Panel by PCR    Respiratory Infection Panel (PCR), Nasopharyngeal [[id number]] Collected: 12/27/21 1725    Order Status: Canceled Specimen: Nasopharyngeal Swab           ABGs:   Recent Labs   Lab 12/31/21  1224   PH 7.351   PCO2 62.1*   PO2 26*   HCO3 34.4*   POCSATURATED 43*   BE 9       -Respiratory status improved from admission, but not showing improvement despite 4 days of steroid therapy.     PLAN  -VBG showed hypoxia, and hypercarbia, will consider possible trial of BiPap overnight  - symbicort not on formulary, will continue with breo inhaler   - Per pulm consult, for severe COPD exacerbation:   -duo nebs scheduled    -levofloxacin 5 day course completed   -80mg methlyprednisolone last course today, will switch to prednisone 60mg PO starting 1/1  -Per Hem/Onc next dose of immunotherapy is not due until Feb 1st so will defer decision on whether to continue current treatment plan to her outpatient oncologist.   - continue phenergan-codeine prn for cough  - wean O2 as tolerated

## 2021-12-31 NOTE — SUBJECTIVE & OBJECTIVE
Interval History: Patient seen and examined. No acute events overnight, afebrile, vital signs stable. She had increasing O2 requirements overnight, and currently breathing on 6L via HFNC. VBG from this morning notable for hypercapnia and hypoxemia.    Review of Systems   Constitutional: Positive for activity change and fatigue. Negative for appetite change, chills, diaphoresis, fever and unexpected weight change.   HENT: Positive for voice change. Negative for congestion and rhinorrhea.    Eyes: Negative.    Respiratory: Positive for cough, shortness of breath and wheezing. Negative for chest tightness.    Cardiovascular: Negative for chest pain, palpitations and leg swelling.   Gastrointestinal: Negative for abdominal distention, nausea and vomiting.   Endocrine: Negative.    Genitourinary: Negative.    Musculoskeletal: Positive for arthralgias and back pain.   Skin: Negative.    Allergic/Immunologic: Negative.    Neurological: Negative for dizziness and numbness.   Psychiatric/Behavioral: Negative for agitation and confusion. The patient is not nervous/anxious (on Valium).      Objective:     Vital Signs (Most Recent):  Temp: 98 °F (36.7 °C) (12/31/21 1250)  Pulse: 85 (12/31/21 1250)  Resp: 17 (12/31/21 1250)  BP: 115/73 (12/31/21 1250)  SpO2: (!) 94 % (12/31/21 1250) Vital Signs (24h Range):  Temp:  [97.6 °F (36.4 °C)-98.5 °F (36.9 °C)] 98 °F (36.7 °C)  Pulse:  [] 85  Resp:  [16-22] 17  SpO2:  [78 %-97 %] 94 %  BP: (115-167)/(69-86) 115/73     Weight: 87 kg (191 lb 12.8 oz)  Body mass index is 30.96 kg/m².    Intake/Output Summary (Last 24 hours) at 12/31/2021 1323  Last data filed at 12/31/2021 1300  Gross per 24 hour   Intake 1550 ml   Output 2650 ml   Net -1100 ml      Physical Exam  Vitals and nursing note reviewed.   Constitutional:       General: She is not in acute distress.     Appearance: Normal appearance. She is not ill-appearing, toxic-appearing or diaphoretic.   HENT:      Head: Normocephalic  and atraumatic.      Mouth/Throat:      Mouth: Mucous membranes are moist.   Eyes:      General: No scleral icterus.        Right eye: No discharge.         Left eye: No discharge.   Cardiovascular:      Rate and Rhythm: Normal rate and regular rhythm.      Pulses: Normal pulses.      Heart sounds: Normal heart sounds.   Pulmonary:      Effort: Pulmonary effort is normal. No respiratory distress.      Breath sounds: Wheezing present. No rhonchi or rales.   Abdominal:      General: Abdomen is flat. Bowel sounds are normal. There is no distension.   Musculoskeletal:         General: No swelling.      Cervical back: Normal range of motion.      Right lower leg: No edema.      Left lower leg: No edema.   Skin:     General: Skin is warm and dry.      Capillary Refill: Capillary refill takes less than 2 seconds.      Coloration: Skin is not jaundiced.   Neurological:      General: No focal deficit present.      Mental Status: She is alert and oriented to person, place, and time. Mental status is at baseline.         Significant Labs:   All pertinent labs within the past 24 hours have been reviewed.  CBC:   Recent Labs   Lab 12/30/21  0539 12/31/21  0326   WBC 19.79* 18.98*   HGB 10.0* 10.7*   HCT 33.8* 33.8*    477*     CMP:   Recent Labs   Lab 12/30/21  0539 12/31/21  0326    138   K 4.6 4.4    101   CO2 26 25    95   BUN 21 19   CREATININE 1.0 1.0   CALCIUM 9.4 9.8   PROT 7.0 7.3   ALBUMIN 2.5* 2.7*   BILITOT 0.2 0.2   ALKPHOS 90 94   AST 12 11   ALT 14 12   ANIONGAP 9 12   EGFRNONAA >60.0 >60.0     Magnesium:   Recent Labs   Lab 12/30/21  0539 12/31/21  0326   MG 1.9 2.1     POCT Glucose:   Recent Labs   Lab 12/30/21  2104 12/31/21  0802 12/31/21  1223   POCTGLUCOSE 276* 118* 146*       ABGs:   Recent Labs   Lab 12/31/21  1224   PH 7.351   PCO2 62.1*   PO2 26*   HCO3 34.4*   POCSATURATED 43*   BE 9       Significant Imaging: I have reviewed all pertinent imaging results/findings within the  past 24 hours.

## 2021-12-31 NOTE — SUBJECTIVE & OBJECTIVE
Interval History: Patient titrated up to 7L NC overnight, weaned back down to 4L NC this morning. States breathing feels a little better, although wheezing seems to be worse.     Objective:     Vital Signs (Most Recent):  Temp: 98 °F (36.7 °C) (12/31/21 1250)  Pulse: 85 (12/31/21 1250)  Resp: 17 (12/31/21 1250)  BP: 115/73 (12/31/21 1250)  SpO2: (!) 94 % (12/31/21 1250) Vital Signs (24h Range):  Temp:  [97.6 °F (36.4 °C)-98.5 °F (36.9 °C)] 98 °F (36.7 °C)  Pulse:  [] 85  Resp:  [16-22] 17  SpO2:  [78 %-97 %] 94 %  BP: (115-167)/(69-86) 115/73     Weight: 87 kg (191 lb 12.8 oz)  Body mass index is 30.96 kg/m².      Intake/Output Summary (Last 24 hours) at 12/31/2021 1337  Last data filed at 12/31/2021 1300  Gross per 24 hour   Intake 1550 ml   Output 2650 ml   Net -1100 ml       Physical Exam  Vitals and nursing note reviewed.   Constitutional:       Appearance: She is well-developed. She is obese. She is not ill-appearing, toxic-appearing or diaphoretic.   HENT:      Head: Normocephalic and atraumatic.   Eyes:      Conjunctiva/sclera: Conjunctivae normal.      Pupils: Pupils are equal, round, and reactive to light.   Cardiovascular:      Rate and Rhythm: Normal rate and regular rhythm.      Heart sounds: Normal heart sounds.   Pulmonary:      Effort: Pulmonary effort is normal. No respiratory distress.      Breath sounds: Wheezing present. No rales.      Comments: Significant diffuse wheezing  Abdominal:      General: Bowel sounds are normal.      Palpations: Abdomen is soft.      Tenderness: There is no abdominal tenderness.   Musculoskeletal:      Right lower leg: No edema.      Left lower leg: No edema.   Neurological:      Mental Status: She is alert and oriented to person, place, and time.   Psychiatric:         Behavior: Behavior normal.         Thought Content: Thought content normal.         Judgment: Judgment normal.         Vents:  Oxygen Concentration (%): 50 (12/26/21 2154)    Lines/Drains/Airways      Peripheral Intravenous Line                 Peripheral IV - Single Lumen 12/26/21 2135 18 G Right Wrist 4 days                Significant Labs:    CBC/Anemia Profile:  Recent Labs   Lab 12/30/21  0539 12/31/21  0326   WBC 19.79* 18.98*   HGB 10.0* 10.7*   HCT 33.8* 33.8*    477*   MCV 84 80*   RDW 14.9* 14.6*        Chemistries:  Recent Labs   Lab 12/30/21  0539 12/31/21  0326    138   K 4.6 4.4    101   CO2 26 25   BUN 21 19   CREATININE 1.0 1.0   CALCIUM 9.4 9.8   ALBUMIN 2.5* 2.7*   PROT 7.0 7.3   BILITOT 0.2 0.2   ALKPHOS 90 94   ALT 14 12   AST 12 11   MG 1.9 2.1   PHOS 3.7 3.9       All pertinent labs within the past 24 hours have been reviewed.    Significant Imaging:  I have reviewed all pertinent imaging results/findings within the past 24 hours.

## 2021-12-31 NOTE — ASSESSMENT & PLAN NOTE
Patient's acute on chronic hypoxemic respiratory failure after short admission with 5 day course of prednisone and antibiotics. Patient began to feel worse after completing her course of both. Continues to have diffuse wheezing and cough of clear sputum.      CT Chest largely unchanged from prior, but now having had LLL ground glass without focal lobular opacification and prior RUL mass which distorts vascular anatomy largely unchanged.     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     Feel her presentation is COPD exacerbation in addition to PDL1 pneumonitis (as it appears her ground glass infiltrates on CT resemble a pattern of a radiation beam and PDL1i can cause pneumonitis in that same distribution)    Recommendations  - LAMA/LABA/ICS while inpatient  - schedule duonebs q6h for 1 day and then PRN for wheezing   - discharge with albuterol/duonebs PRN   - Patient with worsening hypoxia. 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 Continue to monitor. May ultimately need to discontinue pembro.   - Initial improvement in O2 requirements, although has plateaued for now. Still recommend changing to prednisone 60mg PO starting 1/1  - Continue levaquin for 5 days   - Agree with increasing frequency of duonebs to q4h given increased wheezing  - Ordered fungitell, ? Underlying aspergillus given diffuse wheezing?

## 2021-12-31 NOTE — ASSESSMENT & PLAN NOTE
Normotensive on admission. On amlodipine and irbesartan-hydrochlorothiazide.      PLAN  - monitor BP  - restarted home meds on 12/31/21  -Home irbesartan-hydrochlorothiazide replaced with formulary alternative (losartan-hydrochlorothiazide 100-12.5 mg)

## 2021-12-31 NOTE — PROGRESS NOTES
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 She was recently admitted 12/14-12/16 for pneumonia and COPD exacerbation. She states that the SOB of began two days ago and acutely worsened today. She reports an associated cough that is productive with white sputum. She states that she has CP and incontinence associated with the cough. She also reports PRITCHETT, stating that she cannot walk to the bathroom without getting significantly SOB. She reports chills but has not checked her temperature. She had one episode of diarrhea today, denies nausea/vomiting.     In the ED, her O2 sat was initially 78% and she was placed on bipap. She was able to be weaned to 6L NC satting 96%. Afebrile and /78. Labs significant for WBC 16.65, lactate 2.4. Trop and BNP normal. UA unremarkable. CXR with progression of airspace disease in the left hemithorax, stable appearance of airspace disease in the right hemithorax. CTA chest concerning for occlusion of RUL pulmonary artery branch, intraluminal infiltration or tumor thrombus vs thromboembolus. Pulmonology consulted by ED, do not think anticoagulation is warranted and agree with treatment as COPD exacerbation. Admitted to hospital medicine for further management.       Overview/Hospital Course:  Patient admitted to hospital medicine for evaluation of dyspnea. CT Chest notable for new nonspecific 2.4 cm ground-glass opacity within the  left lower lobe. Lower extremity ultrasound negative for DVT. TTE normal with EF of 60%. Hem/Onc consulted as suspicion for Keytruda induced pneumonitis, but per evaluation: ground glass opacities primarily in the lower lobes and is not entirely consistent with immunotherapy related pneumonitis (especially as she has already improved without steroids). Pulm consulted: high suspicion for  COPD exacerbation in addition to PDL1 pneumonitis , recommended holding keytruda and administer solumedrol 80mg IV daily, duonebs + levofloxacin x 5 days. Some improvement in respiratory status over course of hospitalization, increased Duonebs frequency to q4h.      Interval History: Patient seen and examined. No acute events overnight, afebrile, vital signs stable. She had increasing O2 requirements overnight, and currently breathing on 6L via HFNC. VBG from this morning notable for hypercapnia and hypoxemia.    Review of Systems   Constitutional: Positive for activity change and fatigue. Negative for appetite change, chills, diaphoresis, fever and unexpected weight change.   HENT: Positive for voice change. Negative for congestion and rhinorrhea.    Eyes: Negative.    Respiratory: Positive for cough, shortness of breath and wheezing. Negative for chest tightness.    Cardiovascular: Negative for chest pain, palpitations and leg swelling.   Gastrointestinal: Negative for abdominal distention, nausea and vomiting.   Endocrine: Negative.    Genitourinary: Negative.    Musculoskeletal: Positive for arthralgias and back pain.   Skin: Negative.    Allergic/Immunologic: Negative.    Neurological: Negative for dizziness and numbness.   Psychiatric/Behavioral: Negative for agitation and confusion. The patient is not nervous/anxious (on Valium).      Objective:     Vital Signs (Most Recent):  Temp: 98 °F (36.7 °C) (12/31/21 1250)  Pulse: 85 (12/31/21 1250)  Resp: 17 (12/31/21 1250)  BP: 115/73 (12/31/21 1250)  SpO2: (!) 94 % (12/31/21 1250)  Vital Signs (24h Range):  Temp:  [97.6 °F (36.4 °C)-98.5 °F (36.9 °C)] 98 °F (36.7 °C)  Pulse:  [] 85  Resp:  [16-22] 17  SpO2:  [78 %-97 %] 94 %  BP: (115-167)/(69-86) 115/73     Weight: 87 kg (191 lb 12.8 oz)  Body mass index is 30.96 kg/m².    Intake/Output Summary (Last 24 hours) at 12/31/2021 1323  Last data filed at 12/31/2021 1300  Gross per 24 hour   Intake 1550 ml   Output 2650 ml   Net -1100 ml      Physical Exam  Vitals and nursing note reviewed.   Constitutional:       General: She is not in acute distress.     Appearance: Normal appearance. She is not ill-appearing, toxic-appearing or diaphoretic.   HENT:      Head: Normocephalic and atraumatic.      Mouth/Throat:      Mouth: Mucous membranes are moist.   Eyes:      General: No scleral icterus.        Right eye: No discharge.         Left eye: No discharge.   Cardiovascular:      Rate and Rhythm: Normal rate and regular rhythm.      Pulses: Normal pulses.      Heart sounds: Normal heart sounds.   Pulmonary:      Effort: Pulmonary effort is normal. No respiratory distress.      Breath sounds: Wheezing present. No rhonchi or rales.   Abdominal:      General: Abdomen is flat. Bowel sounds are normal. There is no distension.   Musculoskeletal:         General: No swelling.      Cervical back: Normal range of motion.      Right lower leg: No edema.      Left lower leg: No edema.   Skin:     General: Skin is warm and dry.      Capillary Refill: Capillary refill takes less than 2 seconds.      Coloration: Skin is not jaundiced.   Neurological:      General: No focal deficit present.      Mental Status: She is alert and oriented to person, place, and time. Mental status is at baseline.         Significant Labs:   All pertinent labs within the past 24 hours have been reviewed.  CBC:   Recent Labs   Lab 12/30/21  0539 12/31/21  0326   WBC 19.79* 18.98*   HGB 10.0* 10.7*   HCT 33.8* 33.8*    477*     CMP:   Recent Labs   Lab 12/30/21  0539  12/31/21  0326    138   K 4.6 4.4    101   CO2 26 25    95   BUN 21 19   CREATININE 1.0 1.0   CALCIUM 9.4 9.8   PROT 7.0 7.3   ALBUMIN 2.5* 2.7*   BILITOT 0.2 0.2   ALKPHOS 90 94   AST 12 11   ALT 14 12   ANIONGAP 9 12   EGFRNONAA >60.0 >60.0     Magnesium:   Recent Labs   Lab 12/30/21  0539 12/31/21  0326   MG 1.9 2.1     POCT Glucose:   Recent Labs   Lab 12/30/21  2104 12/31/21  0802 12/31/21  1223   POCTGLUCOSE 276* 118* 146*       ABGs:   Recent Labs   Lab 12/31/21  1224   PH 7.351   PCO2 62.1*   PO2 26*   HCO3 34.4*   POCSATURATED 43*   BE 9       Significant Imaging: I have reviewed all pertinent imaging results/findings within the past 24 hours.      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 Recently admitted 12/14-12/16 for pneumonia and COPD exacerbation. In the ED, her O2 sat was initially 78% and she was placed on bipap. She was able to be weaned to 6L NC satting 96%. Labs significant for WBC 16.65, but recently on steroids, and lactate 2.4. CXR with progression of airspace disease in the left hemithorax, stable appearance of airspace disease in the right hemithorax. CTA chest concerning for occlusion of RUL pulmonary artery branch, intraluminal infiltration or tumor thrombus vs thromboembolus. Pulmonology consulted by ED, do not think anticoagulation is warranted. She reports some improvement in symptoms while in ED. Admitted to hospital medicine for management of COPD exacerbation.   -Lower extremity ultrasound negative for DVT. TTE normal with EF of 60%.  -Hem/Onc consulted as suspicion for Keytruda induced pneumonitis, but per evaluation: ground glass opacities primarily in the lower lobes and is not entirely consistent with immunotherapy related pneumonitis (especially  as she has already improved without steroids)  -Pulm consulted: high suspicion for  COPD exacerbation in addition to PDL1 pneumonitis , recommended holding keytruda and administer solumedrol 80mg IV daily, duonebs + levofloxacin x 5 days, procalcitonin and RIP    Microbiology Results (last 7 days)     Procedure Component Value Units Date/Time    Blood culture x two cultures. Draw prior to antibiotics. [[id number]] Collected: 12/26/21 2304    Order Status: Completed Specimen: Blood from Peripheral, Hand, Left Updated: 12/31/21 0612     Blood Culture, Routine No Growth to date      No Growth to date      No Growth to date      No Growth to date      No Growth to date    Narrative:      Aerobic and anaerobic    Blood culture x two cultures. Draw prior to antibiotics. [[id number]] Collected: 12/26/21 2135    Order Status: Completed Specimen: Blood from Peripheral, Hand, Right Updated: 12/31/21 0612     Blood Culture, Routine No Growth to date      No Growth to date      No Growth to date      No Growth to date      No Growth to date    Narrative:      Aerobic and anaerobic    Respiratory Infection Panel (PCR), Nasopharyngeal [[id number]] Collected: 12/29/21 1000    Order Status: Completed Specimen: Nasopharyngeal Swab Updated: 12/29/21 1612     Respiratory Infection Panel Source NP Swab     Adenovirus Not Detected     Coronavirus 229E, Common Cold Virus Not Detected     Coronavirus HKU1, Common Cold Virus Not Detected     Coronavirus NL63, Common Cold Virus Not Detected     Coronavirus OC43, Common Cold Virus Not Detected     Comment: The Coronavirus strains detected in this test cause the common cold.  These strains are not the COVID-19 (novel Coronavirus)strain   associated with the respiratory disease outbreak.          Human Metapneumovirus Not Detected     Human Rhinovirus/Enterovirus Not Detected     Influenza A (subtypes H1, H1-2009,H3) Not Detected     Influenza B Not Detected     Parainfluenza Virus 1 Not Detected  "    Parainfluenza Virus 2 Not Detected     Parainfluenza Virus 3 Not Detected     Parainfluenza Virus 4 Not Detected     Respiratory Syncytial Virus Not Detected     Bordetella Parapertussis (QB7117) Not Detected     Bordetella pertussis (ptxP) Not Detected     Chlamydia pneumoniae Not Detected     Mycoplasma pneumoniae Not Detected    Narrative:      For all other respiratory sources, order [id number] -  Respiratory Viral Panel by PCR    Respiratory Infection Panel (PCR), Nasopharyngeal [[id number]] Collected: 12/29/21 1000    Order Status: Completed Specimen: Nasopharyngeal Swab Updated: 12/29/21 1045     Respiratory Infection Panel Source NP Swab    Narrative:      For all other respiratory sources, order [id number] -  Respiratory Viral Panel by PCR    Respiratory Infection Panel (PCR), Nasopharyngeal [[id number]] Collected: 12/27/21 1725    Order Status: Canceled Specimen: Nasopharyngeal Swab           ABGs:   Recent Labs   Lab 12/31/21  1224   PH 7.351   PCO2 62.1*   PO2 26*   HCO3 34.4*   POCSATURATED 43*   BE 9       -Respiratory status improved from admission, but not showing improvement despite 4 days of steroid therapy.     PLAN  -VBG showed hypoxia, and hypercarbia, will consider possible trial of BiPap overnight  - symbicort not on formulary, will continue with breo inhaler   - Per pulm consult, for severe COPD exacerbation:   -duo nebs scheduled    -levofloxacin 5 day course completed   -80mg methlyprednisolone last course today, will switch to prednisone 60mg PO starting 1/1  -Per Hem/Onc next dose of immunotherapy is not due until Feb 1st so will defer decision on whether to continue current treatment plan to her outpatient oncologist.   - continue phenergan-codeine prn for cough  - wean O2 as tolerated         Pneumonitis  See "COPD exacerbation" A&P      Acute on chronic respiratory failure with hypoxemia  See "COPD exacerbation" A&P    Chronic respiratory failure with hypoxia  Patient with Hypoxic " Respiratory failure which is Acute on chronic.  she is on home oxygen at 2 LPM. Supplemental oxygen was provided and noted- Oxygen Concentration (%):  [50-60] 50.   Signs/symptoms of respiratory failure include- wheezing. Contributing diagnoses includes - COPD Labs and images were reviewed. Patient Has not had a recent ABG. Will treat underlying causes and adjust management of respiratory failure as follows-      See COPD exacerbation.     GERD (gastroesophageal reflux disease)  - protonix daily      Anxiety  -Continue Valium 5mg  - continue bupropion   - continue atarax prn     Adenocarcinoma of lung, stage 4, right  Follows with Dr. Peterson. S/p Carboplatin AUC4 x1 dose 5/27/20 and palliative radiation (5/27/2020 - 6/2/2020; 20 Gy to right lung). Also received SRS to right cerebellar metastasis 6/3/2020. Pembrolizumab started 6/4/2020, C23 on 12/21/2021.  - Hem Onc consulted by ED  - continue gabapentin  - continue tramadol prn       Essential hypertension  Normotensive on admission. On amlodipine and irbesartan-hydrochlorothiazide.      PLAN  - monitor BP  - restarted home meds on 12/31/21  -Home irbesartan-hydrochlorothiazide replaced with formulary alternative (losartan-hydrochlorothiazide 100-12.5 mg)    VTE Risk Mitigation (From admission, onward)         Ordered     enoxaparin injection 40 mg  Daily         12/27/21 0536     IP VTE HIGH RISK PATIENT  Once         12/27/21 0536     Place sequential compression device  Until discontinued         12/27/21 0536                Discharge Planning   TIFFANIE: 1/4/2022     Code Status: Full Code   Is the patient medically ready for discharge?:     Reason for patient still in hospital (select all that apply): Patient trending condition and Treatment  Discharge Plan A: Home Health                  Terry Restrepo DO  Department of Hospital Medicine   [facility] - [facility] Stepdown

## 2021-12-31 NOTE — RESPIRATORY THERAPY
RAPID RESPONSE RESPIRATORY THERAPY PROACTIVE ROUNDING NOTE             Time of visit: 1706     Code Status: Full Code   : 1960  Bed: 345/345 A:   MRN: [id number]  Time spent at the bedside: < 15 min    SITUATION    Evaluated patient for: HFNC Compliance     BACKGROUND    Patient has a past medical history of Anxiety, Asthma, COPD exacerbation, Hepatitis C, psychiatric care, Hypertension, Lung cancer, Psychiatric exam requested by authority, and Psychiatric problem.    24 Hours Vitals Range:  Temp:  [97.5 °F (36.4 °C)-98.5 °F (36.9 °C)]   Pulse:  []   Resp:  [16-22]   BP: (115-167)/(69-86)   SpO2:  [89 %-97 %]     Labs:    Recent Labs     21  0454 21  0539 21  0326    136 138   K 4.9 4.6 4.4    101 101   CO2 28 26 25   CREATININE 1.0 1.0 1.0   * 101 95   PHOS 3.6 3.7 3.9   MG 1.9 1.9 2.1        Recent Labs     21  1224   PH 7.351   PCO2 62.1*   PO2 26*   HCO3 34.4*   POCSATURATED 43*   BE 9       ASSESSMENT/INTERVENTIONS    Upon arrival in room pt resting no distress noted    Last VS   Temp: 97.5 °F (36.4 °C) (1656)  Pulse: 90 (1656)  Resp: 17 (1656)  BP: 115/74 (1656)  SpO2: 89 % (1656)    Level of Consciousness: Level of Consciousness (AVPU): alert  Respiratory Effort: Respiratory Effort: Normal,Unlabored Expansion/Accessory Muscle Usage: Expansion/Accessory Muscles/Retractions: no use of accessory muscles,no retractions,expansion symmetric  All Lung Field Breath Sounds: All Lung Fields Breath Sounds: Anterior:,Lateral:  CHRISTINA Breath Sounds: diminished  LLL Breath Sounds: diminished  RUL Breath Sounds: diminished  RML Breath Sounds: diminished  RLL Breath Sounds: diminished  O2 Device/Concentration: Flow (L/min): 4, Oxygen Concentration (%): 50, O2 Device (Oxygen Therapy): High Flow nasal Cannula  Was the O2 device able to be weaned? No  Ambu at bedside: Ambu bag with the patient?: Yes, Adult Ambu    Active Orders   Respiratory Care     Inhalation Treatment Q4H     Frequency: Q4H     Number of Occurrences: Until Specified    Inhalation Treatment Q6H     Frequency: Q6H     Number of Occurrences: Until Specified    Oxygen Continuous     Frequency: Continuous     Number of Occurrences: Until Specified     Order Questions:      Device type: Low flow      Device: Nasal Cannula (1- 5 Liters)      LPM: 5      Titrate O2 per Oxygen Titration Protocol: Yes      To maintain SpO2 goal of: >= 90%      Notify MD of: Inability to achieve desired SpO2; Sudden change in patient status and requires 20% increase in FiO2; Patient requires >60% FiO2    Pulse Oximetry Q4H     Frequency: Q4H     Number of Occurrences: Until Specified     Order Comments: Q4h with Vitals. Notify MD if < or = 88%         RECOMMENDATIONS    We recommend: RRT Recs: Continue POC per primary team.    ESCALATION        FOLLOW-UP    Please call back the Rapid Response RT, Maddie Bonner, RRT at x [phone] for any questions or concerns.

## 2021-12-31 NOTE — PROGRESS NOTES
[facility] - [facility]  Pulmonology  Progress Note    Patient Name: Janeth Boyce  MRN: [id number]  Admission Date: 12/26/2021  Hospital Length of Stay: 4 days  Code Status: Full Code  Attending Provider: Kelly Schwab MD  Primary Care Provider: Freeman Caballero MD   Principal Problem: COPD exacerbation    Subjective:     Interval History: Patient titrated up to 7L NC overnight, weaned back down to 4L NC this morning. States breathing feels a little better, although wheezing seems to be worse.     Objective:     Vital Signs (Most Recent):  Temp: 98 °F (36.7 °C) (12/31/21 1250)  Pulse: 85 (12/31/21 1250)  Resp: 17 (12/31/21 1250)  BP: 115/73 (12/31/21 1250)  SpO2: (!) 94 % (12/31/21 1250) Vital Signs (24h Range):  Temp:  [97.6 °F (36.4 °C)-98.5 °F (36.9 °C)] 98 °F (36.7 °C)  Pulse:  [] 85  Resp:  [16-22] 17  SpO2:  [78 %-97 %] 94 %  BP: (115-167)/(69-86) 115/73     Weight: 87 kg (191 lb 12.8 oz)  Body mass index is 30.96 kg/m².      Intake/Output Summary (Last 24 hours) at 12/31/2021 1337  Last data filed at 12/31/2021 1300  Gross per 24 hour   Intake 1550 ml   Output 2650 ml   Net -1100 ml       Physical Exam  Vitals and nursing note reviewed.   Constitutional:       Appearance: She is well-developed. She is obese. She is not ill-appearing, toxic-appearing or diaphoretic.   HENT:      Head: Normocephalic and atraumatic.   Eyes:      Conjunctiva/sclera: Conjunctivae normal.      Pupils: Pupils are equal, round, and reactive to light.   Cardiovascular:      Rate and Rhythm: Normal rate and regular rhythm.      Heart sounds: Normal heart sounds.   Pulmonary:      Effort: Pulmonary effort is normal. No respiratory distress.      Breath sounds: Wheezing present. No rales.      Comments: Significant diffuse wheezing  Abdominal:      General: Bowel sounds are normal.      Palpations: Abdomen is soft.      Tenderness: There is no abdominal tenderness.   Musculoskeletal:      Right lower leg: No edema.       Left lower leg: No edema.   Neurological:      Mental Status: She is alert and oriented to person, place, and time.   Psychiatric:         Behavior: Behavior normal.         Thought Content: Thought content normal.         Judgment: Judgment normal.         Vents:  Oxygen Concentration (%): 50 (12/26/21 2154)    Lines/Drains/Airways     Peripheral Intravenous Line                 Peripheral IV - Single Lumen 12/26/21 2135 18 G Right Wrist 4 days                Significant Labs:    CBC/Anemia Profile:  Recent Labs   Lab 12/30/21  0539 12/31/21  0326   WBC 19.79* 18.98*   HGB 10.0* 10.7*   HCT 33.8* 33.8*    477*   MCV 84 80*   RDW 14.9* 14.6*        Chemistries:  Recent Labs   Lab 12/30/21  0539 12/31/21  0326    138   K 4.6 4.4    101   CO2 26 25   BUN 21 19   CREATININE 1.0 1.0   CALCIUM 9.4 9.8   ALBUMIN 2.5* 2.7*   PROT 7.0 7.3   BILITOT 0.2 0.2   ALKPHOS 90 94   ALT 14 12   AST 12 11   MG 1.9 2.1   PHOS 3.7 3.9       All pertinent labs within the past 24 hours have been reviewed.    Significant Imaging:  I have reviewed all pertinent imaging results/findings within the past 24 hours.    Assessment/Plan:     * COPD exacerbation  Patient's acute on chronic hypoxemic respiratory failure after short admission with 5 day course of prednisone and antibiotics. Patient began to feel worse after completing her course of both. Continues to have diffuse wheezing and cough of clear sputum.      CT Chest largely unchanged from prior, but now having had LLL ground glass without focal lobular opacification and prior RUL mass which distorts vascular anatomy largely unchanged.     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     Feel her presentation is COPD exacerbation in addition to PDL1 pneumonitis (as it appears her ground glass infiltrates on CT resemble a pattern of a radiation beam and  PDL1i can cause pneumonitis in that same distribution)    Recommendations  - LAMA/LABA/ICS while inpatient  - schedule duonebs q6h for 1 day and then PRN for wheezing   - discharge with albuterol/duonebs PRN   - Patient with worsening hypoxia. 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 Continue to monitor. May ultimately need to discontinue pembro.   - Initial improvement in O2 requirements, although has plateaued for now. Still recommend changing to prednisone 60mg PO starting 1/1  - Continue levaquin for 5 days   - Agree with increasing frequency of duonebs to q4h given increased wheezing  - Ordered fungitell, ? Underlying aspergillus given diffuse wheezing?             Thanks for the consult, we will follow with you.    Rena Uriostegui MD  Pulmonary and Critical Care Fellow  12/31/2021  1:41 PM

## 2022-01-01 ENCOUNTER — INFUSION (OUTPATIENT)
Dept: INFUSION THERAPY | Facility: HOSPITAL | Age: 62
End: 2022-01-01
Payer: MEDICAID

## 2022-01-01 ENCOUNTER — TELEPHONE (OUTPATIENT)
Dept: HEMATOLOGY/ONCOLOGY | Facility: CLINIC | Age: 62
End: 2022-01-01
Payer: MEDICAID

## 2022-01-01 ENCOUNTER — TELEPHONE (OUTPATIENT)
Dept: PULMONOLOGY | Facility: CLINIC | Age: 62
End: 2022-01-01
Payer: MEDICAID

## 2022-01-01 ENCOUNTER — DOCUMENT SCAN (OUTPATIENT)
Dept: HOME HEALTH SERVICES | Facility: HOSPITAL | Age: 62
End: 2022-01-01
Payer: MEDICAID

## 2022-01-01 ENCOUNTER — OFFICE VISIT (OUTPATIENT)
Dept: PULMONOLOGY | Facility: CLINIC | Age: 62
End: 2022-01-01
Payer: MEDICAID

## 2022-01-01 ENCOUNTER — NURSE TRIAGE (OUTPATIENT)
Dept: ADMINISTRATIVE | Facility: CLINIC | Age: 62
End: 2022-01-01
Payer: MEDICAID

## 2022-01-01 ENCOUNTER — DOCUMENTATION ONLY (OUTPATIENT)
Dept: HEMATOLOGY/ONCOLOGY | Facility: CLINIC | Age: 62
End: 2022-01-01
Payer: MEDICAID

## 2022-01-01 ENCOUNTER — EXTERNAL HOME HEALTH (OUTPATIENT)
Dept: HOME HEALTH SERVICES | Facility: HOSPITAL | Age: 62
End: 2022-01-01
Payer: MEDICAID

## 2022-01-01 ENCOUNTER — TELEPHONE (OUTPATIENT)
Dept: PULMONOLOGY | Facility: HOSPITAL | Age: 62
End: 2022-01-01
Payer: MEDICAID

## 2022-01-01 ENCOUNTER — DOCUMENT SCAN (OUTPATIENT)
Dept: HOME HEALTH SERVICES | Facility: HOSPITAL | Age: 62
End: 2022-01-01

## 2022-01-01 ENCOUNTER — OFFICE VISIT (OUTPATIENT)
Dept: HEMATOLOGY/ONCOLOGY | Facility: CLINIC | Age: 62
End: 2022-01-01
Payer: MEDICAID

## 2022-01-01 ENCOUNTER — PATIENT OUTREACH (OUTPATIENT)
Dept: ADMINISTRATIVE | Facility: CLINIC | Age: 62
End: 2022-01-01
Payer: MEDICAID

## 2022-01-01 ENCOUNTER — HOSPITAL ENCOUNTER (OUTPATIENT)
Dept: RADIOLOGY | Facility: HOSPITAL | Age: 62
Discharge: HOME | End: 2022-02-14
Attending: STUDENT IN AN ORGANIZED HEALTH CARE EDUCATION/TRAINING PROGRAM
Payer: MEDICAID

## 2022-01-01 ENCOUNTER — DOCUMENTATION ONLY (OUTPATIENT)
Dept: ONCOLOGY | Facility: HOSPITAL | Age: 62
End: 2022-01-01
Payer: MEDICAID

## 2022-01-01 ENCOUNTER — HOSPITAL ENCOUNTER (INPATIENT)
Facility: HOSPITAL | Age: 62
LOS: 5 days | DRG: 871 | End: 2022-02-22
Attending: EMERGENCY MEDICINE | Admitting: INTERNAL MEDICINE
Payer: MEDICAID

## 2022-01-01 ENCOUNTER — TELEPHONE (OUTPATIENT)
Dept: ADMINISTRATIVE | Facility: CLINIC | Age: 62
End: 2022-01-01
Payer: MEDICAID

## 2022-01-01 ENCOUNTER — HOSPITAL ENCOUNTER (INPATIENT)
Facility: HOSPITAL | Age: 62
LOS: 6 days | Discharge: HOME HEALTH | DRG: 177 | End: 2022-01-19
Attending: EMERGENCY MEDICINE | Admitting: INTERNAL MEDICINE
Payer: MEDICAID

## 2022-01-01 VITALS
DIASTOLIC BLOOD PRESSURE: 77 MMHG | OXYGEN SATURATION: 99 % | TEMPERATURE: 64 F | HEART RATE: 90 BPM | SYSTOLIC BLOOD PRESSURE: 122 MMHG | RESPIRATION RATE: 20 BRPM

## 2022-01-01 VITALS
TEMPERATURE: 98 F | RESPIRATION RATE: 22 BRPM | HEART RATE: 96 BPM | HEIGHT: 66 IN | DIASTOLIC BLOOD PRESSURE: 83 MMHG | SYSTOLIC BLOOD PRESSURE: 129 MMHG | OXYGEN SATURATION: 91 % | WEIGHT: 211.63 LBS | BODY MASS INDEX: 34.01 KG/M2

## 2022-01-01 VITALS
WEIGHT: 184.06 LBS | HEART RATE: 124 BPM | DIASTOLIC BLOOD PRESSURE: 70 MMHG | TEMPERATURE: 100 F | SYSTOLIC BLOOD PRESSURE: 109 MMHG | RESPIRATION RATE: 29 BRPM | OXYGEN SATURATION: 81 % | HEIGHT: 66 IN | BODY MASS INDEX: 29.58 KG/M2

## 2022-01-01 VITALS
HEART RATE: 98 BPM | OXYGEN SATURATION: 90 % | BODY MASS INDEX: 29.8 KG/M2 | DIASTOLIC BLOOD PRESSURE: 62 MMHG | HEIGHT: 66 IN | WEIGHT: 185.44 LBS | SYSTOLIC BLOOD PRESSURE: 118 MMHG

## 2022-01-01 VITALS
TEMPERATURE: 98 F | RESPIRATION RATE: 18 BRPM | OXYGEN SATURATION: 96 % | DIASTOLIC BLOOD PRESSURE: 66 MMHG | BODY MASS INDEX: 31.6 KG/M2 | SYSTOLIC BLOOD PRESSURE: 111 MMHG | HEIGHT: 66 IN | HEART RATE: 82 BPM | WEIGHT: 196.63 LBS

## 2022-01-01 VITALS
OXYGEN SATURATION: 95 % | HEIGHT: 66 IN | RESPIRATION RATE: 36 BRPM | DIASTOLIC BLOOD PRESSURE: 75 MMHG | BODY MASS INDEX: 29.93 KG/M2 | HEART RATE: 95 BPM | SYSTOLIC BLOOD PRESSURE: 120 MMHG

## 2022-01-01 DIAGNOSIS — C79.31 SECONDARY MALIGNANT NEOPLASM OF BRAIN: ICD-10-CM

## 2022-01-01 DIAGNOSIS — N17.9 AKI (ACUTE KIDNEY INJURY): ICD-10-CM

## 2022-01-01 DIAGNOSIS — U07.1 COVID-19 VIRUS INFECTION: Primary | ICD-10-CM

## 2022-01-01 DIAGNOSIS — J96.11 CHRONIC RESPIRATORY FAILURE WITH HYPOXIA: ICD-10-CM

## 2022-01-01 DIAGNOSIS — C34.91 ADENOCARCINOMA OF LUNG, STAGE 4, RIGHT: ICD-10-CM

## 2022-01-01 DIAGNOSIS — Z99.81 DEPENDENCE ON SUPPLEMENTAL OXYGEN: ICD-10-CM

## 2022-01-01 DIAGNOSIS — Z71.89 GOALS OF CARE, COUNSELING/DISCUSSION: ICD-10-CM

## 2022-01-01 DIAGNOSIS — F41.9 ANXIETY: ICD-10-CM

## 2022-01-01 DIAGNOSIS — R53.81 DEBILITY: ICD-10-CM

## 2022-01-01 DIAGNOSIS — R06.02 SHORTNESS OF BREATH: ICD-10-CM

## 2022-01-01 DIAGNOSIS — F51.02 ADJUSTMENT INSOMNIA: ICD-10-CM

## 2022-01-01 DIAGNOSIS — C79.31 PRIMARY MALIGNANT NEOPLASM OF LUNG WITH METASTASIS TO BRAIN: ICD-10-CM

## 2022-01-01 DIAGNOSIS — C34.91 ADENOCARCINOMA OF LUNG, STAGE 4, RIGHT: Primary | ICD-10-CM

## 2022-01-01 DIAGNOSIS — J44.9 CHRONIC OBSTRUCTIVE PULMONARY DISEASE, UNSPECIFIED COPD TYPE: Chronic | ICD-10-CM

## 2022-01-01 DIAGNOSIS — J98.4 PNEUMONITIS: ICD-10-CM

## 2022-01-01 DIAGNOSIS — R06.02 SOB (SHORTNESS OF BREATH): ICD-10-CM

## 2022-01-01 DIAGNOSIS — N39.3 STRESS INCONTINENCE: ICD-10-CM

## 2022-01-01 DIAGNOSIS — G89.3 CHRONIC NEOPLASM-RELATED PAIN: ICD-10-CM

## 2022-01-01 DIAGNOSIS — J96.01 ACUTE HYPOXEMIC RESPIRATORY FAILURE: ICD-10-CM

## 2022-01-01 DIAGNOSIS — J96.11 CHRONIC RESPIRATORY FAILURE WITH HYPOXIA: Primary | ICD-10-CM

## 2022-01-01 DIAGNOSIS — C34.90 PRIMARY MALIGNANT NEOPLASM OF LUNG WITH METASTASIS TO BRAIN: ICD-10-CM

## 2022-01-01 DIAGNOSIS — R07.9 CHEST PAIN: ICD-10-CM

## 2022-01-01 DIAGNOSIS — U07.1 COVID-19: ICD-10-CM

## 2022-01-01 DIAGNOSIS — J96.21 ACUTE ON CHRONIC RESPIRATORY FAILURE WITH HYPOXEMIA: ICD-10-CM

## 2022-01-01 DIAGNOSIS — I47.20 V-TACH: ICD-10-CM

## 2022-01-01 DIAGNOSIS — C79.31 METASTASIS TO BRAIN: ICD-10-CM

## 2022-01-01 LAB
1,3 BETA GLUCAN SER-MCNC: <31 PG/ML
ALBUMIN SERPL BCP-MCNC: 2.2 G/DL (ref 3.5–5.2)
ALBUMIN SERPL BCP-MCNC: 2.3 G/DL (ref 3.5–5.2)
ALBUMIN SERPL BCP-MCNC: 2.3 G/DL (ref 3.5–5.2)
ALBUMIN SERPL BCP-MCNC: 2.5 G/DL (ref 3.5–5.2)
ALBUMIN SERPL BCP-MCNC: 2.6 G/DL (ref 3.5–5.2)
ALBUMIN SERPL BCP-MCNC: 2.7 G/DL (ref 3.5–5.2)
ALBUMIN SERPL BCP-MCNC: 2.8 G/DL (ref 3.5–5.2)
ALBUMIN SERPL BCP-MCNC: 2.9 G/DL (ref 3.5–5.2)
ALLENS TEST: ABNORMAL
ALLENS TEST: NORMAL
ALP SERPL-CCNC: 73 U/L (ref 55–135)
ALP SERPL-CCNC: 74 U/L (ref 55–135)
ALP SERPL-CCNC: 75 U/L (ref 55–135)
ALP SERPL-CCNC: 75 U/L (ref 55–135)
ALP SERPL-CCNC: 77 U/L (ref 55–135)
ALP SERPL-CCNC: 80 U/L (ref 55–135)
ALP SERPL-CCNC: 81 U/L (ref 55–135)
ALP SERPL-CCNC: 83 U/L (ref 55–135)
ALP SERPL-CCNC: 84 U/L (ref 55–135)
ALP SERPL-CCNC: 87 U/L (ref 55–135)
ALP SERPL-CCNC: 88 U/L (ref 55–135)
ALP SERPL-CCNC: 89 U/L (ref 55–135)
ALP SERPL-CCNC: 90 U/L (ref 55–135)
ALP SERPL-CCNC: 91 U/L (ref 55–135)
ALT SERPL W/O P-5'-P-CCNC: 11 U/L (ref 10–44)
ALT SERPL W/O P-5'-P-CCNC: 12 U/L (ref 10–44)
ALT SERPL W/O P-5'-P-CCNC: 13 U/L (ref 10–44)
ALT SERPL W/O P-5'-P-CCNC: 14 U/L (ref 10–44)
ALT SERPL W/O P-5'-P-CCNC: 14 U/L (ref 10–44)
ALT SERPL W/O P-5'-P-CCNC: 15 U/L (ref 10–44)
ALT SERPL W/O P-5'-P-CCNC: 16 U/L (ref 10–44)
ALT SERPL W/O P-5'-P-CCNC: 17 U/L (ref 10–44)
ANION GAP SERPL CALC-SCNC: 10 MMOL/L (ref 8–16)
ANION GAP SERPL CALC-SCNC: 12 MMOL/L (ref 8–16)
ANION GAP SERPL CALC-SCNC: 13 MMOL/L (ref 8–16)
ANION GAP SERPL CALC-SCNC: 14 MMOL/L (ref 8–16)
ANION GAP SERPL CALC-SCNC: 14 MMOL/L (ref 8–16)
ANION GAP SERPL CALC-SCNC: 15 MMOL/L (ref 8–16)
ANION GAP SERPL CALC-SCNC: 8 MMOL/L (ref 8–16)
ANION GAP SERPL CALC-SCNC: 9 MMOL/L (ref 8–16)
ANISOCYTOSIS BLD QL SMEAR: SLIGHT
AST SERPL-CCNC: 10 U/L (ref 10–40)
AST SERPL-CCNC: 11 U/L (ref 10–40)
AST SERPL-CCNC: 13 U/L (ref 10–40)
AST SERPL-CCNC: 14 U/L (ref 10–40)
AST SERPL-CCNC: 16 U/L (ref 10–40)
AST SERPL-CCNC: 17 U/L (ref 10–40)
AST SERPL-CCNC: 19 U/L (ref 10–40)
AST SERPL-CCNC: 21 U/L (ref 10–40)
AST SERPL-CCNC: 23 U/L (ref 10–40)
AST SERPL-CCNC: 23 U/L (ref 10–40)
AST SERPL-CCNC: 24 U/L (ref 10–40)
AST SERPL-CCNC: 49 U/L (ref 10–40)
AV INDEX (PROSTH): 0.73
AV MEAN GRADIENT: 4 MMHG
AV PEAK GRADIENT: 8 MMHG
AV VALVE AREA: 2.53 CM2
AV VELOCITY RATIO: 0.61
BACTERIA BLD CULT: NORMAL
BASOPHILS # BLD AUTO: 0.01 K/UL (ref 0–0.2)
BASOPHILS # BLD AUTO: 0.02 K/UL (ref 0–0.2)
BASOPHILS # BLD AUTO: 0.03 K/UL (ref 0–0.2)
BASOPHILS # BLD AUTO: 0.04 K/UL (ref 0–0.2)
BASOPHILS # BLD AUTO: 0.06 K/UL (ref 0–0.2)
BASOPHILS # BLD AUTO: 0.06 K/UL (ref 0–0.2)
BASOPHILS # BLD AUTO: 0.07 K/UL (ref 0–0.2)
BASOPHILS NFR BLD: 0.1 % (ref 0–1.9)
BASOPHILS NFR BLD: 0.2 % (ref 0–1.9)
BASOPHILS NFR BLD: 0.3 % (ref 0–1.9)
BILIRUB SERPL-MCNC: 0.2 MG/DL (ref 0.1–1)
BILIRUB SERPL-MCNC: 0.3 MG/DL (ref 0.1–1)
BILIRUB SERPL-MCNC: 0.4 MG/DL (ref 0.1–1)
BILIRUB SERPL-MCNC: 0.5 MG/DL (ref 0.1–1)
BILIRUB SERPL-MCNC: 0.6 MG/DL (ref 0.1–1)
BILIRUB SERPL-MCNC: 0.6 MG/DL (ref 0.1–1)
BILIRUB SERPL-MCNC: 0.7 MG/DL (ref 0.1–1)
BILIRUB SERPL-MCNC: 0.8 MG/DL (ref 0.1–1)
BILIRUB SERPL-MCNC: 0.8 MG/DL (ref 0.1–1)
BILIRUB UR QL STRIP: NEGATIVE
BILIRUB UR QL STRIP: NEGATIVE
BNP SERPL-MCNC: 102 PG/ML (ref 0–99)
BNP SERPL-MCNC: 42 PG/ML (ref 0–99)
BSA FOR ECHO PROCEDURE: 1.98 M2
BUN SERPL-MCNC: 15 MG/DL (ref 8–23)
BUN SERPL-MCNC: 16 MG/DL (ref 8–23)
BUN SERPL-MCNC: 17 MG/DL (ref 8–23)
BUN SERPL-MCNC: 21 MG/DL (ref 8–23)
BUN SERPL-MCNC: 22 MG/DL (ref 8–23)
BUN SERPL-MCNC: 23 MG/DL (ref 8–23)
BUN SERPL-MCNC: 23 MG/DL (ref 8–23)
BUN SERPL-MCNC: 25 MG/DL (ref 8–23)
BUN SERPL-MCNC: 26 MG/DL (ref 8–23)
BUN SERPL-MCNC: 27 MG/DL (ref 8–23)
BUN SERPL-MCNC: 29 MG/DL (ref 8–23)
BUN SERPL-MCNC: 35 MG/DL (ref 8–23)
BUN SERPL-MCNC: 38 MG/DL (ref 8–23)
BUN SERPL-MCNC: 53 MG/DL (ref 8–23)
CALCIUM SERPL-MCNC: 10 MG/DL (ref 8.7–10.5)
CALCIUM SERPL-MCNC: 9 MG/DL (ref 8.7–10.5)
CALCIUM SERPL-MCNC: 9.2 MG/DL (ref 8.7–10.5)
CALCIUM SERPL-MCNC: 9.2 MG/DL (ref 8.7–10.5)
CALCIUM SERPL-MCNC: 9.3 MG/DL (ref 8.7–10.5)
CALCIUM SERPL-MCNC: 9.4 MG/DL (ref 8.7–10.5)
CALCIUM SERPL-MCNC: 9.5 MG/DL (ref 8.7–10.5)
CALCIUM SERPL-MCNC: 9.6 MG/DL (ref 8.7–10.5)
CALCIUM SERPL-MCNC: 9.7 MG/DL (ref 8.7–10.5)
CHLORIDE SERPL-SCNC: 100 MMOL/L (ref 95–110)
CHLORIDE SERPL-SCNC: 100 MMOL/L (ref 95–110)
CHLORIDE SERPL-SCNC: 102 MMOL/L (ref 95–110)
CHLORIDE SERPL-SCNC: 103 MMOL/L (ref 95–110)
CHLORIDE SERPL-SCNC: 105 MMOL/L (ref 95–110)
CHLORIDE SERPL-SCNC: 106 MMOL/L (ref 95–110)
CHLORIDE SERPL-SCNC: 107 MMOL/L (ref 95–110)
CHLORIDE SERPL-SCNC: 110 MMOL/L (ref 95–110)
CHLORIDE SERPL-SCNC: 97 MMOL/L (ref 95–110)
CHLORIDE SERPL-SCNC: 98 MMOL/L (ref 95–110)
CHLORIDE SERPL-SCNC: 99 MMOL/L (ref 95–110)
CK SERPL-CCNC: 33 U/L (ref 20–180)
CLARITY UR REFRACT.AUTO: ABNORMAL
CLARITY UR REFRACT.AUTO: CLEAR
CO2 SERPL-SCNC: 22 MMOL/L (ref 23–29)
CO2 SERPL-SCNC: 23 MMOL/L (ref 23–29)
CO2 SERPL-SCNC: 23 MMOL/L (ref 23–29)
CO2 SERPL-SCNC: 24 MMOL/L (ref 23–29)
CO2 SERPL-SCNC: 24 MMOL/L (ref 23–29)
CO2 SERPL-SCNC: 25 MMOL/L (ref 23–29)
CO2 SERPL-SCNC: 26 MMOL/L (ref 23–29)
CO2 SERPL-SCNC: 27 MMOL/L (ref 23–29)
CO2 SERPL-SCNC: 28 MMOL/L (ref 23–29)
CO2 SERPL-SCNC: 29 MMOL/L (ref 23–29)
CO2 SERPL-SCNC: 30 MMOL/L (ref 23–29)
CO2 SERPL-SCNC: 31 MMOL/L (ref 23–29)
CO2 SERPL-SCNC: 32 MMOL/L (ref 23–29)
COLOR UR AUTO: YELLOW
COLOR UR AUTO: YELLOW
CREAT SERPL-MCNC: 0.9 MG/DL (ref 0.5–1.4)
CREAT SERPL-MCNC: 1 MG/DL (ref 0.5–1.4)
CREAT SERPL-MCNC: 1.1 MG/DL (ref 0.5–1.4)
CREAT SERPL-MCNC: 1.2 MG/DL (ref 0.5–1.4)
CREAT SERPL-MCNC: 1.2 MG/DL (ref 0.5–1.4)
CREAT SERPL-MCNC: 1.3 MG/DL (ref 0.5–1.4)
CREAT SERPL-MCNC: 1.5 MG/DL (ref 0.5–1.4)
CRP SERPL-MCNC: 45.2 MG/L (ref 0–8.2)
CTP QC/QA: YES
CTP QC/QA: YES
CV ECHO LV RWT: 0.5 CM
D DIMER PPP IA.FEU-MCNC: 0.66 MG/L FEU
D DIMER PPP IA.FEU-MCNC: 0.68 MG/L FEU
D DIMER PPP IA.FEU-MCNC: 1.49 MG/L FEU
DACRYOCYTES BLD QL SMEAR: ABNORMAL
DELSYS: ABNORMAL
DELSYS: ABNORMAL
DELSYS: NORMAL
DIFFERENTIAL METHOD: ABNORMAL
DOP CALC AO PEAK VEL: 1.45 M/S
DOP CALC AO VTI: 19.15 CM
DOP CALC LVOT AREA: 3.5 CM2
DOP CALC LVOT DIAMETER: 2.1 CM
DOP CALC LVOT PEAK VEL: 0.88 M/S
DOP CALC LVOT STROKE VOLUME: 48.47 CM3
DOP CALCLVOT PEAK VEL VTI: 14 CM
E WAVE DECELERATION TIME: 296.94 MSEC
E/A RATIO: 0.6
ECHO LV POSTERIOR WALL: 0.97 CM (ref 0.6–1.1)
EJECTION FRACTION: 65 %
EOSINOPHIL # BLD AUTO: 0 K/UL (ref 0–0.5)
EOSINOPHIL # BLD AUTO: 0.1 K/UL (ref 0–0.5)
EOSINOPHIL # BLD AUTO: 0.1 K/UL (ref 0–0.5)
EOSINOPHIL # BLD AUTO: 0.2 K/UL (ref 0–0.5)
EOSINOPHIL # BLD AUTO: 0.3 K/UL (ref 0–0.5)
EOSINOPHIL # BLD AUTO: 0.4 K/UL (ref 0–0.5)
EOSINOPHIL NFR BLD: 0 % (ref 0–8)
EOSINOPHIL NFR BLD: 0 % (ref 0–8)
EOSINOPHIL NFR BLD: 0.1 % (ref 0–8)
EOSINOPHIL NFR BLD: 0.2 % (ref 0–8)
EOSINOPHIL NFR BLD: 0.2 % (ref 0–8)
EOSINOPHIL NFR BLD: 0.3 % (ref 0–8)
EOSINOPHIL NFR BLD: 0.5 % (ref 0–8)
EOSINOPHIL NFR BLD: 0.5 % (ref 0–8)
EOSINOPHIL NFR BLD: 1 % (ref 0–8)
EOSINOPHIL NFR BLD: 1.1 % (ref 0–8)
EOSINOPHIL NFR BLD: 1.3 % (ref 0–8)
EOSINOPHIL NFR BLD: 1.6 % (ref 0–8)
EOSINOPHIL NFR BLD: 1.7 % (ref 0–8)
EOSINOPHIL NFR BLD: 1.7 % (ref 0–8)
EOSINOPHIL NFR BLD: 2.4 % (ref 0–8)
EP: 8
EP: 8
ERYTHROCYTE [DISTWIDTH] IN BLOOD BY AUTOMATED COUNT: 14.7 % (ref 11.5–14.5)
ERYTHROCYTE [DISTWIDTH] IN BLOOD BY AUTOMATED COUNT: 14.8 % (ref 11.5–14.5)
ERYTHROCYTE [DISTWIDTH] IN BLOOD BY AUTOMATED COUNT: 14.8 % (ref 11.5–14.5)
ERYTHROCYTE [DISTWIDTH] IN BLOOD BY AUTOMATED COUNT: 14.9 % (ref 11.5–14.5)
ERYTHROCYTE [DISTWIDTH] IN BLOOD BY AUTOMATED COUNT: 15.1 % (ref 11.5–14.5)
ERYTHROCYTE [DISTWIDTH] IN BLOOD BY AUTOMATED COUNT: 15.5 % (ref 11.5–14.5)
ERYTHROCYTE [DISTWIDTH] IN BLOOD BY AUTOMATED COUNT: 15.6 % (ref 11.5–14.5)
ERYTHROCYTE [DISTWIDTH] IN BLOOD BY AUTOMATED COUNT: 15.7 % (ref 11.5–14.5)
ERYTHROCYTE [DISTWIDTH] IN BLOOD BY AUTOMATED COUNT: 15.7 % (ref 11.5–14.5)
ERYTHROCYTE [DISTWIDTH] IN BLOOD BY AUTOMATED COUNT: 15.8 % (ref 11.5–14.5)
ERYTHROCYTE [DISTWIDTH] IN BLOOD BY AUTOMATED COUNT: 15.8 % (ref 11.5–14.5)
ERYTHROCYTE [DISTWIDTH] IN BLOOD BY AUTOMATED COUNT: 15.9 % (ref 11.5–14.5)
ERYTHROCYTE [DISTWIDTH] IN BLOOD BY AUTOMATED COUNT: 16.9 % (ref 11.5–14.5)
ERYTHROCYTE [DISTWIDTH] IN BLOOD BY AUTOMATED COUNT: 17.2 % (ref 11.5–14.5)
ERYTHROCYTE [DISTWIDTH] IN BLOOD BY AUTOMATED COUNT: 17.4 % (ref 11.5–14.5)
ERYTHROCYTE [DISTWIDTH] IN BLOOD BY AUTOMATED COUNT: 17.4 % (ref 11.5–14.5)
ERYTHROCYTE [DISTWIDTH] IN BLOOD BY AUTOMATED COUNT: 17.6 % (ref 11.5–14.5)
ERYTHROCYTE [DISTWIDTH] IN BLOOD BY AUTOMATED COUNT: 18 % (ref 11.5–14.5)
ERYTHROCYTE [SEDIMENTATION RATE] IN BLOOD BY WESTERGREN METHOD: 20 MM/H
ERYTHROCYTE [SEDIMENTATION RATE] IN BLOOD BY WESTERGREN METHOD: 20 MM/H
ERYTHROCYTE [SEDIMENTATION RATE] IN BLOOD BY WESTERGREN METHOD: 30 MM/H
ERYTHROCYTE [SEDIMENTATION RATE] IN BLOOD BY WESTERGREN METHOD: >120 MM/HR (ref 0–36)
EST. GFR  (AFRICAN AMERICAN): 42.7 ML/MIN/1.73 M^2
EST. GFR  (AFRICAN AMERICAN): 50.8 ML/MIN/1.73 M^2
EST. GFR  (AFRICAN AMERICAN): 56 ML/MIN/1.73 M^2
EST. GFR  (AFRICAN AMERICAN): 56 ML/MIN/1.73 M^2
EST. GFR  (AFRICAN AMERICAN): >60 ML/MIN/1.73 M^2
EST. GFR  (NON AFRICAN AMERICAN): 37.1 ML/MIN/1.73 M^2
EST. GFR  (NON AFRICAN AMERICAN): 44.1 ML/MIN/1.73 M^2
EST. GFR  (NON AFRICAN AMERICAN): 48.6 ML/MIN/1.73 M^2
EST. GFR  (NON AFRICAN AMERICAN): 48.6 ML/MIN/1.73 M^2
EST. GFR  (NON AFRICAN AMERICAN): 53.9 ML/MIN/1.73 M^2
EST. GFR  (NON AFRICAN AMERICAN): >60 ML/MIN/1.73 M^2
ESTIMATED AVG GLUCOSE: 143 MG/DL (ref 68–131)
FERRITIN SERPL-MCNC: 153 NG/ML (ref 20–300)
FIO2: 100
FIO2: 100
FLOW: 6
FRACTIONAL SHORTENING: 25 % (ref 28–44)
FUNGITELL COMMENTS: NEGATIVE
GLUCOSE SERPL-MCNC: 104 MG/DL (ref 70–110)
GLUCOSE SERPL-MCNC: 111 MG/DL (ref 70–110)
GLUCOSE SERPL-MCNC: 113 MG/DL (ref 70–110)
GLUCOSE SERPL-MCNC: 121 MG/DL (ref 70–110)
GLUCOSE SERPL-MCNC: 123 MG/DL (ref 70–110)
GLUCOSE SERPL-MCNC: 123 MG/DL (ref 70–110)
GLUCOSE SERPL-MCNC: 133 MG/DL (ref 70–110)
GLUCOSE SERPL-MCNC: 141 MG/DL (ref 70–110)
GLUCOSE SERPL-MCNC: 141 MG/DL (ref 70–110)
GLUCOSE SERPL-MCNC: 157 MG/DL (ref 70–110)
GLUCOSE SERPL-MCNC: 159 MG/DL (ref 70–110)
GLUCOSE SERPL-MCNC: 160 MG/DL (ref 70–110)
GLUCOSE SERPL-MCNC: 162 MG/DL (ref 70–110)
GLUCOSE SERPL-MCNC: 81 MG/DL (ref 70–110)
GLUCOSE SERPL-MCNC: 86 MG/DL (ref 70–110)
GLUCOSE SERPL-MCNC: 91 MG/DL (ref 70–110)
GLUCOSE SERPL-MCNC: 94 MG/DL (ref 70–110)
GLUCOSE SERPL-MCNC: 97 MG/DL (ref 70–110)
GLUCOSE UR QL STRIP: NEGATIVE
GLUCOSE UR QL STRIP: NEGATIVE
HBA1C MFR BLD: 6.6 % (ref 4–5.6)
HCO3 UR-SCNC: 25 MMOL/L (ref 24–28)
HCO3 UR-SCNC: 25.8 MMOL/L (ref 24–28)
HCO3 UR-SCNC: 27.5 MMOL/L (ref 24–28)
HCT VFR BLD AUTO: 31.6 % (ref 37–48.5)
HCT VFR BLD AUTO: 32.1 % (ref 37–48.5)
HCT VFR BLD AUTO: 33 % (ref 37–48.5)
HCT VFR BLD AUTO: 33 % (ref 37–48.5)
HCT VFR BLD AUTO: 33.2 % (ref 37–48.5)
HCT VFR BLD AUTO: 33.6 % (ref 37–48.5)
HCT VFR BLD AUTO: 33.9 % (ref 37–48.5)
HCT VFR BLD AUTO: 34.1 % (ref 37–48.5)
HCT VFR BLD AUTO: 35.3 % (ref 37–48.5)
HCT VFR BLD AUTO: 35.5 % (ref 37–48.5)
HCT VFR BLD AUTO: 35.5 % (ref 37–48.5)
HCT VFR BLD AUTO: 35.8 % (ref 37–48.5)
HCT VFR BLD AUTO: 35.9 % (ref 37–48.5)
HCT VFR BLD AUTO: 35.9 % (ref 37–48.5)
HCT VFR BLD AUTO: 36 % (ref 37–48.5)
HCT VFR BLD AUTO: 37.5 % (ref 37–48.5)
HCT VFR BLD AUTO: 37.9 % (ref 37–48.5)
HCT VFR BLD AUTO: 38.9 % (ref 37–48.5)
HGB BLD-MCNC: 10 G/DL (ref 12–16)
HGB BLD-MCNC: 10.2 G/DL (ref 12–16)
HGB BLD-MCNC: 10.2 G/DL (ref 12–16)
HGB BLD-MCNC: 10.3 G/DL (ref 12–16)
HGB BLD-MCNC: 10.5 G/DL (ref 12–16)
HGB BLD-MCNC: 10.6 G/DL (ref 12–16)
HGB BLD-MCNC: 10.7 G/DL (ref 12–16)
HGB BLD-MCNC: 10.7 G/DL (ref 12–16)
HGB BLD-MCNC: 10.8 G/DL (ref 12–16)
HGB BLD-MCNC: 10.8 G/DL (ref 12–16)
HGB BLD-MCNC: 11.1 G/DL (ref 12–16)
HGB BLD-MCNC: 11.3 G/DL (ref 12–16)
HGB BLD-MCNC: 11.3 G/DL (ref 12–16)
HGB BLD-MCNC: 11.5 G/DL (ref 12–16)
HGB BLD-MCNC: 12 G/DL (ref 12–16)
HGB BLD-MCNC: 9.6 G/DL (ref 12–16)
HGB BLD-MCNC: 9.7 G/DL (ref 12–16)
HGB BLD-MCNC: 9.9 G/DL (ref 12–16)
HGB UR QL STRIP: NEGATIVE
HGB UR QL STRIP: NEGATIVE
HIV 1+2 AB+HIV1 P24 AG SERPL QL IA: NEGATIVE
HYPOCHROMIA BLD QL SMEAR: ABNORMAL
IMM GRANULOCYTES # BLD AUTO: 0.07 K/UL (ref 0–0.04)
IMM GRANULOCYTES # BLD AUTO: 0.11 K/UL (ref 0–0.04)
IMM GRANULOCYTES # BLD AUTO: 0.16 K/UL (ref 0–0.04)
IMM GRANULOCYTES # BLD AUTO: 0.17 K/UL (ref 0–0.04)
IMM GRANULOCYTES # BLD AUTO: 0.18 K/UL (ref 0–0.04)
IMM GRANULOCYTES # BLD AUTO: 0.2 K/UL (ref 0–0.04)
IMM GRANULOCYTES # BLD AUTO: 0.25 K/UL (ref 0–0.04)
IMM GRANULOCYTES # BLD AUTO: 0.26 K/UL (ref 0–0.04)
IMM GRANULOCYTES # BLD AUTO: 0.26 K/UL (ref 0–0.04)
IMM GRANULOCYTES # BLD AUTO: 0.27 K/UL (ref 0–0.04)
IMM GRANULOCYTES # BLD AUTO: 0.28 K/UL (ref 0–0.04)
IMM GRANULOCYTES # BLD AUTO: 0.28 K/UL (ref 0–0.04)
IMM GRANULOCYTES # BLD AUTO: 0.3 K/UL (ref 0–0.04)
IMM GRANULOCYTES # BLD AUTO: 0.31 K/UL (ref 0–0.04)
IMM GRANULOCYTES # BLD AUTO: 0.33 K/UL (ref 0–0.04)
IMM GRANULOCYTES # BLD AUTO: 0.37 K/UL (ref 0–0.04)
IMM GRANULOCYTES # BLD AUTO: 0.47 K/UL (ref 0–0.04)
IMM GRANULOCYTES # BLD AUTO: 0.58 K/UL (ref 0–0.04)
IMM GRANULOCYTES NFR BLD AUTO: 0.7 % (ref 0–0.5)
IMM GRANULOCYTES NFR BLD AUTO: 0.8 % (ref 0–0.5)
IMM GRANULOCYTES NFR BLD AUTO: 1 % (ref 0–0.5)
IMM GRANULOCYTES NFR BLD AUTO: 1 % (ref 0–0.5)
IMM GRANULOCYTES NFR BLD AUTO: 1.1 % (ref 0–0.5)
IMM GRANULOCYTES NFR BLD AUTO: 1.1 % (ref 0–0.5)
IMM GRANULOCYTES NFR BLD AUTO: 1.2 % (ref 0–0.5)
IMM GRANULOCYTES NFR BLD AUTO: 1.4 % (ref 0–0.5)
IMM GRANULOCYTES NFR BLD AUTO: 1.5 % (ref 0–0.5)
IMM GRANULOCYTES NFR BLD AUTO: 1.6 % (ref 0–0.5)
IMM GRANULOCYTES NFR BLD AUTO: 1.8 % (ref 0–0.5)
IMM GRANULOCYTES NFR BLD AUTO: 2.1 % (ref 0–0.5)
IMM GRANULOCYTES NFR BLD AUTO: 2.2 % (ref 0–0.5)
IMM GRANULOCYTES NFR BLD AUTO: 3.1 % (ref 0–0.5)
INTERVENTRICULAR SEPTUM: 0.85 CM (ref 0.6–1.1)
IP: 15
IP: 15
KETONES UR QL STRIP: NEGATIVE
KETONES UR QL STRIP: NEGATIVE
LA MAJOR: 4.69 CM
LA WIDTH: 3.62 CM
LACTATE SERPL-SCNC: 1.2 MMOL/L (ref 0.5–2.2)
LACTATE SERPL-SCNC: 2.1 MMOL/L (ref 0.5–2.2)
LACTATE SERPL-SCNC: 3.4 MMOL/L (ref 0.5–2.2)
LDH SERPL L TO P-CCNC: 1.15 MMOL/L (ref 0.5–2.2)
LDH SERPL L TO P-CCNC: 363 U/L (ref 110–260)
LEFT ATRIUM VOLUME INDEX MOD: 31.4 ML/M2
LEFT ATRIUM VOLUME MOD: 60.58 CM3
LEFT INTERNAL DIMENSION IN SYSTOLE: 2.9 CM (ref 2.1–4)
LEFT VENTRICLE DIASTOLIC VOLUME INDEX: 33.39 ML/M2
LEFT VENTRICLE DIASTOLIC VOLUME: 64.44 ML
LEFT VENTRICLE MASS INDEX: 55 G/M2
LEFT VENTRICLE SYSTOLIC VOLUME INDEX: 16.7 ML/M2
LEFT VENTRICLE SYSTOLIC VOLUME: 32.2 ML
LEFT VENTRICULAR INTERNAL DIMENSION IN DIASTOLE: 3.86 CM (ref 3.5–6)
LEFT VENTRICULAR MASS: 105.21 G
LEUKOCYTE ESTERASE UR QL STRIP: NEGATIVE
LEUKOCYTE ESTERASE UR QL STRIP: NEGATIVE
LV LATERAL E/E' RATIO: 9.4 M/S
LYMPHOCYTES # BLD AUTO: 1 K/UL (ref 1–4.8)
LYMPHOCYTES # BLD AUTO: 1.2 K/UL (ref 1–4.8)
LYMPHOCYTES # BLD AUTO: 1.3 K/UL (ref 1–4.8)
LYMPHOCYTES # BLD AUTO: 1.4 K/UL (ref 1–4.8)
LYMPHOCYTES # BLD AUTO: 1.6 K/UL (ref 1–4.8)
LYMPHOCYTES # BLD AUTO: 1.7 K/UL (ref 1–4.8)
LYMPHOCYTES # BLD AUTO: 2 K/UL (ref 1–4.8)
LYMPHOCYTES # BLD AUTO: 2.1 K/UL (ref 1–4.8)
LYMPHOCYTES # BLD AUTO: 2.1 K/UL (ref 1–4.8)
LYMPHOCYTES # BLD AUTO: 2.4 K/UL (ref 1–4.8)
LYMPHOCYTES # BLD AUTO: 2.5 K/UL (ref 1–4.8)
LYMPHOCYTES # BLD AUTO: 2.6 K/UL (ref 1–4.8)
LYMPHOCYTES # BLD AUTO: 2.7 K/UL (ref 1–4.8)
LYMPHOCYTES # BLD AUTO: 2.7 K/UL (ref 1–4.8)
LYMPHOCYTES NFR BLD: 10.4 % (ref 18–48)
LYMPHOCYTES NFR BLD: 11 % (ref 18–48)
LYMPHOCYTES NFR BLD: 11.7 % (ref 18–48)
LYMPHOCYTES NFR BLD: 12 % (ref 18–48)
LYMPHOCYTES NFR BLD: 12.4 % (ref 18–48)
LYMPHOCYTES NFR BLD: 14.1 % (ref 18–48)
LYMPHOCYTES NFR BLD: 14.1 % (ref 18–48)
LYMPHOCYTES NFR BLD: 14.5 % (ref 18–48)
LYMPHOCYTES NFR BLD: 14.8 % (ref 18–48)
LYMPHOCYTES NFR BLD: 14.9 % (ref 18–48)
LYMPHOCYTES NFR BLD: 18 % (ref 18–48)
LYMPHOCYTES NFR BLD: 4.4 % (ref 18–48)
LYMPHOCYTES NFR BLD: 4.8 % (ref 18–48)
LYMPHOCYTES NFR BLD: 4.9 % (ref 18–48)
LYMPHOCYTES NFR BLD: 5.3 % (ref 18–48)
LYMPHOCYTES NFR BLD: 6.7 % (ref 18–48)
LYMPHOCYTES NFR BLD: 7.5 % (ref 18–48)
LYMPHOCYTES NFR BLD: 9.5 % (ref 18–48)
MAGNESIUM SERPL-MCNC: 1.7 MG/DL (ref 1.6–2.6)
MAGNESIUM SERPL-MCNC: 1.7 MG/DL (ref 1.6–2.6)
MAGNESIUM SERPL-MCNC: 1.9 MG/DL (ref 1.6–2.6)
MAGNESIUM SERPL-MCNC: 2 MG/DL (ref 1.6–2.6)
MAGNESIUM SERPL-MCNC: 2 MG/DL (ref 1.6–2.6)
MAGNESIUM SERPL-MCNC: 2.1 MG/DL (ref 1.6–2.6)
MAGNESIUM SERPL-MCNC: 2.3 MG/DL (ref 1.6–2.6)
MAGNESIUM SERPL-MCNC: 2.3 MG/DL (ref 1.6–2.6)
MAGNESIUM SERPL-MCNC: 2.8 MG/DL (ref 1.6–2.6)
MCH RBC QN AUTO: 24.5 PG (ref 27–31)
MCH RBC QN AUTO: 24.6 PG (ref 27–31)
MCH RBC QN AUTO: 24.6 PG (ref 27–31)
MCH RBC QN AUTO: 24.7 PG (ref 27–31)
MCH RBC QN AUTO: 24.8 PG (ref 27–31)
MCH RBC QN AUTO: 24.9 PG (ref 27–31)
MCH RBC QN AUTO: 25 PG (ref 27–31)
MCH RBC QN AUTO: 25.1 PG (ref 27–31)
MCH RBC QN AUTO: 25.2 PG (ref 27–31)
MCH RBC QN AUTO: 25.2 PG (ref 27–31)
MCH RBC QN AUTO: 25.5 PG (ref 27–31)
MCH RBC QN AUTO: 25.5 PG (ref 27–31)
MCH RBC QN AUTO: 25.6 PG (ref 27–31)
MCH RBC QN AUTO: 25.8 PG (ref 27–31)
MCH RBC QN AUTO: 26.1 PG (ref 27–31)
MCHC RBC AUTO-ENTMCNC: 29.3 G/DL (ref 32–36)
MCHC RBC AUTO-ENTMCNC: 29.8 G/DL (ref 32–36)
MCHC RBC AUTO-ENTMCNC: 30 G/DL (ref 32–36)
MCHC RBC AUTO-ENTMCNC: 30 G/DL (ref 32–36)
MCHC RBC AUTO-ENTMCNC: 30.1 G/DL (ref 32–36)
MCHC RBC AUTO-ENTMCNC: 30.1 G/DL (ref 32–36)
MCHC RBC AUTO-ENTMCNC: 30.2 G/DL (ref 32–36)
MCHC RBC AUTO-ENTMCNC: 30.3 G/DL (ref 32–36)
MCHC RBC AUTO-ENTMCNC: 30.3 G/DL (ref 32–36)
MCHC RBC AUTO-ENTMCNC: 30.4 G/DL (ref 32–36)
MCHC RBC AUTO-ENTMCNC: 30.7 G/DL (ref 32–36)
MCHC RBC AUTO-ENTMCNC: 30.8 G/DL (ref 32–36)
MCHC RBC AUTO-ENTMCNC: 30.9 G/DL (ref 32–36)
MCHC RBC AUTO-ENTMCNC: 31 G/DL (ref 32–36)
MCHC RBC AUTO-ENTMCNC: 31.1 G/DL (ref 32–36)
MCHC RBC AUTO-ENTMCNC: 31.5 G/DL (ref 32–36)
MCV RBC AUTO: 80 FL (ref 82–98)
MCV RBC AUTO: 80 FL (ref 82–98)
MCV RBC AUTO: 81 FL (ref 82–98)
MCV RBC AUTO: 82 FL (ref 82–98)
MCV RBC AUTO: 83 FL (ref 82–98)
MCV RBC AUTO: 84 FL (ref 82–98)
MCV RBC AUTO: 84 FL (ref 82–98)
MCV RBC AUTO: 85 FL (ref 82–98)
MCV RBC AUTO: 87 FL (ref 82–98)
MICROSCOPIC COMMENT: NORMAL
MIN VOL: 15.6
MIN VOL: 16.4
MODE: ABNORMAL
MODE: ABNORMAL
MODE: NORMAL
MONOCYTES # BLD AUTO: 0.6 K/UL (ref 0.3–1)
MONOCYTES # BLD AUTO: 0.8 K/UL (ref 0.3–1)
MONOCYTES # BLD AUTO: 0.8 K/UL (ref 0.3–1)
MONOCYTES # BLD AUTO: 0.9 K/UL (ref 0.3–1)
MONOCYTES # BLD AUTO: 1 K/UL (ref 0.3–1)
MONOCYTES # BLD AUTO: 1 K/UL (ref 0.3–1)
MONOCYTES # BLD AUTO: 1.2 K/UL (ref 0.3–1)
MONOCYTES # BLD AUTO: 1.3 K/UL (ref 0.3–1)
MONOCYTES # BLD AUTO: 1.4 K/UL (ref 0.3–1)
MONOCYTES # BLD AUTO: 1.6 K/UL (ref 0.3–1)
MONOCYTES # BLD AUTO: 1.8 K/UL (ref 0.3–1)
MONOCYTES # BLD AUTO: 2.1 K/UL (ref 0.3–1)
MONOCYTES # BLD AUTO: 2.1 K/UL (ref 0.3–1)
MONOCYTES # BLD AUTO: 2.5 K/UL (ref 0.3–1)
MONOCYTES NFR BLD: 10.1 % (ref 4–15)
MONOCYTES NFR BLD: 10.3 % (ref 4–15)
MONOCYTES NFR BLD: 5.3 % (ref 4–15)
MONOCYTES NFR BLD: 5.4 % (ref 4–15)
MONOCYTES NFR BLD: 5.5 % (ref 4–15)
MONOCYTES NFR BLD: 5.7 % (ref 4–15)
MONOCYTES NFR BLD: 5.7 % (ref 4–15)
MONOCYTES NFR BLD: 5.8 % (ref 4–15)
MONOCYTES NFR BLD: 5.9 % (ref 4–15)
MONOCYTES NFR BLD: 6.6 % (ref 4–15)
MONOCYTES NFR BLD: 6.7 % (ref 4–15)
MONOCYTES NFR BLD: 6.8 % (ref 4–15)
MONOCYTES NFR BLD: 7 % (ref 4–15)
MONOCYTES NFR BLD: 7 % (ref 4–15)
MONOCYTES NFR BLD: 7.5 % (ref 4–15)
MONOCYTES NFR BLD: 8.4 % (ref 4–15)
MONOCYTES NFR BLD: 8.6 % (ref 4–15)
MONOCYTES NFR BLD: 9.2 % (ref 4–15)
MV PEAK A VEL: 0.78 M/S
MV PEAK E VEL: 0.47 M/S
MV STENOSIS PRESSURE HALF TIME: 86.11 MS
MV VALVE AREA P 1/2 METHOD: 2.55 CM2
NEUTROPHILS # BLD AUTO: 11 K/UL (ref 1.8–7.7)
NEUTROPHILS # BLD AUTO: 11.2 K/UL (ref 1.8–7.7)
NEUTROPHILS # BLD AUTO: 12 K/UL (ref 1.8–7.7)
NEUTROPHILS # BLD AUTO: 12.5 K/UL (ref 1.8–7.7)
NEUTROPHILS # BLD AUTO: 12.7 K/UL (ref 1.8–7.7)
NEUTROPHILS # BLD AUTO: 12.9 K/UL (ref 1.8–7.7)
NEUTROPHILS # BLD AUTO: 13.2 K/UL (ref 1.8–7.7)
NEUTROPHILS # BLD AUTO: 13.4 K/UL (ref 1.8–7.7)
NEUTROPHILS # BLD AUTO: 13.5 K/UL (ref 1.8–7.7)
NEUTROPHILS # BLD AUTO: 13.7 K/UL (ref 1.8–7.7)
NEUTROPHILS # BLD AUTO: 14.2 K/UL (ref 1.8–7.7)
NEUTROPHILS # BLD AUTO: 15.3 K/UL (ref 1.8–7.7)
NEUTROPHILS # BLD AUTO: 16.6 K/UL (ref 1.8–7.7)
NEUTROPHILS # BLD AUTO: 16.8 K/UL (ref 1.8–7.7)
NEUTROPHILS # BLD AUTO: 17.6 K/UL (ref 1.8–7.7)
NEUTROPHILS # BLD AUTO: 18.9 K/UL (ref 1.8–7.7)
NEUTROPHILS # BLD AUTO: 20.2 K/UL (ref 1.8–7.7)
NEUTROPHILS # BLD AUTO: 8.5 K/UL (ref 1.8–7.7)
NEUTROPHILS NFR BLD: 72.3 % (ref 38–73)
NEUTROPHILS NFR BLD: 73.5 % (ref 38–73)
NEUTROPHILS NFR BLD: 75.1 % (ref 38–73)
NEUTROPHILS NFR BLD: 75.6 % (ref 38–73)
NEUTROPHILS NFR BLD: 75.8 % (ref 38–73)
NEUTROPHILS NFR BLD: 78 % (ref 38–73)
NEUTROPHILS NFR BLD: 78.1 % (ref 38–73)
NEUTROPHILS NFR BLD: 78.3 % (ref 38–73)
NEUTROPHILS NFR BLD: 80.2 % (ref 38–73)
NEUTROPHILS NFR BLD: 81.1 % (ref 38–73)
NEUTROPHILS NFR BLD: 81.2 % (ref 38–73)
NEUTROPHILS NFR BLD: 82.3 % (ref 38–73)
NEUTROPHILS NFR BLD: 82.4 % (ref 38–73)
NEUTROPHILS NFR BLD: 83.3 % (ref 38–73)
NEUTROPHILS NFR BLD: 83.6 % (ref 38–73)
NEUTROPHILS NFR BLD: 84.8 % (ref 38–73)
NEUTROPHILS NFR BLD: 85.4 % (ref 38–73)
NEUTROPHILS NFR BLD: 87.6 % (ref 38–73)
NITRITE UR QL STRIP: NEGATIVE
NITRITE UR QL STRIP: NEGATIVE
NRBC BLD-RTO: 0 /100 WBC
OVALOCYTES BLD QL SMEAR: ABNORMAL
OVALOCYTES BLD QL SMEAR: ABNORMAL
PCO2 BLDA: 43 MMHG (ref 35–45)
PCO2 BLDA: 45.2 MMHG (ref 35–45)
PCO2 BLDA: 47.7 MMHG (ref 35–45)
PH SMN: 7.36 [PH] (ref 7.35–7.45)
PH SMN: 7.37 [PH] (ref 7.35–7.45)
PH SMN: 7.37 [PH] (ref 7.35–7.45)
PH UR STRIP: 6 [PH] (ref 5–8)
PH UR STRIP: 7 [PH] (ref 5–8)
PHOSPHATE SERPL-MCNC: 2.6 MG/DL (ref 2.7–4.5)
PHOSPHATE SERPL-MCNC: 2.8 MG/DL (ref 2.7–4.5)
PHOSPHATE SERPL-MCNC: 3.4 MG/DL (ref 2.7–4.5)
PHOSPHATE SERPL-MCNC: 3.4 MG/DL (ref 2.7–4.5)
PHOSPHATE SERPL-MCNC: 3.5 MG/DL (ref 2.7–4.5)
PHOSPHATE SERPL-MCNC: 3.5 MG/DL (ref 2.7–4.5)
PHOSPHATE SERPL-MCNC: 3.7 MG/DL (ref 2.7–4.5)
PHOSPHATE SERPL-MCNC: 3.8 MG/DL (ref 2.7–4.5)
PHOSPHATE SERPL-MCNC: 3.9 MG/DL (ref 2.7–4.5)
PHOSPHATE SERPL-MCNC: 4 MG/DL (ref 2.7–4.5)
PHOSPHATE SERPL-MCNC: 4.1 MG/DL (ref 2.7–4.5)
PHOSPHATE SERPL-MCNC: 4.4 MG/DL (ref 2.7–4.5)
PHOSPHATE SERPL-MCNC: 4.4 MG/DL (ref 2.7–4.5)
PISA TR MAX VEL: 1.83 M/S
PLATELET # BLD AUTO: 285 K/UL (ref 150–450)
PLATELET # BLD AUTO: 289 K/UL (ref 150–450)
PLATELET # BLD AUTO: 304 K/UL (ref 150–450)
PLATELET # BLD AUTO: 308 K/UL (ref 150–450)
PLATELET # BLD AUTO: 309 K/UL (ref 150–450)
PLATELET # BLD AUTO: 320 K/UL (ref 150–450)
PLATELET # BLD AUTO: 324 K/UL (ref 150–450)
PLATELET # BLD AUTO: 327 K/UL (ref 150–450)
PLATELET # BLD AUTO: 328 K/UL (ref 150–450)
PLATELET # BLD AUTO: 338 K/UL (ref 150–450)
PLATELET # BLD AUTO: 342 K/UL (ref 150–450)
PLATELET # BLD AUTO: 347 K/UL (ref 150–450)
PLATELET # BLD AUTO: 350 K/UL (ref 150–450)
PLATELET # BLD AUTO: 360 K/UL (ref 150–450)
PLATELET # BLD AUTO: 383 K/UL (ref 150–450)
PLATELET # BLD AUTO: 405 K/UL (ref 150–450)
PLATELET # BLD AUTO: 418 K/UL (ref 150–450)
PLATELET # BLD AUTO: 425 K/UL (ref 150–450)
PLATELET BLD QL SMEAR: ABNORMAL
PLATELET BLD QL SMEAR: ABNORMAL
PMV BLD AUTO: 10 FL (ref 9.2–12.9)
PMV BLD AUTO: 10.2 FL (ref 9.2–12.9)
PMV BLD AUTO: 10.3 FL (ref 9.2–12.9)
PMV BLD AUTO: 10.3 FL (ref 9.2–12.9)
PMV BLD AUTO: 10.4 FL (ref 9.2–12.9)
PMV BLD AUTO: 10.4 FL (ref 9.2–12.9)
PMV BLD AUTO: 9 FL (ref 9.2–12.9)
PMV BLD AUTO: 9.1 FL (ref 9.2–12.9)
PMV BLD AUTO: 9.1 FL (ref 9.2–12.9)
PMV BLD AUTO: 9.3 FL (ref 9.2–12.9)
PMV BLD AUTO: 9.5 FL (ref 9.2–12.9)
PMV BLD AUTO: 9.6 FL (ref 9.2–12.9)
PMV BLD AUTO: 9.6 FL (ref 9.2–12.9)
PMV BLD AUTO: 9.7 FL (ref 9.2–12.9)
PMV BLD AUTO: 9.8 FL (ref 9.2–12.9)
PMV BLD AUTO: 9.8 FL (ref 9.2–12.9)
PMV BLD AUTO: 9.9 FL (ref 9.2–12.9)
PMV BLD AUTO: 9.9 FL (ref 9.2–12.9)
PO2 BLDA: 21 MMHG (ref 40–60)
PO2 BLDA: 44 MMHG (ref 40–60)
PO2 BLDA: 53 MMHG (ref 40–60)
POC BE: 0 MMOL/L
POC BE: 0 MMOL/L
POC BE: 2 MMOL/L
POC SATURATED O2: 33 % (ref 95–100)
POC SATURATED O2: 78 % (ref 95–100)
POC SATURATED O2: 86 % (ref 95–100)
POC TCO2: 26 MMOL/L (ref 24–29)
POC TCO2: 27 MMOL/L (ref 24–29)
POC TCO2: 29 MMOL/L (ref 24–29)
POCT GLUCOSE: 105 MG/DL (ref 70–110)
POCT GLUCOSE: 107 MG/DL (ref 70–110)
POCT GLUCOSE: 108 MG/DL (ref 70–110)
POCT GLUCOSE: 115 MG/DL (ref 70–110)
POCT GLUCOSE: 118 MG/DL (ref 70–110)
POCT GLUCOSE: 120 MG/DL (ref 70–110)
POCT GLUCOSE: 125 MG/DL (ref 70–110)
POCT GLUCOSE: 127 MG/DL (ref 70–110)
POCT GLUCOSE: 131 MG/DL (ref 70–110)
POCT GLUCOSE: 143 MG/DL (ref 70–110)
POCT GLUCOSE: 148 MG/DL (ref 70–110)
POCT GLUCOSE: 162 MG/DL (ref 70–110)
POCT GLUCOSE: 171 MG/DL (ref 70–110)
POCT GLUCOSE: 172 MG/DL (ref 70–110)
POCT GLUCOSE: 173 MG/DL (ref 70–110)
POCT GLUCOSE: 175 MG/DL (ref 70–110)
POCT GLUCOSE: 185 MG/DL (ref 70–110)
POCT GLUCOSE: 189 MG/DL (ref 70–110)
POCT GLUCOSE: 195 MG/DL (ref 70–110)
POCT GLUCOSE: 197 MG/DL (ref 70–110)
POCT GLUCOSE: 207 MG/DL (ref 70–110)
POCT GLUCOSE: 218 MG/DL (ref 70–110)
POCT GLUCOSE: 219 MG/DL (ref 70–110)
POCT GLUCOSE: 246 MG/DL (ref 70–110)
POCT GLUCOSE: 247 MG/DL (ref 70–110)
POCT GLUCOSE: 250 MG/DL (ref 70–110)
POCT GLUCOSE: 267 MG/DL (ref 70–110)
POCT GLUCOSE: 271 MG/DL (ref 70–110)
POCT GLUCOSE: 278 MG/DL (ref 70–110)
POCT GLUCOSE: 286 MG/DL (ref 70–110)
POCT GLUCOSE: 94 MG/DL (ref 70–110)
POCT GLUCOSE: 97 MG/DL (ref 70–110)
POCT GLUCOSE: 98 MG/DL (ref 70–110)
POIKILOCYTOSIS BLD QL SMEAR: ABNORMAL
POIKILOCYTOSIS BLD QL SMEAR: SLIGHT
POIKILOCYTOSIS BLD QL SMEAR: SLIGHT
POLYCHROMASIA BLD QL SMEAR: ABNORMAL
POLYCHROMASIA BLD QL SMEAR: ABNORMAL
POTASSIUM SERPL-SCNC: 3.6 MMOL/L (ref 3.5–5.1)
POTASSIUM SERPL-SCNC: 3.8 MMOL/L (ref 3.5–5.1)
POTASSIUM SERPL-SCNC: 3.9 MMOL/L (ref 3.5–5.1)
POTASSIUM SERPL-SCNC: 4 MMOL/L (ref 3.5–5.1)
POTASSIUM SERPL-SCNC: 4.2 MMOL/L (ref 3.5–5.1)
POTASSIUM SERPL-SCNC: 4.3 MMOL/L (ref 3.5–5.1)
POTASSIUM SERPL-SCNC: 4.4 MMOL/L (ref 3.5–5.1)
POTASSIUM SERPL-SCNC: 4.5 MMOL/L (ref 3.5–5.1)
POTASSIUM SERPL-SCNC: 4.6 MMOL/L (ref 3.5–5.1)
POTASSIUM SERPL-SCNC: 4.7 MMOL/L (ref 3.5–5.1)
POTASSIUM SERPL-SCNC: 5.2 MMOL/L (ref 3.5–5.1)
POTASSIUM SERPL-SCNC: 5.3 MMOL/L (ref 3.5–5.1)
PROCALCITONIN SERPL IA-MCNC: 0.04 NG/ML
PROT SERPL-MCNC: 6 G/DL (ref 6–8.4)
PROT SERPL-MCNC: 6 G/DL (ref 6–8.4)
PROT SERPL-MCNC: 6.1 G/DL (ref 6–8.4)
PROT SERPL-MCNC: 6.4 G/DL (ref 6–8.4)
PROT SERPL-MCNC: 6.6 G/DL (ref 6–8.4)
PROT SERPL-MCNC: 6.7 G/DL (ref 6–8.4)
PROT SERPL-MCNC: 6.8 G/DL (ref 6–8.4)
PROT SERPL-MCNC: 6.9 G/DL (ref 6–8.4)
PROT SERPL-MCNC: 6.9 G/DL (ref 6–8.4)
PROT SERPL-MCNC: 7 G/DL (ref 6–8.4)
PROT SERPL-MCNC: 7 G/DL (ref 6–8.4)
PROT SERPL-MCNC: 7.4 G/DL (ref 6–8.4)
PROT SERPL-MCNC: 7.4 G/DL (ref 6–8.4)
PROT UR QL STRIP: NEGATIVE
PROT UR QL STRIP: NEGATIVE
PROVIDER CREDENTIALS: ABNORMAL
PROVIDER NOTIFIED: ABNORMAL
PROX AORTA: 3.2 CM
RA PRESSURE: 3 MMHG
RBC # BLD AUTO: 3.9 M/UL (ref 4–5.4)
RBC # BLD AUTO: 3.93 M/UL (ref 4–5.4)
RBC # BLD AUTO: 3.98 M/UL (ref 4–5.4)
RBC # BLD AUTO: 3.99 M/UL (ref 4–5.4)
RBC # BLD AUTO: 4.04 M/UL (ref 4–5.4)
RBC # BLD AUTO: 4.09 M/UL (ref 4–5.4)
RBC # BLD AUTO: 4.1 M/UL (ref 4–5.4)
RBC # BLD AUTO: 4.16 M/UL (ref 4–5.4)
RBC # BLD AUTO: 4.2 M/UL (ref 4–5.4)
RBC # BLD AUTO: 4.24 M/UL (ref 4–5.4)
RBC # BLD AUTO: 4.36 M/UL (ref 4–5.4)
RBC # BLD AUTO: 4.38 M/UL (ref 4–5.4)
RBC # BLD AUTO: 4.4 M/UL (ref 4–5.4)
RBC # BLD AUTO: 4.41 M/UL (ref 4–5.4)
RBC # BLD AUTO: 4.41 M/UL (ref 4–5.4)
RBC # BLD AUTO: 4.47 M/UL (ref 4–5.4)
RBC # BLD AUTO: 4.59 M/UL (ref 4–5.4)
RBC # BLD AUTO: 4.71 M/UL (ref 4–5.4)
RBC #/AREA URNS AUTO: 1 /HPF (ref 0–4)
SAMPLE: ABNORMAL
SAMPLE: NORMAL
SARS-COV-2 RDRP RESP QL NAA+PROBE: NEGATIVE
SARS-COV-2 RDRP RESP QL NAA+PROBE: POSITIVE
SITE: ABNORMAL
SITE: NORMAL
SODIUM SERPL-SCNC: 134 MMOL/L (ref 136–145)
SODIUM SERPL-SCNC: 134 MMOL/L (ref 136–145)
SODIUM SERPL-SCNC: 135 MMOL/L (ref 136–145)
SODIUM SERPL-SCNC: 136 MMOL/L (ref 136–145)
SODIUM SERPL-SCNC: 137 MMOL/L (ref 136–145)
SODIUM SERPL-SCNC: 138 MMOL/L (ref 136–145)
SODIUM SERPL-SCNC: 139 MMOL/L (ref 136–145)
SODIUM SERPL-SCNC: 139 MMOL/L (ref 136–145)
SODIUM SERPL-SCNC: 140 MMOL/L (ref 136–145)
SODIUM SERPL-SCNC: 141 MMOL/L (ref 136–145)
SODIUM SERPL-SCNC: 142 MMOL/L (ref 136–145)
SODIUM SERPL-SCNC: 145 MMOL/L (ref 136–145)
SODIUM SERPL-SCNC: 152 MMOL/L (ref 136–145)
SP GR UR STRIP: 1.01 (ref 1–1.03)
SP GR UR STRIP: 1.02 (ref 1–1.03)
SP02: 92
SP02: 92
SP02: 95
SPONT RATE: 35
SPONT RATE: 35
SQUAMOUS #/AREA URNS AUTO: 0 /HPF
TDI LATERAL: 0.05 M/S
TIME NOTIFIED: 1438
TR MAX PG: 13 MMHG
TRICUSPID ANNULAR PLANE SYSTOLIC EXCURSION: 1.27 CM
TROPONIN I SERPL DL<=0.01 NG/ML-MCNC: 0.04 NG/ML (ref 0–0.03)
TROPONIN I SERPL DL<=0.01 NG/ML-MCNC: <0.006 NG/ML (ref 0–0.03)
TV REST PULMONARY ARTERY PRESSURE: 16 MMHG
URN SPEC COLLECT METH UR: ABNORMAL
URN SPEC COLLECT METH UR: NORMAL
VANCOMYCIN SERPL-MCNC: 12.2 UG/ML
VANCOMYCIN TROUGH SERPL-MCNC: 27.6 UG/ML (ref 10–22)
VERBAL RESULT READBACK PERFORMED: YES
WBC # BLD AUTO: 10.51 K/UL (ref 3.9–12.7)
WBC # BLD AUTO: 14.32 K/UL (ref 3.9–12.7)
WBC # BLD AUTO: 15.2 K/UL (ref 3.9–12.7)
WBC # BLD AUTO: 15.22 K/UL (ref 3.9–12.7)
WBC # BLD AUTO: 15.23 K/UL (ref 3.9–12.7)
WBC # BLD AUTO: 15.83 K/UL (ref 3.9–12.7)
WBC # BLD AUTO: 15.87 K/UL (ref 3.9–12.7)
WBC # BLD AUTO: 17.31 K/UL (ref 3.9–12.7)
WBC # BLD AUTO: 17.41 K/UL (ref 3.9–12.7)
WBC # BLD AUTO: 17.87 K/UL (ref 3.9–12.7)
WBC # BLD AUTO: 18.2 K/UL (ref 3.9–12.7)
WBC # BLD AUTO: 18.56 K/UL (ref 3.9–12.7)
WBC # BLD AUTO: 18.63 K/UL (ref 3.9–12.7)
WBC # BLD AUTO: 18.89 K/UL (ref 3.9–12.7)
WBC # BLD AUTO: 20.62 K/UL (ref 3.9–12.7)
WBC # BLD AUTO: 21.01 K/UL (ref 3.9–12.7)
WBC # BLD AUTO: 22.32 K/UL (ref 3.9–12.7)
WBC # BLD AUTO: 24.18 K/UL (ref 3.9–12.7)
WBC #/AREA URNS AUTO: 4 /HPF (ref 0–5)
WBC TOXIC VACUOLES BLD QL SMEAR: PRESENT
WBC TOXIC VACUOLES BLD QL SMEAR: PRESENT

## 2022-01-01 PROCEDURE — 97530 THERAPEUTIC ACTIVITIES: CPT | Mod: CQ

## 2022-01-01 PROCEDURE — 84100 ASSAY OF PHOSPHORUS: CPT | Performed by: CLINICAL NURSE SPECIALIST

## 2022-01-01 PROCEDURE — 99291 PR CRITICAL CARE, E/M 30-74 MINUTES: ICD-10-PCS | Mod: ,,, | Performed by: INTERNAL MEDICINE

## 2022-01-01 PROCEDURE — 25500020 PHARM REV CODE 255: Performed by: EMERGENCY MEDICINE

## 2022-01-01 PROCEDURE — A9585 GADOBUTROL INJECTION: HCPCS | Performed by: STUDENT IN AN ORGANIZED HEALTH CARE EDUCATION/TRAINING PROGRAM

## 2022-01-01 PROCEDURE — 94640 AIRWAY INHALATION TREATMENT: CPT

## 2022-01-01 PROCEDURE — 27000207 HC ISOLATION

## 2022-01-01 PROCEDURE — 93005 ELECTROCARDIOGRAM TRACING: CPT

## 2022-01-01 PROCEDURE — 25000003 PHARM REV CODE 250

## 2022-01-01 PROCEDURE — 3078F PR MOST RECENT DIASTOLIC BLOOD PRESSURE < 80 MM HG: ICD-10-PCS | Mod: CPTII,,, | Performed by: INTERNAL MEDICINE

## 2022-01-01 PROCEDURE — 25000003 PHARM REV CODE 250: Performed by: INTERNAL MEDICINE

## 2022-01-01 PROCEDURE — 25000003 PHARM REV CODE 250: Performed by: GENERAL ACUTE CARE HOSPITAL

## 2022-01-01 PROCEDURE — 27100171 HC OXYGEN HIGH FLOW UP TO 24 HOURS

## 2022-01-01 PROCEDURE — 36415 COLL VENOUS BLD VENIPUNCTURE: CPT | Performed by: STUDENT IN AN ORGANIZED HEALTH CARE EDUCATION/TRAINING PROGRAM

## 2022-01-01 PROCEDURE — 80053 COMPREHEN METABOLIC PANEL: CPT | Performed by: PHYSICIAN ASSISTANT

## 2022-01-01 PROCEDURE — 63600175 PHARM REV CODE 636 W HCPCS

## 2022-01-01 PROCEDURE — 25000242 PHARM REV CODE 250 ALT 637 W/ HCPCS: Performed by: INTERNAL MEDICINE

## 2022-01-01 PROCEDURE — 81003 URINALYSIS AUTO W/O SCOPE: CPT | Performed by: EMERGENCY MEDICINE

## 2022-01-01 PROCEDURE — 84100 ASSAY OF PHOSPHORUS: CPT | Performed by: STUDENT IN AN ORGANIZED HEALTH CARE EDUCATION/TRAINING PROGRAM

## 2022-01-01 PROCEDURE — 93308 TTE F-UP OR LMTD: CPT | Mod: 26,,, | Performed by: EMERGENCY MEDICINE

## 2022-01-01 PROCEDURE — 82803 BLOOD GASES ANY COMBINATION: CPT

## 2022-01-01 PROCEDURE — 99223 PR INITIAL HOSPITAL CARE,LEVL III: ICD-10-PCS | Mod: CR,,, | Performed by: INTERNAL MEDICINE

## 2022-01-01 PROCEDURE — 25000242 PHARM REV CODE 250 ALT 637 W/ HCPCS: Performed by: STUDENT IN AN ORGANIZED HEALTH CARE EDUCATION/TRAINING PROGRAM

## 2022-01-01 PROCEDURE — 94761 N-INVAS EAR/PLS OXIMETRY MLT: CPT

## 2022-01-01 PROCEDURE — 94660 CPAP INITIATION&MGMT: CPT

## 2022-01-01 PROCEDURE — 93010 EKG 12-LEAD: ICD-10-PCS | Mod: ,,, | Performed by: INTERNAL MEDICINE

## 2022-01-01 PROCEDURE — 99233 PR SUBSEQUENT HOSPITAL CARE,LEVL III: ICD-10-PCS | Mod: ,,, | Performed by: INTERNAL MEDICINE

## 2022-01-01 PROCEDURE — 63600175 PHARM REV CODE 636 W HCPCS: Performed by: CLINICAL NURSE SPECIALIST

## 2022-01-01 PROCEDURE — 63600175 PHARM REV CODE 636 W HCPCS: Performed by: STUDENT IN AN ORGANIZED HEALTH CARE EDUCATION/TRAINING PROGRAM

## 2022-01-01 PROCEDURE — 99233 SBSQ HOSP IP/OBS HIGH 50: CPT | Mod: ,,, | Performed by: INTERNAL MEDICINE

## 2022-01-01 PROCEDURE — 99291 PR CRITICAL CARE, E/M 30-74 MINUTES: ICD-10-PCS | Mod: ,,, | Performed by: GENERAL ACUTE CARE HOSPITAL

## 2022-01-01 PROCEDURE — 27000221 HC OXYGEN, UP TO 24 HOURS

## 2022-01-01 PROCEDURE — 83735 ASSAY OF MAGNESIUM: CPT

## 2022-01-01 PROCEDURE — 85025 COMPLETE CBC W/AUTO DIFF WBC: CPT | Performed by: CLINICAL NURSE SPECIALIST

## 2022-01-01 PROCEDURE — 3078F PR MOST RECENT DIASTOLIC BLOOD PRESSURE < 80 MM HG: ICD-10-PCS | Mod: CPTII,,, | Performed by: STUDENT IN AN ORGANIZED HEALTH CARE EDUCATION/TRAINING PROGRAM

## 2022-01-01 PROCEDURE — 86140 C-REACTIVE PROTEIN: CPT | Performed by: PHYSICIAN ASSISTANT

## 2022-01-01 PROCEDURE — 27000646 HC AEROBIKA DEVICE

## 2022-01-01 PROCEDURE — 1111F PR DISCHARGE MEDS RECONCILED W/ CURRENT OUTPATIENT MED LIST: ICD-10-PCS | Mod: CPTII,,, | Performed by: INTERNAL MEDICINE

## 2022-01-01 PROCEDURE — 99223 PR INITIAL HOSPITAL CARE,LEVL III: ICD-10-PCS | Mod: ,,, | Performed by: STUDENT IN AN ORGANIZED HEALTH CARE EDUCATION/TRAINING PROGRAM

## 2022-01-01 PROCEDURE — 85025 COMPLETE CBC W/AUTO DIFF WBC: CPT | Performed by: STUDENT IN AN ORGANIZED HEALTH CARE EDUCATION/TRAINING PROGRAM

## 2022-01-01 PROCEDURE — 63600175 PHARM REV CODE 636 W HCPCS: Mod: JG | Performed by: STUDENT IN AN ORGANIZED HEALTH CARE EDUCATION/TRAINING PROGRAM

## 2022-01-01 PROCEDURE — 25000003 PHARM REV CODE 250: Performed by: STUDENT IN AN ORGANIZED HEALTH CARE EDUCATION/TRAINING PROGRAM

## 2022-01-01 PROCEDURE — 70553 MRI BRAIN STEM W/O & W/DYE: CPT | Mod: 26,,, | Performed by: RADIOLOGY

## 2022-01-01 PROCEDURE — 97535 SELF CARE MNGMENT TRAINING: CPT

## 2022-01-01 PROCEDURE — 99233 PR SUBSEQUENT HOSPITAL CARE,LEVL III: ICD-10-PCS | Mod: CR,,, | Performed by: INTERNAL MEDICINE

## 2022-01-01 PROCEDURE — 99291 PR CRITICAL CARE, E/M 30-74 MINUTES: ICD-10-PCS | Mod: ,,, | Performed by: CLINICAL NURSE SPECIALIST

## 2022-01-01 PROCEDURE — 36415 COLL VENOUS BLD VENIPUNCTURE: CPT

## 2022-01-01 PROCEDURE — 94799 UNLISTED PULMONARY SVC/PX: CPT

## 2022-01-01 PROCEDURE — 94664 DEMO&/EVAL PT USE INHALER: CPT

## 2022-01-01 PROCEDURE — 93010 ELECTROCARDIOGRAM REPORT: CPT | Mod: ,,, | Performed by: INTERNAL MEDICINE

## 2022-01-01 PROCEDURE — 99900035 HC TECH TIME PER 15 MIN (STAT)

## 2022-01-01 PROCEDURE — 87449 NOS EACH ORGANISM AG IA: CPT | Performed by: STUDENT IN AN ORGANIZED HEALTH CARE EDUCATION/TRAINING PROGRAM

## 2022-01-01 PROCEDURE — 99285 EMERGENCY DEPT VISIT HI MDM: CPT | Mod: 25

## 2022-01-01 PROCEDURE — 99285 PR EMERGENCY DEPT VISIT,LEVEL V: ICD-10-PCS | Mod: CS,,, | Performed by: EMERGENCY MEDICINE

## 2022-01-01 PROCEDURE — 3008F PR BODY MASS INDEX (BMI) DOCUMENTED: ICD-10-PCS | Mod: CPTII,,, | Performed by: INTERNAL MEDICINE

## 2022-01-01 PROCEDURE — 99232 SBSQ HOSP IP/OBS MODERATE 35: CPT | Mod: ,,, | Performed by: INTERNAL MEDICINE

## 2022-01-01 PROCEDURE — 85025 COMPLETE CBC W/AUTO DIFF WBC: CPT

## 2022-01-01 PROCEDURE — 20000000 HC ICU ROOM

## 2022-01-01 PROCEDURE — 96365 THER/PROPH/DIAG IV INF INIT: CPT

## 2022-01-01 PROCEDURE — 96375 TX/PRO/DX INJ NEW DRUG ADDON: CPT

## 2022-01-01 PROCEDURE — G0180 MD CERTIFICATION HHA PATIENT: HCPCS | Mod: ,,, | Performed by: INTERNAL MEDICINE

## 2022-01-01 PROCEDURE — 94760 N-INVAS EAR/PLS OXIMETRY 1: CPT

## 2022-01-01 PROCEDURE — 63600175 PHARM REV CODE 636 W HCPCS: Performed by: NURSE PRACTITIONER

## 2022-01-01 PROCEDURE — 25000242 PHARM REV CODE 250 ALT 637 W/ HCPCS: Performed by: CLINICAL NURSE SPECIALIST

## 2022-01-01 PROCEDURE — 25000003 PHARM REV CODE 250: Performed by: CLINICAL NURSE SPECIALIST

## 2022-01-01 PROCEDURE — 99291 PR CRITICAL CARE, E/M 30-74 MINUTES: ICD-10-PCS | Mod: 25,CS,, | Performed by: EMERGENCY MEDICINE

## 2022-01-01 PROCEDURE — 3008F PR BODY MASS INDEX (BMI) DOCUMENTED: ICD-10-PCS | Mod: CPTII,,, | Performed by: STUDENT IN AN ORGANIZED HEALTH CARE EDUCATION/TRAINING PROGRAM

## 2022-01-01 PROCEDURE — 27000249 HC VAPOTHERM CIRCUIT

## 2022-01-01 PROCEDURE — 99291 CRITICAL CARE FIRST HOUR: CPT | Mod: ,,, | Performed by: INTERNAL MEDICINE

## 2022-01-01 PROCEDURE — 80053 COMPREHEN METABOLIC PANEL: CPT | Performed by: STUDENT IN AN ORGANIZED HEALTH CARE EDUCATION/TRAINING PROGRAM

## 2022-01-01 PROCEDURE — 80053 COMPREHEN METABOLIC PANEL: CPT | Performed by: CLINICAL NURSE SPECIALIST

## 2022-01-01 PROCEDURE — 84100 ASSAY OF PHOSPHORUS: CPT

## 2022-01-01 PROCEDURE — 27000190 HC CPAP FULL FACE MASK W/VALVE

## 2022-01-01 PROCEDURE — 83735 ASSAY OF MAGNESIUM: CPT | Performed by: STUDENT IN AN ORGANIZED HEALTH CARE EDUCATION/TRAINING PROGRAM

## 2022-01-01 PROCEDURE — 20600001 HC STEP DOWN PRIVATE ROOM

## 2022-01-01 PROCEDURE — 93308 PR ECHO HEART XTHORACIC,LIMITED: ICD-10-PCS | Mod: 26,,, | Performed by: EMERGENCY MEDICINE

## 2022-01-01 PROCEDURE — 83605 ASSAY OF LACTIC ACID: CPT | Mod: 91 | Performed by: CLINICAL NURSE SPECIALIST

## 2022-01-01 PROCEDURE — 83605 ASSAY OF LACTIC ACID: CPT | Performed by: PHYSICIAN ASSISTANT

## 2022-01-01 PROCEDURE — 25500020 PHARM REV CODE 255: Performed by: INTERNAL MEDICINE

## 2022-01-01 PROCEDURE — 80202 ASSAY OF VANCOMYCIN: CPT | Performed by: INTERNAL MEDICINE

## 2022-01-01 PROCEDURE — 25000242 PHARM REV CODE 250 ALT 637 W/ HCPCS

## 2022-01-01 PROCEDURE — 99291 CRITICAL CARE FIRST HOUR: CPT | Mod: 25

## 2022-01-01 PROCEDURE — 85652 RBC SED RATE AUTOMATED: CPT | Performed by: PHYSICIAN ASSISTANT

## 2022-01-01 PROCEDURE — 99233 SBSQ HOSP IP/OBS HIGH 50: CPT | Mod: CR,,, | Performed by: INTERNAL MEDICINE

## 2022-01-01 PROCEDURE — 63600175 PHARM REV CODE 636 W HCPCS: Performed by: INTERNAL MEDICINE

## 2022-01-01 PROCEDURE — 80053 COMPREHEN METABOLIC PANEL: CPT

## 2022-01-01 PROCEDURE — 99291 PR CRITICAL CARE, E/M 30-74 MINUTES: ICD-10-PCS | Mod: ,,, | Performed by: PHYSICIAN ASSISTANT

## 2022-01-01 PROCEDURE — 99215 OFFICE O/P EST HI 40 MIN: CPT | Mod: PBBFAC | Performed by: INTERNAL MEDICINE

## 2022-01-01 PROCEDURE — 83735 ASSAY OF MAGNESIUM: CPT | Performed by: CLINICAL NURSE SPECIALIST

## 2022-01-01 PROCEDURE — 83880 ASSAY OF NATRIURETIC PEPTIDE: CPT | Performed by: PHYSICIAN ASSISTANT

## 2022-01-01 PROCEDURE — 27100092 HC HIGH FLOW DELIVERY CANNULA

## 2022-01-01 PROCEDURE — U0002 COVID-19 LAB TEST NON-CDC: HCPCS | Performed by: EMERGENCY MEDICINE

## 2022-01-01 PROCEDURE — 99232 PR SUBSEQUENT HOSPITAL CARE,LEVL II: ICD-10-PCS | Mod: ,,, | Performed by: INTERNAL MEDICINE

## 2022-01-01 PROCEDURE — 1111F PR DISCHARGE MEDS RECONCILED W/ CURRENT OUTPATIENT MED LIST: ICD-10-PCS | Mod: CPTII,,, | Performed by: STUDENT IN AN ORGANIZED HEALTH CARE EDUCATION/TRAINING PROGRAM

## 2022-01-01 PROCEDURE — 99291 CRITICAL CARE FIRST HOUR: CPT | Mod: 25,CS,, | Performed by: EMERGENCY MEDICINE

## 2022-01-01 PROCEDURE — 12000002 HC ACUTE/MED SURGE SEMI-PRIVATE ROOM

## 2022-01-01 PROCEDURE — 83605 ASSAY OF LACTIC ACID: CPT | Performed by: EMERGENCY MEDICINE

## 2022-01-01 PROCEDURE — 99291 CRITICAL CARE FIRST HOUR: CPT | Mod: ,,, | Performed by: GENERAL ACUTE CARE HOSPITAL

## 2022-01-01 PROCEDURE — 84484 ASSAY OF TROPONIN QUANT: CPT | Performed by: EMERGENCY MEDICINE

## 2022-01-01 PROCEDURE — 63600175 PHARM REV CODE 636 W HCPCS: Performed by: GENERAL ACUTE CARE HOSPITAL

## 2022-01-01 PROCEDURE — 1111F DSCHRG MED/CURRENT MED MERGE: CPT | Mod: CPTII,,, | Performed by: STUDENT IN AN ORGANIZED HEALTH CARE EDUCATION/TRAINING PROGRAM

## 2022-01-01 PROCEDURE — 97165 OT EVAL LOW COMPLEX 30 MIN: CPT

## 2022-01-01 PROCEDURE — 1159F PR MEDICATION LIST DOCUMENTED IN MEDICAL RECORD: ICD-10-PCS | Mod: CPTII,,, | Performed by: INTERNAL MEDICINE

## 2022-01-01 PROCEDURE — 99215 OFFICE O/P EST HI 40 MIN: CPT | Mod: S$PBB,,, | Performed by: INTERNAL MEDICINE

## 2022-01-01 PROCEDURE — 87040 BLOOD CULTURE FOR BACTERIA: CPT | Mod: 59 | Performed by: EMERGENCY MEDICINE

## 2022-01-01 PROCEDURE — 70553 MRI BRAIN STEM W/O & W/DYE: CPT | Mod: TC

## 2022-01-01 PROCEDURE — 81001 URINALYSIS AUTO W/SCOPE: CPT

## 2022-01-01 PROCEDURE — 25000242 PHARM REV CODE 250 ALT 637 W/ HCPCS: Performed by: PHYSICIAN ASSISTANT

## 2022-01-01 PROCEDURE — 3074F SYST BP LT 130 MM HG: CPT | Mod: CPTII,,, | Performed by: STUDENT IN AN ORGANIZED HEALTH CARE EDUCATION/TRAINING PROGRAM

## 2022-01-01 PROCEDURE — 82550 ASSAY OF CK (CPK): CPT | Performed by: PHYSICIAN ASSISTANT

## 2022-01-01 PROCEDURE — 70553 MRI BRAIN W WO CONTRAST: ICD-10-PCS | Mod: 26,,, | Performed by: RADIOLOGY

## 2022-01-01 PROCEDURE — 63600175 PHARM REV CODE 636 W HCPCS: Performed by: EMERGENCY MEDICINE

## 2022-01-01 PROCEDURE — 87389 HIV-1 AG W/HIV-1&-2 AB AG IA: CPT | Performed by: EMERGENCY MEDICINE

## 2022-01-01 PROCEDURE — 3078F DIAST BP <80 MM HG: CPT | Mod: CPTII,,, | Performed by: INTERNAL MEDICINE

## 2022-01-01 PROCEDURE — 99999 PR PBB SHADOW E&M-EST. PATIENT-LVL III: ICD-10-PCS | Mod: PBBFAC,,, | Performed by: STUDENT IN AN ORGANIZED HEALTH CARE EDUCATION/TRAINING PROGRAM

## 2022-01-01 PROCEDURE — 96367 TX/PROPH/DG ADDL SEQ IV INF: CPT

## 2022-01-01 PROCEDURE — 3074F SYST BP LT 130 MM HG: CPT | Mod: CPTII,,, | Performed by: INTERNAL MEDICINE

## 2022-01-01 PROCEDURE — 85379 FIBRIN DEGRADATION QUANT: CPT | Performed by: STUDENT IN AN ORGANIZED HEALTH CARE EDUCATION/TRAINING PROGRAM

## 2022-01-01 PROCEDURE — 76604 PR US CHEST / UPPER BACK: ICD-10-PCS | Mod: 26,,, | Performed by: EMERGENCY MEDICINE

## 2022-01-01 PROCEDURE — 99999 PR PBB SHADOW E&M-EST. PATIENT-LVL V: ICD-10-PCS | Mod: PBBFAC,,, | Performed by: INTERNAL MEDICINE

## 2022-01-01 PROCEDURE — 1159F MED LIST DOCD IN RCRD: CPT | Mod: CPTII,,, | Performed by: INTERNAL MEDICINE

## 2022-01-01 PROCEDURE — 3044F PR MOST RECENT HEMOGLOBIN A1C LEVEL <7.0%: ICD-10-PCS | Mod: CPTII,,, | Performed by: STUDENT IN AN ORGANIZED HEALTH CARE EDUCATION/TRAINING PROGRAM

## 2022-01-01 PROCEDURE — 97530 THERAPEUTIC ACTIVITIES: CPT

## 2022-01-01 PROCEDURE — 97161 PT EVAL LOW COMPLEX 20 MIN: CPT

## 2022-01-01 PROCEDURE — 83880 ASSAY OF NATRIURETIC PEPTIDE: CPT | Performed by: EMERGENCY MEDICINE

## 2022-01-01 PROCEDURE — 84484 ASSAY OF TROPONIN QUANT: CPT | Performed by: PHYSICIAN ASSISTANT

## 2022-01-01 PROCEDURE — 99233 PR SUBSEQUENT HOSPITAL CARE,LEVL III: ICD-10-PCS | Mod: ,,, | Performed by: STUDENT IN AN ORGANIZED HEALTH CARE EDUCATION/TRAINING PROGRAM

## 2022-01-01 PROCEDURE — 63600175 PHARM REV CODE 636 W HCPCS: Performed by: PHYSICIAN ASSISTANT

## 2022-01-01 PROCEDURE — 3078F DIAST BP <80 MM HG: CPT | Mod: CPTII,,, | Performed by: STUDENT IN AN ORGANIZED HEALTH CARE EDUCATION/TRAINING PROGRAM

## 2022-01-01 PROCEDURE — 3044F HG A1C LEVEL LT 7.0%: CPT | Mod: CPTII,,, | Performed by: INTERNAL MEDICINE

## 2022-01-01 PROCEDURE — C9113 INJ PANTOPRAZOLE SODIUM, VIA: HCPCS | Performed by: INTERNAL MEDICINE

## 2022-01-01 PROCEDURE — 99239 HOSP IP/OBS DSCHRG MGMT >30: CPT | Mod: CR,,, | Performed by: INTERNAL MEDICINE

## 2022-01-01 PROCEDURE — 85025 COMPLETE CBC W/AUTO DIFF WBC: CPT | Performed by: EMERGENCY MEDICINE

## 2022-01-01 PROCEDURE — 99215 PR OFFICE/OUTPT VISIT, EST, LEVL V, 40-54 MIN: ICD-10-PCS | Mod: S$PBB,,, | Performed by: INTERNAL MEDICINE

## 2022-01-01 PROCEDURE — 25000003 PHARM REV CODE 250: Performed by: EMERGENCY MEDICINE

## 2022-01-01 PROCEDURE — 99291 CRITICAL CARE FIRST HOUR: CPT | Mod: ,,, | Performed by: CLINICAL NURSE SPECIALIST

## 2022-01-01 PROCEDURE — 99900031 HC PATIENT EDUCATION (STAT)

## 2022-01-01 PROCEDURE — 99215 PR OFFICE/OUTPT VISIT, EST, LEVL V, 40-54 MIN: ICD-10-PCS | Mod: S$PBB,,, | Performed by: STUDENT IN AN ORGANIZED HEALTH CARE EDUCATION/TRAINING PROGRAM

## 2022-01-01 PROCEDURE — 25000003 PHARM REV CODE 250: Performed by: PHYSICIAN ASSISTANT

## 2022-01-01 PROCEDURE — 99239 PR HOSPITAL DISCHARGE DAY,>30 MIN: ICD-10-PCS | Mod: CR,,, | Performed by: INTERNAL MEDICINE

## 2022-01-01 PROCEDURE — 99285 EMERGENCY DEPT VISIT HI MDM: CPT | Mod: CS,,, | Performed by: EMERGENCY MEDICINE

## 2022-01-01 PROCEDURE — 99215 OFFICE O/P EST HI 40 MIN: CPT | Mod: S$PBB,,, | Performed by: STUDENT IN AN ORGANIZED HEALTH CARE EDUCATION/TRAINING PROGRAM

## 2022-01-01 PROCEDURE — 99213 OFFICE O/P EST LOW 20 MIN: CPT | Mod: PBBFAC,25 | Performed by: STUDENT IN AN ORGANIZED HEALTH CARE EDUCATION/TRAINING PROGRAM

## 2022-01-01 PROCEDURE — 94668 MNPJ CHEST WALL SBSQ: CPT

## 2022-01-01 PROCEDURE — 3044F HG A1C LEVEL LT 7.0%: CPT | Mod: CPTII,,, | Performed by: STUDENT IN AN ORGANIZED HEALTH CARE EDUCATION/TRAINING PROGRAM

## 2022-01-01 PROCEDURE — 97116 GAIT TRAINING THERAPY: CPT

## 2022-01-01 PROCEDURE — 3008F BODY MASS INDEX DOCD: CPT | Mod: CPTII,,, | Performed by: STUDENT IN AN ORGANIZED HEALTH CARE EDUCATION/TRAINING PROGRAM

## 2022-01-01 PROCEDURE — 76604 US EXAM CHEST: CPT | Mod: 26,,, | Performed by: EMERGENCY MEDICINE

## 2022-01-01 PROCEDURE — 99233 SBSQ HOSP IP/OBS HIGH 50: CPT | Mod: ,,, | Performed by: STUDENT IN AN ORGANIZED HEALTH CARE EDUCATION/TRAINING PROGRAM

## 2022-01-01 PROCEDURE — 82728 ASSAY OF FERRITIN: CPT | Performed by: PHYSICIAN ASSISTANT

## 2022-01-01 PROCEDURE — 99999 PR PBB SHADOW E&M-EST. PATIENT-LVL III: CPT | Mod: PBBFAC,,, | Performed by: STUDENT IN AN ORGANIZED HEALTH CARE EDUCATION/TRAINING PROGRAM

## 2022-01-01 PROCEDURE — 84145 PROCALCITONIN (PCT): CPT | Performed by: STUDENT IN AN ORGANIZED HEALTH CARE EDUCATION/TRAINING PROGRAM

## 2022-01-01 PROCEDURE — 25500020 PHARM REV CODE 255: Performed by: STUDENT IN AN ORGANIZED HEALTH CARE EDUCATION/TRAINING PROGRAM

## 2022-01-01 PROCEDURE — 96413 CHEMO IV INFUSION 1 HR: CPT

## 2022-01-01 PROCEDURE — 1111F DSCHRG MED/CURRENT MED MERGE: CPT | Mod: CPTII,,, | Performed by: INTERNAL MEDICINE

## 2022-01-01 PROCEDURE — 99999 PR PBB SHADOW E&M-EST. PATIENT-LVL V: CPT | Mod: PBBFAC,,, | Performed by: INTERNAL MEDICINE

## 2022-01-01 PROCEDURE — 99291 CRITICAL CARE FIRST HOUR: CPT | Mod: ,,, | Performed by: PHYSICIAN ASSISTANT

## 2022-01-01 PROCEDURE — 3074F PR MOST RECENT SYSTOLIC BLOOD PRESSURE < 130 MM HG: ICD-10-PCS | Mod: CPTII,,, | Performed by: STUDENT IN AN ORGANIZED HEALTH CARE EDUCATION/TRAINING PROGRAM

## 2022-01-01 PROCEDURE — 3044F PR MOST RECENT HEMOGLOBIN A1C LEVEL <7.0%: ICD-10-PCS | Mod: CPTII,,, | Performed by: INTERNAL MEDICINE

## 2022-01-01 PROCEDURE — 99223 1ST HOSP IP/OBS HIGH 75: CPT | Mod: ,,, | Performed by: STUDENT IN AN ORGANIZED HEALTH CARE EDUCATION/TRAINING PROGRAM

## 2022-01-01 PROCEDURE — 99239 HOSP IP/OBS DSCHRG MGMT >30: CPT | Mod: ,,, | Performed by: INTERNAL MEDICINE

## 2022-01-01 PROCEDURE — 3074F PR MOST RECENT SYSTOLIC BLOOD PRESSURE < 130 MM HG: ICD-10-PCS | Mod: CPTII,,, | Performed by: INTERNAL MEDICINE

## 2022-01-01 PROCEDURE — 83036 HEMOGLOBIN GLYCOSYLATED A1C: CPT | Performed by: STUDENT IN AN ORGANIZED HEALTH CARE EDUCATION/TRAINING PROGRAM

## 2022-01-01 PROCEDURE — 99223 1ST HOSP IP/OBS HIGH 75: CPT | Mod: CR,,, | Performed by: INTERNAL MEDICINE

## 2022-01-01 PROCEDURE — 83615 LACTATE (LD) (LDH) ENZYME: CPT | Performed by: PHYSICIAN ASSISTANT

## 2022-01-01 PROCEDURE — 97110 THERAPEUTIC EXERCISES: CPT

## 2022-01-01 PROCEDURE — 83605 ASSAY OF LACTIC ACID: CPT

## 2022-01-01 PROCEDURE — 80053 COMPREHEN METABOLIC PANEL: CPT | Performed by: EMERGENCY MEDICINE

## 2022-01-01 PROCEDURE — 99232 PR SUBSEQUENT HOSPITAL CARE,LEVL II: ICD-10-PCS | Mod: CR,,, | Performed by: INTERNAL MEDICINE

## 2022-01-01 PROCEDURE — 3008F BODY MASS INDEX DOCD: CPT | Mod: CPTII,,, | Performed by: INTERNAL MEDICINE

## 2022-01-01 PROCEDURE — G0180 PR HOME HEALTH MD CERTIFICATION: ICD-10-PCS | Mod: ,,, | Performed by: INTERNAL MEDICINE

## 2022-01-01 PROCEDURE — 85025 COMPLETE CBC W/AUTO DIFF WBC: CPT | Performed by: PHYSICIAN ASSISTANT

## 2022-01-01 PROCEDURE — 99232 SBSQ HOSP IP/OBS MODERATE 35: CPT | Mod: CR,,, | Performed by: INTERNAL MEDICINE

## 2022-01-01 PROCEDURE — 99239 PR HOSPITAL DISCHARGE DAY,>30 MIN: ICD-10-PCS | Mod: ,,, | Performed by: INTERNAL MEDICINE

## 2022-01-01 RX ORDER — MAGNESIUM SULFATE HEPTAHYDRATE 40 MG/ML
2 INJECTION, SOLUTION INTRAVENOUS
Status: DISCONTINUED | OUTPATIENT
Start: 2022-01-01 | End: 2022-01-01

## 2022-01-01 RX ORDER — GUAIFENESIN 600 MG/1
600 TABLET, EXTENDED RELEASE ORAL 2 TIMES DAILY
Status: DISCONTINUED | OUTPATIENT
Start: 2022-01-01 | End: 2022-01-01 | Stop reason: HOSPADM

## 2022-01-01 RX ORDER — LANOLIN ALCOHOL/MO/W.PET/CERES
400 CREAM (GRAM) TOPICAL 2 TIMES DAILY
Status: COMPLETED | OUTPATIENT
Start: 2022-01-01 | End: 2022-01-01

## 2022-01-01 RX ORDER — ATOVAQUONE 750 MG/5ML
1500 SUSPENSION ORAL DAILY
Status: DISCONTINUED | OUTPATIENT
Start: 2022-01-01 | End: 2022-01-01

## 2022-01-01 RX ORDER — IBUPROFEN 200 MG
24 TABLET ORAL
Status: DISCONTINUED | OUTPATIENT
Start: 2022-01-01 | End: 2022-01-01 | Stop reason: HOSPADM

## 2022-01-01 RX ORDER — IBUPROFEN 200 MG
16 TABLET ORAL
Status: DISCONTINUED | OUTPATIENT
Start: 2022-01-01 | End: 2022-01-01 | Stop reason: HOSPADM

## 2022-01-01 RX ORDER — TALC
6 POWDER (GRAM) TOPICAL NIGHTLY PRN
Status: DISCONTINUED | OUTPATIENT
Start: 2022-01-01 | End: 2022-01-01 | Stop reason: HOSPADM

## 2022-01-01 RX ORDER — POLYETHYLENE GLYCOL 3350 17 G/17G
17 POWDER, FOR SOLUTION ORAL DAILY
Status: DISCONTINUED | OUTPATIENT
Start: 2022-01-01 | End: 2022-01-01 | Stop reason: HOSPADM

## 2022-01-01 RX ORDER — SODIUM CHLORIDE 0.9 % (FLUSH) 0.9 %
10 SYRINGE (ML) INJECTION
Status: CANCELLED | OUTPATIENT
Start: 2022-01-01

## 2022-01-01 RX ORDER — SULFAMETHOXAZOLE AND TRIMETHOPRIM 800; 160 MG/1; MG/1
1 TABLET ORAL DAILY
Qty: 30 TABLET | Refills: 1 | Status: ON HOLD | OUTPATIENT
Start: 2022-01-01 | End: 2022-01-01

## 2022-01-01 RX ORDER — ASPIRIN 325 MG
325 TABLET ORAL
Status: DISCONTINUED | OUTPATIENT
Start: 2022-01-01 | End: 2022-01-01

## 2022-01-01 RX ORDER — GLUCAGON 1 MG
1 KIT INJECTION
Status: DISCONTINUED | OUTPATIENT
Start: 2022-01-01 | End: 2022-01-01

## 2022-01-01 RX ORDER — CETIRIZINE HYDROCHLORIDE 10 MG/1
10 TABLET ORAL DAILY
Status: DISCONTINUED | OUTPATIENT
Start: 2022-01-01 | End: 2022-01-01 | Stop reason: HOSPADM

## 2022-01-01 RX ORDER — MORPHINE SULFATE 2 MG/ML
2 INJECTION, SOLUTION INTRAMUSCULAR; INTRAVENOUS
Status: DISCONTINUED | OUTPATIENT
Start: 2022-01-01 | End: 2022-01-01

## 2022-01-01 RX ORDER — TRAMADOL HYDROCHLORIDE 50 MG/1
50 TABLET ORAL EVERY 8 HOURS PRN
Qty: 90 TABLET | Refills: 0 | Status: SHIPPED | OUTPATIENT
Start: 2022-01-01 | End: 2022-01-01 | Stop reason: SDUPTHER

## 2022-01-01 RX ORDER — FUROSEMIDE 10 MG/ML
40 INJECTION INTRAMUSCULAR; INTRAVENOUS ONCE
Status: COMPLETED | OUTPATIENT
Start: 2022-01-01 | End: 2022-01-01

## 2022-01-01 RX ORDER — SODIUM CHLORIDE FOR INHALATION 3 %
4 VIAL, NEBULIZER (ML) INHALATION EVERY 6 HOURS
Status: DISCONTINUED | OUTPATIENT
Start: 2022-01-01 | End: 2022-01-01 | Stop reason: HOSPADM

## 2022-01-01 RX ORDER — FLUTICASONE PROPIONATE 50 MCG
1 SPRAY, SUSPENSION (ML) NASAL DAILY
Status: DISCONTINUED | OUTPATIENT
Start: 2022-01-01 | End: 2022-01-01

## 2022-01-01 RX ORDER — TRAMADOL HYDROCHLORIDE 50 MG/1
50 TABLET ORAL EVERY 8 HOURS PRN
Qty: 90 TABLET | Refills: 0 | Status: ON HOLD | OUTPATIENT
Start: 2022-01-01 | End: 2022-01-01

## 2022-01-01 RX ORDER — B-COMPLEX WITH VITAMIN C
1 TABLET ORAL
Status: ON HOLD | COMMUNITY
Start: 2021-01-01 | End: 2022-01-01

## 2022-01-01 RX ORDER — HEPARIN 100 UNIT/ML
500 SYRINGE INTRAVENOUS
Status: DISCONTINUED | OUTPATIENT
Start: 2022-01-01 | End: 2022-01-01 | Stop reason: HOSPADM

## 2022-01-01 RX ORDER — IPRATROPIUM BROMIDE AND ALBUTEROL SULFATE 2.5; .5 MG/3ML; MG/3ML
3 SOLUTION RESPIRATORY (INHALATION) EVERY 4 HOURS
Status: DISCONTINUED | OUTPATIENT
Start: 2022-01-01 | End: 2022-01-01

## 2022-01-01 RX ORDER — IPRATROPIUM BROMIDE AND ALBUTEROL SULFATE 2.5; .5 MG/3ML; MG/3ML
3 SOLUTION RESPIRATORY (INHALATION) EVERY 6 HOURS PRN
Status: DISCONTINUED | OUTPATIENT
Start: 2022-01-01 | End: 2022-01-01

## 2022-01-01 RX ORDER — LORAZEPAM 2 MG/ML
0.5 INJECTION INTRAMUSCULAR EVERY 4 HOURS PRN
Status: DISCONTINUED | OUTPATIENT
Start: 2022-01-01 | End: 2022-01-01

## 2022-01-01 RX ORDER — FLUTICASONE FUROATE AND VILANTEROL 100; 25 UG/1; UG/1
1 POWDER RESPIRATORY (INHALATION) DAILY
Refills: 2 | Status: DISCONTINUED | OUTPATIENT
Start: 2022-01-01 | End: 2022-01-01 | Stop reason: HOSPADM

## 2022-01-01 RX ORDER — DIAZEPAM 5 MG/1
5 TABLET ORAL NIGHTLY PRN
Qty: 12 TABLET | Refills: 0 | Status: SHIPPED | OUTPATIENT
Start: 2022-01-01 | End: 2022-01-01 | Stop reason: SDUPTHER

## 2022-01-01 RX ORDER — LORAZEPAM 2 MG/ML
INJECTION INTRAMUSCULAR
Status: COMPLETED
Start: 2022-01-01 | End: 2022-01-01

## 2022-01-01 RX ORDER — LORAZEPAM 2 MG/ML
1 INJECTION INTRAMUSCULAR EVERY 4 HOURS PRN
Status: DISCONTINUED | OUTPATIENT
Start: 2022-01-01 | End: 2022-01-01

## 2022-01-01 RX ORDER — LORAZEPAM 2 MG/ML
2 INJECTION INTRAMUSCULAR ONCE
Status: COMPLETED | OUTPATIENT
Start: 2022-01-01 | End: 2022-01-01

## 2022-01-01 RX ORDER — MORPHINE SULFATE 2 MG/ML
2 INJECTION, SOLUTION INTRAMUSCULAR; INTRAVENOUS EVERY 4 HOURS PRN
Status: DISCONTINUED | OUTPATIENT
Start: 2022-01-01 | End: 2022-01-01

## 2022-01-01 RX ORDER — IPRATROPIUM BROMIDE AND ALBUTEROL SULFATE 2.5; .5 MG/3ML; MG/3ML
3 SOLUTION RESPIRATORY (INHALATION) EVERY 6 HOURS
Status: DISCONTINUED | OUTPATIENT
Start: 2022-01-01 | End: 2022-01-01 | Stop reason: HOSPADM

## 2022-01-01 RX ORDER — IPRATROPIUM BROMIDE AND ALBUTEROL SULFATE 2.5; .5 MG/3ML; MG/3ML
3 SOLUTION RESPIRATORY (INHALATION)
Status: COMPLETED | OUTPATIENT
Start: 2022-01-01 | End: 2022-01-01

## 2022-01-01 RX ORDER — VANCOMYCIN HCL IN 5 % DEXTROSE 1G/250ML
1000 PLASTIC BAG, INJECTION (ML) INTRAVENOUS
Status: DISCONTINUED | OUTPATIENT
Start: 2022-01-01 | End: 2022-01-01

## 2022-01-01 RX ORDER — ALBUTEROL SULFATE 90 UG/1
6 AEROSOL, METERED RESPIRATORY (INHALATION) ONCE
Status: COMPLETED | OUTPATIENT
Start: 2022-01-01 | End: 2022-01-01

## 2022-01-01 RX ORDER — GABAPENTIN 400 MG/1
400 CAPSULE ORAL 3 TIMES DAILY
Status: DISCONTINUED | OUTPATIENT
Start: 2022-01-01 | End: 2022-01-01

## 2022-01-01 RX ORDER — HYDROXYZINE HYDROCHLORIDE 25 MG/1
25 TABLET, FILM COATED ORAL 3 TIMES DAILY PRN
Qty: 90 TABLET | Refills: 2 | Status: ON HOLD | OUTPATIENT
Start: 2022-01-01 | End: 2022-01-01

## 2022-01-01 RX ORDER — ENOXAPARIN SODIUM 100 MG/ML
40 INJECTION SUBCUTANEOUS EVERY 24 HOURS
Status: DISCONTINUED | OUTPATIENT
Start: 2022-01-01 | End: 2022-01-01

## 2022-01-01 RX ORDER — PROMETHAZINE HYDROCHLORIDE AND CODEINE PHOSPHATE 6.25; 1 MG/5ML; MG/5ML
5 SOLUTION ORAL EVERY 8 HOURS PRN
Qty: 473 ML | Refills: 0 | Status: SHIPPED | OUTPATIENT
Start: 2022-01-01 | End: 2022-01-01 | Stop reason: SDUPTHER

## 2022-01-01 RX ORDER — ACETAMINOPHEN 325 MG/1
650 TABLET ORAL EVERY 8 HOURS PRN
Status: DISCONTINUED | OUTPATIENT
Start: 2022-01-01 | End: 2022-01-01

## 2022-01-01 RX ORDER — IBUPROFEN 200 MG
16 TABLET ORAL
Status: DISCONTINUED | OUTPATIENT
Start: 2022-01-01 | End: 2022-01-01

## 2022-01-01 RX ORDER — OMEPRAZOLE 20 MG/1
20 CAPSULE, DELAYED RELEASE ORAL DAILY
Qty: 30 CAPSULE | Refills: 3 | Status: ON HOLD | OUTPATIENT
Start: 2022-01-01 | End: 2022-01-01

## 2022-01-01 RX ORDER — MAG HYDROX/ALUMINUM HYD/SIMETH 200-200-20
30 SUSPENSION, ORAL (FINAL DOSE FORM) ORAL DAILY PRN
Status: DISCONTINUED | OUTPATIENT
Start: 2022-01-01 | End: 2022-01-01 | Stop reason: HOSPADM

## 2022-01-01 RX ORDER — QUETIAPINE FUMARATE 25 MG/1
25 TABLET, FILM COATED ORAL ONCE
Status: DISCONTINUED | OUTPATIENT
Start: 2022-01-01 | End: 2022-01-01

## 2022-01-01 RX ORDER — SODIUM CHLORIDE 0.9 % (FLUSH) 0.9 %
10 SYRINGE (ML) INJECTION
Status: DISCONTINUED | OUTPATIENT
Start: 2022-01-01 | End: 2022-01-01 | Stop reason: HOSPADM

## 2022-01-01 RX ORDER — PREDNISONE 20 MG/1
40 TABLET ORAL DAILY
Status: DISCONTINUED | OUTPATIENT
Start: 2022-01-01 | End: 2022-01-01 | Stop reason: HOSPADM

## 2022-01-01 RX ORDER — FUROSEMIDE 10 MG/ML
60 INJECTION INTRAMUSCULAR; INTRAVENOUS ONCE
Status: COMPLETED | OUTPATIENT
Start: 2022-01-01 | End: 2022-01-01

## 2022-01-01 RX ORDER — AMLODIPINE BESYLATE 5 MG/1
5 TABLET ORAL DAILY
Status: DISCONTINUED | OUTPATIENT
Start: 2022-01-01 | End: 2022-01-01

## 2022-01-01 RX ORDER — BUDESONIDE AND FORMOTEROL FUMARATE DIHYDRATE 160; 4.5 UG/1; UG/1
2 AEROSOL RESPIRATORY (INHALATION) EVERY 12 HOURS
Qty: 10.2 G | Refills: 2 | Status: ON HOLD | OUTPATIENT
Start: 2022-01-01 | End: 2022-01-01

## 2022-01-01 RX ORDER — SULFAMETHOXAZOLE AND TRIMETHOPRIM 800; 160 MG/1; MG/1
1 TABLET ORAL DAILY
Status: DISCONTINUED | OUTPATIENT
Start: 2022-01-01 | End: 2022-01-01 | Stop reason: HOSPADM

## 2022-01-01 RX ORDER — MORPHINE SULFATE 2 MG/ML
2 INJECTION, SOLUTION INTRAMUSCULAR; INTRAVENOUS ONCE
Status: COMPLETED | OUTPATIENT
Start: 2022-01-01 | End: 2022-01-01

## 2022-01-01 RX ORDER — ACETAMINOPHEN 325 MG/1
650 TABLET ORAL EVERY 4 HOURS PRN
Status: DISCONTINUED | OUTPATIENT
Start: 2022-01-01 | End: 2022-01-01 | Stop reason: HOSPADM

## 2022-01-01 RX ORDER — AMOXICILLIN 250 MG
1 CAPSULE ORAL 2 TIMES DAILY
Status: DISCONTINUED | OUTPATIENT
Start: 2022-01-01 | End: 2022-01-01

## 2022-01-01 RX ORDER — INSULIN ASPART 100 [IU]/ML
0-5 INJECTION, SOLUTION INTRAVENOUS; SUBCUTANEOUS
Status: DISCONTINUED | OUTPATIENT
Start: 2022-01-01 | End: 2022-01-01 | Stop reason: HOSPADM

## 2022-01-01 RX ORDER — ASCORBIC ACID 500 MG
500 TABLET ORAL 2 TIMES DAILY
Status: DISCONTINUED | OUTPATIENT
Start: 2022-01-01 | End: 2022-01-01 | Stop reason: HOSPADM

## 2022-01-01 RX ORDER — DEXMEDETOMIDINE HYDROCHLORIDE 4 UG/ML
0-1.4 INJECTION, SOLUTION INTRAVENOUS CONTINUOUS
Status: DISCONTINUED | OUTPATIENT
Start: 2022-01-01 | End: 2022-01-01

## 2022-01-01 RX ORDER — TIOTROPIUM BROMIDE 18 UG/1
18 CAPSULE ORAL; RESPIRATORY (INHALATION) DAILY
Qty: 30 CAPSULE | Refills: 3 | Status: ON HOLD | OUTPATIENT
Start: 2022-01-01 | End: 2022-01-01

## 2022-01-01 RX ORDER — IRBESARTAN AND HYDROCHLOROTHIAZIDE 300; 12.5 MG/1; MG/1
1 TABLET, FILM COATED ORAL DAILY
Qty: 90 TABLET | Refills: 3 | Status: ON HOLD | OUTPATIENT
Start: 2022-01-01 | End: 2022-01-01

## 2022-01-01 RX ORDER — PREDNISONE 20 MG/1
TABLET ORAL
Qty: 72 TABLET | Refills: 0 | Status: SHIPPED | OUTPATIENT
Start: 2022-01-01 | End: 2022-01-01

## 2022-01-01 RX ORDER — SENNOSIDES 8.6 MG/1
8.6 TABLET ORAL DAILY
Status: DISCONTINUED | OUTPATIENT
Start: 2022-01-01 | End: 2022-01-01

## 2022-01-01 RX ORDER — SULFAMETHOXAZOLE AND TRIMETHOPRIM 800; 160 MG/1; MG/1
1 TABLET ORAL DAILY
Status: DISCONTINUED | OUTPATIENT
Start: 2022-01-01 | End: 2022-01-01

## 2022-01-01 RX ORDER — ENOXAPARIN SODIUM 100 MG/ML
40 INJECTION SUBCUTANEOUS EVERY 24 HOURS
Status: DISCONTINUED | OUTPATIENT
Start: 2022-01-01 | End: 2022-01-01 | Stop reason: HOSPADM

## 2022-01-01 RX ORDER — PROMETHAZINE HYDROCHLORIDE AND CODEINE PHOSPHATE 6.25; 1 MG/5ML; MG/5ML
5 SOLUTION ORAL EVERY 8 HOURS PRN
Status: DISCONTINUED | OUTPATIENT
Start: 2022-01-01 | End: 2022-01-01

## 2022-01-01 RX ORDER — ALBUTEROL SULFATE 90 UG/1
2 AEROSOL, METERED RESPIRATORY (INHALATION) EVERY 6 HOURS
Status: DISCONTINUED | OUTPATIENT
Start: 2022-01-01 | End: 2022-01-01

## 2022-01-01 RX ORDER — AMLODIPINE BESYLATE 5 MG/1
5 TABLET ORAL DAILY
Status: DISCONTINUED | OUTPATIENT
Start: 2022-01-01 | End: 2022-01-01 | Stop reason: HOSPADM

## 2022-01-01 RX ORDER — GABAPENTIN 100 MG/1
100 CAPSULE ORAL 3 TIMES DAILY
Status: DISCONTINUED | OUTPATIENT
Start: 2022-01-01 | End: 2022-01-01

## 2022-01-01 RX ORDER — PROMETHAZINE HYDROCHLORIDE AND CODEINE PHOSPHATE 6.25; 1 MG/5ML; MG/5ML
5 SOLUTION ORAL ONCE
Status: COMPLETED | OUTPATIENT
Start: 2022-01-01 | End: 2022-01-01

## 2022-01-01 RX ORDER — MUPIROCIN 20 MG/G
OINTMENT TOPICAL 2 TIMES DAILY
Status: DISCONTINUED | OUTPATIENT
Start: 2022-01-01 | End: 2022-01-01 | Stop reason: HOSPADM

## 2022-01-01 RX ORDER — PROMETHAZINE HYDROCHLORIDE AND CODEINE PHOSPHATE 6.25; 1 MG/5ML; MG/5ML
5 SOLUTION ORAL EVERY 8 HOURS PRN
Qty: 473 ML | Refills: 0 | Status: ON HOLD | OUTPATIENT
Start: 2022-01-01 | End: 2022-01-01

## 2022-01-01 RX ORDER — METHYLPREDNISOLONE SOD SUCC 125 MG
125 VIAL (EA) INJECTION
Status: COMPLETED | OUTPATIENT
Start: 2022-01-01 | End: 2022-01-01

## 2022-01-01 RX ORDER — PANTOPRAZOLE SODIUM 40 MG/1
40 TABLET, DELAYED RELEASE ORAL DAILY
Refills: 3 | Status: DISCONTINUED | OUTPATIENT
Start: 2022-01-01 | End: 2022-01-01 | Stop reason: HOSPADM

## 2022-01-01 RX ORDER — DIAZEPAM 5 MG/1
5 TABLET ORAL NIGHTLY PRN
Qty: 9 TABLET | Refills: 0 | Status: ON HOLD | OUTPATIENT
Start: 2022-01-01 | End: 2022-01-01

## 2022-01-01 RX ORDER — IPRATROPIUM BROMIDE AND ALBUTEROL SULFATE 2.5; .5 MG/3ML; MG/3ML
3 SOLUTION RESPIRATORY (INHALATION) EVERY 6 HOURS
Status: DISCONTINUED | OUTPATIENT
Start: 2022-01-01 | End: 2022-01-01

## 2022-01-01 RX ORDER — POTASSIUM CHLORIDE 7.45 MG/ML
80 INJECTION INTRAVENOUS
Status: DISCONTINUED | OUTPATIENT
Start: 2022-01-01 | End: 2022-01-01

## 2022-01-01 RX ORDER — AMLODIPINE BESYLATE 5 MG/1
5 TABLET ORAL DAILY
Qty: 90 TABLET | Refills: 3 | Status: ON HOLD | OUTPATIENT
Start: 2022-01-01 | End: 2022-01-01

## 2022-01-01 RX ORDER — MELOXICAM 15 MG/1
15 TABLET ORAL DAILY
Status: ON HOLD | COMMUNITY
Start: 2021-01-01 | End: 2022-01-01

## 2022-01-01 RX ORDER — FLUTICASONE FUROATE AND VILANTEROL 100; 25 UG/1; UG/1
1 POWDER RESPIRATORY (INHALATION) DAILY
Refills: 2 | Status: DISCONTINUED | OUTPATIENT
Start: 2022-01-01 | End: 2022-01-01

## 2022-01-01 RX ORDER — AMOXICILLIN 250 MG
1 CAPSULE ORAL DAILY
Status: DISCONTINUED | OUTPATIENT
Start: 2022-01-01 | End: 2022-01-01 | Stop reason: HOSPADM

## 2022-01-01 RX ORDER — POLYETHYLENE GLYCOL 3350 17 G/17G
17 POWDER, FOR SOLUTION ORAL 2 TIMES DAILY
Status: DISCONTINUED | OUTPATIENT
Start: 2022-01-01 | End: 2022-01-01

## 2022-01-01 RX ORDER — FLUTICASONE PROPIONATE 50 MCG
SPRAY, SUSPENSION (ML) NASAL
Status: ON HOLD | COMMUNITY
Start: 2021-01-01 | End: 2022-01-01

## 2022-01-01 RX ORDER — PREDNISONE 10 MG/1
TABLET ORAL
Qty: 18 TABLET | Refills: 0 | Status: ON HOLD | OUTPATIENT
Start: 2022-01-01 | End: 2022-01-01

## 2022-01-01 RX ORDER — MORPHINE SULFATE IN 0.9 % NACL 30 MG/30ML
0-10 PATIENT CONTROLLED ANALGESIA SYRINGE INTRAVENOUS CONTINUOUS
Status: DISCONTINUED | OUTPATIENT
Start: 2022-01-01 | End: 2022-01-01 | Stop reason: HOSPADM

## 2022-01-01 RX ORDER — POTASSIUM CHLORIDE 7.45 MG/ML
60 INJECTION INTRAVENOUS
Status: DISCONTINUED | OUTPATIENT
Start: 2022-01-01 | End: 2022-01-01

## 2022-01-01 RX ORDER — BUPROPION HYDROCHLORIDE 150 MG/1
150 TABLET, EXTENDED RELEASE ORAL DAILY
Qty: 30 TABLET | Refills: 6 | Status: ON HOLD | OUTPATIENT
Start: 2022-01-01 | End: 2022-01-01

## 2022-01-01 RX ORDER — CETIRIZINE HYDROCHLORIDE 10 MG/1
10 TABLET ORAL DAILY
Status: DISCONTINUED | OUTPATIENT
Start: 2022-01-01 | End: 2022-01-01

## 2022-01-01 RX ORDER — IBUPROFEN 200 MG
24 TABLET ORAL
Status: DISCONTINUED | OUTPATIENT
Start: 2022-01-01 | End: 2022-01-01

## 2022-01-01 RX ORDER — ONDANSETRON 2 MG/ML
4 INJECTION INTRAMUSCULAR; INTRAVENOUS EVERY 8 HOURS PRN
Status: DISCONTINUED | OUTPATIENT
Start: 2022-01-01 | End: 2022-01-01 | Stop reason: HOSPADM

## 2022-01-01 RX ORDER — GLUCAGON 1 MG
1 KIT INJECTION
Status: DISCONTINUED | OUTPATIENT
Start: 2022-01-01 | End: 2022-01-01 | Stop reason: HOSPADM

## 2022-01-01 RX ORDER — BISACODYL 10 MG
10 SUPPOSITORY, RECTAL RECTAL DAILY PRN
Status: DISCONTINUED | OUTPATIENT
Start: 2022-01-01 | End: 2022-01-01 | Stop reason: HOSPADM

## 2022-01-01 RX ORDER — MAGNESIUM SULFATE HEPTAHYDRATE 40 MG/ML
4 INJECTION, SOLUTION INTRAVENOUS
Status: DISCONTINUED | OUTPATIENT
Start: 2022-01-01 | End: 2022-01-01

## 2022-01-01 RX ORDER — GABAPENTIN 400 MG/1
400 CAPSULE ORAL 3 TIMES DAILY
Qty: 90 CAPSULE | Refills: 3 | Status: ON HOLD | OUTPATIENT
Start: 2022-01-01 | End: 2022-01-01

## 2022-01-01 RX ORDER — ONDANSETRON 4 MG/1
4 TABLET, ORALLY DISINTEGRATING ORAL EVERY 8 HOURS PRN
Status: DISCONTINUED | OUTPATIENT
Start: 2022-01-01 | End: 2022-01-01 | Stop reason: HOSPADM

## 2022-01-01 RX ORDER — HEPARIN 100 UNIT/ML
500 SYRINGE INTRAVENOUS
Status: CANCELLED | OUTPATIENT
Start: 2022-01-01

## 2022-01-01 RX ORDER — GUAIFENESIN/DEXTROMETHORPHAN 100-10MG/5
10 SYRUP ORAL ONCE
Status: COMPLETED | OUTPATIENT
Start: 2022-01-01 | End: 2022-01-01

## 2022-01-01 RX ORDER — POTASSIUM CHLORIDE 7.45 MG/ML
40 INJECTION INTRAVENOUS
Status: DISCONTINUED | OUTPATIENT
Start: 2022-01-01 | End: 2022-01-01

## 2022-01-01 RX ORDER — GUAIFENESIN/DEXTROMETHORPHAN 100-10MG/5
10 SYRUP ORAL EVERY 4 HOURS PRN
Status: DISCONTINUED | OUTPATIENT
Start: 2022-01-01 | End: 2022-01-01 | Stop reason: HOSPADM

## 2022-01-01 RX ORDER — GUAIFENESIN 600 MG/1
600 TABLET, EXTENDED RELEASE ORAL 2 TIMES DAILY
Qty: 20 TABLET | Refills: 0 | Status: ON HOLD | OUTPATIENT
Start: 2022-01-01 | End: 2022-01-01 | Stop reason: HOSPADM

## 2022-01-01 RX ORDER — SODIUM CHLORIDE 0.9 % (FLUSH) 0.9 %
10 SYRINGE (ML) INJECTION EVERY 12 HOURS PRN
Status: DISCONTINUED | OUTPATIENT
Start: 2022-01-01 | End: 2022-01-01 | Stop reason: HOSPADM

## 2022-01-01 RX ORDER — ALBUTEROL SULFATE 90 UG/1
1-2 AEROSOL, METERED RESPIRATORY (INHALATION) EVERY 6 HOURS PRN
Qty: 18 G | Refills: 3 | Status: ON HOLD | OUTPATIENT
Start: 2022-01-01 | End: 2022-01-01

## 2022-01-01 RX ORDER — BENZONATATE 100 MG/1
100 CAPSULE ORAL 3 TIMES DAILY PRN
Status: DISCONTINUED | OUTPATIENT
Start: 2022-01-01 | End: 2022-01-01 | Stop reason: HOSPADM

## 2022-01-01 RX ORDER — NALOXONE HCL 0.4 MG/ML
0.02 VIAL (ML) INJECTION
Status: DISCONTINUED | OUTPATIENT
Start: 2022-01-01 | End: 2022-01-01 | Stop reason: HOSPADM

## 2022-01-01 RX ORDER — PANTOPRAZOLE SODIUM 40 MG/10ML
40 INJECTION, POWDER, LYOPHILIZED, FOR SOLUTION INTRAVENOUS DAILY
Status: DISCONTINUED | OUTPATIENT
Start: 2022-01-01 | End: 2022-01-01

## 2022-01-01 RX ORDER — HYDROXYZINE HYDROCHLORIDE 25 MG/1
25 TABLET, FILM COATED ORAL DAILY PRN
Status: DISCONTINUED | OUTPATIENT
Start: 2022-01-01 | End: 2022-01-01 | Stop reason: HOSPADM

## 2022-01-01 RX ORDER — IPRATROPIUM BROMIDE AND ALBUTEROL SULFATE 2.5; .5 MG/3ML; MG/3ML
3 SOLUTION RESPIRATORY (INHALATION) EVERY 4 HOURS
Status: DISCONTINUED | OUTPATIENT
Start: 2022-01-01 | End: 2022-01-01 | Stop reason: HOSPADM

## 2022-01-01 RX ORDER — GADOBUTROL 604.72 MG/ML
8 INJECTION INTRAVENOUS
Status: COMPLETED | OUTPATIENT
Start: 2022-01-01 | End: 2022-01-01

## 2022-01-01 RX ADMIN — DICLOFENAC SODIUM 2 G: 10 GEL TOPICAL at 09:01

## 2022-01-01 RX ADMIN — PROMETHAZINE HYDROCHLORIDE AND CODEINE PHOSPHATE 5 ML: 10; 6.25 SOLUTION ORAL at 08:01

## 2022-01-01 RX ADMIN — GADOBUTROL 8 ML: 604.72 INJECTION INTRAVENOUS at 02:02

## 2022-01-01 RX ADMIN — PREDNISONE 40 MG: 20 TABLET ORAL at 10:01

## 2022-01-01 RX ADMIN — THERA TABS 1 TABLET: TAB at 10:01

## 2022-01-01 RX ADMIN — DICLOFENAC SODIUM 2 G: 10 GEL TOPICAL at 08:01

## 2022-01-01 RX ADMIN — PROMETHAZINE HYDROCHLORIDE AND CODEINE PHOSPHATE 5 ML: 10; 6.25 SOLUTION ORAL at 03:02

## 2022-01-01 RX ADMIN — IPRATROPIUM BROMIDE AND ALBUTEROL SULFATE 3 ML: 2.5; .5 SOLUTION RESPIRATORY (INHALATION) at 11:01

## 2022-01-01 RX ADMIN — METHYLPREDNISOLONE SODIUM SUCCINATE 80 MG: 40 INJECTION, POWDER, FOR SOLUTION INTRAMUSCULAR; INTRAVENOUS at 08:02

## 2022-01-01 RX ADMIN — DEXMEDETOMIDINE HYDROCHLORIDE 0.3 MCG/KG/HR: 4 INJECTION, SOLUTION INTRAVENOUS at 09:02

## 2022-01-01 RX ADMIN — METHYLPREDNISOLONE SODIUM SUCCINATE 80 MG: 40 INJECTION, POWDER, FOR SOLUTION INTRAMUSCULAR; INTRAVENOUS at 10:02

## 2022-01-01 RX ADMIN — PIPERACILLIN AND TAZOBACTAM 4.5 G: 4; .5 INJECTION, POWDER, LYOPHILIZED, FOR SOLUTION INTRAVENOUS; PARENTERAL at 10:02

## 2022-01-01 RX ADMIN — INSULIN ASPART 1 UNITS: 100 INJECTION, SOLUTION INTRAVENOUS; SUBCUTANEOUS at 08:01

## 2022-01-01 RX ADMIN — IPRATROPIUM BROMIDE AND ALBUTEROL SULFATE 3 ML: 2.5; .5 SOLUTION RESPIRATORY (INHALATION) at 09:01

## 2022-01-01 RX ADMIN — HYDROXYZINE HYDROCHLORIDE 25 MG: 25 TABLET ORAL at 08:01

## 2022-01-01 RX ADMIN — PANTOPRAZOLE SODIUM 40 MG: 40 TABLET, DELAYED RELEASE ORAL at 08:01

## 2022-01-01 RX ADMIN — PIPERACILLIN AND TAZOBACTAM 4.5 G: 4; .5 INJECTION, POWDER, LYOPHILIZED, FOR SOLUTION INTRAVENOUS; PARENTERAL at 11:02

## 2022-01-01 RX ADMIN — DOCUSATE SODIUM 50 MG AND SENNOSIDES 8.6 MG 1 TABLET: 8.6; 5 TABLET, FILM COATED ORAL at 08:02

## 2022-01-01 RX ADMIN — CETIRIZINE HYDROCHLORIDE 10 MG: 10 TABLET, FILM COATED ORAL at 09:01

## 2022-01-01 RX ADMIN — POLYETHYLENE GLYCOL 3350 17 G: 17 POWDER, FOR SOLUTION ORAL at 10:01

## 2022-01-01 RX ADMIN — IPRATROPIUM BROMIDE AND ALBUTEROL SULFATE 3 ML: 2.5; .5 SOLUTION RESPIRATORY (INHALATION) at 03:01

## 2022-01-01 RX ADMIN — IPRATROPIUM BROMIDE AND ALBUTEROL SULFATE 3 ML: 2.5; .5 SOLUTION RESPIRATORY (INHALATION) at 12:01

## 2022-01-01 RX ADMIN — AMLODIPINE BESYLATE 5 MG: 5 TABLET ORAL at 10:01

## 2022-01-01 RX ADMIN — SODIUM CHLORIDE 30 MG/ML INHALATION SOLUTION 4 ML: 30 SOLUTION INHALANT at 08:01

## 2022-01-01 RX ADMIN — MUPIROCIN: 20 OINTMENT TOPICAL at 09:01

## 2022-01-01 RX ADMIN — PANTOPRAZOLE SODIUM 40 MG: 40 TABLET, DELAYED RELEASE ORAL at 09:01

## 2022-01-01 RX ADMIN — Medication 400 MG: at 09:01

## 2022-01-01 RX ADMIN — GABAPENTIN 400 MG: 400 CAPSULE ORAL at 03:02

## 2022-01-01 RX ADMIN — MELATONIN TAB 3 MG 6 MG: 3 TAB at 08:01

## 2022-01-01 RX ADMIN — TIOTROPIUM BROMIDE INHALATION SPRAY 2 PUFF: 3.12 SPRAY, METERED RESPIRATORY (INHALATION) at 07:01

## 2022-01-01 RX ADMIN — INSULIN ASPART 3 UNITS: 100 INJECTION, SOLUTION INTRAVENOUS; SUBCUTANEOUS at 04:01

## 2022-01-01 RX ADMIN — IPRATROPIUM BROMIDE AND ALBUTEROL SULFATE 3 ML: 2.5; .5 SOLUTION RESPIRATORY (INHALATION) at 01:01

## 2022-01-01 RX ADMIN — LORAZEPAM 0.5 MG: 2 INJECTION INTRAMUSCULAR; INTRAVENOUS at 08:02

## 2022-01-01 RX ADMIN — LOSARTAN POTASSIUM AND HYDROCHLOROTHIAZIDE 1 TABLET: 12.5; 1 TABLET ORAL at 09:01

## 2022-01-01 RX ADMIN — FLUTICASONE FUROATE AND VILANTEROL TRIFENATATE 1 PUFF: 100; 25 POWDER RESPIRATORY (INHALATION) at 07:01

## 2022-01-01 RX ADMIN — GABAPENTIN 100 MG: 100 CAPSULE ORAL at 04:01

## 2022-01-01 RX ADMIN — BENZONATATE 100 MG: 100 CAPSULE ORAL at 02:01

## 2022-01-01 RX ADMIN — ENOXAPARIN SODIUM 40 MG: 100 INJECTION SUBCUTANEOUS at 04:01

## 2022-01-01 RX ADMIN — THERA TABS 1 TABLET: TAB at 08:01

## 2022-01-01 RX ADMIN — SENNOSIDES AND DOCUSATE SODIUM 1 TABLET: 50; 8.6 TABLET ORAL at 09:01

## 2022-01-01 RX ADMIN — GUAIFENESIN 600 MG: 600 TABLET, EXTENDED RELEASE ORAL at 08:01

## 2022-01-01 RX ADMIN — MORPHINE SULFATE 2 MG: 2 INJECTION, SOLUTION INTRAMUSCULAR; INTRAVENOUS at 07:02

## 2022-01-01 RX ADMIN — IPRATROPIUM BROMIDE AND ALBUTEROL SULFATE 3 ML: 2.5; .5 SOLUTION RESPIRATORY (INHALATION) at 12:02

## 2022-01-01 RX ADMIN — IPRATROPIUM BROMIDE AND ALBUTEROL SULFATE 3 ML: 2.5; .5 SOLUTION RESPIRATORY (INHALATION) at 08:01

## 2022-01-01 RX ADMIN — ONDANSETRON 8 MG: 8 TABLET, ORALLY DISINTEGRATING ORAL at 03:01

## 2022-01-01 RX ADMIN — OXYCODONE HYDROCHLORIDE AND ACETAMINOPHEN 500 MG: 500 TABLET ORAL at 08:01

## 2022-01-01 RX ADMIN — PIPERACILLIN AND TAZOBACTAM 4.5 G: 4; .5 INJECTION, POWDER, LYOPHILIZED, FOR SOLUTION INTRAVENOUS; PARENTERAL at 05:02

## 2022-01-01 RX ADMIN — AMLODIPINE BESYLATE 5 MG: 5 TABLET ORAL at 09:01

## 2022-01-01 RX ADMIN — SODIUM CHLORIDE 30 MG/ML INHALATION SOLUTION 4 ML: 30 SOLUTION INHALANT at 01:01

## 2022-01-01 RX ADMIN — GUAIFENESIN 600 MG: 600 TABLET, EXTENDED RELEASE ORAL at 12:01

## 2022-01-01 RX ADMIN — IPRATROPIUM BROMIDE AND ALBUTEROL SULFATE 3 ML: 2.5; .5 SOLUTION RESPIRATORY (INHALATION) at 07:01

## 2022-01-01 RX ADMIN — MUPIROCIN: 20 OINTMENT TOPICAL at 08:01

## 2022-01-01 RX ADMIN — AMLODIPINE BESYLATE 5 MG: 5 TABLET ORAL at 08:01

## 2022-01-01 RX ADMIN — POLYETHYLENE GLYCOL 3350 17 G: 17 POWDER, FOR SOLUTION ORAL at 11:01

## 2022-01-01 RX ADMIN — OXYCODONE HYDROCHLORIDE AND ACETAMINOPHEN 500 MG: 500 TABLET ORAL at 09:01

## 2022-01-01 RX ADMIN — MORPHINE SULFATE 2 MG: 2 INJECTION, SOLUTION INTRAMUSCULAR; INTRAVENOUS at 12:02

## 2022-01-01 RX ADMIN — ALBUTEROL SULFATE 2 PUFF: 108 INHALANT RESPIRATORY (INHALATION) at 07:01

## 2022-01-01 RX ADMIN — SULFAMETHOXAZOLE AND TRIMETHOPRIM 1 TABLET: 800; 160 TABLET ORAL at 09:01

## 2022-01-01 RX ADMIN — ALBUTEROL SULFATE 2 PUFF: 108 INHALANT RESPIRATORY (INHALATION) at 01:01

## 2022-01-01 RX ADMIN — GABAPENTIN 400 MG: 400 CAPSULE ORAL at 09:01

## 2022-01-01 RX ADMIN — POLYETHYLENE GLYCOL 3350 17 G: 17 POWDER, FOR SOLUTION ORAL at 09:01

## 2022-01-01 RX ADMIN — GUAIFENESIN 600 MG: 600 TABLET, EXTENDED RELEASE ORAL at 09:01

## 2022-01-01 RX ADMIN — TIOTROPIUM BROMIDE INHALATION SPRAY 2 PUFF: 1.56 SPRAY, METERED RESPIRATORY (INHALATION) at 07:01

## 2022-01-01 RX ADMIN — DEXAMETHASONE 6 MG: 4 TABLET ORAL at 09:01

## 2022-01-01 RX ADMIN — PIPERACILLIN AND TAZOBACTAM 4.5 G: 4; .5 INJECTION, POWDER, LYOPHILIZED, FOR SOLUTION INTRAVENOUS; PARENTERAL at 06:02

## 2022-01-01 RX ADMIN — ALBUTEROL SULFATE 2 PUFF: 108 INHALANT RESPIRATORY (INHALATION) at 06:01

## 2022-01-01 RX ADMIN — MORPHINE SULFATE 2 MG: 2 INJECTION, SOLUTION INTRAMUSCULAR; INTRAVENOUS at 08:02

## 2022-01-01 RX ADMIN — IPRATROPIUM BROMIDE AND ALBUTEROL SULFATE 3 ML: 2.5; .5 SOLUTION RESPIRATORY (INHALATION) at 04:01

## 2022-01-01 RX ADMIN — BUPROPION HYDROCHLORIDE 150 MG: 150 TABLET, FILM COATED, EXTENDED RELEASE ORAL at 10:01

## 2022-01-01 RX ADMIN — PROMETHAZINE HYDROCHLORIDE AND CODEINE PHOSPHATE 5 ML: 10; 6.25 SOLUTION ORAL at 10:01

## 2022-01-01 RX ADMIN — LORAZEPAM 2 MG: 2 INJECTION INTRAMUSCULAR; INTRAVENOUS at 03:02

## 2022-01-01 RX ADMIN — DEXMEDETOMIDINE HYDROCHLORIDE 0.9 MCG/KG/HR: 4 INJECTION, SOLUTION INTRAVENOUS at 05:02

## 2022-01-01 RX ADMIN — PANTOPRAZOLE SODIUM 40 MG: 40 TABLET, DELAYED RELEASE ORAL at 10:01

## 2022-01-01 RX ADMIN — HYDROXYZINE HYDROCHLORIDE 25 MG: 25 TABLET ORAL at 11:01

## 2022-01-01 RX ADMIN — THERA TABS 1 TABLET: TAB at 09:01

## 2022-01-01 RX ADMIN — GABAPENTIN 400 MG: 400 CAPSULE ORAL at 03:01

## 2022-01-01 RX ADMIN — GUAIFENESIN AND DEXTROMETHORPHAN 10 ML: 100; 10 SYRUP ORAL at 05:01

## 2022-01-01 RX ADMIN — GUAIFENESIN AND DEXTROMETHORPHAN 10 ML: 100; 10 SYRUP ORAL at 01:01

## 2022-01-01 RX ADMIN — DEXMEDETOMIDINE HYDROCHLORIDE 0.8 MCG/KG/HR: 4 INJECTION, SOLUTION INTRAVENOUS at 10:02

## 2022-01-01 RX ADMIN — IPRATROPIUM BROMIDE AND ALBUTEROL SULFATE 3 ML: 2.5; .5 SOLUTION RESPIRATORY (INHALATION) at 11:02

## 2022-01-01 RX ADMIN — LOSARTAN POTASSIUM AND HYDROCHLOROTHIAZIDE 1 TABLET: 12.5; 1 TABLET ORAL at 10:01

## 2022-01-01 RX ADMIN — PREDNISONE 40 MG: 20 TABLET ORAL at 09:01

## 2022-01-01 RX ADMIN — INSULIN ASPART 2 UNITS: 100 INJECTION, SOLUTION INTRAVENOUS; SUBCUTANEOUS at 04:01

## 2022-01-01 RX ADMIN — ENOXAPARIN SODIUM 40 MG: 100 INJECTION SUBCUTANEOUS at 04:02

## 2022-01-01 RX ADMIN — MORPHINE SULFATE 2 MG: 2 INJECTION, SOLUTION INTRAMUSCULAR; INTRAVENOUS at 09:02

## 2022-01-01 RX ADMIN — SODIUM CHLORIDE 30 MG/ML INHALATION SOLUTION 4 ML: 30 SOLUTION INHALANT at 07:01

## 2022-01-01 RX ADMIN — IPRATROPIUM BROMIDE AND ALBUTEROL SULFATE 3 ML: 2.5; .5 SOLUTION RESPIRATORY (INHALATION) at 08:02

## 2022-01-01 RX ADMIN — LORAZEPAM 2 MG: 2 INJECTION INTRAMUSCULAR at 03:02

## 2022-01-01 RX ADMIN — FLUTICASONE FUROATE AND VILANTEROL TRIFENATATE 1 PUFF: 100; 25 POWDER RESPIRATORY (INHALATION) at 09:02

## 2022-01-01 RX ADMIN — GABAPENTIN 400 MG: 400 CAPSULE ORAL at 08:01

## 2022-01-01 RX ADMIN — ENOXAPARIN SODIUM 40 MG: 100 INJECTION SUBCUTANEOUS at 05:01

## 2022-01-01 RX ADMIN — SULFAMETHOXAZOLE AND TRIMETHOPRIM 1 TABLET: 800; 160 TABLET ORAL at 10:01

## 2022-01-01 RX ADMIN — VANCOMYCIN HYDROCHLORIDE 1000 MG: 1 INJECTION, POWDER, LYOPHILIZED, FOR SOLUTION INTRAVENOUS at 01:02

## 2022-01-01 RX ADMIN — LOSARTAN POTASSIUM AND HYDROCHLOROTHIAZIDE 1 TABLET: 12.5; 1 TABLET ORAL at 08:01

## 2022-01-01 RX ADMIN — DIAZEPAM 5 MG: 5 TABLET ORAL at 08:01

## 2022-01-01 RX ADMIN — REMDESIVIR 100 MG: 100 INJECTION, POWDER, LYOPHILIZED, FOR SOLUTION INTRAVENOUS at 08:01

## 2022-01-01 RX ADMIN — IPRATROPIUM BROMIDE AND ALBUTEROL SULFATE 3 ML: .5; 2.5 SOLUTION RESPIRATORY (INHALATION) at 12:02

## 2022-01-01 RX ADMIN — IPRATROPIUM BROMIDE AND ALBUTEROL SULFATE 3 ML: 2.5; .5 SOLUTION RESPIRATORY (INHALATION) at 02:01

## 2022-01-01 RX ADMIN — AMLODIPINE BESYLATE 5 MG: 5 TABLET ORAL at 10:02

## 2022-01-01 RX ADMIN — CETIRIZINE HYDROCHLORIDE 10 MG: 10 TABLET, FILM COATED ORAL at 08:01

## 2022-01-01 RX ADMIN — LORAZEPAM 0.5 MG: 2 INJECTION INTRAMUSCULAR; INTRAVENOUS at 07:02

## 2022-01-01 RX ADMIN — TIOTROPIUM BROMIDE INHALATION SPRAY 2 PUFF: 1.56 SPRAY, METERED RESPIRATORY (INHALATION) at 08:01

## 2022-01-01 RX ADMIN — ENOXAPARIN SODIUM 40 MG: 100 INJECTION SUBCUTANEOUS at 06:02

## 2022-01-01 RX ADMIN — BENZONATATE 100 MG: 100 CAPSULE ORAL at 09:01

## 2022-01-01 RX ADMIN — GABAPENTIN 100 MG: 100 CAPSULE ORAL at 09:01

## 2022-01-01 RX ADMIN — DIAZEPAM 5 MG: 5 TABLET ORAL at 09:01

## 2022-01-01 RX ADMIN — DEXMEDETOMIDINE HYDROCHLORIDE 1 MCG/KG/HR: 4 INJECTION, SOLUTION INTRAVENOUS at 04:02

## 2022-01-01 RX ADMIN — ATOVAQUONE 1500 MG: 750 SUSPENSION ORAL at 03:02

## 2022-01-01 RX ADMIN — PIPERACILLIN AND TAZOBACTAM 4.5 G: 4; .5 INJECTION, POWDER, LYOPHILIZED, FOR SOLUTION INTRAVENOUS; PARENTERAL at 09:02

## 2022-01-01 RX ADMIN — CETIRIZINE HYDROCHLORIDE 10 MG: 10 TABLET, FILM COATED ORAL at 10:02

## 2022-01-01 RX ADMIN — ONDANSETRON 8 MG: 8 TABLET, ORALLY DISINTEGRATING ORAL at 01:01

## 2022-01-01 RX ADMIN — SULFAMETHOXAZOLE AND TRIMETHOPRIM 1 TABLET: 800; 160 TABLET ORAL at 08:01

## 2022-01-01 RX ADMIN — PIPERACILLIN AND TAZOBACTAM 4.5 G: 4; .5 INJECTION, POWDER, LYOPHILIZED, FOR SOLUTION INTRAVENOUS; PARENTERAL at 04:02

## 2022-01-01 RX ADMIN — BUPROPION HYDROCHLORIDE 150 MG: 150 TABLET, FILM COATED, EXTENDED RELEASE ORAL at 09:01

## 2022-01-01 RX ADMIN — DICLOFENAC SODIUM 2 G: 10 GEL TOPICAL at 10:01

## 2022-01-01 RX ADMIN — BUPROPION HYDROCHLORIDE 150 MG: 150 TABLET, FILM COATED, EXTENDED RELEASE ORAL at 08:01

## 2022-01-01 RX ADMIN — PIPERACILLIN AND TAZOBACTAM 4.5 G: 4; .5 INJECTION, POWDER, LYOPHILIZED, FOR SOLUTION INTRAVENOUS; PARENTERAL at 02:02

## 2022-01-01 RX ADMIN — DOCUSATE SODIUM 50 MG AND SENNOSIDES 8.6 MG 1 TABLET: 8.6; 5 TABLET, FILM COATED ORAL at 10:02

## 2022-01-01 RX ADMIN — PREDNISONE 60 MG: 50 TABLET ORAL at 09:01

## 2022-01-01 RX ADMIN — REMDESIVIR 200 MG: 100 INJECTION, POWDER, LYOPHILIZED, FOR SOLUTION INTRAVENOUS at 05:01

## 2022-01-01 RX ADMIN — MORPHINE SULFATE 2 MG: 2 INJECTION, SOLUTION INTRAMUSCULAR; INTRAVENOUS at 03:02

## 2022-01-01 RX ADMIN — MORPHINE SULFATE 2 MG: 2 INJECTION, SOLUTION INTRAMUSCULAR; INTRAVENOUS at 10:02

## 2022-01-01 RX ADMIN — DEXMEDETOMIDINE HYDROCHLORIDE 1 MCG/KG/HR: 4 INJECTION, SOLUTION INTRAVENOUS at 01:02

## 2022-01-01 RX ADMIN — DEXMEDETOMIDINE HYDROCHLORIDE 1.2 MCG/KG/HR: 4 INJECTION, SOLUTION INTRAVENOUS at 01:02

## 2022-01-01 RX ADMIN — GUAIFENESIN 600 MG: 600 TABLET, EXTENDED RELEASE ORAL at 10:01

## 2022-01-01 RX ADMIN — LORAZEPAM 1 MG/HR: 2 INJECTION INTRAMUSCULAR; INTRAVENOUS at 05:02

## 2022-01-01 RX ADMIN — TRAZODONE HYDROCHLORIDE 25 MG: 50 TABLET ORAL at 10:01

## 2022-01-01 RX ADMIN — GABAPENTIN 400 MG: 400 CAPSULE ORAL at 09:02

## 2022-01-01 RX ADMIN — DEXMEDETOMIDINE HYDROCHLORIDE 1.4 MCG/KG/HR: 4 INJECTION, SOLUTION INTRAVENOUS at 09:02

## 2022-01-01 RX ADMIN — TIOTROPIUM BROMIDE INHALATION SPRAY 2 PUFF: 3.12 SPRAY, METERED RESPIRATORY (INHALATION) at 08:01

## 2022-01-01 RX ADMIN — FLUTICASONE FUROATE AND VILANTEROL TRIFENATATE 1 PUFF: 100; 25 POWDER RESPIRATORY (INHALATION) at 08:01

## 2022-01-01 RX ADMIN — ALBUTEROL SULFATE 2 PUFF: 108 INHALANT RESPIRATORY (INHALATION) at 08:01

## 2022-01-01 RX ADMIN — GABAPENTIN 400 MG: 400 CAPSULE ORAL at 08:02

## 2022-01-01 RX ADMIN — GABAPENTIN 400 MG: 400 CAPSULE ORAL at 04:01

## 2022-01-01 RX ADMIN — SENNOSIDES AND DOCUSATE SODIUM 1 TABLET: 50; 8.6 TABLET ORAL at 10:01

## 2022-01-01 RX ADMIN — ONDANSETRON 8 MG: 8 TABLET, ORALLY DISINTEGRATING ORAL at 08:01

## 2022-01-01 RX ADMIN — IPRATROPIUM BROMIDE AND ALBUTEROL SULFATE 3 ML: 2.5; .5 SOLUTION RESPIRATORY (INHALATION) at 03:02

## 2022-01-01 RX ADMIN — IOHEXOL 100 ML: 350 INJECTION, SOLUTION INTRAVENOUS at 10:02

## 2022-01-01 RX ADMIN — DEXAMETHASONE 6 MG: 4 TABLET ORAL at 04:01

## 2022-01-01 RX ADMIN — ATOVAQUONE 1500 MG: 750 SUSPENSION ORAL at 10:02

## 2022-01-01 RX ADMIN — PROMETHAZINE HYDROCHLORIDE AND CODEINE PHOSPHATE 5 ML: 10; 6.25 SOLUTION ORAL at 09:01

## 2022-01-01 RX ADMIN — MORPHINE SULFATE 2 MG: 2 INJECTION, SOLUTION INTRAMUSCULAR; INTRAVENOUS at 11:02

## 2022-01-01 RX ADMIN — VANCOMYCIN HYDROCHLORIDE 1500 MG: 1.5 INJECTION, POWDER, LYOPHILIZED, FOR SOLUTION INTRAVENOUS at 11:02

## 2022-01-01 RX ADMIN — INSULIN ASPART 2 UNITS: 100 INJECTION, SOLUTION INTRAVENOUS; SUBCUTANEOUS at 05:01

## 2022-01-01 RX ADMIN — PIPERACILLIN AND TAZOBACTAM 4.5 G: 4; .5 INJECTION, POWDER, LYOPHILIZED, FOR SOLUTION INTRAVENOUS; PARENTERAL at 12:02

## 2022-01-01 RX ADMIN — REMDESIVIR 100 MG: 100 INJECTION, POWDER, LYOPHILIZED, FOR SOLUTION INTRAVENOUS at 10:01

## 2022-01-01 RX ADMIN — DEXMEDETOMIDINE HYDROCHLORIDE 1.1 MCG/KG/HR: 4 INJECTION, SOLUTION INTRAVENOUS at 05:02

## 2022-01-01 RX ADMIN — LORAZEPAM 1 MG: 2 INJECTION INTRAMUSCULAR; INTRAVENOUS at 10:02

## 2022-01-01 RX ADMIN — PREDNISONE 60 MG: 50 TABLET ORAL at 08:01

## 2022-01-01 RX ADMIN — OXYCODONE HYDROCHLORIDE AND ACETAMINOPHEN 500 MG: 500 TABLET ORAL at 10:01

## 2022-01-01 RX ADMIN — SODIUM CHLORIDE 400 MG: 9 INJECTION, SOLUTION INTRAVENOUS at 09:02

## 2022-01-01 RX ADMIN — HUMAN ALBUMIN MICROSPHERES AND PERFLUTREN 0.66 MG: 10; .22 INJECTION, SOLUTION INTRAVENOUS at 01:02

## 2022-01-01 RX ADMIN — ALBUTEROL SULFATE 2 PUFF: 108 INHALANT RESPIRATORY (INHALATION) at 02:01

## 2022-01-01 RX ADMIN — FLUTICASONE FUROATE AND VILANTEROL TRIFENATATE 1 PUFF: 100; 25 POWDER RESPIRATORY (INHALATION) at 09:01

## 2022-01-01 RX ADMIN — PIPERACILLIN AND TAZOBACTAM 4.5 G: 4; .5 INJECTION, POWDER, LYOPHILIZED, FOR SOLUTION INTRAVENOUS; PARENTERAL at 01:02

## 2022-01-01 RX ADMIN — MUPIROCIN: 20 OINTMENT TOPICAL at 10:01

## 2022-01-01 RX ADMIN — MELATONIN TAB 3 MG 6 MG: 3 TAB at 09:01

## 2022-01-01 RX ADMIN — PANTOPRAZOLE SODIUM 40 MG: 40 INJECTION, POWDER, FOR SOLUTION INTRAVENOUS at 08:02

## 2022-01-01 RX ADMIN — GABAPENTIN 400 MG: 400 CAPSULE ORAL at 10:02

## 2022-01-01 RX ADMIN — REMDESIVIR 100 MG: 100 INJECTION, POWDER, LYOPHILIZED, FOR SOLUTION INTRAVENOUS at 09:01

## 2022-01-01 RX ADMIN — MORPHINE SULFATE 2 MG: 2 INJECTION, SOLUTION INTRAMUSCULAR; INTRAVENOUS at 04:02

## 2022-01-01 RX ADMIN — GABAPENTIN 400 MG: 400 CAPSULE ORAL at 10:01

## 2022-01-01 RX ADMIN — SULFAMETHOXAZOLE AND TRIMETHOPRIM 1 TABLET: 800; 160 TABLET ORAL at 12:01

## 2022-01-01 RX ADMIN — INSULIN ASPART 3 UNITS: 100 INJECTION, SOLUTION INTRAVENOUS; SUBCUTANEOUS at 05:01

## 2022-01-01 RX ADMIN — FUROSEMIDE 40 MG: 10 INJECTION, SOLUTION INTRAMUSCULAR; INTRAVENOUS at 06:02

## 2022-01-01 RX ADMIN — IPRATROPIUM BROMIDE AND ALBUTEROL SULFATE 3 ML: .5; 3 SOLUTION RESPIRATORY (INHALATION) at 01:01

## 2022-01-01 RX ADMIN — SODIUM CHLORIDE: 9 INJECTION, SOLUTION INTRAVENOUS at 09:02

## 2022-01-01 RX ADMIN — IPRATROPIUM BROMIDE AND ALBUTEROL SULFATE 3 ML: 2.5; .5 SOLUTION RESPIRATORY (INHALATION) at 05:01

## 2022-01-01 RX ADMIN — SENNOSIDES AND DOCUSATE SODIUM 1 TABLET: 50; 8.6 TABLET ORAL at 08:01

## 2022-01-01 RX ADMIN — TIOTROPIUM BROMIDE INHALATION SPRAY 2 PUFF: 3.12 SPRAY, METERED RESPIRATORY (INHALATION) at 09:01

## 2022-01-01 RX ADMIN — GABAPENTIN 100 MG: 100 CAPSULE ORAL at 08:01

## 2022-01-01 RX ADMIN — ALBUTEROL SULFATE 6 PUFF: 108 INHALANT RESPIRATORY (INHALATION) at 02:01

## 2022-01-01 RX ADMIN — LORAZEPAM 0.5 MG/HR: 2 INJECTION INTRAMUSCULAR; INTRAVENOUS at 10:02

## 2022-01-01 RX ADMIN — METHYLPREDNISOLONE SODIUM SUCCINATE 125 MG: 125 INJECTION, POWDER, FOR SOLUTION INTRAMUSCULAR; INTRAVENOUS at 11:02

## 2022-01-01 RX ADMIN — IPRATROPIUM BROMIDE 2 PUFF: 17 AEROSOL, METERED RESPIRATORY (INHALATION) at 03:01

## 2022-01-01 RX ADMIN — POLYETHYLENE GLYCOL 3350 17 G: 17 POWDER, FOR SOLUTION ORAL at 08:01

## 2022-01-01 RX ADMIN — HYDROXYZINE HYDROCHLORIDE 25 MG: 25 TABLET ORAL at 09:01

## 2022-01-01 RX ADMIN — LORAZEPAM 1 MG: 2 INJECTION INTRAMUSCULAR; INTRAVENOUS at 11:02

## 2022-01-01 RX ADMIN — VANCOMYCIN HYDROCHLORIDE 1000 MG: 1 INJECTION, POWDER, LYOPHILIZED, FOR SOLUTION INTRAVENOUS at 10:02

## 2022-01-01 RX ADMIN — SENNOSIDES AND DOCUSATE SODIUM 1 TABLET: 50; 8.6 TABLET ORAL at 12:01

## 2022-01-01 RX ADMIN — LORAZEPAM 0.5 MG: 2 INJECTION INTRAMUSCULAR; INTRAVENOUS at 01:02

## 2022-01-01 RX ADMIN — VANCOMYCIN HYDROCHLORIDE 1000 MG: 1 INJECTION, POWDER, LYOPHILIZED, FOR SOLUTION INTRAVENOUS at 12:02

## 2022-01-01 RX ADMIN — IPRATROPIUM BROMIDE AND ALBUTEROL SULFATE 3 ML: 2.5; .5 SOLUTION RESPIRATORY (INHALATION) at 07:02

## 2022-01-01 RX ADMIN — IOHEXOL 75 ML: 350 INJECTION, SOLUTION INTRAVENOUS at 03:01

## 2022-01-01 RX ADMIN — LORAZEPAM 1 MG: 2 INJECTION INTRAMUSCULAR; INTRAVENOUS at 06:02

## 2022-01-01 RX ADMIN — GUAIFENESIN AND DEXTROMETHORPHAN 10 ML: 100; 10 SYRUP ORAL at 04:01

## 2022-01-01 RX ADMIN — HYDROXYZINE HYDROCHLORIDE 25 MG: 25 TABLET ORAL at 10:01

## 2022-01-01 RX ADMIN — DEXMEDETOMIDINE HYDROCHLORIDE 0.5 MCG/KG/HR: 4 INJECTION, SOLUTION INTRAVENOUS at 06:02

## 2022-01-01 RX ADMIN — PROMETHAZINE HYDROCHLORIDE AND CODEINE PHOSPHATE 5 ML: 10; 6.25 SOLUTION ORAL at 12:01

## 2022-01-01 RX ADMIN — MORPHINE SULFATE 2 MG: 2 INJECTION, SOLUTION INTRAMUSCULAR; INTRAVENOUS at 06:02

## 2022-01-01 RX ADMIN — Medication 400 MG: at 08:01

## 2022-01-01 RX ADMIN — IPRATROPIUM BROMIDE AND ALBUTEROL SULFATE 3 ML: 2.5; .5 SOLUTION RESPIRATORY (INHALATION) at 04:02

## 2022-01-01 RX ADMIN — FUROSEMIDE 60 MG: 10 INJECTION, SOLUTION INTRAVENOUS at 10:02

## 2022-01-01 RX ADMIN — VANCOMYCIN HYDROCHLORIDE 1750 MG: 10 INJECTION, POWDER, LYOPHILIZED, FOR SOLUTION INTRAVENOUS at 02:02

## 2022-01-01 RX ADMIN — PREDNISONE 60 MG: 50 TABLET ORAL at 10:01

## 2022-01-01 RX ADMIN — CETIRIZINE HYDROCHLORIDE 10 MG: 10 TABLET, FILM COATED ORAL at 10:01

## 2022-01-01 RX ADMIN — GABAPENTIN 100 MG: 100 CAPSULE ORAL at 02:01

## 2022-01-01 RX ADMIN — HYDROXYZINE HYDROCHLORIDE 25 MG: 25 TABLET ORAL at 03:01

## 2022-01-01 RX ADMIN — MORPHINE SULFATE 1 MG/HR: 10 INJECTION, SOLUTION INTRAMUSCULAR; INTRAVENOUS at 10:02

## 2022-01-01 NOTE — ASSESSMENT & PLAN NOTE
Patient's acute on chronic hypoxemic respiratory failure after short admission with 5 day course of prednisone and antibiotics. Patient began to feel worse after completing her course of both. Continues to have diffuse wheezing and cough of clear sputum.      CT Chest largely unchanged from prior, but now having had LLL ground glass without focal lobular opacification and prior RUL mass which distorts vascular anatomy largely unchanged.     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     Feel her presentation is COPD exacerbation in addition to PDL1 pneumonitis (as it appears her ground glass infiltrates on CT resemble a pattern of a radiation beam and PDL1i can cause pneumonitis in that same distribution)    Recommendations  - LAMA/LABA/ICS while inpatient  - schedule duonebs q6h for 1 day and then PRN for wheezing   - discharge with albuterol/duonebs PRN   - Patient with worsening hypoxia. 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 Continue to monitor. May ultimately need to discontinue pembro.   - O2 requirements improved, changed to prednisone 60mg PO starting 1/1  - S/p levaquin for 5 days   - Agree with increasing frequency of duonebs to q4h, wheezing improved 1/1  - Fungitell pending

## 2022-01-01 NOTE — SUBJECTIVE & OBJECTIVE
Interval History: Patient had just taken of oxygen and per RT was found with SpO2 of 69%, recovered back to 92% with 4L NC. Discussed with patient that she must keep oxygen on at all times. Feels a little better, started on pred 60 PO daily today    Objective:     Vital Signs (Most Recent):  Temp: 98.3 °F (36.8 °C) (01/01/22 1117)  Pulse: 86 (01/01/22 1234)  Resp: (!) 24 (01/01/22 1234)  BP: 124/73 (01/01/22 1117)  SpO2: (!) 94 % (01/01/22 1234) Vital Signs (24h Range):  Temp:  [95.1 °F (35.1 °C)-98.7 °F (37.1 °C)] 98.3 °F (36.8 °C)  Pulse:  [77-96] 86  Resp:  [16-24] 24  SpO2:  [86 %-99 %] 94 %  BP: (115-145)/(70-85) 124/73     Weight: 87 kg (191 lb 12.8 oz)  Body mass index is 30.96 kg/m².      Intake/Output Summary (Last 24 hours) at 1/1/2022 1301  Last data filed at 12/31/2021 2155  Gross per 24 hour   Intake 240 ml   Output --   Net 240 ml       Physical Exam  Vitals and nursing note reviewed.   Constitutional:       Appearance: She is well-developed. She is obese. She is not ill-appearing, toxic-appearing or diaphoretic.   HENT:      Head: Normocephalic and atraumatic.   Eyes:      Conjunctiva/sclera: Conjunctivae normal.      Pupils: Pupils are equal, round, and reactive to light.   Cardiovascular:      Rate and Rhythm: Normal rate and regular rhythm.      Heart sounds: Normal heart sounds.   Pulmonary:      Effort: Pulmonary effort is normal. No respiratory distress.      Breath sounds: Wheezing present. No rales.      Comments: Significant diffuse wheezing  Abdominal:      General: Bowel sounds are normal.      Palpations: Abdomen is soft.      Tenderness: There is no abdominal tenderness.   Musculoskeletal:      Right lower leg: No edema.      Left lower leg: No edema.   Neurological:      Mental Status: She is alert and oriented to person, place, and time.   Psychiatric:         Behavior: Behavior normal.         Thought Content: Thought content normal.         Judgment: Judgment normal.          Vents:  Oxygen Concentration (%): 60 (01/01/22 0139)    Lines/Drains/Airways     Peripheral Intravenous Line                 Peripheral IV - Single Lumen 12/26/21 2135 18 G Right Wrist 5 days                Significant Labs:    CBC/Anemia Profile:  Recent Labs   Lab 12/31/21 0326 01/01/22  0512   WBC 18.98* 17.31*   HGB 10.7* 10.0*   HCT 33.8* 33.0*   * 425   MCV 80* 83   RDW 14.6* 14.8*        Chemistries:  Recent Labs   Lab 12/31/21 0326 01/01/22  0512    136   K 4.4 4.7    98   CO2 25 29   BUN 19 23   CREATININE 1.0 1.1   CALCIUM 9.8 9.7   ALBUMIN 2.7* 2.6*   PROT 7.3 6.9   BILITOT 0.2 0.2   ALKPHOS 94 90   ALT 12 11   AST 11 13   MG 2.1 2.0   PHOS 3.9 4.1       All pertinent labs within the past 24 hours have been reviewed.    Significant Imaging:  I have reviewed all pertinent imaging results/findings within the past 24 hours.

## 2022-01-01 NOTE — ASSESSMENT & PLAN NOTE
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 Recently admitted 12/14-12/16 for pneumonia and COPD exacerbation. In the ED, her O2 sat was initially 78% and she was placed on bipap. She was able to be weaned to 6L NC satting 96%. Labs significant for WBC 16.65, but recently on steroids, and lactate 2.4. CXR with progression of airspace disease in the left hemithorax, stable appearance of airspace disease in the right hemithorax. CTA chest concerning for occlusion of RUL pulmonary artery branch, intraluminal infiltration or tumor thrombus vs thromboembolus. Pulmonology consulted by ED, do not think anticoagulation is warranted. She reports some improvement in symptoms while in ED. Admitted to hospital medicine for management of COPD exacerbation.   -Lower extremity ultrasound negative for DVT. TTE normal with EF of 60%.  -Hem/Onc consulted as suspicion for Keytruda induced pneumonitis, but per evaluation: ground glass opacities primarily in the lower lobes and is not entirely consistent with immunotherapy related pneumonitis (especially as she has already improved without steroids)  -Pulm consulted: high suspicion for  COPD exacerbation in addition to PDL1 pneumonitis , recommended holding keytruda and administer solumedrol 80mg IV daily, duonebs + levofloxacin x 5 days, procalcitonin and RIP    Microbiology Results (last 7 days)     Procedure Component Value Units Date/Time    Blood culture x two cultures. Draw prior to antibiotics. [[id number]] Collected: 12/26/21 2304    Order Status: Completed Specimen: Blood from Peripheral, Hand, Left Updated: 01/01/22 0612     Blood Culture, Routine No growth after 5 days.    Narrative:      Aerobic and anaerobic    Blood culture x two cultures. Draw prior to antibiotics. [[id number]] Collected: 12/26/21 6430     Order Status: Completed Specimen: Blood from Peripheral, Hand, Right Updated: 01/01/22 0612     Blood Culture, Routine No growth after 5 days.    Narrative:      Aerobic and anaerobic    Respiratory Infection Panel (PCR), Nasopharyngeal [[id number]] Collected: 12/29/21 1000    Order Status: Completed Specimen: Nasopharyngeal Swab Updated: 12/29/21 1612     Respiratory Infection Panel Source NP Swab     Adenovirus Not Detected     Coronavirus 229E, Common Cold Virus Not Detected     Coronavirus HKU1, Common Cold Virus Not Detected     Coronavirus NL63, Common Cold Virus Not Detected     Coronavirus OC43, Common Cold Virus Not Detected     Comment: The Coronavirus strains detected in this test cause the common cold.  These strains are not the COVID-19 (novel Coronavirus)strain   associated with the respiratory disease outbreak.          Human Metapneumovirus Not Detected     Human Rhinovirus/Enterovirus Not Detected     Influenza A (subtypes H1, H1-2009,H3) Not Detected     Influenza B Not Detected     Parainfluenza Virus 1 Not Detected     Parainfluenza Virus 2 Not Detected     Parainfluenza Virus 3 Not Detected     Parainfluenza Virus 4 Not Detected     Respiratory Syncytial Virus Not Detected     Bordetella Parapertussis (RU9430) Not Detected     Bordetella pertussis (ptxP) Not Detected     Chlamydia pneumoniae Not Detected     Mycoplasma pneumoniae Not Detected    Narrative:      For all other respiratory sources, order [id number] -  Respiratory Viral Panel by PCR    Respiratory Infection Panel (PCR), Nasopharyngeal [[id number]] Collected: 12/29/21 1000    Order Status: Completed Specimen: Nasopharyngeal Swab Updated: 12/29/21 1045     Respiratory Infection Panel Source NP Swab    Narrative:      For all other respiratory sources, order [id number] -  Respiratory Viral Panel by PCR    Respiratory Infection Panel (PCR), Nasopharyngeal [[id number]] Collected: 12/27/21 1725    Order Status: Canceled Specimen: Nasopharyngeal  Swab           ABGs:   Recent Labs   Lab 12/31/21  1224   PH 7.351   PCO2 62.1*   PO2 26*   HCO3 34.4*   POCSATURATED 43*   BE 9       -Respiratory status improved from admission, but not showing improvement despite 4 days of steroid therapy.     PLAN  -VBG showed hypoxia, and hypercarbia, BiPap overnight  - symbicort not on formulary, will continue with breo inhaler   - Per pulm consult, for severe COPD exacerbation:   -duo nebs scheduled    -levofloxacin 5 day course completed   -80mg methlyprednisolone x5 day course completed, switched to prednisone 60mg PO starting 1/1  -Per Hem/Onc next dose of immunotherapy is not due until Feb 1st so will defer decision on whether to continue current treatment plan to her outpatient oncologist.   - continue phenergan-codeine prn for cough  - wean O2 as tolerated

## 2022-01-01 NOTE — PROGRESS NOTES
[facility] Medicine  Progress Note    Patient Name: Janeth Boyce  MRN: [id number]  Patient Class: IP- Inpatient   Admission Date: 12/26/2021  Length of Stay: 5 days  Attending Physician: Kelly Schwab MD  Primary Care Provider: Freeman Caballero MD        Subjective:     Principal Problem:COPD exacerbation        HPI:  Janeth Boyce is a 60 y/o F with a PMH of COPD on home oxygen 2 L nasal cannula, stage IV small cell adenocarcinoma of the lung with Mets to right cerebellum (s/p palliative XRT, on pembrolizumab), malignant pleural effusion who presents with SOB. She was recently admitted 12/14-12/16 for pneumonia and COPD exacerbation. She states that the SOB of began two days ago and acutely worsened today. She reports an associated cough that is productive with white sputum. She states that she has CP and incontinence associated with the cough. She also reports PRITCHETT, stating that she cannot walk to the bathroom without getting significantly SOB. She reports chills but has not checked her temperature. She had one episode of diarrhea today, denies nausea/vomiting.     In the ED, her O2 sat was initially 78% and she was placed on bipap. She was able to be weaned to 6L NC satting 96%. Afebrile and /78. Labs significant for WBC 16.65, lactate 2.4. Trop and BNP normal. UA unremarkable. CXR with progression of airspace disease in the left hemithorax, stable appearance of airspace disease in the right hemithorax. CTA chest concerning for occlusion of RUL pulmonary artery branch, intraluminal infiltration or tumor thrombus vs thromboembolus. Pulmonology consulted by ED, do not think anticoagulation is warranted and agree with treatment as COPD exacerbation. Admitted to hospital medicine for further management.       Overview/Hospital Course:  Patient admitted to hospital medicine for evaluation of dyspnea. CT Chest notable for new nonspecific 2.4 cm ground-glass opacity within the  left lower lobe. Lower extremity ultrasound negative for DVT. TTE normal with EF of 60%. Hem/Onc consulted as suspicion for Keytruda induced pneumonitis, but per evaluation: ground glass opacities primarily in the lower lobes and is not entirely consistent with immunotherapy related pneumonitis (especially as she has already improved without steroids). Pulm consulted: high suspicion for  COPD exacerbation in addition to PDL1 pneumonitis , recommended holding keytruda and administer solumedrol 80mg IV daily, duonebs + levofloxacin x 5 days. Some improvement in respiratory status over course of hospitalization, increased Duonebs frequency to q4h. Trial of BiPap overnight on 01/01 improved respiratory requirements. Transitioned steroids to Prednisone 60mg PO. Fungitel assay ordered.      Interval History: Patient seen and examined. No acute events overnight, afebrile, vital signs stable. Patient tolerated BiPap overnight and breathing on 4L via NC this morning. She reports feeling noticeable improvement in her breathing after overnight Bipap, and reports decreased dyspnea on exertion today.    Review of Systems   Constitutional: Positive for activity change and fatigue. Negative for appetite change, chills, diaphoresis, fever and unexpected weight change.   HENT: Positive for voice change. Negative for congestion and rhinorrhea.    Eyes: Negative.    Respiratory: Positive for cough, shortness of breath and wheezing. Negative for chest tightness.    Cardiovascular: Negative for chest pain, palpitations and leg swelling.   Gastrointestinal: Negative for abdominal distention, nausea and vomiting.   Endocrine: Negative.    Genitourinary: Negative.    Musculoskeletal: Positive for arthralgias and back pain.   Skin: Negative.    Allergic/Immunologic: Negative.    Neurological: Negative for dizziness and numbness.   Psychiatric/Behavioral: Negative for agitation and confusion. The patient is not nervous/anxious (on Valium).       Objective:     Vital Signs (Most Recent):  Temp: 98.3 °F (36.8 °C) (01/01/22 1117)  Pulse: 86 (01/01/22 1234)  Resp: (!) 24 (01/01/22 1234)  BP: 124/73 (01/01/22 1117)  SpO2: (!) 94 % (01/01/22 1234) Vital Signs (24h Range):  Temp:  [95.1 °F (35.1 °C)-98.7 °F (37.1 °C)] 98.3 °F (36.8 °C)  Pulse:  [77-96] 86  Resp:  [16-24] 24  SpO2:  [86 %-99 %] 94 %  BP: (115-145)/(70-85) 124/73     Weight: 87 kg (191 lb 12.8 oz)  Body mass index is 30.96 kg/m².    Intake/Output Summary (Last 24 hours) at 1/1/2022 1541  Last data filed at 12/31/2021 2155  Gross per 24 hour   Intake 240 ml   Output --   Net 240 ml      Physical Exam  Vitals and nursing note reviewed.   Constitutional:       General: She is not in acute distress.     Appearance: Normal appearance. She is not ill-appearing, toxic-appearing or diaphoretic.   HENT:      Head: Normocephalic and atraumatic.      Mouth/Throat:      Mouth: Mucous membranes are moist.   Eyes:      General: No scleral icterus.        Right eye: No discharge.         Left eye: No discharge.   Cardiovascular:      Rate and Rhythm: Normal rate and regular rhythm.      Pulses: Normal pulses.      Heart sounds: Normal heart sounds.   Pulmonary:      Effort: Pulmonary effort is normal. No respiratory distress.      Breath sounds: Wheezing present. No rhonchi or rales.   Abdominal:      General: Abdomen is flat. Bowel sounds are normal. There is no distension.   Musculoskeletal:         General: No swelling.      Cervical back: Normal range of motion.      Right lower leg: No edema.      Left lower leg: No edema.   Skin:     General: Skin is warm and dry.      Capillary Refill: Capillary refill takes less than 2 seconds.      Coloration: Skin is not jaundiced.   Neurological:      General: No focal deficit present.      Mental Status: She is alert and oriented to person, place, and time. Mental status is at baseline.         Significant Labs:   All pertinent labs within the past 24 hours have  been reviewed.  A1C: No results for input(s): HGBA1C in the last 4320 hours.  ABGs:   Recent Labs   Lab 12/31/21  1224   PH 7.351   PCO2 62.1*   HCO3 34.4*   POCSATURATED 43*   BE 9   PO2 26*     CBC:   Recent Labs   Lab 12/31/21 0326 01/01/22  0512   WBC 18.98* 17.31*   HGB 10.7* 10.0*   HCT 33.8* 33.0*   * 425     CMP:   Recent Labs   Lab 12/31/21 0326 01/01/22  0512    136   K 4.4 4.7    98   CO2 25 29   GLU 95 111*   BUN 19 23   CREATININE 1.0 1.1   CALCIUM 9.8 9.7   PROT 7.3 6.9   ALBUMIN 2.7* 2.6*   BILITOT 0.2 0.2   ALKPHOS 94 90   AST 11 13   ALT 12 11   ANIONGAP 12 9   EGFRNONAA >60.0 53.9*     Magnesium:   Recent Labs   Lab 12/31/21 0326 01/01/22  0512   MG 2.1 2.0     POCT Glucose:   Recent Labs   Lab 12/31/21  2035 01/01/22  0800 01/01/22  1118   POCTGLUCOSE 187* 120* 131*       Significant Imaging: I have reviewed all pertinent imaging results/findings within the past 24 hours.      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 Recently admitted 12/14-12/16 for pneumonia and COPD exacerbation. In the ED, her O2 sat was initially 78% and she was placed on bipap. She was able to be weaned to 6L NC satting 96%. Labs significant for WBC 16.65, but recently on steroids, and lactate 2.4. CXR with progression of airspace disease in the left hemithorax, stable appearance of airspace disease in the right hemithorax. CTA chest concerning for occlusion of RUL pulmonary artery branch, intraluminal infiltration or tumor thrombus vs thromboembolus. Pulmonology consulted by ED, do not think anticoagulation is warranted. She reports some improvement in symptoms while in ED. Admitted to hospital medicine for management of COPD exacerbation.   -Lower extremity ultrasound negative for DVT.  TTE normal with EF of 60%.  -Hem/Onc consulted as suspicion for Keytruda induced pneumonitis, but per evaluation: ground glass opacities primarily in the lower lobes and is not entirely consistent with immunotherapy related pneumonitis (especially as she has already improved without steroids)  -Pulm consulted: high suspicion for  COPD exacerbation in addition to PDL1 pneumonitis , recommended holding keytruda and administer solumedrol 80mg IV daily, duonebs + levofloxacin x 5 days, procalcitonin and RIP    Microbiology Results (last 7 days)     Procedure Component Value Units Date/Time    Blood culture x two cultures. Draw prior to antibiotics. [[id number]] Collected: 12/26/21 2304    Order Status: Completed Specimen: Blood from Peripheral, Hand, Left Updated: 01/01/22 0612     Blood Culture, Routine No growth after 5 days.    Narrative:      Aerobic and anaerobic    Blood culture x two cultures. Draw prior to antibiotics. [[id number]] Collected: 12/26/21 2135    Order Status: Completed Specimen: Blood from Peripheral, Hand, Right Updated: 01/01/22 0612     Blood Culture, Routine No growth after 5 days.    Narrative:      Aerobic and anaerobic    Respiratory Infection Panel (PCR), Nasopharyngeal [[id number]] Collected: 12/29/21 1000    Order Status: Completed Specimen: Nasopharyngeal Swab Updated: 12/29/21 1612     Respiratory Infection Panel Source NP Swab     Adenovirus Not Detected     Coronavirus 229E, Common Cold Virus Not Detected     Coronavirus HKU1, Common Cold Virus Not Detected     Coronavirus NL63, Common Cold Virus Not Detected     Coronavirus OC43, Common Cold Virus Not Detected     Comment: The Coronavirus strains detected in this test cause the common cold.  These strains are not the COVID-19 (novel Coronavirus)strain   associated with the respiratory disease outbreak.          Human Metapneumovirus Not Detected     Human Rhinovirus/Enterovirus Not Detected     Influenza A (subtypes H1, H1-2009,H3) Not  "Detected     Influenza B Not Detected     Parainfluenza Virus 1 Not Detected     Parainfluenza Virus 2 Not Detected     Parainfluenza Virus 3 Not Detected     Parainfluenza Virus 4 Not Detected     Respiratory Syncytial Virus Not Detected     Bordetella Parapertussis (QG0084) Not Detected     Bordetella pertussis (ptxP) Not Detected     Chlamydia pneumoniae Not Detected     Mycoplasma pneumoniae Not Detected    Narrative:      For all other respiratory sources, order [id number] -  Respiratory Viral Panel by PCR    Respiratory Infection Panel (PCR), Nasopharyngeal [[id number]] Collected: 12/29/21 1000    Order Status: Completed Specimen: Nasopharyngeal Swab Updated: 12/29/21 1045     Respiratory Infection Panel Source NP Swab    Narrative:      For all other respiratory sources, order [id number] -  Respiratory Viral Panel by PCR    Respiratory Infection Panel (PCR), Nasopharyngeal [[id number]] Collected: 12/27/21 1725    Order Status: Canceled Specimen: Nasopharyngeal Swab           ABGs:   Recent Labs   Lab 12/31/21  1224   PH 7.351   PCO2 62.1*   PO2 26*   HCO3 34.4*   POCSATURATED 43*   BE 9       -Respiratory status improved from admission, but not showing improvement despite 4 days of steroid therapy.     PLAN  -VBG showed hypoxia, and hypercarbia, BiPap overnight  - symbicort not on formulary, will continue with breo inhaler   - Per pulm consult, for severe COPD exacerbation:   -duo nebs scheduled    -levofloxacin 5 day course completed   -80mg methlyprednisolone x5 day course completed, switched to prednisone 60mg PO starting 1/1  -Per Hem/Onc next dose of immunotherapy is not due until Feb 1st so will defer decision on whether to continue current treatment plan to her outpatient oncologist.   - continue phenergan-codeine prn for cough  - wean O2 as tolerated         Pneumonitis  See "COPD exacerbation" A&P      Acute on chronic respiratory failure with hypoxemia  See "COPD exacerbation" A&P    Chronic respiratory " failure with hypoxia  Patient with Hypoxic Respiratory failure which is Acute on chronic.  she is on home oxygen at 2 LPM. Supplemental oxygen was provided and noted- Oxygen Concentration (%):  [50-60] 50.   Signs/symptoms of respiratory failure include- wheezing. Contributing diagnoses includes - COPD Labs and images were reviewed. Patient Has not had a recent ABG. Will treat underlying causes and adjust management of respiratory failure as follows-      See COPD exacerbation.     GERD (gastroesophageal reflux disease)  - protonix daily      Anxiety  -Continue Valium 5mg  - continue bupropion   - continue atarax prn     Adenocarcinoma of lung, stage 4, right  Follows with Dr. Peterson. S/p Carboplatin AUC4 x1 dose 5/27/20 and palliative radiation (5/27/2020 - 6/2/2020; 20 Gy to right lung). Also received SRS to right cerebellar metastasis 6/3/2020. Pembrolizumab started 6/4/2020, C23 on 12/21/2021.  - Hem Onc consulted by ED  - continue gabapentin  - continue tramadol prn       Essential hypertension  Normotensive on admission. On amlodipine and irbesartan-hydrochlorothiazide.      PLAN  - monitor BP  - restarted home meds on 12/31/21  -Home irbesartan-hydrochlorothiazide replaced with formulary alternative (losartan-hydrochlorothiazide 100-12.5 mg)    VTE Risk Mitigation (From admission, onward)         Ordered     enoxaparin injection 40 mg  Daily         12/27/21 0536     IP VTE HIGH RISK PATIENT  Once         12/27/21 0536     Place sequential compression device  Until discontinued         12/27/21 0536                Discharge Planning   TIFFANIE: 1/4/2022     Code Status: Full Code   Is the patient medically ready for discharge?:     Reason for patient still in hospital (select all that apply): Patient trending condition, Laboratory test and Treatment  Discharge Plan A: Home Health                  Terry Restrepo DO  Department of Hospital Medicine   [facility] Step[facility]

## 2022-01-01 NOTE — PLAN OF CARE
Plan of care discussed with pt. VSS. A&Ox4; x1 assist. Denies c/o of CP or SOB. Telemetry monitor showing NSR. BiPAP worn while asleep; otherwise 4L NC. Pt remains free of injury or falls. All questions addressed.

## 2022-01-01 NOTE — SUBJECTIVE & OBJECTIVE
Interval History: Patient seen and examined. No acute events overnight, afebrile, vital signs stable. Patient tolerated BiPap overnight and breathing on 4L via NC this morning. She reports feeling noticeable improvement in her breathing after overnight Bipap, and reports decreased dyspnea on exertion today.    Review of Systems   Constitutional: Positive for activity change and fatigue. Negative for appetite change, chills, diaphoresis, fever and unexpected weight change.   HENT: Positive for voice change. Negative for congestion and rhinorrhea.    Eyes: Negative.    Respiratory: Positive for cough, shortness of breath and wheezing. Negative for chest tightness.    Cardiovascular: Negative for chest pain, palpitations and leg swelling.   Gastrointestinal: Negative for abdominal distention, nausea and vomiting.   Endocrine: Negative.    Genitourinary: Negative.    Musculoskeletal: Positive for arthralgias and back pain.   Skin: Negative.    Allergic/Immunologic: Negative.    Neurological: Negative for dizziness and numbness.   Psychiatric/Behavioral: Negative for agitation and confusion. The patient is not nervous/anxious (on Valium).      Objective:     Vital Signs (Most Recent):  Temp: 98.3 °F (36.8 °C) (01/01/22 1117)  Pulse: 86 (01/01/22 1234)  Resp: (!) 24 (01/01/22 1234)  BP: 124/73 (01/01/22 1117)  SpO2: (!) 94 % (01/01/22 1234) Vital Signs (24h Range):  Temp:  [95.1 °F (35.1 °C)-98.7 °F (37.1 °C)] 98.3 °F (36.8 °C)  Pulse:  [77-96] 86  Resp:  [16-24] 24  SpO2:  [86 %-99 %] 94 %  BP: (115-145)/(70-85) 124/73     Weight: 87 kg (191 lb 12.8 oz)  Body mass index is 30.96 kg/m².    Intake/Output Summary (Last 24 hours) at 1/1/2022 1541  Last data filed at 12/31/2021 2155  Gross per 24 hour   Intake 240 ml   Output --   Net 240 ml      Physical Exam  Vitals and nursing note reviewed.   Constitutional:       General: She is not in acute distress.     Appearance: Normal appearance. She is not ill-appearing,  toxic-appearing or diaphoretic.   HENT:      Head: Normocephalic and atraumatic.      Mouth/Throat:      Mouth: Mucous membranes are moist.   Eyes:      General: No scleral icterus.        Right eye: No discharge.         Left eye: No discharge.   Cardiovascular:      Rate and Rhythm: Normal rate and regular rhythm.      Pulses: Normal pulses.      Heart sounds: Normal heart sounds.   Pulmonary:      Effort: Pulmonary effort is normal. No respiratory distress.      Breath sounds: Wheezing present. No rhonchi or rales.   Abdominal:      General: Abdomen is flat. Bowel sounds are normal. There is no distension.   Musculoskeletal:         General: No swelling.      Cervical back: Normal range of motion.      Right lower leg: No edema.      Left lower leg: No edema.   Skin:     General: Skin is warm and dry.      Capillary Refill: Capillary refill takes less than 2 seconds.      Coloration: Skin is not jaundiced.   Neurological:      General: No focal deficit present.      Mental Status: She is alert and oriented to person, place, and time. Mental status is at baseline.         Significant Labs:   All pertinent labs within the past 24 hours have been reviewed.  A1C: No results for input(s): HGBA1C in the last 4320 hours.  ABGs:   Recent Labs   Lab 12/31/21  1224   PH 7.351   PCO2 62.1*   HCO3 34.4*   POCSATURATED 43*   BE 9   PO2 26*     CBC:   Recent Labs   Lab 12/31/21 0326 01/01/22  0512   WBC 18.98* 17.31*   HGB 10.7* 10.0*   HCT 33.8* 33.0*   * 425     CMP:   Recent Labs   Lab 12/31/21 0326 01/01/22  0512    136   K 4.4 4.7    98   CO2 25 29   GLU 95 111*   BUN 19 23   CREATININE 1.0 1.1   CALCIUM 9.8 9.7   PROT 7.3 6.9   ALBUMIN 2.7* 2.6*   BILITOT 0.2 0.2   ALKPHOS 94 90   AST 11 13   ALT 12 11   ANIONGAP 12 9   EGFRNONAA >60.0 53.9*     Magnesium:   Recent Labs   Lab 12/31/21 0326 01/01/22  0512   MG 2.1 2.0     POCT Glucose:   Recent Labs   Lab 12/31/21  2035 01/01/22  0800 01/01/22  1118    POCTGLUCOSE 187* 120* 131*       Significant Imaging: I have reviewed all pertinent imaging results/findings within the past 24 hours.

## 2022-01-01 NOTE — PROGRESS NOTES
[facility] - [facility] Stepdown  Pulmonology  Progress Note    Patient Name: Janeth Boyce  MRN: [id number]  Admission Date: 12/26/2021  Hospital Length of Stay: 5 days  Code Status: Full Code  Attending Provider: Kelly Schwab MD  Primary Care Provider: Freeman Caballero MD   Principal Problem: COPD exacerbation    Subjective:     Interval History: Patient had just taken of oxygen and per RT was found with SpO2 of 69%, recovered back to 92% with 4L NC. Discussed with patient that she must keep oxygen on at all times. Feels a little better, started on pred 60 PO daily today    Objective:     Vital Signs (Most Recent):  Temp: 98.3 °F (36.8 °C) (01/01/22 1117)  Pulse: 86 (01/01/22 1234)  Resp: (!) 24 (01/01/22 1234)  BP: 124/73 (01/01/22 1117)  SpO2: (!) 94 % (01/01/22 1234) Vital Signs (24h Range):  Temp:  [95.1 °F (35.1 °C)-98.7 °F (37.1 °C)] 98.3 °F (36.8 °C)  Pulse:  [77-96] 86  Resp:  [16-24] 24  SpO2:  [86 %-99 %] 94 %  BP: (115-145)/(70-85) 124/73     Weight: 87 kg (191 lb 12.8 oz)  Body mass index is 30.96 kg/m².      Intake/Output Summary (Last 24 hours) at 1/1/2022 1301  Last data filed at 12/31/2021 2155  Gross per 24 hour   Intake 240 ml   Output --   Net 240 ml       Physical Exam  Vitals and nursing note reviewed.   Constitutional:       Appearance: She is well-developed. She is obese. She is not ill-appearing, toxic-appearing or diaphoretic.   HENT:      Head: Normocephalic and atraumatic.   Eyes:      Conjunctiva/sclera: Conjunctivae normal.      Pupils: Pupils are equal, round, and reactive to light.   Cardiovascular:      Rate and Rhythm: Normal rate and regular rhythm.      Heart sounds: Normal heart sounds.   Pulmonary:      Effort: Pulmonary effort is normal. No respiratory distress.      Breath sounds: Wheezing present. No rales.      Comments: Significant diffuse wheezing  Abdominal:      General: Bowel sounds are normal.      Palpations: Abdomen is soft.      Tenderness: There is no abdominal  tenderness.   Musculoskeletal:      Right lower leg: No edema.      Left lower leg: No edema.   Neurological:      Mental Status: She is alert and oriented to person, place, and time.   Psychiatric:         Behavior: Behavior normal.         Thought Content: Thought content normal.         Judgment: Judgment normal.         Vents:  Oxygen Concentration (%): 60 (01/01/22 0139)    Lines/Drains/Airways     Peripheral Intravenous Line                 Peripheral IV - Single Lumen 12/26/21 2135 18 G Right Wrist 5 days                Significant Labs:    CBC/Anemia Profile:  Recent Labs   Lab 12/31/21 0326 01/01/22  0512   WBC 18.98* 17.31*   HGB 10.7* 10.0*   HCT 33.8* 33.0*   * 425   MCV 80* 83   RDW 14.6* 14.8*        Chemistries:  Recent Labs   Lab 12/31/21 0326 01/01/22  0512    136   K 4.4 4.7    98   CO2 25 29   BUN 19 23   CREATININE 1.0 1.1   CALCIUM 9.8 9.7   ALBUMIN 2.7* 2.6*   PROT 7.3 6.9   BILITOT 0.2 0.2   ALKPHOS 94 90   ALT 12 11   AST 11 13   MG 2.1 2.0   PHOS 3.9 4.1       All pertinent labs within the past 24 hours have been reviewed.    Significant Imaging:  I have reviewed all pertinent imaging results/findings within the past 24 hours.    Assessment/Plan:     * COPD exacerbation  Patient's acute on chronic hypoxemic respiratory failure after short admission with 5 day course of prednisone and antibiotics. Patient began to feel worse after completing her course of both. Continues to have diffuse wheezing and cough of clear sputum.      CT Chest largely unchanged from prior, but now having had LLL ground glass without focal lobular opacification and prior RUL mass which distorts vascular anatomy largely unchanged.     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     Feel her presentation is COPD exacerbation in addition to PDL1 pneumonitis (as it appears her ground  glass infiltrates on CT resemble a pattern of a radiation beam and PDL1i can cause pneumonitis in that same distribution)    Recommendations  - LAMA/LABA/ICS while inpatient  - schedule duonebs q6h for 1 day and then PRN for wheezing   - discharge with albuterol/duonebs PRN   - Patient with worsening hypoxia. 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 Continue to monitor. May ultimately need to discontinue pembro.   - O2 requirements improved, changed to prednisone 60mg PO starting 1/1  - S/p levaquin for 5 days   - Agree with increasing frequency of duonebs to q4h, wheezing improved 1/1  - Fungitell pending                        Rena Uriostegui MD  Pulmonology  [facility] - [facility] Step[facility]

## 2022-01-02 NOTE — ASSESSMENT & PLAN NOTE
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 Recently admitted 12/14-12/16 for pneumonia and COPD exacerbation. In the ED, her O2 sat was initially 78% and she was placed on bipap. She was able to be weaned to 6L NC satting 96%. Labs significant for WBC 16.65, but recently on steroids, and lactate 2.4. CXR with progression of airspace disease in the left hemithorax, stable appearance of airspace disease in the right hemithorax. CTA chest concerning for occlusion of RUL pulmonary artery branch, intraluminal infiltration or tumor thrombus vs thromboembolus. Pulmonology consulted by ED, do not think anticoagulation is warranted. She reports some improvement in symptoms while in ED. Admitted to hospital medicine for management of COPD exacerbation.   -Lower extremity ultrasound negative for DVT. TTE normal with EF of 60%.  -Hem/Onc consulted as suspicion for Keytruda induced pneumonitis, but per evaluation: ground glass opacities primarily in the lower lobes and is not entirely consistent with immunotherapy related pneumonitis (especially as she has already improved without steroids)  -Pulm consulted: high suspicion for  COPD exacerbation in addition to PDL1 pneumonitis , recommended holding keytruda and administer solumedrol 80mg IV daily, duonebs + levofloxacin x 5 days, procalcitonin and RIP    Microbiology Results (last 7 days)     Procedure Component Value Units Date/Time    Blood culture x two cultures. Draw prior to antibiotics. [[id number]] Collected: 12/26/21 2304    Order Status: Completed Specimen: Blood from Peripheral, Hand, Left Updated: 01/01/22 0612     Blood Culture, Routine No growth after 5 days.    Narrative:      Aerobic and anaerobic    Blood culture x two cultures. Draw prior to antibiotics. [[id number]] Collected: 12/26/21 1007     Order Status: Completed Specimen: Blood from Peripheral, Hand, Right Updated: 01/01/22 0612     Blood Culture, Routine No growth after 5 days.    Narrative:      Aerobic and anaerobic    Respiratory Infection Panel (PCR), Nasopharyngeal [[id number]] Collected: 12/29/21 1000    Order Status: Completed Specimen: Nasopharyngeal Swab Updated: 12/29/21 1612     Respiratory Infection Panel Source NP Swab     Adenovirus Not Detected     Coronavirus 229E, Common Cold Virus Not Detected     Coronavirus HKU1, Common Cold Virus Not Detected     Coronavirus NL63, Common Cold Virus Not Detected     Coronavirus OC43, Common Cold Virus Not Detected     Comment: The Coronavirus strains detected in this test cause the common cold.  These strains are not the COVID-19 (novel Coronavirus)strain   associated with the respiratory disease outbreak.          Human Metapneumovirus Not Detected     Human Rhinovirus/Enterovirus Not Detected     Influenza A (subtypes H1, H1-2009,H3) Not Detected     Influenza B Not Detected     Parainfluenza Virus 1 Not Detected     Parainfluenza Virus 2 Not Detected     Parainfluenza Virus 3 Not Detected     Parainfluenza Virus 4 Not Detected     Respiratory Syncytial Virus Not Detected     Bordetella Parapertussis (GI0150) Not Detected     Bordetella pertussis (ptxP) Not Detected     Chlamydia pneumoniae Not Detected     Mycoplasma pneumoniae Not Detected    Narrative:      For all other respiratory sources, order [id number] -  Respiratory Viral Panel by PCR    Respiratory Infection Panel (PCR), Nasopharyngeal [[id number]] Collected: 12/29/21 1000    Order Status: Completed Specimen: Nasopharyngeal Swab Updated: 12/29/21 1045     Respiratory Infection Panel Source NP Swab    Narrative:      For all other respiratory sources, order [id number] -  Respiratory Viral Panel by PCR    Respiratory Infection Panel (PCR), Nasopharyngeal [[id number]] Collected: 12/27/21 1725    Order Status: Canceled Specimen: Nasopharyngeal  Swab           ABGs:   Recent Labs   Lab 12/31/21  1224   PH 7.351   PCO2 62.1*   PO2 26*   HCO3 34.4*   POCSATURATED 43*   BE 9       -Respiratory status improved from admission, but not showing improvement despite 4 days of steroid therapy.     PLAN  -BiPap overnight  -symbicort not on formulary, will continue with breo inhaler   -Chest physiotherapy scheduled  - Per pulm consult, for severe COPD exacerbation:   -duo nebs scheduled    -levofloxacin 5 day course completed   -80mg methlyprednisolone x5 day course completed, switched to prednisone 60mg PO starting 1/1  -Per Hem/Onc next dose of immunotherapy is not due until Feb 1st so will defer decision on whether to continue current treatment plan to her outpatient oncologist.   - continue phenergan-codeine prn for cough  - wean O2 as tolerated

## 2022-01-02 NOTE — ASSESSMENT & PLAN NOTE
Patient's acute on chronic hypoxemic respiratory failure after short admission with 5 day course of prednisone and antibiotics. Patient began to feel worse after completing her course of both. Continues to have diffuse wheezing and cough of clear sputum.      CT Chest largely unchanged from prior, but now having had LLL ground glass without focal lobular opacification and prior RUL mass which distorts vascular anatomy largely unchanged.     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     Feel her presentation is COPD exacerbation in addition to PDL1 pneumonitis (as it appears her ground glass infiltrates on CT resemble a pattern of a radiation beam and PDL1i can cause pneumonitis in that same distribution)    Recommendations  - LAMA/LABA/ICS while inpatient  - schedule duonebs q6h for 1 day and then PRN for wheezing   - discharge with albuterol/duonebs PRN   - Patient with worsening hypoxia. 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 Continue to monitor. May ultimately need to discontinue pembro.   - O2 requirements improved, changed to prednisone 60mg PO starting 1/1   - Likely nearing discharge, will need 6MWT prior to discharge to determine if home O2 requirements have increased. Counseled pt on need to keep oxygen on at all times   - Recommend discharge on prednisone taper: 60mg x 14 days (total), 40mg x 14 days, 20 mg  X 14 days. Needs follow up with Dr Garcia by that point. Needs to be discharged with bactrim for PJP ppx. Will follow up OP with Dr Garcia to follow steroid taper, and to continue discussing whether to ultimately DC pembro   - Will order repeat CT in 4 weeks OP  - S/p levaquin for 5 days   - Wheezing improving, continue duonebs as needed   - Fungitell pending

## 2022-01-02 NOTE — PROGRESS NOTES
[facility] - [facility] Stepdown  Pulmonology  Progress Note    Patient Name: Janeth Boyce  MRN: [id number]  Admission Date: 12/26/2021  Hospital Length of Stay: 6 days  Code Status: Full Code  Attending Provider: Kelly Schwab MD  Primary Care Provider: Freeman Caballero MD   Principal Problem: COPD exacerbation    Subjective:     Interval History:  Patient feels better this morning. Now on pred 60 PO daily. Down to 3L NC this AM. Still wheezing,b ut significantly improved.     Objective:     Vital Signs (Most Recent):  Temp: 98 °F (36.7 °C) (01/02/22 1134)  Pulse: 84 (01/02/22 1134)  Resp: 18 (01/02/22 1134)  BP: 108/71 (01/02/22 1134)  SpO2: (!) 90 % (01/02/22 1134) Vital Signs (24h Range):  Temp:  [98 °F (36.7 °C)-98.6 °F (37 °C)] 98 °F (36.7 °C)  Pulse:  [] 84  Resp:  [16-32] 18  SpO2:  [82 %-99 %] 90 %  BP: ()/(56-75) 108/71     Weight: 87 kg (191 lb 12.8 oz)  Body mass index is 30.96 kg/m².    No intake or output data in the 24 hours ending 01/02/22 1150    Physical Exam  Vitals and nursing note reviewed.   Constitutional:       Appearance: She is well-developed. She is obese. She is not ill-appearing, toxic-appearing or diaphoretic.   HENT:      Head: Normocephalic and atraumatic.   Eyes:      Conjunctiva/sclera: Conjunctivae normal.      Pupils: Pupils are equal, round, and reactive to light.   Cardiovascular:      Rate and Rhythm: Normal rate and regular rhythm.      Heart sounds: Normal heart sounds.   Pulmonary:      Effort: Pulmonary effort is normal. No respiratory distress.      Breath sounds: Wheezing present. No rales.      Comments: Significant improvement in her wheezing  Abdominal:      General: Bowel sounds are normal.      Palpations: Abdomen is soft.      Tenderness: There is no abdominal tenderness.   Musculoskeletal:      Right lower leg: No edema.      Left lower leg: No edema.   Neurological:      Mental Status: She is alert and oriented to person, place, and time.    Psychiatric:         Behavior: Behavior normal.         Thought Content: Thought content normal.         Judgment: Judgment normal.         Vents:  Oxygen Concentration (%): 60 (01/01/22 0139)    Lines/Drains/Airways     Peripheral Intravenous Line                 Peripheral IV - Single Lumen 12/26/21 2135 18 G Right Wrist 6 days                Significant Labs:    CBC/Anemia Profile:  Recent Labs   Lab 01/01/22  0512 01/02/22  0504   WBC 17.31* 18.63*   HGB 10.0* 10.2*   HCT 33.0* 33.6*    405   MCV 83 81*   RDW 14.8* 14.9*        Chemistries:  Recent Labs   Lab 01/01/22  0512 01/02/22  0504    138   K 4.7 4.3   CL 98 99   CO2 29 29   BUN 23 29*   CREATININE 1.1 1.1   CALCIUM 9.7 9.4   ALBUMIN 2.6* 2.5*   PROT 6.9 6.7   BILITOT 0.2 0.2   ALKPHOS 90 91   ALT 11 13   AST 13 11   MG 2.0 2.0   PHOS 4.1 4.4       All pertinent labs within the past 24 hours have been reviewed.    Significant Imaging:  I have reviewed all pertinent imaging results/findings within the past 24 hours.    Assessment/Plan:     * COPD exacerbation  Patient's acute on chronic hypoxemic respiratory failure after short admission with 5 day course of prednisone and antibiotics. Patient began to feel worse after completing her course of both. Continues to have diffuse wheezing and cough of clear sputum.      CT Chest largely unchanged from prior, but now having had LLL ground glass without focal lobular opacification and prior RUL mass which distorts vascular anatomy largely unchanged.     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     Feel her presentation is COPD exacerbation in addition to PDL1 pneumonitis (as it appears her ground glass infiltrates on CT resemble a pattern of a radiation beam and PDL1i can cause pneumonitis in that same distribution)    Recommendations  - LAMA/LABA/ICS while inpatient  -  schedule duonebs q6h for 1 day and then PRN for wheezing   - discharge with albuterol/duonebs PRN   - Patient with worsening hypoxia. 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 Continue to monitor. May ultimately need to discontinue pembro.   - O2 requirements improved, changed to prednisone 60mg PO starting 1/1   - Likely nearing discharge, will need 6MWT prior to discharge to determine if home O2 requirements have increased. Counseled pt on need to keep oxygen on at all times   - Recommend discharge on prednisone taper: 60mg x 14 days (total), 40mg x 14 days, 20 mg  X 14 days. Needs follow up with Dr Garcia by that point. Needs to be discharged with bactrim for PJP ppx. Will follow up OP with Dr Garcia to follow steroid taper, and to continue discussing whether to ultimately DC pembro   - Will order repeat CT in 4 weeks OP  - S/p levaquin for 5 days   - Wheezing improving, continue duonebs as needed   - Fungitell pending             Thanks for the consult, we will sign off and follow up outpatient (I will message ). Please call with any questions.    Rena Uriostegui MD  Pulmonary and Critical Care Fellow  01/02/2022  2:38 PM

## 2022-01-02 NOTE — SUBJECTIVE & OBJECTIVE
Interval History: Patient seen and examined. No acute events overnight, afebrile, vital signs stable. She was unable to tolerate BiPap overnight due to anxiety, and had episode of coughing spell that resolved with breathing treatments.  This morning, she is breathing on 5L Via NC. She continues to report subjective improvement with decreased PRITCHETT after overnight BiPap.      Review of Systems   Constitutional: Positive for activity change and fatigue. Negative for appetite change, chills, diaphoresis, fever and unexpected weight change.   HENT: Negative for congestion and rhinorrhea.    Eyes: Negative.    Respiratory: Positive for cough, shortness of breath and wheezing. Negative for chest tightness.    Cardiovascular: Negative for chest pain, palpitations and leg swelling.   Gastrointestinal: Negative for abdominal distention, nausea and vomiting.   Endocrine: Negative.    Genitourinary: Negative.    Musculoskeletal: Positive for arthralgias and back pain.   Skin: Negative.    Allergic/Immunologic: Negative.    Neurological: Negative for dizziness and numbness.   Psychiatric/Behavioral: Negative for agitation and confusion. The patient is not nervous/anxious (on Valium).      Objective:     Vital Signs (Most Recent):  Temp: 98.2 °F (36.8 °C) (01/02/22 0758)  Pulse: 80 (01/02/22 0758)  Resp: 16 (01/02/22 0758)  BP: 105/60 (01/02/22 0758)  SpO2: (!) 93 % (01/02/22 0810) Vital Signs (24h Range):  Temp:  [98 °F (36.7 °C)-98.6 °F (37 °C)] 98.2 °F (36.8 °C)  Pulse:  [] 80  Resp:  [16-32] 16  SpO2:  [82 %-99 %] 93 %  BP: ()/(56-75) 105/60     Weight: 87 kg (191 lb 12.8 oz)  Body mass index is 30.96 kg/m².  No intake or output data in the 24 hours ending 01/02/22 0931   Physical Exam  Vitals and nursing note reviewed.   Constitutional:       General: She is not in acute distress.     Appearance: Normal appearance. She is obese. She is not ill-appearing, toxic-appearing or diaphoretic.   HENT:      Head:  Normocephalic and atraumatic.      Mouth/Throat:      Mouth: Mucous membranes are moist.   Eyes:      General: No scleral icterus.        Right eye: No discharge.         Left eye: No discharge.   Cardiovascular:      Rate and Rhythm: Normal rate and regular rhythm.      Pulses: Normal pulses.      Heart sounds: Normal heart sounds.   Pulmonary:      Effort: Pulmonary effort is normal. No respiratory distress.      Breath sounds: Wheezing present. No rhonchi or rales.   Abdominal:      General: Abdomen is flat. Bowel sounds are normal. There is no distension.   Musculoskeletal:         General: No swelling.      Cervical back: Normal range of motion.      Right lower leg: No edema.      Left lower leg: No edema.   Skin:     General: Skin is warm and dry.      Capillary Refill: Capillary refill takes less than 2 seconds.      Coloration: Skin is not jaundiced.   Neurological:      General: No focal deficit present.      Mental Status: She is alert and oriented to person, place, and time. Mental status is at baseline.         Significant Labs:   All pertinent labs within the past 24 hours have been reviewed.  CBC:   Recent Labs   Lab 01/01/22  0512 01/02/22  0504   WBC 17.31* 18.63*   HGB 10.0* 10.2*   HCT 33.0* 33.6*    405     CMP:   Recent Labs   Lab 01/01/22  0512 01/02/22  0504    138   K 4.7 4.3   CL 98 99   CO2 29 29   * 104   BUN 23 29*   CREATININE 1.1 1.1   CALCIUM 9.7 9.4   PROT 6.9 6.7   ALBUMIN 2.6* 2.5*   BILITOT 0.2 0.2   ALKPHOS 90 91   AST 13 11   ALT 11 13   ANIONGAP 9 10   EGFRNONAA 53.9* 53.9*     Magnesium:   Recent Labs   Lab 01/01/22  0512 01/02/22  0504   MG 2.0 2.0       Significant Imaging: I have reviewed all pertinent imaging results/findings within the past 24 hours.     X-Ray Chest 1 View [[id number]] Resulted: 01/01/22 1145   Impression:       Since December 26, 2021, decreased left middle and lower lung zone airspace opacities.  No new abnormality.

## 2022-01-02 NOTE — PROGRESS NOTES
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 She was recently admitted 12/14-12/16 for pneumonia and COPD exacerbation. She states that the SOB of began two days ago and acutely worsened today. She reports an associated cough that is productive with white sputum. She states that she has CP and incontinence associated with the cough. She also reports PRITCHETT, stating that she cannot walk to the bathroom without getting significantly SOB. She reports chills but has not checked her temperature. She had one episode of diarrhea today, denies nausea/vomiting.     In the ED, her O2 sat was initially 78% and she was placed on bipap. She was able to be weaned to 6L NC satting 96%. Afebrile and /78. Labs significant for WBC 16.65, lactate 2.4. Trop and BNP normal. UA unremarkable. CXR with progression of airspace disease in the left hemithorax, stable appearance of airspace disease in the right hemithorax. CTA chest concerning for occlusion of RUL pulmonary artery branch, intraluminal infiltration or tumor thrombus vs thromboembolus. Pulmonology consulted by ED, do not think anticoagulation is warranted and agree with treatment as COPD exacerbation. Admitted to hospital medicine for further management.       Overview/Hospital Course:  Patient admitted to hospital medicine for evaluation of dyspnea. CT Chest notable for new nonspecific 2.4 cm ground-glass opacity within the  left lower lobe. Lower extremity ultrasound negative for DVT. TTE normal with EF of 60%. Hem/Onc consulted as suspicion for Keytruda induced pneumonitis, but per evaluation: ground glass opacities primarily in the lower lobes and is not entirely consistent with immunotherapy related pneumonitis (especially as she has already improved without steroids). Pulm consulted: high suspicion for  COPD exacerbation in addition to PDL1 pneumonitis , recommended holding keytruda and administer solumedrol 80mg IV daily, duonebs + levofloxacin x 5 days. Some improvement in respiratory status over course of hospitalization, increased Duonebs frequency to q4h. Trial of BiPap overnight on 01/01 improved respiratory requirements. Transitioned steroids to Prednisone 60mg PO. Repeat CXR on 01/01 showed decreased left middle and lower lung zone airspace opacities. Fungitel assay ordered.      Interval History: Patient seen and examined. No acute events overnight, afebrile, vital signs stable. She was unable to tolerate BiPap overnight due to anxiety, and had episode of coughing spell that resolved with breathing treatments.  This morning, she is breathing on 5L Via NC. She continues to report subjective improvement with decreased PRITCHETT after overnight BiPap.      Review of Systems   Constitutional: Positive for activity change and fatigue. Negative for appetite change, chills, diaphoresis, fever and unexpected weight change.   HENT: Negative for congestion and rhinorrhea.    Eyes: Negative.    Respiratory: Positive for cough, shortness of breath and wheezing. Negative for chest tightness.    Cardiovascular: Negative for chest pain, palpitations and leg swelling.   Gastrointestinal: Negative for abdominal distention, nausea and vomiting.   Endocrine: Negative.    Genitourinary: Negative.    Musculoskeletal: Positive for arthralgias and back pain.   Skin: Negative.    Allergic/Immunologic: Negative.    Neurological: Negative for dizziness  and numbness.   Psychiatric/Behavioral: Negative for agitation and confusion. The patient is not nervous/anxious (on Valium).      Objective:     Vital Signs (Most Recent):  Temp: 98.2 °F (36.8 °C) (01/02/22 0758)  Pulse: 80 (01/02/22 0758)  Resp: 16 (01/02/22 0758)  BP: 105/60 (01/02/22 0758)  SpO2: (!) 93 % (01/02/22 0810) Vital Signs (24h Range):  Temp:  [98 °F (36.7 °C)-98.6 °F (37 °C)] 98.2 °F (36.8 °C)  Pulse:  [] 80  Resp:  [16-32] 16  SpO2:  [82 %-99 %] 93 %  BP: ()/(56-75) 105/60     Weight: 87 kg (191 lb 12.8 oz)  Body mass index is 30.96 kg/m².  No intake or output data in the 24 hours ending 01/02/22 0931   Physical Exam  Vitals and nursing note reviewed.   Constitutional:       General: She is not in acute distress.     Appearance: Normal appearance. She is obese. She is not ill-appearing, toxic-appearing or diaphoretic.   HENT:      Head: Normocephalic and atraumatic.      Mouth/Throat:      Mouth: Mucous membranes are moist.   Eyes:      General: No scleral icterus.        Right eye: No discharge.         Left eye: No discharge.   Cardiovascular:      Rate and Rhythm: Normal rate and regular rhythm.      Pulses: Normal pulses.      Heart sounds: Normal heart sounds.   Pulmonary:      Effort: Pulmonary effort is normal. No respiratory distress.      Breath sounds: Wheezing present. No rhonchi or rales.   Abdominal:      General: Abdomen is flat. Bowel sounds are normal. There is no distension.   Musculoskeletal:         General: No swelling.      Cervical back: Normal range of motion.      Right lower leg: No edema.      Left lower leg: No edema.   Skin:     General: Skin is warm and dry.      Capillary Refill: Capillary refill takes less than 2 seconds.      Coloration: Skin is not jaundiced.   Neurological:      General: No focal deficit present.      Mental Status: She is alert and oriented to person, place, and time. Mental status is at baseline.         Significant Labs:   All  pertinent labs within the past 24 hours have been reviewed.  CBC:   Recent Labs   Lab 01/01/22  0512 01/02/22  0504   WBC 17.31* 18.63*   HGB 10.0* 10.2*   HCT 33.0* 33.6*    405     CMP:   Recent Labs   Lab 01/01/22  0512 01/02/22  0504    138   K 4.7 4.3   CL 98 99   CO2 29 29   * 104   BUN 23 29*   CREATININE 1.1 1.1   CALCIUM 9.7 9.4   PROT 6.9 6.7   ALBUMIN 2.6* 2.5*   BILITOT 0.2 0.2   ALKPHOS 90 91   AST 13 11   ALT 11 13   ANIONGAP 9 10   EGFRNONAA 53.9* 53.9*     Magnesium:   Recent Labs   Lab 01/01/22  0512 01/02/22  0504   MG 2.0 2.0       Significant Imaging: I have reviewed all pertinent imaging results/findings within the past 24 hours.     X-Ray Chest 1 View [[id number]] Resulted: 01/01/22 1145   Impression:       Since December 26, 2021, decreased left middle and lower lung zone airspace opacities.  No new abnormality.            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 Recently admitted 12/14-12/16 for pneumonia and COPD exacerbation. In the ED, her O2 sat was initially 78% and she was placed on bipap. She was able to be weaned to 6L NC satting 96%. Labs significant for WBC 16.65, but recently on steroids, and lactate 2.4. CXR with progression of airspace disease in the left hemithorax, stable appearance of airspace disease in the right hemithorax. CTA chest concerning for occlusion of RUL pulmonary artery branch, intraluminal infiltration or tumor thrombus vs thromboembolus. Pulmonology consulted by ED, do not think anticoagulation is warranted. She reports some improvement in symptoms while in ED. Admitted to hospital medicine for management of COPD exacerbation.   -Lower extremity ultrasound negative for DVT. TTE normal with EF of 60%.  -Hem/Onc consulted as  suspicion for Keytruda induced pneumonitis, but per evaluation: ground glass opacities primarily in the lower lobes and is not entirely consistent with immunotherapy related pneumonitis (especially as she has already improved without steroids)  -Pulm consulted: high suspicion for  COPD exacerbation in addition to PDL1 pneumonitis , recommended holding keytruda and administer solumedrol 80mg IV daily, duonebs + levofloxacin x 5 days, procalcitonin and RIP    Microbiology Results (last 7 days)     Procedure Component Value Units Date/Time    Blood culture x two cultures. Draw prior to antibiotics. [[id number]] Collected: 12/26/21 2304    Order Status: Completed Specimen: Blood from Peripheral, Hand, Left Updated: 01/01/22 0612     Blood Culture, Routine No growth after 5 days.    Narrative:      Aerobic and anaerobic    Blood culture x two cultures. Draw prior to antibiotics. [[id number]] Collected: 12/26/21 2135    Order Status: Completed Specimen: Blood from Peripheral, Hand, Right Updated: 01/01/22 0612     Blood Culture, Routine No growth after 5 days.    Narrative:      Aerobic and anaerobic    Respiratory Infection Panel (PCR), Nasopharyngeal [[id number]] Collected: 12/29/21 1000    Order Status: Completed Specimen: Nasopharyngeal Swab Updated: 12/29/21 1612     Respiratory Infection Panel Source NP Swab     Adenovirus Not Detected     Coronavirus 229E, Common Cold Virus Not Detected     Coronavirus HKU1, Common Cold Virus Not Detected     Coronavirus NL63, Common Cold Virus Not Detected     Coronavirus OC43, Common Cold Virus Not Detected     Comment: The Coronavirus strains detected in this test cause the common cold.  These strains are not the COVID-19 (novel Coronavirus)strain   associated with the respiratory disease outbreak.          Human Metapneumovirus Not Detected     Human Rhinovirus/Enterovirus Not Detected     Influenza A (subtypes H1, H1-2009,H3) Not Detected     Influenza B Not Detected      "Parainfluenza Virus 1 Not Detected     Parainfluenza Virus 2 Not Detected     Parainfluenza Virus 3 Not Detected     Parainfluenza Virus 4 Not Detected     Respiratory Syncytial Virus Not Detected     Bordetella Parapertussis (QH0696) Not Detected     Bordetella pertussis (ptxP) Not Detected     Chlamydia pneumoniae Not Detected     Mycoplasma pneumoniae Not Detected    Narrative:      For all other respiratory sources, order [id number] -  Respiratory Viral Panel by PCR    Respiratory Infection Panel (PCR), Nasopharyngeal [[id number]] Collected: 12/29/21 1000    Order Status: Completed Specimen: Nasopharyngeal Swab Updated: 12/29/21 1045     Respiratory Infection Panel Source NP Swab    Narrative:      For all other respiratory sources, order [id number] -  Respiratory Viral Panel by PCR    Respiratory Infection Panel (PCR), Nasopharyngeal [[id number]] Collected: 12/27/21 1725    Order Status: Canceled Specimen: Nasopharyngeal Swab           ABGs:   Recent Labs   Lab 12/31/21  1224   PH 7.351   PCO2 62.1*   PO2 26*   HCO3 34.4*   POCSATURATED 43*   BE 9       -Respiratory status improved from admission, but not showing improvement despite 4 days of steroid therapy.     PLAN  -BiPap overnight  -symbicort not on formulary, will continue with breo inhaler   -Chest physiotherapy scheduled  - Per pulm consult, for severe COPD exacerbation:   -duo nebs scheduled    -levofloxacin 5 day course completed   -80mg methlyprednisolone x5 day course completed, switched to prednisone 60mg PO starting 1/1  -Per Hem/Onc next dose of immunotherapy is not due until Feb 1st so will defer decision on whether to continue current treatment plan to her outpatient oncologist.   - continue phenergan-codeine prn for cough  - wean O2 as tolerated       Pneumonitis  See "COPD exacerbation" A&P      Acute on chronic respiratory failure with hypoxemia  See "COPD exacerbation" A&P    Chronic respiratory failure with hypoxia  Patient with Hypoxic " Respiratory failure which is Acute on chronic.  she is on home oxygen at 2 LPM. Supplemental oxygen was provided and noted- Oxygen Concentration (%):  [50-60] 50.   Signs/symptoms of respiratory failure include- wheezing. Contributing diagnoses includes - COPD Labs and images were reviewed. Patient Has not had a recent ABG. Will treat underlying causes and adjust management of respiratory failure as follows-      See COPD exacerbation.     GERD (gastroesophageal reflux disease)  - protonix daily      Anxiety  -Continue Valium 5mg  - continue bupropion   - continue atarax prn     Adenocarcinoma of lung, stage 4, right  Follows with Dr. Peterson. S/p Carboplatin AUC4 x1 dose 5/27/20 and palliative radiation (5/27/2020 - 6/2/2020; 20 Gy to right lung). Also received SRS to right cerebellar metastasis 6/3/2020. Pembrolizumab started 6/4/2020, C23 on 12/21/2021.  - Hem Onc consulted by ED  - continue gabapentin  - continue tramadol prn       Essential hypertension  Normotensive on admission. On amlodipine and irbesartan-hydrochlorothiazide.      PLAN  - monitor BP  - restarted home meds on 12/31/21  -Home irbesartan-hydrochlorothiazide replaced with formulary alternative (losartan-hydrochlorothiazide 100-12.5 mg)    VTE Risk Mitigation (From admission, onward)         Ordered     enoxaparin injection 40 mg  Daily         12/27/21 0536     IP VTE HIGH RISK PATIENT  Once         12/27/21 0536     Place sequential compression device  Until discontinued         12/27/21 0536                Discharge Planning   TIFFANIE: 1/4/2022     Code Status: Full Code   Is the patient medically ready for discharge?:     Reason for patient still in hospital (select all that apply): Patient trending condition, Laboratory test and Treatment  Discharge Plan A: Home Health                  Terry Restrepo DO  Department of Hospital Medicine   [facility] Step[facility]

## 2022-01-02 NOTE — PLAN OF CARE
Pt free of falls and injury. Pt AAOx4. Fall precautions remain in place. IS and ambulation encouraged. Pt on 5L nasal cannula. Sats 89-93%. NSR on telemetry. Educated pt on importance of accurate I/Os. Plan of care reviewed with pt. All questions addressed. Pt resting comfortably. Nausea and anxiety managed with prn zofran and valium.  Pt denies chest pain and headache. Pt reports some SOB with exertion. No acute distress, will continue to monitor.

## 2022-01-02 NOTE — PLAN OF CARE
Plan of care discussed with patient. Patient is free of fall/trauma/injury. Denies CP, SOB, or pain/discomfort. Pt unable toweaned to 3L NC due to sat 86-97%. Pt sating 91% on 4L. Chest XR obtained this afternoon. All questions addressed. Will continue to monitor

## 2022-01-02 NOTE — SUBJECTIVE & OBJECTIVE
Interval History:  Patient feels better this morning. Now on pred 60 PO daily. Down to 3L NC this AM. Still wheezing,b ut significantly improved.     Objective:     Vital Signs (Most Recent):  Temp: 98 °F (36.7 °C) (01/02/22 1134)  Pulse: 84 (01/02/22 1134)  Resp: 18 (01/02/22 1134)  BP: 108/71 (01/02/22 1134)  SpO2: (!) 90 % (01/02/22 1134) Vital Signs (24h Range):  Temp:  [98 °F (36.7 °C)-98.6 °F (37 °C)] 98 °F (36.7 °C)  Pulse:  [] 84  Resp:  [16-32] 18  SpO2:  [82 %-99 %] 90 %  BP: ()/(56-75) 108/71     Weight: 87 kg (191 lb 12.8 oz)  Body mass index is 30.96 kg/m².    No intake or output data in the 24 hours ending 01/02/22 1150    Physical Exam  Vitals and nursing note reviewed.   Constitutional:       Appearance: She is well-developed. She is obese. She is not ill-appearing, toxic-appearing or diaphoretic.   HENT:      Head: Normocephalic and atraumatic.   Eyes:      Conjunctiva/sclera: Conjunctivae normal.      Pupils: Pupils are equal, round, and reactive to light.   Cardiovascular:      Rate and Rhythm: Normal rate and regular rhythm.      Heart sounds: Normal heart sounds.   Pulmonary:      Effort: Pulmonary effort is normal. No respiratory distress.      Breath sounds: Wheezing present. No rales.      Comments: Significant improvement in her wheezing  Abdominal:      General: Bowel sounds are normal.      Palpations: Abdomen is soft.      Tenderness: There is no abdominal tenderness.   Musculoskeletal:      Right lower leg: No edema.      Left lower leg: No edema.   Neurological:      Mental Status: She is alert and oriented to person, place, and time.   Psychiatric:         Behavior: Behavior normal.         Thought Content: Thought content normal.         Judgment: Judgment normal.         Vents:  Oxygen Concentration (%): 60 (01/01/22 0139)    Lines/Drains/Airways     Peripheral Intravenous Line                 Peripheral IV - Single Lumen 12/26/21 2135 18 G Right Wrist 6 days                 Significant Labs:    CBC/Anemia Profile:  Recent Labs   Lab 01/01/22  0512 01/02/22  0504   WBC 17.31* 18.63*   HGB 10.0* 10.2*   HCT 33.0* 33.6*    405   MCV 83 81*   RDW 14.8* 14.9*        Chemistries:  Recent Labs   Lab 01/01/22  0512 01/02/22  0504    138   K 4.7 4.3   CL 98 99   CO2 29 29   BUN 23 29*   CREATININE 1.1 1.1   CALCIUM 9.7 9.4   ALBUMIN 2.6* 2.5*   PROT 6.9 6.7   BILITOT 0.2 0.2   ALKPHOS 90 91   ALT 11 13   AST 13 11   MG 2.0 2.0   PHOS 4.1 4.4       All pertinent labs within the past 24 hours have been reviewed.    Significant Imaging:  I have reviewed all pertinent imaging results/findings within the past 24 hours.

## 2022-01-02 NOTE — NURSING
"At aprox 0200. Pt removed BiPAP stating she "felt like I was suffocating''. Pt having frequent, productive cough. Pt's O2 desatted to 70s-80s. 5L nasal cannula placed on pt. RN administered prn zofran for nausea. Breathing treatment administered by respiratory. Pt's O2 sats went up to 89-93%. Pt refused to get back on BiPAP for rest of night. O2 sats>88%. Dr. Webb made aware. Will continue to monitor.   "

## 2022-01-03 PROBLEM — J96.11 CHRONIC RESPIRATORY FAILURE WITH HYPOXIA: Status: RESOLVED | Noted: 2020-06-18 | Resolved: 2022-01-01

## 2022-01-03 NOTE — ASSESSMENT & PLAN NOTE
Follows with Dr. Peterson. S/p Carboplatin AUC4 x1 dose 5/27/20 and palliative radiation (5/27/2020 - 6/2/2020; 20 Gy to right lung). Also received SRS to right cerebellar metastasis 6/3/2020. Pembrolizumab started 6/4/2020, C23 on 12/21/2021.    Plan:  - Hem Onc consulted by ED  - continue gabapentin  - continue tramadol prn

## 2022-01-03 NOTE — PT/OT/SLP PROGRESS
Physical Therapy Treatment    Patient Name:  Janeth Boyce   MRN:  [id number]    Recommendations:     Discharge Recommendations:  home health PT   Discharge Equipment Recommendations: walker, rolling   Barriers to discharge: decreased functional endurance    Assessment:     Janeth Boyce is a 61 y.o. female admitted with a medical diagnosis of COPD exacerbation.  She presents with the following impairments/functional limitations:  weakness,impaired endurance,impaired balance,decreased lower extremity function,gait instability,impaired functional mobilty,impaired cardiopulmonary response to activity,decreased safety awareness. Pt stands EOB with supervision, ambulates to door of room with no device and 6 LPM of supplemental O2. Pt then requests RW due to instability, able to ambulate another 70ft with RW and supplemental O2 with SBA for safety and direction. Pt refuses bedside chair and goes back to bed.    Rehab Prognosis: Good; patient would benefit from acute skilled PT services to address these deficits and reach maximum level of function.    Recent Surgery: * No surgery found *      Plan:     During this hospitalization, patient to be seen 3 x/week to address the identified rehab impairments via gait training,therapeutic activities,therapeutic exercises and progress toward the following goals:    · Plan of Care Expires:  01/29/22    Subjective     Chief Complaint: mucus buildup  Patient/Family Comments/goals: walk without being out of breath  Pain/Comfort:  · Pain Rating 1:  (no quantified rating)  · Location 1: chest  · Pain Addressed 1: Distraction,Reposition  · Location 2: back      Objective:     Communicated with Nurse Hannah prior to session.  Patient found sitting EOB with oxygen upon PT entry to room.     General Precautions: Standard, fall,respiratory   Orthopedic Precautions:N/A   Braces: N/A  Respiratory Status: Nasal cannula, flow 5 L/min     Functional Mobility:  · Transfers:     · Sit to Stand:   stand by assistance with rolling walker  · Gait: ambulates to door of room with no device and 6 LPM of supplemental O2. Pt then requests RW due to instability, able to ambulate another 70ft with RW and supplemental O2 with SBA for safety and direction.  · Balance: SBA for dynamic standing balance, independent sitting EOB      AM-PAC 6 CLICK MOBILITY  Turning over in bed (including adjusting bedclothes, sheets and blankets)?: 4  Sitting down on and standing up from a chair with arms (e.g., wheelchair, bedside commode, etc.): 4  Moving from lying on back to sitting on the side of the bed?: 4  Moving to and from a bed to a chair (including a wheelchair)?: 3  Need to walk in hospital room?: 3  Climbing 3-5 steps with a railing?: 2  Basic Mobility Total Score: 20       Therapeutic Activities and Exercises:    Educated patient on POC, verbalizes understanding      Patient left sitting EOB with all lines intact and call button in reach..    GOALS:   Multidisciplinary Problems     Physical Therapy Goals        Problem: Physical Therapy Goal    Goal Priority Disciplines Outcome Goal Variances Interventions   Physical Therapy Goal     PT, PT/OT Ongoing, Progressing     Description: Goals to be met by: 2021     Patient will increase functional independence with mobility by performin. Supine to sit with [place]  2. Sit to supine with [place]  3. Sit to stand transfer with Modified [place]  4. Gait  x 200 feet with Modified [place] using Least Restrictive Assistive Device.                      Time Tracking:     PT Received On: 22  PT Start Time: 1553     PT Stop Time: 1611  PT Total Time (min): 18 min     Billable Minutes: Gait Training 15 min    Treatment Type: Treatment  PT/PTA: PT     PTA Visit Number: 0     2022

## 2022-01-03 NOTE — PLAN OF CARE
Plan of care discussed with patient. Patient is free of fall/trauma/injury. Denies CP, SOB, or pain/discomfort. Pt currently on 4L O2 and sating at 91%. CPT q 6 hours has been ordered. Pt encouraged to deep cough. Supplemental insulin provided with meals. All questions addressed. Will continue to monitor

## 2022-01-03 NOTE — ASSESSMENT & PLAN NOTE
Normotensive on admission. On amlodipine and irbesartan-hydrochlorothiazide.    Plan:  - Monitor BP  - Restarted home meds on 12/31/21  - Home irbesartan-hydrochlorothiazide replaced with formulary alternative (losartan-hydrochlorothiazide 100-12.5 mg)

## 2022-01-03 NOTE — ASSESSMENT & PLAN NOTE
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 Recently admitted 12/14-12/16 for pneumonia and COPD exacerbation. In the ED, her O2 sat was initially 78% and she was placed on bipap. She was able to be weaned to 6L NC satting 96%. Labs significant for WBC 16.65, but recently on steroids, and lactate 2.4. CXR with progression of airspace disease in the left hemithorax, stable appearance of airspace disease in the right hemithorax. CTA chest concerning for occlusion of RUL pulmonary artery branch, intraluminal infiltration or tumor thrombus vs thromboembolus. Pulmonology consulted by ED, do not think anticoagulation is warranted. She reports some improvement in symptoms while in ED. Admitted to hospital medicine for management of COPD exacerbation.     -Hem/Onc consulted as suspicion for Keytruda induced pneumonitis, but per evaluation: ground glass opacities primarily in the lower lobes and is not entirely consistent with immunotherapy related pneumonitis (especially as she has already improved without steroids)  -Pulm consulted: high suspicion for  COPD exacerbation in addition to PDL1 pneumonitis , recommended holding keytruda, duonebs + levofloxacin x 5 days, prednisone 60 mg with taper     -Respiratory status improved from admission, but not showing improvement despite 4 days of steroid therapy.     PLAN  -Breo inhaler   -Chest physiotherapy scheduled  - Per pulm consult, for severe COPD exacerbation:   -duo nebs scheduled    -levofloxacin 5 day course completed   -80mg methlyprednisolone x5 day course completed, switched to prednisone 60mg PO starting 1/1  -Per Hem/Onc next dose of immunotherapy is not due until Feb 1st so will defer decision on whether to continue current treatment plan to her outpatient oncologist.   - continue  phenergan-codeine prn for cough  -Mucinex  - wean O2 as tolerated

## 2022-01-03 NOTE — ASSESSMENT & PLAN NOTE
Patient with Hypoxic Respiratory failure which is Acute on chronic.  she is on home oxygen at 2 LPM. Supplemental oxygen was provided and noted-  .   Signs/symptoms of respiratory failure include- wheezing. Contributing diagnoses includes - COPD Labs and images were reviewed. Patient Has not had a recent ABG. Will treat underlying causes and adjust management of respiratory failure as follows-      See COPD exacerbation.

## 2022-01-03 NOTE — SUBJECTIVE & OBJECTIVE
Interval History: No acute events overnight, afebrile, vital signs stable. Patient continues to have subjective improvement in respiratory symptoms. Was able to ambulate to restroom today without difficulty. Still on 4 L nasal canula this AM (satting~ 90%). Increased coughing over the past 24 hours with significant amount of yellow/ white sputum production. No blood in sputum.     Review of Systems   Constitutional: Positive for activity change. Negative for appetite change, chills, diaphoresis, fever and unexpected weight change.   HENT: Negative for congestion and rhinorrhea.    Eyes: Negative.    Respiratory: Positive for cough, shortness of breath and wheezing. Negative for chest tightness.    Cardiovascular: Negative for chest pain, palpitations and leg swelling.   Gastrointestinal: Negative for abdominal distention, nausea and vomiting.   Endocrine: Negative.    Genitourinary: Negative.    Skin: Negative.    Allergic/Immunologic: Negative.    Neurological: Negative for dizziness and numbness.   Psychiatric/Behavioral: Negative for agitation and confusion. The patient is not nervous/anxious (on Valium).      Objective:     Vital Signs (Most Recent):  Temp: 98 °F (36.7 °C) (01/03/22 1207)  Pulse: 80 (01/03/22 1207)  Resp: 17 (01/03/22 1207)  BP: 106/72 (01/03/22 1207)  SpO2: (!) 93 % (01/03/22 1207) Vital Signs (24h Range):  Temp:  [96.7 °F (35.9 °C)-98.2 °F (36.8 °C)] 98 °F (36.7 °C)  Pulse:  [78-90] 80  Resp:  [16-20] 17  SpO2:  [89 %-95 %] 93 %  BP: (106-147)/(62-79) 106/72     Weight: 90.5 kg (199 lb 8.3 oz)  Body mass index is 32.2 kg/m².    Intake/Output Summary (Last 24 hours) at 1/3/2022 1334  Last data filed at 1/3/2022 0800  Gross per 24 hour   Intake 325 ml   Output 2050 ml   Net -1725 ml      Physical Exam  Vitals and nursing note reviewed.   Constitutional:       General: She is not in acute distress.     Appearance: Normal appearance. She is obese. She is not ill-appearing, toxic-appearing or  diaphoretic.   HENT:      Head: Normocephalic and atraumatic.      Mouth/Throat:      Mouth: Mucous membranes are moist.   Eyes:      General: No scleral icterus.        Right eye: No discharge.         Left eye: No discharge.   Cardiovascular:      Rate and Rhythm: Normal rate and regular rhythm.      Pulses: Normal pulses.      Heart sounds: Normal heart sounds.   Pulmonary:      Effort: Pulmonary effort is normal. No respiratory distress.      Breath sounds: Wheezing present. No rhonchi or rales.   Abdominal:      General: Abdomen is flat. Bowel sounds are normal. There is no distension.   Musculoskeletal:         General: No swelling.      Cervical back: Normal range of motion.      Right lower leg: No edema.      Left lower leg: No edema.   Skin:     General: Skin is warm and dry.      Capillary Refill: Capillary refill takes less than 2 seconds.      Coloration: Skin is not jaundiced.   Neurological:      General: No focal deficit present.      Mental Status: She is alert and oriented to person, place, and time. Mental status is at baseline.         Significant Labs:   All pertinent labs within the past 24 hours have been reviewed.  CBC:   Recent Labs   Lab 01/02/22  0504 01/03/22  0705   WBC 18.63* 17.87*   HGB 10.2* 9.7*   HCT 33.6* 32.1*    418     CMP:   Recent Labs   Lab 01/02/22  0504 01/03/22  0705    137   K 4.3 4.3   CL 99 97   CO2 29 30*    81   BUN 29* 29*   CREATININE 1.1 1.1   CALCIUM 9.4 9.4   PROT 6.7 6.0   ALBUMIN 2.5* 2.6*   BILITOT 0.2 0.2   ALKPHOS 91 80   AST 11 10   ALT 13 11   ANIONGAP 10 10   EGFRNONAA 53.9* 53.9*     Magnesium:   Recent Labs   Lab 01/02/22  0504 01/03/22  0705   MG 2.0 1.9       Significant Imaging: I have reviewed all pertinent imaging results/findings within the past 24 hours.     X-Ray Chest 1 View [[id number]] Resulted: 01/01/22 1145   Impression:       Since December 26, 2021, decreased left middle and lower lung zone airspace opacities.  No new  abnormality.

## 2022-01-03 NOTE — PLAN OF CARE
Problem: Adult Inpatient Plan of Care  Goal: Plan of Care Review  Outcome: Ongoing, Progressing  Goal: Patient-Specific Goal (Individualized)  Outcome: Ongoing, Progressing  Goal: Absence of Hospital-Acquired Illness or Injury  Outcome: Ongoing, Progressing  Goal: Optimal Comfort and Wellbeing  Outcome: Ongoing, Progressing  Goal: Readiness for Transition of Care  Outcome: Ongoing, Progressing     Problem: Adjustment to Illness COPD (Chronic Obstructive Pulmonary Disease)  Goal: Optimal Chronic Illness Coping  Outcome: Ongoing, Progressing     Problem: Functional Ability Impaired COPD (Chronic Obstructive Pulmonary Disease)  Goal: Optimal Level of Functional [place]  Outcome: Ongoing, Progressing     Problem: Infection COPD (Chronic Obstructive Pulmonary Disease)  Goal: Absence of Infection Signs and Symptoms  Outcome: Ongoing, Progressing     Problem: Oral Intake Inadequate COPD (Chronic Obstructive Pulmonary Disease)  Goal: Improved Nutrition Intake  Outcome: Ongoing, Progressing     Problem: Respiratory Compromise COPD (Chronic Obstructive Pulmonary Disease)  Goal: Effective Oxygenation and Ventilation  Outcome: Ongoing, Progressing     Problem: Fluid and Electrolyte Imbalance (Acute Kidney Injury/Impairment)  Goal: Fluid and Electrolyte Balance  Outcome: Ongoing, Progressing     Problem: Oral Intake Inadequate (Acute Kidney Injury/Impairment)  Goal: Optimal Nutrition Intake  Outcome: Ongoing, Progressing     Problem: Renal Function Impairment (Acute Kidney Injury/Impairment)  Goal: Effective Renal Function  Outcome: Ongoing, Progressing     Problem: Fluid Imbalance (Pneumonia)  Goal: Fluid Balance  Outcome: Ongoing, Progressing     Problem: Infection (Pneumonia)  Goal: Resolution of Infection Signs and Symptoms  Outcome: Ongoing, Progressing     Problem: Respiratory Compromise (Pneumonia)  Goal: Effective Oxygenation and Ventilation  Outcome: Ongoing, Progressing     Problem: Fall Injury Risk  Goal:  Absence of Fall and Fall-Related Injury  Outcome: Ongoing, Progressing   Cardiology Step Down. See progress note and flowsheet. Plan: Continue to monitor patient and report any abnormalities in labs, vitals signs, and body system functions to physician as indicated. Patient was given education on their disease process and medications.

## 2022-01-03 NOTE — PLAN OF CARE
[facility] - Cardiology Stepdown  Discharge Reassessment    Primary Care Provider: Freeman Caballero MD    Expected Discharge Date: 1/4/2022    Reassessment (most recent)     Discharge Reassessment - 01/03/22 1439        Discharge Reassessment    Assessment Type Discharge Planning Reassessment     Discharge Plan A Home Health     Discharge Plan B Home     DME Needed Upon Discharge  cane, straight     Discharge Barriers Identified None     Why the patient remains in the hospital Requires continued medical care               Bekah Henriquez RN, CM   Ext: [phone]

## 2022-01-03 NOTE — PROGRESS NOTES
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 She was recently admitted 12/14-12/16 for pneumonia and COPD exacerbation. She states that the SOB of began two days ago and acutely worsened today. She reports an associated cough that is productive with white sputum. She states that she has CP and incontinence associated with the cough. She also reports PRITCHETT, stating that she cannot walk to the bathroom without getting significantly SOB. She reports chills but has not checked her temperature. She had one episode of diarrhea today, denies nausea/vomiting.     In the ED, her O2 sat was initially 78% and she was placed on bipap. She was able to be weaned to 6L NC satting 96%. Afebrile and /78. Labs significant for WBC 16.65, lactate 2.4. Trop and BNP normal. UA unremarkable. CXR with progression of airspace disease in the left hemithorax, stable appearance of airspace disease in the right hemithorax. CTA chest concerning for occlusion of RUL pulmonary artery branch, intraluminal infiltration or tumor thrombus vs thromboembolus. Pulmonology consulted by ED, do not think anticoagulation is warranted and agree with treatment as COPD exacerbation. Admitted to hospital medicine for further management.       Overview/Hospital Course:  Patient admitted to hospital medicine for evaluation of dyspnea. CT Chest notable for new nonspecific 2.4 cm ground-glass opacity within the  left lower lobe. Lower extremity ultrasound negative for DVT. TTE normal with EF of 60%. Hem/Onc consulted as suspicion for Keytruda induced pneumonitis, but per evaluation: ground glass opacities primarily in the lower lobes and is not entirely consistent with immunotherapy related pneumonitis (especially as she has already improved without steroids). Pulm consulted: high suspicion for  COPD exacerbation in addition to PDL1 pneumonitis , recommended holding keytruda and administer solumedrol 80mg IV daily, duonebs + levofloxacin x 5 days. Some improvement in respiratory status over course of hospitalization, increased Duonebs frequency to q4h. Trial of BiPap overnight on 01/01 improved respiratory requirements. Transitioned steroids to Prednisone 60mg PO with taper. Repeat CXR on 01/01 showed decreased left middle and lower lung zone airspace opacities. Fungitel assay ordered.      Interval History: No acute events overnight, afebrile, vital signs stable. Patient continues to have subjective improvement in respiratory symptoms. Was able to ambulate to restroom today without difficulty. Still on 4 L nasal canula this AM (satting~ 90%). Increased coughing over the past 24 hours with significant amount of yellow/ white sputum production. No blood in sputum.     Review of Systems   Constitutional: Positive for activity change. Negative for appetite change, chills, diaphoresis, fever and unexpected weight change.   HENT: Negative for congestion and rhinorrhea.    Eyes: Negative.    Respiratory: Positive for cough, shortness of breath and wheezing. Negative for chest tightness.    Cardiovascular: Negative for chest pain, palpitations and leg swelling.   Gastrointestinal: Negative for abdominal distention, nausea and vomiting.   Endocrine: Negative.    Genitourinary: Negative.    Skin: Negative.    Allergic/Immunologic: Negative.    Neurological: Negative for dizziness and numbness.   Psychiatric/Behavioral: Negative for  agitation and confusion. The patient is not nervous/anxious (on Valium).      Objective:     Vital Signs (Most Recent):  Temp: 98 °F (36.7 °C) (01/03/22 1207)  Pulse: 80 (01/03/22 1207)  Resp: 17 (01/03/22 1207)  BP: 106/72 (01/03/22 1207)  SpO2: (!) 93 % (01/03/22 1207) Vital Signs (24h Range):  Temp:  [96.7 °F (35.9 °C)-98.2 °F (36.8 °C)] 98 °F (36.7 °C)  Pulse:  [78-90] 80  Resp:  [16-20] 17  SpO2:  [89 %-95 %] 93 %  BP: (106-147)/(62-79) 106/72     Weight: 90.5 kg (199 lb 8.3 oz)  Body mass index is 32.2 kg/m².    Intake/Output Summary (Last 24 hours) at 1/3/2022 1334  Last data filed at 1/3/2022 0800  Gross per 24 hour   Intake 325 ml   Output 2050 ml   Net -1725 ml      Physical Exam  Vitals and nursing note reviewed.   Constitutional:       General: She is not in acute distress.     Appearance: Normal appearance. She is obese. She is not ill-appearing, toxic-appearing or diaphoretic.   HENT:      Head: Normocephalic and atraumatic.      Mouth/Throat:      Mouth: Mucous membranes are moist.   Eyes:      General: No scleral icterus.        Right eye: No discharge.         Left eye: No discharge.   Cardiovascular:      Rate and Rhythm: Normal rate and regular rhythm.      Pulses: Normal pulses.      Heart sounds: Normal heart sounds.   Pulmonary:      Effort: Pulmonary effort is normal. No respiratory distress.      Breath sounds: Wheezing present. No rhonchi or rales.   Abdominal:      General: Abdomen is flat. Bowel sounds are normal. There is no distension.   Musculoskeletal:         General: No swelling.      Cervical back: Normal range of motion.      Right lower leg: No edema.      Left lower leg: No edema.   Skin:     General: Skin is warm and dry.      Capillary Refill: Capillary refill takes less than 2 seconds.      Coloration: Skin is not jaundiced.   Neurological:      General: No focal deficit present.      Mental Status: She is alert and oriented to person, place, and time. Mental status is at  baseline.         Significant Labs:   All pertinent labs within the past 24 hours have been reviewed.  CBC:   Recent Labs   Lab 01/02/22  0504 01/03/22  0705   WBC 18.63* 17.87*   HGB 10.2* 9.7*   HCT 33.6* 32.1*    418     CMP:   Recent Labs   Lab 01/02/22  0504 01/03/22  0705    137   K 4.3 4.3   CL 99 97   CO2 29 30*    81   BUN 29* 29*   CREATININE 1.1 1.1   CALCIUM 9.4 9.4   PROT 6.7 6.0   ALBUMIN 2.5* 2.6*   BILITOT 0.2 0.2   ALKPHOS 91 80   AST 11 10   ALT 13 11   ANIONGAP 10 10   EGFRNONAA 53.9* 53.9*     Magnesium:   Recent Labs   Lab 01/02/22  0504 01/03/22  0705   MG 2.0 1.9       Significant Imaging: I have reviewed all pertinent imaging results/findings within the past 24 hours.     X-Ray Chest 1 View [[id number]] Resulted: 01/01/22 1145   Impression:       Since December 26, 2021, decreased left middle and lower lung zone airspace opacities.  No new abnormality.            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 Recently admitted 12/14-12/16 for pneumonia and COPD exacerbation. In the ED, her O2 sat was initially 78% and she was placed on bipap. She was able to be weaned to 6L NC satting 96%. Labs significant for WBC 16.65, but recently on steroids, and lactate 2.4. CXR with progression of airspace disease in the left hemithorax, stable appearance of airspace disease in the right hemithorax. CTA chest concerning for occlusion of RUL pulmonary artery branch, intraluminal infiltration or tumor thrombus vs thromboembolus. Pulmonology consulted by ED, do not think anticoagulation is warranted. She reports some improvement in symptoms while in ED. Admitted to hospital medicine for management of COPD exacerbation.     -Hem/Onc consulted as suspicion for Keytruda induced  "pneumonitis, but per evaluation: ground glass opacities primarily in the lower lobes and is not entirely consistent with immunotherapy related pneumonitis (especially as she has already improved without steroids)  -Pulm consulted: high suspicion for  COPD exacerbation in addition to PDL1 pneumonitis , recommended holding keytruda, duonebs + levofloxacin x 5 days, prednisone 60 mg with taper     -Respiratory status improved from admission, but not showing improvement despite 4 days of steroid therapy.     PLAN  -Breo inhaler   -Chest physiotherapy scheduled  - Per pulm consult, for severe COPD exacerbation:   -duo nebs scheduled    -levofloxacin 5 day course completed   -80mg methlyprednisolone x5 day course completed, switched to prednisone 60mg PO starting 1/1  -Per Hem/Onc next dose of immunotherapy is not due until Feb 1st so will defer decision on whether to continue current treatment plan to her outpatient oncologist.   - continue phenergan-codeine prn for cough  -Mucinex  - wean O2 as tolerated       Pneumonitis  See "COPD exacerbation" A&P    - will require long steroid taper with bactrim ppx and ppi       Acute on chronic respiratory failure with hypoxemia  See "COPD exacerbation" A&P    GERD (gastroesophageal reflux disease)  - protonix daily      Anxiety  -Continue Valium 5mg  - continue bupropion   - continue atarax prn     Adenocarcinoma of lung, stage 4, right  Follows with Dr. Peterson. S/p Carboplatin AUC4 x1 dose 5/27/20 and palliative radiation (5/27/2020 - 6/2/2020; 20 Gy to right lung). Also received SRS to right cerebellar metastasis 6/3/2020. Pembrolizumab started 6/4/2020, C23 on 12/21/2021.    Plan:  - Hem Onc consulted by ED  - continue gabapentin  - continue tramadol prn   - Needs outpatient heme/onc follow up to determine whether or not to continue pembrolizumab.       Essential hypertension  Normotensive on admission. On amlodipine and irbesartan-hydrochlorothiazide.    Plan:  - Monitor BP  - " Restarted home meds on 12/31/21  - Home irbesartan-hydrochlorothiazide replaced with formulary alternative (losartan-hydrochlorothiazide 100-12.5 mg)      VTE Risk Mitigation (From admission, onward)         Ordered     enoxaparin injection 40 mg  Daily         12/27/21 0536     IP VTE HIGH RISK PATIENT  Once         12/27/21 0536     Place sequential compression device  Until discontinued         12/27/21 0536                Discharge Planning   TIFFANIE: 1/4/2022     Code Status: Full Code   Is the patient medically ready for discharge?:     Reason for patient still in hospital (select all that apply): Patient trending condition  Discharge Plan A: Home Health                  Dilma Bhandari MD  Department of Hospital Medicine   [facility]

## 2022-01-04 NOTE — ASSESSMENT & PLAN NOTE
Follows with Dr. Peterson. S/p Carboplatin AUC4 x1 dose 5/27/20 and palliative radiation (5/27/2020 - 6/2/2020; 20 Gy to right lung). Also received SRS to right cerebellar metastasis 6/3/2020. Pembrolizumab started 6/4/2020, C23 on 12/21/2021.    Plan:  - Hem Onc consulted by ED  - continue gabapentin  - continue tramadol prn   - Will follow up with Dr. Petreson as an outpatient

## 2022-01-04 NOTE — PT/OT/SLP PROGRESS
Occupational Therapy   Treatment    Name: Janeth Boyce  MRN: [id number]  Admitting Diagnosis:  COPD exacerbation       Recommendations:     Discharge Recommendations: home health OT,home health PT  Discharge Equipment Recommendations:  walker, rolling  Barriers to discharge:  None    Assessment:     Janeth Boyce is a 61 y.o. female with a medical diagnosis of COPD exacerbation.  She presents with decreased safety awareness. Pt tolerated session well limited by increased oxygen demands.  She is progressing well towards OT goals. Performance deficits affecting function are weakness,impaired endurance,impaired self care skills,impaired functional mobilty,gait instability,impaired balance,decreased lower extremity function,impaired cardiopulmonary response to activity,decreased safety awareness. Pt would benefit from skilled OT services in order to maximize independence with ADLs and facilitate safe discharge. Pt would benefit from home health OT once medically stable for discharge.     Rehab Prognosis:  Good; patient would benefit from acute skilled OT services to address these deficits and reach maximum level of function.       Plan:     Patient to be seen 2 x/week to address the above listed problems via self-care/home management,therapeutic activities,therapeutic exercises,neuromuscular re-education  · Plan of Care Expires: 01/28/22  · Plan of Care Reviewed with: patient    Subjective     Pain/Comfort:  Pain Rating 1: 0/10  Pain Rating Post-Intervention 1: 0/10    Objective:     Communicated with: RN prior to session.  Patient found EOB with oxygen,telemetry upon OT entry to room.    General Precautions: Standard, fall,respiratory   Orthopedic Precautions:N/A   Braces: N/A  Respiratory Status: Nasal cannula, flow 4 L/min     Occupational Performance:     Bed Mobility:    Pt found and returned to EOB    Functional Mobility/Transfers:  · Patient completed Sit <> Stand Transfer with supervision  with  no  assistive device   · Functional Mobility: Pt ambulated 50 ft x2 trials with CGA-SBA with SPC in order to maximize functional activity tolerance and standing balance required for engagement in occupations of choice.    · 4L O2 in tow, after 50 ft SpO2 82%. O2 titrated to 6L where it remained >90 for conclusion of mobility. Pt returned to EOB and O2 placed on 4L. SpO2 remained >92%.   · Pt alternating UE using SPC during mobility causing increased instability despite cuing.    Activities of Daily Living:  · Feeding:  independence to take medication from RN; writing therapist obtained pitcher of ice water per pt request      [facility] 6 Click ADL: 20    Treatment & Education:  -Therapist provided facilitation and instruction of proper body mechanics, energy conservation, and fall prevention strategies during tasks listed above.  -Pt educated on role of OT, POC and goals for therapy  -Pt educated on importance of OOB activities with staff member assistance and sitting OOB majority of the day.   -Pt inquiring about portable O2 that can be worn over the shoulder for her upcoming discharge, writing therapist reached out to social work via secure chat. Pt also inquiring about obtained step stool as her bed is elevated at home. Pt educated that step stools are not covered by insurance and she will have to obtain this on her own.  -Pt verbalized understanding. Pt expressed no further concerns/questions  -Whiteboard updated    Patient left EOB with all lines intact, call button in reach and RN notifiedEducation:      GOALS:   Multidisciplinary Problems     Occupational Therapy Goals        Problem: Occupational Therapy Goal    Goal Priority Disciplines Outcome Interventions   Occupational Therapy Goal     OT, PT/OT Ongoing, Progressing    Description: Goals to be met by: 1/12/22     Patient will increase functional independence with ADLs by performing:    Feeding with Set-up Assistance.  UE Dressing with Stand-by Assistance.  AMINATA  Dressing with Stand-by Assistance.  Grooming while standing at sink with Stand-by Assistance.  Toileting from toilet with Stand-by Assistance for hygiene and clothing management.   Toilet transfer to toilet with Stand-by Assistance.                     Time Tracking:     OT Date of Treatment: 01/04/22  OT Start Time: 0844  OT Stop Time: 0907  OT Total Time (min): 23 min    Billable Minutes:Self Care/Home Management 10  Therapeutic Activity 13    OT/TRAVIS: OT          1/4/2022

## 2022-01-04 NOTE — SUBJECTIVE & OBJECTIVE
Interval History: No acute events overnight, afebrile, vital signs stable. Patient continues to have subjective improvement in respiratory symptoms.  Still on 4-5 L nasal canula this AM (satting~ 90%). Increased coughing over the past 24 hours with significant amount of yellow/ white sputum production. No blood in sputum. Will give patient hypertonic saline nebs and mucinex, continuing to try and wean O2.     Review of Systems   Constitutional: Positive for activity change. Negative for appetite change, chills, diaphoresis, fever and unexpected weight change.   HENT: Negative for congestion and rhinorrhea.    Eyes: Negative.    Respiratory: Positive for cough, shortness of breath and wheezing. Negative for chest tightness.         Copious sputum production    Cardiovascular: Negative for chest pain, palpitations and leg swelling.   Gastrointestinal: Negative for abdominal distention, nausea and vomiting.   Endocrine: Negative.    Genitourinary: Negative.    Skin: Negative.    Allergic/Immunologic: Negative.    Neurological: Negative for dizziness and numbness.   Psychiatric/Behavioral: Negative for agitation and confusion. The patient is not nervous/anxious (on Valium).      Objective:     Vital Signs (Most Recent):  Temp: 97.7 °F (36.5 °C) (01/04/22 0738)  Pulse: 87 (01/04/22 1257)  Resp: 18 (01/04/22 1257)  BP: 120/61 (01/04/22 0738)  SpO2: 96 % (01/04/22 1257) Vital Signs (24h Range):  Temp:  [96.7 °F (35.9 °C)-97.7 °F (36.5 °C)] 97.7 °F (36.5 °C)  Pulse:  [78-91] 87  Resp:  [16-20] 18  SpO2:  [90 %-97 %] 96 %  BP: (104-132)/(61-79) 120/61     Weight: 89.2 kg (196 lb 10.4 oz)  Body mass index is 31.74 kg/m².    Intake/Output Summary (Last 24 hours) at 1/4/2022 1305  Last data filed at 1/4/2022 0900  Gross per 24 hour   Intake 1470 ml   Output 2501 ml   Net -1031 ml      Physical Exam  Vitals and nursing note reviewed.   Constitutional:       General: She is not in acute distress.     Appearance: Normal  appearance. She is obese. She is not ill-appearing, toxic-appearing or diaphoretic.   HENT:      Head: Normocephalic and atraumatic.      Mouth/Throat:      Mouth: Mucous membranes are moist.   Eyes:      General: No scleral icterus.        Right eye: No discharge.         Left eye: No discharge.   Cardiovascular:      Rate and Rhythm: Normal rate and regular rhythm.      Pulses: Normal pulses.      Heart sounds: Normal heart sounds.   Pulmonary:      Effort: Pulmonary effort is normal. No respiratory distress.      Breath sounds: Wheezing present. No rhonchi or rales.   Abdominal:      General: Abdomen is flat. Bowel sounds are normal. There is no distension.   Musculoskeletal:         General: No swelling.      Cervical back: Normal range of motion.      Right lower leg: No edema.      Left lower leg: No edema.   Skin:     General: Skin is warm and dry.      Capillary Refill: Capillary refill takes less than 2 seconds.      Coloration: Skin is not jaundiced.   Neurological:      General: No focal deficit present.      Mental Status: She is alert and oriented to person, place, and time. Mental status is at baseline.         Significant Labs:   All pertinent labs within the past 24 hours have been reviewed.  CBC:   Recent Labs   Lab 01/03/22  0705 01/04/22  0518   WBC 17.87* 17.41*   HGB 9.7* 9.6*   HCT 32.1* 31.6*    383     CMP:   Recent Labs   Lab 01/03/22  0705 01/04/22  0518    138   K 4.3 4.6   CL 97 98   CO2 30* 32*   GLU 81 86   BUN 29* 25*   CREATININE 1.1 1.2   CALCIUM 9.4 9.4   PROT 6.0 6.0   ALBUMIN 2.6* 2.7*   BILITOT 0.2 0.2   ALKPHOS 80 81   AST 10 10   ALT 11 12   ANIONGAP 10 8   EGFRNONAA 53.9* 48.6*     Magnesium:   Recent Labs   Lab 01/03/22  0705 01/04/22  0518   MG 1.9 1.9       Significant Imaging: I have reviewed all pertinent imaging results/findings within the past 24 hours.     X-Ray Chest 1 View [[id number]] Resulted: 01/01/22 1145   Impression:       Since December 26, 2021,  decreased left middle and lower lung zone airspace opacities.  No new abnormality.

## 2022-01-04 NOTE — PROGRESS NOTES
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 She was recently admitted 12/14-12/16 for pneumonia and COPD exacerbation. She states that the SOB of began two days ago and acutely worsened today. She reports an associated cough that is productive with white sputum. She states that she has CP and incontinence associated with the cough. She also reports PRITCHETT, stating that she cannot walk to the bathroom without getting significantly SOB. She reports chills but has not checked her temperature. She had one episode of diarrhea today, denies nausea/vomiting.     In the ED, her O2 sat was initially 78% and she was placed on bipap. She was able to be weaned to 6L NC satting 96%. Afebrile and /78. Labs significant for WBC 16.65, lactate 2.4. Trop and BNP normal. UA unremarkable. CXR with progression of airspace disease in the left hemithorax, stable appearance of airspace disease in the right hemithorax. CTA chest concerning for occlusion of RUL pulmonary artery branch, intraluminal infiltration or tumor thrombus vs thromboembolus. Pulmonology consulted by ED, do not think anticoagulation is warranted and agree with treatment as COPD exacerbation. Admitted to hospital medicine for further management.       Overview/Hospital Course:  Patient admitted to hospital medicine for evaluation of dyspnea. CT Chest notable for new nonspecific 2.4 cm ground-glass opacity within the  left lower lobe. Lower extremity ultrasound negative for DVT. TTE normal with EF of 60%. Hem/Onc consulted as suspicion for Keytruda induced pneumonitis, but per evaluation: ground glass opacities primarily in the lower lobes and is not entirely consistent with immunotherapy related pneumonitis (especially as she has already improved without steroids). Pulm consulted: high suspicion for  COPD exacerbation in addition to PDL1 pneumonitis , recommended holding keytruda and administer solumedrol 80mg IV daily, duonebs + levofloxacin x 5 days. Some improvement in respiratory status over course of hospitalization, increased Duonebs frequency to q4h. Trial of BiPap overnight on 01/01 improved respiratory requirements. Transitioned steroids to Prednisone 60mg PO with taper. Repeat CXR on 01/01 showed decreased left middle and lower lung zone airspace opacities. Fungitel assay ordered. Continuing to wean O2      Interval History: No acute events overnight, afebrile, vital signs stable. Patient continues to have subjective improvement in respiratory symptoms.  Still on 4-5 L nasal canula this AM (satting~ 90%). Increased coughing over the past 24 hours with significant amount of yellow/ white sputum production. No blood in sputum. Will give patient hypertonic saline nebs and mucinex, continuing to try and wean O2.     Review of Systems   Constitutional: Positive for activity change. Negative for appetite change, chills, diaphoresis, fever and unexpected weight change.   HENT: Negative for congestion and rhinorrhea.    Eyes: Negative.    Respiratory: Positive for cough, shortness of breath and wheezing. Negative for chest tightness.         Copious sputum production    Cardiovascular: Negative for chest pain, palpitations and leg swelling.   Gastrointestinal: Negative for abdominal distention, nausea and vomiting.   Endocrine: Negative.    Genitourinary: Negative.    Skin: Negative.    Allergic/Immunologic: Negative.     Neurological: Negative for dizziness and numbness.   Psychiatric/Behavioral: Negative for agitation and confusion. The patient is not nervous/anxious (on Valium).      Objective:     Vital Signs (Most Recent):  Temp: 97.7 °F (36.5 °C) (01/04/22 0738)  Pulse: 87 (01/04/22 1257)  Resp: 18 (01/04/22 1257)  BP: 120/61 (01/04/22 0738)  SpO2: 96 % (01/04/22 1257) Vital Signs (24h Range):  Temp:  [96.7 °F (35.9 °C)-97.7 °F (36.5 °C)] 97.7 °F (36.5 °C)  Pulse:  [78-91] 87  Resp:  [16-20] 18  SpO2:  [90 %-97 %] 96 %  BP: (104-132)/(61-79) 120/61     Weight: 89.2 kg (196 lb 10.4 oz)  Body mass index is 31.74 kg/m².    Intake/Output Summary (Last 24 hours) at 1/4/2022 1305  Last data filed at 1/4/2022 0900  Gross per 24 hour   Intake 1470 ml   Output 2501 ml   Net -1031 ml      Physical Exam  Vitals and nursing note reviewed.   Constitutional:       General: She is not in acute distress.     Appearance: Normal appearance. She is obese. She is not ill-appearing, toxic-appearing or diaphoretic.   HENT:      Head: Normocephalic and atraumatic.      Mouth/Throat:      Mouth: Mucous membranes are moist.   Eyes:      General: No scleral icterus.        Right eye: No discharge.         Left eye: No discharge.   Cardiovascular:      Rate and Rhythm: Normal rate and regular rhythm.      Pulses: Normal pulses.      Heart sounds: Normal heart sounds.   Pulmonary:      Effort: Pulmonary effort is normal. No respiratory distress.      Breath sounds: Wheezing present. No rhonchi or rales.   Abdominal:      General: Abdomen is flat. Bowel sounds are normal. There is no distension.   Musculoskeletal:         General: No swelling.      Cervical back: Normal range of motion.      Right lower leg: No edema.      Left lower leg: No edema.   Skin:     General: Skin is warm and dry.      Capillary Refill: Capillary refill takes less than 2 seconds.      Coloration: Skin is not jaundiced.   Neurological:      General: No focal deficit present.       Mental Status: She is alert and oriented to person, place, and time. Mental status is at baseline.         Significant Labs:   All pertinent labs within the past 24 hours have been reviewed.  CBC:   Recent Labs   Lab 01/03/22  0705 01/04/22  0518   WBC 17.87* 17.41*   HGB 9.7* 9.6*   HCT 32.1* 31.6*    383     CMP:   Recent Labs   Lab 01/03/22  0705 01/04/22 0518    138   K 4.3 4.6   CL 97 98   CO2 30* 32*   GLU 81 86   BUN 29* 25*   CREATININE 1.1 1.2   CALCIUM 9.4 9.4   PROT 6.0 6.0   ALBUMIN 2.6* 2.7*   BILITOT 0.2 0.2   ALKPHOS 80 81   AST 10 10   ALT 11 12   ANIONGAP 10 8   EGFRNONAA 53.9* 48.6*     Magnesium:   Recent Labs   Lab 01/03/22 0705 01/04/22 0518   MG 1.9 1.9       Significant Imaging: I have reviewed all pertinent imaging results/findings within the past 24 hours.     X-Ray Chest 1 View [[id number]] Resulted: 01/01/22 1145   Impression:       Since December 26, 2021, decreased left middle and lower lung zone airspace opacities.  No new abnormality.            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 Recently admitted 12/14-12/16 for pneumonia and COPD exacerbation. In the ED, her O2 sat was initially 78% and she was placed on bipap. She was able to be weaned to 6L NC satting 96%. Labs significant for WBC 16.65, but recently on steroids, and lactate 2.4. CXR with progression of airspace disease in the left hemithorax, stable appearance of airspace disease in the right hemithorax. CTA chest concerning for occlusion of RUL pulmonary artery branch, intraluminal infiltration or tumor thrombus vs thromboembolus. Pulmonology consulted by ED, do not think anticoagulation is warranted. She reports some improvement in symptoms while in ED. Admitted to hospital medicine for  "management of COPD exacerbation.     -Hem/Onc consulted as suspicion for Keytruda induced pneumonitis, but per evaluation: ground glass opacities primarily in the lower lobes and is not entirely consistent with immunotherapy related pneumonitis (especially as she has already improved without steroids)  -Pulm consulted: high suspicion for  COPD exacerbation in addition to PDL1 pneumonitis , recommended holding keytruda, duonebs + levofloxacin x 5 days, prednisone 60 mg with taper     -Respiratory status improved from admission, now stable on 4-5 L NC    Plan:  -Breo inhaler   -Chest physiotherapy scheduled  - Per pulm consult, for severe COPD exacerbation:   -duo nebs scheduled    -levofloxacin 5 day course completed   -80mg methlyprednisolone x5 day course completed, switched to prednisone 60mg PO starting 1/1   -Per Hem/Onc next dose of immunotherapy is not due until Feb 1st so will defer decision on whether to continue current treatment plan to her outpatient oncologist.   - continue phenergan-codeine prn for cough  -Mucinex  -Hypertonic saline nebs   - wean O2 as tolerated   -Pt qualifies for home oxygen       Pneumonitis  See "COPD exacerbation" A&P    - will require long steroid taper with bactrim ppx and ppi       Acute on chronic respiratory failure with hypoxemia  See "COPD exacerbation" A&P    GERD (gastroesophageal reflux disease)  - protonix daily      Anxiety  -Continue Valium 5mg  - continue bupropion   - continue atarax prn     Adenocarcinoma of lung, stage 4, right  Follows with Dr. Peterson. S/p Carboplatin AUC4 x1 dose 5/27/20 and palliative radiation (5/27/2020 - 6/2/2020; 20 Gy to right lung). Also received SRS to right cerebellar metastasis 6/3/2020. Pembrolizumab started 6/4/2020, C23 on 12/21/2021.    Plan:  - Hem Onc consulted by ED  - continue gabapentin  - continue tramadol prn   - Will follow up with Dr. Peterson as an outpatient       Essential hypertension  Normotensive on admission. On amlodipine " and irbesartan-hydrochlorothiazide.    Plan:  - Monitor BP  - Restarted home meds on 12/31/21  - Home irbesartan-hydrochlorothiazide replaced with formulary alternative (losartan-hydrochlorothiazide 100-12.5 mg)      VTE Risk Mitigation (From admission, onward)         Ordered     enoxaparin injection 40 mg  Daily         12/27/21 0536     IP VTE HIGH RISK PATIENT  Once         12/27/21 0536     Place sequential compression device  Until discontinued         12/27/21 0536                Discharge Planning   TIFFANIE: 1/4/2022     Code Status: Full Code   Is the patient medically ready for discharge?:     Reason for patient still in hospital (select all that apply): Pending disposition  Discharge Plan A: Home Health                  Dilma Bhandari MD  Department of Hospital Medicine   [facility] - Cardiology Stepdown

## 2022-01-04 NOTE — NURSING
Home Oxygen Evaluation    Date Performed: 2022    1) Patient's Home O2 Sat on room air, while at rest: 89%        If O2 sats on room air at rest are 88% or below, patient qualifies. No additional testing needed. Document N/A in steps 2 and 3. If 89% or above, complete steps 2.      2) Patient's O2 Sat on room air while exercisin %        If O2 sats on room air while exercising remain 89% or above patient does not qualify, no further testing needed Document N/A in step 3. If O2 sats on room air while exercising are 88% or below, continue to step 3.      3) Patient's O2 Sat while exercising on O2: 92% BNX at 6 LPM         (Must show improvement from #2 for patients to qualify)    If O2 sats improve on oxygen, patient qualifies for portable oxygen. If not, the patient does not qualify.      Patient does qualify for home oxygen at  6 L BNC.

## 2022-01-04 NOTE — PT/OT/SLP PROGRESS
Physical Therapy Treatment    Patient Name:  Janeth Boyce   MRN:  [id number]    Recommendations:     Discharge Recommendations:  home health PT,home health OT   Discharge Equipment Recommendations: walker, rolling   Barriers to discharge: None    Assessment:     Janeth Boyce is a 61 y.o. female admitted with a medical diagnosis of COPD exacerbation.  She presents with the following impairments/functional limitations:  weakness,impaired endurance,impaired self care skills,impaired functional mobilty,gait instability,impaired balance,decreased upper extremity function,decreased lower extremity function,decreased safety awareness,impaired cardiopulmonary response to activity. Pt would benefit from HHPT/OT for: Dynamic/static standing/sitting balance through skilled balance training, strengthening with the use of skilled therapeutic exercises interventions, and mobility through adaptive equipment training. Pt continues to benefit from a collaborative PT/OT program to improve quality of life and focus on recovery of impairments.    Rehab Prognosis: Good; patient would benefit from acute skilled PT services to address these deficits and reach maximum level of function.    Recent Surgery: * No surgery found *      Plan:     During this hospitalization, patient to be seen 3 x/week to address the identified rehab impairments via gait training,therapeutic activities,therapeutic exercises,neuromuscular re-education and progress toward the following goals:    · Plan of Care Expires:  01/29/22    Subjective     Chief Complaint: none verbalized  Patient/Family Comments/goals: Pt reports that she needs a portable oxygen tank as the large metal oxygen tank is unmanageable and limits her.  Pain/Comfort:  · Pain Rating 1: 0/10  · Pain Rating Post-Intervention 1: 0/10      Objective:     Communicated with RN prior to session.  Patient found sitting EOB with telemetry,oxygen upon PT entry to room.     General Precautions:  Standard, fall,respiratory   Orthopedic Precautions:N/A   Braces: N/A  Respiratory Status: Nasal cannula, flow 4 L/min, pt increased to 6 L/min for ambulation     Functional Mobility:  · Bed Mobility:   N/T pt found sitting EOB  · Transfers:     · Sit to Stand:  supervision with RW  · Gait: 90 ft x 2, RW, Supervision. No LOB or unsteadiness noted. Pt with improved mobility and no coughing during ambualtion.  · Balance: indep for sitting balance and Supervision with RW      AM-PAC 6 CLICK MOBILITY  Turning over in bed (including adjusting bedclothes, sheets and blankets)?: 4  Sitting down on and standing up from a chair with arms (e.g., wheelchair, bedside commode, etc.): 3  Moving from lying on back to sitting on the side of the bed?: 4  Moving to and from a bed to a chair (including a wheelchair)?: 3  Need to walk in hospital room?: 3  Climbing 3-5 steps with a railing?: 2  Basic Mobility Total Score: 19       Therapeutic Activities and Exercises:   Patient educated on role of therapy, goals of session, and benefits of mobilizing.   Discussed PT plan of care during hospitalization.   Patient educated on calling for assistance.   Patient educated on how their diagnosis impacts their mobility within PT scope of practice.   Communication board up to date.  All questions answered within PT scope of practice.    Patient left sitting EOB with all lines intact and call button in reach..    GOALS:   Multidisciplinary Problems     Physical Therapy Goals        Problem: Physical Therapy Goal    Goal Priority Disciplines Outcome Goal Variances Interventions   Physical Therapy Goal     PT, PT/OT Ongoing, Progressing     Description: Goals to be met by: 2021     Patient will increase functional independence with mobility by performin. Supine to sit with [place]  2. Sit to supine with [place]  3. Sit to stand transfer with Modified [place]  4. Gait  x 200 feet with Modified [place] using Least  Restrictive Assistive Device.                      Time Tracking:     PT Received On: 01/04/22  PT Start Time: 1236     PT Stop Time: 1252  PT Total Time (min): 16 min     Billable Minutes: Therapeutic Exercise 16    Treatment Type: Treatment  PT/PTA: PT     PTA Visit Number: 0     01/04/2022

## 2022-01-04 NOTE — ASSESSMENT & PLAN NOTE
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 Recently admitted 12/14-12/16 for pneumonia and COPD exacerbation. In the ED, her O2 sat was initially 78% and she was placed on bipap. She was able to be weaned to 6L NC satting 96%. Labs significant for WBC 16.65, but recently on steroids, and lactate 2.4. CXR with progression of airspace disease in the left hemithorax, stable appearance of airspace disease in the right hemithorax. CTA chest concerning for occlusion of RUL pulmonary artery branch, intraluminal infiltration or tumor thrombus vs thromboembolus. Pulmonology consulted by ED, do not think anticoagulation is warranted. She reports some improvement in symptoms while in ED. Admitted to hospital medicine for management of COPD exacerbation.     -Hem/Onc consulted as suspicion for Keytruda induced pneumonitis, but per evaluation: ground glass opacities primarily in the lower lobes and is not entirely consistent with immunotherapy related pneumonitis (especially as she has already improved without steroids)  -Pulm consulted: high suspicion for  COPD exacerbation in addition to PDL1 pneumonitis , recommended holding keytruda, duonebs + levofloxacin x 5 days, prednisone 60 mg with taper     -Respiratory status improved from admission, now stable on 4-5 L NC    Plan:  -Breo inhaler   -Chest physiotherapy scheduled  - Per pulm consult, for severe COPD exacerbation:   -duo nebs scheduled    -levofloxacin 5 day course completed   -80mg methlyprednisolone x5 day course completed, switched to prednisone 60mg PO starting 1/1   -Per Hem/Onc next dose of immunotherapy is not due until Feb 1st so will defer decision on whether to continue current treatment plan to her outpatient oncologist.   - continue phenergan-codeine prn for cough  -Mucinex  -Hypertonic  saline nebs   - wean O2 as tolerated   -Pt qualifies for home oxygen

## 2022-01-05 NOTE — DISCHARGE SUMMARY
[facility] - Cardiology 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 She was recently admitted 12/14-12/16 for pneumonia and COPD exacerbation. She states that the SOB of began two days ago and acutely worsened today. She reports an associated cough that is productive with white sputum. She states that she has CP and incontinence associated with the cough. She also reports PRITCHETT, stating that she cannot walk to the bathroom without getting significantly SOB. She reports chills but has not checked her temperature. She had one episode of diarrhea today, denies nausea/vomiting.     In the ED, her O2 sat was initially 78% and she was placed on bipap. She was able to be weaned to 6L NC satting 96%. Afebrile and /78. Labs significant for WBC 16.65, lactate 2.4. Trop and BNP normal. UA unremarkable. CXR with progression of airspace disease in the left hemithorax, stable appearance of airspace disease in the right hemithorax. CTA chest concerning for occlusion of RUL pulmonary artery branch, intraluminal infiltration or tumor thrombus vs thromboembolus. Pulmonology consulted by ED, do not think anticoagulation is warranted and agree with treatment as COPD exacerbation. Admitted to hospital medicine for further management.       * No surgery found *      Hospital Course:   Patient admitted to hospital medicine for evaluation of dyspnea. CT Chest notable  for new nonspecific 2.4 cm ground-glass opacity within the left lower lobe. Lower extremity ultrasound negative for DVT. TTE normal with EF of 60%. Hem/Onc consulted as suspicion for Keytruda induced pneumonitis, but per evaluation: ground glass opacities primarily in the lower lobes and is not entirely consistent with immunotherapy related pneumonitis (especially as she has already improved without steroids). Pulm consulted: high suspicion for  COPD exacerbation in addition to PDL1 pneumonitis , recommended holding keytruda and administer solumedrol 80mg IV daily, duonebs + levofloxacin x 5 days. Some improvement in respiratory status over course of hospitalization, increased Duonebs frequency to q4h. Trial of BiPap overnight on 01/01 improved respiratory requirements. Transitioned steroids to Prednisone 60mg PO with taper. Repeat CXR on 01/01 showed decreased left middle and lower lung zone airspace opacities. Fungitel assay ordered. Continuing to wean O2. Patient stable for discharge on home oxygen on 1/5       Goals of Care Treatment Preferences:  Code Status: Full Code    Health care agent: Kell Boyce (daughter)  Health care agent number: [phone]                Objective:      Vital Signs (Most Recent):  Temp: 97.7 °F (36.5 °C) (01/04/22 0738)  Pulse: 87 (01/04/22 1257)  Resp: 18 (01/04/22 1257)  BP: 120/61 (01/04/22 0738)  SpO2: 96 % (01/04/22 1257) Vital Signs (24h Range):  Temp:  [96.7 °F (35.9 °C)-97.7 °F (36.5 °C)] 97.7 °F (36.5 °C)  Pulse:  [78-91] 87  Resp:  [16-20] 18  SpO2:  [90 %-97 %] 96 %  BP: (104-132)/(61-79) 120/61      Weight: 89.2 kg (196 lb 10.4 oz)  Body mass index is 31.74 kg/m².     Intake/Output Summary (Last 24 hours) at 1/4/2022 1305  Last data filed at 1/4/2022 0900      Gross per 24 hour   Intake 1470 ml   Output 2501 ml   Net -1031 ml      Physical Exam  Vitals and nursing note reviewed.   Constitutional:       General: She is not in acute distress.     Appearance: Normal  appearance. She is obese. She is not ill-appearing, toxic-appearing or diaphoretic.   HENT:      Head: Normocephalic and atraumatic.      Mouth/Throat:      Mouth: Mucous membranes are moist.   Eyes:      General: No scleral icterus.        Right eye: No discharge.         Left eye: No discharge.   Cardiovascular:      Rate and Rhythm: Normal rate and regular rhythm.      Pulses: Normal pulses.      Heart sounds: Normal heart sounds.   Pulmonary:      Effort: Pulmonary effort is normal. No respiratory distress.      Breath sounds: Wheezing present. No rhonchi or rales.   Abdominal:      General: Abdomen is flat. Bowel sounds are normal. There is no distension.   Musculoskeletal:         General: No swelling.      Cervical back: Normal range of motion.      Right lower leg: No edema.      Left lower leg: No edema.   Skin:     General: Skin is warm and dry.      Capillary Refill: Capillary refill takes less than 2 seconds.      Coloration: Skin is not jaundiced.   Neurological:      General: No focal deficit present.      Mental Status: She is alert and oriented to person, place, and time. Mental status is at baseline.         Consults:   Consults (From admission, onward)        Status Ordering Provider     Inpatient consult to Hematology/Oncology  Once        Provider:  (Not yet assigned)    Completed TONY LANCE     Inpatient consult to Pulmonology  Once        Provider:  (Not yet assigned)    Completed TONY LANCE          * COPD exacerbation  62 y/o F with a PMH of COPD on 2L home O2, 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 (s/p palliative XRT, on pembrolizumab) who presents with SOB x2 days that has acutely worsened. Recently admitted 12/14-12/16 for pneumonia and COPD exacerbation. In the ED, her O2 sat was initially 78% and she was placed on bipap. She was able to be weaned to 6L NC satting 96%. Labs significant for WBC 16.65, but recently on steroids, and  "lactate 2.4. CXR with progression of airspace disease in the left hemithorax, stable appearance of airspace disease in the right hemithorax. CTA chest concerning for occlusion of RUL pulmonary artery branch, intraluminal infiltration or tumor thrombus vs thromboembolus. Pulmonology consulted by ED, do not think anticoagulation is warranted. She reports some improvement in symptoms while in ED. Admitted to hospital medicine for management of COPD exacerbation.     -Hem/Onc consulted as suspicion for Keytruda induced pneumonitis, but per evaluation: ground glass opacities primarily in the lower lobes and is not entirely consistent with immunotherapy related pneumonitis (especially as she has already improved without steroids)  -Pulm consulted: high suspicion for  COPD exacerbation in addition to PDL1 pneumonitis , recommended holding keytruda, duonebs + levofloxacin x 5 days, prednisone 60 mg with taper     -Respiratory status improved from admission, now stable on 4-5 L NC    Plan:  -Breo inhaler   -Chest physiotherapy scheduled  - Per pulm consult, for severe COPD exacerbation:   -duo nebs scheduled    -levofloxacin 5 day course completed   -80mg methlyprednisolone x5 day course completed, switched to prednisone 60mg PO starting 1/1   -Per Hem/Onc next dose of immunotherapy is not due until Feb 1st so will defer decision on whether to continue current treatment plan to her outpatient oncologist.   - continue phenergan-codeine prn for cough  -Mucinex  -Hypertonic saline nebs   - wean O2 as tolerated   -Pt qualifies for home oxygen       Pneumonitis  See "COPD exacerbation" A&P    - will require long steroid taper with bactrim ppx and ppi       Acute on chronic respiratory failure with hypoxemia  See "COPD exacerbation" A&P    GERD (gastroesophageal reflux disease)  - protonix daily      Anxiety  -Continue Valium 5mg  - continue bupropion   - continue atarax prn     Essential hypertension  Normotensive on admission. " "On amlodipine and irbesartan-hydrochlorothiazide.    Plan:  - Monitor BP  - Restarted home meds on 12/31/21  - Home irbesartan-hydrochlorothiazide replaced with formulary alternative (losartan-hydrochlorothiazide 100-12.5 mg)      Final Active Diagnoses:    Diagnosis Date Noted POA    PRINCIPAL PROBLEM:  COPD exacerbation [J44.1] 12/14/2021 Yes    Chest pain [R07.9]  Yes    Hypoxia [R09.02]  Yes    Shortness of breath [R06.02]  Yes    Acute on chronic respiratory failure with hypoxemia [J96.21]  Yes    Pneumonitis [J18.9]  Yes    GERD (gastroesophageal reflux disease) [K21.9] 06/17/2020 Yes    Anxiety [F41.9] 05/26/2020 Yes    Adenocarcinoma of lung, stage 4, right [C34.91] 05/11/2020 Yes    Essential hypertension [I10] 05/01/2020 Yes      Problems Resolved During this Admission:    Diagnosis Date Noted Date Resolved POA    Chronic respiratory failure with hypoxia [J96.11] 06/18/2020 01/03/2022 Yes       Discharged Condition: stable    Disposition:     Follow Up:    Patient Instructions:      CANE FOR HOME USE     Order Specific Question Answer Comments   Type of Cane: Straight    Height: 5' 6" (1.676 m)    Weight: 86.2 kg (190 lb 0.6 oz)    Does patient have medical equipment at home? oxygen requesting a cane   Length of need (1-99 months): 99    Please check all that apply: Patient's condition impairs ambulation.      OXYGEN FOR HOME USE     Order Specific Question Answer Comments   Liter Flow 6    Duration Continuous    Qualifying Test Performed at: Activity    Oxygen saturation at rest 89    Oxygen saturation with activity 82    Oxygen saturation with activity on oxygen 92    Portable mode: continuous    Route nasal cannula    Device: home concentrator with portable tanks    Length of need (in months): 99 mos    Patient condition with qualifying saturation COPD    Height: 5' 6" (1.676 m)    Weight: 89.2 kg (196 lb 10.4 oz)    Alternative treatment measures have been tried or considered and deemed " clinically ineffective. Yes      Ambulatory referral/consult to Oncology   Standing Status: Future   Referral Priority: Routine Referral Type: Consultation   Referral Reason: Specialty Services Required   Requested Specialty: Oncology   Number of Visits Requested: 1       Significant Diagnostic Studies: Labs:   BMP:   Recent Labs   Lab 01/04/22 0518 01/05/22 0418   GLU 86 91    137   K 4.6 4.6   CL 98 97   CO2 32* 31*   BUN 25* 23   CREATININE 1.2 1.0   CALCIUM 9.4 9.4   MG 1.9 1.9   , CMP   Recent Labs   Lab 01/04/22 0518 01/05/22 0418    137   K 4.6 4.6   CL 98 97   CO2 32* 31*   GLU 86 91   BUN 25* 23   CREATININE 1.2 1.0   CALCIUM 9.4 9.4   PROT 6.0 6.1   ALBUMIN 2.7* 2.7*   BILITOT 0.2 0.2   ALKPHOS 81 87   AST 10 10   ALT 12 12   ANIONGAP 8 9   ESTGFRAFRICA 56.0* >60.0   EGFRNONAA 48.6* >60.0   , CBC   Recent Labs   Lab 01/04/22 0518 01/04/22 0518 01/05/22 0418   WBC 17.41*  --  18.20*   HGB 9.6*  --  9.9*   HCT 31.6*   < > 33.0*     --  350    < > = values in this interval not displayed.    and All labs within the past 24 hours have been reviewed    Pending Diagnostic Studies:     Procedure Component Value Units Date/Time    EKG 12-lead [[id number]] Collected: 01/05/22 0520    Order Status: Sent Lab Status: In process Updated: 01/05/22 0738    Narrative:      Test Reason : R94.31,    Vent. Rate : 084 BPM     Atrial Rate : 084 BPM     P-R Int : 152 ms          QRS Dur : 064 ms      QT Int : 348 ms       P-R-T Axes : 067 031 037 degrees     QTc Int : 411 ms    Normal sinus rhythm  Normal ECG  When compared with ECG of 04-JAN-2022 05:57,  No significant change was found    Referred By: [email or web address]   SELF           Confirmed By:     Fungitell Assay For (1.3)-B-D-Glucans [[id number]] Collected: 01/01/22 0512    Order Status: Sent Lab Status: In process Updated: 01/01/22 0531    Specimen: Blood          Medications:  Reconciled Home Medications:      Medication List      START taking these  medications    guaiFENesin 600 mg 12 hr tablet  Commonly known as: MUCINEX  Take 1 tablet (600 mg total) by mouth 2 (two) times daily for 10 days     predniSONE 20 MG tablet  Commonly known as: DELTASONE  Take 3 tablets (60 mg total) by mouth once daily for 10 days, THEN 2 tablets (40 mg total) once daily for 14 days, THEN 1 tablet (20 mg total) once daily for 14 days. REFILL MEDICATION TO FINISH FULL COURSE.  Start taking on: January 4, 2022     sulfamethoxazole-trimethoprim 800-160mg 800-160 mg Tab  Commonly known as: BACTRIM DS  Take 1 tablet by mouth once daily.        CHANGE how you take these medications    albuterol 90 mcg/actuation inhaler  Commonly known as: PROVENTIL/VENTOLIN HFA  Inhale 1-2 puffs into the lungs every 6 (six) hours as needed for Wheezing or Shortness of Breath.  What changed:   · how much to take  · reasons to take this     gabapentin 400 MG capsule  Commonly known as: NEURONTIN  Take 1 capsule (400 mg total) by mouth 3 (three) times daily.  What changed:   · how much to take  · how to take this  · when to take this     irbesartan-hydrochlorothiazide 300-12.5 mg per tablet  Commonly known as: AVALIDE  Take 1 tablet by mouth once daily.  What changed:   · how much to take  · how to take this  · when to take this     omeprazole 20 MG capsule  Commonly known as: PRILOSEC  Take 1 capsule (20 mg total) by mouth once daily.  What changed:   · how much to take  · when to take this        CONTINUE taking these medications    albuterol-ipratropium 2.5 mg-0.5 mg/3 mL nebulizer solution  Commonly known as: DUO-NEB  Take 3 mLs by nebulization every 6 (six) hours as needed for Wheezing. Rescue     amLODIPine 5 MG tablet  Commonly known as: NORVASC  Take 1 tablet (5 mg total) by mouth once daily.     budesonide-formoterol 160-4.5 mcg 160-4.5 mcg/actuation Hfaa  Commonly known as: SYMBICORT  Inhale 2 puffs into the lungs every 12 (twelve) hours. Controller     buPROPion 150 MG TBSR 12 hr tablet  Commonly  known as: WELLBUTRIN SR  Take 1 tablet (150 mg total) by mouth once daily.     cetirizine 10 MG tablet  Commonly known as: ZYRTEC  Take 1 tablet (10 mg total) by mouth once daily.     diazePAM 5 MG tablet  Commonly known as: VALIUM  Take 1 tablet (5 mg total) by mouth nightly as needed for Insomnia (do not take more than 3 times per week.). Fill 10/13/21     diclofenac sodium 3 % gel  Commonly known as: SOLARAZE  Apply topically 2 (two) times a day.     hydrOXYzine HCL 25 MG tablet  Commonly known as: ATARAX  TAKE ONE TABLET BY MOUTH EVERY DAY AS NEEDED FOR ANXIETY     multivitamin with minerals tablet  Take 1 tablet by mouth once daily.     ondansetron 8 MG Tbdl  Commonly known as: ZOFRAN-ODT  Dissolve 1 tablet (8 mg total) by mouth every 8 (eight) hours as needed.     polyethylene glycol 17 gram/dose powder  Commonly known as: GLYCOLAX  mix 1 capful (17 g) and take by mouth once daily.     promethazine-codeine 6.25-10 mg/5 ml 6.25-10 mg/5 mL syrup  Commonly known as: PHENERGAN with CODEINE  Take 5 mLs by mouth every 8 (eight) hours as needed for Cough. Fill 12/21/21     SENNA-S 8.6-50 mg per tablet  Generic drug: senna-docusate 8.6-50 mg  Take 1 tablet by mouth 2 (two) times daily.     tiotropium 18 mcg inhalation capsule  Commonly known as: SPIRIVA  Inhale 1 capsule (18 mcg total) into the lungs once daily. Controller     traMADoL 50 mg tablet  Commonly known as: ULTRAM  Take 1 tablet (50 mg total) by mouth every 8 (eight) hours as needed for Pain. Fill 12/21/21        STOP taking these medications    benzonatate 100 MG capsule  Commonly known as: TESSALON     LIDOcaine 5 % Oint ointment  Commonly known as: XYLOCAINE            Indwelling Lines/Drains at time of discharge:   Lines/Drains/Airways     None                 Time spent on the discharge of patient: 45 minutes         Dilma Bhandari MD  Department of Hospital Medicine  [facility] - Cardiology Stepdown

## 2022-01-05 NOTE — PLAN OF CARE
[facility] - [facility]iology Step[facility]      HOME HEALTH ORDERS  FACE TO FACE ENCOUNTER    Patient Name: Janeth Boyce  YOB: 1960    PCP: Freeman Caabllero MD   PCP Address: [address] / [place] [address]  PCP Phone Number: [phone]  PCP Fax: [phone]    Encounter Date: 12/26/21    Admit to Home Health    Diagnoses:  Active Hospital Problems    Diagnosis  POA    *COPD exacerbation [J44.1]  Yes    Chest pain [R07.9]  Yes    Hypoxia [R09.02]  Yes    Shortness of breath [R06.02]  Yes    Acute on chronic respiratory failure with hypoxemia [J96.21]  Yes    Pneumonitis [J18.9]  Yes    GERD (gastroesophageal reflux disease) [K21.9]  Yes    Anxiety [F41.9]  Yes    Adenocarcinoma of lung, stage 4, right [C34.91]  Yes     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  Initially presented with pleural effusion on right side s/p therapeutic thoracentesis 5/2020. 5/5/21 lung biopsy, final pathology adenocarcinoma. PDL1 expressed in 100% of tumor cells from biopsy.  No actionable mutations identified.  Carboplatin AUC4 x1 dose 5/27/20 and palliative radiation (5/27/2020 - 6/2/2020; 20 Gy to right lung).  Also received SRS to right cerebellar metastasis 6/3/2020.  6/4/21 started pembrolizumab.      Essential hypertension [I10]  Yes      Resolved Hospital Problems    Diagnosis Date Resolved POA    Chronic respiratory failure with hypoxia [J96.11] 01/03/2022 Yes       Follow Up Appointments:  Future Appointments   Date Time Provider Department Center   1/31/2022  1:15 PM [facility] OIC-MRI2 [facility] MRI [facility]   1/31/2022  2:00 PM LAB, APPOINTMENT [facility] INTMED [facility] LAB IM [facility]   2/1/2022  9:00 AM Maik Peterson MD [facility] HEMONC3 [facility]   2/1/2022  9:30 AM NURSE , [facility] CHEMO [facility] CHEMO [facility]       Allergies:Review of patient's allergies indicates:  No Known Allergies    Medications: Review discharge medications with patient and family and provide  education.    Current Facility-Administered Medications   Medication Dose Route Frequency Provider Last Rate Last Admin    acetaminophen tablet 650 mg  650 mg Oral Q4H PRN Ariella Eldridge MD   650 mg at 12/30/21 0143    albuterol-ipratropium 2.5 mg-0.5 mg/3 mL nebulizer solution 3 mL  3 mL Nebulization Q4H Noni Dickerson MD   3 mL at 01/05/22 0431    amLODIPine tablet 5 mg  5 mg Oral Daily Terry R Lauro, DO   5 mg at 01/04/22 0843    buPROPion TB24 tablet 150 mg  150 mg Oral Daily Ariella Eldridge MD   150 mg at 01/04/22 0844    dextrose 50% injection 12.5 g  12.5 g Intravenous PRN Ariella Eldridge MD        dextrose 50% injection 25 g  25 g Intravenous PRN Ariella Eldridge MD        diazePAM tablet 5 mg  5 mg Oral Nightly PRN Noni Dickerson MD   5 mg at 01/04/22 2031    diclofenac sodium 1 % gel 2 g  2 g Topical (Top) BID Terry R Lauro, DO   2 g at 01/04/22 2100    enoxaparin injection 40 mg  40 mg Subcutaneous Daily Ariella Eldridge MD   40 mg at 01/04/22 1629    fluticasone furoate-vilanteroL 100-25 mcg/dose diskus inhaler 1 puff  1 puff Inhalation Daily Ariella Eldridge MD   1 puff at 01/04/22 0803    gabapentin capsule 400 mg  400 mg Oral TID Ariella Eldridge MD   400 mg at 01/04/22 2031    glucagon (human recombinant) injection 1 mg  1 mg Intramuscular PRN Ariella Eldridge MD        glucose chewable tablet 16 g  16 g Oral PRN Ariella Eldridge MD        glucose chewable tablet 24 g  24 g Oral PRN Ariella Eldridge MD        guaiFENesin 12 hr tablet 600 mg  600 mg Oral BID Dilma Bhandari MD   600 mg at 01/04/22 2031    hydrOXYzine HCL tablet 25 mg  25 mg Oral Daily PRN Ariella Eldridge MD   25 mg at 01/05/22 0329    insulin aspart U-100 pen 0-5 Units  0-5 Units Subcutaneous QID (AC + HS) PRN Ariella Eldridge MD   3 Units at 01/03/22 1705    losartan-hydrochlorothiazide 100-12.5 mg per tablet 1 tablet  1 tablet Oral Daily Terry Restrepo,  DO   1 tablet at 01/04/22 0846    naloxone 0.4 mg/mL injection 0.02 mg  0.02 mg Intravenous PRN Ariella Eldridge MD        ondansetron disintegrating tablet 8 mg  8 mg Oral Q8H PRN Ariella Eldridge MD   8 mg at 01/05/22 0329    pantoprazole EC tablet 40 mg  40 mg Oral Daily Ariella Eldridge MD   40 mg at 01/04/22 0846    polyethylene glycol packet 17 g  17 g Oral Daily PRN Noni Dickerson MD        predniSONE tablet 60 mg  60 mg Oral Daily Noni Dickerson MD   60 mg at 01/04/22 0846    promethazine-codeine 6.25-10 mg/5 ml syrup 5 mL  5 mL Oral Q8H PRN Ariella Eldridge MD   5 mL at 01/04/22 2037    senna-docusate 8.6-50 mg per tablet 1 tablet  1 tablet Oral Daily PRN Noni Dickerson MD        sodium chloride 0.9% flush 10 mL  10 mL Intravenous Q12H PRN Ariella Eldridge MD        sodium chloride 0.9% flush 3 mL  3 mL Intravenous PRN Ariella Eldridge MD        sodium chloride 3% nebulizer solution 4 mL  4 mL Nebulization Q6H Dilma Bhandari MD   4 mL at 01/04/22 2026    sulfamethoxazole-trimethoprim 800-160mg per tablet 1 tablet  1 tablet Oral Daily Dilma Bhandari MD   1 tablet at 01/04/22 0847    tiotropium bromide 1.25 mcg/actuation inhaler 2 puff  2 puff Inhalation Daily Molly Rouse MD   2 puff at 01/04/22 0803    traMADoL tablet 50 mg  50 mg Oral Q8H PRN Ariella lEdridge MD   50 mg at 12/27/21 1147     Current Discharge Medication List      START taking these medications    Details   guaiFENesin (MUCINEX) 600 mg 12 hr tablet Take 1 tablet (600 mg total) by mouth 2 (two) times daily for 10 days  Qty: 20 tablet, Refills: 0      predniSONE (DELTASONE) 20 MG tablet Take 3 tablets (60 mg total) by mouth once daily for 10 days, THEN 2 tablets (40 mg total) once daily for 14 days, THEN 1 tablet (20 mg total) once daily for 14 days. REFILL MEDICATION TO FINISH FULL COURSE.  Qty: 72 tablet, Refills: 0      sulfamethoxazole-trimethoprim 800-160mg  (BACTRIM DS) 800-160 mg Tab Take 1 tablet by mouth once daily.  Qty: 30 tablet, Refills: 1         CONTINUE these medications which have CHANGED    Details   albuterol (PROVENTIL/VENTOLIN HFA) 90 mcg/actuation inhaler Inhale 1-2 puffs into the lungs every 6 (six) hours as needed for Wheezing or Shortness of Breath.  Qty: 18 g, Refills: 3    Associated Diagnoses: Adenocarcinoma of lung, stage 4, right      amLODIPine (NORVASC) 5 MG tablet Take 1 tablet (5 mg total) by mouth once daily.  Qty: 90 tablet, Refills: 3      budesonide-formoterol 160-4.5 mcg (SYMBICORT) 160-4.5 mcg/actuation HFAA Inhale 2 puffs into the lungs every 12 (twelve) hours. Controller  Qty: 10.2 g, Refills: 2      buPROPion (WELLBUTRIN SR) 150 MG TBSR 12 hr tablet Take 1 tablet (150 mg total) by mouth once daily.  Qty: 30 tablet, Refills: 6    Associated Diagnoses: Anxiety      gabapentin (NEURONTIN) 400 MG capsule Take 1 capsule (400 mg total) by mouth 3 (three) times daily.  Qty: 90 capsule, Refills: 3      irbesartan-hydrochlorothiazide (AVALIDE) 300-12.5 mg per tablet Take 1 tablet by mouth once daily.  Qty: 90 tablet, Refills: 3    Comments: .      omeprazole (PRILOSEC) 20 MG capsule Take 1 capsule (20 mg total) by mouth once daily.  Qty: 30 capsule, Refills: 3      tiotropium (SPIRIVA) 18 mcg inhalation capsule Inhale 1 capsule (18 mcg total) into the lungs once daily. Controller  Qty: 30 capsule, Refills: 3         CONTINUE these medications which have NOT CHANGED    Details   albuterol-ipratropium (DUO-NEB) 2.5 mg-0.5 mg/3 mL nebulizer solution Take 3 mLs by nebulization every 6 (six) hours as needed for Wheezing. Rescue  Qty: 90 each, Refills: 3    Associated Diagnoses: Chronic obstructive pulmonary disease with acute exacerbation      diclofenac sodium (SOLARAZE) 3 % gel Apply topically 2 (two) times a day.      hydrOXYzine HCL (ATARAX) 25 MG tablet TAKE ONE TABLET BY MOUTH EVERY DAY AS NEEDED FOR ANXIETY  Qty: 30 tablet, Refills: 1     Associated Diagnoses: Adenocarcinoma of lung, stage 4, right; Anxiety      traMADoL (ULTRAM) 50 mg tablet Take 1 tablet (50 mg total) by mouth every 8 (eight) hours as needed for Pain. Fill 12/21/21  Qty: 90 tablet, Refills: 0    Comments: Quantity prescribed more than 7 day supply? Yes, medically necessary. Fill 12/1/21  Associated Diagnoses: Chronic neoplasm-related pain      cetirizine (ZYRTEC) 10 MG tablet Take 1 tablet (10 mg total) by mouth once daily.  Qty: 30 tablet, Refills: 0    Associated Diagnoses: Seasonal allergies      diazePAM (VALIUM) 5 MG tablet Take 1 tablet (5 mg total) by mouth nightly as needed for Insomnia (do not take more than 3 times per week.). Fill 10/13/21  Qty: 15 tablet, Refills: 0    Associated Diagnoses: Adjustment insomnia      multivitamin with minerals tablet Take 1 tablet by mouth once daily.  Qty: 30 tablet, Refills: 11    Associated Diagnoses: Adenocarcinoma of lung, stage 4, right      ondansetron (ZOFRAN-ODT) 8 MG TbDL Dissolve 1 tablet (8 mg total) by mouth every 8 (eight) hours as needed.  Qty: 60 tablet, Refills: 0    Associated Diagnoses: Nausea      polyethylene glycol (GLYCOLAX) 17 gram/dose powder mix 1 capful (17 g) and take by mouth once daily.  Qty: 510 g, Refills: 0      promethazine-codeine 6.25-10 mg/5 ml (PHENERGAN WITH CODEINE) 6.25-10 mg/5 mL syrup Take 5 mLs by mouth every 8 (eight) hours as needed for Cough. Fill 12/21/21  Qty: 473 mL, Refills: 0    Comments: Quantity prescribed more than 7 day supply? Yes, medically necessary. Fill 12/1/21  Associated Diagnoses: Adenocarcinoma of lung, stage 4, right      senna-docusate 8.6-50 mg (PERICOLACE) 8.6-50 mg per tablet Take 1 tablet by mouth 2 (two) times daily.  Qty: 60 tablet, Refills: 0         STOP taking these medications       benzonatate (TESSALON) 100 MG capsule Comments:   Reason for Stopping:         lidocaine (XYLOCAINE) 5 % Oint ointment Comments:   Reason for Stopping:                 I have seen and  examined this patient within the last 30 days. My clinical findings that support the need for the home health skilled services and home bound status are the following:no   Weakness/numbness causing balance and gait disturbance due to COPD Exacerbation, Weakness/Debility, Anemia and Malignancy/Cancer making it taxing to leave home.  Requiring assistive device to leave home due to unsteady gait caused by  COPD Exacerbation, Weakness/Debility, Anemia and Malignancy/Cancer.  Patient with medication mismanagement issues requiring home bound status as evidenced by  Poor understanding of medication regimen/dosage.  Medical restrictions requiring assistance of another human to leave home due to  Dyspnea on exertion (SOB) and Home oxygen requirement.     Diet:   cardiac diet and 2 gram sodium diet    Labs:  Report Lab results to PCP. and per PCP Dr. Caballero.    Referrals/ Consults  Physical Therapy to evaluate and treat. Evaluate for home safety and equipment needs; Establish/upgrade home exercise program. Perform / instruct on therapeutic exercises, gait training, transfer training, and Range of Motion.  Occupational Therapy to evaluate and treat. Evaluate home environment for safety and equipment needs. Perform/Instruct on transfers, ADL training, ROM, and therapeutic exercises.    Activities:   activity as tolerated    Nursing:   Agency to admit patient within 24 hours of hospital discharge unless specified on physician order or at patient request    SN to complete comprehensive assessment including routine vital signs. Instruct on disease process and s/s of complications to report to MD. Review/verify medication list sent home with the patient at time of discharge  and instruct patient/caregiver as needed. Frequency may be adjusted depending on start of care date.     Skilled nurse to perform up to 3 visits PRN for symptoms related to diagnosis    Notify MD if SBP > 160 or < 90; DBP > 90 or < 50; HR > 120 or < 50; Temp  > 101; O2 < 88%    Ok to schedule additional visits based on staff availability and patient request on consecutive days within the home health episode.    When multiple disciplines ordered:    Start of Care occurs on Sunday - Wednesday schedule remaining discipline evaluations as ordered on separate consecutive days following the start of care.    Thursday SOC -schedule subsequent evaluations Friday and Monday the following week.     Friday - Saturday SOC - schedule subsequent discipline evaluations on consecutive days starting Monday of the following week.    For all post-discharge communication and subsequent orders please contact patient's primary care physician. If unable to reach primary care physician or do not receive response within 30 minutes, please contact primary care provider (Dr. Caballero) at [phone] for clinical staff order clarification.    Miscellaneous   Routine Skin for Bedridden Patients: Instruct patient/caregiver to apply moisture barrier cream to all skin folds and wet areas in perineal area daily and after baths and all bowel movements.  Home Oxygen:  Oxygen at 6 L/min nasal canula to be used:  Continuously., Assess oxygen saturation via pulse oximeter as needed for increase in SOB., Notify physician if oxygen saturation less than 88% and Consult respiratory therapy for instruction on home oxygen use  COPD: Teach patient MDI/HFA usage and guidelines  Please provide smoking education, cessation, and nicotine replacement options if patient is a current smoker or if anyone in the household is a smoker  Please ensure that patient has a pulse oximeter and is educated on his normal oxygen saturations: 88-92%  Please ensure patient has a functioning nebulizer and provide education on its usage.    Home Health Aide:  Physical Therapy Three times weekly, Occupational Therapy Three times weekly and Respiratory Therapy Three times weekly    Wound Care Orders  n/a    I certify that this patient is  confined to her home and needs physical therapy and occupational therapy.

## 2022-01-05 NOTE — PLAN OF CARE
SW informed treatment team that pt's insurance does not cover HH.    Janina Soto LMSW  [facility]  [id number]

## 2022-01-05 NOTE — NURSING
Patient DCed via WC; Oxygen tank in transit. Daughter has home O2 in car. Belongings with patient.

## 2022-01-05 NOTE — PLAN OF CARE
[facility] - [facility] Step[facility]  Discharge Final Note    Primary Care Provider: Freemna Caballero MD    Expected Discharge Date: 1/5/2022    Final Discharge Note (most recent)     Final Note - 01/05/22 1019        Final Note    Assessment Type Final Discharge Note     Anticipated Discharge Disposition Home or Self Care     Hospital Resources/Appts/Education Provided Appointments scheduled and added to AVS        Post-Acute Status    Post-Acute Authorization Other     Other Status No Post-Acute Service Needs   Doesn't qualify              Contact Info     Freeman Caballero MD   Specialty: Family Medicine   Relationship: PCP - General    [address] [place] [address]   Phone: [phone]       Next Steps: Follow up on 1/7/2022    Instructions: hospital follow up at 11:30AM         Bekah Henriquez RN, CM   Ext: [phone]

## 2022-01-05 NOTE — NURSING
IV DCed; patient tolerated well. Telemetry monitor removed and replaced @ nursing station. DC information reviewed with patient; verbalized understanding. Patient upset regarding O2 delivery, stating she's ready to go; notified FERNANDA Godfrey. O2 to be delivered to bedside; notified patient regarding being on oxygen safety regarding transferring with O2 on; verbalized understanding. Patient's daughter states that she is going to get patient's home oxygen regarding DC.

## 2022-01-05 NOTE — PLAN OF CARE
Problem: Adult Inpatient Plan of Care  Goal: Plan of Care Review  Outcome: Ongoing, Progressing  Goal: Patient-Specific Goal (Individualized)  Outcome: Ongoing, Progressing  Goal: Absence of Hospital-Acquired Illness or Injury  Outcome: Ongoing, Progressing  Goal: Optimal Comfort and Wellbeing  Outcome: Ongoing, Progressing  Goal: Readiness for Transition of Care  Outcome: Ongoing, Progressing     Problem: Adjustment to Illness COPD (Chronic Obstructive Pulmonary Disease)  Goal: Optimal Chronic Illness Coping  Outcome: Ongoing, Progressing     Problem: Functional Ability Impaired COPD (Chronic Obstructive Pulmonary Disease)  Goal: Optimal Level of Functional [place]  Outcome: Ongoing, Progressing     Problem: Infection COPD (Chronic Obstructive Pulmonary Disease)  Goal: Absence of Infection Signs and Symptoms  Outcome: Ongoing, Progressing     Problem: Oral Intake Inadequate COPD (Chronic Obstructive Pulmonary Disease)  Goal: Improved Nutrition Intake  Outcome: Ongoing, Progressing     Problem: Respiratory Compromise COPD (Chronic Obstructive Pulmonary Disease)  Goal: Effective Oxygenation and Ventilation  Outcome: Ongoing, Progressing     Problem: Fluid and Electrolyte Imbalance (Acute Kidney Injury/Impairment)  Goal: Fluid and Electrolyte Balance  Outcome: Ongoing, Progressing     Problem: Oral Intake Inadequate (Acute Kidney Injury/Impairment)  Goal: Optimal Nutrition Intake  Outcome: Ongoing, Progressing     Problem: Renal Function Impairment (Acute Kidney Injury/Impairment)  Goal: Effective Renal Function  Outcome: Ongoing, Progressing     Problem: Fluid Imbalance (Pneumonia)  Goal: Fluid Balance  Outcome: Ongoing, Progressing     Problem: Infection (Pneumonia)  Goal: Resolution of Infection Signs and Symptoms  Outcome: Ongoing, Progressing     Problem: Respiratory Compromise (Pneumonia)  Goal: Effective Oxygenation and Ventilation  Outcome: Ongoing, Progressing     Problem: Fall Injury Risk  Goal:  Absence of Fall and Fall-Related Injury  Outcome: Ongoing, Progressing   Cardiology Step Down. See progress note and flowsheet. Plan: Continue to monitor patient and report any abnormalities in labs, vitals signs, and body system functions to physician as indicated. Patient was given education on their disease process and medications.

## 2022-01-05 NOTE — ASSESSMENT & PLAN NOTE
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 Recently admitted 12/14-12/16 for pneumonia and COPD exacerbation. In the ED, her O2 sat was initially 78% and she was placed on bipap. She was able to be weaned to 6L NC satting 96%. Labs significant for WBC 16.65, but recently on steroids, and lactate 2.4. CXR with progression of airspace disease in the left hemithorax, stable appearance of airspace disease in the right hemithorax. CTA chest concerning for occlusion of RUL pulmonary artery branch, intraluminal infiltration or tumor thrombus vs thromboembolus. Pulmonology consulted by ED, do not think anticoagulation is warranted. She reports some improvement in symptoms while in ED. Admitted to hospital medicine for management of COPD exacerbation.     -Hem/Onc consulted as suspicion for Keytruda induced pneumonitis, but per evaluation: ground glass opacities primarily in the lower lobes and is not entirely consistent with immunotherapy related pneumonitis (especially as she has already improved without steroids)  -Pulm consulted: high suspicion for  COPD exacerbation in addition to PDL1 pneumonitis , recommended holding keytruda, duonebs + levofloxacin x 5 days, prednisone 60 mg with taper     -Respiratory status improved from admission, now stable on 4-5 L NC    Plan:  -Breo inhaler   -Chest physiotherapy scheduled  - Per pulm consult, for severe COPD exacerbation:   -duo nebs scheduled    -levofloxacin 5 day course completed   -80mg methlyprednisolone x5 day course completed, switched to prednisone 60mg PO starting 1/1   -Per Hem/Onc next dose of immunotherapy is not due until Feb 1st so will defer decision on whether to continue current treatment plan to her outpatient oncologist.   - continue phenergan-codeine prn for cough  -Mucinex  -Hypertonic  saline nebs   - wean O2 as tolerated   -Pt qualifies for home oxygen

## 2022-01-07 NOTE — TELEPHONE ENCOUNTER
"----- Message from Erica Pierre sent at 1/7/2022  8:20 AM CST -----         Consult/Advisory:      Name Of Caller:Janeth  Contact Preference?:[phone]      Provider Name: Kristen  Does patient feel the need to be seen today?no      What is the nature of the call?:pt states that her oxygen machine is on 6 and it's too high. Please       Additional Notes:  "Thank you for all that you do for our patients'"                           "

## 2022-01-07 NOTE — TELEPHONE ENCOUNTER
Spoke with patient.  She notes she is wearing oxygen at 6liters, per hospital discharge, but it is was too strong for her. She is asking if she can go down to 4-5 to see if that is more comfortable. She feels fine on 4-5 liters. She understands to monitor her oxygen so that it stays above 88---and that she can go up to 6liters.  She thanked nurse.

## 2022-01-11 NOTE — NURSING
Spoke with pt after her recent hospitalization and discharge home.  Pt complains of limited mobility since coming home due to shortness of breath.  Patient currently in office awaiting PCP with noted dyspnea.  Informed pt to ask PCP for a respiratory treatment to help improve her breathing.  Also informed pt that she may have to return to the ER if she continues to feel worse or if current SOB continues.  Verbalized understanding.  Dr. Zaragoza agreeable with respiratory treatment.

## 2022-01-11 NOTE — NURSING
Oncology Navigation   Intake  Date of Diagnosis: 2020  Cancer Type: Thoracic  Initial Nurse Navigator Contact: 2020  Date Worked: 2021     Treatment  Current Status: Active    Medical Oncologist: Dr. Rocha  Chemotherapy: Completed (Carboplatin AUC4 x1 dose 20)  Chemotherapy Regimen: 21 started pembrolizumab  Delays/Reductions Due to:: Overall toxicity  Other: 21 started pembrolizumab  Immunotherapy: Initiated  Immunotherapy Name: 21 started pembrolizumab  Start Date: 2020  End Date: 2021    Radiation Oncologist: Dr. Colmenares  Start Date: 2020  End Date: 6/3/2020  Total cGy: 24  Total Treatments: 1    Radiation Oncologist: Dr. Colmenares     Acuity  Stage: III-IV  Systemic Treatment - predicted or initiated: More than one treatment modality concurrently (chemotherapy, radiation, etc.) (+2)  Treatment Tolerability: Severe symptoms/treatment must be amended/held  ECO-3 (+1)  Comorbidities in Medical History: 3-5 (+1)  Hospitalization Within the Past Month: Less than 1 week stay  Other Medical Factors (+1 each): Home medical equipment required   Needed: No  Support: Patient reports minimal support system  Verbalizes Financial Concerns: No  Transportation: Adequate transportation for treatment  Mental Health: PHQ Score: 0-10  Psychological Factors (+1 each): Other (refuses referral to oncology psychology)  History of noncompliance/frequent no shows and cancellations: No  Verbalizes the need for more education: No  Other Factors (+1 for Each): +1  Navigation Acuity: 12     Follow Up  No follow-ups on file.

## 2022-01-13 PROBLEM — F41.9 ANXIETY: Chronic | Status: ACTIVE | Noted: 2020-05-26

## 2022-01-13 PROBLEM — I10 ESSENTIAL HYPERTENSION: Chronic | Status: ACTIVE | Noted: 2020-05-01

## 2022-01-13 PROBLEM — C79.31 METASTASIS TO BRAIN: Status: ACTIVE | Noted: 2022-01-01

## 2022-01-13 PROBLEM — Z71.89 GOALS OF CARE, COUNSELING/DISCUSSION: Status: ACTIVE | Noted: 2022-01-01

## 2022-01-13 PROBLEM — R53.81 DEBILITY: Status: ACTIVE | Noted: 2022-01-01

## 2022-01-13 PROBLEM — K21.9 GERD (GASTROESOPHAGEAL REFLUX DISEASE): Chronic | Status: ACTIVE | Noted: 2020-06-17

## 2022-01-13 PROBLEM — U07.1 COVID-19 VIRUS INFECTION: Status: ACTIVE | Noted: 2022-01-01

## 2022-01-13 PROBLEM — Z87.09 HISTORY OF COPD: Status: ACTIVE | Noted: 2022-01-01

## 2022-01-13 NOTE — H&P
[facility]  [facility]  History & Physical    Patient Name: Janeth Boyce  MRN: [id number]  Patient Class: IP- Inpatient  Admission Date: 1/13/2022  Attending Physician: Florencia Cheung MD   Primary Care Provider: Freeman Caballero MD         Patient information was obtained from patient, relative(s) and ER records.     Subjective:     Principal Problem:COVID-19 virus infection    Chief Complaint:   Chief Complaint   Patient presents with    Shortness of Breath        HPI: Ms. 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 Patient states she started feeling short of breath and fatigued a few days ago, followed by congestion, cough productive of white sputum, with associated diarrhea and myalgias. She had taken NSAIDs to help with the pain with minimal relief. She states she started to wheeze and decided to come to the ED for further evaluation. She denies fevers, chills, vomiting, and abdominal pain,. Of note, she was last admitted on 12/27-1/5 in which she was found to have a COPD exacerbation, was started on steroids, duonebs and Levaquin.  Heme/onc felt some concern for pneumonitis secondary to keytruda use, started on steroids and recommended a 6 week taper at discharge, also started on Bactrim and PPI prophylaxis. She was discharged with higher oxygen requirement at home around 5-6 L.    Upon admission the patient was found to tachypnic, but afebrile and on her baseline requirements of oxygen (5L NC). She was admitted to Hospital Medicine Team 1 for further management.       Past Medical History:   Diagnosis Date    Anxiety     Asthma     COPD exacerbation 12/14/2021    Hepatitis C 2019    treated    Hx of psychiatric care     Hypertension      Lung cancer     Psychiatric exam requested by authority     Psychiatric problem        Past Surgical History:   Procedure Laterality Date    BRONCHOSCOPY N/A 9/3/2020    Procedure: Bronchoscopy;  Surgeon: Renee Diagnostic Provider;  Location: [facility] OR [address] [place] [address];  Service: Anesthesiology;  Laterality: N/A;     SECTION      KNEE SURGERY         Review of patient's allergies indicates:  No Known Allergies    No current facility-administered medications on file prior to encounter.     Current Outpatient Medications on File Prior to Encounter   Medication Sig    albuterol (PROVENTIL/VENTOLIN HFA) 90 mcg/actuation inhaler Inhale 1-2 puffs into the lungs every 6 (six) hours as needed for Wheezing or Shortness of Breath.    albuterol-ipratropium (DUO-NEB) 2.5 mg-0.5 mg/3 mL nebulizer solution Take 3 mLs by nebulization every 6 (six) hours as needed for Wheezing. Rescue    amLODIPine (NORVASC) 5 MG tablet Take 1 tablet (5 mg total) by mouth once daily.    budesonide-formoterol 160-4.5 mcg (SYMBICORT) 160-4.5 mcg/actuation HFAA Inhale 2 puffs into the lungs every 12 (twelve) hours. Controller    buPROPion (WELLBUTRIN SR) 150 MG TBSR 12 hr tablet Take 1 tablet (150 mg total) by mouth once daily.    cetirizine (ZYRTEC) 10 MG tablet Take 1 tablet (10 mg total) by mouth once daily.    diazePAM (VALIUM) 5 MG tablet Take 1 tablet (5 mg total) by mouth nightly as needed for Insomnia (do not take more than 3 times per week.). Fill 10/13/21    diclofenac sodium (SOLARAZE) 3 % gel Apply topically 2 (two) times a day.    gabapentin (NEURONTIN) 400 MG capsule Take 1 capsule (400 mg total) by mouth 3 (three) times daily.    guaiFENesin (MUCINEX) 600 mg 12 hr tablet Take 1 tablet (600 mg total) by mouth 2 (two) times daily for 10 days    hydrOXYzine HCL (ATARAX) 25 MG tablet TAKE ONE TABLET BY MOUTH EVERY DAY AS NEEDED FOR ANXIETY    irbesartan-hydrochlorothiazide (AVALIDE) 300-12.5 mg per tablet Take 1 tablet by mouth  once daily.    multivitamin with minerals tablet Take 1 tablet by mouth once daily.    omeprazole (PRILOSEC) 20 MG capsule Take 1 capsule (20 mg total) by mouth once daily.    ondansetron (ZOFRAN-ODT) 8 MG TbDL Dissolve 1 tablet (8 mg total) by mouth every 8 (eight) hours as needed.    polyethylene glycol (GLYCOLAX) 17 gram/dose powder mix 1 capful (17 g) and take by mouth once daily.    predniSONE (DELTASONE) 20 MG tablet Take 3 tablets (60 mg total) by mouth once daily for 10 days, THEN 2 tablets (40 mg total) once daily for 14 days, THEN 1 tablet (20 mg total) once daily for 14 days. REFILL MEDICATION TO FINISH FULL COURSE.    promethazine-codeine 6.25-10 mg/5 ml (PHENERGAN WITH CODEINE) 6.25-10 mg/5 mL syrup Take 5 mLs by mouth every 8 (eight) hours as needed for Cough. Fill 21    senna-docusate 8.6-50 mg (PERICOLACE) 8.6-50 mg per tablet Take 1 tablet by mouth 2 (two) times daily.    sulfamethoxazole-trimethoprim 800-160mg (BACTRIM DS) 800-160 mg Tab Take 1 tablet by mouth once daily.    tiotropium (SPIRIVA) 18 mcg inhalation capsule Inhale 1 capsule (18 mcg total) into the lungs once daily. Controller    traMADoL (ULTRAM) 50 mg tablet Take 1 tablet (50 mg total) by mouth every 8 (eight) hours as needed for Pain. Fill 21     Family History     Problem Relation (Age of Onset)    Cancer Father        Tobacco Use    Smoking status: Former Smoker     Packs/day: 0.50     Years: 40.00     Pack years: 20.00     Types: Cigarettes     Quit date: 2020     Years since quittin.7    Smokeless tobacco: Never Used   Substance and Sexual Activity    Alcohol use: Yes     Comment: occasional    Drug use: No    Sexual activity: Not on file     Review of Systems   Constitutional: Positive for activity change, appetite change and fatigue. Negative for chills and fever.   HENT: Positive for congestion. Negative for rhinorrhea, sinus pain and sore throat.    Eyes: Negative for pain and discharge.    Respiratory: Positive for cough, shortness of breath and wheezing.         Cough productive of white sputum   Cardiovascular: Negative for chest pain, palpitations and leg swelling.   Gastrointestinal: Positive for diarrhea and nausea. Negative for abdominal pain, constipation and vomiting.   Musculoskeletal: Positive for myalgias. Negative for back pain.   Skin: Negative for color change and rash.   Neurological: Positive for weakness. Negative for dizziness, light-headedness and headaches.   Hematological: Does not bruise/bleed easily.   Psychiatric/Behavioral: Negative for agitation and confusion. The patient is nervous/anxious.      Objective:     Vital Signs (Most Recent):  Temp: 99.4 °F (37.4 °C) (01/13/22 1236)  Pulse: 97 (01/13/22 1532)  Resp: (!) 24 (01/13/22 1532)  BP: (!) 142/81 (01/13/22 1351)  SpO2: 97 % (01/13/22 1533) Vital Signs (24h Range):  Temp:  [99.4 °F (37.4 °C)] 99.4 °F (37.4 °C)  Pulse:  [] 97  Resp:  [24-30] 24  SpO2:  [86 %-100 %] 97 %  BP: (118-142)/(62-81) 142/81     Weight: 88.9 kg (196 lb)  Body mass index is 31.64 kg/m².    Physical Exam  Vitals and nursing note reviewed.   Constitutional:       General: She is not in acute distress.     Appearance: She is obese. She is ill-appearing.   HENT:      Head: Normocephalic and atraumatic.      Mouth/Throat:      Mouth: Mucous membranes are moist.   Eyes:      General: No scleral icterus.     Extraocular Movements: Extraocular movements intact.      Conjunctiva/sclera: Conjunctivae normal.   Cardiovascular:      Rate and Rhythm: Normal rate and regular rhythm.      Pulses: Normal pulses.      Heart sounds: Normal heart sounds. No murmur heard.      Pulmonary:      Effort: Tachypnea present.      Breath sounds: Examination of the right-upper field reveals wheezing. Examination of the left-upper field reveals wheezing. Examination of the right-lower field reveals decreased breath sounds and wheezing. Examination of the left-lower field  reveals decreased breath sounds and wheezing. Decreased breath sounds and wheezing present.      Comments: Diffuse wheezing with crackles  Abdominal:      General: Abdomen is flat. Bowel sounds are normal. There is no distension.      Tenderness: There is no abdominal tenderness.   Musculoskeletal:         General: No swelling or tenderness.   Skin:     General: Skin is warm and dry.      Coloration: Skin is not jaundiced.   Neurological:      Mental Status: She is alert and oriented to person, place, and time.      Motor: No weakness.   Psychiatric:         Mood and Affect: Mood normal.         Behavior: Behavior normal.             Significant Labs:   Recent Lab Results       01/13/22  1435   01/13/22  1402   01/13/22  1301        Time Notifed: 1438           Provider Notified: GT           Verbal Result Readback Performed Yes           Albumin   2.8         Alkaline Phosphatase   74         Allens Test N/A           ALT   13         Anion Gap   12         AST   19         Baso #   0.02         Basophil %   0.1         BILIRUBIN TOTAL   0.3  Comment: For infants and newborns, interpretation of results should be based  on gestational age, weight and in agreement with clinical  observations.    Premature Infant recommended reference ranges:  Up to 24 hours.............<8.0 mg/dL  Up to 48 hours............<12.0 mg/dL  3-5 days..................<15.0 mg/dL  6-29 days.................<15.0 mg/dL           Site Other           BUN   21         Calcium   9.6         Chloride   105         CO2   22         CPK   33         Creatinine   1.0         CRP   45.2         DelSys Nasal Can           Differential Method   Automated         eGFR if    >60.0         eGFR if non    >60.0  Comment: Calculation used to obtain the estimated glomerular filtration  rate (eGFR) is the CKD-EPI equation.            Eos #   0.2         Eosinophil %   1.3         Ferritin   153         Flow 6            Glucose   141         Gran # (ANC)   11.2         Gran %   78.0         Hematocrit   37.9         Hemoglobin   11.3         Immature Grans (Abs)   0.11  Comment: Mild elevation in immature granulocytes is non specific and   can be seen in a variety of conditions including stress response,   acute inflammation, trauma and pregnancy. Correlation with other   laboratory and clinical findings is essential.           Immature Granulocytes   0.8         Lactate, Santosh   2.1  Comment: Falsely low lactic acid results can be found in samples   containing >=13.0 mg/dL total bilirubin and/or >=3.5 mg/dL   direct bilirubin.           LD   363  Comment: Results are increased in hemolyzed samples.         Lymph #   2.0         Lymph %   14.1         MCH   24.6         MCHC   29.8         MCV   83         Mode SPONT           Mono #   0.8         Mono %   5.7         MPV   9.5         nRBC   0         Platelets   304         POC BE 0           POC HCO3 25.0           POC PCO2 43.0           POC PH 7.373           POC PO2 44           POC SATURATED O2 78           POC TCO2 26           Potassium   3.8         PROTEIN TOTAL   7.0         Provider Credentials: MD            Acceptable     Yes       Rate 30           RBC   4.59         RDW   15.7         Sample VENOUS           SARS-CoV-2 RNA, Amplification, Qual     Positive       Sodium   139         Sp02 95           Troponin I   <0.006  Comment: The reference interval for Troponin I represents the 99th percentile   cutoff   for our facility and is consistent with 3rd generation assay   performance.           WBC   14.32               Significant Imaging: I have reviewed all pertinent imaging results/findings within the past 24 hours.    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 (s/p palliative XRT, on pembrolizumab)   High risk for decompensation  Received dexamethasone in ED  Oxygen saturation at baseline (5L NC) with oxygen saturation >94  CXR with diffuse pleuroparenchymal opacities throughout the right mid to upper lung zone with ground-glass attenuation at the right base as well as patchy mixed alveolar and interstitial type opacities at the left mid to lower lung zone     PLAN:   - Remdesivir and dexamethasone started  - procal to rule out additional bacterial pneumonia  - D-Dimer, LD, ESR, CRP  - Duonebs + ICS provided  - PRN breathing treatments  - Not on full AC due to brain mets, continue with lovenox 40  - Incentive provided  - q4 vitals  - continuous oxygen  - Trend CBC/CMP      Adenocarcinoma of lung, stage 4, right  Mass of Upper Right Lung  Pneumonitis    Follows with Dr. Peterson. S/p Carboplatin AUC4 x1 dose 5/27/20 and palliative radiation (5/27/2020 - 6/2/2020; 20 Gy to right lung). Also received SRS to right cerebellar metastasis 6/3/2020. Pembrolizumab started 6/4/2020, C23 on 12/21/2021. Suspected pneumonitis due to Keytruda use. Recently treated with bactrim and prednisone on discharge. CXR with evidence of diffuse pleuroparenchymal opacities throughout the right mid to upper lung zone with ground-glass attenuation at the right base as well as patchy mixed alveolar and interstitial type opacities at the left mid to lower lung zone.    - May consider heme/onc consult or palliative care consult due to prognosis with additional COVID infection  - continue gabapentin  - COVID treatment as above, receiving dexamethasone x5 days    Brain Metastasis  - due to adenocarcinoma of lung  - continue on lovenox 40 due to brain mets  - follow-up d-dimer in setting of mets  - See plan above    Goals of care, counseling/discussion  After discussion with the family and  patient, both agreed to full code status. Will continue to discuss further goals of care with patient and her prognosis.       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 Recently admitted 12/14-12/16 for pneumonia and COPD exacerbation. In the ED, her O2 sat was initially 78% and she was placed on bipap. She was able to be weaned to 6L NC satting 96%. Labs significant for WBC 16.65, but recently on steroids, and lactate 2.4. CXR with progression of airspace disease in the left hemithorax, stable appearance of airspace disease in the right hemithorax. CTA chest concerning for occlusion of RUL pulmonary artery branch, intraluminal infiltration or tumor thrombus vs thromboembolus. Pulmonology consulted by ED, do not think anticoagulation is warranted. She reports some improvement in symptoms while in ED. Admitted to hospital medicine for management of COPD exacerbation.  - symbicort not on formulary, will continue with breo inhaler   - prednisone 40 mg   - duo nebs scheduled  - will hold off on abx as pt completed a course of abx with recent admission, low suspicion for acute infection   - continue phenergan-codeine prn for cough  - IV lasix 40 mg once   - wean O2 as tolerated   - f/u pulm recs   - f/u TTE  - f/u U/S LE veins      Leukocytosis  Elevated WBC to 14 on admission. However, downtrending from previous admission (18.2)    - Trend CBC  - q4 vitals      GERD (gastroesophageal reflux disease)  - protonix daily      Anxiety  - continue bupropion   - continue atarax prn     Tobacco abuse  History of tobacco abuse    - nicotine patch    Essential hypertension  Normotensive on admission. On amlodipine and irbesartan-hydrochlorothiazide.    - monitor BP  - resume amlodipine 5mg daily       VTE Risk Mitigation (From  admission, onward)         Ordered     enoxaparin injection 40 mg  Daily         01/13/22 1710     IP VTE HIGH RISK PATIENT  Once         01/13/22 1630     Place sequential compression device  Until discontinued         01/13/22 1630                   Mckayla Webb MD  Department of Hospital Medicine   [facility]

## 2022-01-13 NOTE — NURSING
Received call from pt stating that she is having more difficulty breathing.  Pt states she has taken a breathing treatment and still does not have any relief.  Instructed pt to come to the ER, pt will wait until daughter comes to bring her.

## 2022-01-13 NOTE — ED PROVIDER NOTES
Encounter Date: 2022       History     Chief Complaint   Patient presents with    Shortness of Breath     61-year-old female with history of COPD, hep C, hypertension and adenocarcinoma of the lung with Mets to the brain who presents to the emergency department for shortness of breath.  Patient states that she had shortness of breath starting yesterday which worsened today.  Patient is normally on 3-4 L of oxygen at home and occasionally has gone up to 6 L due to shortness of breath.  Patient states that she has received 2 doses of the Pfizer COVID-19 vaccine.        Review of patient's allergies indicates:  No Known Allergies  Past Medical History:   Diagnosis Date    Anxiety     Asthma     COPD exacerbation 2021    Hepatitis C 2019    treated    Hx of psychiatric care     Hypertension     Lung cancer     Psychiatric exam requested by authority     Psychiatric problem      Past Surgical History:   Procedure Laterality Date    BRONCHOSCOPY N/A 9/3/2020    Procedure: Bronchoscopy;  Surgeon: Renee Diagnostic Provider;  Location: [facility] OR [address] [place] [address];  Service: Anesthesiology;  Laterality: N/A;     SECTION      KNEE SURGERY       Family History   Problem Relation Age of Onset    Cancer Father      Social History     Tobacco Use    Smoking status: Former Smoker     Packs/day: 0.50     Years: 40.00     Pack years: 20.00     Types: Cigarettes     Quit date: 2020     Years since quittin.7    Smokeless tobacco: Never Used   Substance Use Topics    Alcohol use: Yes     Comment: occasional    Drug use: No     Review of Systems    Physical Exam     Initial Vitals [22 1236]   BP Pulse Resp Temp SpO2   118/62 107 (!) 26 99.4 °F (37.4 °C) 96 %      MAP       --         Physical Exam    Nursing note and vitals reviewed.  Constitutional: She appears well-developed and well-nourished. She is not diaphoretic. She appears distressed.   HENT:   Head: Normocephalic and atraumatic.   Eyes:  Conjunctivae are normal. Pupils are equal, round, and reactive to light.   Neck: Neck supple.   Normal range of motion.  Cardiovascular: Regular rhythm, normal heart sounds and intact distal pulses. Exam reveals no gallop.    No murmur heard.  Pulmonary/Chest: She is in respiratory distress. She has wheezes. She exhibits no tenderness.   Abdominal: Abdomen is soft. Bowel sounds are normal. She exhibits no distension and no mass. There is no abdominal tenderness. There is no rebound and no guarding.   Musculoskeletal:         General: Normal range of motion.      Cervical back: Normal range of motion and neck supple.     Neurological: She is alert and oriented to person, place, and time. GCS score is 15. GCS eye subscore is 4. GCS verbal subscore is 5. GCS motor subscore is 6.   Skin: Skin is warm and dry. Capillary refill takes less than 2 seconds.   Psychiatric: She has a normal mood and affect.         ED Course   Procedures  Labs Reviewed   CBC W/ AUTO DIFFERENTIAL - Abnormal; Notable for the following components:       Result Value    WBC 14.32 (*)     Hemoglobin 11.3 (*)     MCH 24.6 (*)     MCHC 29.8 (*)     RDW 15.7 (*)     Immature Granulocytes 0.8 (*)     Gran # (ANC) 11.2 (*)     Immature Grans (Abs) 0.11 (*)     Gran % 78.0 (*)     Lymph % 14.1 (*)     All other components within normal limits   COMPREHENSIVE METABOLIC PANEL - Abnormal; Notable for the following components:    CO2 22 (*)     Glucose 141 (*)     Albumin 2.8 (*)     All other components within normal limits   C-REACTIVE PROTEIN - Abnormal; Notable for the following components:    CRP 45.2 (*)     All other components within normal limits   LACTATE DEHYDROGENASE - Abnormal; Notable for the following components:     (*)     All other components within normal limits   SARS-COV-2 RDRP GENE - Abnormal; Notable for the following components:    POC Rapid COVID Positive (*)     All other components within normal limits    Narrative:     This  test utilizes isothermal nucleic acid amplification   technology to detect the SARS-CoV-2 RdRp nucleic acid segment.   The analytical sensitivity (limit of detection) is 125 genome   equivalents/mL.   A POSITIVE result implies infection with the SARS-CoV-2 virus;   the patient is presumed to be contagious.     A NEGATIVE result means that SARS-CoV-2 nucleic acids are not   present above the limit of detection. A NEGATIVE result should be   treated as presumptive. It does not rule out the possibility of   COVID-19 and should not be the sole basis for treatment decisions.   If COVID-19 is strongly suspected based on clinical and exposure   history, re-testing using an alternate molecular assay should be   considered.   This test is only for use under the Food and Drug   Administration s Emergency Use Authorization (EUA).   Commercial kits are provided by [employer].   Performance characteristics of the EUA have been independently   verified by [facility] Department of   Pathology and Laboratory Medicine.   _________________________________________________________________   The authorized Fact Sheet for Healthcare Providers and the authorized Fact   Sheet for Patients of the ID NOW COVID-19 are available on the FDA   website:     https://www.fda.gov/media/163857/download  https://www.fda.gov/media/224983/download         ISTAT PROCEDURE - Abnormal; Notable for the following components:    POC SATURATED O2 78 (*)     All other components within normal limits   FERRITIN   CK   LACTIC ACID, PLASMA   TROPONIN I        ECG Results          EKG 12-lead (Final result)  Result time 01/13/22 14:32:42    Final result by Interface, Lab In [facility] (01/13/22 14:32:42)                 Narrative:    Test Reason : R06.02,    Vent. Rate : 106 BPM     Atrial Rate : 106 BPM     P-R Int : 136 ms          QRS Dur : 066 ms      QT Int : 302 ms       P-R-T Axes : 068 058 054 degrees     QTc Int : 401 ms    Sinus  tachycardia  Nonspecific T wave abnormality  Abnormal ECG  When compared with ECG of 05-JAN-2022 05:20,  No significant change was found  Confirmed by MIRANDA LANGSTON MD ([id number]) on 1/13/2022 2:32:32 PM    Referred By:             Confirmed By:MIRANDA LANGSTON MD                            Imaging Results          CTA Chest Non-Coronary - PE Study (Final result)  Result time 01/13/22 15:38:20    Final result by Nemo Quiroz MD (01/13/22 15:38:20)                 Impression:      1.  Although there is no abrupt nonvisualization of the right upper lobe all pulmonary artery, this is stable finding.  This may reflect chronic pulmonary embolus, tumor invasion, versus chronic stricture related to post radiation treatment changes.    2.  Although consolidative opacities in the lingula are no longer seen and consolidative opacities in left lower lobe are improved, they are worsened and increase in the right lower lobe.    3.  Stable cicatricial atelectasis and pulmonary fibrosis of the right upper lobe, most consistent with post treatment pulmonary fibrosis.      Electronically signed by: Nemo Quiroz  Date:    01/13/2022  Time:    15:38             Narrative:    EXAMINATION:  CTA CHEST NON CORONARY    CLINICAL HISTORY:  Pulmonary embolism (PE) suspected, unknown D-dimer; stage IV lung cancer.    TECHNIQUE:  Low dose axial images, sagittal and coronal reformations were obtained from the thoracic inlet to the lung bases following the IV administration of 100 mL of Omnipaque 350.  Contrast timing was optimized to evaluate the pulmonary arteries.  MIP images were performed.    COMPARISON:  CT angiography of the chest 12/26/2021    FINDINGS:  No new filling defects consistent with acute pulmonary thromboembolic disease is identified to the subsegmental branches.    Again seen is abrupt nonvisualization of the right upper lobar pulmonary artery just proximal to segmental branching.  This appears related to a masslike  consolidation in the right upper lobe.  The latter is stable in size compared to prior exam, but smaller than when on seem 01/15/2020.    Again seen is volume loss, atelectasis, and bronchiectasis of the right upper lobe, consistent with post treatment changes.    There are persistent consolidative opacities in the left lower lobe.  However, opacities in the lingula have markedly improved.    Consolidative opacities in the right lower lobe, however, arm creased versus new.    Again seen are enlarged mediastinal lymph nodes up to 13 mm in the pre-vascular space, 20 mm in in the subaortic space, and 14 mm in the left inter lobar space.    The heart is normal in size.  No significant pericardial effusion.  Again seen, mild calcific disease of the coronary arteries.    There is partial imaging of the upper abdomen.  There is a small hiatal hernia.  There is mild bilateral renal cortical scarring.  Overall, no significant changes.    In the bones, no new destructive lesions identified.                               X-Ray Chest AP Portable (Final result)  Result time 01/13/22 14:44:03    Final result by Ishaan Mary MD (01/13/22 14:44:03)                 Impression:      Grossly stable abnormal radiographic appearance of the chest as above when compared to 01/01/2022 study.      Electronically signed by: Ishaan Mary MD  Date:    01/13/2022  Time:    14:44             Narrative:    EXAMINATION:  XR CHEST AP PORTABLE    CLINICAL HISTORY:  COVID-19;    TECHNIQUE:  Single frontal view of the chest was performed.    COMPARISON:  Chest radiograph 01/01/2022 and CT thorax 12/26/2021    FINDINGS:  Patient is rotated.  Monitoring leads and tubing overlie the chest.  Resolution is limited by body habitus with underpenetration.    Cardiomediastinal silhouette remains similar to prior with rightward shift.  There is diffuse pleuroparenchymal opacities throughout the right mid to upper lung zone with ground-glass attenuation at  the right base as well as patchy mixed alveolar and interstitial type opacities at the left mid to lower lung zone not significantly changed from prior allowing for differences in positioning and technique.  No new focal opacity or pneumothorax.  Suspected small right and trace left pleural effusion similar to prior.  Osseous structures appear grossly stable.  PA and lateral views can be obtained.                                 Medications   dexAMETHasone tablet 6 mg (has no administration in time range)   albuterol inhaler 6 puff ( Inhalation Canceled Entry 1/13/22 1515)   iohexoL (OMNIPAQUE 350) injection 75 mL (75 mLs Intravenous Given 1/13/22 1503)   ipratropium inhaler 2 puff (2 puffs Inhalation Given 1/13/22 1532)     Medical Decision Making:   History:   Old Medical Records: I decided to obtain old medical records.  Old Records Summarized: records from previous admission(s) and records from clinic visits.  Clinical Tests:   Lab Tests: Ordered and Reviewed  Radiological Study: Ordered and Reviewed  Medical Tests: Ordered and Reviewed  Other:   I have discussed this case with another health care provider.       APC / Resident Notes:   61 y.o. year old female presenting with SOB, cough.  On exam patient is afebrile and nontoxic. Heart rate and rhythm are regular. Lungs with bilateral wheezing. Abdomen is soft, nontender. No edema.    DDx includes but is not limited to COVID-19, PE, COVID, COPD exacerbation    ED workup reveals patient is COVID-19 positive in the ED, is fully vaccinated with 2 doses of Pfizer and is not yet due for a booster.  Chest x-ray shows no acute cardiopulmonary abnormalities, CT PE protocol showed some worsening opacities in the right lower with no acute PE identified.  Patient given albuterol and Atrovent MDI as well as 6 mg p.o. Decadron in the ED.  patient is in respiratory distress is tachypneic with O2 saturations in the low 90s at rest on 4 liters/minute nasal cannula.  Has a  history of stage IV lung cancer with chemo every 6 weeks.  Due to patient's comorbidities and O2 saturations will admit to hospital medicine for further evaluation of COVID hypoxia.    Discussed findings and plan with patient who verbalized understanding and agrees with the plan and course of treatment. Return to ED precautions discussed. Patient is stable for discharge. I discussed the care of this patient with my supervising physician.         Attending Attestation:     Physician Attestation Statement for NP/PA:   I have conducted a face to face encounter with this patient in addition to the NP/PA, due to Medical Complexity    Other NP/PA Attestation Additions:      Medical Decision Makin yo F w/ lung cancer and COPD on 3-4 L home oxygen presents for worsening sob and higher oxygen requirement  Covid positive. CTA neg for PE                        Clinical Impression:   Final diagnoses:  [R06.02] Shortness of breath  [U07.1] COVID-19          ED Disposition Condition    Admit               Jean Carlos Johnson PA-C  22 [id number]       Deepti Forrest MD  [id number]

## 2022-01-13 NOTE — ASSESSMENT & PLAN NOTE
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 (s/p palliative XRT, on pembrolizumab)   High risk for decompensation  Received dexamethasone in ED  Oxygen saturation at baseline (5L NC) with oxygen saturation >94  CXR with diffuse pleuroparenchymal opacities throughout the right mid to upper lung zone with ground-glass attenuation at the right base as well as patchy mixed alveolar and interstitial type opacities at the left mid to lower lung zone     PLAN:   - Remdesivir and dexamethasone started  - procal to rule out additional bacterial pneumonia  - D-Dimer, LD, ESR, CRP  - Duonebs + ICS provided  - PRN breathing treatments  - Not on full AC due to brain mets, continue with lovenox 40  - Incentive provided  - q4 vitals  - continuous oxygen  - Trend CBC/CMP

## 2022-01-13 NOTE — ASSESSMENT & PLAN NOTE
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 Recently admitted 12/14-12/16 for pneumonia and COPD exacerbation. In the ED, her O2 sat was initially 78% and she was placed on bipap. She was able to be weaned to 6L NC satting 96%. Labs significant for WBC 16.65, but recently on steroids, and lactate 2.4. CXR with progression of airspace disease in the left hemithorax, stable appearance of airspace disease in the right hemithorax. CTA chest concerning for occlusion of RUL pulmonary artery branch, intraluminal infiltration or tumor thrombus vs thromboembolus. Pulmonology consulted by ED, do not think anticoagulation is warranted. She reports some improvement in symptoms while in ED. Admitted to hospital medicine for management of COPD exacerbation.  - symbicort not on formulary, will continue with breo inhaler   - prednisone 40 mg   - duo nebs scheduled  - will hold off on abx as pt completed a course of abx with recent admission, low suspicion for acute infection   - continue phenergan-codeine prn for cough  - IV lasix 40 mg once   - wean O2 as tolerated   - f/u pulm recs   - f/u TTE  - f/u U/S LE veins

## 2022-01-13 NOTE — ASSESSMENT & PLAN NOTE
Normotensive on admission. On amlodipine and irbesartan-hydrochlorothiazide.    - monitor BP  - resume amlodipine 5mg daily

## 2022-01-13 NOTE — ASSESSMENT & PLAN NOTE
After discussion with the family and patient, both agreed to full code status. Will continue to discuss further goals of care with patient and her prognosis.

## 2022-01-13 NOTE — HPI
Ms. 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 Patient states she started feeling short of breath and fatigued a few days ago, followed by congestion, cough productive of white sputum, with associated diarrhea and myalgias. She had taken NSAIDs to help with the pain with minimal relief. She states she started to wheeze and decided to come to the ED for further evaluation. She denies fevers, chills, vomiting, and abdominal pain,. Of note, she was last admitted on 12/27-1/5 in which she was found to have a COPD exacerbation, was started on steroids, duonebs and Levaquin.  Heme/onc felt some concern for pneumonitis secondary to keytruda use, started on steroids and recommended a 6 week taper at discharge, also started on Bactrim and PPI prophylaxis. She was discharged with higher oxygen requirement at home around 5-6 L.    Upon admission the patient was found to tachypnic, but afebrile and on her baseline requirements of oxygen (5L NC). She was admitted to Hospital Medicine Team 1 for further management.

## 2022-01-13 NOTE — PHARMACY MED REC
"Admission Medication History     The home medication history was taken by Juliane Arriaza.    You may go to "Admission" then "Reconcile Home Medications" tabs to review and/or act upon these items.      The home medication list has been updated by the Pharmacy department.    Please read ALL comments highlighted in yellow.    Please address this information as you see fit.     Feel free to contact us if you have any questions or require assistance.        Medications listed below were obtained from: Medical records  Current Outpatient Medications on File Prior to Encounter   Medication Sig    albuterol (PROVENTIL/VENTOLIN HFA) 90 mcg/actuation inhaler Inhale 1-2 puffs into the lungs every 6 (six) hours as needed for Wheezing or Shortness of Breath.    albuterol-ipratropium (DUO-NEB) 2.5 mg-0.5 mg/3 mL nebulizer solution Take 3 mLs by nebulization every 6 (six) hours as needed for Wheezing. Rescue    amLODIPine (NORVASC) 5 MG tablet Take 1 tablet (5 mg total) by mouth once daily.    budesonide-formoterol 160-4.5 mcg (SYMBICORT) 160-4.5 mcg/actuation HFAA Inhale 2 puffs into the lungs every 12 (twelve) hours. Controller    buPROPion (WELLBUTRIN SR) 150 MG TBSR 12 hr tablet Take 1 tablet (150 mg total) by mouth once daily.    cetirizine (ZYRTEC) 10 MG tablet Take 1 tablet (10 mg total) by mouth once daily.    diazePAM (VALIUM) 5 MG tablet Take 1 tablet (5 mg total) by mouth nightly as needed for Insomnia (do not take more than 3 times per week.). Fill 10/13/21    diclofenac sodium (SOLARAZE) 3 % gel Apply topically 2 (two) times a day.    gabapentin (NEURONTIN) 400 MG capsule Take 1 capsule (400 mg total) by mouth 3 (three) times daily.    guaiFENesin (MUCINEX) 600 mg 12 hr tablet Take 1 tablet (600 mg total) by mouth 2 (two) times daily for 10 days    hydrOXYzine HCL (ATARAX) 25 MG tablet TAKE ONE TABLET BY MOUTH EVERY DAY AS NEEDED FOR ANXIETY    irbesartan-hydrochlorothiazide (AVALIDE) 300-12.5 mg per " tablet Take 1 tablet by mouth once daily.    multivitamin with minerals tablet Take 1 tablet by mouth once daily.    omeprazole (PRILOSEC) 20 MG capsule Take 1 capsule (20 mg total) by mouth once daily.    ondansetron (ZOFRAN-ODT) 8 MG TbDL Dissolve 1 tablet (8 mg total) by mouth every 8 (eight) hours as needed.    polyethylene glycol (GLYCOLAX) 17 gram/dose powder mix 1 capful (17 g) and take by mouth once daily.    predniSONE (DELTASONE) 20 MG tablet Take 3 tablets (60 mg total) by mouth once daily for 10 days, THEN 2 tablets (40 mg total) once daily for 14 days, THEN 1 tablet (20 mg total) once daily for 14 days. REFILL MEDICATION TO FINISH FULL COURSE.    promethazine-codeine 6.25-10 mg/5 ml (PHENERGAN WITH CODEINE) 6.25-10 mg/5 mL syrup Take 5 mLs by mouth every 8 (eight) hours as needed for Cough. Fill 12/21/21    senna-docusate 8.6-50 mg (PERICOLACE) 8.6-50 mg per tablet Take 1 tablet by mouth 2 (two) times daily.    sulfamethoxazole-trimethoprim 800-160mg (BACTRIM DS) 800-160 mg Tab Take 1 tablet by mouth once daily.    tiotropium (SPIRIVA) 18 mcg inhalation capsule Inhale 1 capsule (18 mcg total) into the lungs once daily. Controller    traMADoL (ULTRAM) 50 mg tablet Take 1 tablet (50 mg total) by mouth every 8 (eight) hours as needed for Pain. Fill 12/21/21           Juliane Arriaza  [id number]                  .

## 2022-01-13 NOTE — SUBJECTIVE & OBJECTIVE
Past Medical History:   Diagnosis Date    Anxiety     Asthma     COPD exacerbation 2021    Hepatitis C 2019    treated    Hx of psychiatric care     Hypertension     Lung cancer     Psychiatric exam requested by authority     Psychiatric problem        Past Surgical History:   Procedure Laterality Date    BRONCHOSCOPY N/A 9/3/2020    Procedure: Bronchoscopy;  Surgeon: Renee Diagnostic Provider;  Location: [facility] OR [address] [place] [address];  Service: Anesthesiology;  Laterality: N/A;     SECTION      KNEE SURGERY         Review of patient's allergies indicates:  No Known Allergies    No current facility-administered medications on file prior to encounter.     Current Outpatient Medications on File Prior to Encounter   Medication Sig    albuterol (PROVENTIL/VENTOLIN HFA) 90 mcg/actuation inhaler Inhale 1-2 puffs into the lungs every 6 (six) hours as needed for Wheezing or Shortness of Breath.    albuterol-ipratropium (DUO-NEB) 2.5 mg-0.5 mg/3 mL nebulizer solution Take 3 mLs by nebulization every 6 (six) hours as needed for Wheezing. Rescue    amLODIPine (NORVASC) 5 MG tablet Take 1 tablet (5 mg total) by mouth once daily.    budesonide-formoterol 160-4.5 mcg (SYMBICORT) 160-4.5 mcg/actuation HFAA Inhale 2 puffs into the lungs every 12 (twelve) hours. Controller    buPROPion (WELLBUTRIN SR) 150 MG TBSR 12 hr tablet Take 1 tablet (150 mg total) by mouth once daily.    cetirizine (ZYRTEC) 10 MG tablet Take 1 tablet (10 mg total) by mouth once daily.    diazePAM (VALIUM) 5 MG tablet Take 1 tablet (5 mg total) by mouth nightly as needed for Insomnia (do not take more than 3 times per week.). Fill 10/13/21    diclofenac sodium (SOLARAZE) 3 % gel Apply topically 2 (two) times a day.    gabapentin (NEURONTIN) 400 MG capsule Take 1 capsule (400 mg total) by mouth 3 (three) times daily.    guaiFENesin (MUCINEX) 600 mg 12 hr tablet Take 1 tablet (600 mg total) by mouth 2 (two) times daily for 10 days     hydrOXYzine HCL (ATARAX) 25 MG tablet TAKE ONE TABLET BY MOUTH EVERY DAY AS NEEDED FOR ANXIETY    irbesartan-hydrochlorothiazide (AVALIDE) 300-12.5 mg per tablet Take 1 tablet by mouth once daily.    multivitamin with minerals tablet Take 1 tablet by mouth once daily.    omeprazole (PRILOSEC) 20 MG capsule Take 1 capsule (20 mg total) by mouth once daily.    ondansetron (ZOFRAN-ODT) 8 MG TbDL Dissolve 1 tablet (8 mg total) by mouth every 8 (eight) hours as needed.    polyethylene glycol (GLYCOLAX) 17 gram/dose powder mix 1 capful (17 g) and take by mouth once daily.    predniSONE (DELTASONE) 20 MG tablet Take 3 tablets (60 mg total) by mouth once daily for 10 days, THEN 2 tablets (40 mg total) once daily for 14 days, THEN 1 tablet (20 mg total) once daily for 14 days. REFILL MEDICATION TO FINISH FULL COURSE.    promethazine-codeine 6.25-10 mg/5 ml (PHENERGAN WITH CODEINE) 6.25-10 mg/5 mL syrup Take 5 mLs by mouth every 8 (eight) hours as needed for Cough. Fill 21    senna-docusate 8.6-50 mg (PERICOLACE) 8.6-50 mg per tablet Take 1 tablet by mouth 2 (two) times daily.    sulfamethoxazole-trimethoprim 800-160mg (BACTRIM DS) 800-160 mg Tab Take 1 tablet by mouth once daily.    tiotropium (SPIRIVA) 18 mcg inhalation capsule Inhale 1 capsule (18 mcg total) into the lungs once daily. Controller    traMADoL (ULTRAM) 50 mg tablet Take 1 tablet (50 mg total) by mouth every 8 (eight) hours as needed for Pain. Fill 21     Family History     Problem Relation (Age of Onset)    Cancer Father        Tobacco Use    Smoking status: Former Smoker     Packs/day: 0.50     Years: 40.00     Pack years: 20.00     Types: Cigarettes     Quit date: 2020     Years since quittin.7    Smokeless tobacco: Never Used   Substance and Sexual Activity    Alcohol use: Yes     Comment: occasional    Drug use: No    Sexual activity: Not on file     Review of Systems   Constitutional: Positive for activity change,  appetite change and fatigue. Negative for chills and fever.   HENT: Positive for congestion. Negative for rhinorrhea, sinus pain and sore throat.    Eyes: Negative for pain and discharge.   Respiratory: Positive for cough, shortness of breath and wheezing.         Cough productive of white sputum   Cardiovascular: Negative for chest pain, palpitations and leg swelling.   Gastrointestinal: Positive for diarrhea and nausea. Negative for abdominal pain, constipation and vomiting.   Musculoskeletal: Positive for myalgias. Negative for back pain.   Skin: Negative for color change and rash.   Neurological: Positive for weakness. Negative for dizziness, light-headedness and headaches.   Hematological: Does not bruise/bleed easily.   Psychiatric/Behavioral: Negative for agitation and confusion. The patient is nervous/anxious.      Objective:     Vital Signs (Most Recent):  Temp: 99.4 °F (37.4 °C) (01/13/22 1236)  Pulse: 97 (01/13/22 1532)  Resp: (!) 24 (01/13/22 1532)  BP: (!) 142/81 (01/13/22 1351)  SpO2: 97 % (01/13/22 1533) Vital Signs (24h Range):  Temp:  [99.4 °F (37.4 °C)] 99.4 °F (37.4 °C)  Pulse:  [] 97  Resp:  [24-30] 24  SpO2:  [86 %-100 %] 97 %  BP: (118-142)/(62-81) 142/81     Weight: 88.9 kg (196 lb)  Body mass index is 31.64 kg/m².    Physical Exam  Vitals and nursing note reviewed.   Constitutional:       General: She is not in acute distress.     Appearance: She is obese. She is ill-appearing.   HENT:      Head: Normocephalic and atraumatic.      Mouth/Throat:      Mouth: Mucous membranes are moist.   Eyes:      General: No scleral icterus.     Extraocular Movements: Extraocular movements intact.      Conjunctiva/sclera: Conjunctivae normal.   Cardiovascular:      Rate and Rhythm: Normal rate and regular rhythm.      Pulses: Normal pulses.      Heart sounds: Normal heart sounds. No murmur heard.      Pulmonary:      Effort: Tachypnea present.      Breath sounds: Examination of the right-upper field  reveals wheezing. Examination of the left-upper field reveals wheezing. Examination of the right-lower field reveals decreased breath sounds and wheezing. Examination of the left-lower field reveals decreased breath sounds and wheezing. Decreased breath sounds and wheezing present.      Comments: Diffuse wheezing with crackles  Abdominal:      General: Abdomen is flat. Bowel sounds are normal. There is no distension.      Tenderness: There is no abdominal tenderness.   Musculoskeletal:         General: No swelling or tenderness.   Skin:     General: Skin is warm and dry.      Coloration: Skin is not jaundiced.   Neurological:      Mental Status: She is alert and oriented to person, place, and time.      Motor: No weakness.   Psychiatric:         Mood and Affect: Mood normal.         Behavior: Behavior normal.             Significant Labs:   Recent Lab Results       01/13/22  1435   01/13/22  1402   01/13/22  1301        Time Notifed: 1438           Provider Notified: GT           Verbal Result Readback Performed Yes           Albumin   2.8         Alkaline Phosphatase   74         Allens Test N/A           ALT   13         Anion Gap   12         AST   19         Baso #   0.02         Basophil %   0.1         BILIRUBIN TOTAL   0.3  Comment: For infants and newborns, interpretation of results should be based  on gestational age, weight and in agreement with clinical  observations.    Premature Infant recommended reference ranges:  Up to 24 hours.............<8.0 mg/dL  Up to 48 hours............<12.0 mg/dL  3-5 days..................<15.0 mg/dL  6-29 days.................<15.0 mg/dL           Site Other           BUN   21         Calcium   9.6         Chloride   105         CO2   22         CPK   33         Creatinine   1.0         CRP   45.2         DelSys Nasal Can           Differential Method   Automated         eGFR if    >60.0         eGFR if non    >60.0  Comment: Calculation  used to obtain the estimated glomerular filtration  rate (eGFR) is the CKD-EPI equation.            Eos #   0.2         Eosinophil %   1.3         Ferritin   153         Flow 6           Glucose   141         Gran # (ANC)   11.2         Gran %   78.0         Hematocrit   37.9         Hemoglobin   11.3         Immature Grans (Abs)   0.11  Comment: Mild elevation in immature granulocytes is non specific and   can be seen in a variety of conditions including stress response,   acute inflammation, trauma and pregnancy. Correlation with other   laboratory and clinical findings is essential.           Immature Granulocytes   0.8         Lactate, Santosh   2.1  Comment: Falsely low lactic acid results can be found in samples   containing >=13.0 mg/dL total bilirubin and/or >=3.5 mg/dL   direct bilirubin.           LD   363  Comment: Results are increased in hemolyzed samples.         Lymph #   2.0         Lymph %   14.1         MCH   24.6         MCHC   29.8         MCV   83         Mode SPONT           Mono #   0.8         Mono %   5.7         MPV   9.5         nRBC   0         Platelets   304         POC BE 0           POC HCO3 25.0           POC PCO2 43.0           POC PH 7.373           POC PO2 44           POC SATURATED O2 78           POC TCO2 26           Potassium   3.8         PROTEIN TOTAL   7.0         Provider Credentials: MD            Acceptable     Yes       Rate 30           RBC   4.59         RDW   15.7         Sample VENOUS           SARS-CoV-2 RNA, Amplification, Qual     Positive       Sodium   139         Sp02 95           Troponin I   <0.006  Comment: The reference interval for Troponin I represents the 99th percentile   cutoff   for our facility and is consistent with 3rd generation assay   performance.           WBC   14.32               Significant Imaging: I have reviewed all pertinent imaging results/findings within the past 24 hours.

## 2022-01-13 NOTE — ASSESSMENT & PLAN NOTE
Mass of Upper Right Lung  Pneumonitis    Follows with Dr. Peterson. S/p Carboplatin AUC4 x1 dose 5/27/20 and palliative radiation (5/27/2020 - 6/2/2020; 20 Gy to right lung). Also received SRS to right cerebellar metastasis 6/3/2020. Pembrolizumab started 6/4/2020, C23 on 12/21/2021. Suspected pneumonitis due to Keytruda use. Recently treated with bactrim and prednisone on discharge. CXR with evidence of diffuse pleuroparenchymal opacities throughout the right mid to upper lung zone with ground-glass attenuation at the right base as well as patchy mixed alveolar and interstitial type opacities at the left mid to lower lung zone.    - May consider heme/onc consult or palliative care consult due to prognosis with additional COVID infection  - continue gabapentin  - COVID treatment as above, receiving dexamethasone x5 days

## 2022-01-14 NOTE — HOSPITAL COURSE
Upon admission, the patient CXR showed grossly stable abnormal radiographic appearance of the chest. She also got a CTPE showing improvement in consolidative opacities in the lingula of the left lower lobe, but worsening in the right lower lobe. 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 Negative for PE. D-dimer slightly elevated and procal wnl indicating no additional treatment needed for concominant bacterial infection. Completed 5 days of remdesivir and 4 days of dexamethasone for COVID. Oxygen remained stable at 6L. On 1/19 telemetry showed a run of unsustained Vtach (8 beats). EKG showed NSR, no ischemic changes. Likely 2/2 underlying disease, deferred starting metoprolol at this time.    Patient continued to have poor insight into ongoing pulmonary problems, continues to report shortness of breath as barrier to discharge despite team's reinforcement that ongoing issues will take time to improve. Also discussed benefit of palliative care given metastatic lung cancer diagnosis and significant symptom burden. Patient amenable at this time to palliative care as another layer of care.     Discharged with  and outpatient Palliative Care follow up. Continued on prednisone taper at 40 mg daily for Keytruda pneumonitis until 1/30, then to be on 20 mg daily for 2 weeks.

## 2022-01-14 NOTE — PLAN OF CARE
[facility] - Telemetry Stepdown  Initial Discharge Assessment       Primary Care Provider: Freeman Caballero MD    Admission Diagnosis: Shortness of breath [R06.02]  Chest pain [R07.9]  COVID-19 [U07.1]    Admission Date: 1/13/2022  Expected Discharge Date: 1/17/2022    Discharge Barriers Identified: None    Payor: MEDICAID / Plan: [place]UE [place] / Product Type: Managed Medicaid /     Extended Emergency Contact Information  Primary Emergency Contact: Ayan Garcia   [facility]  Home Phone: [phone]  Relation: Sister  Secondary Emergency Contact: Kell Boyce  Mobile Phone: [phone]  Relation: Daughter    Discharge Plan A: Home Health  Discharge Plan B: Home with family      [facility] [address] [facility]  [address] [facility] [address]  Phone: [phone] Fax: [phone]    [facility]  [address]  [facility] [address]  Phone: [phone] Fax: [phone]      Initial Assessment (most recent)     Adult Discharge Assessment - 01/14/22 1400        Discharge Assessment    Assessment Type Discharge Planning Assessment     Confirmed/corrected address, phone number and insurance Yes     Confirmed Demographics Correct on Facesheet     Source of Information patient     Communicated TIFFANIE with patient/caregiver Yes     Reason For Admission COVID-19 virus infection     Lives With alone     Do you expect to return to your current living situation? Yes     Do you have help at home or someone to help you manage your care at home? Yes     Who are your caregiver(s) and their phone number(s)? Kell Boyce, daughter, [phone]     Prior to hospitilization cognitive status: Alert/Oriented     Current cognitive status: Alert/Oriented     Walking or Climbing Stairs Difficulty ambulation difficulty, requires equipment     Mobility Management Patient reports use of standard cane     Dressing/Bathing Difficulty bathing difficulty,  assistance 1 person     Dressing/Bathing Management Patient reports sometimes needs assistance getting into shower     Home Layout Able to live on 1st floor     Equipment Currently Used at Home oxygen;cane, straight     Patient currently being followed by outpatient case management? No     Do you currently have service(s) that help you manage your care at home? No     Do you take prescription medications? Yes     Do you have prescription coverage? Yes     Do you have any problems affording any of your prescribed medications? No     Is the patient taking medications as prescribed? yes     Who is going to help you get home at discharge? Kell Boyce, daughter, [phone]     How do you get to doctors appointments? family or friend will provide     Are you on dialysis? No     Discharge Plan A Home Health     Discharge Plan B Home with family     DME Needed Upon Discharge  --   TBD    Discharge Plan discussed with: Patient     Discharge Barriers Identified None               Zaria Maguire LMSW  [facility]  Ext. [phone]

## 2022-01-14 NOTE — PT/OT/SLP EVAL
Physical Therapy Co-Evaluation/Treatment    Patient Name:  Janeth Boyce   MRN:  [id number]    Recommendations:     Discharge Recommendations:  home health PT   Discharge Equipment Recommendations: bedside commode   Barriers to discharge: Decreased caregiver support (lives alone)    Assessment:     Janeth Boyce is a 61 y.o. female admitted with a medical diagnosis of COVID-19 virus infection.  She presents with the following impairments/functional limitations:  weakness,impaired endurance,impaired self care skills,impaired functional mobilty,gait instability,impaired balance,impaired cardiopulmonary response to activity,decreased safety awareness. Pt completing functional mobility without physical assist. Ambulated short household distance with HHA provided throughout gait for improved stability. Impaired endurance noted throughout session with increased SOB upon minimal exertion, requiring intermittent rest breaks for recovery and limiting further progression. Pt would continue to benefit from skilled acute PT in order to address current deficits and progress functional mobility.     Rehab Prognosis: Good; patient would benefit from acute skilled PT services to address these deficits and reach maximum level of function.    Recent Surgery: * No surgery found *      Plan:     During this hospitalization, patient to be seen 4 x/week to address the identified rehab impairments via gait training,therapeutic activities,therapeutic exercises,neuromuscular re-education and progress toward the following goals:    · Plan of Care Expires:  02/12/22    Subjective     Chief Complaint: increased WOB  Patient/Family Comments/goals: return home  Pain/Comfort:  · Pain Rating 1: 0/10    Patients cultural, spiritual, Amish conflicts given the current situation: no    Living Environment:  Pt lives alone in a 1SH with 0 MIKO.   Prior to admission, patients level of function was independent with mobility and ADLs; however, pt  reports that she has been more limited recently due to SOB.  Equipment used at home: oxygen.  DME owned (not currently used): none.  Upon discharge, patient will have assistance from her daughter.    Objective:     Communicated with RN prior to session.  Patient found supine with telemetry,pulse ox (continuous),oxygen  upon PT entry to room.    General Precautions: Standard, fall,droplet,contact,airborne,respiratory   Orthopedic Precautions:N/A   Braces: N/A  Respiratory Status: Nasal cannula, flow 6 L/min    SpO2 decreased upon transition supine>sit EOB, following gait trial, and following stand pivot transfers chair<>BSC. Seated rest breaks and cues for pursed lip breathing technique provided. SpO2 gradually increased to 90s with seated rest and increased time provided for recovery.     Exams:  · Cognitive Exam:  Patient is oriented to Person, Place, Time and Situation  · Sensation:    · -       Intact  · RLE ROM: WFL  · RLE Strength: WFL  · LLE ROM: WFL  · LLE Strength: WFL    Functional Mobility:  · Bed Mobility:     · Supine to Sit: stand by assistance with HOB elevated and using bed rail   · SpO2 decreased to 86% following transition to sit EOB. Increased time and cues for pursed lip breathing technique provided. SpO2 gradually increased to 90s with seated rest.   · Transfers:     · Sit to Stand:  contact guard assistance with hand-held assist, x1 rep from EOB and x1 rep from bedside chair   · Toilet Transfer: contact guard assistance with  hand-held assist and bedside commode  using  Stand Pivot  · SpO2 decreased to 77% following. Required seated rest and cues for pursed lip breathing to allow spO2 to return to 90s.   · Gait: ~12 ft. with HHA and CGA  · demo'd decreased step length, decreased filippo, impaired weight-shifting ability, increased postural sway, and mild instability  · Cues provided for self-pacing, sequencing, and safety awareness  · Increasing SOB noted as distance progressed. SpO2 decreased  to 77% following. Cues provided for pursed lip breathing and slowed rate of breathing. Pt verbalized and demonstrated understanding. SpO2 steadily increased to 90s with seated rest.     Therapeutic Activities and Exercises:  Pt educated on role of PT and PT POC. Pt verbalized understanding.   Pt educated on the effects of bed rest and the importance of OOB activity. Pt encouraged to sit UIC majority of day as tolerated and continue daily OOB mobility with nursing assist. Pt verbalized understanding.  Pt oriented to call bell and instructed to call for staff assist with standing tasks/transfers. Pt verbalized understanding.     AM-PAC 6 CLICK MOBILITY  Total Score:17     Patient left up in chair with all lines intact and call button in reach.    GOALS:   Multidisciplinary Problems     Physical Therapy Goals        Problem: Physical Therapy Goal    Goal Priority Disciplines Outcome Goal Variances Interventions   Physical Therapy Goal     PT, PT/OT Ongoing, Progressing     Description: Goals to be met by: 2022     Patient will increase functional independence with mobility by performin. Supine to sit with Supervision  2. Sit to stand transfer with Supervision  3. Gait  x 150 feet with Supervision using LRAD as needed or without AD.  4. Lower extremity exercise program x15 reps, with supervision, in order to increase LE strength and (I) with functional mobility.                       History:     Past Medical History:   Diagnosis Date    Anxiety     Asthma     COPD exacerbation 2021    Hepatitis C 2019    treated    Hx of psychiatric care     Hypertension     Lung cancer     Psychiatric exam requested by authority     Psychiatric problem        Past Surgical History:   Procedure Laterality Date    BRONCHOSCOPY N/A 9/3/2020    Procedure: Bronchoscopy;  Surgeon: Renee Diagnostic Provider;  Location: [facility] OR [address] [place] [address];  Service: Anesthesiology;  Laterality: N/A;     SECTION      KNEE  SURGERY         Time Tracking:     PT Received On: 01/14/22  PT Start Time: 1146     PT Stop Time: 1218  PT Total Time (min): 32 min     Billable Minutes: Evaluation 20 and Therapeutic Activity 12  (co-eval performed this date due to need for 2 skilled therapists in order to ensure pt safety, accomodate for pt activity tolerance, and maximize functional potential)     01/14/2022

## 2022-01-14 NOTE — ASSESSMENT & PLAN NOTE
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 Remains on 6L NC, which is her home dose. Labs significant for WBC 12, downtrending from recent admission. CXR with progression of right lower lobe consolidation, but improvement in left lower lobe when compared to CXR from previous admission.     - duo nebs scheduled  - Continue with rem/dex for COVID infecion  - Continue continuous oxygen  - Incentive spirometer provided

## 2022-01-14 NOTE — SUBJECTIVE & OBJECTIVE
Interval History: NAEO. Remains on 6L NC with oxygenation >90. Endorses continuous cough, weakness, myalgias, and wheezing. Continue dex/rem. Endorses diarrhea, no suspicion for Cdiff but will monitor.     Review of Systems   Constitutional: Positive for activity change, appetite change and fatigue. Negative for chills and fever.   HENT: Positive for congestion. Negative for rhinorrhea, sinus pain and sore throat.    Eyes: Negative for pain and discharge.   Respiratory: Positive for cough, shortness of breath and wheezing.         Cough productive of white sputum   Cardiovascular: Negative for chest pain, palpitations and leg swelling.   Gastrointestinal: Positive for diarrhea and nausea. Negative for abdominal pain, constipation and vomiting.        Two episodes of loose, nonbloody stools    Musculoskeletal: Positive for myalgias. Negative for back pain.   Skin: Negative for color change and rash.   Neurological: Positive for weakness. Negative for dizziness, light-headedness and headaches.   Hematological: Does not bruise/bleed easily.   Psychiatric/Behavioral: Negative for agitation and confusion. The patient is not nervous/anxious.      Objective:     Vital Signs (Most Recent):  Temp: 98.1 °F (36.7 °C) (01/14/22 1140)  Pulse: 93 (01/14/22 1140)  Resp: 20 (01/14/22 1140)  BP: 126/72 (01/14/22 1140)  SpO2: 98 % (01/14/22 1226) Vital Signs (24h Range):  Temp:  [96.4 °F (35.8 °C)-99.4 °F (37.4 °C)] 98.1 °F (36.7 °C)  Pulse:  [] 93  Resp:  [17-30] 20  SpO2:  [80 %-100 %] 98 %  BP: (110-142)/(61-87) 126/72     Weight: 88.9 kg (196 lb)  Body mass index is 31.64 kg/m².  No intake or output data in the 24 hours ending 01/14/22 1228   Physical Exam  Vitals and nursing note reviewed.   Constitutional:       General: She is not in acute distress.     Appearance: She is obese. She is not ill-appearing.   HENT:      Head: Normocephalic and atraumatic.      Mouth/Throat:      Mouth: Mucous membranes are moist.       Comments: Missing teeth  Eyes:      General: No scleral icterus.     Extraocular Movements: Extraocular movements intact.      Conjunctiva/sclera: Conjunctivae normal.   Cardiovascular:      Rate and Rhythm: Normal rate and regular rhythm.      Pulses: Normal pulses.      Heart sounds: Normal heart sounds. No murmur heard.      Pulmonary:      Effort: Tachypnea present.      Breath sounds: Examination of the right-upper field reveals wheezing. Examination of the left-upper field reveals wheezing. Examination of the right-lower field reveals decreased breath sounds and wheezing. Examination of the left-lower field reveals decreased breath sounds and wheezing. Decreased breath sounds and wheezing present.      Comments: Diffuse wheezing with crackles  Abdominal:      General: Abdomen is flat. Bowel sounds are normal. There is no distension.      Tenderness: There is no abdominal tenderness.   Musculoskeletal:         General: No swelling or tenderness.   Skin:     General: Skin is warm and dry.      Coloration: Skin is not jaundiced.   Neurological:      Mental Status: She is alert and oriented to person, place, and time.      Motor: Weakness (nonfocal) present.   Psychiatric:         Mood and Affect: Mood normal.         Behavior: Behavior normal.         Significant Labs:   All pertinent labs within the past 24 hours have been reviewed.  CBC:   Recent Labs   Lab 01/13/22  1402 01/14/22  0531   WBC 14.32* 10.51   HGB 11.3* 10.8*   HCT 37.9 36.0*    285     CMP:   Recent Labs   Lab 01/13/22  1402 01/14/22  0531    137   K 3.8 4.7    103   CO2 22* 25   * 113*   BUN 21 17   CREATININE 1.0 0.9   CALCIUM 9.6 9.5   PROT 7.0 6.8   ALBUMIN 2.8* 2.7*   BILITOT 0.3 0.4   ALKPHOS 74 77   AST 19 16   ALT 13 12   ANIONGAP 12 9   EGFRNONAA >60.0 >60.0       Significant Imaging: I have reviewed all pertinent imaging results/findings within the past 24 hours.

## 2022-01-14 NOTE — NURSING
Pt medication cup was knocked over, medication collected and wasted, medications re pulled and administered.

## 2022-01-14 NOTE — ASSESSMENT & PLAN NOTE
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 (s/p palliative XRT, on pembrolizumab)   High risk for decompensation  Received dexamethasone in ED  Oxygen saturation at baseline (5L NC) with oxygen saturation >94  CXR with diffuse pleuroparenchymal opacities throughout the right mid to upper lung zone with ground-glass attenuation at the right base as well as patchy mixed alveolar and interstitial type opacities at the left mid to lower lung zone     PLAN:   - Remdesivir and dexamethasone day 2  - procal WNL, will not start on CAP coverage  - Elevation of inflammatory markers related to COVID  - Duonebs + ICS provided  - PRN breathing treatments  - Continue with lovenox 40  - Incentive provided  - q4 vitals  - continuous oxygen  - Trend CBC/CMP

## 2022-01-14 NOTE — ASSESSMENT & PLAN NOTE
Elevated WBC to 14 on admission. However, downtrending from previous admission (18.2)    RESOLVED  - Trend CBC  - q4 vitals

## 2022-01-14 NOTE — PLAN OF CARE
PT evaluation complete and appropriate goals established.    2022    Problem: Physical Therapy Goal  Goal: Physical Therapy Goal  Description: Goals to be met by: 2022     Patient will increase functional independence with mobility by performin. Supine to sit with Supervision  2. Sit to stand transfer with Supervision  3. Gait  x 150 feet with Supervision using LRAD as needed or without AD.  4. Lower extremity exercise program x15 reps, with supervision, in order to increase LE strength and (I) with functional mobility.      Outcome: Ongoing, Progressing

## 2022-01-14 NOTE — PROGRESS NOTES
[facility] Medicine  Progress Note    Patient Name: Janeth Boyce  MRN: [id number]  Patient Class: IP- Inpatient   Admission Date: 1/13/2022  Length of Stay: 1 days  Attending Physician: Florencia Cheung MD  Primary Care Provider: Freeman Caballero MD        Subjective:     Principal Problem:COVID-19 virus infection        HPI:  Ms. 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 Patient states she started feeling short of breath and fatigued a few days ago, followed by congestion, cough productive of white sputum, with associated diarrhea and myalgias. She had taken NSAIDs to help with the pain with minimal relief. She states she started to wheeze and decided to come to the ED for further evaluation. She denies fevers, chills, vomiting, and abdominal pain,. Of note, she was last admitted on 12/27-1/5 in which she was found to have a COPD exacerbation, was started on steroids, duonebs and Levaquin.  Heme/onc felt some concern for pneumonitis secondary to keytruda use, started on steroids and recommended a 6 week taper at discharge, also started on Bactrim and PPI prophylaxis. She was discharged with higher oxygen requirement at home around 5-6 L.    Upon admission the patient was found to tachypnic, but afebrile and on her baseline requirements of oxygen (5L NC). She was admitted to Hospital Medicine Team 1 for further management.       Overview/Hospital Course:  Upon admission, the patient got an Xray showing grossly stable abnormal radiographic appearance of the chest. She also got a CTPE showing improvement in consolidative opacities in the lingula of the left lower lobe, but worsening in the right lower lobe. 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 Negative for PE. D-dimer slightly elevated and procal wnl indicating no additional treatment needed for concominant bacterial infection. Started on remdesivir and dexamethasone for COVID. Experienced diarrhea with low suspicion of Cdiff; however, continued to monitor due to recent antibiotic use.       Interval History: NAEO. Remains on 6L NC with oxygenation >90. Endorses continuous cough, weakness, myalgias, and wheezing. Continue dex/rem. Endorses diarrhea, no suspicion for Cdiff but will monitor.     Review of Systems   Constitutional: Positive for activity change, appetite change and fatigue. Negative for chills and fever.   HENT: Positive for congestion. Negative for rhinorrhea, sinus pain and sore throat.    Eyes: Negative for pain and discharge.   Respiratory: Positive for cough, shortness of breath and wheezing.         Cough productive of white sputum   Cardiovascular: Negative for chest pain, palpitations and leg swelling.   Gastrointestinal: Positive for diarrhea and nausea. Negative for abdominal pain, constipation and vomiting.        Two episodes of loose, nonbloody stools    Musculoskeletal: Positive for myalgias. Negative for back pain.   Skin: Negative for color change and rash.   Neurological: Positive for weakness. Negative for dizziness, light-headedness and headaches.   Hematological: Does not bruise/bleed easily.   Psychiatric/Behavioral: Negative for agitation and confusion. The patient is not nervous/anxious.      Objective:     Vital Signs (Most Recent):  Temp: 98.1 °F (36.7 °C) (01/14/22 1140)  Pulse: 93 (01/14/22 1140)  Resp: 20 (01/14/22 1140)  BP: 126/72 (01/14/22 1140)  SpO2: 98 % (01/14/22 1226) Vital Signs (24h Range):  Temp:  [96.4 °F (35.8 °C)-99.4 °F (37.4 °C)] 98.1 °F (36.7 °C)  Pulse:  [] 93  Resp:  [17-30] 20  SpO2:  [80 %-100 %] 98 %  BP: (110-142)/(61-87)  126/72     Weight: 88.9 kg (196 lb)  Body mass index is 31.64 kg/m².  No intake or output data in the 24 hours ending 01/14/22 1228   Physical Exam  Vitals and nursing note reviewed.   Constitutional:       General: She is not in acute distress.     Appearance: She is obese. She is not ill-appearing.   HENT:      Head: Normocephalic and atraumatic.      Mouth/Throat:      Mouth: Mucous membranes are moist.      Comments: Missing teeth  Eyes:      General: No scleral icterus.     Extraocular Movements: Extraocular movements intact.      Conjunctiva/sclera: Conjunctivae normal.   Cardiovascular:      Rate and Rhythm: Normal rate and regular rhythm.      Pulses: Normal pulses.      Heart sounds: Normal heart sounds. No murmur heard.      Pulmonary:      Effort: Tachypnea present.      Breath sounds: Examination of the right-upper field reveals wheezing. Examination of the left-upper field reveals wheezing. Examination of the right-lower field reveals decreased breath sounds and wheezing. Examination of the left-lower field reveals decreased breath sounds and wheezing. Decreased breath sounds and wheezing present.      Comments: Diffuse wheezing with crackles  Abdominal:      General: Abdomen is flat. Bowel sounds are normal. There is no distension.      Tenderness: There is no abdominal tenderness.   Musculoskeletal:         General: No swelling or tenderness.   Skin:     General: Skin is warm and dry.      Coloration: Skin is not jaundiced.   Neurological:      Mental Status: She is alert and oriented to person, place, and time.      Motor: Weakness (nonfocal) present.   Psychiatric:         Mood and Affect: Mood normal.         Behavior: Behavior normal.         Significant Labs:   All pertinent labs within the past 24 hours have been reviewed.  CBC:   Recent Labs   Lab 01/13/22  1402 01/14/22  0531   WBC 14.32* 10.51   HGB 11.3* 10.8*   HCT 37.9 36.0*    285     CMP:   Recent Labs   Lab 01/13/22  1402  01/14/22  0531    137   K 3.8 4.7    103   CO2 22* 25   * 113*   BUN 21 17   CREATININE 1.0 0.9   CALCIUM 9.6 9.5   PROT 7.0 6.8   ALBUMIN 2.8* 2.7*   BILITOT 0.3 0.4   ALKPHOS 74 77   AST 19 16   ALT 13 12   ANIONGAP 12 9   EGFRNONAA >60.0 >60.0       Significant Imaging: I have reviewed all pertinent imaging results/findings within the past 24 hours.      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 (s/p palliative XRT, on pembrolizumab)   High risk for decompensation  Received dexamethasone in ED  Oxygen saturation at baseline (5L NC) with oxygen saturation >94  CXR with diffuse pleuroparenchymal opacities throughout the right mid to upper lung zone with ground-glass attenuation at the right base as well as patchy mixed alveolar and interstitial type opacities at the left mid to lower lung zone     PLAN:   - Remdesivir and dexamethasone day 2  - procal WNL, will not start on CAP coverage  - Elevation of inflammatory markers related to COVID  - Duonebs + ICS provided  - PRN breathing treatments  - Continue with lovenox 40  - Incentive provided  - q4 vitals  - continuous oxygen  - Trend CBC/CMP      Adenocarcinoma of lung, stage 4, right  Mass of Upper Right Lung  Pneumonitis    Follows with Dr. Peterson. S/p Carboplatin AUC4 x1 dose 5/27/20 and palliative radiation (5/27/2020 - 6/2/2020; 20 Gy to right lung). Also received SRS to right cerebellar metastasis 6/3/2020. Pembrolizumab started 6/4/2020, C23 on 12/21/2021. Suspected pneumonitis due to Keytruda use. Recently treated with bactrim and prednisone on discharge. CXR with evidence of diffuse pleuroparenchymal opacities throughout the right mid to upper  lung zone with ground-glass attenuation at the right base as well as patchy mixed alveolar and interstitial type opacities at the left mid to lower lung zone.    - May consider heme/onc consult or palliative care consult due to prognosis with additional COVID infection  - continue gabapentin  - COVID treatment as above, receiving dexamethasone x5 days      Metastasis to brain  - Continue on lovenox 40 daily  - Delirium precautions      Goals of care, counseling/discussion  After discussion with the family and patient, both agreed to full code status. Will continue to discuss further goals of care with patient and her prognosis.       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 Remains on 6L NC, which is her home dose. Labs significant for WBC 12, downtrending from recent admission. CXR with progression of right lower lobe consolidation, but improvement in left lower lobe when compared to CXR from previous admission.     - duo nebs scheduled  - Continue with rem/dex for COVID infecion  - Continue continuous oxygen  - Incentive spirometer provided    Leukocytosis  Elevated WBC to 14 on admission. However, downtrending from previous admission (18.2)    RESOLVED  - Trend CBC  - q4 vitals      GERD (gastroesophageal reflux disease)  - protonix daily      Anxiety  - continue bupropion   - continue atarax prn     Tobacco abuse  History of tobacco abuse    - nicotine patch    Essential hypertension  Normotensive on admission. On amlodipine and irbesartan-hydrochlorothiazide.    - monitor BP  - resume amlodipine 5mg daily         VTE Risk Mitigation (From admission, onward)         Ordered     enoxaparin injection 40 mg  Daily         01/13/22 1710     IP VTE HIGH RISK PATIENT  Once         01/13/22 1630     Place sequential compression device   Until discontinued         01/13/22 1630                Discharge Planning   TIFFANIE: 1/17/2022     Code Status: Full Code   Is the patient medically ready for discharge?:     Reason for patient still in hospital (select all that apply): Patient trending condition         Mckayla Webb MD  Department of Hospital Medicine   [facility] - [facility] Medicine

## 2022-01-14 NOTE — PT/OT/SLP EVAL
Occupational Therapy  Co- Evaluation/tx    Name: Janeth Boyce  MRN: [id number]  Admitting Diagnosis:  COVID-19 virus infection  Recent Surgery: * No surgery found *      Recommendations:     Discharge Recommendations: home health OT  Discharge Equipment Recommendations:  bedside commode  Barriers to discharge:  Decreased caregiver support    Assessment:     Janeth Boyce is a 61 y.o. female with a medical diagnosis of COVID-19 virus infection.  She presents with significant deficits with mobility and endurance. Pt. Required CGA for mobility in room on this date as well as toileting task however 02 sats decreased significantly with activity.Pt. highly motivated and participated well..  Performance deficits affecting function: weakness,impaired endurance,impaired self care skills,impaired functional mobilty,gait instability,impaired cardiopulmonary response to activity.  Pt. Would benefit from continued OT services to maximize safety and I with ADL tasks.     Rehab Prognosis: Good; patient would benefit from acute skilled OT services to address these deficits and reach maximum level of function.       Plan:     Patient to be seen 3 x/week to address the above listed problems via self-care/home management,therapeutic activities,therapeutic exercises  · Plan of Care Expires: 02/13/22  · Plan of Care Reviewed with: patient    Subjective     Chief Complaint: SOB  Patient/Family Comments/goals: To get better before going home    Occupational Profile:  Living Environment: Pt. Resides alone ion first floor. Pt. Has a tub shower  Previous level of function: Pt. Reports I with mobility and uses a grocery care to rest against if she goes shopping.  Daughter checks in on pt. daily  Roles and Routines: caretaker of herself, mother  Equipment Used at Home:  oxygen,cane, straight  Assistance upon Discharge: daughter checks in    Pain/Comfort:  · Pain Rating 1: 0/10  · Pain Rating Post-Intervention 1: 0/10    Patients  cultural, spiritual, Latter-day conflicts given the current situation: no    Objective:     Communicated with: nurse prior to session.  Patient found supine with oxygen,pulse ox (continuous),telemetry upon OT entry to room.    General Precautions: Standard, airborne,droplet,fall,contact,respiratory   Orthopedic Precautions:N/A   Braces: N/A  Respiratory Status: Nasal cannula, flow 6 L/min    Occupational Performance:    Bed Mobility:    · Patient completed Supine to Sit with stand by assistance    Functional Mobility/Transfers:  · Patient completed Sit <> Stand Transfer with contact guard assistance  with  no assistive device   · Patient completed Bed <> Chair Transfer using Stand Pivot technique with contact guard assistance with no assistive device  · Patient completed Toilet Transfer Stand Pivot technique with contact guard assistance with  no AD  · Functional Mobility: Pt. Ambulated in room without an AD ~ 12 feet and CGA    Activities of Daily Living:  · Toileting: contact guard assistance on bedside commode on this date; 02 sats decreased to 77% after toileting and did recover to 89%    Cognitive/Visual Perceptual:  Cognitive/Psychosocial Skills:     -       Oriented to: Person, Place, Time and Situation   -       Follows Commands/attention:Follows multistep  commands  -       Communication: clear/fluent  -       Memory: No Deficits noted  -       Safety awareness/insight to disability: intact   -       Mood/Affect/Coping skills/emotional control: Appropriate to situation  Visual/Perceptual:      -Intact .    Physical Exam:  Balance: -       sit: good; stand: CGA  Postural examination/scapula alignment:    -       Rounded shoulders  -       Posterior pelvic tilt  Skin integrity: Visible skin intact  Dominant hand: -       right  Upper Extremity Range of Motion:     -       Right Upper Extremity: WFL  -       Left Upper Extremity: WFL   Strength:    -       Right Upper Extremity: WFL  -       Left Upper  Extremity: WFL    AMPAC 6 Click ADL:  AMPAC Total Score: 20    Treatment & Education:  Pt. Was educated on role of OT and POC  Pt. Required multiple rest breaks during session 2/2 SOB  Pt. Educated on importance of sitting up during the day.   Education:    Patient left up in chair with all lines intact and call button in reach    GOALS:   Multidisciplinary Problems     Occupational Therapy Goals     Not on file                History:     Past Medical History:   Diagnosis Date    Anxiety     Asthma     COPD exacerbation 2021    Hepatitis C 2019    treated    Hx of psychiatric care     Hypertension     Lung cancer     Psychiatric exam requested by authority     Psychiatric problem        Past Surgical History:   Procedure Laterality Date    BRONCHOSCOPY N/A 9/3/2020    Procedure: Bronchoscopy;  Surgeon: Renee Diagnostic Provider;  Location: [facility] OR [address] [place] [address];  Service: Anesthesiology;  Laterality: N/A;     SECTION      KNEE SURGERY         Time Tracking:     OT Date of Treatment: 22  OT Start Time: 1146  OT Stop Time: 1215  OT Total Time (min): 29 min    Billable Minutes:Evaluation 15  Self Care/Home Management 14    2022

## 2022-01-14 NOTE — RESPIRATORY THERAPY
RAPID RESPONSE RESPIRATORY CHART CHECK       Chart check completed, no concerns verbalized at this time, instructed to call [phone] for further concerns or assistance.

## 2022-01-15 PROBLEM — R91.8 MASS OF UPPER LOBE OF RIGHT LUNG: Status: RESOLVED | Noted: 2020-05-01 | Resolved: 2022-01-01

## 2022-01-15 NOTE — PLAN OF CARE
Problem: Adult Inpatient Plan of Care  Goal: Plan of Care Review  Outcome: Ongoing, Progressing     Problem: Respiratory Compromise (Pneumonia)  Goal: Effective Oxygenation and Ventilation  Outcome: Ongoing, Progressing     Pt remained free from falls or injuries this shift. Pt independent in repositioning. No skin breakdown noticed. Pt rested well through the night.  At rest O2 sats 95-97% 6L NC. Pt does get SOB on exertion and sats dropped to 79% after using BSC and changing clothes. After doing slow breathing sats recovered to 90's. Educated pt on importance of not exerting self and to call nurse w any needs, especially getting OOB. afebrile

## 2022-01-15 NOTE — PROGRESS NOTES
[facility] ([place] [address])  [facility] Medicine  Progress Note    Patient Name: Janeth Boyce  MRN: [id number]  Patient Class: IP- Inpatient   Admission Date: 1/13/2022  Length of Stay: 2 days  Attending Physician: Florencia Cheung MD  Primary Care Provider: Freeman Caballero MD        Subjective:     Principal Problem:COVID-19 virus infection        HPI:  Ms. 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 Patient states she started feeling short of breath and fatigued a few days ago, followed by congestion, cough productive of white sputum, with associated diarrhea and myalgias. She had taken NSAIDs to help with the pain with minimal relief. She states she started to wheeze and decided to come to the ED for further evaluation. She denies fevers, chills, vomiting, and abdominal pain,. Of note, she was last admitted on 12/27-1/5 in which she was found to have a COPD exacerbation, was started on steroids, duonebs and Levaquin.  Heme/onc felt some concern for pneumonitis secondary to keytruda use, started on steroids and recommended a 6 week taper at discharge, also started on Bactrim and PPI prophylaxis. She was discharged with higher oxygen requirement at home around 5-6 L.    Upon admission the patient was found to tachypnic, but afebrile and on her baseline requirements of oxygen (5L NC). She was admitted to Hospital Medicine Team 1 for further management.       Overview/Hospital Course:  Upon admission, the patient got an Xray showing grossly stable abnormal radiographic appearance of the chest. She also got a CTPE showing improvement in consolidative opacities in the lingula of the left lower lobe, but worsening in the right lower lobe. 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 Negative for PE. D-dimer slightly elevated and procal wnl indicating no additional treatment needed for concominant bacterial infection. Started on remdesivir and dexamethasone for COVID. Experienced diarrhea with low suspicion of Cdiff; however, continued to monitor due to recent antibiotic use.       Interval History: Patient remains on 6L NC with oxygenation >90. Endorses continuous cough productive of white/ yellow sputum and shortness of breath. SOB worse with ambulation. Continue dex/rem.     Review of Systems   Constitutional: Positive for activity change and fatigue. Negative for appetite change, chills and fever.   HENT: Positive for congestion. Negative for rhinorrhea, sinus pain and sore throat.    Eyes: Negative for pain and discharge.   Respiratory: Positive for cough, shortness of breath and wheezing.         Cough productive of white sputum   Cardiovascular: Negative for chest pain, palpitations and leg swelling.   Gastrointestinal: Negative for abdominal pain, constipation and vomiting.   Musculoskeletal: Positive for myalgias. Negative for back pain.   Skin: Negative for color change and rash.   Neurological: Positive for weakness. Negative for dizziness, light-headedness and headaches.   Hematological: Does not bruise/bleed easily.   Psychiatric/Behavioral: Negative for agitation and confusion. The patient is not nervous/anxious.      Objective:     Vital Signs (Most Recent):  Temp: 98.3 °F (36.8 °C) (01/15/22 0748)  Pulse: 84 (01/15/22 1343)  Resp: (!) 24 (01/15/22 1343)  BP: 112/74 (01/15/22 1129)  SpO2: (!) 91 % (01/15/22 1200) Vital Signs (24h Range):  Temp:  [96.3 °F (35.7 °C)-98.5 °F (36.9 °C)] 98.3 °F (36.8 °C)  Pulse:  [77-99] 84  Resp:  [16-26] 24  SpO2:  [89 %-100 %] 91 %  BP: (112-133)/(66-84) 112/74     Weight: 96.1 kg (211 lb 13.8 oz)  Body mass index is 34.2  kg/m².    Intake/Output Summary (Last 24 hours) at 1/15/2022 1432  Last data filed at 1/15/2022 0600  Gross per 24 hour   Intake 660 ml   Output --   Net 660 ml      Physical Exam  Vitals and nursing note reviewed.   Constitutional:       General: She is not in acute distress.     Appearance: She is obese. She is not ill-appearing.   HENT:      Head: Normocephalic and atraumatic.      Mouth/Throat:      Mouth: Mucous membranes are moist.      Comments: Missing teeth  Eyes:      General: No scleral icterus.     Extraocular Movements: Extraocular movements intact.      Conjunctiva/sclera: Conjunctivae normal.   Cardiovascular:      Rate and Rhythm: Normal rate and regular rhythm.      Pulses: Normal pulses.      Heart sounds: Normal heart sounds. No murmur heard.      Pulmonary:      Effort: Tachypnea present.      Breath sounds: Examination of the right-upper field reveals wheezing. Examination of the left-upper field reveals wheezing. Examination of the right-lower field reveals decreased breath sounds and wheezing. Examination of the left-lower field reveals decreased breath sounds and wheezing. Decreased breath sounds and wheezing present.      Comments: Diffuse wheezing with crackles  Abdominal:      General: Abdomen is flat. Bowel sounds are normal. There is no distension.      Tenderness: There is no abdominal tenderness.   Musculoskeletal:         General: No swelling or tenderness.   Skin:     General: Skin is warm and dry.      Coloration: Skin is not jaundiced.   Neurological:      Mental Status: She is alert and oriented to person, place, and time.      Motor: Weakness (nonfocal) present.   Psychiatric:         Mood and Affect: Mood normal.         Behavior: Behavior normal.         Significant Labs:   All pertinent labs within the past 24 hours have been reviewed.  CBC:   Recent Labs   Lab 01/14/22  0531 01/15/22  0442   WBC 10.51 15.87*   HGB 10.8* 10.3*   HCT 36.0* 33.2*    289     CMP:   Recent  Labs   Lab 01/14/22  0531 01/15/22  0442    134*   K 4.7 4.4    100   CO2 25 25   * 123*   BUN 17 27*   CREATININE 0.9 0.9   CALCIUM 9.5 9.0   PROT 6.8 6.6   ALBUMIN 2.7* 2.6*   BILITOT 0.4 0.3   ALKPHOS 77 73   AST 16 16   ALT 12 11   ANIONGAP 9 9   EGFRNONAA >60.0 >60.0       Significant Imaging: I have reviewed all pertinent imaging results/findings within the past 24 hours.      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 (s/p palliative XRT, on pembrolizumab)   High risk for decompensation  Received dexamethasone in ED  Oxygen saturation at baseline (5L NC) with oxygen saturation >94  CXR with diffuse pleuroparenchymal opacities throughout the right mid to upper lung zone with ground-glass attenuation at the right base as well as patchy mixed alveolar and interstitial type opacities at the left mid to lower lung zone     PLAN:   - Remdesivir and dexamethasone   - procal WNL, will not start on CAP coverage  - Elevation of inflammatory markers related to COVID  - Duonebs + ICS provided  - PRN breathing treatments  - Continue with lovenox 40  - Incentive provided  - q4 vitals  - continuous oxygen  - Trend CBC/CMP  - Will add Baricitinib if patient decompensates       Metastasis to brain  - Continue on lovenox 40 daily  - Delirium precautions      Goals of care, counseling/discussion  After discussion with the family and patient, both agreed to full code status. Will continue to discuss further goals of care with patient and her prognosis.   Will need outpatient referral to palliative care      Debility  -PT/OT to assess    History of COPD  Was on 2L home O2, stage IV small cell adenocarcinoma of the lung with mets to right  cerebellum (s/p palliative XRT, on pembrolizumab) who presents with SOB due to COVID infection. Remains on 6L NC, which is her home dose. Labs significant for WBC 12, downtrending from recent admission. CXR with progression of right lower lobe consolidation, but improvement in left lower lobe when compared to CXR from previous admission.     - duo nebs scheduled  - Continue with rem/dex for COVID infecion  - Continue continuous oxygen  - Incentive spirometer provided  -mucinex for cough productive of sputum          Leukocytosis  Elevated WBC to 14 on admission. However, downtrending from previous admission (18.2). WBC elevated to 15.8 on 1/15    Plan:  - Likely 2/2 steroid use  - Trend CBC  - q4 vitals      GERD (gastroesophageal reflux disease)  - protonix daily      Anxiety  - continue bupropion   - continue atarax prn     Adenocarcinoma of lung, stage 4, right  Mass of Upper Right Lung  Pneumonitis    Follows with Dr. Peterson. S/p Carboplatin AUC4 x1 dose 5/27/20 and palliative radiation (5/27/2020 - 6/2/2020; 20 Gy to right lung). Also received SRS to right cerebellar metastasis 6/3/2020. Pembrolizumab started 6/4/2020, C23 on 12/21/2021. Suspected pneumonitis due to Keytruda use. Recently treated with bactrim and prednisone on discharge. CXR with evidence of diffuse pleuroparenchymal opacities throughout the right mid to upper lung zone with ground-glass attenuation at the right base as well as patchy mixed alveolar and interstitial type opacities at the left mid to lower lung zone.    - May consider heme/onc consult or palliative care consult due to prognosis with additional COVID infection  - continue gabapentin  - COVID treatment as above, receiving dexamethasone x5 days      Tobacco abuse  History of tobacco abuse    - nicotine patch    Essential hypertension  Normotensive on admission. On amlodipine and irbesartan-hydrochlorothiazide at home.     Plan:  - monitor BP  - resume amlodipine 5mg daily         VTE  Risk Mitigation (From admission, onward)         Ordered     enoxaparin injection 40 mg  Daily         01/13/22 1710     IP VTE HIGH RISK PATIENT  Once         01/13/22 1630     Place sequential compression device  Until discontinued         01/13/22 1630                Discharge Planning   TIFFANIE: 1/17/2022     Code Status: Full Code   Is the patient medically ready for discharge?:     Reason for patient still in hospital (select all that apply): Patient trending condition and Treatment  Discharge Plan A: Home Health                  Dilma Bhandari MD  Department of Hospital Medicine   [facility] - Intensive Care ([place]-16)

## 2022-01-15 NOTE — PLAN OF CARE
Pt in bed with eyes open talking on phone, Aox4, able to voice needs, continues on 5L NC, no distress noted, denies pain, bed locked in lowest position, call bell in reach, instructed to call when assistance needed.

## 2022-01-15 NOTE — ASSESSMENT & PLAN NOTE
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 (s/p palliative XRT, on pembrolizumab)   High risk for decompensation  Received dexamethasone in ED  Oxygen saturation at baseline (5L NC) with oxygen saturation >94  CXR with diffuse pleuroparenchymal opacities throughout the right mid to upper lung zone with ground-glass attenuation at the right base as well as patchy mixed alveolar and interstitial type opacities at the left mid to lower lung zone     PLAN:   - Remdesivir and dexamethasone day 2  - procal WNL, will not start on CAP coverage  - Elevation of inflammatory markers related to COVID  - Duonebs + ICS provided  - PRN breathing treatments  - Continue with lovenox 40  - Incentive provided  - q4 vitals  - continuous oxygen  - Trend CBC/CMP  - Will add Baricitinib if patient decompensates

## 2022-01-15 NOTE — ASSESSMENT & PLAN NOTE
Patient with stage 4 lung cancer with cerebellar mets and malignant pleural effusion    Plan:  -Patient and daughter wish for patient to remain full code at this time  -Consider palliative care consult  -Outpatient follow up with oncology

## 2022-01-15 NOTE — SUBJECTIVE & OBJECTIVE
Interval History: Patient remains on 6L NC with oxygenation >90. Endorses continuous cough productive of white/ yellow sputum and shortness of breath. SOB worse with ambulation. Continue dex/rem.     Review of Systems   Constitutional: Positive for activity change and fatigue. Negative for appetite change, chills and fever.   HENT: Positive for congestion. Negative for rhinorrhea, sinus pain and sore throat.    Eyes: Negative for pain and discharge.   Respiratory: Positive for cough, shortness of breath and wheezing.         Cough productive of white sputum   Cardiovascular: Negative for chest pain, palpitations and leg swelling.   Gastrointestinal: Negative for abdominal pain, constipation and vomiting.   Musculoskeletal: Positive for myalgias. Negative for back pain.   Skin: Negative for color change and rash.   Neurological: Positive for weakness. Negative for dizziness, light-headedness and headaches.   Hematological: Does not bruise/bleed easily.   Psychiatric/Behavioral: Negative for agitation and confusion. The patient is not nervous/anxious.      Objective:     Vital Signs (Most Recent):  Temp: 98.3 °F (36.8 °C) (01/15/22 0748)  Pulse: 84 (01/15/22 1343)  Resp: (!) 24 (01/15/22 1343)  BP: 112/74 (01/15/22 1129)  SpO2: (!) 91 % (01/15/22 1200) Vital Signs (24h Range):  Temp:  [96.3 °F (35.7 °C)-98.5 °F (36.9 °C)] 98.3 °F (36.8 °C)  Pulse:  [77-99] 84  Resp:  [16-26] 24  SpO2:  [89 %-100 %] 91 %  BP: (112-133)/(66-84) 112/74     Weight: 96.1 kg (211 lb 13.8 oz)  Body mass index is 34.2 kg/m².    Intake/Output Summary (Last 24 hours) at 1/15/2022 1432  Last data filed at 1/15/2022 0600  Gross per 24 hour   Intake 660 ml   Output --   Net 660 ml      Physical Exam  Vitals and nursing note reviewed.   Constitutional:       General: She is not in acute distress.     Appearance: She is obese. She is not ill-appearing.   HENT:      Head: Normocephalic and atraumatic.      Mouth/Throat:      Mouth: Mucous membranes  are moist.      Comments: Missing teeth  Eyes:      General: No scleral icterus.     Extraocular Movements: Extraocular movements intact.      Conjunctiva/sclera: Conjunctivae normal.   Cardiovascular:      Rate and Rhythm: Normal rate and regular rhythm.      Pulses: Normal pulses.      Heart sounds: Normal heart sounds. No murmur heard.      Pulmonary:      Effort: Tachypnea present.      Breath sounds: Examination of the right-upper field reveals wheezing. Examination of the left-upper field reveals wheezing. Examination of the right-lower field reveals decreased breath sounds and wheezing. Examination of the left-lower field reveals decreased breath sounds and wheezing. Decreased breath sounds and wheezing present.      Comments: Diffuse wheezing with crackles  Abdominal:      General: Abdomen is flat. Bowel sounds are normal. There is no distension.      Tenderness: There is no abdominal tenderness.   Musculoskeletal:         General: No swelling or tenderness.   Skin:     General: Skin is warm and dry.      Coloration: Skin is not jaundiced.   Neurological:      Mental Status: She is alert and oriented to person, place, and time.      Motor: Weakness (nonfocal) present.   Psychiatric:         Mood and Affect: Mood normal.         Behavior: Behavior normal.         Significant Labs:   All pertinent labs within the past 24 hours have been reviewed.  CBC:   Recent Labs   Lab 01/14/22  0531 01/15/22  0442   WBC 10.51 15.87*   HGB 10.8* 10.3*   HCT 36.0* 33.2*    289     CMP:   Recent Labs   Lab 01/14/22  0531 01/15/22  0442    134*   K 4.7 4.4    100   CO2 25 25   * 123*   BUN 17 27*   CREATININE 0.9 0.9   CALCIUM 9.5 9.0   PROT 6.8 6.6   ALBUMIN 2.7* 2.6*   BILITOT 0.4 0.3   ALKPHOS 77 73   AST 16 16   ALT 12 11   ANIONGAP 9 9   EGFRNONAA >60.0 >60.0       Significant Imaging: I have reviewed all pertinent imaging results/findings within the past 24 hours.

## 2022-01-15 NOTE — ASSESSMENT & PLAN NOTE
Elevated WBC to 14 on admission. However, downtrending from previous admission (18.2). WBC elevated to 15.8 on 1/15    Plan:  - Likely 2/2 steroid use  - Trend CBC  - q4 vitals

## 2022-01-15 NOTE — ASSESSMENT & PLAN NOTE
After discussion with the family and patient, both agreed to full code status. Will continue to discuss further goals of care with patient and her prognosis.   Will need outpatient referral to palliative care

## 2022-01-15 NOTE — PLAN OF CARE
Problem: Adult Inpatient Plan of Care  Goal: Plan of Care Review  Outcome: Ongoing, Progressing     Problem: Gas Exchange Impaired  Goal: Optimal Gas Exchange  Outcome: Ongoing, Progressing  O2 therapy titrated to maintain O2 saturation between 89-93%; no respiratory distress noted.     Problem: Airway Clearance Ineffective  Goal: Effective Airway Clearance  Outcome: Ongoing, Progressing  Cough and deep breathe encourage for airway clearance.     Problem: Fall Injury Risk  Goal: Absence of Fall and Fall-Related Injury  Outcome: Ongoing, Progressing  Pt free of fall this shift.     Will continue to monitor pt.

## 2022-01-15 NOTE — ASSESSMENT & PLAN NOTE
Normotensive on admission. On amlodipine and irbesartan-hydrochlorothiazide at home.     Plan:  - monitor BP  - resume amlodipine 5mg daily

## 2022-01-15 NOTE — ASSESSMENT & PLAN NOTE
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 Remains on 6L NC, which is her home dose. Labs significant for WBC 12, downtrending from recent admission. CXR with progression of right lower lobe consolidation, but improvement in left lower lobe when compared to CXR from previous admission.     - duo nebs scheduled  - Continue with rem/dex for COVID infecion  - Continue continuous oxygen  - Incentive spirometer provided  -mucinex for cough productive of sputum

## 2022-01-16 NOTE — ASSESSMENT & PLAN NOTE
Mass of Upper Right Lung  Pneumonitis    Follows with Dr. Peterosn. S/p Carboplatin AUC4 x1 dose 5/27/20 and palliative radiation (5/27/2020 - 6/2/2020; 20 Gy to right lung). Also received SRS to right cerebellar metastasis 6/3/2020. Pembrolizumab started 6/4/2020, C23 on 12/21/2021. Suspected pneumonitis due to Keytruda use. Recently treated with bactrim and prednisone on discharge. CXR with evidence of diffuse pleuroparenchymal opacities throughout the right mid to upper lung zone with ground-glass attenuation at the right base as well as patchy mixed alveolar and interstitial type opacities at the left mid to lower lung zone.    plan  - continue gabapentin  - ongoing goals of care  - reinforced utility and role of palliative care in advanced care planning and symptom management  - Will need continuation of prednisone taper after dexamethasone is complete (prednisone 40 mg start date 1/16 X 14 days, followed by prednisone 20 mg X 14 days)

## 2022-01-16 NOTE — SUBJECTIVE & OBJECTIVE
Interval History: Patient appears quite anxious this AM. Had an episode of wheezing last night during which time she could not sleep and had to get PRN breathing treatment. Patient remains on 6L NC with oxygenation >90. Endorses continuous cough productive of white/ yellow sputum and shortness of breath. SOB worse with ambulation. Continue dex/rem. Patient also complaining of urinary incontinence. No other signs such as neurological changes or saddle anesthesia present. Will order Urinalysis to evaluate     Review of Systems   Constitutional: Positive for activity change and fatigue. Negative for appetite change, chills and fever.   HENT: Positive for congestion. Negative for rhinorrhea, sinus pain and sore throat.    Eyes: Negative for pain and discharge.   Respiratory: Positive for cough, shortness of breath and wheezing.         Cough productive of white sputum   Cardiovascular: Negative for chest pain, palpitations and leg swelling.   Gastrointestinal: Negative for abdominal pain, constipation and vomiting.   Musculoskeletal: Positive for myalgias. Negative for back pain.   Skin: Negative for color change and rash.   Neurological: Positive for weakness. Negative for dizziness, light-headedness and headaches.   Hematological: Does not bruise/bleed easily.   Psychiatric/Behavioral: Negative for agitation and confusion. The patient is nervous/anxious.      Objective:     Vital Signs (Most Recent):  Temp: 97.3 °F (36.3 °C) (01/16/22 0814)  Pulse: 88 (01/16/22 1249)  Resp: (!) 26 (01/16/22 1249)  BP: 118/71 (01/16/22 0814)  SpO2: (!) 92 % (01/16/22 1249) Vital Signs (24h Range):  Temp:  [97.3 °F (36.3 °C)-98.4 °F (36.9 °C)] 97.3 °F (36.3 °C)  Pulse:  [] 88  Resp:  [16-26] 26  SpO2:  [88 %-93 %] 92 %  BP: (114-135)/(63-76) 118/71     Weight: 96 kg (211 lb 10.3 oz)  Body mass index is 34.16 kg/m².    Intake/Output Summary (Last 24 hours) at 1/16/2022 1324  Last data filed at 1/16/2022 1000  Gross per 24 hour    Intake 821.96 ml   Output 550 ml   Net 271.96 ml      Physical Exam  Vitals and nursing note reviewed.   Constitutional:       General: She is not in acute distress.     Appearance: She is obese. She is not ill-appearing.   HENT:      Head: Normocephalic and atraumatic.      Mouth/Throat:      Mouth: Mucous membranes are moist.      Comments: Missing teeth  Eyes:      General: No scleral icterus.     Extraocular Movements: Extraocular movements intact.      Conjunctiva/sclera: Conjunctivae normal.   Cardiovascular:      Rate and Rhythm: Normal rate and regular rhythm.      Pulses: Normal pulses.      Heart sounds: Normal heart sounds. No murmur heard.      Pulmonary:      Effort: Tachypnea present.      Comments: Diffuse wheezing with crackles  Abdominal:      General: Abdomen is flat. Bowel sounds are normal. There is no distension.      Tenderness: There is no abdominal tenderness.   Musculoskeletal:         General: No swelling or tenderness.   Skin:     General: Skin is warm and dry.      Coloration: Skin is not jaundiced.   Neurological:      Mental Status: She is alert and oriented to person, place, and time.      Motor: Weakness (nonfocal) present.   Psychiatric:         Mood and Affect: Mood normal.         Behavior: Behavior normal.         Significant Labs:   All pertinent labs within the past 24 hours have been reviewed.  CBC:   Recent Labs   Lab 01/15/22  0442 01/16/22  0359   WBC 15.87* 15.22*   HGB 10.3* 10.7*   HCT 33.2* 35.5*    309     CMP:   Recent Labs   Lab 01/15/22  0442 01/16/22  0359   * 135*   K 4.4 4.5    102   CO2 25 24   * 162*   BUN 27* 25*   CREATININE 0.9 1.0   CALCIUM 9.0 9.3   PROT 6.6 6.9   ALBUMIN 2.6* 2.7*   BILITOT 0.3 0.2   ALKPHOS 73 83   AST 16 16   ALT 11 12   ANIONGAP 9 9   EGFRNONAA >60.0 >60.0       Significant Imaging: I have reviewed all pertinent imaging results/findings within the past 24 hours.

## 2022-01-16 NOTE — PROGRESS NOTES
[facility] - [facility] ([place] [address])  [facility] Medicine  Progress Note    Patient Name: Janeth Boyce  MRN: [id number]  Patient Class: IP- Inpatient   Admission Date: 1/13/2022  Length of Stay: 3 days  Attending Physician: Florencia Cheung MD  Primary Care Provider: Freeman Caballero MD        Subjective:     Principal Problem:COVID-19 virus infection        HPI:  Ms. 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 Patient states she started feeling short of breath and fatigued a few days ago, followed by congestion, cough productive of white sputum, with associated diarrhea and myalgias. She had taken NSAIDs to help with the pain with minimal relief. She states she started to wheeze and decided to come to the ED for further evaluation. She denies fevers, chills, vomiting, and abdominal pain,. Of note, she was last admitted on 12/27-1/5 in which she was found to have a COPD exacerbation, was started on steroids, duonebs and Levaquin.  Heme/onc felt some concern for pneumonitis secondary to keytruda use, started on steroids and recommended a 6 week taper at discharge, also started on Bactrim and PPI prophylaxis. She was discharged with higher oxygen requirement at home around 5-6 L.    Upon admission the patient was found to tachypnic, but afebrile and on her baseline requirements of oxygen (5L NC). She was admitted to Hospital Medicine Team 1 for further management.       Overview/Hospital Course:  Upon admission, the patient got an Xray showing grossly stable abnormal radiographic appearance of the chest. She also got a CTPE showing improvement in consolidative opacities in the lingula of the left lower lobe, but worsening in the right lower lobe. 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 Negative for PE. D-dimer slightly elevated and procal wnl indicating no additional treatment needed for concominant bacterial infection. Started on remdesivir and dexamethasone for COVID. Oxygen remains stable at 6L      Interval History: Patient appears quite anxious this AM. Had an episode of wheezing last night during which time she could not sleep and had to get PRN breathing treatment. Patient remains on 6L NC with oxygenation >90. Endorses continuous cough productive of white/ yellow sputum and shortness of breath. SOB worse with ambulation. Continue dex/rem. Patient also complaining of urinary incontinence. No other signs such as neurological changes or saddle anesthesia present. Will order Urinalysis to evaluate     Review of Systems   Constitutional: Positive for activity change and fatigue. Negative for appetite change, chills and fever.   HENT: Positive for congestion. Negative for rhinorrhea, sinus pain and sore throat.    Eyes: Negative for pain and discharge.   Respiratory: Positive for cough, shortness of breath and wheezing.         Cough productive of white sputum   Cardiovascular: Negative for chest pain, palpitations and leg swelling.   Gastrointestinal: Negative for abdominal pain, constipation and vomiting.   Musculoskeletal: Positive for myalgias. Negative for back pain.   Skin: Negative for color change and rash.   Neurological: Positive for weakness. Negative for dizziness, light-headedness and headaches.   Hematological: Does not bruise/bleed easily.   Psychiatric/Behavioral: Negative for agitation and confusion. The patient is nervous/anxious.      Objective:     Vital Signs (Most Recent):  Temp: 97.3 °F (36.3 °C) (01/16/22 0814)  Pulse: 88 (01/16/22 1249)  Resp: (!) 26 (01/16/22 1249)  BP: 118/71 (01/16/22 0814)  SpO2: (!) 92 % (01/16/22 1249) Vital Signs (24h  Range):  Temp:  [97.3 °F (36.3 °C)-98.4 °F (36.9 °C)] 97.3 °F (36.3 °C)  Pulse:  [] 88  Resp:  [16-26] 26  SpO2:  [88 %-93 %] 92 %  BP: (114-135)/(63-76) 118/71     Weight: 96 kg (211 lb 10.3 oz)  Body mass index is 34.16 kg/m².    Intake/Output Summary (Last 24 hours) at 1/16/2022 1324  Last data filed at 1/16/2022 1000  Gross per 24 hour   Intake 821.96 ml   Output 550 ml   Net 271.96 ml      Physical Exam  Vitals and nursing note reviewed.   Constitutional:       General: She is not in acute distress.     Appearance: She is obese. She is not ill-appearing.   HENT:      Head: Normocephalic and atraumatic.      Mouth/Throat:      Mouth: Mucous membranes are moist.      Comments: Missing teeth  Eyes:      General: No scleral icterus.     Extraocular Movements: Extraocular movements intact.      Conjunctiva/sclera: Conjunctivae normal.   Cardiovascular:      Rate and Rhythm: Normal rate and regular rhythm.      Pulses: Normal pulses.      Heart sounds: Normal heart sounds. No murmur heard.      Pulmonary:      Effort: Tachypnea present.      Comments: Diffuse wheezing with crackles  Abdominal:      General: Abdomen is flat. Bowel sounds are normal. There is no distension.      Tenderness: There is no abdominal tenderness.   Musculoskeletal:         General: No swelling or tenderness.   Skin:     General: Skin is warm and dry.      Coloration: Skin is not jaundiced.   Neurological:      Mental Status: She is alert and oriented to person, place, and time.      Motor: Weakness (nonfocal) present.   Psychiatric:         Mood and Affect: Mood normal.         Behavior: Behavior normal.         Significant Labs:   All pertinent labs within the past 24 hours have been reviewed.  CBC:   Recent Labs   Lab 01/15/22  0442 01/16/22  0359   WBC 15.87* 15.22*   HGB 10.3* 10.7*   HCT 33.2* 35.5*    309     CMP:   Recent Labs   Lab 01/15/22  0442 01/16/22  0359   * 135*   K 4.4 4.5    102   CO2 25 24   GLU  123* 162*   BUN 27* 25*   CREATININE 0.9 1.0   CALCIUM 9.0 9.3   PROT 6.6 6.9   ALBUMIN 2.6* 2.7*   BILITOT 0.3 0.2   ALKPHOS 73 83   AST 16 16   ALT 11 12   ANIONGAP 9 9   EGFRNONAA >60.0 >60.0       Significant Imaging: I have reviewed all pertinent imaging results/findings within the past 24 hours.      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 (s/p palliative XRT, on pembrolizumab)   High risk for decompensation  Received dexamethasone in ED  Oxygen saturation at baseline (5L NC) with oxygen saturation >94  CXR with diffuse pleuroparenchymal opacities throughout the right mid to upper lung zone with ground-glass attenuation at the right base as well as patchy mixed alveolar and interstitial type opacities at the left mid to lower lung zone     PLAN:   - Remdesivir and dexamethasone day 4  - procal WNL, will not start on CAP coverage  - Elevation of inflammatory markers related to COVID  - Duonebs + ICS provided  - PRN breathing treatments  - Continue with lovenox 40  - Incentive provided  - q4 vitals  - continuous oxygen  - Trend CBC/CMP  - Will add Baricitinib if patient decompensates       Metastasis to brain  - Continue on lovenox 40 daily  - Delirium precautions      Goals of care, counseling/discussion  After discussion with the family and patient, both agreed to full code status. Will continue to discuss further goals of care with patient and her prognosis.   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  Remains on 6L NC, which is her home dose. Labs significant for WBC 12, downtrending from recent admission. CXR with progression of right lower lobe consolidation, but improvement in left lower lobe when compared to CXR from previous admission.     - duo nebs scheduled  - Continue with rem/dex for COVID infecion  - Continue continuous oxygen  - Incentive spirometer provided  -mucinex for cough productive of sputum    Pneumonitis  Patient will need to be restarted on prednisone steroid taper after dexamethasone is completed (prednisone 60 from 1/2- 1/16, prednisone 40 from 1/16- 1/30, prednisone 20 from 1/30- 2/13)      Leukocytosis  Elevated WBC to 14 on admission. However, downtrending from previous admission (18.2). WBC elevated to 15.8 on 1/15    Plan:  - Likely 2/2 steroid use  - Trend CBC  - q4 vitals      GERD (gastroesophageal reflux disease)  - protonix daily      Anxiety     - continue atarax prn     Adenocarcinoma of lung, stage 4, right  Mass of Upper Right Lung  Pneumonitis    Follows with Dr. Peterson. S/p Carboplatin AUC4 x1 dose 5/27/20 and palliative radiation (5/27/2020 - 6/2/2020; 20 Gy to right lung). Also received SRS to right cerebellar metastasis 6/3/2020. Pembrolizumab started 6/4/2020, C23 on 12/21/2021. Suspected pneumonitis due to Keytruda use. Recently treated with bactrim and prednisone on discharge. CXR with evidence of diffuse pleuroparenchymal opacities throughout the right mid to upper lung zone with ground-glass attenuation at the right base as well as patchy mixed alveolar and interstitial type opacities at the left mid to lower lung zone.    - May consider heme/onc consult or palliative care consult due to prognosis with additional COVID infection  - continue gabapentin  - COVID treatment as above, receiving dexamethasone x5 days  - Will need continuation of prednisone taper after dexamethasone is complete (prednisone 40 mg start date 1/16 X 14 days, followed by prednisone 20 mg X 14  days)       Tobacco abuse  History of tobacco abuse    - nicotine patch    Essential hypertension  Normotensive on admission. On amlodipine and irbesartan-hydrochlorothiazide at home.     Plan:  - monitor BP  - resume amlodipine 5mg daily         VTE Risk Mitigation (From admission, onward)         Ordered     enoxaparin injection 40 mg  Daily         01/13/22 1710     IP VTE HIGH RISK PATIENT  Once         01/13/22 1630     Place sequential compression device  Until discontinued         01/13/22 1630                Discharge Planning   TIFFANIE: 1/17/2022     Code Status: Full Code   Is the patient medically ready for discharge?:     Reason for patient still in hospital (select all that apply): Patient trending condition  Discharge Plan A: Home Health                  Dilma Bhandari MD  Department of Hospital Medicine   [facility] - Intensive Care ([place] [address])

## 2022-01-16 NOTE — ASSESSMENT & PLAN NOTE
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 (s/p palliative XRT, on pembrolizumab)   High risk for decompensation  Received dexamethasone in ED  Oxygen saturation at baseline (5L NC) with oxygen saturation >94  CXR with diffuse pleuroparenchymal opacities throughout the right mid to upper lung zone with ground-glass attenuation at the right base as well as patchy mixed alveolar and interstitial type opacities at the left mid to lower lung zone     PLAN:  - completed 5/5 days remdesivir on 1/17/21  - procal WNL, will not start on CAP coverage  - Elevation of inflammatory markers related to COVID  - Duonebs + ICS provided  - PRN breathing treatments  - Continue with lovenox 40  - Incentive provided  - q4 vitals  - continuous oxygen  - Trend CBC/CMP  - Will add Baricitinib if patient decompensates

## 2022-01-16 NOTE — ASSESSMENT & PLAN NOTE
Patient will need to be restarted on prednisone steroid taper after dexamethasone is completed (prednisone 60 from 1/2- 1/16, prednisone 40 from 1/16- 1/30, prednisone 20 from 1/30- 2/13)

## 2022-01-16 NOTE — ASSESSMENT & PLAN NOTE
- Continue on lovenox 40 daily  - Delirium precautions     Detail Level: Simple Otc Regimen: Mediplast as directed ( handout given to patient) Plan: Vinegar soak QD,  neosporin apply QD-bid and recommend Serica scar Gel.

## 2022-01-16 NOTE — ASSESSMENT & PLAN NOTE
Mass of Upper Right Lung  Pneumonitis    Follows with Dr. Peterson. S/p Carboplatin AUC4 x1 dose 5/27/20 and palliative radiation (5/27/2020 - 6/2/2020; 20 Gy to right lung). Also received SRS to right cerebellar metastasis 6/3/2020. Pembrolizumab started 6/4/2020, C23 on 12/21/2021. Suspected pneumonitis due to Keytruda use. Recently treated with bactrim and prednisone on discharge. CXR with evidence of diffuse pleuroparenchymal opacities throughout the right mid to upper lung zone with ground-glass attenuation at the right base as well as patchy mixed alveolar and interstitial type opacities at the left mid to lower lung zone.    - May consider heme/onc consult or palliative care consult due to prognosis with additional COVID infection  - continue gabapentin  - COVID treatment as above, receiving dexamethasone x5 days  - Will need continuation of prednisone taper after dexamethasone is complete (prednisone 40 mg start date 1/16 X 14 days, followed by prednisone 20 mg X 14 days)

## 2022-01-17 NOTE — ASSESSMENT & PLAN NOTE
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 Patient will need to be restarted on prednisone steroid taper after dexamethasone is completed     -continue long prednisone taper: 60mg from 1/2- 1/16, prednisone 40mg from 1/16- 1/30, prednisone 20mg from 1/30- 2/13

## 2022-01-17 NOTE — PLAN OF CARE
[facility] ([place] [address])      HOME HEALTH ORDERS  FACE TO FACE ENCOUNTER    Patient Name: Janeth Boyce  YOB: 1960    PCP: Freeman Caballero MD   [facility] [address] [facility] [facility] [place] [address]  PCP Phone Number: [phone]  PCP Fax: [phone]    Encounter Date: 1/13/22    Admit to Home Health    Diagnoses:  Active Hospital Problems    Diagnosis  POA    *COVID-19 virus infection [U07.1]  Yes    History of COPD [Z87.09]  Not Applicable    Debility [R53.81]  Yes     PT/OT to assess      Goals of care, counseling/discussion [Z71.89]  Not Applicable    Metastasis to brain [C79.31]  Yes    Shortness of breath [R06.02]  Yes    Pneumonitis [J18.9]  Yes    Leukocytosis [D72.829]  Yes    Malignant pleural effusion [J91.0]  Yes             GERD (gastroesophageal reflux disease) [K21.9]  Yes     Chronic    Anxiety [F41.9]  Yes     Chronic    Adenocarcinoma of lung, stage 4, right [C34.91]  Yes     Adenocarcinoma of lung with metastasis to brain - Stage IV.  Initially presented with pleural effusion on right side s/p therapeutic thoracentesis 5/2020. 5/5/21 lung biopsy, final pathology adenocarcinoma. PDL1 expressed in 100% of tumor cells from biopsy.  No actionable mutations identified.  Carboplatin AUC4 x1 dose 5/27/20 and palliative radiation (5/27/2020 - 6/2/2020; 20 Gy to right lung).  Also received SRS to right cerebellar metastasis 6/3/2020.  6/4/21 started pembrolizumab.      Cough [R05.9]  Yes             Essential hypertension [I10]  Yes     Chronic    Tobacco abuse [Z72.0]  Yes      Resolved Hospital Problems    Diagnosis Date Resolved POA    Mass of upper lobe of right lung [R91.8] 01/15/2022 Yes       Follow Up Appointments:  Future Appointments   Date Time Provider Department Center   1/31/2022  1:15 PM [facility]-MRI2 [facility]   1/31/2022  2:00 PM LAB, APPOINTMENT [facility] IN[facility]ED [facility] LAB IM [facility]   2/1/2022 10:30 AM Maik Peterson MD  [facility] HEMONC3 [facility]   2/1/2022 11:00 AM NURSE 9, [facility] CHEMO [facility] CHEMO [facility]       Allergies:Review of patient's allergies indicates:  No Known Allergies    Medications: Review discharge medications with patient and family and provide education.    Current Facility-Administered Medications   Medication Dose Route Frequency Provider Last Rate Last Admin    acetaminophen tablet 650 mg  650 mg Oral Q4H PRN Bruce Koo MD        albuterol-ipratropium 2.5 mg-0.5 mg/3 mL nebulizer solution 3 mL  3 mL Nebulization Q4H Florencia Cheung MD   3 mL at 01/17/22 1248    aluminum-magnesium hydroxide-simethicone 200-200-20 mg/5 mL suspension 30 mL  30 mL Oral Daily PRN Bruce Koo MD        amLODIPine tablet 5 mg  5 mg Oral Daily Bruce Koo MD   5 mg at 01/17/22 0958    ascorbic acid (vitamin C) tablet 500 mg  500 mg Oral BID Bruce Koo MD   500 mg at 01/17/22 0958    benzonatate capsule 100 mg  100 mg Oral TID PRN Bruce Koo MD   100 mg at 01/14/22 2123    bisacodyL suppository 10 mg  10 mg Rectal Daily PRN Bruce Koo MD        cetirizine tablet 10 mg  10 mg Oral Daily Bruce Koo MD   10 mg at 01/17/22 0958    dextromethorphan-guaiFENesin  mg/5 ml liquid 10 mL  10 mL Oral Q4H PRN Dilma Bhandari MD   10 mL at 01/17/22 0412    dextrose 50% injection 12.5 g  12.5 g Intravenous PRN Bruce Koo MD        dextrose 50% injection 25 g  25 g Intravenous PRN Bruce Koo MD        enoxaparin injection 40 mg  40 mg Subcutaneous Daily Mckayla Webb MD   40 mg at 01/16/22 1729    fluticasone furoate-vilanteroL 100-25 mcg/dose diskus inhaler 1 puff  1 puff Inhalation Daily Bruce Koo MD   1 puff at 01/17/22 0846    gabapentin capsule 100 mg  100 mg Oral TID Bruce Koo MD   100 mg at 01/15/22 2002    glucagon (human recombinant) injection 1 mg  1 mg Intramuscular PRN Bruce Koo MD        glucose chewable tablet 16 g   16 g Oral PRN Bruce Koo MD        glucose chewable tablet 24 g  24 g Oral PRN Bruce Koo MD        guaiFENesin 12 hr tablet 600 mg  600 mg Oral BID Bruce Koo MD   600 mg at 01/17/22 0958    hydrOXYzine HCL tablet 25 mg  25 mg Oral Daily PRN Bruce Koo MD   25 mg at 01/16/22 2021    insulin aspart U-100 pen 0-5 Units  0-5 Units Subcutaneous QID (AC + HS) PRN Mckayla Webb MD   1 Units at 01/16/22 2025    melatonin tablet 6 mg  6 mg Oral Nightly PRN Bruce Koo MD   6 mg at 01/16/22 2021    multivitamin tablet  1 tablet Oral Daily Bruce Koo MD   1 tablet at 01/17/22 0959    mupirocin 2 % ointment   Nasal BID Florencia Cheung MD   Given at 01/17/22 1000    naloxone 0.4 mg/mL injection 0.02 mg  0.02 mg Intravenous PRN Bruce Koo MD        ondansetron disintegrating tablet 4 mg  4 mg Oral Q8H PRN Bruce Koo MD        ondansetron injection 4 mg  4 mg Intravenous Q8H PRN Bruce Koo MD        pantoprazole EC tablet 40 mg  40 mg Oral Daily Bruce Koo MD   40 mg at 01/17/22 0959    polyethylene glycol packet 17 g  17 g Oral Daily Bruce Koo MD   17 g at 01/17/22 1000    predniSONE tablet 40 mg  40 mg Oral Daily Bruce Koo MD   40 mg at 01/17/22 0958    senna-docusate 8.6-50 mg per tablet 1 tablet  1 tablet Oral Daily Bruce Koo MD   1 tablet at 01/17/22 0958    sodium chloride 0.9% flush 10 mL  10 mL Intravenous Q12H PRN Bruce Koo MD        sulfamethoxazole-trimethoprim 800-160mg per tablet 1 tablet  1 tablet Oral Daily Raad Mendez MD   1 tablet at 01/17/22 0959     Current Discharge Medication List      CONTINUE these medications which have NOT CHANGED    Details   albuterol (PROVENTIL/VENTOLIN HFA) 90 mcg/actuation inhaler Inhale 1-2 puffs into the lungs every 6 (six) hours as needed for Wheezing or Shortness of Breath.  Qty: 18 g, Refills: 3    Associated Diagnoses: Adenocarcinoma of lung,  stage 4, right      albuterol-ipratropium (DUO-NEB) 2.5 mg-0.5 mg/3 mL nebulizer solution Take 3 mLs by nebulization every 6 (six) hours as needed for Wheezing. Rescue  Qty: 90 each, Refills: 3    Associated Diagnoses: Chronic obstructive pulmonary disease with acute exacerbation      amLODIPine (NORVASC) 5 MG tablet Take 1 tablet (5 mg total) by mouth once daily.  Qty: 90 tablet, Refills: 3      budesonide-formoterol 160-4.5 mcg (SYMBICORT) 160-4.5 mcg/actuation HFAA Inhale 2 puffs into the lungs every 12 (twelve) hours. Controller  Qty: 10.2 g, Refills: 2      buPROPion (WELLBUTRIN SR) 150 MG TBSR 12 hr tablet Take 1 tablet (150 mg total) by mouth once daily.  Qty: 30 tablet, Refills: 6    Associated Diagnoses: Anxiety      cetirizine (ZYRTEC) 10 MG tablet Take 1 tablet (10 mg total) by mouth once daily.  Qty: 30 tablet, Refills: 0    Associated Diagnoses: Seasonal allergies      diazePAM (VALIUM) 5 MG tablet Take 1 tablet (5 mg total) by mouth nightly as needed for Insomnia (do not take more than 3 times per week.). Fill 10/13/21  Qty: 15 tablet, Refills: 0    Associated Diagnoses: Adjustment insomnia      diclofenac sodium (SOLARAZE) 3 % gel Apply topically 2 (two) times a day.      gabapentin (NEURONTIN) 400 MG capsule Take 1 capsule (400 mg total) by mouth 3 (three) times daily.  Qty: 90 capsule, Refills: 3      hydrOXYzine HCL (ATARAX) 25 MG tablet TAKE ONE TABLET BY MOUTH EVERY DAY AS NEEDED FOR ANXIETY  Qty: 30 tablet, Refills: 1    Associated Diagnoses: Adenocarcinoma of lung, stage 4, right; Anxiety      irbesartan-hydrochlorothiazide (AVALIDE) 300-12.5 mg per tablet Take 1 tablet by mouth once daily.  Qty: 90 tablet, Refills: 3    Comments: .      multivitamin with minerals tablet Take 1 tablet by mouth once daily.  Qty: 30 tablet, Refills: 11    Associated Diagnoses: Adenocarcinoma of lung, stage 4, right      omeprazole (PRILOSEC) 20 MG capsule Take 1 capsule (20 mg total) by mouth once daily.  Qty: 30  capsule, Refills: 3      ondansetron (ZOFRAN-ODT) 8 MG TbDL Dissolve 1 tablet (8 mg total) by mouth every 8 (eight) hours as needed.  Qty: 60 tablet, Refills: 0    Associated Diagnoses: Nausea      polyethylene glycol (GLYCOLAX) 17 gram/dose powder mix 1 capful (17 g) and take by mouth once daily.  Qty: 510 g, Refills: 0      predniSONE (DELTASONE) 20 MG tablet Take 3 tablets (60 mg total) by mouth once daily for 10 days, THEN 2 tablets (40 mg total) once daily for 14 days, THEN 1 tablet (20 mg total) once daily for 14 days. REFILL MEDICATION TO FINISH FULL COURSE.  Qty: 72 tablet, Refills: 0      promethazine-codeine 6.25-10 mg/5 ml (PHENERGAN WITH CODEINE) 6.25-10 mg/5 mL syrup Take 5 mLs by mouth every 8 (eight) hours as needed for Cough. Fill 12/21/21  Qty: 473 mL, Refills: 0    Comments: Quantity prescribed more than 7 day supply? Yes, medically necessary. Fill 12/1/21  Associated Diagnoses: Adenocarcinoma of lung, stage 4, right      senna-docusate 8.6-50 mg (PERICOLACE) 8.6-50 mg per tablet Take 1 tablet by mouth 2 (two) times daily.  Qty: 60 tablet, Refills: 0      sulfamethoxazole-trimethoprim 800-160mg (BACTRIM DS) 800-160 mg Tab Take 1 tablet by mouth once daily.  Qty: 30 tablet, Refills: 1      tiotropium (SPIRIVA) 18 mcg inhalation capsule Inhale 1 capsule (18 mcg total) into the lungs once daily. Controller  Qty: 30 capsule, Refills: 3      traMADoL (ULTRAM) 50 mg tablet Take 1 tablet (50 mg total) by mouth every 8 (eight) hours as needed for Pain. Fill 12/21/21  Qty: 90 tablet, Refills: 0    Comments: Quantity prescribed more than 7 day supply? Yes, medically necessary. Fill 12/1/21  Associated Diagnoses: Chronic neoplasm-related pain         STOP taking these medications       guaiFENesin (MUCINEX) 600 mg 12 hr tablet Comments:   Reason for Stopping:                 I have seen and examined this patient within the last 30 days. My clinical findings that support the need for the home health skilled  services and home bound status are the following:no   Weakness/numbness causing balance and gait disturbance due to Malignancy/Cancer making it taxing to leave home.  Requiring assistive device to leave home due to unsteady gait caused by  Malignancy/Cancer.     Diet:   regular diet    Labs:      Referrals/ Consults  Physical Therapy to evaluate and treat. Evaluate for home safety and equipment needs; Establish/upgrade home exercise program. Perform / instruct on therapeutic exercises, gait training, transfer training, and Range of Motion.  Occupational Therapy to evaluate and treat. Evaluate home environment for safety and equipment needs. Perform/Instruct on transfers, ADL training, ROM, and therapeutic exercises.  Aide to provide assistance with personal care, ADLs, and vital signs.    Activities:   ambulate in house with assistance    Nursing:   Agency to admit patient within 24 hours of hospital discharge unless specified on physician order or at patient request    SN to complete comprehensive assessment including routine vital signs. Instruct on disease process and s/s of complications to report to MD. Review/verify medication list sent home with the patient at time of discharge  and instruct patient/caregiver as needed. Frequency may be adjusted depending on start of care date.     Skilled nurse to perform up to 3 visits PRN for symptoms related to diagnosis    Notify MD if SBP > 160 or < 90; DBP > 90 or < 50; HR > 120 or < 50; Temp > 101; O2 < 88%; Other:       Ok to schedule additional visits based on staff availability and patient request on consecutive days within the home health episode.    When multiple disciplines ordered:    Start of Care occurs on Sunday - Wednesday schedule remaining discipline evaluations as ordered on separate consecutive days following the start of care.    Thursday SOC -schedule subsequent evaluations Friday and Monday the following week.     Friday - Saturday SOC - schedule  subsequent discipline evaluations on consecutive days starting Monday of the following week.    For all post-discharge communication and subsequent orders please contact patient's primary care physician. If unable to reach primary care physician or do not receive response within 30 minutes, please contact  for clinical staff order clarification    Miscellaneous       Home Health Aide:  Nursing Three times weekly, Physical Therapy Three times weekly, Occupational Therapy Three times weekly and Home Health Aide Three times weekly    Wound Care Orders  n/a    I certify that this patient is confined to her home and needs intermittent skilled nursing care, physical therapy and occupational therapy.      Bruce Gamboa M.D.  Internal Medicine PGY-3  [facility]

## 2022-01-17 NOTE — PLAN OF CARE
Problem: Adult Inpatient Plan of Care  Goal: Plan of Care Review  1/16/2022 1827 by Tracey Bartlett RN  Outcome: Ongoing, Not Progressing  Goal: Optimal Comfort and Wellbeing  1/16/2022 1827 by Tracey Bartlett RN  Outcome: Ongoing, Not Progressing    Problem: Adult Inpatient Plan of Care  Goal: Optimal Comfort and Wellbeing  1/16/2022 1827 by Tracey Bartlett RN  Outcome: Ongoing, Not Progressing    Problem: Gas Exchange Impaired  Goal: Optimal Gas Exchange  Outcome: Ongoing, Not Progressing       AAOX4. 6 L 02 via nc. 02 sats > 88%; Sob at rest. Continues po abx, remains afebrile.

## 2022-01-17 NOTE — SUBJECTIVE & OBJECTIVE
Interval History: NAEO. Patient continues to have poor insight into ongoing pulmonary problems, continues to report shortness of breath as barrier to discharge despite team's reinforcement that ongoing issues will take time to improve. Plan to discharge home tomorrow with HH.       Review of Systems   Constitutional: Positive for activity change and fatigue. Negative for appetite change, chills and fever.   HENT: Positive for congestion. Negative for rhinorrhea, sinus pain and sore throat.    Eyes: Negative for pain and discharge.   Respiratory: Positive for cough, shortness of breath and wheezing.         Cough productive of white sputum   Cardiovascular: Negative for chest pain, palpitations and leg swelling.   Gastrointestinal: Negative for abdominal pain, constipation and vomiting.   Musculoskeletal: Positive for myalgias. Negative for back pain.   Skin: Negative for color change and rash.   Neurological: Positive for weakness. Negative for dizziness, light-headedness and headaches.   Hematological: Does not bruise/bleed easily.   Psychiatric/Behavioral: Negative for agitation and confusion. The patient is nervous/anxious.      Objective:     Vital Signs (Most Recent):  Temp: 97.9 °F (36.6 °C) (01/17/22 1114)  Pulse: 93 (01/17/22 1248)  Resp: (!) 22 (01/17/22 1248)  BP: 133/77 (01/17/22 1114)  SpO2: (!) 92 % (01/17/22 1248) Vital Signs (24h Range):  Temp:  [96.8 °F (36 °C)-98.4 °F (36.9 °C)] 97.9 °F (36.6 °C)  Pulse:  [] 93  Resp:  [20-22] 22  SpO2:  [86 %-98 %] 92 %  BP: (115-152)/(62-79) 133/77     Weight: 96 kg (211 lb 10.3 oz)  Body mass index is 34.16 kg/m².    Intake/Output Summary (Last 24 hours) at 1/17/2022 1349  Last data filed at 1/16/2022 2300  Gross per 24 hour   Intake --   Output 500 ml   Net -500 ml      Physical Exam  Vitals and nursing note reviewed.   Constitutional:       General: She is not in acute distress.     Appearance: She is obese. She is not ill-appearing.   HENT:      Head:  Normocephalic and atraumatic.      Mouth/Throat:      Mouth: Mucous membranes are moist.      Comments: Missing teeth  Eyes:      General: No scleral icterus.     Extraocular Movements: Extraocular movements intact.      Conjunctiva/sclera: Conjunctivae normal.   Cardiovascular:      Rate and Rhythm: Normal rate and regular rhythm.      Pulses: Normal pulses.      Heart sounds: Normal heart sounds. No murmur heard.      Pulmonary:      Effort: Tachypnea present.      Comments: Diffuse wheezing with crackles  Abdominal:      General: Abdomen is flat. Bowel sounds are normal. There is no distension.      Tenderness: There is no abdominal tenderness.   Musculoskeletal:         General: No swelling or tenderness.   Skin:     General: Skin is warm and dry.      Coloration: Skin is not jaundiced.   Neurological:      Mental Status: She is alert and oriented to person, place, and time.      Motor: Weakness (nonfocal) present.   Psychiatric:         Mood and Affect: Mood normal.         Behavior: Behavior normal.         Significant Labs:   All pertinent labs within the past 24 hours have been reviewed.  CBC:   Recent Labs   Lab 01/16/22  0359 01/17/22 0318   WBC 15.22* 15.20*   HGB 10.7* 10.6*   HCT 35.5* 34.1*    308     CMP:   Recent Labs   Lab 01/16/22  0359 01/17/22 0318   * 137   K 4.5 5.2*    105   CO2 24 23   * 159*   BUN 25* 22   CREATININE 1.0 1.0   CALCIUM 9.3 9.4   PROT 6.9 6.7   ALBUMIN 2.7* 2.7*   BILITOT 0.2 0.2   ALKPHOS 83 83   AST 16 16   ALT 12 12   ANIONGAP 9 9   EGFRNONAA >60.0 >60.0

## 2022-01-17 NOTE — ASSESSMENT & PLAN NOTE
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 Remains on 6L NC, which is her home dose. Labs significant for WBC 12, downtrending from recent admission. CXR with progression of right lower lobe consolidation, but improvement in left lower lobe when compared to CXR from previous admission.     plan  - duo nebs scheduled  - Continue with rem/dex for COVID infecion  - Continue continuous oxygen  - Incentive spirometer provided  -mucinex for cough productive of sputum

## 2022-01-17 NOTE — PLAN OF CARE
Problem: Adult Inpatient Plan of Care  Goal: Plan of Care Review  Outcome: Ongoing, Progressing  Goal: Patient-Specific Goal (Individualized)  Outcome: Ongoing, Progressing  Goal: Absence of Hospital-Acquired Illness or Injury  Outcome: Ongoing, Progressing  Goal: Optimal Comfort and Wellbeing  Outcome: Ongoing, Progressing  Goal: Readiness for Transition of Care  Outcome: Ongoing, Progressing     Problem: Fluid and Electrolyte Imbalance (Acute Kidney Injury/Impairment)  Goal: Fluid and Electrolyte Balance  Outcome: Ongoing, Progressing     Problem: Oral Intake Inadequate (Acute Kidney Injury/Impairment)  Goal: Optimal Nutrition Intake  Outcome: Ongoing, Progressing     Problem: Renal Function Impairment (Acute Kidney Injury/Impairment)  Goal: Effective Renal Function  Outcome: Ongoing, Progressing     Problem: Fluid Imbalance (Pneumonia)  Goal: Fluid Balance  Outcome: Ongoing, Progressing     Problem: Infection (Pneumonia)  Goal: Resolution of Infection Signs and Symptoms  Outcome: Ongoing, Progressing     Problem: Respiratory Compromise (Pneumonia)  Goal: Effective Oxygenation and Ventilation  Outcome: Ongoing, Progressing     Problem: Gas Exchange Impaired  Goal: Optimal Gas Exchange  Outcome: Ongoing, Progressing     Problem: Fall Injury Risk  Goal: Absence of Fall and Fall-Related Injury  Outcome: Ongoing, Progressing     Problem: Airway Clearance Ineffective  Goal: Effective Airway Clearance  Outcome: Ongoing, Progressing     Pt A&Ox4. VSS. Afebrile. O2 via NC at 6LPM. Pt noted to desat on exertion.  Meds given per MAR. Voices zero unmet needs or concerns at this time. Safety measures in place. Bed locked and lowered. Call light within reach.

## 2022-01-17 NOTE — PROGRESS NOTES
[facility] - [facility] ([place] [id number])  [facility] Medicine  Progress Note    Patient Name: Janeth Boyce  MRN: [id number]  Patient Class: IP- Inpatient   Admission Date: 1/13/2022  Length of Stay: 4 days  Attending Physician: Florencia Cheung MD  Primary Care Provider: Freeman Caballero MD        Subjective:     Principal Problem:COVID-19 virus infection        HPI:  Ms. 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 Patient states she started feeling short of breath and fatigued a few days ago, followed by congestion, cough productive of white sputum, with associated diarrhea and myalgias. She had taken NSAIDs to help with the pain with minimal relief. She states she started to wheeze and decided to come to the ED for further evaluation. She denies fevers, chills, vomiting, and abdominal pain,. Of note, she was last admitted on 12/27-1/5 in which she was found to have a COPD exacerbation, was started on steroids, duonebs and Levaquin.  Heme/onc felt some concern for pneumonitis secondary to keytruda use, started on steroids and recommended a 6 week taper at discharge, also started on Bactrim and PPI prophylaxis. She was discharged with higher oxygen requirement at home around 5-6 L.    Upon admission the patient was found to tachypnic, but afebrile and on her baseline requirements of oxygen (5L NC). She was admitted to Hospital Medicine Team 1 for further management.       Overview/Hospital Course:  Upon admission, the patient got an Xray showing grossly stable abnormal radiographic appearance of the chest. She also got a CTPE showing improvement in consolidative opacities in the lingula of the left lower lobe, but worsening in the right lower lobe. 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 Negative for PE. D-dimer slightly elevated and procal wnl indicating no additional treatment needed for concominant bacterial infection. Completed 5 days of remdesivir for COVID. Oxygen remained stable at 6L.     Patient continued to have poor insight into ongoing pulmonary problems, continues to report shortness of breath as barrier to discharge despite team's reinforcement that ongoing issues will take time to improve. Also discussed benefit of palliative care given metastatic lung cancer diagnosis and significant symptom burden. Patient not amenable at this time but will continue to reinforce value as another layer of care.     Plan for discharge 1/18 with HH.       Interval History: NAEO. Patient continues to have poor insight into ongoing pulmonary problems, continues to report shortness of breath as barrier to discharge despite team's reinforcement that ongoing issues will take time to improve. Plan to discharge home tomorrow with HH.       Review of Systems   Constitutional: Positive for activity change and fatigue. Negative for appetite change, chills and fever.   HENT: Positive for congestion. Negative for rhinorrhea, sinus pain and sore throat.    Eyes: Negative for pain and discharge.   Respiratory: Positive for cough, shortness of breath and wheezing.         Cough productive of white sputum   Cardiovascular: Negative for chest pain, palpitations and leg swelling.   Gastrointestinal: Negative for abdominal pain, constipation and vomiting.   Musculoskeletal: Positive for myalgias. Negative for back pain.   Skin: Negative for color change and rash.   Neurological: Positive for weakness. Negative for dizziness, light-headedness and headaches.   Hematological: Does not bruise/bleed easily.   Psychiatric/Behavioral: Negative for agitation and confusion. The patient is  nervous/anxious.      Objective:     Vital Signs (Most Recent):  Temp: 97.9 °F (36.6 °C) (01/17/22 1114)  Pulse: 93 (01/17/22 1248)  Resp: (!) 22 (01/17/22 1248)  BP: 133/77 (01/17/22 1114)  SpO2: (!) 92 % (01/17/22 1248) Vital Signs (24h Range):  Temp:  [96.8 °F (36 °C)-98.4 °F (36.9 °C)] 97.9 °F (36.6 °C)  Pulse:  [] 93  Resp:  [20-22] 22  SpO2:  [86 %-98 %] 92 %  BP: (115-152)/(62-79) 133/77     Weight: 96 kg (211 lb 10.3 oz)  Body mass index is 34.16 kg/m².    Intake/Output Summary (Last 24 hours) at 1/17/2022 1349  Last data filed at 1/16/2022 2300  Gross per 24 hour   Intake --   Output 500 ml   Net -500 ml      Physical Exam  Vitals and nursing note reviewed.   Constitutional:       General: She is not in acute distress.     Appearance: She is obese. She is not ill-appearing.   HENT:      Head: Normocephalic and atraumatic.      Mouth/Throat:      Mouth: Mucous membranes are moist.      Comments: Missing teeth  Eyes:      General: No scleral icterus.     Extraocular Movements: Extraocular movements intact.      Conjunctiva/sclera: Conjunctivae normal.   Cardiovascular:      Rate and Rhythm: Normal rate and regular rhythm.      Pulses: Normal pulses.      Heart sounds: Normal heart sounds. No murmur heard.      Pulmonary:      Effort: Tachypnea present.      Comments: Diffuse wheezing with crackles  Abdominal:      General: Abdomen is flat. Bowel sounds are normal. There is no distension.      Tenderness: There is no abdominal tenderness.   Musculoskeletal:         General: No swelling or tenderness.   Skin:     General: Skin is warm and dry.      Coloration: Skin is not jaundiced.   Neurological:      Mental Status: She is alert and oriented to person, place, and time.      Motor: Weakness (nonfocal) present.   Psychiatric:         Mood and Affect: Mood normal.         Behavior: Behavior normal.         Significant Labs:   All pertinent labs within the past 24 hours have been reviewed.  CBC:   Recent  Labs   Lab 01/16/22  0359 01/17/22 0318   WBC 15.22* 15.20*   HGB 10.7* 10.6*   HCT 35.5* 34.1*    308     CMP:   Recent Labs   Lab 01/16/22 0359 01/17/22 0318   * 137   K 4.5 5.2*    105   CO2 24 23   * 159*   BUN 25* 22   CREATININE 1.0 1.0   CALCIUM 9.3 9.4   PROT 6.9 6.7   ALBUMIN 2.7* 2.7*   BILITOT 0.2 0.2   ALKPHOS 83 83   AST 16 16   ALT 12 12   ANIONGAP 9 9   EGFRNONAA >60.0 >60.0       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 (s/p palliative XRT, on pembrolizumab)   High risk for decompensation  Received dexamethasone in ED  Oxygen saturation at baseline (5L NC) with oxygen saturation >94  CXR with diffuse pleuroparenchymal opacities throughout the right mid to upper lung zone with ground-glass attenuation at the right base as well as patchy mixed alveolar and interstitial type opacities at the left mid to lower lung zone     PLAN:  - completed 5/5 days remdesivir on 1/17/21  - procal WNL, will not start on CAP coverage  - Elevation of inflammatory markers related to COVID  - Duonebs + ICS provided  - PRN breathing treatments  - Continue with lovenox 40  - Incentive provided  - q4 vitals  - continuous oxygen  - Trend CBC/CMP  - Will add Baricitinib if patient decompensates     Metastasis to brain  - Continue on lovenox 40 daily  - Delirium precautions      Goals of care, counseling/discussion  After discussion with the family and patient, both agreed to full code status. Will continue to discuss further goals of care with patient and her prognosis.         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 Remains on 6L NC, which is her home dose. Labs significant for WBC 12, downtrending from recent admission. CXR with progression of right lower lobe consolidation, but improvement in left lower lobe when compared to CXR from previous admission.     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 Patient will need to be restarted on prednisone steroid taper after dexamethasone is completed     -continue long prednisone taper: 60mg from 1/2- 1/16, prednisone 40mg from 1/16- 1/30, prednisone 20mg from 1/30- 2/13        Leukocytosis  Elevated WBC to 14 on admission. However, downtrending from previous admission (18.2). WBC elevated to 15.8 on 1/15    Plan:  - Likely 2/2 steroid use  - Trend CBC  - q4 vitals       GERD (gastroesophageal reflux disease)  - protonix daily      Anxiety     - continue atarax prn     Adenocarcinoma of lung, stage 4, right  Mass of Upper Right Lung  Pneumonitis    Follows with Dr. Peterson. S/p Carboplatin AUC4 x1 dose 5/27/20 and palliative radiation (5/27/2020 - 6/2/2020; 20 Gy to right lung). Also received SRS to right cerebellar metastasis 6/3/2020. Pembrolizumab started 6/4/2020, C23 on 12/21/2021. Suspected pneumonitis due to Keytruda use. Recently treated with bactrim and prednisone on discharge. CXR with evidence of diffuse pleuroparenchymal opacities throughout the right mid to upper lung zone with ground-glass attenuation at the right base as well as patchy mixed alveolar and interstitial type opacities at the left mid to lower lung zone.    plan  - continue gabapentin  - ongoing goals of care  - reinforced utility and role of palliative care in advanced care planning and symptom management  - Will need  continuation of prednisone taper after dexamethasone is complete (prednisone 40 mg start date 1/16 X 14 days, followed by prednisone 20 mg X 14 days)    Cough        Tobacco abuse  History of tobacco abuse    - nicotine patch     Essential hypertension  Normotensive on admission. On amlodipine and irbesartan-hydrochlorothiazide at home.     Plan:  - monitor BP  - resume amlodipine 5mg daily         VTE Risk Mitigation (From admission, onward)         Ordered     enoxaparin injection 40 mg  Daily         01/13/22 1710     IP VTE HIGH RISK PATIENT  Once         01/13/22 1630     Place sequential compression device  Until discontinued         01/13/22 1630                Discharge Planning   TIFFANIE: 1/17/2022     Code Status: Full Code   Is the patient medically ready for discharge?:     Reason for patient still in hospital (select all that apply): Patient trending condition  Discharge Plan A: Home Health            Bruce Gamboa MD  Department of Hospital Medicine   [facility] - Intensive Care ([place]-[facility])

## 2022-01-18 PROBLEM — Z87.09 HISTORY OF COPD: Chronic | Status: ACTIVE | Noted: 2022-01-01

## 2022-01-18 NOTE — PLAN OF CARE
Problem: Adult Inpatient Plan of Care  Goal: Plan of Care Review  Outcome: Ongoing, Progressing     Problem: Respiratory Compromise (Pneumonia)  Goal: Effective Oxygenation and Ventilation  Outcome: Ongoing, Progressing  O2 therapy remains at 6L NCH and O2 saturation maintains at 88-94% with no respiratory distress noted.     Problem: Infection (Pneumonia)  Goal: Resolution of Infection Signs and Symptoms  Outcome: Adequate for Care Transition  Antibiotic administered to manage lower respiratory tract infection and other oppurtunistic infections     Problem: Airway Clearance Ineffective  Goal: Effective Airway Clearance  Outcome: Adequate for Care Transition  Guaifenesin administered to promote airway clearance.     Will continue to monitor pt.

## 2022-01-18 NOTE — PT/OT/SLP PROGRESS
Physical Therapy Treatment    Patient Name:  Janeth Boyce   MRN:  [id number]    Recommendations:     Discharge Recommendations:  home health PT   Discharge Equipment Recommendations: bedside commode   Barriers to discharge: Decreased caregiver support (lives alone)    Assessment:     Janeth Boyce is a 61 y.o. female admitted with a medical diagnosis of COVID-19 virus infection.  She presents with the following impairments/functional limitations:  weakness,impaired endurance,impaired self care skills,impaired functional mobilty,gait instability,impaired balance,impaired cardiopulmonary response to activity. Pt tolerated treatment fairly well, and will continue to benefit from skilled PT services to improve all deficits noted above. Resume PT POC as indicated.     Rehab Prognosis: Good; patient would benefit from acute skilled PT services to address these deficits and reach maximum level of function.    Recent Surgery: * No surgery found *      Plan:     During this hospitalization, patient to be seen 4 x/week to address the identified rehab impairments via gait training,therapeutic activities,therapeutic exercises,neuromuscular re-education and progress toward the following goals:    · Plan of Care Expires:  02/12/22    Subjective     Chief Complaint: none stated  Patient/Family Comments/goals: none stated  Pain/Comfort:  ·        Objective:     Communicated with nursing prior to session.  Patient found HOB elevated with  (all lines intact) upon PT entry to room.     General Precautions: Standard, fall,droplet,contact,airborne,respiratory   Orthopedic Precautions:N/A   Braces:  N/A  Respiratory Status: Nasal cannula, flow 6 L/min     Functional Mobility:  · Bed Mobility:     · Supine to Sit: stand by assistance w/ HOB elevated, and use of bed rail  · Sit to Supine: stand by assistance w/ HOB elevated, and use of bed rail  · Transfers:  Sit to Stand:  stand by assistance with no AD  · EOB<>bedside commode:  stand by assistance with no AD  · Gait: Not performed 2/2 pt refusal.     AM-PAC 6 CLICK MOBILITY   Total Score: 17     Therapeutic Activities and Exercises:   -BLE therex 2x10 reps: AP,LAQ,HF,Hip abd/add  -pt did not require assistance with toileting on this date.     Patient left HOB elevated with all lines intact, call button in reach and nursing notified..    GOALS:   Multidisciplinary Problems     Physical Therapy Goals        Problem: Physical Therapy Goal    Goal Priority Disciplines Outcome Goal Variances Interventions   Physical Therapy Goal     PT, PT/OT Ongoing, Progressing     Description: Goals to be met by: 2022     Patient will increase functional independence with mobility by performin. Supine to sit with Supervision  2. Sit to stand transfer with Supervision  3. Gait  x 150 feet with Supervision using LRAD as needed or without AD.  4. Lower extremity exercise program x15 reps, with supervision, in order to increase LE strength and (I) with functional mobility.                       Time Tracking:     PT Received On: 22  PT Start Time: 1458     PT Stop Time: 1517  PT Total Time (min): 19 min     Billable Minutes: Therapeutic Activity 19    Treatment Type: Treatment  PT/PTA: PTA     PTA Visit Number: 1     2022

## 2022-01-18 NOTE — PT/OT/SLP PROGRESS
Occupational Therapy   Treatment    Name: Janeth Boyce  MRN: [id number]  Admitting Diagnosis:  COVID-19 virus infection       Recommendations:     Discharge Recommendations: home health OT  Discharge Equipment Recommendations:  bedside commode  Barriers to discharge:  Decreased caregiver support    Assessment:     Janeth Boyce is a 61 y.o. female with a medical diagnosis of COVID-19 virus infection.  She presents with performance deficits affecting function are weakness,impaired endurance,impaired self care skills,impaired functional mobilty,gait instability,impaired balance,impaired cardiopulmonary response to activity. Upon OT arrival, pt with increased work of breathing, visibly upset regarding disagreement with family. Session limited as time spent providing cuing for pursed lip breathing to help calm patient. Patient declined OOB activities this date 2' fatigue from increased work of breathing. Pt would benefit from skilled OT services in order to maximize independence with ADLs and facilitate safe discharge. Pt would benefit from home health OT once medically stable for discharge.     Rehab Prognosis:  Good; patient would benefit from acute skilled OT services to address these deficits and reach maximum level of function.       Plan:     Patient to be seen 3 x/week to address the above listed problems via self-care/home management,therapeutic exercises,therapeutic activities,neuromuscular re-education  · Plan of Care Expires: 02/13/22  · Plan of Care Reviewed with: patient    Subjective     Pain/Comfort:  Pain Rating 1: 0/10  Pain Rating Post-Intervention 1: 0/10    Objective:     Communicated with: RN prior to session.  Patient found HOB elevated with oxygen,pulse ox (continuous),telemetry upon OT entry to room.    General Precautions: Standard, airborne,contact,droplet,fall,respiratory   Orthopedic Precautions:N/A   Braces: N/A  Respiratory Status: Nasal cannula, flow 6 L/min     Occupational  Performance:     Bed Mobility:    · Patient able to perform supine<>long sitting with mod (I) with HOB elevated. She declined further mobility.    Activities of Daily Living:  · Feeding:  independence to self feed using utensil; pt provided cup of coffee and requested assistance with package manipulation  · Grooming: independence to perform facial hygiene with HOB elevated      AMPA 6 Click ADL: 19    Treatment & Education:  -Therapist provided facilitation and instruction of proper body mechanics, energy conservation, and fall prevention strategies during tasks listed above.  -Pt educated on role of OT, POC and goals for therapy  -Pt educated on pursed lip breathing to help reduce anxiety  -Pt educated on importance of OOB activities with staff member assistance and sitting OOB majority of the day.   -Pt verbalized understanding. Pt expressed no further concerns/questions      Patient left HOB elevated with all lines intact and call button in reachEducation:      GOALS:   Multidisciplinary Problems     Occupational Therapy Goals        Problem: Occupational Therapy Goal    Goal Priority Disciplines Outcome Interventions   Occupational Therapy Goal     OT, PT/OT Ongoing, Progressing    Description: Goals to be met by: 2/8/22    Patient will increase functional independence with ADLs by performing:    Grooming while standing at sink with [place].  Toileting from toilet with [place] for hygiene and clothing management.   Toilet transfer to toilet with [place].                     Time Tracking:     OT Date of Treatment: 01/18/22  OT Start Time: 0907  OT Stop Time: 0923  OT Total Time (min): 16 min    Billable Minutes:Therapeutic Activity 16    OT/TRAVIS: OT          1/18/2022

## 2022-01-18 NOTE — PLAN OF CARE
[facility] - [facility] ([facility]  Discharge Reassessment    Primary Care Provider: Freeman Caballero MD    Expected Discharge Date: 1/18/2022    Reassessment (most recent)       Discharge Reassessment - 01/18/22 1210          Discharge Reassessment    Assessment Type Discharge Planning Reassessment     Discharge Plan discussed with: Patient     Communicated TIFFANIE with patient/caregiver Yes     Discharge Plan A Home Health     Discharge Plan B Home with family     DME Needed Upon Discharge  none        Post-Acute Status    Post-Acute Authorization Home Health                     Patient awake & alert in bed when CM rounded. No family present. [facility] , was admitted with COVID & is being followed by PT/OT. Pox 91% on 6L NC & RR 28 this AM. Patient uses 2L O2 at home & received her O2 supplies from [facility]. Patient stated that she is short winded right this AM because she had a disagreement with her family. CM encouraged patient to take deep breaths from her nose. Patient verbalized appreciation.      Patient lives alone, has equipment to assist with ambulation, has her portable O2 tank at the bedside, & denied the need for assistance with transportation at time of discharge.     Hospital follow up appointment scheduled for the patient with Dr Freeman Caballero (PCP) on 1/824/2022 at 0945.      Will continue to follow.

## 2022-01-18 NOTE — RESPIRATORY THERAPY
RAPID RESPONSE RESPIRATORY CHART CHECK       Chart check completed, instructed to call [phone] for further concerns or assistance.

## 2022-01-18 NOTE — ASSESSMENT & PLAN NOTE
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 Remains on 6L NC, which is her home dose. Labs significant for WBC 12, downtrending from recent admission. CXR with progression of right lower lobe consolidation, but improvement in left lower lobe when compared to CXR from previous admission.     Plan:  - alfredo scheduled  - s/p remdesivir and 4 days of dexamethasone for COVID infection  - continue continuous oxygen  - incentive spirometer provided  - mucinex for cough productive of sputum

## 2022-01-18 NOTE — SUBJECTIVE & OBJECTIVE
Interval History: No acute events overnight. VSS, remains afebrile. Patient with no new complaints this morning, states she is unable to go home today due to her family cleaning her house. Says she will be able to go home tomorrow.    Review of Systems   Constitutional: Positive for activity change and fatigue. Negative for appetite change, chills and fever.   HENT: Positive for congestion. Negative for rhinorrhea, sinus pain and sore throat.    Eyes: Negative for pain and discharge.   Respiratory: Positive for cough, shortness of breath and wheezing.         Cough productive of white sputum   Cardiovascular: Negative for chest pain, palpitations and leg swelling.   Gastrointestinal: Negative for abdominal pain, constipation and vomiting.   Musculoskeletal: Positive for myalgias. Negative for back pain.   Skin: Negative for color change and rash.   Neurological: Positive for weakness (generalized). Negative for dizziness, light-headedness and headaches.   Hematological: Does not bruise/bleed easily.   Psychiatric/Behavioral: Negative for agitation and confusion. The patient is nervous/anxious.      Objective:     Vital Signs (Most Recent):  Temp: 97.7 °F (36.5 °C) (01/18/22 1234)  Pulse: 102 (01/18/22 1234)  Resp: 19 (01/18/22 1234)  BP: 134/81 (01/18/22 1234)  SpO2: (!) 92 % (01/18/22 1240) Vital Signs (24h Range):  Temp:  [97.6 °F (36.4 °C)-98.9 °F (37.2 °C)] 97.7 °F (36.5 °C)  Pulse:  [] 102  Resp:  [16-28] 19  SpO2:  [85 %-94 %] 92 %  BP: (121-141)/(75-90) 134/81     Weight: 96 kg (211 lb 10.3 oz)  Body mass index is 34.16 kg/m².    Intake/Output Summary (Last 24 hours) at 1/18/2022 1438  Last data filed at 1/18/2022 0400  Gross per 24 hour   Intake --   Output 400 ml   Net -400 ml      Physical Exam  Vitals and nursing note reviewed.   Constitutional:       General: She is not in acute distress.     Appearance: She is obese. She is not ill-appearing.   HENT:      Head: Normocephalic and atraumatic.       Mouth/Throat:      Mouth: Mucous membranes are moist.      Comments: Missing teeth  Eyes:      General: No scleral icterus.     Extraocular Movements: Extraocular movements intact.      Conjunctiva/sclera: Conjunctivae normal.   Cardiovascular:      Rate and Rhythm: Normal rate and regular rhythm.      Pulses: Normal pulses.      Heart sounds: Normal heart sounds. No murmur heard.      Pulmonary:      Effort: Tachypnea present. No respiratory distress.      Breath sounds: Wheezing (Diffuse wheezing with crackles) present.      Comments: NC in place connected to 6L oxygen  Abdominal:      General: Abdomen is flat. There is no distension.      Tenderness: There is no abdominal tenderness.   Musculoskeletal:         General: No swelling or tenderness.   Skin:     General: Skin is warm and dry.      Coloration: Skin is not jaundiced.   Neurological:      General: No focal deficit present.      Mental Status: She is alert and oriented to person, place, and time.      Motor: Weakness (nonfocal) present.   Psychiatric:         Mood and Affect: Mood normal.         Behavior: Behavior normal.         Significant Labs: All pertinent labs within the past 24 hours have been reviewed.    Significant Imaging: I have reviewed all pertinent imaging results/findings within the past 24 hours.

## 2022-01-18 NOTE — PROGRESS NOTES
[facility] - Intensive Care ([place] [id number])  [facility]  Progress Note    Patient Name: Janeth Boyce  MRN: [id number]  Patient Class: IP- Inpatient   Admission Date: 1/13/2022  Length of Stay: 5 days  Attending Physician: Florencia Cheung MD  Primary Care Provider: Freeman Caballero MD        Subjective:     Principal Problem:COVID-19 virus infection        HPI:  Ms. 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 Patient states she started feeling short of breath and fatigued a few days ago, followed by congestion, cough productive of white sputum, with associated diarrhea and myalgias. She had taken NSAIDs to help with the pain with minimal relief. She states she started to wheeze and decided to come to the ED for further evaluation. She denies fevers, chills, vomiting, and abdominal pain,. Of note, she was last admitted on 12/27-1/5 in which she was found to have a COPD exacerbation, was started on steroids, duonebs and Levaquin.  Heme/onc felt some concern for pneumonitis secondary to keytruda use, started on steroids and recommended a 6 week taper at discharge, also started on Bactrim and PPI prophylaxis. She was discharged with higher oxygen requirement at home around 5-6 L.    Upon admission the patient was found to tachypnic, but afebrile and on her baseline requirements of oxygen (5L NC). She was admitted to Hospital Medicine Team 1 for further management.       Overview/Hospital Course:  Upon admission, the patient CXR showed grossly stable abnormal radiographic appearance of the chest. She also got a CTPE showing improvement in consolidative opacities in the lingula of the left lower lobe, but worsening in the right lower lobe. 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 Negative for PE. D-dimer slightly elevated and procal wnl indicating no additional treatment needed for concominant bacterial infection. Completed 5 days of remdesivir for COVID. Oxygen remained stable at 6L.     Patient continued to have poor insight into ongoing pulmonary problems, continues to report shortness of breath as barrier to discharge despite team's reinforcement that ongoing issues will take time to improve. Also discussed benefit of palliative care given metastatic lung cancer diagnosis and significant symptom burden. Patient amenable at this time to palliative care as another layer of care.     Plan for discharge 1/19 with HH and outpatient Palliative Care follow up. On prednisone taper at 40 mg daily for Keytruda pneumonitis until 1/30, then to be on 20 mg daily for 2 weeks.      Interval History: No acute events overnight. VSS, remains afebrile. Patient with no new complaints this morning, states she is unable to go home today due to her family cleaning her house. Says she will be able to go home tomorrow.    Review of Systems   Constitutional: Positive for activity change and fatigue. Negative for appetite change, chills and fever.   HENT: Positive for congestion. Negative for rhinorrhea, sinus pain and sore throat.    Eyes: Negative for pain and discharge.   Respiratory: Positive for cough, shortness of breath and wheezing.         Cough productive of white sputum   Cardiovascular: Negative for chest pain, palpitations and leg swelling.   Gastrointestinal: Negative for abdominal pain, constipation and vomiting.   Musculoskeletal: Positive for myalgias. Negative for back pain.   Skin: Negative for color change and rash.   Neurological: Positive for weakness (generalized). Negative for dizziness, light-headedness and headaches.   Hematological: Does not bruise/bleed easily.    Psychiatric/Behavioral: Negative for agitation and confusion. The patient is nervous/anxious.      Objective:     Vital Signs (Most Recent):  Temp: 97.7 °F (36.5 °C) (01/18/22 1234)  Pulse: 102 (01/18/22 1234)  Resp: 19 (01/18/22 1234)  BP: 134/81 (01/18/22 1234)  SpO2: (!) 92 % (01/18/22 1240) Vital Signs (24h Range):  Temp:  [97.6 °F (36.4 °C)-98.9 °F (37.2 °C)] 97.7 °F (36.5 °C)  Pulse:  [] 102  Resp:  [16-28] 19  SpO2:  [85 %-94 %] 92 %  BP: (121-141)/(75-90) 134/81     Weight: 96 kg (211 lb 10.3 oz)  Body mass index is 34.16 kg/m².    Intake/Output Summary (Last 24 hours) at 1/18/2022 1438  Last data filed at 1/18/2022 0400  Gross per 24 hour   Intake --   Output 400 ml   Net -400 ml      Physical Exam  Vitals and nursing note reviewed.   Constitutional:       General: She is not in acute distress.     Appearance: She is obese. She is not ill-appearing.   HENT:      Head: Normocephalic and atraumatic.      Mouth/Throat:      Mouth: Mucous membranes are moist.      Comments: Missing teeth  Eyes:      General: No scleral icterus.     Extraocular Movements: Extraocular movements intact.      Conjunctiva/sclera: Conjunctivae normal.   Cardiovascular:      Rate and Rhythm: Normal rate and regular rhythm.      Pulses: Normal pulses.      Heart sounds: Normal heart sounds. No murmur heard.      Pulmonary:      Effort: Tachypnea present. No respiratory distress.      Breath sounds: Wheezing (Diffuse wheezing with crackles) present.      Comments: NC in place connected to 6L oxygen  Abdominal:      General: Abdomen is flat. There is no distension.      Tenderness: There is no abdominal tenderness.   Musculoskeletal:         General: No swelling or tenderness.   Skin:     General: Skin is warm and dry.      Coloration: Skin is not jaundiced.   Neurological:      General: No focal deficit present.      Mental Status: She is alert and oriented to person, place, and time.      Motor: Weakness (nonfocal) present.    Psychiatric:         Mood and Affect: Mood normal.         Behavior: Behavior normal.         Significant Labs: All pertinent labs within the past 24 hours have been reviewed.    Significant Imaging: I have reviewed all pertinent imaging results/findings within the past 24 hours.      Assessment/Plan:      * COVID-19 virus infection  Patient presenting with cough, congestion, worsening fatigue, nausea/diarrhea, and myalgias. Tested positive for COVID upon admission to ED (1/13/22). Undergoing active chemotherapy for stage IV adenocarcinoma of the lung with mets to right cerebellum (s/p palliative XRT, on pembrolizumab). High risk for decompensation. Received dexamethasone in ED. Oxygen saturation at baseline (5L NC) with oxygen saturation >94. CXR with diffuse pleuroparenchymal opacities throughout the right mid to upper lung zone with ground-glass attenuation at the right base as well as patchy mixed alveolar and interstitial type opacities at the left mid to lower lung zone.     Completed 4 days of dexamethasone on 1/16 and 5 days remdesivir on 1/17. Procal WNL, will not start on CAP coverage. Elevation of inflammatory markers related to COVID.    Plan:  - q4 vitals  - Duonebs + ICS provided  - PRN breathing treatments  - DVT ppx: lovenox 40  - incentive spirometer provided  - continuous oxygen  - trend CBC/CMP  - will add baricitinib if patient decompensates     Cough  See COVID-19 virus infection  Negative respiratory panel and sputum Gram stain. Likely multifactorial etiology of COVID-19 infection, stage 4 lung adenocarcinoma with malignant effusion.     Metastasis to brain  - Continue on lovenox 40 daily  - Delirium precautions      Adenocarcinoma of lung, stage 4, right  Mass of Upper Right Lung  Pneumonitis    Follows with Dr. Peterson. S/p Carboplatin AUC4 x1 dose 5/27/20 and palliative radiation (5/27/2020 - 6/2/2020; 20 Gy to right lung). Also received SRS to right cerebellar metastasis 6/3/2020.  Pembrolizumab started 6/4/2020, C23 on 12/21/2021. Suspected pneumonitis due to Keytruda use. Recently treated with bactrim and prednisone on discharge. CXR with evidence of diffuse pleuroparenchymal opacities throughout the right mid to upper lung zone with ground-glass attenuation at the right base as well as patchy mixed alveolar and interstitial type opacities at the left mid to lower lung zone.    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 Patient will need to be restarted on prednisone steroid taper after dexamethasone is completed     - continue long prednisone taper: 60mg from 1/2-1/16, prednisone 40mg from 1/16-1/30, prednisone 20mg from 1/30-2/13        Leukocytosis  Elevated WBC to 14 on admission. However, downtrending from previous admission (18.2). 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 Remains on 6L NC, which is her home dose. Labs significant for WBC 12, downtrending from recent admission. CXR with progression of right lower lobe consolidation, but improvement in left lower lobe when compared to CXR from previous admission.     Plan:  - duonebs scheduled  - s/p remdesivir and 4 days of dexamethasone for COVID infection  - continue continuous oxygen  - incentive spirometer provided  - mucinex for cough productive of sputum    GERD  (gastroesophageal reflux disease)  - protonix daily      Anxiety   - continue atarax prn     Tobacco abuse  History of tobacco abuse    - nicotine patch     Essential hypertension  Normotensive on admission. On amlodipine and irbesartan-hydrochlorothiazide at home.     Plan:  - monitor BP  - resume amlodipine 5mg daily    Goals of care, counseling/discussion  After discussion with the family and patient, both agreed to full code status. Will continue to discuss further goals of care with patient and her prognosis.         Debility  -PT/OT recommending home health      VTE Risk Mitigation (From admission, onward)         Ordered     enoxaparin injection 40 mg  Daily         01/13/22 1710     IP VTE HIGH RISK PATIENT  Once         01/13/22 1630     Place sequential compression device  Until discontinued         01/13/22 1630                Discharge Planning   TIFFANIE: 1/19/2022     Code Status: Full Code   Is the patient medically ready for discharge?:     Reason for patient still in hospital (select all that apply): Patient trending condition  Discharge Plan A: Home Health            Enma King MD  Department of Hospital Medicine   [facility] - Intensive Care ([place]-16)

## 2022-01-18 NOTE — ASSESSMENT & PLAN NOTE
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 Patient will need to be restarted on prednisone steroid taper after dexamethasone is completed     - continue long prednisone taper: 60mg from 1/2-1/16, prednisone 40mg from 1/16-1/30, prednisone 20mg from 1/30-2/13

## 2022-01-18 NOTE — ASSESSMENT & PLAN NOTE
Patient presenting with cough, congestion, worsening fatigue, nausea/diarrhea, and myalgias. Tested positive for COVID upon admission to ED (1/13/22). Undergoing active chemotherapy for stage IV adenocarcinoma of the lung with mets to right cerebellum (s/p palliative XRT, on pembrolizumab). High risk for decompensation. Received dexamethasone in ED. Oxygen saturation at baseline (5L NC) with oxygen saturation >94. CXR with diffuse pleuroparenchymal opacities throughout the right mid to upper lung zone with ground-glass attenuation at the right base as well as patchy mixed alveolar and interstitial type opacities at the left mid to lower lung zone.     Completed 4 days of dexamethasone on 1/16 and 5 days remdesivir on 1/17. Procal WNL, will not start on CAP coverage. Elevation of inflammatory markers related to COVID.    Plan:  - q4 vitals  - Duonebs + ICS provided  - PRN breathing treatments  - DVT ppx: lovenox 40  - incentive spirometer provided  - continuous oxygen  - trend CBC/CMP  - will add baricitinib if patient decompensates

## 2022-01-18 NOTE — ASSESSMENT & PLAN NOTE
Elevated WBC to 14 on admission. However, downtrending from previous admission (18.2). WBC elevated to 15.8 on 1/15    Plan:  - Likely 2/2 chronic steroid use  - Trend CBC  - q4 vitals

## 2022-01-18 NOTE — ASSESSMENT & PLAN NOTE
See COVID-19 virus infection  Negative respiratory panel and sputum Gram stain. Likely multifactorial etiology of COVID-19 infection, stage 4 lung adenocarcinoma with malignant effusion.

## 2022-01-18 NOTE — PLAN OF CARE
FERNANDA faxed  Orders to [facility] via [employer] for review. FERNANDA will continue to follow.      01/18/22 8188   Post-Acute Status   Post-Acute Authorization Home Health   Home Health Status Referrals Sent     3:29 PM  [facility] - Accepted    Araceli Álvarez LMSW   - [facility]  Ext. [phone]

## 2022-01-19 NOTE — PLAN OF CARE
[facility]  - [place] - Accepted     01/19/22 0907   Post-Acute Status   Post-Acute Authorization Home Health   Home Health Status Set-up Complete/Auth obtained     Araceli Álvarez LMSW   - [facility]  Ext. [phone]

## 2022-01-19 NOTE — ASSESSMENT & PLAN NOTE
See COVID-19 virus infection  Negative respiratory panel and sputum Gram stain. Likely multifactorial etiology of COVID-19 infection, pneumonitis 2/2 Keytruda therapy, stage 4 lung adenocarcinoma with malignant effusion.

## 2022-01-19 NOTE — NURSING
AAOx4. Discharged home by personal transportation. All belongings provided. Educated on discharge, medications, and follow up[ appts; voiced understanding. Pulse oximeter delivered to bedside. Home oxygen in progress.

## 2022-01-19 NOTE — PLAN OF CARE
Problem: Adult Inpatient Plan of Care  Goal: Plan of Care Review  Outcome: Ongoing, Not Progressing  Goal: Optimal Comfort and Wellbeing  Outcome: Ongoing, Not Progressing     Problem: Gas Exchange Impaired  Goal: Optimal Gas Exchange  Outcome: Ongoing, Not Progressing      AAOx4. Continues 6 L 02 via nc. Sob upon exertion and displays anxious behavior prn medication provided for relief and voiced some effectiveness.

## 2022-01-19 NOTE — TELEPHONE ENCOUNTER
"----- Message from Wolf Valdovinos sent at 1/19/2022 11:45 AM CST -----  Regarding: Speak with office  Contact: Janeth  Consult/Advisory:       Name Of Caller: Janeth      Contact Preference?: [phone]       Does patient feel the need to be seen today? No       What is the nature of the call?: Speak with nurse about care.       Additional Notes:  "Thank you for all that you do for our patients'"      "

## 2022-01-19 NOTE — PLAN OF CARE
Pt is being discharged today requiring bedside commode; MD put in order DME and FERNANDA/JEN informed Ashley in [employer] or the request for a bedside commode. Will continue to follow.        1:24 PM  FERNANDA informed by Ashley; patient is not eligible for bedside commode because patient received on on 06/17/2020.  SW will follow patient.       Molly Escobar LMSW  PRN - [facility]  [phone]

## 2022-01-19 NOTE — ASSESSMENT & PLAN NOTE
Mass of Upper Right Lung  Pneumonitis    Follows with Dr. Peterson. S/p Carboplatin AUC4 x1 dose 5/27/20 and palliative radiation (5/27/2020 - 6/2/2020; 20 Gy to right lung). Also received SRS to right cerebellar metastasis 6/3/2020. Pembrolizumab started 6/4/2020, C23 on 12/21/2021. Suspected pneumonitis due to Keytruda use. Recently treated with bactrim and prednisone on discharge. CXR with evidence of diffuse pleuroparenchymal opacities throughout the right mid to upper lung zone with ground-glass attenuation at the right base as well as patchy mixed alveolar and interstitial type opacities at the left mid to lower lung zone.    Plan:  - continue gabapentin  - ongoing goals of care  - reinforced utility and role of palliative care in advanced care planning and symptom management  - Will need continuation of prednisone taper after dexamethasone is complete (prednisone 40 mg start date 1/16 X 14 days, followed by prednisone 20 mg X 14 days)

## 2022-01-19 NOTE — DISCHARGE SUMMARY
[facility] ([place] [address])  [facility]  Discharge Summary      Patient Name: Janeth Boyce  MRN: [id number]  Patient Class: IP- Inpatient  Admission Date: 1/13/2022  Hospital Length of Stay: 6 days  Discharge Date and Time:  01/19/2022 1:44 PM  Attending Physician: Florencia Cheung MD   Discharging Provider: Enma King MD  Primary Care Provider: Freeman Caballero MD      HPI:   Ms. 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 Patient states she started feeling short of breath and fatigued a few days ago, followed by congestion, cough productive of white sputum, with associated diarrhea and myalgias. She had taken NSAIDs to help with the pain with minimal relief. She states she started to wheeze and decided to come to the ED for further evaluation. She denies fevers, chills, vomiting, and abdominal pain,. Of note, she was last admitted on 12/27-1/5 in which she was found to have a COPD exacerbation, was started on steroids, duonebs and Levaquin.  Heme/onc felt some concern for pneumonitis secondary to keytruda use, started on steroids and recommended a 6 week taper at discharge, also started on Bactrim and PPI prophylaxis. She was discharged with higher oxygen requirement at home around 5-6 L.    Upon admission the patient was found to tachypnic, but afebrile and on her baseline requirements of oxygen (5L NC). She was admitted to Hospital Medicine Team 1 for further management.       * No surgery found *      Hospital Course:   Upon admission, the patient CXR showed grossly stable abnormal radiographic appearance of the chest. She also got a CTPE showing improvement in consolidative opacities in the lingula of the left lower lobe, but worsening in the  "right lower lobe. 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 Negative for PE. D-dimer slightly elevated and procal wnl indicating no additional treatment needed for concominant bacterial infection. Completed 5 days of remdesivir and 4 days of dexamethasone for COVID. Oxygen remained stable at 6L. On 1/19 telemetry showed a run of unsustained Vtach (8 beats). EKG showed NSR, no ischemic changes. Likely 2/2 underlying disease, deferred starting metoprolol at this time.    Patient continued to have poor insight into ongoing pulmonary problems, continues to report shortness of breath as barrier to discharge despite team's reinforcement that ongoing issues will take time to improve. Also discussed benefit of palliative care given metastatic lung cancer diagnosis and significant symptom burden. Patient amenable at this time to palliative care as another layer of care.     Discharged with  and outpatient Palliative Care follow up. Continued on prednisone taper at 40 mg daily for Keytruda pneumonitis until 1/30, then to be on 20 mg daily for 2 weeks.       Goals of Care Treatment Preferences:  Code Status: Full Code    Health care agent: Kell Boyce (daughter)  Health care agent number: [phone]             /83 (BP Location: Left arm, Patient Position: Sitting)   Pulse 96   Temp 97.7 °F (36.5 °C) (Oral)   Resp (!) 22   Ht 5' 6" (1.676 m)   Wt 96 kg (211 lb 10.3 oz)   SpO2 (!) 91%   Breastfeeding No   BMI 34.16 kg/m²     Physical Exam  Vitals and nursing note reviewed.   Constitutional:       General: She is not in acute distress.     Appearance: She is obese. She is not ill-appearing.   HENT:      Head: Normocephalic and atraumatic.      Mouth/Throat:      Mouth: Mucous membranes are moist.      Comments: Missing teeth  Eyes:      General: No scleral icterus.     Extraocular Movements: Extraocular " movements intact.      Conjunctiva/sclera: Conjunctivae normal.   Cardiovascular:      Rate and Rhythm: Normal rate and regular rhythm.      Pulses: Normal pulses.      Heart sounds: Normal heart sounds. No murmur heard.  Pulmonary:      Effort: Tachypnea present. No respiratory distress.      Breath sounds: Wheezing (Diffuse wheezing with crackles) present.      Comments: NC in place connected to 6L oxygen  Abdominal:      General: Abdomen is flat. There is no distension.      Tenderness: There is no abdominal tenderness.   Musculoskeletal:         General: No swelling or tenderness.   Skin:     General: Skin is warm and dry.      Coloration: Skin is not jaundiced.   Neurological:      General: No focal deficit present.      Mental Status: She is alert and oriented to person, place, and time.      Motor: Weakness (nonfocal) present.   Psychiatric:         Mood and Affect: Mood normal.         Behavior: Behavior normal.     Consults:  none    * COVID-19 virus infection  Patient presenting with cough, congestion, worsening fatigue, nausea/diarrhea, and myalgias. Tested positive for COVID upon admission to ED (1/13/22). Undergoing active chemotherapy for stage IV adenocarcinoma of the lung with mets to right cerebellum (s/p palliative XRT, on pembrolizumab). High risk for decompensation. Received dexamethasone in ED. Oxygen saturation at baseline (5L NC) with oxygen saturation >94. CXR with diffuse pleuroparenchymal opacities throughout the right mid to upper lung zone with ground-glass attenuation at the right base as well as patchy mixed alveolar and interstitial type opacities at the left mid to lower lung zone.     Completed 4 days of dexamethasone on 1/16 and 5 days remdesivir on 1/17. Procal WNL, will not start on CAP coverage. Elevation of inflammatory markers related to COVID.    Plan:  - q4 vitals  - Duonebs + ICS provided  - PRN breathing treatments  - DVT ppx: lovenox 40  - incentive spirometer provided  -  continuous oxygen  - trend CBC/CMP  - will add baricitinib if patient decompensates     Cough  See COVID-19 virus infection  Negative respiratory panel and sputum Gram stain. Likely multifactorial etiology of COVID-19 infection, pneumonitis 2/2 Keytruda therapy, stage 4 lung adenocarcinoma with malignant effusion.     Metastasis to brain  - continue on lovenox 40 daily  - delirium precautions      Adenocarcinoma of lung, stage 4, right  Mass of Upper Right Lung  Pneumonitis    Follows with Dr. Peterson. S/p Carboplatin AUC4 x1 dose 5/27/20 and palliative radiation (5/27/2020 - 6/2/2020; 20 Gy to right lung). Also received SRS to right cerebellar metastasis 6/3/2020. Pembrolizumab started 6/4/2020, C23 on 12/21/2021. Suspected pneumonitis due to Keytruda use. Recently treated with bactrim and prednisone on discharge. CXR with evidence of diffuse pleuroparenchymal opacities throughout the right mid to upper lung zone with ground-glass attenuation at the right base as well as patchy mixed alveolar and interstitial type opacities at the left mid to lower lung zone.    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 Patient will need to be restarted on prednisone steroid taper after dexamethasone is completed     - continue long prednisone taper: 60mg from 1/2-1/16, prednisone 40mg from 1/16-1/30, prednisone 20mg from 1/30-2/13        Leukocytosis  Elevated WBC to 14 on admission. However, downtrending from previous admission (18.2). WBC elevated to 15.8 on 1/15    Plan:  - Likely 2/2 chronic steroid use  - Trend CBC  - q4 vitals       History of COPD  On 2L home O2, stage IV  small cell adenocarcinoma of the lung with mets to right cerebellum (s/p palliative XRT, on pembrolizumab) who presents with SOB due to COVID infection. Remains on 6L NC, which is her home dose. Labs significant for WBC 12, downtrending from recent admission. CXR with progression of right lower lobe consolidation, but improvement in left lower lobe when compared to CXR from previous admission.     Plan:  - duonebs scheduled  - s/p 5 days of remdesivir and 4 days of dexamethasone for COVID infection  - continue continuous oxygen  - incentive spirometer provided  - mucinex for cough productive of sputum    GERD (gastroesophageal reflux disease)  - protonix daily      Anxiety  - continue atarax prn     Tobacco abuse  History of tobacco abuse    - nicotine patch     Essential hypertension  Normotensive on admission. On amlodipine and irbesartan-hydrochlorothiazide at home.     Plan:  - monitor BP  - resume amlodipine 5mg daily    Goals of care, counseling/discussion  After discussion with the family and patient, both agreed to full code status. Will continue to discuss further goals of care with patient and her prognosis.         Debility  - PT/OT recommending home health      Final Active Diagnoses:    Diagnosis Date Noted POA    PRINCIPAL PROBLEM:  COVID-19 virus infection [U07.1] 01/13/2022 Yes    Cough [R05.9] 05/02/2020 Yes    Metastasis to brain [C79.31] 01/13/2022 Yes    Adenocarcinoma of lung, stage 4, right [C34.91] 05/11/2020 Yes    Pneumonitis [J18.9]  Yes    Leukocytosis [D72.829] 01/06/2021 Yes    History of COPD [Z87.09] 01/13/2022 Not Applicable     Chronic    GERD (gastroesophageal reflux disease) [K21.9] 06/17/2020 Yes     Chronic    Anxiety [F41.9] 05/26/2020 Yes     Chronic    Essential hypertension [I10] 05/01/2020 Yes     Chronic    Tobacco abuse [Z72.0] 05/01/2020 Yes    Debility [R53.81] 01/13/2022 Yes    Goals of care, counseling/discussion [Z71.89] 01/13/2022 Not Applicable     Shortness of breath [R06.02]  Yes    Malignant pleural effusion [J91.0]  Yes      Problems Resolved During this Admission:    Diagnosis Date Noted Date Resolved POA    Mass of upper lobe of right lung [R91.8] 05/01/2020 01/15/2022 Yes       Discharged Condition: stable    Disposition: Home-Health Care Svc    Follow Up: PCP, Oncology, Palliative Care   Follow-up Information     Freeman Caballero MD On 1/25/2022.    Specialty: Family Medicine  Why: at 9:45 AM; PCP hospital follow up appointment  Contact information:  [address]  [facility] [address]  [phone]             Maik Peterson MD. Go on 2/1/2022.    Specialty: Hematology and Oncology  Why: for 10:30 AM appointment  Contact information:  [address]  [facility] [address]  [phone]             Schedule an appointment as soon as possible for a visit with [facility] - Palliative [facility].    Specialty: Palliative Medicine  Contact information:  [address]  [facility] [id number] [phone]                     Patient Instructions:      Ambulatory referral/consult to Home Palliative Care   Standing Status: Future   Referral Priority: Routine Referral Type: Consultation   Requested Specialty: Hospice and Palliative Medicine   Number of Visits Requested: 1     COVID-19 Surveillance Program     Order Specific Question Answer Comments   Does patient have a smartphone? Yes    Does patient have the [facility] on their smartphone? Yes    While in surveillance program, will patient be using home oxygen? Yes        Significant Diagnostic Studies: Labs:   CMP   Recent Labs   Lab 01/18/22  0456 01/19/22  0552    141   K 4.7 4.6    106   CO2 23 25   GLU 97 94   BUN 26* 29*   CREATININE 1.2 1.0   CALCIUM 9.2 9.2   PROT 6.8 6.7   ALBUMIN 2.7* 2.7*   BILITOT 0.2 0.3   ALKPHOS 87 88   AST 16 14   ALT 14 14   ANIONGAP 9 10   ESTGFRAFRICA 56.0* >60.0   EGFRNONAA 48.6* >60.0    and CBC   Recent Labs   Lab 01/18/22  0456  01/18/22  0456 01/19/22  0552   WBC 15.83*  --  15.23*   HGB 10.8*  --  11.1*   HCT 35.5*   < > 35.9*     --  324    < > = values in this interval not displayed.     Microbiology:   Blood Culture   Lab Results   Component Value Date    LABBLOO No growth after 5 days. 12/26/2021    and Sputum Culture   Lab Results   Component Value Date    GSRESP Few Gram positive cocci 12/15/2021    GSRESP Rare Gram negative rods 12/15/2021    GSRESP Rare WBC's 12/15/2021    GSRESP <10 epithelial cells per low power field. 12/15/2021    RESPIRATORYC Normal respiratory nathaniel 12/15/2021    RESPIRATORYC No S aureus or Pseudomonas isolated. 12/15/2021     Radiology:   X-Ray: 1/13/22 CXR:   Grossly stable abnormal radiographic appearance of the chest as above when compared to 01/01/2022 study.    CT scan: 1/13/22 CTA Chest:   1.  Although there is no abrupt nonvisualization of the right upper lobe all pulmonary artery, this is stable finding.  This may reflect chronic pulmonary embolus, tumor invasion, versus chronic stricture related to post radiation treatment changes.     2.  Although consolidative opacities in the lingula are no longer seen and consolidative opacities in left lower lobe are improved, they are worsened and increase in the right lower lobe.     3.  Stable cicatricial atelectasis and pulmonary fibrosis of the right upper lobe, most consistent with post treatment pulmonary fibrosis.    Pending Diagnostic Studies:     None         Medications:  Reconciled Home Medications:      Medication List      START taking these medications    pulse oximeter device  Commonly known as: pulse oximeter  by Apply Externally route 2 (two) times a day. Use twice daily at 8 AM and 3 PM and record the value in [employer] as directed.        CONTINUE taking these medications    albuterol 90 mcg/actuation inhaler  Commonly known as: PROVENTIL/VENTOLIN HFA  Inhale 1-2 puffs into the lungs every 6 (six) hours as needed for Wheezing or  Shortness of Breath.     albuterol-ipratropium 2.5 mg-0.5 mg/3 mL nebulizer solution  Commonly known as: DUO-NEB  Take 3 mLs by nebulization every 6 (six) hours as needed for Wheezing. Rescue     amLODIPine 5 MG tablet  Commonly known as: NORVASC  Take 1 tablet (5 mg total) by mouth once daily.     buPROPion 150 MG TBSR 12 hr tablet  Commonly known as: WELLBUTRIN SR  Take 1 tablet (150 mg total) by mouth once daily.     cetirizine 10 MG tablet  Commonly known as: ZYRTEC  Take 1 tablet (10 mg total) by mouth once daily.     diazePAM 5 MG tablet  Commonly known as: VALIUM  Take 1 tablet (5 mg total) by mouth nightly as needed for Insomnia (do not take more than 3 times per week.). Fill 10/13/21     diclofenac sodium 3 % gel  Commonly known as: SOLARAZE  Apply topically 2 (two) times a day.     gabapentin 400 MG capsule  Commonly known as: NEURONTIN  Take 1 capsule (400 mg total) by mouth 3 (three) times daily.     hydrOXYzine HCL 25 MG tablet  Commonly known as: ATARAX  TAKE ONE TABLET BY MOUTH EVERY DAY AS NEEDED FOR ANXIETY     irbesartan-hydrochlorothiazide 300-12.5 mg per tablet  Commonly known as: AVALIDE  Take 1 tablet by mouth once daily.     multivitamin with minerals tablet  Take 1 tablet by mouth once daily.     omeprazole 20 MG capsule  Commonly known as: PRILOSEC  Take 1 capsule (20 mg total) by mouth once daily.     ondansetron 8 MG Tbdl  Commonly known as: ZOFRAN-ODT  Dissolve 1 tablet (8 mg total) by mouth every 8 (eight) hours as needed.     polyethylene glycol 17 gram/dose powder  Commonly known as: GLYCOLAX  mix 1 capful (17 g) and take by mouth once daily.     predniSONE 20 MG tablet  Commonly known as: DELTASONE  Take 3 tablets (60 mg total) by mouth once daily for 10 days, THEN 2 tablets (40 mg total) once daily for 14 days, THEN 1 tablet (20 mg total) once daily for 14 days. REFILL MEDICATION TO FINISH FULL COURSE.  Start taking on: January 4, 2022     promethazine-codeine 6.25-10 mg/5 ml 6.25-10  mg/5 mL syrup  Commonly known as: PHENERGAN with CODEINE  Take 5 mLs by mouth every 8 (eight) hours as needed for Cough. Fill 12/21/21     SENNA-S 8.6-50 mg per tablet  Generic drug: senna-docusate 8.6-50 mg  Take 1 tablet by mouth 2 (two) times daily.     sulfamethoxazole-trimethoprim 800-160mg 800-160 mg Tab  Commonly known as: BACTRIM DS  Take 1 tablet by mouth once daily.     SYMBICORT 160-4.5 mcg/actuation Hfaa  Generic drug: budesonide-formoterol 160-4.5 mcg  Inhale 2 puffs into the lungs every 12 (twelve) hours. Controller     tiotropium 18 mcg inhalation capsule  Commonly known as: SPIRIVA  Inhale 1 capsule (18 mcg total) into the lungs once daily. Controller     traMADoL 50 mg tablet  Commonly known as: ULTRAM  Take 1 tablet (50 mg total) by mouth every 8 (eight) hours as needed for Pain. Fill 12/21/21        STOP taking these medications    MUCUS RELIEF  mg 12 hr tablet  Generic drug: guaiFENesin            Indwelling Lines/Drains at time of discharge:   Lines/Drains/Airways     None                 Time spent on the discharge of patient: 35 minutes         Enma King MD  Department of Hospital Medicine  [facility] - Intensive Care ([place]-16)

## 2022-01-19 NOTE — TELEPHONE ENCOUNTER
Spoke to patient, she has been discharged from the hospital today. She is asking for a refill on tramadol and her cough syrup.

## 2022-01-19 NOTE — DISCHARGE INSTRUCTIONS
You need to isolate for 10 days after your positive COVID test (you tested positive on 1/13, so you should isolate until 1/23).    You are getting a pulse oximeter to monitor your oxygen saturation at home. Your goal oxygen saturation is 88-93%. You will receive follow up calls to check how you are doing.    You have a referral to see Palliative Care in clinic. If you do not receive a call in a few days, please call to schedule.

## 2022-01-19 NOTE — NURSING
Pt had an 8 beat run of vtach. No c/o palpitations or chest pain. Pt resting in bed comfortably no distress.

## 2022-01-20 NOTE — PLAN OF CARE
[facility] ([facility])  Discharge Final Note    Primary Care Provider: Freeman Caballero MD    Expected Discharge Date: 1/19/2022    Final Discharge Note (most recent)       Final Note - 01/20/22 0758          Final Note    Assessment Type Final Discharge Note     Anticipated Discharge Disposition Home-Health Care Svc   [facility]                     Important Message from Medicare             Contact Info       Freeman Caballero MD   Specialty: Family Medicine   Relationship: PCP - General    [address] [place] [address]   Phone: [phone]       Next Steps: Follow up on 1/25/2022    Instructions: at 9:45 AM; PCP hospital follow up appointment    Maik Peterson MD   Specialty: Hematology and Oncology    [address] [facility] [address]   Phone: [phone]       Next Steps: Go on 2/1/2022    Instructions: for 10:30 AM appointment    [facility] - Palliative Care   Specialty: Palliative Medicine    [address] [facility] [address]   Phone: [phone]       Next Steps: Schedule an appointment as soon as possible for a visit    [facility] [place]   Specialty: Home Health Services    [facility] [address]   Phone: [phone]       Next Steps: Follow up on 1/19/2022    Instructions: will provide home health services          Discharge summary faxed to Dr Freeman Caballero ([phone]).

## 2022-01-21 NOTE — TELEPHONE ENCOUNTER
Pt enrolled in [facility] program.  Tasks removed per pt's request.    Reason for Disposition   Unable to complete triage due to phone connection issues    Additional Information   Negative: Caller has already spoken with the PCP (or office), and has no further questions   Negative: Caller has already spoken with another triager and has no further questions   Negative: Caller has already spoken with another triager or PCP (or office), and has further questions and triager able to answer questions.   Negative: Busy signal.  First attempt to contact caller.  Follow-up call scheduled within 15 minutes.   Negative: No answer.  First attempt to contact caller.  Follow-up call scheduled within 15 minutes.   Negative: Message left on identified voicemail   Negative: Message left on unidentified voice mail. Phone number verified.   Negative: Message left with person in household   Negative: Wrong number reached. Phone number verified.   Negative: Second attempt to contact caller AND no contact made. Phone number verified.   Negative: Third attempt to contact caller AND no contact made. Phone number verified.   Negative: Cell phone out of range. Phone number verified.   Negative: Patient already left for the hospital/clinic   Negative: Caller has cancelled the call before the first contact    Protocols used: NO CONTACT OR DUPLICATE CONTACT CALL-A-[place]

## 2022-01-24 NOTE — TELEPHONE ENCOUNTER
Spoke with patient Home Health Nurse (Ms Gregory) informed her that I have received her message. Ms Gregory is requesting that Dr Garcia put in a order for AIM (Advanced Illness Management) to monitor patient and try to keep patient out of hospital. Ms Gregory also states that patient is stating that the nasal cannula in her nose for her oxygen is starting to dry out her nose and that patient is also requesting a decrease in her oxygen because patient feels like her liter flow is to high. Ms Gregory also states that patient is requesting to switch from her concentrator that she currently has to the kind that patient has to put water into. I verbalized to Ms Gregory that I understand and advised Ms Gregory that I will forward her message to Dr Garcia to review/advise. Ms Gregory verbalized that she understand.

## 2022-01-24 NOTE — TELEPHONE ENCOUNTER
----- Message from Km Barbosa sent at 1/24/2022  2:36 PM CST -----  Regarding: Pt Inquiry  Bri with Home Health called requesting an order for assistance to have pt examined.    Bri     [phone]

## 2022-01-24 NOTE — TELEPHONE ENCOUNTER
----- Message from Obed Wooten sent at 1/24/2022  2:58 PM CST -----  Regarding: Pt Inquiry  [facility] called and advised pt is on 6L of O2 post COVID and advised it's becoming a lot and drying out her nose. Inquiring if O2 can be decreased. Home Health RN asked if they can get a order for AIM, they would like to watch her closely.      [phone] (Bri)

## 2022-01-25 NOTE — PROGRESS NOTES
"Spoke with Mrs Boyce this morning. SaO2 is 94% on supplemental oxygen today. SaO2 was in the 80s this morning pt reports. I do not think she can decrease her supplemental oxygen at this time. I personally contacted [facility] & asked them to make sure her home concentrator has a working humidifier.     Home health nurse sent a message requesting an order for "advanced illness management." I am not sure how to enroll the patient in this program. I called the home health nurse, but it went to voicemail.    [place] MD [facility]  "

## 2022-01-25 NOTE — TELEPHONE ENCOUNTER
"----- Message from Kell Wade sent at 1/25/2022  3:44 PM CST -----  Regarding: Refill  RX Name and Strength: diazePAM (VALIUM) 5 MG tablet     How is the patient currently taking it? Take 1 tablet (5 mg total) by mouth nightly as needed for Insomnia (do not take more than 3 times per week.). Fill 10/13/21 - Oral     Is this a 30 day or 90 day Rx? 15 tablets        Preferred Pharmacy with phone number: [facility] [address]    [phone]   Fax  [phone]          Local or Mail Order: local     Ordering Provider: Dr Peterson     Contact Preference: [phone]                   Additional Information:  "Thank you for all that you do for our patients"     "

## 2022-01-26 NOTE — PHYSICIAN QUERY
xxPT Name: Janeth Boyce  MR #: [id number]     DOCUMENTATION CLARIFICATION     CDS: Liz Nick RN, CCDS         Contact information :ext [phone] [email or web address]     This form is a permanent document in the medical record.    Query Date: January 26, 2022    By submitting this query, we are merely seeking further clarification of documentation.  Please utilize your independent clinical judgment when addressing the question(s) below.  The Medical Record contains the following:   Indicators   Supporting Clinical Findings Location in Medical Record   x Pneumonia documented COVID 19  COPD exacerbation  Adenocarcinoma of lung, stage 4, right  Mass of Upper Right Lung  Pneumonitis  Suspected pneumonitis due to Keytruda use H&P 1/13/22   x Chest X-Ray/CT Scan CXR showed grossly stable abnormal radiographic appearance of the chest. She also got a CTPE showing improvement in consolidative opacities in the lingula of the left lower lobe, but worsening in the right lower lobe. Discharge summary 1/19/22   x PaO2    PaCO2     O2 sat Resp:  [24-30] 24  SpO2:  [86 %-100 %] 97 % H&P 1/13/22    WBC      Vital Signs      Cultures      x Treatment  Completed 5 days of remdesivir and 4 days of dexamethasone for COVID  Continued on prednisone taper at 40 mg daily for Keytruda pneumonitis until 1/30, then to be on 20 mg daily for 2 weeks Discharge summary 1/19/22   x Supplemental O2 O2 saturations around 90-97% on 6L nasal cannula ED Provider note 1/14/22    Dysphagia/Swallow study     x Other Cough  Likely multifactorial etiology of COVID-19 infection, pneumonitis 2/2 Keytruda therapy, stage 4 lung adenocarcinoma with malignant effusion. Discharge summary 1/19/22     Provider, please provide the diagnosis related to the above clinical indicators:    [ x  ] Both Pneumonitis due to Keytruda and COVID-19 Pneumonia    [   ] Pneumonitis due to Keytruda only   [   ] Other clarification  (please specify): _________   [  ]  Clinically undetermined         Form No. [id number]

## 2022-01-26 NOTE — PHYSICIAN QUERY
PT Name: Janeth Boyce  MR #: [id number]     DOCUMENTATION CLARIFICATION     CDS: Liz Nick RN, CCDS         Contact information :ext [phone] [email or web address]     This form is a permanent document in the medical record.     Query Date: January 26, 2022    By submitting this query, we are merely seeking further clarification of documentation.  Please utilize your independent clinical judgment when addressing the question(s) below.  The Medical Record contains the following   Indicators   Supporting Clinical Findings Location in Medical Record   x SOB, PRITCHETT, Wheezing, Productive Cough, Use of Accessory Muscles, etc. Patient states that she had shortness of breath starting yesterday which worsened today.  Patient is normally on 3-4 L of oxygen at home and occasionally has gone up to 6 L due to shortness of breath.     Positive for cough, shortness of breath and wheezing  Cough productive of white sputum  ED Provider Note 1/14/22            H&P 1/13/22   x RR         ABGs         O2 sat Resp:  [24-30] 24  SpO2:  [86 %-100 %] 97 % H&P 1/13/22    Hypoxia/Hypercapnia      BiPAP/Intubation/Mechanical Ventilation     x Supplemental O2 O2 saturations around 90-97% on 6L nasal cannula     with past medical history of COPD on 2L home oxygen     Of note, patient admitted 12/26-1/5 for    COPD exacerbation.    Patient discharged with home O2 requirement around 5-6L      Patient improved but continues on 5-6L NC supplementation (which is what she was discharged on from last admission).  Ed provider Note 1/14/22      Discharge summary 1/19/22      Discharge summary 1/19/22          Discharge summary 1/19/22   x Home O2, Oxygen Dependence w/ lung cancer and COPD on 3-4 L home oxygen presents for worsening sob and higher oxygen requirement  Upon admission the patient was found to tachypnic, but afebrile and on her baseline requirements of oxygen (5L NC). ED Provider note 1/14/22        H&P 1/13/22   x Respiratory  distress or failure She is in respiratory distress ED Provider Note 1/14/22    Radiology findings     x Acute/Chronic Illness COVID 19  COPD exacerbation  Adenocarcinoma of lung, stage 4, right  Mass of Upper Right Lung  Pneumonitis  Suspected pneumonitis due to Keytruda use H&P 1/14/22   x Treatment PLAN:   - Remdesivir and dexamethasone started  - procal to rule out additional bacterial pneumonia  - D-Dimer, LD, ESR, CRP  - Duonebs + ICS provided  - PRN breathing treatments  - Not on full AC due to brain mets, continue with lovenox 40  - Incentive provided  - q4 vitals  - continuous oxygen  - Trend CBC/CMP H&P 1/13/22    Other         The noted clinical guidelines are only system guidelines and do not replace the providers clinical judgment.    Provider, please specify the diagnosis or diagnoses associated with above clinical findings.   [    ] Acute and (on) Chronic Respiratory Failure with Hypoxia - (pO2 >10 mmHg below baseline or SpO2 < 91% on usual home O2 or O2 ? 2L/min over baseline home O2 and respiratory symptoms documented)     [ x   ]   Chronic Respiratory Failure with Hypoxia - Continuous home oxygen use     [    ] Other Respiratory Diagnosis (please specify): _________________     [   ] Clinically Undetermined       Form No. [id number]

## 2022-01-27 NOTE — TELEPHONE ENCOUNTER
----- Message from Isamar Ortega sent at 1/27/2022 12:31 PM CST -----  Contact: [facility]  Zaria from [facility] requesting a call back re: pt coughing up sputum. Also Zaria requests a home chest x-ray.     Confirmed patient's contact below:  Contact Name: Zaria  Phone Number: [phone]

## 2022-01-27 NOTE — TELEPHONE ENCOUNTER
Spoke with Ms Bri with [facility] program. Ms Gregory states that she is checking on the order for patient to start the AIM ([facility]) Program so that she can watch patient more closely. I verbalized to Ms Gregory that I understand and Ms Gregory that I will forward her message to Dr Garcia to review/advise. Ms Bri verbalized that she understand.

## 2022-01-27 NOTE — TELEPHONE ENCOUNTER
Spoke with ronny with [facility].  She is calling to get verbal for AIM to start today.  She thanked nurse.  She will fax over order to dr garcia.

## 2022-01-27 NOTE — TELEPHONE ENCOUNTER
----- Message from Alondra Street sent at 1/27/2022  2:33 PM CST -----  Regarding: Pt Advice  Pt caregiver calling to speak to staff about program ([facility]). Please Call Ronny [phone].

## 2022-01-27 NOTE — TELEPHONE ENCOUNTER
Spoke with patient Home Health Nurse (Ms Enciso) informed her that I have received her message. Ms Enciso states that she Cant hear anything on the right side of patient lung and that patient is desaturating in her oxygen when she moves around to 60 percent. Ms Enciso also states that patient is home alone and that she is beginning to worry about patient being alone after she leaves patient home. Ms Enciso is requesting for Dr Garcia to enter a Home Chest Xray order placed in patients chart. I verbalized to Ms Enciso that I understand and advised Ms Enciso that I will forward her message to Dr Garcia to review/advise. Patient home health nurse (Ms Enciso) verbalized that she understand.

## 2022-01-27 NOTE — TELEPHONE ENCOUNTER
"----- Message from Lindsay Lawrence sent at 1/27/2022  2:55 PM CST -----  Regarding: Home Health RN  RN:  Bri from [facility]    Contact Preference?:  [phone]    Message/Note:  Would like to discuss the doctor putting in an AIMS Order for the patient.            Additional Notes:  "Thank you for all that you do for our patients'"                      "

## 2022-01-28 NOTE — TELEPHONE ENCOUNTER
Patient not currently enrolled in hospice but has been referred to hospice. Patient is apprehensive to stop treatment because it has been working. She states that Dr. Peterson said she needs to remain on treatment until May. She would like to speak to Dr. Peterson about this.She would like to know if it will hurt her to continue treatment or to stop treatment.

## 2022-01-28 NOTE — TELEPHONE ENCOUNTER
Returned phone call to home health nurse Bri at the provided number. No answer. Left voicemail with my direct call back number.    Dash Garcia MD  [facility]

## 2022-01-28 NOTE — TELEPHONE ENCOUNTER
Left message on patient voicemail, informing her that I'm contacting her I regards to her message. I advised patient that if she has any questions or concerns, she may contact the office. Office number has been provided.

## 2022-01-28 NOTE — TELEPHONE ENCOUNTER
"----- Message from Kell Olvera sent at 1/28/2022  1:29 PM CST -----  Regarding: Consult/Advisory:  Name Of Caller: Janeth    Contact Preference?: [phone]      What is the nature of the call?: pt would like to inquire about care plan after hospice because was told she would have to discontinue chemo           Additional Notes:  "Thank you for all that you do for our patients'"     "

## 2022-01-28 NOTE — TELEPHONE ENCOUNTER
----- Message from Stiven Spangler sent at 1/28/2022 11:13 AM CST -----  Contact: patient  Pt needs to speak w/ dr again in regards to question from previous conversation     Call back @[phone]

## 2022-01-28 NOTE — TELEPHONE ENCOUNTER
"I spoke with both home health nurse Zaria today. I also spoke with the patient today (separately). I provided history to the home health nurse of patient: COPD, Stage 4 adenocarcinoma of right lung with pleural mets initially diagnosed 5/2020, covid pneumonia, had a pleurx catheter in the right pleura briefly, received palliative XRT to the right lung, not much air movement in the right lung on examination at baseline, also with history of cerebellar brain met treated with radiation, most recent CTs are now showing new GGOs in the left lung, & the patient has required several hospitalizations in the past 6 weeks.     The current picture is a complicated one with both cancer-specific immunotherapy & recent covid infection as contributing factors to her clinical decline. Progression of lung cancer is always a possibility for this patient & could be difficult to identify on CT scans when multiple disease processes are present.    The patient's home health nurse shared concern that the patient exhibits exercise-induced oxygen desaturation. The oxygen saturation is maintaining at goal greater than 88% at rest. I recommended that if patient becomes unable to sustain oxygen saturation above 88% at rest, then she should go to the hospital. She also shared concern that the patient may not understand the overall prognosis of her disease.     I subsequently reviewed the diagnosis with Mrs Boyce & reviewed the incurable nature of stage 4 lung cancer. I did my best to answer her questions. We have talked about this previously, but it has been my observation that Mrs Boyce can be forgetful in remembering the prognosis of her cancer, & she seems to retain more general statements like "your scan looks good."    The home health nurse asked if Mrs Boyce would qualify for hospice. She certainly does meet disease-specific criteria for hospice. I discussed hospice benefit with Mrs Boyce & explained what services it provides to " patients. I told her she is eligible for hospice & could enroll her in hospice if she feels these benefits would be useful to her. She did not seem interested in hospice at this time.    The pt has upcoming appts with myself & Dr Peterson.    Dash Garcia MD   [facility]

## 2022-01-29 NOTE — TELEPHONE ENCOUNTER
Long discussion with Ms Boyce.  Reviewed scan and told her it looked worse but likely mostly due to COVID.  Could not comment specifically if her cancer got worse since her December scan but overall felt it was less likely.  She is still coughing up mucus and desaturating when she coughs.  She has supplemental oxygen at home.  Told her we can reschedule her appointments with me and treatment if she wants more time to recover. She felt this was a good idea.  Agreed to rescheduling and giving her 2 additional weeks.    Spoke with sister. Told her I do not know why hospice specifically was recommended but reassured her that even in cancer patients who have treatment that is working, it is not unreasonable to recommend hospice if she is worsening due to another condition (ie. COVID) and it is felt that she would not recover.    Patient and family are satisfied with this phone call.    Maik Peterson MD  Hematology Oncology

## 2022-02-08 PROBLEM — J44.9 COPD (CHRONIC OBSTRUCTIVE PULMONARY DISEASE): Chronic | Status: ACTIVE | Noted: 2021-01-01

## 2022-02-08 NOTE — PROGRESS NOTES
History & Physical  [facility]        SUBJECTIVE:     Chief Complaint:   Chronic hypoxemic respiratory failure    History of Present Illness:  Janeth Boyce is a 61 y.o. female who presents for follow up of chronic hypoxemic respiratory failure.    Pt was hospitalized in December with concern for immunotherapy-induced pneumonitis. She was treated with steroids & immunotherapy was stopped. She made moderate improvements & eventually returned home though on a higher oxygen requirement than what she had previously been on. She was hospitalized again in January with covid infection. She returns to clinic today for follow up. Since returning home, she reports improvement in cough & dyspnea though symptoms are still significant. Pt reports significant anxiety & also stress incontinence with coughing.    PMH: COPD, Stage 4 adenocarcinoma of right lung with pleural mets initially diagnosed 2020, covid pneumonia, had a pleurx catheter in the right pleura briefly, received palliative XRT to the right lung, also with history of cerebellar brain met treated with radiation.      She is a former smoker who quit smoking in .    Review of patient's allergies indicates:  No Known Allergies    Past Medical History:   Diagnosis Date    Anxiety     Asthma     COPD exacerbation 2021    Hepatitis C 2019    treated    Hx of psychiatric care     Hypertension     Lung cancer     Psychiatric exam requested by authority     Psychiatric problem      Past Surgical History:   Procedure Laterality Date    BRONCHOSCOPY N/A 9/3/2020    Procedure: Bronchoscopy;  Surgeon: Renee Diagnostic Provider;  Location: [facility] OR [address] [place] [address];  Service: Anesthesiology;  Laterality: N/A;     SECTION      KNEE SURGERY       Family History   Problem Relation Age of Onset    Cancer Father      Social History     Socioeconomic History    Marital status: Single    Number of children: 3   Tobacco Use    Smoking status: Former  "Smoker     Packs/day: 0.50     Years: 40.00     Pack years: 20.00     Types: Cigarettes     Quit date: 2020     Years since quittin.8    Smokeless tobacco: Never Used   Substance and Sexual Activity    Alcohol use: Yes     Comment: occasional    Drug use: No     Review of Systems:  CV: no syncope  ENT: no sore throat  Resp: +cough but improved  Eyes: no eye pain  Gastrointestinal: no nausea or vomiting  Integument/Breast: no rash  Musculoskeletal: +msk pains  Neurological: no headaches  Behavioral/Psych: no confusion or depression  Heme: no bleeding    OBJECTIVE:     Vital Signs  Vitals:    22 1125   BP: 118/62   Pulse: 98   SpO2: (!) 90%   Weight: 84.1 kg (185 lb 6.5 oz)   Height: 5' 6" (1.676 m)     Body mass index is 29.93 kg/m².    Physical Exam:  General: no distress  Eyes:  conjunctivae/corneas clear  Nose: no discharge  Neck: trachea midline with no masses appreciated  Lungs:  normal respiratory effort, no wheezes, no rales. Diminished in the right lung fields.  Heart: regular rate and rhythm and no murmur  Abdomen: non-distended  Extremities: no cyanosis, no edema, no clubbing  Skin: No rashes or lesions. good skin turgor. pleurx catheter secure & in place. Wound appears clean & dry without erythema.  Neurologic: alert, oriented, thought content appropriate    Laboratory:  Lab Results   Component Value Date    WBC 15.23 (H) 2022    HGB 11.1 (L) 2022    HCT 35.9 (L) 2022    MCV 80 (L) 2022     2022     Chest Imaging, My Impression:   Chest ct 2020: there is dense consolidation vs atelectasis of the right upper & middle lobe, this is worse in comparison to her prior CT scan 2020.  Chest CT 2021: stable findings of extensive right lung infiltrate & volume loss    Diagnostic Results:  CT: Reviewed  PET Scan: Reviewed  PFT : mixed pattern of obstruction & restriction with FEV1 of 49%    ASSESSMENT/PLAN:     History of Malignant Pleural Effusion       " Resolved with pleurx.     Secondary Malignant Neoplasm of Brain       S/p palliative radiation to brain lesion.     Adenocarcinoma of Lung, Stage 4, Right     Pt has received palliative XRT & immunotherapy in the past. Right lung is almost completely non-functional. She follows with Dr Peterson.       COPD with acute exacerbation     Continue trelegy inhaler. Continue prn albuterol inhaler. Continue prn duoneb.      Chronic hypoxemic respiratory failure     Pt suffered a significant deterioration in pulmonary status this winter. She was hospitalized in December with concern for immunotherapy-induced pneumonitis. She was treated with steroids & immunotherapy was stopped. She made moderate modest improvements & eventually returned home though on a higher oxygen requirement than what she had previously been on. She was hospitalized again in January with covid infection. She returns to clinic today for follow up. Since returning home, she reports improvement in cough & dyspnea though symptoms are still significant & her O2 remains at 5L. Pt reports significant anxiety & also stress incontinence with coughing.       Fibrotic lung changes on the right following palliative radiation therapy      Treatment is supportive.     Stress incontinence     Refer to urology.     Goals of care counseling      Pt does not want to pursue hospice at this time. I do think she has symptoms of anxiety related to her disease, & she may benefit from palliative symptom control. I also think she will benefit from continued discussion between her & her family regarding advanced care planning. My perception is that they have not done much advanced care planning because the emotionally overwhelming nature of the topic. It is very stressful for Janeth to discuss this. Janeth was open to the referral, & we discussed it at length. I will relay this plan to Dr Peterson as well.     Total professional time spent for the encounter: 60 minutes  Time was spent  preparing to see the patient, reviewing results of prior testing, obtaining and/or reviewing separately obtained history, performing a medically appropriate examination and interview, counseling and educating the patient/family, ordering medications/tests/procedures, referring and communicating with other health care professionals, documenting clinical information in the electronic health record, and independently interpreting results.      [place], MD  [facility]

## 2022-02-11 NOTE — PROGRESS NOTES
Informed by Navigeva Hummel that patient is requesting assistance in obtaining depends. Called [employer], [phone], to find out if this is something they can help with; spoke with Abdiaziz, who confirmed that they can assist patient with this. Patient's application is on file with them from previous unrelated assistance request. Sophienadiarhonda said that if they have the size and type at their office then patient/family can pick them up, or they can provide  gift cards for [employer] and patient/family can purchase, or they can order from [employer] and have delivered to patient's home. Abdiaziz asked that I email the request to [email or web address].  Called patient and discussed with her; she asked if they can be ordered and delivered, she is still weak and SOB and can't go anywhere to get them, and she cannot afford the ongoing cost; she is using size large depends pull-ups (like underwear). Sent the email request to [facility]ANE.    Informed patient that I have 3 cases of Boost Plus, vanilla flavor, for her and can give them to her when she comes to clinic next week. Asked that she call me after her appointments are completed so that I can meet her at the [facility] entrance while she waits for Medicaid transportation. She stated understanding and agreement.  Will continue to follow and assist as needs are identified.

## 2022-02-15 NOTE — PLAN OF CARE
1028-Pt tolerated Keytruda infusion well, no complications or side effects, POC and meds discussed with pt, pt reconnected to portable O2 at 4l per n/c before d/c, pt aware of upcoming appts, pt knows to call MD with any questions or concerns. Pt off unit via w/c with daughter, no distress noted.

## 2022-02-15 NOTE — TELEPHONE ENCOUNTER
Patient had an appointment today where an essential piece from her portal oxygen device was taken by the staff due to needing imaging. Patient states that the part was not returned to her. Patient is scheduled for a chemo infusion and labs beginning at 0830 tomorrow morning. Patient contacted DME and was advised to call back at 0800 tomorrow morning for service. Triage nurse contacted DME and spoke with Geovanni Spangler who states that he advised the patient to call back tomorrow morning and can not guarantee that he will be there early enough for the patient to make her appointment. Triage nurse made DME aware that Ms. Spangler' appointment tomorrow is to receive essential medication.     While discussing with the patient the decision of DME not to provide service tonight, the patient received a phone call from Mr. Spangler. He plans to bring the portal oxygen part to her home tonight. Advised the patient to call back with any further questions.      Reason for Disposition   Health Information question, no triage required and triager able to answer question    Protocols used: INFORMATION ONLY CALL - NO TRIAGE-A-

## 2022-02-16 NOTE — PROGRESS NOTES
Called patient on yesterday morning re: the nutritional supplements. She stated that she forgot to call me and had already left with her daughter after her infusion was completed. She will try to see if someone can  the supplements from me before her next clinic appointments.     She asked if I knew when the depends pull-ups would be delivered to her. Called [facility], [phone], and spoke with [employer], who was going to check into it, because Abdiaziz wasn't in. [employer] sent me an email update that patient should get a box of the large size on Thurs. (from [employer]). Called patient today and provided this update.    Received a consult today from Dr. Peterson re: a portable oxygen concentrator that he ordered for patient. Sent a reply message to Dr. Peterson and explained to patient via phone that portable oxygen concentrators are not covered by Medicaid insurance plans.     Will continue to follow and assist as needs are identified.

## 2022-02-16 NOTE — TELEPHONE ENCOUNTER
Spoke with patient.  She notes she is very anxious, weak, and feels dizzy. She is very SOB and is gasping for air, while sounding very choppy in her sentences. This all started yesterday afternoon.     Spoke with dr garcia---he states patient was very anxious yesterday, was given valium, and is on steroids.  Her keytruda was increased to the 6 week dose.    She understands to proceed to ED with any SOB.  She thanked nurse.

## 2022-02-16 NOTE — PROGRESS NOTES
PATIENT: Janeth Boyce  MRN: [id number]  DATE: 2/16/2022      Diagnosis:   1. Adenocarcinoma of lung, stage 4, right    2. Primary malignant neoplasm of lung with metastasis to brain    3. Metastasis to brain    4. Adjustment insomnia    5. Anxiety    6. Chronic neoplasm-related pain    7. Pneumonitis    8. Chronic respiratory failure with hypoxia    9. JM (acute kidney injury)        Chief Complaint: Adenocarcinoma of lung, stage 4, right      Oncologic History:    Oncologic History 1. Lung adenocarcinoma    Oncologic Treatment 1. Carboplatin AUC4 x1 dose 5/27/20 and palliative radiation (5/27/2020 - 6/2/2020; 20 Gy to right lung)  2. Pembrolizumab  Cycle 1 6/4/2020       Pathology 5/5/2020  Right lung, biopsy:  Positive for adenocarcinoma with areas of necrosis.  Interpretation:   Right lung, specimen for PD-L1 immunohistochemistry studies (clone [id number], [facility], [place], [place]) ([id number]): 100% tumor cells are positive for PD-L1 (membranous positivity).     BRAF Mutation Analysis (V600E), Tumor Result Summary: NO MUTATION IDENTIFIED.   Result: BRAF status: Wild-type   Interpretation :     EGFR Gene, Mutation Analysis, Tumor Result Summary: NO MUTATION IDENTIFIED.   Result: EGFR status: Wild-type     ALK (2p23), Lung Cancer, FISH, Ts Result Summary: NEGATIVE   Interpretation: No rearrangement of the ALK gene was identified.     Result: nuc micha(ALKx3)[29/50]   Of 50 nuclei, 0% had separation of the 3'ALK and 5'ALK signals and 58% had three intact 3'ALK/5'ALK signals. The normal cutoff is <50.0% for one 3'ALK, one 5'ALK and one 3'ALK/5'ALK signal and <18.0% for three 3'ALK/5'ALK signals.     ROS1 (6Q22), FISH, Ts Result Summary: NEGATIVE   Interpretation: No rearrangement of the ROS1 gene was identified.   Result nuc micha(ROS1x1)[44/50]           Subjective:    Interval History: Ms. Boyce is here for follow up    61 y.o. woman with lung adenocarcinoma complicated by single brain met to  "cerebellum.     Oncologic History  She has been smoking since 15 years old and estimates about 0.5ppd.  She has always had some level of dyspnea with exertion at baseline, but noticed it was worse in February 2020 with an associated cough with white and clear sputum.  She says she was [place] who told her the scans were suspicious for cancer.  She then headed to [place] but because of COVID-19 her appointment was cancelled.  She initially presented to [facility] without any new symptoms since February and the cough is persistent.  Denied any unilateral weakness, dizziness, headaches, or gait instability.  Was able to perform all ADLs and could walk 1 block before getting tired and needing to rest.  On 5/1/2020, she was "admitted to observation with SOB and cough x 2 months. CT chest Large RUL mass with scattered nodules and mediastinal lymphadenopathy; small-moderate right pleural effusion....S/p right lung biopsy 5/4/2020. Home O2 arranged to use with activity. Admitted to Medical Oncology 5/25/2020-5/29/2020 for worsening shortness of breath.  CTPE negative for PE. Pulmonology attempted thoracentesis twice. Second attempt resulted in 400cc being drained, without relief.  She then received x1 dose of carboplatin and palliative RT to the right lung. Patient reported improvement in symptoms after XRT and was discharged.      Single agent pembrolizumab was started for PDL1>50%.  She received her first dose of pembrolizumab 6/4/2020.  She since had an admit for right PleurX.  -Scans 8/3 decreased lesions per RECIST   -8/26: pleurX removed  -Scans prior to cycle 13 scans prior show stable disease  -7/14: here for cycle 18 single agent Pembro    Interval History  Doing better since her last visit.  She is very anxious about her CT scan with the infiltrates from pneumonia and COVID19 (1/13/22).  She is still on her steroid taper and breathing has improved since her last visit.  She has questions about when she will " finish immunotherapy.    Chronic cough and pain controlled.    Her daughter accompanies her at this visit today.    Past Medical History:   Past Medical History:   Diagnosis Date    Anxiety     Asthma     COPD exacerbation 2021    Hepatitis C 2019    treated    Hx of psychiatric care     Hypertension     Lung cancer     Psychiatric exam requested by authority     Psychiatric problem        Past Surgical HIstory:   Past Surgical History:   Procedure Laterality Date    BRONCHOSCOPY N/A 9/3/2020    Procedure: Bronchoscopy;  Surgeon: Renee Diagnostic Provider;  Location: [facility] OR [address] [place] [address];  Service: Anesthesiology;  Laterality: N/A;     SECTION      KNEE SURGERY         Family History:   Family History   Problem Relation Age of Onset    Cancer Father        Social History:  reports that she quit smoking about 22 months ago. Her smoking use included cigarettes. She has a 20.00 pack-year smoking history. She has never used smokeless tobacco. She reports current alcohol use. She reports that she does not use drugs.    Allergies:  Review of patient's allergies indicates:  No Known Allergies    Medications:  Current Outpatient Medications   Medication Sig Dispense Refill    albuterol (PROVENTIL/VENTOLIN HFA) 90 mcg/actuation inhaler Inhale 1-2 puffs into the lungs every 6 (six) hours as needed for Wheezing or Shortness of Breath. 18 g 3    albuterol-ipratropium (DUO-NEB) 2.5 mg-0.5 mg/3 mL nebulizer solution Take 3 mLs by nebulization every 6 (six) hours as needed for Wheezing. Rescue 90 each 3    amLODIPine (NORVASC) 5 MG tablet Take 1 tablet (5 mg total) by mouth once daily. 90 tablet 3    B-complex with vitamin C (Z-BEC OR EQUIV) tablet Take 1 tablet by mouth.      budesonide-formoterol 160-4.5 mcg (SYMBICORT) 160-4.5 mcg/actuation HFAA Inhale 2 puffs into the lungs every 12 (twelve) hours. Controller 10.2 g 2    buPROPion (WELLBUTRIN SR) 150 MG TBSR 12 hr tablet Take 1 tablet (150 mg total)  by mouth once daily. 30 tablet 6    cetirizine (ZYRTEC) 10 MG tablet Take 1 tablet (10 mg total) by mouth once daily. 30 tablet 0    diazePAM (VALIUM) 5 MG tablet Take 1 tablet (5 mg total) by mouth nightly as needed for Anxiety or Insomnia (do not take more than 3 times per week.). Fill 2/15/22 9 tablet 0    diclofenac sodium (SOLARAZE) 3 % gel Apply topically 2 (two) times a day.      fluticasone propionate (FLONASE) 50 mcg/actuation nasal spray by Each Nostril route.      gabapentin (NEURONTIN) 400 MG capsule Take 1 capsule (400 mg total) by mouth 3 (three) times daily. 90 capsule 3    hydrOXYzine HCL (ATARAX) 25 MG tablet Take 1 tablet (25 mg total) by mouth 3 (three) times daily as needed for Anxiety. 90 tablet 2    irbesartan-hydrochlorothiazide (AVALIDE) 300-12.5 mg per tablet Take 1 tablet by mouth once daily. 90 tablet 3    meloxicam (MOBIC) 15 MG tablet       multivitamin with minerals tablet Take 1 tablet by mouth once daily. 30 tablet 11    omeprazole (PRILOSEC) 20 MG capsule Take 1 capsule (20 mg total) by mouth once daily. 30 capsule 3    ondansetron (ZOFRAN-ODT) 8 MG TbDL Dissolve 1 tablet (8 mg total) by mouth every 8 (eight) hours as needed. 60 tablet 0    polyethylene glycol (GLYCOLAX) 17 gram/dose powder mix 1 capful (17 g) and take by mouth once daily. 510 g 0    predniSONE (DELTASONE) 10 MG tablet Take one pill daily for ten days. Then take half pill daily for ten days. Then take half pill every other day for ten days. Then stop. 18 tablet 0    promethazine-codeine 6.25-10 mg/5 ml (PHENERGAN WITH CODEINE) 6.25-10 mg/5 mL syrup Take 5 mLs by mouth every 8 (eight) hours as needed for Cough. Fill 2/15/22 473 mL 0    pulse oximeter (PULSE OXIMETER) device by Apply Externally route 2 (two) times a day. Use twice daily at 8 AM and 3 PM and record the value in [facility] as directed. 1 each 0    senna-docusate 8.6-50 mg (PERICOLACE) 8.6-50 mg per tablet Take 1 tablet by mouth 2 (two) times  "daily. 60 tablet 0    sulfamethoxazole-trimethoprim 800-160mg (BACTRIM DS) 800-160 mg Tab Take 1 tablet by mouth once daily. 30 tablet 1    tiotropium (SPIRIVA) 18 mcg inhalation capsule Inhale 1 capsule (18 mcg total) into the lungs once daily. Controller 30 capsule 3    traMADoL (ULTRAM) 50 mg tablet Take 1 tablet (50 mg total) by mouth every 8 (eight) hours as needed for Pain. Fill 2/15/22 90 tablet 0     No current facility-administered medications for this visit.       Review of Systems   Constitutional: Negative for activity change, chills, fatigue, fever and unexpected weight change.   HENT: Negative for nosebleeds.    Eyes: Negative for visual disturbance.   Respiratory: Positive for cough (stable) and shortness of breath (chronic).    Cardiovascular: Negative for chest pain and leg swelling.   Gastrointestinal: Negative for abdominal distention, abdominal pain, constipation, diarrhea and nausea.   Endocrine: Negative for cold intolerance and heat intolerance.   Genitourinary: Negative for difficulty urinating and dysuria.   Musculoskeletal: Positive for arthralgias (chronic in knees). Negative for myalgias.   Skin: Negative for color change.   Neurological: Negative for dizziness, weakness, light-headedness and numbness.   Hematological: Does not bruise/bleed easily.   Psychiatric/Behavioral: Positive for dysphoric mood and sleep disturbance. Negative for confusion. The patient is nervous/anxious.        ECOG Performance Status:     ECOG SCORE    2 - Capable of all selfcare but unable to carry out any work activities, active > 50% of hours         Objective:      Vitals:   Vitals:    02/15/22 0841   BP: 120/75   BP Location: Left arm   Patient Position: Sitting   BP Method: Medium (Automatic)   Pulse: 95   Resp: (!) 36   SpO2: 95%   Height: 5' 6" (1.676 m)     BMI: Body mass index is 29.93 kg/m².    Physical Exam  Constitutional:       General: She is not in acute distress.     Appearance: She is " well-developed.      Comments: In wheelchair   HENT:      Head: Normocephalic.      Mouth/Throat:      Pharynx: No oropharyngeal exudate.   Eyes:      General: No scleral icterus.     Pupils: Pupils are equal, round, and reactive to light.   Neck:      Trachea: No tracheal deviation.   Cardiovascular:      Rate and Rhythm: Normal rate and regular rhythm.      Heart sounds: Normal heart sounds. No murmur heard.  No friction rub. No gallop.    Pulmonary:      Effort: No respiratory distress.      Breath sounds: No wheezing or rales.   Abdominal:      General: Bowel sounds are normal. There is no distension.      Palpations: Abdomen is soft. There is no mass.      Tenderness: There is no abdominal tenderness. There is no guarding.   Musculoskeletal:         General: No swelling or tenderness. Normal range of motion.      Cervical back: Normal range of motion.   Skin:     General: Skin is warm and dry.   Neurological:      General: No focal deficit present.      Mental Status: She is alert.      Motor: Weakness present.       Recent Results (from the past 168 hour(s))   CBC Auto Differential    Collection Time: 02/14/22 12:29 PM   Result Value Ref Range    WBC 12.94 (H) 3.90 - 12.70 K/uL    RBC 4.37 4.00 - 5.40 M/uL    Hemoglobin 11.3 (L) 12.0 - 16.0 g/dL    Hematocrit 37.5 37.0 - 48.5 %    MCV 86 82 - 98 fL    MCH 25.9 (L) 27.0 - 31.0 pg    MCHC 30.1 (L) 32.0 - 36.0 g/dL    RDW 18.3 (H) 11.5 - 14.5 %    Platelets 295 150 - 450 K/uL    MPV 10.5 9.2 - 12.9 fL    Immature Granulocytes 1.3 (H) 0.0 - 0.5 %    Gran # (ANC) 9.0 (H) 1.8 - 7.7 K/uL    Immature Grans (Abs) 0.17 (H) 0.00 - 0.04 K/uL    Lymph # 2.3 1.0 - 4.8 K/uL    Mono # 1.1 (H) 0.3 - 1.0 K/uL    Eos # 0.4 0.0 - 0.5 K/uL    Baso # 0.07 0.00 - 0.20 K/uL    nRBC 0 0 /100 WBC    Gran % 69.3 38.0 - 73.0 %    Lymph % 17.4 (L) 18.0 - 48.0 %    Mono % 8.3 4.0 - 15.0 %    Eosinophil % 3.2 0.0 - 8.0 %    Basophil % 0.5 0.0 - 1.9 %    Differential Method Automated     Comprehensive Metabolic Panel    Collection Time: 02/14/22 12:29 PM   Result Value Ref Range    Sodium 138 136 - 145 mmol/L    Potassium 4.2 3.5 - 5.1 mmol/L    Chloride 101 95 - 110 mmol/L    CO2 26 23 - 29 mmol/L    Glucose 125 (H) 70 - 110 mg/dL    BUN 19 8 - 23 mg/dL    Creatinine 0.9 0.5 - 1.4 mg/dL    Calcium 9.5 8.7 - 10.5 mg/dL    Total Protein 6.4 6.0 - 8.4 g/dL    Albumin 3.1 (L) 3.5 - 5.2 g/dL    Total Bilirubin 0.3 0.1 - 1.0 mg/dL    Alkaline Phosphatase 57 55 - 135 U/L    AST 17 10 - 40 U/L    ALT 17 10 - 44 U/L    Anion Gap 11 8 - 16 mmol/L    eGFR if African American >60.0 >60 mL/min/1.73 m^2    eGFR if non African American >60.0 >60 mL/min/1.73 m^2   TSH    Collection Time: 02/14/22 12:29 PM   Result Value Ref Range    TSH 0.971 0.400 - 4.000 uIU/mL         Images:    MRI Brain W WO Contrast    Result Date: 2/14/2022  EXAMINATION: MRI BRAIN W WO CONTRAST CLINICAL HISTORY: metastatic lung cancer, surveillance; Malignant neoplasm of unspecified part of right bronchus or lung TECHNIQUE: Sagittal and axial T1, axial T2, axial FLAIR, axial gradient, axial diffusion imaging of the whole brain pre-contrast with postcontrast axial T1 and axial spoiled gradient imaging reformatted in the coronal and sagittal planes.. 8 ml of Gadavist injected intravenously. COMPARISON: 09/22/2021 FINDINGS: Numerous scattered small to punctate size foci of T2 FLAIR signal hyperintensity throughout the brain parenchyma primarily within the supratentorial white matter again seen.  There is no corresponding diffusion signal or enhancement.  This is nonspecific and may represent sequela of chronic microvascular ischemic change.  There is no midline shift or significant mass effect.  Continued prominent anterior left paramedian falcine calcification.  There is no new abnormal parenchymal susceptibility to suggest parenchymal hemorrhage, there is stable punctate susceptibility within the right cerebellum at site of previous lesion  suggestive for treated metastases with calcification or remote hemorrhage..  Major intracranial T2 flow voids are present.  Question partially empty sella.     No significant change from prior specifically without evidence for new parenchymal signal abnormality or intracranial enhancing lesion to suggest new or worsening intracranial metastatic disease. Scattered small to punctate foci of T2 FLAIR signal hyperintensity supratentorial white matter which are nonspecific and may represent post treatment change or chronic ischemic change. Clinical correlation and continued follow-up advised. Electronically signed by: Luisito Donohue DO Date:    02/14/2022 Time:    15:13        Assessment:       1. Adenocarcinoma of lung, stage 4, right    2. Primary malignant neoplasm of lung with metastasis to brain    3. Metastasis to brain    4. Adjustment insomnia    5. Anxiety    6. Chronic neoplasm-related pain    7. Pneumonitis    8. Chronic respiratory failure with hypoxia    9. JM (acute kidney injury)         Plan:       Problem List Items Addressed This Visit        Psychiatric    Anxiety (Chronic)    Relevant Medications    hydrOXYzine HCL (ATARAX) 25 MG tablet       Pulmonary    Chronic respiratory failure with hypoxia    Current Assessment & Plan     Worsened with recent hospitalization with COVID19.  -Continue with supplemental oxygen, currently on 4LNC         Pneumonitis    Current Assessment & Plan     Still on steroid taper.  Dyspnea improved since I last saw her but not yet at baseline.  -continue steroid taper.            Renal/    JM (acute kidney injury)    Current Assessment & Plan     Creatinine back to baseline  -resolved            Oncology    Adenocarcinoma of lung, stage 4, right - Primary    Overview     Adenocarcinoma of lung with metastasis to brain - Stage IV.  Initially presented with pleural effusion on right side s/p therapeutic thoracentesis 5/2020. 5/5/21 lung biopsy, final pathology  adenocarcinoma. PDL1 expressed in 100% of tumor cells from biopsy.  No actionable mutations identified.  Carboplatin AUC4 x1 dose 5/27/20 and palliative radiation (5/27/2020 - 6/2/2020; 20 Gy to right lung).  Also received SRS to right cerebellar metastasis 6/3/2020.  6/4/21 started pembrolizumab.         Current Assessment & Plan     She is here for pembrolizumab.  -refill medications for chronic cough and pain  -labs reviewed; ok to proceed with pembrolizumab  -follow up with me in 6 weeks         Relevant Medications    hydrOXYzine HCL (ATARAX) 25 MG tablet    promethazine-codeine 6.25-10 mg/5 ml (PHENERGAN WITH CODEINE) 6.25-10 mg/5 mL syrup    Metastasis to brain    Current Assessment & Plan     2/14/22 mri brain without evidence of progression. No new focal neuro deficits  -continue surveillance            Other    Primary malignant neoplasm of lung with metastasis to brain    Overview     6/3/2020 SRS to right cerebellar brain metastasis         Insomnia    Relevant Medications    diazePAM (VALIUM) 5 MG tablet      Other Visit Diagnoses     Chronic neoplasm-related pain        Relevant Medications    traMADoL (ULTRAM) 50 mg tablet            Route Chart for Scheduling    Med Onc Chart Routing      Follow up with physician 6 weeks. Follow up prior to chemo   Follow up with [facility]    Labs CMP, CBC and TSH   Lab interval:  In 6 weeks prior to chemo   Imaging None      Pharmacy appointment No pharmacy appointment needed      Other referrals          Treatment Plan Information   OP PEMBROLIZUMAB 200MG Q3W (C22 start q6w dose)   Helen Ley MD   Upcoming Treatment Dates - OP PEMBROLIZUMAB 200MG Q3W (C22 start q6w dose)    3/29/2022       Chemotherapy       pembrolizumab (KEYTRUDA) 400 mg in sodium chloride 0.9% 100 mL infusion  5/10/2022       Chemotherapy       pembrolizumab (KEYTRUDA) 400 mg in sodium chloride 0.9% 100 mL infusion    Supportive Plan Information  IV FLUIDS AND ELECTROLYTES   Maik Peterson MD    Upcoming Treatment Dates - IV FLUIDS AND ELECTROLYTES    No upcoming days in selected categories.      Maik Peterson MD  Hematology Oncology

## 2022-02-16 NOTE — ASSESSMENT & PLAN NOTE
Still on steroid taper.  Dyspnea improved since I last saw her but not yet at baseline.  -continue steroid taper.

## 2022-02-16 NOTE — ASSESSMENT & PLAN NOTE
2/14/22 mri brain without evidence of progression. No new focal neuro deficits  -continue surveillance

## 2022-02-16 NOTE — ASSESSMENT & PLAN NOTE
Worsened with recent hospitalization with COVID19.  -Continue with supplemental oxygen, currently on 4LNC

## 2022-02-16 NOTE — ASSESSMENT & PLAN NOTE
She is here for pembrolizumab.  -refill medications for chronic cough, pain, and anxiety  -labs reviewed; ok to proceed with pembrolizumab  -follow up with me in 6 weeks  -discussed plan for 2 years of immunotherapy and then stopping.

## 2022-02-16 NOTE — TELEPHONE ENCOUNTER
"----- Message from Kell Olvera sent at 2/16/2022 10:53 AM CST -----  Regarding: Consult/Advisory:  Name Of Caller: Janeth    Contact Preference?: [phone]    What is the nature of the call?: having shortness of breath since after chemo infusion on yesterday           Additional Notes:  "Thank you for all that you do for our patients'"     "

## 2022-02-17 PROBLEM — Z99.81 DEPENDENCE ON SUPPLEMENTAL OXYGEN: Status: ACTIVE | Noted: 2020-06-01
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
## 2022-02-17 PROBLEM — Z86.16 HISTORY OF 2019 NOVEL CORONAVIRUS DISEASE (COVID-19): Status: ACTIVE | Noted: 2022-01-01

## 2022-02-17 PROBLEM — R65.20 SEVERE SEPSIS: Status: ACTIVE | Noted: 2022-01-01

## 2022-02-17 PROBLEM — A41.9 SEVERE SEPSIS: Status: ACTIVE | Noted: 2022-01-01

## 2022-02-17 PROBLEM — C34.91 PRIMARY ADENOCARCINOMA OF RIGHT LUNG: Status: ACTIVE | Noted: 2021-01-01

## 2022-02-17 PROBLEM — J18.9 HOSPITAL-ACQUIRED PNEUMONIA: Status: ACTIVE | Noted: 2022-01-01

## 2022-02-17 NOTE — SUBJECTIVE & OBJECTIVE
Past Medical History:   Diagnosis Date    Anxiety     Asthma     COPD exacerbation 2021    Hepatitis C 2019    treated    Hx of psychiatric care     Hypertension     Lung cancer     Psychiatric exam requested by authority     Psychiatric problem        Past Surgical History:   Procedure Laterality Date    BRONCHOSCOPY N/A 9/3/2020    Procedure: Bronchoscopy;  Surgeon: Renee Diagnostic Provider;  Location: [facility] OR [address] [place] [address];  Service: Anesthesiology;  Laterality: N/A;     SECTION      KNEE SURGERY         Review of patient's allergies indicates:  No Known Allergies    Family History     Problem Relation (Age of Onset)    Cancer Father        Tobacco Use    Smoking status: Former Smoker     Packs/day: 0.50     Years: 40.00     Pack years: 20.00     Types: Cigarettes     Quit date: 2020     Years since quittin.8    Smokeless tobacco: Never Used   Substance and Sexual Activity    Alcohol use: Yes     Comment: occasional    Drug use: No    Sexual activity: Not on file      Review of Systems   Constitutional: Negative for chills, fatigue and fever.   Respiratory: Positive for cough, shortness of breath and wheezing.    Cardiovascular: Positive for chest pain (Soreness). Negative for palpitations and leg swelling.   Gastrointestinal: Negative for abdominal pain, diarrhea, nausea and vomiting.   Genitourinary: Positive for urgency (Incontinence). Negative for difficulty urinating, dysuria and frequency.   Musculoskeletal: Negative for myalgias.   Neurological: Positive for dizziness. Negative for numbness and headaches.     Objective:     Vital Signs (Most Recent):  Temp: 97.7 °F (36.5 °C) (22 1146)  Pulse: 106 (22 1521)  Resp: (!) 26 (22 1332)  BP: 137/85 (22 1502)  SpO2: 97 % (22 1521) Vital Signs (24h Range):  Temp:  [97.7 °F (36.5 °C)] 97.7 °F (36.5 °C)  Pulse:  [103-111] 106  Resp:  [26-30] 26  SpO2:  [65 %-97 %] 97 %  BP: (121-167)/(69-85) 137/85    Weight: 83.9 kg (185 lb)  Body mass index is 29.86 kg/m².      Intake/Output Summary (Last 24 hours) at 2022 1611  Last data filed at 2022 1341  Gross per 24 hour   Intake 100 ml   Output --   Net 100 ml       Physical Exam  Vitals reviewed.   Constitutional:       General: She is in acute distress.      Appearance: She is ill-appearing.   HENT:      Head: Normocephalic and atraumatic.      Nose: Nose normal.   Cardiovascular:      Rate and Rhythm: Normal rate and regular rhythm.      Pulses: Normal pulses.      Heart sounds: Normal heart sounds.   Pulmonary:      Effort: Respiratory distress present.      Breath sounds: Wheezing present.   Abdominal:      General: Abdomen is flat.      Palpations: Abdomen is soft.   Musculoskeletal:         General: Normal range of motion.      Cervical back: Normal range of motion and neck supple.      Right lower le+ Edema present.      Left lower le+ Edema present.   Neurological:      Mental Status: She is alert and oriented to person, place, and time.         Vents:  Oxygen Concentration (%): 100 (22 1521)  Lines/Drains/Airways     Peripheral Intravenous Line                 Peripheral IV - Single Lumen 22 1154 20 G Right Wrist <1 day         Peripheral IV - Single Lumen 22 1216 20 G Left Hand <1 day              Significant Labs:    CBC/Anemia Profile:  Recent Labs   Lab 22  1156   WBC 21.01*   HGB 11.5*   HCT 37.5      MCV 85   RDW 18.0*        Chemistries:  Recent Labs   Lab 22  1156   *   K 5.3*   CL 98   CO2 24   BUN 15   CREATININE 0.9   CALCIUM 9.5   ALBUMIN 2.9*   PROT 7.4   BILITOT 0.7   ALKPHOS 75   ALT 17   AST 49*       ABGs:   Recent Labs   Lab 22  1211   PH 7.364   PCO2 45.2*   HCO3 25.8   POCSATURATED 33*   BE 0     Lactic Acid:   Recent Labs   Lab 22  1156   LACTATE 3.4*     Troponin:   Recent Labs   Lab 22  1156   TROPONINI 0.044*     All pertinent labs within the past 24 hours  have been reviewed.    Significant Imaging: I have reviewed all pertinent imaging results/findings within the past 24 hours.     X-Ray Chest AP Portable  Narrative: EXAMINATION:  XR CHEST AP PORTABLE    CLINICAL HISTORY:  dyspnea;    TECHNIQUE:  Single frontal view of the chest was performed.    COMPARISON:  01/13/2022    FINDINGS:  Stable right hemithoracic volume loss associated with dense opacification of the medial aspect of the right medial upper lung zone, better seen on CT 01/13/2022.    Interval increase in left mid to lung zones patchy consolidations.  Right mid to lower lung zones opacities are stable.    The mediastinal contents again noted to be shifted to the right.    No other significant change.  Impression: Increased consolidative opacities in the left mid to lower lung zones, now obscuring the heart border.    Electronically signed by: Nemo Quiroz  Date:    02/17/2022  Time:    12:29

## 2022-02-17 NOTE — HPI
Janeth Boyce 61 y.o. 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 Pt reports last dose of chemo was 2/15, she states she received an increased dose this cycle, and endorses symptoms began shortly after. She reports associated chest soreness from coughing and dizziness. Denies fever, LE pain, and palpitations. Pt's daughter is at bedside, she reports prior to this week, pt was able to tolerate some ambulation without worsening SOB, but since onset is unable to tolerate. Pt is currently on 6L at home O2. Her home inhalers are Spiriva, Symbicort, PRN Albuterol, and PRN duonebs. Pt with recent admission in Dec 2021 2/2 to chemotherapy induced pneumonitis and again in Jan 2021 2/2 COVID-19 (DC 1/19). She was given remdesivir and dexamethasone therapy but did not require intubation. Pt scheduled to complete home Prednisone for Pneumonitis on 2/14. In the ED she is tachycardic with increased RR, currently on BiPAP with IPAP 15, EPAP 8 and FiO2 100% sating 92%. ED workup significant for WBC of 21.1, Lactate of 3.4, BNP of 102, Troponin elevated from baseline at 0.044, ABG 7.364/45.2/21/25.8/0. CXR showed increased consolidative opacities in the left mid to lower lung zones. She was given x2 Duonebs,125mg Methylprednisolone, and started on Vancomycin and Zosyn.

## 2022-02-17 NOTE — TELEPHONE ENCOUNTER
"Spoke with patient.  Per patient, her anxiety is off the charts. She is "very weak, not able to do anything, and i'm shaking I am so nervous."  She is still having sob and her speech is choppy while gasping for air.  She will proceed to [facility] ED for evaluation.     She said she tolerated the every 3 week dosing for keytruda better--and wants to switch to that next time.    Message routed to dr garcia  "

## 2022-02-17 NOTE — ED PROVIDER NOTES
Encounter Date: 2022       History     Chief Complaint   Patient presents with    Respiratory Distress     HPI   61F with a history of Stage 4 adenocarcinoma and COPD BIB EMS with shortness of breath and productive cough with pleuritic chest pain which the patient states started today. Per EMS, patient was hypoxic to 60s on arrival. Patient on 6L  NC at baseline. Patient was placed on NRB.  Review of patient's allergies indicates:  No Known Allergies  Past Medical History:   Diagnosis Date    Anxiety     Asthma     COPD exacerbation 2021    Hepatitis C 2019    treated    Hx of psychiatric care     Hypertension     Lung cancer     Psychiatric exam requested by authority     Psychiatric problem      Past Surgical History:   Procedure Laterality Date    BRONCHOSCOPY N/A 9/3/2020    Procedure: Bronchoscopy;  Surgeon: Renee Diagnostic Provider;  Location: [facility] OR [address] [place] [address];  Service: Anesthesiology;  Laterality: N/A;     SECTION      KNEE SURGERY       Family History   Problem Relation Age of Onset    Cancer Father      Social History     Tobacco Use    Smoking status: Former Smoker     Packs/day: 0.50     Years: 40.00     Pack years: 20.00     Types: Cigarettes     Quit date: 2020     Years since quittin.8    Smokeless tobacco: Never Used   Substance Use Topics    Alcohol use: Yes     Comment: occasional    Drug use: No     Review of Systems   Constitutional: Positive for activity change. Negative for chills, fatigue and fever.   HENT: Negative for congestion and dental problem.    Eyes: Negative for discharge and itching.   Respiratory: Positive for cough and shortness of breath.    Cardiovascular: Positive for chest pain. Negative for palpitations.   Gastrointestinal: Negative for abdominal distention and abdominal pain.   Endocrine: Negative for cold intolerance and heat intolerance.   Genitourinary: Negative for dysuria.   Musculoskeletal: Negative for arthralgias and back  pain.   Skin: Negative for color change and pallor.   Allergic/Immunologic: Negative for environmental allergies and food allergies.   Neurological: Negative for dizziness and facial asymmetry.   Hematological: Negative for adenopathy. Does not bruise/bleed easily.   Psychiatric/Behavioral: Negative for agitation and behavioral problems.       Physical Exam     Initial Vitals [02/17/22 1146]   BP Pulse Resp Temp SpO2   (!) 167/72 110 (!) 30 97.7 °F (36.5 °C) (!) 65 %      MAP       --         Physical Exam    Constitutional: She appears well-developed and well-nourished.   HENT:   Head: Normocephalic.   Eyes: EOM are normal.   Neck:   No JVD on bedside exam    Normal range of motion.  Cardiovascular: Normal rate and regular rhythm.   No murmur heard.  Pulmonary/Chest: Breath sounds normal.   No wheezing appreciated on exam.   No rales appreciated on exam.       Abdominal: Abdomen is soft.   Musculoskeletal:         General: Normal range of motion.      Cervical back: Normal range of motion.      Comments: Moving all extremities w/o difficulty.   No swelling appreciated to lower extremities      Neurological:   No gross focal neurologic deficit.    Skin: Skin is warm and dry. Capillary refill takes less than 2 seconds.   Psychiatric: She has a normal mood and affect.         ED Course   Procedures  Labs Reviewed   B-TYPE NATRIURETIC PEPTIDE - Abnormal; Notable for the following components:       Result Value     (*)     All other components within normal limits    Narrative:     Release to patient->Immediate   CBC W/ AUTO DIFFERENTIAL - Abnormal; Notable for the following components:    WBC 21.01 (*)     Hemoglobin 11.5 (*)     MCH 26.1 (*)     MCHC 30.7 (*)     RDW 18.0 (*)     Immature Granulocytes 2.2 (*)     Gran # (ANC) 16.8 (*)     Immature Grans (Abs) 0.47 (*)     Mono # 2.1 (*)     Gran % 80.2 (*)     Lymph % 6.7 (*)     All other components within normal limits    Narrative:     Release to  patient->Immediate   COMPREHENSIVE METABOLIC PANEL - Abnormal; Notable for the following components:    Sodium 134 (*)     Potassium 5.3 (*)     Glucose 141 (*)     Albumin 2.9 (*)     AST 49 (*)     All other components within normal limits    Narrative:     Release to patient->Immediate   TROPONIN I - Abnormal; Notable for the following components:    Troponin I 0.044 (*)     All other components within normal limits    Narrative:     Release to patient->Immediate   LACTIC ACID, PLASMA - Abnormal; Notable for the following components:    Lactate (Lactic Acid) 3.4 (*)     All other components within normal limits    Narrative:     Release to patient->Immediate   ISTAT PROCEDURE - Abnormal; Notable for the following components:    POC PCO2 45.2 (*)     POC PO2 21 (*)     POC SATURATED O2 33 (*)     All other components within normal limits   CULTURE, BLOOD   CULTURE, BLOOD   HIV 1 / 2 ANTIBODY   URINALYSIS, REFLEX TO URINE CULTURE   SARS-COV-2 RDRP GENE    Narrative:     This test utilizes isothermal nucleic acid amplification   technology to detect the SARS-CoV-2 RdRp nucleic acid segment.   The analytical sensitivity (limit of detection) is 125 genome   equivalents/mL.   A POSITIVE result implies infection with the SARS-CoV-2 virus;   the patient is presumed to be contagious.     A NEGATIVE result means that SARS-CoV-2 nucleic acids are not   present above the limit of detection. A NEGATIVE result should be   treated as presumptive. It does not rule out the possibility of   COVID-19 and should not be the sole basis for treatment decisions.   If COVID-19 is strongly suspected based on clinical and exposure   history, re-testing using an alternate molecular assay should be   considered.   This test is only for use under the Food and Drug   Administration s Emergency Use Authorization (EUA).   Commercial kits are provided by [employer].   Performance characteristics of the EUA have been independently    verified by [facility] Department of   Pathology and Laboratory Medicine.   _________________________________________________________________   The authorized Fact Sheet for Healthcare Providers and the authorized Fact   Sheet for Patients of the ID NOW COVID-19 are available on the FDA   website:     https://www.fda.gov/media/919287/download  https://www.fda.gov/media/647067/download         EKG Readings: (Independently Interpreted)   Initial Reading: No STEMI. Rhythm: Sinus Tachycardia. Heart Rate: 112. Ectopy: No Ectopy. Conduction: Normal. ST Segments: Normal ST Segments.     ECG Results          EKG 12-lead (Final result)  Result time 02/17/22 12:10:01    Final result by Interface, Lab In [facility] (02/17/22 12:10:01)                 Narrative:    Test Reason : R06.02,    Vent. Rate : 112 BPM     Atrial Rate : 112 BPM     P-R Int : 132 ms          QRS Dur : 062 ms      QT Int : 320 ms       P-R-T Axes : 054 021 030 degrees     QTc Int : 436 ms    Sinus tachycardia  Otherwise normal ECG  When compared with ECG of 19-JAN-2022 09:24,  No significant change was found  Confirmed by MIRANDA LANGSTON MD ([id number]) on 2/17/2022 12:09:49 PM    Referred By: System System           Confirmed By:MIRANDA LANGSTON MD                            Imaging Results          X-Ray Chest AP Portable (Final result)  Result time 02/17/22 12:29:31    Final result by Nemo Quiroz MD (02/17/22 12:29:31)                 Impression:      Increased consolidative opacities in the left mid to lower lung zones, now obscuring the heart border.      Electronically signed by: Nemo Quiroz  Date:    02/17/2022  Time:    12:29             Narrative:    EXAMINATION:  XR CHEST AP PORTABLE    CLINICAL HISTORY:  dyspnea;    TECHNIQUE:  Single frontal view of the chest was performed.    COMPARISON:  01/13/2022    FINDINGS:  Stable right hemithoracic volume loss associated with dense opacification of the medial aspect of the right medial  upper lung zone, better seen on CT 01/13/2022.    Interval increase in left mid to lung zones patchy consolidations.  Right mid to lower lung zones opacities are stable.    The mediastinal contents again noted to be shifted to the right.    No other significant change.                              X-Rays:   Independently Interpreted Readings:   Other Readings:  Airway midline  No gross bony abnormality noted.   Left hemidiaphragm obscured.   Opacification to left lung zones noted.      Medications   vancomycin 1.75 g in 5 % dextrose 500 mL IVPB (has no administration in time range)   promethazine-codeine 6.25-10 mg/5 ml syrup 5 mL (has no administration in time range)   albuterol-ipratropium 2.5 mg-0.5 mg/3 mL nebulizer solution 3 mL (3 mLs Nebulization Given 2/17/22 1200)   methylPREDNISolone sodium succinate injection 125 mg (125 mg Intravenous Given 2/17/22 1156)   piperacillin-tazobactam 4.5 g in sodium chloride 0.9% 100 mL IVPB (ready to mix system) (0 g Intravenous Stopped 2/17/22 1341)     Medical Decision Making:   Initial Assessment:   61F presenting with respiratory distress. GCS 15. Bilateral breath sounds. HDS. Abdomen nonsurgical. Moving all extremities spontaneosly. Patient in distress. Esc elating oxygen requirements to 60L HFNC.   Differential Diagnosis:   COPD exacerbation   Volume Overload   Worsening tumor burden  ACS   ED Management:  61F presenting with respiratory distress on HFNC. Patient with desaturation in ED requiring escalating oxygen requirements. Patient oxygen requirement increase to 60L HFNC. Bedside echo without evidence of pericardial effusion. Lung slide present. Minimal B lines present. EF decreased. However, IVC collapsible. XR chest with evidence of developed left lung parenchymal infiltrates. Workup notable for leukocytosis to 22. Hyperkalemia to 5.3. Troponin elevation to 0.044. Lactic acid elevated to 3.4.  Treatments included round of duonebs and solumedrol with concern for  worsening obstructive lung disease process. Workup and presentation consistent with sepsis-patient provided Vancomycin and Zosyn. Patient with prior medical chart indicating DNR. However, patient states at this time she wishes full code status. Given patient's escalating oxygen requirement to 60L HFNC, medical ICU consulted for admission who has now admitted the patient.     Guzman Busch MD  [facility] EM, HO-2  2/17/22 5:11 PM             Attending Attestation:   Physician Attestation Statement for Resident:  As the supervising MD   Physician Attestation Statement: I have personally seen and examined this patient.   I agree with the above history. -:   As the supervising MD I agree with the above PE.    As the supervising MD I agree with the above treatment, course, plan, and disposition.   -: 61-year-old female presents to the emergency department for shortness of breath.  Past medical history of lung cancer with brain metastasis, on chemotherapy, COPD on 6 L of oxygen at home.  Patient presents in respiratory distress with oxygen saturation of 58-60%, dyspneic  Bilateral breath sounds no wheezing  Bedside ultrasound with normal RV to LV ratio, no pericardial effusion but thick pericardial fat pad, no pleural effusions, left upper chest mass versus pneumonia with B-lines in the lower lobe    DuoNebs, prednisone  Started on high-flow with improvement in the oxygen  VBG with normal pH and pCO2 of 43 not suggestive of COPD exacerbation  CBC with white count of 21 possibly infectious versus secondary to lung cancer, will cover with broad-spectrum antibiotics  12:27 PM  HF 60 L 100% oxygen, 87% ox sat in the room  CXR :Increased consolidative opacities in the left mid to lower lung zones, now obscuring the heart border.     Patient is full code at this time  I have reviewed and agree with the residents interpretation of the following: lab data and x-rays.  I have reviewed the following: old records at this facility.         Attending Critical Care:   Critical Care Times:   ==============================================================  · Total Critical Care Time - exclusive of procedural time: 45 minutes.  ==============================================================  Critical care reasons: hypoxic resp failure.   Critical care was time spent personally by me on the following activities: obtaining history from patient or relative, examination of patient, review of old charts, ordering lab, x-rays, and/or EKG, development of treatment plan with patient or relative, ordering and performing treatments and interventions, evaluation of patient's response to treatment, discussion with consultants and end of life discussions.   Critical Care Condition: life-threatening                    Clinical Impression:   Final diagnoses:  [R06.02] SOB (shortness of breath)         acute hypoxic resp failure  Pneumonia  Lung adenocarcinoma        Guzman Busch MD  Resident  02/17/22 1712       Deepti Forrest MD  02/17/22 1773

## 2022-02-17 NOTE — ASSESSMENT & PLAN NOTE
Patient with Hypoxic Respiratory failure which is Acute on chronic.  she is on home oxygen at 5 LPM. Supplemental oxygen was provided and noted- Oxygen Concentration (%):  [100] 100.   Signs/symptoms of respiratory failure include- tachypnea, increased work of breathing, respiratory distress, use of accessory muscles and lethargy. Contributing diagnoses includes - Pneumonia and pneumonitis. Labs and images were reviewed. Patient Has recent ABG, which has been reviewed. Will treat underlying causes and adjust management of respiratory failure as follows-   - BiPAP alternating with HFNC  - Titrate oxygen for saturations 88-92%  - Diurese prn  - methyl pred x1  - broad spectrum abx  - continue home meds  - VTE-Lovenox   - prn ABG

## 2022-02-17 NOTE — ED TRIAGE NOTES
Janeth Boyce, a 61 y.o. female presents to the ED w/ complaint of SOB. Pt states they did something different with her chemo treatment on Monday. Pt reports worsening SOB since then. Pt received a duo neb in route per EMS. Pt is on 6L of oxygen at home normally. Pt reports chest pain from coughing.     Triage note:  Chief Complaint   Patient presents with    Respiratory Distress     Review of patient's allergies indicates:  No Known Allergies  Past Medical History:   Diagnosis Date    Anxiety     Asthma     COPD exacerbation 12/14/2021    Hepatitis C 2019    treated    Hx of psychiatric care     Hypertension     Lung cancer     Psychiatric exam requested by authority     Psychiatric problem

## 2022-02-17 NOTE — PROGRESS NOTES
Pharmacokinetic Initial Assessment & Plan: IV Vancomycin      IV Vancomycin 1750 mg once, given in the ED @ 1500 on 02/17.  Plan to continue Vancomycin 1500 mg every 12 hours. Begin next dose at 2300 on 02/17. Draw a Vancomycin 60 mins prior to the 4 th dose on 02/18 @ 2200.  Desired empiric serum trough concentration is 15 to 20 mcg/mL    Pharmacy will continue to follow and monitor vancomycin.    [id number] with any questions regarding this assessment.     Thank you for the consult,   Millicent Mcallister       Patient brief summary:  Janeth Boyce is a 61 y.o. female initiated on antimicrobial therapy with IV Vancomycin for treatment of suspected Pneumonia    Drug Allergies:   Review of patient's allergies indicates:  No Known Allergies    Actual Body Weight:   83.9 kg    Renal Function:   Estimated Creatinine Clearance: 70.7 mL/min (based on SCr of 0.9 mg/dL).,     CBC (last 72 hours):  Recent Labs   Lab Result Units 02/17/22  1156   WBC K/uL 21.01*   Hemoglobin g/dL 11.5*   Hematocrit % 37.5   Platelets K/uL 320   Gran % % 80.2*   Lymph % % 6.7*   Mono % % 10.1   Eosinophil % % 0.5   Basophil % % 0.3   Differential Method  Automated       Metabolic Panel (last 72 hours):  Recent Labs   Lab Result Units 02/17/22  1156 02/17/22  1418   Sodium mmol/L 134*  --    Potassium mmol/L 5.3*  --    Chloride mmol/L 98  --    CO2 mmol/L 24  --    Glucose mg/dL 141*  --    Glucose, UA   --  Negative   BUN mg/dL 15  --    Creatinine mg/dL 0.9  --    Albumin g/dL 2.9*  --    Total Bilirubin mg/dL 0.7  --    Alkaline Phosphatase U/L 75  --    AST U/L 49*  --    ALT U/L 17  --        Drug levels (last 3 results):  No results for input(s): VANCOMYCINRA, VANCOMYCINPE, VANCOMYCINTR in the last 72 hours.    Microbiologic Results:  Microbiology Results (last 7 days)     Procedure Component Value Units Date/Time    Blood culture #2 **CANNOT BE ORDERED STAT** [[id number]] Collected: 02/17/22 1221    Order Status: Sent Specimen: Blood from  Peripheral, Hand, Left Updated: 02/17/22 1229    Blood culture #1 **CANNOT BE ORDERED STAT** [[id number]] Collected: 02/17/22 1157    Order Status: Sent Specimen: Blood from Peripheral, Hand, Right Updated: 02/17/22 1206

## 2022-02-17 NOTE — TELEPHONE ENCOUNTER
"----- Message from Cain Gomez sent at 2/17/2022 10:26 AM CST -----  Consult/Advisory:          Name Of Caller: Celio (Caregiver)      Contact Preference?: [phone]      Provider Name: Kristen      Does patient feel the need to be seen today? No      What is the nature of the call?: Calling to speak w/ nurse ASAP. Stating pt isn't feeling well at all since recent Chemo appt when her dosage was doubled.          Additional Notes:  "Thank you for all that you do for our patients'"      "

## 2022-02-17 NOTE — CONSULTS
Consult received.  Full Consult Note to follow.        Tavo Radford M.D.  Department of Pulmonary Medicine  [facility]        N.B.: Portions of this note was dictated using M*Modal Fluency Direct--there may be voice recognition errors occasionally missed on review.

## 2022-02-17 NOTE — ASSESSMENT & PLAN NOTE
This patient does have evidence of infective focus  My overall impression is sepsis. Vital signs were reviewed and noted in progress note.  Antibiotics given-   Antibiotics (From admission, onward)            Start     Stop Route Frequency Ordered    02/17/22 2300  vancomycin 1.5 g in dextrose 5 % 250 mL IVPB (ready to mix)         -- IV Every 12 hours (non-standard times) 02/17/22 1557    02/17/22 2100  piperacillin-tazobactam 4.5 g in sodium chloride 0.9% 100 mL IVPB (ready to mix system)  ( Pneumonia - Moderate-High MDR )         02/22 2059 IV Every 8 hours (non-standard times) 02/17/22 1539    02/17/22 1230  vancomycin 1.75 g in 5 % dextrose 500 mL IVPB         02/18 0029 IV ED 1 Time 02/17/22 1225        Cultures were taken-   Microbiology Results (last 7 days)     Procedure Component Value Units Date/Time    Blood culture #2 **CANNOT BE ORDERED STAT** [[id number]] Collected: 02/17/22 1221    Order Status: Sent Specimen: Blood from Peripheral, Hand, Left Updated: 02/17/22 1229    Blood culture #1 **CANNOT BE ORDERED STAT** [[id number]] Collected: 02/17/22 1157    Order Status: Sent Specimen: Blood from Peripheral, Hand, Right Updated: 02/17/22 1208        Latest lactate reviewed, they are-  Recent Labs   Lab 02/17/22  1156   LACTATE 3.4*       Organ dysfunction indicated by Acute respiratory failure  Source- Hospital Acquired Pneumonia

## 2022-02-17 NOTE — ED PROVIDER NOTES
Encounter Date: 2022       History     Chief Complaint   Patient presents with    Respiratory Distress     HPI  Review of patient's allergies indicates:  No Known Allergies  Past Medical History:   Diagnosis Date    Anxiety     Asthma     COPD exacerbation 2021    Hepatitis C 2019    treated    Hx of psychiatric care     Hypertension     Lung cancer     Psychiatric exam requested by authority     Psychiatric problem      Past Surgical History:   Procedure Laterality Date    BRONCHOSCOPY N/A 9/3/2020    Procedure: Bronchoscopy;  [facility];  Location: [facility] OR [address] [place] [address];  Service: Anesthesiology;  Laterality: N/A;     SECTION      KNEE SURGERY       Family History   Problem Relation Age of Onset    Cancer Father      Social History     Tobacco Use    Smoking status: Former Smoker     Packs/day: 0.50     Years: 40.00     Pack years: 20.00     Types: Cigarettes     Quit date: 2020     Years since quittin.8    Smokeless tobacco: Never Used   Substance Use Topics    Alcohol use: Yes     Comment: occasional    Drug use: No     Review of Systems    Physical Exam     Initial Vitals [22 1146]   BP Pulse Resp Temp SpO2   (!) 167/72 110 (!) 30 97.7 °F (36.5 °C) (!) 65 %      MAP       --         Physical Exam    ED Course   Procedures  Labs Reviewed   CBC W/ AUTO DIFFERENTIAL - Abnormal; Notable for the following components:       Result Value    WBC 21.01 (*)     Hemoglobin 11.5 (*)     MCH 26.1 (*)     MCHC 30.7 (*)     RDW 18.0 (*)     All other components within normal limits    Narrative:     Release to patient->Immediate   ISTAT PROCEDURE - Abnormal; Notable for the following components:    POC PCO2 45.2 (*)     POC PO2 21 (*)     POC SATURATED O2 33 (*)     All other components within normal limits   CULTURE, BLOOD   CULTURE, BLOOD   HIV 1 / 2 ANTIBODY   B-TYPE NATRIURETIC PEPTIDE   COMPREHENSIVE METABOLIC PANEL   TROPONIN I   URINALYSIS, REFLEX TO  URINE CULTURE   LACTIC ACID, PLASMA   SARS-COV-2 RDRP GENE        ECG Results          EKG 12-lead (Final result)  Result time 02/17/22 12:10:01    Final result by Interface, Lab In [facility] (02/17/22 12:10:01)                 Narrative:    Test Reason : R06.02,    Vent. Rate : 112 BPM     Atrial Rate : 112 BPM     P-R Int : 132 ms          QRS Dur : 062 ms      QT Int : 320 ms       P-R-T Axes : 054 021 030 degrees     QTc Int : 436 ms    Sinus tachycardia  Otherwise normal ECG  When compared with ECG of 19-JAN-2022 09:24,  No significant change was found  Confirmed by MIRANDA LANGSTON MD ([id number]) on 2/17/2022 12:09:49 PM    Referred By: System System           Confirmed By:MIRANDA LANGSTON MD                            Imaging Results    None          Medications   albuterol-ipratropium 2.5 mg-0.5 mg/3 mL nebulizer solution 3 mL (3 mLs Nebulization Given 2/17/22 1200)   methylPREDNISolone sodium succinate injection 125 mg (125 mg Intravenous Given 2/17/22 1156)                Attending Attestation:   Physician Attestation Statement for Resident:  As the supervising MD   Physician Attestation Statement: I have personally seen and examined this patient.   I agree with the above history. -:   As the supervising MD I agree with the above PE.    As the supervising MD I agree with the above treatment, course, plan, and disposition.   -: 61-year-old female presents to the emergency department for shortness of breath.  Past medical history of lung cancer with brain metastasis, on chemotherapy, COPD on 6 L of oxygen at home.  Patient presents in respiratory distress with oxygen saturation of 58-60%, dyspneic  Bilateral breath sounds no wheezing  Bedside ultrasound with normal RV to LV ratio, no pericardial effusion by thick pericardial fat pad, no pleural effusions, left upper chest mass versus pneumonia with B-lines in the lower lobe    DuoNebs, prednisone  Started on high-flow with improvement in the oxygen  VBG with normal  pH and pCO2 of 43 not suggestive of COPD exacerbation  CBC with white count of 21 possibly infectious versus secondary to lung cancer, will cover with broad-spectrum antibiotics  12:27 PM  HF 60 L 100% oxygen, 87% ox sat in the room        Attending Critical Care:   Critical Care Times:   ==============================================================  · Total Critical Care Time - exclusive of procedural time: 45 minutes.  ==============================================================  Critical care reasons: hypoxic resp failure, leukocytosis.   Critical care was time spent personally by me on the following activities: obtaining history from patient or relative, examination of patient, review of old charts, ordering lab, x-rays, and/or EKG, development of treatment plan with patient or relative, ordering and performing treatments and interventions, evaluation of patient's response to treatment and discussion with consultants.   Critical Care Condition: life-threatening                    Clinical Impression:   Final diagnoses:  [R06.02] SOB (shortness of breath)

## 2022-02-18 NOTE — PLAN OF CARE
Patient's daughter requested a bedside commode for home use, an order for a sleep study so that patient could get a Bipap for home use, and a consult with the . CM notified of aforementioned.      Batool Jacobson RN     [phone]

## 2022-02-18 NOTE — PLAN OF CARE
SW received call from [facility], to notify CM dept that pt is current with their agency.       02/18/22 1485   Post-Acute Status   Post-Acute Authorization Home Health   Home Health Status Set-up Complete/Auth obtained  (Pt is current with [facility].)   Coverage Medicaid - [employer] Blue ([facility], [place])   Hospital Resources/Appts/Education Provided Post-Acute resouces added to AVS   Discharge Delays None known at this time   Discharge Plan   Discharge Plan A Home Health   Discharge Plan B Home with family     Dario Robison LMSW  [facility]  [phone]

## 2022-02-18 NOTE — HOSPITAL COURSE
Mrs. Boyce was admitted to the MICU for acute on chronic hypoxemic respiratory failure. She was started on lasix. Hem/Onc started her on methylprednisolone (1mg/kg) for a suspected pneumonitis.   2/19:  Patient unable to wean from comfort flow 60L95% along with a non-rebreather mask.  Had further goals of care discussions with the patient--that she has now a DNR/DNI but not comfort care.2/21. Placed on Morphine and Ativan to assist with tachypnea and air hunger. BiPAP continues. Family informed that the patient is dying and will likely not survive for more than a few hours to days with current therapy. Family amenable to transitioning to full comfort measures on 2/22. Bipap discontinued.

## 2022-02-18 NOTE — RESPIRATORY THERAPY
Placed pt on Airvo 60L 95% as ariella to maintain O2 sat > 88%. bipap on standby for resp. Distress. O2 sat 91, , RR 26. Nurse notified
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
## 2022-02-18 NOTE — ED NOTES
Pt remains on bipap, tolerating well.  Pt updated on status, awaiting transfer to floor.  Pt continues with SOB on exertion with good recovery at rest.  Will continue to monitor.

## 2022-02-18 NOTE — PROGRESS NOTES
[facility]  Critical Care Medicine  Progress Note    Patient Name: Janeth Boyce  MRN: [id number]  Admission Date: 2/17/2022  Hospital Length of Stay: 1 days  Code Status: Full Code  Attending Provider: Tavo Radford MD  Primary Care Provider: Freeman Caballero MD   Principal Problem: Acute on chronic respiratory failure with hypoxemia    Subjective:     HPI:  Janeth Boyce 61 y.o. 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 Pt reports last dose of chemo was 2/15, she states she received an increased dose this cycle, and endorses symptoms began shortly after. She reports associated chest soreness from coughing and dizziness. Denies fever, LE pain, and palpitations. Pt's daughter is at bedside, she reports prior to this week, pt was able to tolerate some ambulation without worsening SOB, but since onset is unable to tolerate. Pt is currently on 6L at home O2. Her home inhalers are Spiriva, Symbicort, PRN Albuterol, and PRN duonebs. Pt with recent admission in Dec 2021 2/2 to chemotherapy induced pneumonitis and again in Jan 2021 2/2 COVID-19 (DC 1/19). She was given remdesivir and dexamethasone therapy but did not require intubation. Pt scheduled to complete home Prednisone for Pneumonitis on 2/14. In the ED she is tachycardic with increased RR, currently on BiPAP with IPAP 15, EPAP 8 and FiO2 100% sating 92%. ED workup significant for WBC of 21.1, Lactate of 3.4, BNP of 102, Troponin elevated from baseline at 0.044, ABG 7.364/45.2/21/25.8/0. CXR showed increased consolidative opacities in the left mid to lower lung zones. She was given x2 Duonebs,125mg Methylprednisolone, and started on Vancomycin and Zosyn.         Hospital/ICU Course:  Mrs. Boyce was admitted to the MICU for acute on chronic  hypoxemic respiratory failure. She was started on lasix. Hem/Onc started her on methylprednisolone (1mg/kg) for a suspected pneumonitis.       Interval History/Significant Events: No significant events overnight.    Review of Systems   Constitutional: Negative for chills and diaphoresis.   HENT: Negative for postnasal drip.    Eyes: Negative for visual disturbance.   Respiratory: Positive for shortness of breath.    Cardiovascular: Negative for chest pain.   Gastrointestinal: Negative for abdominal pain and constipation.   Genitourinary: Negative for hematuria.   Musculoskeletal: Negative for myalgias.   Skin: Negative for rash.   Neurological: Negative for headaches.   Hematological: Negative for adenopathy.     Objective:     Vital Signs (Most Recent):  Temp: 97.1 °F (36.2 °C) (02/18/22 0500)  Pulse: 104 (02/18/22 0821)  Resp: (!) 28 (02/18/22 0821)  BP: 126/83 (02/18/22 0700)  SpO2: 95 % (02/18/22 0821) Vital Signs (24h Range):  Temp:  [97.1 °F (36.2 °C)-100.1 °F (37.8 °C)] 97.1 °F (36.2 °C)  Pulse:  [] 104  Resp:  [25-35] 28  SpO2:  [65 %-97 %] 95 %  BP: (119-167)/(67-90) 126/83   Weight: 83.9 kg (185 lb)  Body mass index is 29.86 kg/m².      Intake/Output Summary (Last 24 hours) at 2/18/2022 1118  Last data filed at 2/18/2022 0600  Gross per 24 hour   Intake 462.97 ml   Output 1400 ml   Net -937.03 ml       Physical Exam  Vitals reviewed.   Constitutional:       Appearance: She is well-developed and well-nourished.   HENT:      Head: Normocephalic and atraumatic.      Mouth/Throat:      Mouth: Oropharynx is clear and moist.   Eyes:      General: No scleral icterus.        Right eye: No discharge.         Left eye: No discharge.      Conjunctiva/sclera: Conjunctivae normal.      Pupils: Pupils are equal, round, and reactive to light.   Neck:      Thyroid: No thyromegaly.      Vascular: No JVD.      Trachea: Trachea normal. No tracheal deviation.   Cardiovascular:      Rate and Rhythm: Normal rate and  regular rhythm.      Pulses: Intact distal pulses.      Heart sounds: Normal heart sounds, S1 normal and S2 normal. No murmur heard.  No friction rub. No gallop.    Pulmonary:      Effort: Respiratory distress present.      Breath sounds: Rales present. No wheezing.   Chest:      Chest wall: No tenderness.   Abdominal:      General: Bowel sounds are normal. There is no distension.      Palpations: Abdomen is soft. There is no mass.      Tenderness: There is no abdominal tenderness. There is no guarding or rebound.   Musculoskeletal:         General: No tenderness or deformity. Normal range of motion.      Cervical back: Full passive range of motion without pain, normal range of motion and neck supple.   Lymphadenopathy:      Cervical: No cervical adenopathy.   Skin:     General: Skin is warm and dry.      Findings: No abrasion or bruising.   Neurological:      Cranial Nerves: No cranial nerve deficit.      Coordination: Coordination normal.   Psychiatric:         Mood and Affect: Mood and affect normal.         Vents:  Oxygen Concentration (%): 100 (02/18/22 0821)  Lines/Drains/Airways     Drain            Female External Urinary Catheter 02/17/22 1750 <1 day          Peripheral Intravenous Line                 Peripheral IV - Single Lumen 02/17/22 1154 20 G Right Wrist <1 day         Peripheral IV - Single Lumen 02/17/22 1216 20 G Left Hand <1 day              Significant Labs:    CBC/Anemia Profile:  Recent Labs   Lab 02/17/22  1156 02/18/22  0354   WBC 21.01* 18.56*   HGB 11.5* 11.3*   HCT 37.5 35.9*    327   MCV 85 82   RDW 18.0* 17.6*        Chemistries:  Recent Labs   Lab 02/17/22  1156 02/18/22  0354   * 140   K 5.3* 4.2   CL 98 100   CO2 24 27   BUN 15 16   CREATININE 0.9 1.0   CALCIUM 9.5 9.4   ALBUMIN 2.9* 2.7*   PROT 7.4 7.0   BILITOT 0.7 0.6   ALKPHOS 75 75   ALT 17 15   AST 49* 23   MG  --  1.9   PHOS  --  4.0       Significant Imaging:  I have reviewed all pertinent imaging  results/findings within the past 24 hours.      ABG  Recent Labs   Lab 02/17/22  1819   PH 7.369   PO2 53   PCO2 47.7*   HCO3 27.5   BE 2     Assessment/Plan:     Pulmonary  * Acute on chronic respiratory failure with hypoxemia  --Ddx includes chemotherapy induced pneumonitis vs PE vs pneumonia.   --Vanc + pip/tazo.  --LE US. Hold on CTA due to severe hypoxia. Hold on anticoagulation.   --Methylprednisolone 1mg/kg.  --Bactrim PPx.  --Wean bipap as tolerated.   --NPO for intubation watch.  --High risk for worsening in need of close monitoring.       Hospital-acquired pneumonia  See respiratory failure    Dependence on supplemental oxygen  Patient on 3L NC at home but recently increased to 5LNC    ID  Severe sepsis  This patient does have evidence of infective focus  My overall impression is sepsis. Vital signs were reviewed and noted in progress note.  Antibiotics given-   Antibiotics (From admission, onward)            Start     Stop Route Frequency Ordered    02/17/22 2300  vancomycin 1.5 g in dextrose 5 % 250 mL IVPB (ready to mix)         -- IV Every 12 hours (non-standard times) 02/17/22 1557    02/17/22 2100  piperacillin-tazobactam 4.5 g in sodium chloride 0.9% 100 mL IVPB (ready to mix system)  ( Pneumonia - Moderate-High MDR )         02/22 2059 IV Every 8 hours (non-standard times) 02/17/22 1539    02/17/22 1230  vancomycin 1.75 g in 5 % dextrose 500 mL IVPB         02/18 0029 IV ED 1 Time 02/17/22 1225        Cultures were taken-   Microbiology Results (last 7 days)     Procedure Component Value Units Date/Time    Blood culture #2 **CANNOT BE ORDERED STAT** [[id number]] Collected: 02/17/22 1221    Order Status: Sent Specimen: Blood from Peripheral, Hand, Left Updated: 02/17/22 1229    Blood culture #1 **CANNOT BE ORDERED STAT** [[id number]] Collected: 02/17/22 1157    Order Status: Sent Specimen: Blood from Peripheral, Hand, Right Updated: 02/17/22 1208        Latest lactate reviewed, they are-  Recent Labs    Lab 02/17/22  1156   LACTATE 3.4*       Organ dysfunction indicated by Acute respiratory failure  Source- Hospital Acquired Pneumonia        Oncology  Secondary malignant neoplasm of brain  MRI 2/14- No significant change from prior specifically without evidence for new parenchymal signal abnormality or intracranial enhancing lesion to suggest new or worsening intracranial metastatic disease. Scattered small to punctate foci of T2 FLAIR signal hyperintensity supratentorial white matter which are nonspecific and may represent post treatment change or chronic ischemic change.       Adenocarcinoma of lung, stage 4, right  5/5/21 lung biopsy, final pathology adenocarcinoma.  2/15/22 -Most recent Keytruda infusion     Other  History of 2019 novel coronavirus disease (COVID-19)  COVID19 (1/13/22).    Negative COVID (2/17/22)      Goals of care, counseling/discussion   Goals of care discussed in ED. Will need to readdress when the patient is not in distress. Previously a DNR.        Critical Care Time: 50 minutes  Critical secondary to Patient has a condition that poses threat to life and bodily function: Severe Respiratory Distress      Critical care was time spent personally by me on the following activities: development of treatment plan with patient or surrogate and bedside caregivers, discussions with consultants, evaluation of patient's response to treatment, examination of patient, ordering and performing treatments and interventions, ordering and review of laboratory studies, ordering and review of radiographic studies, pulse oximetry, re-evaluation of patient's condition. This critical care time did not overlap with that of any other provider or involve time for any procedures.     Hill Garcia PA-C  Critical Care Medicine  [facility]

## 2022-02-18 NOTE — ASSESSMENT & PLAN NOTE
Patient is a 60YO year old woman with Stage IV NSCLC C24D4 Keytruda presenting to the ICU with hypoxemic respiratory failure, oncology on consult. Pt notes two days of dyspnea since her last dose of Keytruda. No fevers, chills or chest pain. Agree with empiric tx for PNA and fu CTA for PE. With 2 year duration of IO, pneumonitis would be a possibility. Recommend 1 mg/kg solumedrol daily with GI ppx. Discussed intubation with this patient, we explained that if she gets to that point the chance of recovery is small. We discussed that her children are her surrogate decision makers.     Will continue to follow as respiratory status requires ICU level of support.

## 2022-02-18 NOTE — SUBJECTIVE & OBJECTIVE
Interval History/Significant Events: No significant events overnight.    Review of Systems   Constitutional: Negative for chills and diaphoresis.   HENT: Negative for postnasal drip.    Eyes: Negative for visual disturbance.   Respiratory: Positive for shortness of breath.    Cardiovascular: Negative for chest pain.   Gastrointestinal: Negative for abdominal pain and constipation.   Genitourinary: Negative for hematuria.   Musculoskeletal: Negative for myalgias.   Skin: Negative for rash.   Neurological: Negative for headaches.   Hematological: Negative for adenopathy.     Objective:     Vital Signs (Most Recent):  Temp: 97.1 °F (36.2 °C) (02/18/22 0500)  Pulse: 104 (02/18/22 0821)  Resp: (!) 28 (02/18/22 0821)  BP: 126/83 (02/18/22 0700)  SpO2: 95 % (02/18/22 0821) Vital Signs (24h Range):  Temp:  [97.1 °F (36.2 °C)-100.1 °F (37.8 °C)] 97.1 °F (36.2 °C)  Pulse:  [] 104  Resp:  [25-35] 28  SpO2:  [65 %-97 %] 95 %  BP: (119-167)/(67-90) 126/83   Weight: 83.9 kg (185 lb)  Body mass index is 29.86 kg/m².      Intake/Output Summary (Last 24 hours) at 2/18/2022 1118  Last data filed at 2/18/2022 0600  Gross per 24 hour   Intake 462.97 ml   Output 1400 ml   Net -937.03 ml       Physical Exam  Vitals reviewed.   Constitutional:       Appearance: She is well-developed and well-nourished.   HENT:      Head: Normocephalic and atraumatic.      Mouth/Throat:      Mouth: Oropharynx is clear and moist.   Eyes:      General: No scleral icterus.        Right eye: No discharge.         Left eye: No discharge.      Conjunctiva/sclera: Conjunctivae normal.      Pupils: Pupils are equal, round, and reactive to light.   Neck:      Thyroid: No thyromegaly.      Vascular: No JVD.      Trachea: Trachea normal. No tracheal deviation.   Cardiovascular:      Rate and Rhythm: Normal rate and regular rhythm.      Pulses: Intact distal pulses.      Heart sounds: Normal heart sounds, S1 normal and S2 normal. No murmur heard.  No friction  rub. No gallop.    Pulmonary:      Effort: Respiratory distress present.      Breath sounds: Rales present. No wheezing.   Chest:      Chest wall: No tenderness.   Abdominal:      General: Bowel sounds are normal. There is no distension.      Palpations: Abdomen is soft. There is no mass.      Tenderness: There is no abdominal tenderness. There is no guarding or rebound.   Musculoskeletal:         General: No tenderness or deformity. Normal range of motion.      Cervical back: Full passive range of motion without pain, normal range of motion and neck supple.   Lymphadenopathy:      Cervical: No cervical adenopathy.   Skin:     General: Skin is warm and dry.      Findings: No abrasion or bruising.   Neurological:      Cranial Nerves: No cranial nerve deficit.      Coordination: Coordination normal.   Psychiatric:         Mood and Affect: Mood and affect normal.         Vents:  Oxygen Concentration (%): 100 (02/18/22 0821)  Lines/Drains/Airways     Drain            Female External Urinary Catheter 02/17/22 1750 <1 day          Peripheral Intravenous Line                 Peripheral IV - Single Lumen 02/17/22 1154 20 G Right Wrist <1 day         Peripheral IV - Single Lumen 02/17/22 1216 20 G Left Hand <1 day              Significant Labs:    CBC/Anemia Profile:  Recent Labs   Lab 02/17/22  1156 02/18/22  0354   WBC 21.01* 18.56*   HGB 11.5* 11.3*   HCT 37.5 35.9*    327   MCV 85 82   RDW 18.0* 17.6*        Chemistries:  Recent Labs   Lab 02/17/22  1156 02/18/22  0354   * 140   K 5.3* 4.2   CL 98 100   CO2 24 27   BUN 15 16   CREATININE 0.9 1.0   CALCIUM 9.5 9.4   ALBUMIN 2.9* 2.7*   PROT 7.4 7.0   BILITOT 0.7 0.6   ALKPHOS 75 75   ALT 17 15   AST 49* 23   MG  --  1.9   PHOS  --  4.0       Significant Imaging:  I have reviewed all pertinent imaging results/findings within the past 24 hours.

## 2022-02-18 NOTE — ED NOTES
Pt transferred to 3rd floor ICU on cardiac monitor.  Pt awake and alert, pt transported on non-rebreather with respiratory accompanying pt and nurse.  Pt continues with SOB on exertion with good recovery at rest.  Pt transported to 3rd floor with no incident.

## 2022-02-18 NOTE — PLAN OF CARE
[facility]  Initial Discharge Assessment       Primary Care Provider: Freeman Caballero MD    Admission Diagnosis: SOB (shortness of breath) [R06.02]    Admission Date: 2/17/2022  Expected Discharge Date:     Discharge Barriers Identified: Underinsured    Payor: MEDICAID / Plan: HEALTHY BLUE ([employer] [place] / Product Type: Managed Medicaid /     Extended Emergency Contact Information  Primary Emergency Contact: Missy Boyce  Mobile Phone: [phone]  Relation: Grandchild  Secondary Emergency Contact: Geronimo Boyce  Mobile Phone: [phone]  Relation: Son    Discharge Plan A: Home  Discharge Plan B: Home with family      [facility] [address]  [address] [facility] [address]  Phone: [phone] Fax: [phone]    [facility]  [address]  [facility] [address]  Phone: [phone] Fax: [phone]      Transferred from:     Past Medical History:   Diagnosis Date    Anxiety     Asthma     COPD exacerbation 12/14/2021    Hepatitis C 2019    treated    Hx of psychiatric care     Hypertension     Lung cancer     Psychiatric exam requested by authority     Psychiatric problem          CM met with patient and Kell Boyce (daughter) [phone] in room for Discharge Planning Assessment.  Patient was unable to answer questions due to having Bipap mask on, but was alert and oriented and gave thumbs up for answers daughter provided.  Per daughter, patient lives alone in a first floor apartment with 0 step(s) to enter.   Per daughter, patient was dependent with ADLS and used rollator for ambulation. Per daughter, patient is not on dialysis and does not take Coumadin.  Patient will have help from Kell Boyce (daughter) [phone] upon discharge.   Discharge Planning Booklet given to patient/family and discussed.  All questions addressed.  CM will follow for needs.      Initial Assessment (most  recent)     Adult Discharge Assessment - 02/18/22 1403        Discharge Assessment    Assessment Type Discharge Planning Assessment     Confirmed/corrected address, phone number and insurance Yes     Confirmed Demographics Correct on Facesheet     Source of Information patient;family     When was your last doctors appointment? 02/15/22     Communicated TIFFANIE with patient/caregiver Date not available/Unable to determine     Reason For Admission Acute on chronic respiratory failure with hypoxemia     Lives With alone     Facility Arrived From: Home     Do you expect to return to your current living situation? Yes     Do you have help at home or someone to help you manage your care at home? Yes     Who are your caregiver(s) and their phone number(s)? Kell Boyce (daughter) [phone]     Prior to hospitilization cognitive status: Alert/Oriented     Current cognitive status: Alert/Oriented     Walking or Climbing Stairs Difficulty ambulation difficulty, requires equipment     Mobility Management rollator     Dressing/Bathing Difficulty bathing difficulty, assistance 1 person;dressing difficulty, assistance 1 person     Dressing/Bathing Management Christos Castorena and Medicaid sitter that comes everyday assists in these tasks.     Equipment Currently Used at Home rollator     Readmission within 30 days? Yes     Patient currently being followed by outpatient case management? No     Do you currently have service(s) that help you manage your care at home? Yes     Name and Contact number of agency Medicaid sitters and [facility]     Is the pt/caregiver preference to resume services with current agency Yes     Do you take prescription medications? Yes     Do you have prescription coverage? Yes     Coverage Medicaid - Healthy Blue ([employer] [place])     Do you have any problems affording any of your prescribed medications? No     Who is going to help you get home at discharge? Kell Boyce (daughter) [phone]      How do you get to doctors appointments? family or friend will provide     Are you on dialysis? No     Do you take coumadin? No     Discharge Plan A Home     Discharge Plan B Home with family     DME Needed Upon Discharge  bedside commode;CPAP     Discharge Plan discussed with: Adult children;Patient     Discharge Barriers Identified Underinsured                        PCP:  Freeman Caballero MD  [phone]        Ph[facility]acy:    [facility] [address]  [address] [facility] [address]  Phone: [phone] Fax: [phone]    [facility] [address] [place] [address]  Phone: [phone] Fax: [phone]        Emergency Contacts:  Extended Emergency Contact Information  Primary Emergency Contact: Missy Boyce  Mobile Phone: [phone]  Relation: Grandchild  Secondary Emergency Contact: Geronimo Boyce  Mobile Phone: [phone]  Relation: Son      Insurance:    Payor: MEDICAID / Plan: HEALTHY BLUE ([employer] [place]) / Product Type: Managed Medicaid /     Batool Jacobson RN     [phone]      02/18/2022  2:15 PM

## 2022-02-18 NOTE — CONSULTS
[facility] - Cardiac Intensive Care  Hematology/Oncology  Consult Note    Patient Name: Janeth Boyce  MRN: [id number]  Admission Date: 2/17/2022  Hospital Length of Stay: 1 days  Code Status: Full Code   Attending Provider: Tavo Radford MD  Consulting Provider: Beronica Magallon MD  Primary Care Physician: Freeman Caballero MD  Principal Problem:Acute on chronic respiratory failure with hypoxemia    Inpatient consult to Hematology/Oncology  Consult performed by: Beronica Magallon MD  Consult ordered by: Fiona Winterbottom, APRN, CNS        Subjective:     HPI:  Patient is a 60YO year old woman with Stage IV NSCLC C24D4 Keytruda presenting to the ICU with hypoxemic respiratory failure, oncology on consult. Pt notes two days of dyspnea since her last dose of Keytruda. No fevers, chills or chest pain. She thinks this dose of chemotherapy is too strong. Patient would want intubation short term if it could make it better. She has two adult children who would make decisions for her if she cannot. CXR in ED shows worsening b/l opacities, no desirable effusion or pneumonia. Pt started on vanc/zosyn, admitted to ICU on 60L BiPap at 100%.       Oncology Treatment Plan:   OP PEMBROLIZUMAB 200MG Q3W (C22-24 Q6W)    Medications:  Continuous Infusions:  Scheduled Meds:   amLODIPine  5 mg Oral Daily    cetirizine  10 mg Oral Daily    enoxaparin  40 mg Subcutaneous Daily    fluticasone furoate-vilanteroL  1 puff Inhalation Daily    fluticasone propionate  1 spray Each Nostril Daily    gabapentin  400 mg Oral TID    methylPREDNISolone sodium succinate injection  80 mg Intravenous Daily    piperacillin-tazobactam (ZOSYN) IVPB  4.5 g Intravenous Q8H    senna-docusate 8.6-50 mg  1 tablet Oral BID    sulfamethoxazole-trimethoprim 800-160mg  1 tablet Oral Daily    tiotropium bromide  2 puff Inhalation Daily    vancomycin (VANCOCIN) IVPB  1,000 mg Intravenous Q12H     PRN Meds:acetaminophen, albuterol-ipratropium, calcium  gluconate IVPB, calcium gluconate IVPB, calcium gluconate IVPB, dextrose 10%, dextrose 10%, glucagon (human recombinant), glucose, glucose, magnesium sulfate IVPB, magnesium sulfate IVPB, potassium chloride in water **AND** potassium chloride in water **AND** potassium chloride in water, sodium chloride 0.9%     Review of patient's allergies indicates:  No Known Allergies     Past Medical History:   Diagnosis Date    Anxiety     Asthma     COPD exacerbation 2021    Hepatitis C 2019    treated    Hx of psychiatric care     Hypertension     Lung cancer     Psychiatric exam requested by authority     Psychiatric problem      Past Surgical History:   Procedure Laterality Date    BRONCHOSCOPY N/A 9/3/2020    Procedure: Bronchoscopy;  Surgeon: Renee Diagnostic Provider;  Location: [facility] OR [address] [place] [address];  Service: Anesthesiology;  Laterality: N/A;     SECTION      KNEE SURGERY       Family History     Problem Relation (Age of Onset)    Cancer Father        Tobacco Use    Smoking status: Former Smoker     Packs/day: 0.50     Years: 40.00     Pack years: 20.00     Types: Cigarettes     Quit date: 2020     Years since quittin.8    Smokeless tobacco: Never Used   Substance and Sexual Activity    Alcohol use: Yes     Comment: occasional    Drug use: No    Sexual activity: Not on file       Review of Systems   Constitutional: Positive for fatigue. Negative for activity change, appetite change, chills and fever.   HENT: Negative for congestion, drooling and tinnitus.    Respiratory: Positive for shortness of breath. Negative for apnea, cough and chest tightness.    Cardiovascular: Negative for chest pain, palpitations and leg swelling.   Gastrointestinal: Negative for abdominal distention and abdominal pain.   Endocrine: Negative for cold intolerance and heat intolerance.   Genitourinary: Negative for difficulty urinating and hematuria.   Musculoskeletal: Negative for arthralgias, back pain and  gait problem.   Skin: Negative for color change and rash.   Neurological: Negative for syncope and weakness.   Hematological: Negative for adenopathy. Does not bruise/bleed easily.   Psychiatric/Behavioral: Negative for agitation and confusion.     Objective:     Vital Signs (Most Recent):  Temp: 97.1 °F (36.2 °C) (02/18/22 0500)  Pulse: 108 (02/18/22 1127)  Resp: (!) 33 (02/18/22 1127)  BP: 126/83 (02/18/22 0700)  SpO2: (!) 87 % (02/18/22 1127) Vital Signs (24h Range):  Temp:  [97.1 °F (36.2 °C)-100.1 °F (37.8 °C)] 97.1 °F (36.2 °C)  Pulse:  [] 108  Resp:  [25-35] 33  SpO2:  [87 %-97 %] 87 %  BP: (119-163)/(67-90) 126/83     Weight: 83.9 kg (185 lb)  Body mass index is 29.86 kg/m².  Body surface area is 1.98 meters squared.      Intake/Output Summary (Last 24 hours) at 2/18/2022 1233  Last data filed at 2/18/2022 0600  Gross per 24 hour   Intake 462.97 ml   Output 1400 ml   Net -937.03 ml       Physical Exam  Constitutional:       Appearance: Normal appearance.   HENT:      Head: Normocephalic and atraumatic.      Mouth/Throat:      Mouth: Mucous membranes are moist.   Eyes:      Extraocular Movements: Extraocular movements intact.      Pupils: Pupils are equal, round, and reactive to light.   Cardiovascular:      Rate and Rhythm: Normal rate and regular rhythm.      Pulses: Normal pulses.      Heart sounds: No murmur heard.      Pulmonary:      Effort: Accessory muscle usage, prolonged expiration and respiratory distress present.      Breath sounds: Decreased air movement present.   Abdominal:      General: Abdomen is flat. There is no distension.      Palpations: Abdomen is soft.      Tenderness: There is no abdominal tenderness.   Musculoskeletal:         General: No swelling or tenderness. Normal range of motion.      Cervical back: Normal range of motion. No rigidity.   Skin:     General: Skin is warm and dry.      Coloration: Skin is not jaundiced.   Neurological:      General: No focal deficit present.       Mental Status: She is alert and oriented to person, place, and time.   Psychiatric:         Mood and Affect: Mood normal.         Behavior: Behavior normal.         Significant Labs:   All pertinent labs from the last 24 hours have been reviewed.    Diagnostic Results:  I have reviewed all pertinent imaging results/findings within the past 24 hours.    Assessment/Plan:     Adenocarcinoma of lung, stage 4, right  Patient is a 60YO year old woman with Stage IV NSCLC C24D4 Keytruda presenting to the ICU with hypoxemic respiratory failure, oncology on consult. Pt notes two days of dyspnea since her last dose of Keytruda. No fevers, chills or chest pain. Agree with empiric tx for PNA and fu CTA for PE. With 2 year duration of IO, pneumonitis would be a possibility. Recommend 1 mg/kg solumedrol daily with GI ppx. Discussed intubation with this patient, we explained that if she gets to that point the chance of recovery is small. We discussed that her children are her surrogate decision makers.     Will continue to follow as respiratory status requires ICU level of support.         Thank you for your consult. I will follow-up with patient. Please contact us if you have any additional questions. Patient discussed with attending physician, Dr. Peterson. Recommendations will be finalized with attending addendum.       Beronica Magallon MD  Hematology/Oncology  [facility]

## 2022-02-18 NOTE — ASSESSMENT & PLAN NOTE
--Ddx includes chemotherapy induced pneumonitis vs PE vs pneumonia.   --Vanc + pip/tazo.  --LE US. Hold on CTA due to severe hypoxia. Hold on anticoagulation.   --Methylprednisolone 1mg/kg.  --Bactrim PPx.  --Wean bipap as tolerated.   --NPO for intubation watch.  --High risk for worsening in need of close monitoring.

## 2022-02-18 NOTE — SUBJECTIVE & OBJECTIVE
Oncology Treatment Plan:   OP PEMBROLIZUMAB 200MG Q3W (C22-24 Q6W)    Medications:  Continuous Infusions:  Scheduled Meds:   amLODIPine  5 mg Oral Daily    cetirizine  10 mg Oral Daily    enoxaparin  40 mg Subcutaneous Daily    fluticasone furoate-vilanteroL  1 puff Inhalation Daily    fluticasone propionate  1 spray Each Nostril Daily    gabapentin  400 mg Oral TID    methylPREDNISolone sodium succinate injection  80 mg Intravenous Daily    piperacillin-tazobactam (ZOSYN) IVPB  4.5 g Intravenous Q8H    senna-docusate 8.6-50 mg  1 tablet Oral BID    sulfamethoxazole-trimethoprim 800-160mg  1 tablet Oral Daily    tiotropium bromide  2 puff Inhalation Daily    vancomycin (VANCOCIN) IVPB  1,000 mg Intravenous Q12H     PRN Meds:acetaminophen, albuterol-ipratropium, calcium gluconate IVPB, calcium gluconate IVPB, calcium gluconate IVPB, dextrose 10%, dextrose 10%, glucagon (human recombinant), glucose, glucose, magnesium sulfate IVPB, magnesium sulfate IVPB, potassium chloride in water **AND** potassium chloride in water **AND** potassium chloride in water, sodium chloride 0.9%     Review of patient's allergies indicates:  No Known Allergies     Past Medical History:   Diagnosis Date    Anxiety     Asthma     COPD exacerbation 2021    Hepatitis C 2019    treated    Hx of psychiatric care     Hypertension     Lung cancer     Psychiatric exam requested by authority     Psychiatric problem      Past Surgical History:   Procedure Laterality Date    BRONCHOSCOPY N/A 9/3/2020    Procedure: Bronchoscopy;  [facility];  Location: [facility] OR [address] [place] [address];  Service: Anesthesiology;  Laterality: N/A;     SECTION      KNEE SURGERY       Family History     Problem Relation (Age of Onset)    Cancer Father        Tobacco Use    Smoking status: Former Smoker     Packs/day: 0.50     Years: 40.00     Pack years: 20.00     Types: Cigarettes     Quit date: 2020     Years since  quittin.8    Smokeless tobacco: Never Used   Substance and Sexual Activity    Alcohol use: Yes     Comment: occasional    Drug use: No    Sexual activity: Not on file       Review of Systems   Constitutional: Positive for fatigue. Negative for activity change, appetite change, chills and fever.   HENT: Negative for congestion, drooling and tinnitus.    Respiratory: Positive for shortness of breath. Negative for apnea, cough and chest tightness.    Cardiovascular: Negative for chest pain, palpitations and leg swelling.   Gastrointestinal: Negative for abdominal distention and abdominal pain.   Endocrine: Negative for cold intolerance and heat intolerance.   Genitourinary: Negative for difficulty urinating and hematuria.   Musculoskeletal: Negative for arthralgias, back pain and gait problem.   Skin: Negative for color change and rash.   Neurological: Negative for syncope and weakness.   Hematological: Negative for adenopathy. Does not bruise/bleed easily.   Psychiatric/Behavioral: Negative for agitation and confusion.     Objective:     Vital Signs (Most Recent):  Temp: 97.1 °F (36.2 °C) (22 0500)  Pulse: 108 (22 1127)  Resp: (!) 33 (22 1127)  BP: 126/83 (22 0700)  SpO2: (!) 87 % (22 1127) Vital Signs (24h Range):  Temp:  [97.1 °F (36.2 °C)-100.1 °F (37.8 °C)] 97.1 °F (36.2 °C)  Pulse:  [] 108  Resp:  [25-35] 33  SpO2:  [87 %-97 %] 87 %  BP: (119-163)/(67-90) 126/83     Weight: 83.9 kg (185 lb)  Body mass index is 29.86 kg/m².  Body surface area is 1.98 meters squared.      Intake/Output Summary (Last 24 hours) at 2022 1233  Last data filed at 2022 0600  Gross per 24 hour   Intake 462.97 ml   Output 1400 ml   Net -937.03 ml       Physical Exam  Constitutional:       Appearance: Normal appearance.   HENT:      Head: Normocephalic and atraumatic.      Mouth/Throat:      Mouth: Mucous membranes are moist.   Eyes:      Extraocular Movements: Extraocular movements  intact.      Pupils: Pupils are equal, round, and reactive to light.   Cardiovascular:      Rate and Rhythm: Normal rate and regular rhythm.      Pulses: Normal pulses.      Heart sounds: No murmur heard.      Pulmonary:      Effort: Accessory muscle usage, prolonged expiration and respiratory distress present.      Breath sounds: Decreased air movement present.   Abdominal:      General: Abdomen is flat. There is no distension.      Palpations: Abdomen is soft.      Tenderness: There is no abdominal tenderness.   Musculoskeletal:         General: No swelling or tenderness. Normal range of motion.      Cervical back: Normal range of motion. No rigidity.   Skin:     General: Skin is warm and dry.      Coloration: Skin is not jaundiced.   Neurological:      General: No focal deficit present.      Mental Status: She is alert and oriented to person, place, and time.   Psychiatric:         Mood and Affect: Mood normal.         Behavior: Behavior normal.         Significant Labs:   All pertinent labs from the last 24 hours have been reviewed.    Diagnostic Results:  I have reviewed all pertinent imaging results/findings within the past 24 hours.

## 2022-02-18 NOTE — HPI
Patient is a 60YO year old woman with Stage IV NSCLC C24D4 Keytruda presenting to the ICU with hypoxemic respiratory failure, oncology on consult. Pt notes two days of dyspnea since her last dose of Keytruda. No fevers, chills or chest pain. She thinks this dose of chemotherapy is too strong. Patient would want intubation short term if it could make it better. She has two adult children who would make decisions for her if she cannot. CXR in ED shows worsening b/l opacities, no desirable effusion or pneumonia. Pt started on vanc/zosyn, admitted to ICU on 60L BiPap at 100%.

## 2022-02-18 NOTE — H&P
[facility]  Critical Care Medicine  History & Physical    Patient Name: Janeth Boyce  MRN: [id number]  Admission Date: 2/17/2022  Hospital Length of Stay: 0 days  Code Status: Full Code  Attending Physician: Jaylen Alcazar MD   Primary Care Provider: Freeman Caballero MD   Principal Problem: Acute on chronic respiratory failure with hypoxemia    Subjective:     HPI:  Janeth Boyce 61 y.o. 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 Pt reports last dose of chemo was 2/15, she states she received an increased dose this cycle, and endorses symptoms began shortly after. She reports associated chest soreness from coughing and dizziness. Denies fever, LE pain, and palpitations. Pt's daughter is at bedside, she reports prior to this week, pt was able to tolerate some ambulation without worsening SOB, but since onset is unable to tolerate. Pt is currently on 6L at home O2. Her home inhalers are Spiriva, Symbicort, PRN Albuterol, and PRN duonebs. Pt with recent admission in Dec 2021 2/2 to chemotherapy induced pneumonitis and again in Jan 2021 2/2 COVID-19 (DC 1/19). She was given remdesivir and dexamethasone therapy but did not require intubation. Pt scheduled to complete home Prednisone for Pneumonitis on 2/14. In the ED she is tachycardic with increased RR, currently on BiPAP with IPAP 15, EPAP 8 and FiO2 100% sating 92%. ED workup significant for WBC of 21.1, Lactate of 3.4, BNP of 102, Troponin elevated from baseline at 0.044, ABG 7.364/45.2/21/25.8/0. CXR showed increased consolidative opacities in the left mid to lower lung zones. She was given x2 Duonebs,125mg Methylprednisolone, and started on Vancomycin and Zosyn.         Hospital/ICU Course:  No notes on file     Past Medical History:    Diagnosis Date    Anxiety     Asthma     COPD exacerbation 2021    Hepatitis C 2019    treated    Hx of psychiatric care     Hypertension     Lung cancer     Psychiatric exam requested by authority     Psychiatric problem        Past Surgical History:   Procedure Laterality Date    BRONCHOSCOPY N/A 9/3/2020    Procedure: Bronchoscopy;  Surgeon: Renee Diagnostic Provider;  Location: [facility] OR [address] [place] [address];  Service: Anesthesiology;  Laterality: N/A;     SECTION      KNEE SURGERY         Review of patient's allergies indicates:  No Known Allergies    Family History     Problem Relation (Age of Onset)    Cancer Father        Tobacco Use    Smoking status: Former Smoker     Packs/day: 0.50     Years: 40.00     Pack years: 20.00     Types: Cigarettes     Quit date: 2020     Years since quittin.8    Smokeless tobacco: Never Used   Substance and Sexual Activity    Alcohol use: Yes     Comment: occasional    Drug use: No    Sexual activity: Not on file      Review of Systems   Constitutional: Negative for chills, fatigue and fever.   Respiratory: Positive for cough, shortness of breath and wheezing.    Cardiovascular: Positive for chest pain (Soreness). Negative for palpitations and leg swelling.   Gastrointestinal: Negative for abdominal pain, diarrhea, nausea and vomiting.   Genitourinary: Positive for urgency (Incontinence). Negative for difficulty urinating, dysuria and frequency.   Musculoskeletal: Negative for myalgias.   Neurological: Positive for dizziness. Negative for numbness and headaches.     Objective:     Vital Signs (Most Recent):  Temp: 97.7 °F (36.5 °C) (22 1146)  Pulse: 106 (22 1521)  Resp: (!) 26 (22 1332)  BP: 137/85 (22 1502)  SpO2: 97 % (22 1521) Vital Signs (24h Range):  Temp:  [97.7 °F (36.5 °C)] 97.7 °F (36.5 °C)  Pulse:  [103-111] 106  Resp:  [26-30] 26  SpO2:  [65 %-97 %] 97 %  BP: (121-167)/(69-85) 137/85   Weight: 83.9 kg (185  lb)  Body mass index is 29.86 kg/m².      Intake/Output Summary (Last 24 hours) at 2022 1611  Last data filed at 2022 1341  Gross per 24 hour   Intake 100 ml   Output --   Net 100 ml       Physical Exam  Vitals reviewed.   Constitutional:       General: She is in acute distress.      Appearance: She is ill-appearing.   HENT:      Head: Normocephalic and atraumatic.      Nose: Nose normal.   Cardiovascular:      Rate and Rhythm: Normal rate and regular rhythm.      Pulses: Normal pulses.      Heart sounds: Normal heart sounds.   Pulmonary:      Effort: Respiratory distress present.      Breath sounds: Wheezing present.   Abdominal:      General: Abdomen is flat.      Palpations: Abdomen is soft.   Musculoskeletal:         General: Normal range of motion.      Cervical back: Normal range of motion and neck supple.      Right lower le+ Edema present.      Left lower le+ Edema present.   Neurological:      Mental Status: She is alert and oriented to person, place, and time.         Vents:  Oxygen Concentration (%): 100 (22 1521)  Lines/Drains/Airways     Peripheral Intravenous Line                 Peripheral IV - Single Lumen 22 1154 20 G Right Wrist <1 day         Peripheral IV - Single Lumen 22 1216 20 G Left Hand <1 day              Significant Labs:    CBC/Anemia Profile:  Recent Labs   Lab 22  1156   WBC 21.01*   HGB 11.5*   HCT 37.5      MCV 85   RDW 18.0*        Chemistries:  Recent Labs   Lab 22  1156   *   K 5.3*   CL 98   CO2 24   BUN 15   CREATININE 0.9   CALCIUM 9.5   ALBUMIN 2.9*   PROT 7.4   BILITOT 0.7   ALKPHOS 75   ALT 17   AST 49*       ABGs:   Recent Labs   Lab 22  1211   PH 7.364   PCO2 45.2*   HCO3 25.8   POCSATURATED 33*   BE 0     Lactic Acid:   Recent Labs   Lab 22  1156   LACTATE 3.4*     Troponin:   Recent Labs   Lab 22  1156   TROPONINI 0.044*     All pertinent labs within the past 24 hours have been  reviewed.    Significant Imaging: I have reviewed all pertinent imaging results/findings within the past 24 hours.     X-Ray Chest AP Portable  Narrative: EXAMINATION:  XR CHEST AP PORTABLE    CLINICAL HISTORY:  dyspnea;    TECHNIQUE:  Single frontal view of the chest was performed.    COMPARISON:  01/13/2022    FINDINGS:  Stable right hemithoracic volume loss associated with dense opacification of the medial aspect of the right medial upper lung zone, better seen on CT 01/13/2022.    Interval increase in left mid to lung zones patchy consolidations.  Right mid to lower lung zones opacities are stable.    The mediastinal contents again noted to be shifted to the right.    No other significant change.  Impression: Increased consolidative opacities in the left mid to lower lung zones, now obscuring the heart border.    Electronically signed by: Nemo Quiroz  Date:    02/17/2022  Time:    12:29      Assessment/Plan:     Pulmonary  * Acute on chronic respiratory failure with hypoxemia  Patient with Hypoxic Respiratory failure which is Acute on chronic.  she is on home oxygen at 5 LPM. Supplemental oxygen was provided and noted- Oxygen Concentration (%):  [100] 100.   Signs/symptoms of respiratory failure include- tachypnea, increased work of breathing, respiratory distress, use of accessory muscles and lethargy. Contributing diagnoses includes - Pneumonia and pneumonitis. Labs and images were reviewed. Patient Has recent ABG, which has been reviewed. Will treat underlying causes and adjust management of respiratory failure as follows-   - BiPAP alternating with HFNC  - Titrate oxygen for saturations 88-92%  - Diurese prn  - methyl pred x1  - broad spectrum abx  - continue home meds  - VTE-Lovenox   - prn ABG    Hospital-acquired pneumonia  See respiratory failure    Dependence on supplemental oxygen  Patient on 3L NC at home but recently increased to 5LNC    ID  Severe sepsis  This patient does have evidence of  infective focus  My overall impression is sepsis. Vital signs were reviewed and noted in progress note.  Antibiotics given-   Antibiotics (From admission, onward)            Start     Stop Route Frequency Ordered    02/17/22 2300  vancomycin 1.5 g in dextrose 5 % 250 mL IVPB (ready to mix)         -- IV Every 12 hours (non-standard times) 02/17/22 1557    02/17/22 2100  piperacillin-tazobactam 4.5 g in sodium chloride 0.9% 100 mL IVPB (ready to mix system)  ( Pneumonia - Moderate-High MDR )         02/22 2059 IV Every 8 hours (non-standard times) 02/17/22 1539    02/17/22 1230  vancomycin 1.75 g in 5 % dextrose 500 mL IVPB         02/18 0029 IV ED 1 Time 02/17/22 1225        Cultures were taken-   Microbiology Results (last 7 days)     Procedure Component Value Units Date/Time    Blood culture #2 **CANNOT BE ORDERED STAT** [[id number]] Collected: 02/17/22 1221    Order Status: Sent Specimen: Blood from Peripheral, Hand, Left Updated: 02/17/22 1229    Blood culture #1 **CANNOT BE ORDERED STAT** [[id number]] Collected: 02/17/22 1157    Order Status: Sent Specimen: Blood from Peripheral, Hand, Right Updated: 02/17/22 1208        Latest lactate reviewed, they are-  Recent Labs   Lab 02/17/22  1156   LACTATE 3.4*       Organ dysfunction indicated by Acute respiratory failure  Source- Hospital Acquired Pneumonia        Oncology  Secondary malignant neoplasm of brain  MRI 2/14- No significant change from prior specifically without evidence for new parenchymal signal abnormality or intracranial enhancing lesion to suggest new or worsening intracranial metastatic disease. Scattered small to punctate foci of T2 FLAIR signal hyperintensity supratentorial white matter which are nonspecific and may represent post treatment change or chronic ischemic change.       Adenocarcinoma of lung, stage 4, right  5/5/21 lung biopsy, final pathology adenocarcinoma.  2/15/22 -Most recent Keytruda infusion     Other  History of 2019 novel  coronavirus disease (COVID-19)  COVID19 (1/13/22).    Negative COVID (2/17/22)      Goals of care, counseling/discussion   Goals of care discussed in ED. Will need to readdress when the patient is not in distress. Previously a DNR.       Critical Care Time: 60 minutes  Critical secondary to Patient has a condition that poses threat to life and bodily function: Severe Respiratory Distress     Critical care was time spent personally by me on the following activities: development of treatment plan with patient or surrogate and bedside caregivers, discussions with consultants, evaluation of patient's response to treatment, examination of patient, ordering and performing treatments and interventions, ordering and review of laboratory studies, ordering and review of radiographic studies, pulse oximetry, re-evaluation of patient's condition. This critical care time did not overlap with that of any other provider or involve time for any procedures.     Fiona Winterbottom, APRN, CNS  Critical Care Medicine  [facility]

## 2022-02-18 NOTE — ASSESSMENT & PLAN NOTE
MRI 2/14- No significant change from prior specifically without evidence for new parenchymal signal abnormality or intracranial enhancing lesion to suggest new or worsening intracranial metastatic disease. Scattered small to punctate foci of T2 FLAIR signal hyperintensity supratentorial white matter which are nonspecific and may represent post treatment change or chronic ischemic change.

## 2022-02-19 NOTE — ASSESSMENT & PLAN NOTE
Advance Care Planning     Date: 02/19/2022    Code Status  In light of the patients advanced and life limiting illness,I engaged the the patient in a conversation about the patient's preferences for care at the very end of life. The patient wishes to have a natural, peaceful death.  Along those lines, the patient does not wish to have CPR or other invasive treatments performed when her heart and/or breathing stops. I communicated to the patient that a DNR order would be placed in her medical record to reflect this preference.  I spent a total of 15 minutes engaging the patient in this advance care planning discussion.     Her nurse was present for the conversation and witnessed that the patient wished to go with God in the event that she cannot breathe on her own or loses her pulse.  She still would like to continue treatment.

## 2022-02-19 NOTE — PROGRESS NOTES
Pharmacokinetic Assessment Follow Up: IV Vancomycin    Vancomycin serum concentration assessment(s) and plan:     Patient with worsening renal function, not quite an JM, will transition to pulse dosing vancomycin at this time    Goal serum vancomycin levels = 10-20 mcg/mL   Vancomycin 1000 mg q24h discontinued    Will dose when subsequent serum concentrations are <20 mcg/dL   Vancomycin trough level resulted as 27.6 mcg/mL, which is supratherapeutic    Will hold vancomycin doses for today   Draw next vancomycin level with am labs 2/20      Drug levels (last 3 results):  Recent Labs   Lab Result Units 02/19/22  1107   Vancomycin-Trough ug/mL 27.6*       Pharmacy will continue to follow and monitor vancomycin.    Please contact pharmacy at extension [phone] for questions regarding this assessment.    Thank you for the consult,   Chelsey Keith       Patient brief summary:  Janeth Boyce is a 61 y.o. female initiated on antimicrobial therapy with IV Vancomycin for treatment of sepsis    The patient's current regimen is holding     Drug Allergies:   Review of patient's allergies indicates:  No Known Allergies    Actual Body Weight:   83.5 kg    Renal Function:   Estimated Creatinine Clearance: 48.9 mL/min (based on SCr of 1.3 mg/dL).,     Dialysis Method (if applicable):  N/A    CBC (last 72 hours):  Recent Labs   Lab Result Units 02/17/22  1156 02/18/22  0354 02/19/22  0345   WBC K/uL 21.01* 18.56* 24.18*   Hemoglobin g/dL 11.5* 11.3* 12.0   Hematocrit % 37.5 35.9* 38.9   Platelets K/uL 320 327 360   Gran % % 80.2* 82.4* 83.6*   Lymph % % 6.7* 7.5* 4.9*   Mono % % 10.1 8.4 10.3   Eosinophil % % 0.5 0.0 0.0   Basophil % % 0.3 0.2 0.1   Differential Method  Automated Automated Automated       Metabolic Panel (last 72 hours):  Recent Labs   Lab Result Units 02/17/22  1156 02/17/22  1418 02/18/22  0354 02/19/22  0345   Sodium mmol/L 134*  --  140 142   Potassium mmol/L 5.3*  --  4.2 3.9   Chloride mmol/L 98   --  100 98   CO2 mmol/L 24  --  27 29   Glucose mg/dL 141*  --  133* 121*   Glucose, UA   --  Negative  --   --    BUN mg/dL 15  --  16 25*   Creatinine mg/dL 0.9  --  1.0 1.3   Albumin g/dL 2.9*  --  2.7* 2.7*   Total Bilirubin mg/dL 0.7  --  0.6 0.8   Alkaline Phosphatase U/L 75  --  75 84   AST U/L 49*  --  23 24   ALT U/L 17  --  15 16   Magnesium mg/dL  --   --  1.9 2.1   Phosphorus mg/dL  --   --  4.0 3.4       Vancomycin Administrations:  vancomycin given in the last 96 hours                   vancomycin in dextrose 5 % 1 gram/250 mL IVPB 1,000 mg (mg) 1,000 mg New Bag 02/19/22 0008     1,000 mg New Bag 02/18/22 1033    vancomycin 1.5 g in dextrose 5 % 250 mL IVPB (ready to mix) (mg) 1,500 mg New Bag 02/17/22 2337    vancomycin 1.75 g in 5 % dextrose 500 mL IVPB (mg) 1,750 mg New Bag 02/17/22 1451                Microbiologic Results:  Microbiology Results (last 7 days)     Procedure Component Value Units Date/Time    Blood culture #1 **CANNOT BE ORDERED STAT** [[id number]] Collected: 02/17/22 1157    Order Status: Completed Specimen: Blood from Peripheral, Hand, Right Updated: 02/18/22 1412     Blood Culture, Routine No Growth to date      No Growth to date    Blood culture #2 **CANNOT BE ORDERED STAT** [[id number]] Collected: 02/17/22 1221    Order Status: Completed Specimen: Blood from Peripheral, Hand, Left Updated: 02/18/22 1412     Blood Culture, Routine No Growth to date      No Growth to date

## 2022-02-19 NOTE — ACP (ADVANCE CARE PLANNING)
Advance Care Planning     Date: 02/19/2022    Code Status  In light of the patients advanced and life limiting illness,I engaged the the patient in a conversation about the patient's preferences for care at the very end of life. The patient wishes to have a natural, peaceful death.  Along those lines, the patient does not wish to have CPR or other invasive treatments performed when her heart and/or breathing stops. I communicated to the patient that a DNR order would be placed in her medical record to reflect this preference.  I spent a total of 15 minutes engaging the patient in this advance care planning discussion.     This conversation was witnessed by her nurse, Mellissa.        Tavo Radford M.D.  Department of Pulmonary Medicine  [facility]        N.B.: Portions of this note was dictated using M*Modal Fluency Direct--there may be voice recognition errors occasionally missed on review.

## 2022-02-19 NOTE — PLAN OF CARE
Problem: Adult Inpatient Plan of Care  Goal: Optimal Comfort and Wellbeing  Outcome: Ongoing, Progressing     Problem: Fluid Imbalance (Pneumonia)  Goal: Fluid Balance  Outcome: Ongoing, Progressing     Problem: Infection (Pneumonia)  Goal: Resolution of Infection Signs and Symptoms  Outcome: Ongoing, Progressing     Problem: Respiratory Compromise (Pneumonia)  Goal: Effective Oxygenation and Ventilation  Outcome: Ongoing, Progressing     Problem: Skin Injury Risk Increased  Goal: Skin Health and Integrity  Outcome: Ongoing, Progressing     Problem: Fall Injury Risk  Goal: Absence of Fall and Fall-Related Injury  Outcome: Ongoing, Progressing

## 2022-02-19 NOTE — SUBJECTIVE & OBJECTIVE
Interval History/Significant Events:  Patient reports that she had a better night last night but does feel short of breath this morning which she attributes to anxiety.  Still with high oxygen requirements at comfort flow 60L95% with a non-rebreather mask over top.  I did have goals of care discussions with the patient with her nurse as witness and she did clarify DNR/DNI code status but is not comfort care.    Review of Systems   Respiratory:  Positive for shortness of breath.    Cardiovascular:  Negative for chest pain.   All other systems reviewed and are negative.  Objective:     Vital Signs (Most Recent):  Temp: 98.8 °F (37.1 °C) (02/19/22 0400)  Pulse: (!) 119 (02/19/22 0913)  Resp: (!) 41 (02/19/22 0913)  BP: 135/80 (02/19/22 0700)  SpO2: 100 % (02/19/22 0913)   Vital Signs (24h Range):  Temp:  [97.8 °F (36.6 °C)-98.8 °F (37.1 °C)] 98.8 °F (37.1 °C)  Pulse:  [106-121] 119  Resp:  [28-56] 41  SpO2:  [82 %-100 %] 100 %  BP: (104-182)/(69-94) 135/80   Weight: 83.5 kg (184 lb 1.4 oz)  Body mass index is 29.71 kg/m².      Intake/Output Summary (Last 24 hours) at 2/19/2022 1040  Last data filed at 2/19/2022 0600  Gross per 24 hour   Intake 873.17 ml   Output 1300 ml   Net -426.83 ml       Physical Exam  Vitals and nursing note reviewed.   Constitutional:       General: She is in acute distress.      Appearance: Normal appearance. She is normal weight. She is ill-appearing. She is not toxic-appearing or diaphoretic.   HENT:      Head: Normocephalic and atraumatic.      Right Ear: External ear normal.      Left Ear: External ear normal.      Nose: Nose normal.   Cardiovascular:      Comments: Mildly tachycardic but regular rhythm, no murmurs gallops, no jugular venous distention or peripheral edema  Pulmonary:      Comments: Significantly increased respiratory effort with faint bilateral crackles on BiPAP  Abdominal:      General: Abdomen is flat. Bowel sounds are normal. There is no distension.      Palpations:  Abdomen is soft.      Tenderness: There is no abdominal tenderness. There is no guarding or rebound.   Musculoskeletal:         General: No swelling. Normal range of motion.   Skin:     General: Skin is warm and dry.      Coloration: Skin is not jaundiced.   Neurological:      General: No focal deficit present.      Mental Status: She is alert and oriented to person, place, and time. Mental status is at baseline.   Psychiatric:         Mood and Affect: Mood normal.         Behavior: Behavior normal.         Thought Content: Thought content normal.         Judgment: Judgment normal.       Vents:  Oxygen Concentration (%): 95 (02/19/22 0905)  Lines/Drains/Airways       Drain  Duration             Female External Urinary Catheter 02/17/22 1750 1 day              Peripheral Intravenous Line  Duration                  Peripheral IV - Single Lumen 02/17/22 1154 20 G Right Wrist 1 day         Peripheral IV - Single Lumen 02/17/22 1216 20 G Left Hand 1 day                  Significant Labs:    CBC/Anemia Profile:  Recent Labs   Lab 02/17/22  1156 02/18/22  0354 02/19/22  0345   WBC 21.01* 18.56* 24.18*   HGB 11.5* 11.3* 12.0   HCT 37.5 35.9* 38.9    327 360   MCV 85 82 83   RDW 18.0* 17.6* 17.4*        Chemistries:  Recent Labs   Lab 02/17/22  1156 02/18/22  0354 02/19/22  0345   * 140 142   K 5.3* 4.2 3.9   CL 98 100 98   CO2 24 27 29   BUN 15 16 25*   CREATININE 0.9 1.0 1.3   CALCIUM 9.5 9.4 10.0   ALBUMIN 2.9* 2.7* 2.7*   PROT 7.4 7.0 7.4   BILITOT 0.7 0.6 0.8   ALKPHOS 75 75 84   ALT 17 15 16   AST 49* 23 24   MG  --  1.9 2.1   PHOS  --  4.0 3.4       All pertinent labs within the past 24 hours have been reviewed.    Significant Imaging:  I have reviewed all pertinent imaging results/findings within the past 24 hours.

## 2022-02-19 NOTE — PROGRESS NOTES
[facility] - Cardiac Intensive Care  Critical Care Medicine  Progress Note    Patient Name: Janeth Boyce  MRN: [id number]  Admission Date: 2/17/2022  Hospital Length of Stay: 2 days  Code Status: DNR  Attending Provider: Tavo Radford MD  Primary Care Provider: Freeman Caballero MD   Principal Problem: Acute on chronic respiratory failure with hypoxemia    Subjective:     HPI:  Janeth Boyce 61 y.o. 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 Pt reports last dose of chemo was 2/15, she states she received an increased dose this cycle, and endorses symptoms began shortly after. She reports associated chest soreness from coughing and dizziness. Denies fever, LE pain, and palpitations. Pt's daughter is at bedside, she reports prior to this week, pt was able to tolerate some ambulation without worsening SOB, but since onset is unable to tolerate. Pt is currently on 6L at home O2. Her home inhalers are Spiriva, Symbicort, PRN Albuterol, and PRN duonebs. Pt with recent admission in Dec 2021 2/2 to chemotherapy induced pneumonitis and again in Jan 2021 2/2 COVID-19 (DC 1/19). She was given remdesivir and dexamethasone therapy but did not require intubation. Pt scheduled to complete home Prednisone for Pneumonitis on 2/14. In the ED she is tachycardic with increased RR, currently on BiPAP with IPAP 15, EPAP 8 and FiO2 100% sating 92%. ED workup significant for WBC of 21.1, Lactate of 3.4, BNP of 102, Troponin elevated from baseline at 0.044, ABG 7.364/45.2/21/25.8/0. CXR showed increased consolidative opacities in the left mid to lower lung zones. She was given x2 Duonebs,125mg Methylprednisolone, and started on Vancomycin and Zosyn.         Hospital/ICU Course:  Mrs. Boyce was admitted to the MICU for acute on chronic hypoxemic  respiratory failure. She was started on lasix. Hem/Onc started her on methylprednisolone (1mg/kg) for a suspected pneumonitis.   2/19:  Patient unable to wean from comfort flow 60L95% along with a non-rebreather mask.  Had further goals of care discussions with the patient--that she has now a DNR/DNI but not comfort care.      Interval History/Significant Events:  Patient reports that she had a better night last night but does feel short of breath this morning which she attributes to anxiety.  Still with high oxygen requirements at comfort flow 60L95% with a non-rebreather mask over top.  I did have goals of care discussions with the patient with her nurse as witness and she did clarify DNR/DNI code status but is not comfort care.    Review of Systems   Respiratory:  Positive for shortness of breath.    Cardiovascular:  Negative for chest pain.   All other systems reviewed and are negative.  Objective:     Vital Signs (Most Recent):  Temp: 98.8 °F (37.1 °C) (02/19/22 0400)  Pulse: (!) 119 (02/19/22 0913)  Resp: (!) 41 (02/19/22 0913)  BP: 135/80 (02/19/22 0700)  SpO2: 100 % (02/19/22 0913)   Vital Signs (24h Range):  Temp:  [97.8 °F (36.6 °C)-98.8 °F (37.1 °C)] 98.8 °F (37.1 °C)  Pulse:  [106-121] 119  Resp:  [28-56] 41  SpO2:  [82 %-100 %] 100 %  BP: (104-182)/(69-94) 135/80   Weight: 83.5 kg (184 lb 1.4 oz)  Body mass index is 29.71 kg/m².      Intake/Output Summary (Last 24 hours) at 2/19/2022 1040  Last data filed at 2/19/2022 0600  Gross per 24 hour   Intake 873.17 ml   Output 1300 ml   Net -426.83 ml       Physical Exam  Vitals and nursing note reviewed.   Constitutional:       General: She is in acute distress.      Appearance: Normal appearance. She is normal weight. She is ill-appearing. She is not toxic-appearing or diaphoretic.   HENT:      Head: Normocephalic and atraumatic.      Right Ear: External ear normal.      Left Ear: External ear normal.      Nose: Nose normal.   Cardiovascular:      Comments:  Mildly tachycardic but regular rhythm, no murmurs gallops, no jugular venous distention or peripheral edema  Pulmonary:      Comments: Significantly increased respiratory effort with faint bilateral crackles on BiPAP  Abdominal:      General: Abdomen is flat. Bowel sounds are normal. There is no distension.      Palpations: Abdomen is soft.      Tenderness: There is no abdominal tenderness. There is no guarding or rebound.   Musculoskeletal:         General: No swelling. Normal range of motion.   Skin:     General: Skin is warm and dry.      Coloration: Skin is not jaundiced.   Neurological:      General: No focal deficit present.      Mental Status: She is alert and oriented to person, place, and time. Mental status is at baseline.   Psychiatric:         Mood and Affect: Mood normal.         Behavior: Behavior normal.         Thought Content: Thought content normal.         Judgment: Judgment normal.       Vents:  Oxygen Concentration (%): 95 (02/19/22 0905)  Lines/Drains/Airways       Drain  Duration             Female External Urinary Catheter 02/17/22 1750 1 day              Peripheral Intravenous Line  Duration                  Peripheral IV - Single Lumen 02/17/22 1154 20 G Right Wrist 1 day         Peripheral IV - Single Lumen 02/17/22 1216 20 G Left Hand 1 day                  Significant Labs:    CBC/Anemia Profile:  Recent Labs   Lab 02/17/22  1156 02/18/22  0354 02/19/22  0345   WBC 21.01* 18.56* 24.18*   HGB 11.5* 11.3* 12.0   HCT 37.5 35.9* 38.9    327 360   MCV 85 82 83   RDW 18.0* 17.6* 17.4*        Chemistries:  Recent Labs   Lab 02/17/22  1156 02/18/22  0354 02/19/22  0345   * 140 142   K 5.3* 4.2 3.9   CL 98 100 98   CO2 24 27 29   BUN 15 16 25*   CREATININE 0.9 1.0 1.3   CALCIUM 9.5 9.4 10.0   ALBUMIN 2.9* 2.7* 2.7*   PROT 7.4 7.0 7.4   BILITOT 0.7 0.6 0.8   ALKPHOS 75 75 84   ALT 17 15 16   AST 49* 23 24   MG  --  1.9 2.1   PHOS  --  4.0 3.4       All pertinent labs within the past  24 hours have been reviewed.    Significant Imaging:  I have reviewed all pertinent imaging results/findings within the past 24 hours.      ABG  Recent Labs   Lab 02/17/22  1819   PH 7.369   PO2 53   PCO2 47.7*   HCO3 27.5   BE 2     Assessment/Plan:     Pulmonary  * Acute on chronic respiratory failure with hypoxemia  Differential diagnosis includes chemotherapy-induced pneumonitis, pulmonary embolism, pneumonia, COPD exacerbation  · I feel this is less likely COPD exacerbation given limited response to topical therapy  · She has been started on methylprednisolone per Oncology due to suspected chemotherapy-induced pneumonitis  · On empiric antibiotic therapy with vancomycin and piperacillin/tazobactam  · Also on atovaquone for PJP prophylaxis  · CT-A chest pending--two unstable to undergo scanning at this time  · Bilateral lower extremity ultrasonography without evidence for DVT  · Currently on comfort flow 60L95% in addition to a non-rebreather mask  · No longer an intubation watch--she is DNR DNI code status    Hospital-acquired pneumonia  See respiratory failure    Dependence on supplemental oxygen  As addressed above    ID  Severe sepsis  This patient does have evidence of infective focus  My overall impression is sepsis. Vital signs were reviewed and noted in progress note.  Antibiotics given-   Antibiotics (From admission, onward)            Start     Stop Route Frequency Ordered    02/18/22 1100  vancomycin in dextrose 5 % 1 gram/250 mL IVPB 1,000 mg         -- IV Every 12 hours (non-standard times) 02/18/22 0852    02/17/22 2100  piperacillin-tazobactam 4.5 g in sodium chloride 0.9% 100 mL IVPB (ready to mix system)  ( Pneumonia - Moderate-High MDR )         02/22 2059 IV Every 8 hours (non-standard times) 02/17/22 1539        Cultures were taken-   Microbiology Results (last 7 days)     Procedure Component Value Units Date/Time    Blood culture #1 **CANNOT BE ORDERED STAT** [[id number]] Collected: 02/17/22  1157    Order Status: Completed Specimen: Blood from Peripheral, Hand, Right Updated: 02/18/22 1412     Blood Culture, Routine No Growth to date      No Growth to date    Blood culture #2 **CANNOT BE ORDERED STAT** [[id number]] Collected: 02/17/22 1221    Order Status: Completed Specimen: Blood from Peripheral, Hand, Left Updated: 02/18/22 1412     Blood Culture, Routine No Growth to date      No Growth to date        Latest lactate reviewed, they are-  Recent Labs   Lab 02/17/22  1156 02/17/22  1950   LACTATE 3.4* 1.2       Organ dysfunction indicated by Acute respiratory failure  Source- Hospital Acquired Pneumonia    Oncology  Secondary malignant neoplasm of brain  MRI 2/14- No significant change from prior specifically without evidence for new parenchymal signal abnormality or intracranial enhancing lesion to suggest new or worsening intracranial metastatic disease. Scattered small to punctate foci of T2 FLAIR signal hyperintensity supratentorial white matter which are nonspecific and may represent post treatment change or chronic ischemic change.    Adenocarcinoma of lung, stage 4, right  5/5/21 lung biopsy, final pathology adenocarcinoma.  2/15/22 -Most recent Keytruda infusion   Oncology consulted--appreciate input    Other  History of 2019 novel coronavirus disease (COVID-19)  COVID19 (1/13/22).    Negative COVID (2/17/22)      Goals of care, counseling/discussion  Advance Care Planning     Date: 02/19/2022    Code Status  In light of the patients advanced and life limiting illness,I engaged the the patient in a conversation about the patient's preferences for care at the very end of life. The patient wishes to have a natural, peaceful death.  Along those lines, the patient does not wish to have CPR or other invasive treatments performed when her heart and/or breathing stops. I communicated to the patient that a DNR order would be placed in her medical record to reflect this preference.  I spent a total of  15 minutes engaging the patient in this advance care planning discussion.     Her nurse was present for the conversation and witnessed that the patient wished to go with God in the event that she cannot breathe on her own or loses her pulse.  She still would like to continue treatment.          Critical Care Time: 45 minutes  Critical secondary to Patient has a condition that poses threat to life and bodily function: Severe Respiratory Distress      Critical care was time spent personally by me on the following activities: development of treatment plan with patient or surrogate and bedside caregivers, discussions with consultants, evaluation of patient's response to treatment, examination of patient, ordering and performing treatments and interventions, ordering and review of laboratory studies, ordering and review of radiographic studies, pulse oximetry, re-evaluation of patient's condition. This critical care time did not overlap with that of any other provider or involve time for any procedures.           Tavo Radford M.D.  Department of Pulmonary Medicine  [facility]        N.B.: Portions of this note was dictated using M*Modal Fluency Direct--there may be voice recognition errors occasionally missed on review.

## 2022-02-19 NOTE — ASSESSMENT & PLAN NOTE
This patient does have evidence of infective focus  My overall impression is sepsis. Vital signs were reviewed and noted in progress note.  Antibiotics given-   Antibiotics (From admission, onward)            Start     Stop Route Frequency Ordered    02/18/22 1100  vancomycin in dextrose 5 % 1 gram/250 mL IVPB 1,000 mg         -- IV Every 12 hours (non-standard times) 02/18/22 0852    02/17/22 2100  piperacillin-tazobactam 4.5 g in sodium chloride 0.9% 100 mL IVPB (ready to mix system)  ( Pneumonia - Moderate-High MDR )         02/22 2059 IV Every 8 hours (non-standard times) 02/17/22 1539        Cultures were taken-   Microbiology Results (last 7 days)     Procedure Component Value Units Date/Time    Blood culture #1 **CANNOT BE ORDERED STAT** [[id number]] Collected: 02/17/22 1157    Order Status: Completed Specimen: Blood from Peripheral, Hand, Right Updated: 02/18/22 1412     Blood Culture, Routine No Growth to date      No Growth to date    Blood culture #2 **CANNOT BE ORDERED STAT** [[id number]] Collected: 02/17/22 1221    Order Status: Completed Specimen: Blood from Peripheral, Hand, Left Updated: 02/18/22 1412     Blood Culture, Routine No Growth to date      No Growth to date        Latest lactate reviewed, they are-  Recent Labs   Lab 02/17/22  1156 02/17/22  1950   LACTATE 3.4* 1.2       Organ dysfunction indicated by Acute respiratory failure  Source- Hospital Acquired Pneumonia

## 2022-02-19 NOTE — TELEPHONE ENCOUNTER
Patient calling form inside hospital room.  Patient is located on 3rd floor at the [facility] [place] stating she needs her doctor and wants to know what is going on with her care.  Patient made aware her call could not be triaged.  Spoke with charge nurse Neris whom states she will go to patient's room.  Patient hung up the phone.     Reason for Disposition   Caller has cancelled the call before the first contact    Protocols used: NO CONTACT OR DUPLICATE CONTACT CALL-A-AH

## 2022-02-19 NOTE — ASSESSMENT & PLAN NOTE
5/5/21 lung biopsy, final pathology adenocarcinoma.  2/15/22 -Most recent [facility] infusion   Oncology consulted--appreciate input

## 2022-02-19 NOTE — ASSESSMENT & PLAN NOTE
Differential diagnosis includes chemotherapy-induced pneumonitis, pulmonary embolism, pneumonia, COPD exacerbation  · I feel this is less likely COPD exacerbation given limited response to topical therapy  · She has been started on methylprednisolone per Oncology due to suspected chemotherapy-induced pneumonitis  · On empiric antibiotic therapy with vancomycin and piperacillin/tazobactam  · Also on atovaquone for PJP prophylaxis  · CT-A chest pending--two unstable to undergo scanning at this time  · Bilateral lower extremity ultrasonography without evidence for DVT  · Currently on comfort flow 60L95% in addition to a non-rebreather mask  · No longer an intubation watch--she is DNR DNI code status

## 2022-02-20 NOTE — ASSESSMENT & PLAN NOTE
Patient with adenocarcinoma and associated brain metastasis.  She is taking keytruda as an outpatient.    - oncology on board.  recs appreciated.

## 2022-02-20 NOTE — HOSPITAL COURSE
Mrs. Boyce was admitted to the MICU for acute on chronic hypoxemic respiratory failure. She was started on lasix. Hem/Onc started her on methylprednisolone (1mg/kg) for a suspected pneumonitis.   2/19:  Patient unable to wean from comfort flow 60L95% along with a non-rebreather mask.  Had further goals of care discussions with the patient--that she has now a DNR/DNI but not comfort care.

## 2022-02-20 NOTE — SUBJECTIVE & OBJECTIVE
Interval History: Today the patient is progressively obtunded.  She is on BIPAP 100% and dependent on this.  She has many family members at bedside, given her degree of illness and poor prognosis.  They remain hopeful, but understand her degree of illness.    Objective:     Vital Signs (Most Recent):  Temp: 97.9 °F (36.6 °C) (02/20/22 1600)  Pulse: 71 (02/20/22 1600)  Resp: (!) 32 (02/20/22 1600)  BP: 113/79 (02/20/22 1600)  SpO2: 97 % (02/20/22 1600)   Vital Signs (24h Range):  Temp:  [97.4 °F (36.3 °C)-98.9 °F (37.2 °C)] 97.9 °F (36.6 °C)  Pulse:  [] 71  Resp:  [30-83] 32  SpO2:  [90 %-98 %] 97 %  BP: ()/() 113/79     Weight: 83.5 kg (184 lb 1.4 oz)  Body mass index is 29.71 kg/m².      Intake/Output Summary (Last 24 hours) at 2/20/2022 1642  Last data filed at 2/20/2022 0615  Gross per 24 hour   Intake 347.55 ml   Output 300 ml   Net 47.55 ml       Physical Exam  Constitutional:       General: She is in acute distress.      Appearance: She is ill-appearing, toxic-appearing and diaphoretic.   HENT:      Head: Normocephalic and atraumatic.      Nose: Nose normal.      Mouth/Throat:      Mouth: Mucous membranes are dry.   Eyes:      General: No scleral icterus.     Extraocular Movements: Extraocular movements intact.   Cardiovascular:      Rate and Rhythm: Regular rhythm. Tachycardia present.   Pulmonary:      Effort: Respiratory distress present.      Breath sounds: No stridor. Rhonchi and rales present.      Comments: Bilateral crackles and marked shortness of breath.  She is in respiratory extremis, and is having difficulty with ventilation.    Abdominal:      General: Abdomen is flat. Bowel sounds are normal. There is no distension.      Palpations: Abdomen is soft.      Tenderness: There is no abdominal tenderness.   Musculoskeletal:      Cervical back: Normal range of motion.      Right lower leg: No edema.      Left lower leg: No edema.   Skin:     General: Skin is warm.      Capillary  Refill: Capillary refill takes less than 2 seconds.   Neurological:      Mental Status: She is disoriented.       Vents:  Oxygen Concentration (%): 100 (02/20/22 1541)    Lines/Drains/Airways       Drain  Duration             Female External Urinary Catheter 02/17/22 1750 2 days              Peripheral Intravenous Line  Duration                  Peripheral IV - Single Lumen 02/17/22 1154 20 G Right Wrist 3 days         Peripheral IV - Single Lumen 02/17/22 1216 20 G Left Hand 3 days                    Significant Labs:    CBC/Anemia Profile:  Recent Labs   Lab 02/19/22  0345 02/20/22  0353   WBC 24.18* 18.89*   HGB 12.0 10.7*   HCT 38.9 35.3*    342   MCV 83 84   RDW 17.4* 17.2*        Chemistries:  Recent Labs   Lab 02/19/22  0345 02/20/22  0353    138   K 3.9 4.0   CL 98 97   CO2 29 29   BUN 25* 35*   CREATININE 1.3 1.0   CALCIUM 10.0 9.3   ALBUMIN 2.7* 2.3*   PROT 7.4 6.8   BILITOT 0.8 0.8   ALKPHOS 84 87   ALT 16 15   AST 24 21   MG 2.1 2.3   PHOS 3.4 3.7       All pertinent labs within the past 24 hours have been reviewed.    Significant Imaging:  I have reviewed all pertinent imaging results/findings within the past 24 hours.

## 2022-02-20 NOTE — PROGRESS NOTES
Pharmacokinetic Assessment Follow Up: IV Vancomycin    Vancomycin serum concentration assessment(s) and plan:    · Patient renal function back to baseline  · Goal serum vancomycin levels = 10-20 mcg/mL  · Previously on vancomycin 1 g q12h, stopped due to supratherapeutic level and dose held yesterday   · Vancomycin trough level that was ~22 hrs post dose resulted as 12.2 mcg/mL, which is within atherapeutic range  · Will schedule vancomycin 1000 mg q24h  · Draw next vancomycin level before 3rd dose on 2/22/22 at 1200      Drug levels (last 3 results):  Recent Labs   Lab Result Units 02/19/22  1107 02/20/22  0333   Vancomycin, Random ug/mL  --  12.2   Vancomycin-Trough ug/mL 27.6*  --        Pharmacy will continue to follow and monitor vancomycin.    Please contact pharmacy at extension [phone] for questions regarding this assessment.    Thank you for the consult,   Chelsey Keith       Patient brief summary:  Janeth Boyce is a 61 y.o. female initiated on antimicrobial therapy with IV Vancomycin for treatment of lower respiratory infection    The patient's current regimen is 1000 mg q24h    Drug Allergies:   Review of patient's allergies indicates:  No Known Allergies    Actual Body Weight:   83.5 kg    Renal Function:   Estimated Creatinine Clearance: 63.5 mL/min (based on SCr of 1 mg/dL).,     Dialysis Method (if applicable):  N/A    CBC (last 72 hours):  Recent Labs   Lab Result Units 02/17/22  1156 02/18/22  0354 02/19/22  0345 02/20/22  0353   WBC K/uL 21.01* 18.56* 24.18* 18.89*   Hemoglobin g/dL 11.5* 11.3* 12.0 10.7*   Hematocrit % 37.5 35.9* 38.9 35.3*   Platelets K/uL 320 327 360 342   Gran % % 80.2* 82.4* 83.6* 87.6*   Lymph % % 6.7* 7.5* 4.9* 5.3*   Mono % % 10.1 8.4 10.3 5.3   Eosinophil % % 0.5 0.0 0.0 0.1   Basophil % % 0.3 0.2 0.1 0.1   Differential Method  Automated Automated Automated Automated       Metabolic Panel (last 72 hours):  Recent Labs   Lab Result Units 02/17/22  1156 02/17/22  1418  02/18/22  0354 02/19/22  0345 02/20/22  0353   Sodium mmol/L 134*  --  140 142 138   Potassium mmol/L 5.3*  --  4.2 3.9 4.0   Chloride mmol/L 98  --  100 98 97   CO2 mmol/L 24  --  27 29 29   Glucose mg/dL 141*  --  133* 121* 160*   Glucose, UA   --  Negative  --   --   --    BUN mg/dL 15  --  16 25* 35*   Creatinine mg/dL 0.9  --  1.0 1.3 1.0   Albumin g/dL 2.9*  --  2.7* 2.7* 2.3*   Total Bilirubin mg/dL 0.7  --  0.6 0.8 0.8   Alkaline Phosphatase U/L 75  --  75 84 87   AST U/L 49*  --  23 24 21   ALT U/L 17  --  15 16 15   Magnesium mg/dL  --   --  1.9 2.1 2.3   Phosphorus mg/dL  --   --  4.0 3.4 3.7       Vancomycin Administrations:  vancomycin given in the last 96 hours                   vancomycin in dextrose 5 % 1 gram/250 mL IVPB 1,000 mg (mg) 1,000 mg New Bag 02/19/22 0008     1,000 mg New Bag 02/18/22 1033    vancomycin 1.5 g in dextrose 5 % 250 mL IVPB (ready to mix) (mg) 1,500 mg New Bag 02/17/22 2337    vancomycin 1.75 g in 5 % dextrose 500 mL IVPB (mg) 1,750 mg New Bag 02/17/22 1451                Microbiologic Results:  Microbiology Results (last 7 days)     Procedure Component Value Units Date/Time    Blood culture #1 **CANNOT BE ORDERED STAT** [[id number]] Collected: 02/17/22 1157    Order Status: Completed Specimen: Blood from Peripheral, Hand, Right Updated: 02/19/22 1412     Blood Culture, Routine No Growth to date      No Growth to date      No Growth to date    Blood culture #2 **CANNOT BE ORDERED STAT** [[id number]] Collected: 02/17/22 1221    Order Status: Completed Specimen: Blood from Peripheral, Hand, Left Updated: 02/19/22 1412     Blood Culture, Routine No Growth to date      No Growth to date      No Growth to date

## 2022-02-21 NOTE — ASSESSMENT & PLAN NOTE
Will continue to discuss with family.  They would like to continue medical management as currently planned, and if condition worsens, they will consider changing to comfort care.

## 2022-02-21 NOTE — ASSESSMENT & PLAN NOTE
Patient with what appears to be keytruda related lung toxicity with subsequent hypoxic/hypercarbic respiratory failure.  She is becoming progressively difficult to ventilate on BIPAP and now is becoming obtunded.  Family at bedside today and updated regarding her condition.  They understand her severity of illness, but would like to continue medical therapy.  - continue solumedrol per Onc recs  - continue broad spectrum abx  - continue atovaquone  - no DVT on US of BLE  - Patient clearly relayed she would not want intubation.  Her prognosis is very poor.  - continue home inhalers as tolerated.  Continue PRN nebs.  Less likely this is a COPD exacerbation.  - precedex and PRN morphine for anxiety and dyspnea.

## 2022-02-21 NOTE — PROGRESS NOTES
[facility]  Pulmonology  Progress Note    Patient Name: Janeth Boyce  MRN: [id number]  Admission Date: 2/17/2022  Hospital Length of Stay: 3 days  Code Status: DNR  Attending Provider: Epiafnio Meier MD  Primary Care Provider: Freeman Caballero MD   Principal Problem: Acute on chronic respiratory failure with hypoxemia    Subjective:     Interval History: Today the patient is progressively obtunded.  She is on BIPAP 100% and dependent on this.  She has many family members at bedside, given her degree of illness and poor prognosis.  They remain hopeful, but understand her degree of illness.    Objective:     Vital Signs (Most Recent):  Temp: 97.9 °F (36.6 °C) (02/20/22 1600)  Pulse: 71 (02/20/22 1600)  Resp: (!) 32 (02/20/22 1600)  BP: 113/79 (02/20/22 1600)  SpO2: 97 % (02/20/22 1600)   Vital Signs (24h Range):  Temp:  [97.4 °F (36.3 °C)-98.9 °F (37.2 °C)] 97.9 °F (36.6 °C)  Pulse:  [] 71  Resp:  [30-83] 32  SpO2:  [90 %-98 %] 97 %  BP: ()/() 113/79     Weight: 83.5 kg (184 lb 1.4 oz)  Body mass index is 29.71 kg/m².      Intake/Output Summary (Last 24 hours) at 2/20/2022 1642  Last data filed at 2/20/2022 0615  Gross per 24 hour   Intake 347.55 ml   Output 300 ml   Net 47.55 ml       Physical Exam  Constitutional:       General: She is in acute distress.      Appearance: She is ill-appearing, toxic-appearing and diaphoretic.   HENT:      Head: Normocephalic and atraumatic.      Nose: Nose normal.      Mouth/Throat:      Mouth: Mucous membranes are dry.   Eyes:      General: No scleral icterus.     Extraocular Movements: Extraocular movements intact.   Cardiovascular:      Rate and Rhythm: Regular rhythm. Tachycardia present.   Pulmonary:      Effort: Respiratory distress present.      Breath sounds: No stridor. Rhonchi and rales present.      Comments: Bilateral crackles and marked shortness of breath.  She is in respiratory extremis, and is having difficulty with  ventilation.    Abdominal:      General: Abdomen is flat. Bowel sounds are normal. There is no distension.      Palpations: Abdomen is soft.      Tenderness: There is no abdominal tenderness.   Musculoskeletal:      Cervical back: Normal range of motion.      Right lower leg: No edema.      Left lower leg: No edema.   Skin:     General: Skin is warm.      Capillary Refill: Capillary refill takes less than 2 seconds.   Neurological:      Mental Status: She is disoriented.       Vents:  Oxygen Concentration (%): 100 (02/20/22 1541)    Lines/Drains/Airways       Drain  Duration             Female External Urinary Catheter 02/17/22 1750 2 days              Peripheral Intravenous Line  Duration                  Peripheral IV - Single Lumen 02/17/22 1154 20 G Right Wrist 3 days         Peripheral IV - Single Lumen 02/17/22 1216 20 G Left Hand 3 days                    Significant Labs:    CBC/Anemia Profile:  Recent Labs   Lab 02/19/22  0345 02/20/22  0353   WBC 24.18* 18.89*   HGB 12.0 10.7*   HCT 38.9 35.3*    342   MCV 83 84   RDW 17.4* 17.2*        Chemistries:  Recent Labs   Lab 02/19/22  0345 02/20/22  0353    138   K 3.9 4.0   CL 98 97   CO2 29 29   BUN 25* 35*   CREATININE 1.3 1.0   CALCIUM 10.0 9.3   ALBUMIN 2.7* 2.3*   PROT 7.4 6.8   BILITOT 0.8 0.8   ALKPHOS 84 87   ALT 16 15   AST 24 21   MG 2.1 2.3   PHOS 3.4 3.7       All pertinent labs within the past 24 hours have been reviewed.    Significant Imaging:  I have reviewed all pertinent imaging results/findings within the past 24 hours.    Assessment/Plan:     * Acute on chronic respiratory failure with hypoxemia  Patient with what appears to be keytruda related lung toxicity with subsequent hypoxic/hypercarbic respiratory failure.  She is becoming progressively difficult to ventilate on BIPAP and now is becoming obtunded.  Family at bedside today and updated regarding her condition.  They understand her severity of illness, but would like to  continue medical therapy.  - continue solumedrol per Onc recs  - continue broad spectrum abx  - continue atovaquone  - no DVT on US of BLE  - Patient clearly relayed she would not want intubation.  Her prognosis is very poor.  - continue home inhalers as tolerated.  Continue PRN nebs.  Less likely this is a COPD exacerbation.  - precedex and PRN morphine for anxiety and dyspnea.    Severe sepsis  Continue abx as in respiratory failure    Hospital-acquired pneumonia  See respiratory failure section.    Goals of care, counseling/discussion  Will continue to discuss with family.  They would like to continue medical management as currently planned, and if condition worsens, they will consider changing to comfort care.    Secondary malignant neoplasm of brain  Oncology on board.  MRI did not show significant change from prior.    Adenocarcinoma of lung, stage 4, right  Patient with adenocarcinoma and associated brain metastasis.  She is taking keytruda as an outpatient.    - oncology on board.  recs appreciated.       Lovenox  ppi    Critical care time 50 minutes.       Epifanio Meier MD  Pulmonology  [facility]

## 2022-02-21 NOTE — ASSESSMENT & PLAN NOTE
5/5/21 lung biopsy, final pathology adenocarcinoma.  2/15/22 -Most recent [facility] infusion   Oncology consulted--appreciate input

## 2022-02-21 NOTE — ASSESSMENT & PLAN NOTE
This patient does have evidence of infective focus  My overall impression is sepsis. Vital signs were reviewed and noted in progress note.  Antibiotics given-   Antibiotics (From admission, onward)            Start     Stop Route Frequency Ordered    02/17/22 2100  piperacillin-tazobactam 4.5 g in sodium chloride 0.9% 100 mL IVPB (ready to mix system)  ( Pneumonia - Moderate-High MDR )         02/22 1459 IV Every 8 hours (non-standard times) 02/17/22 1539        Cultures were taken-   Microbiology Results (last 7 days)     Procedure Component Value Units Date/Time    Blood culture #1 **CANNOT BE ORDERED STAT** [[id number]] Collected: 02/17/22 1157    Order Status: Completed Specimen: Blood from Peripheral, Hand, Right Updated: 02/21/22 1412     Blood Culture, Routine No Growth to date      No Growth to date      No Growth to date      No Growth to date      No Growth to date    Blood culture #2 **CANNOT BE ORDERED STAT** [[id number]] Collected: 02/17/22 1221    Order Status: Completed Specimen: Blood from Peripheral, Hand, Left Updated: 02/21/22 1412     Blood Culture, Routine No Growth to date      No Growth to date      No Growth to date      No Growth to date      No Growth to date        Latest lactate reviewed, they are-  No results for input(s): LACTATE in the last 72 hours.    Organ dysfunction indicated by Acute respiratory failure  Source- Hospital Acquired Pneumonia

## 2022-02-21 NOTE — PLAN OF CARE
Per tx team, pt prognosis is poor and will not make it out of the MICU.  Pt currently on Morphine/Ativan.       02/21/22 1503   Post-Acute Status   Post-Acute Authorization Hospice   Hospice Status Pending medical clearance/testing   Coverage Medicaid - [employer] ([facility], [place])   Discharge Delays None known at this time   Discharge Plan   Discharge Plan A Inpatient Hospice     Dario Robison LMSW  [facility]  [phone]

## 2022-02-21 NOTE — PROGRESS NOTES
Pharmacokinetic Sign-off: IV Vancomycin    Therapy with vancomycin complete and/or consult discontinued by provider.  Pharmacy will sign off, please re-consult as needed.    Hedy Torres, PharmD, BCPS  EXT [phone]

## 2022-02-21 NOTE — PLAN OF CARE
[facility]  Discharge Reassessment    Primary Care Provider: Freeman Caballero MD    Expected Discharge Date: 2/24/2022    Reassessment (most recent)       Discharge Reassessment - 02/21/22 1652          Discharge Reassessment    Assessment Type Discharge Planning Reassessment (P)      Did the patient's condition or plan change since previous assessment? No (P)      Discharge Plan discussed with: -- (P)    family    Communicated TIFFANIE with patient/caregiver Yes (P)      Discharge Plan A Home with family;Home Health (P)    STAT [facility]    Discharge Plan B Home (P)      DME Needed Upon Discharge  other (see comments) (P)    TBD    Discharge Barriers Identified None (P)      Why the patient remains in the hospital Requires continued medical care (P)         Post-Acute Status    Discharge Delays None known at this time (P)                    Ashanti Neri LMSW  Case Management Social Worker   [facility] [address]

## 2022-02-21 NOTE — SUBJECTIVE & OBJECTIVE
Interval History/Significant Events: Worsening respiratory status overnight. IV medications to ease suffering initiated today    Review of Systems   Unable to perform ROS: Acuity of condition   Objective:     Vital Signs (Most Recent):  Temp: 97.4 °F (36.3 °C) (02/20/22 2330)  Pulse: 83 (02/21/22 1415)  Resp: (!) 33 (02/21/22 1415)  BP: 101/65 (02/21/22 1400)  SpO2: (!) 90 % (02/21/22 1415)   Vital Signs (24h Range):  Temp:  [97.4 °F (36.3 °C)-97.9 °F (36.6 °C)] 97.4 °F (36.3 °C)  Pulse:  [] 83  Resp:  [29-84] 33  SpO2:  [90 %-99 %] 90 %  BP: (101-160)/(65-87) 101/65   Weight: 83.5 kg (184 lb 1.4 oz)  Body mass index is 29.71 kg/m².      Intake/Output Summary (Last 24 hours) at 2/21/2022 1442  Last data filed at 2/21/2022 0522  Gross per 24 hour   Intake 1076.45 ml   Output 150 ml   Net 926.45 ml       Physical Exam  Vitals and nursing note reviewed.   Constitutional:       General: She is in acute distress.      Appearance: She is well-developed. She is ill-appearing.   HENT:      Head: Normocephalic.      Mouth/Throat:      Mouth: Mucous membranes are dry.   Eyes:      Pupils: Pupils are equal, round, and reactive to light.   Cardiovascular:      Rate and Rhythm: Regular rhythm. Tachycardia present.      Pulses: Normal pulses.   Pulmonary:      Effort: Pulmonary effort is normal. Tachypnea present.   Abdominal:      General: Bowel sounds are normal. There is no distension.      Palpations: Abdomen is soft.      Tenderness: There is no abdominal tenderness.   Musculoskeletal:         General: No swelling.   Skin:     General: Skin is warm and dry.   Neurological:      Mental Status: She is lethargic.      GCS: GCS eye subscore is 3. GCS verbal subscore is 1. GCS motor subscore is 4.   Psychiatric:         Mood and Affect: Mood normal.       Vents:  Oxygen Concentration (%): 100 (02/21/22 1111)  Lines/Drains/Airways       Drain  Duration             Female External Urinary Catheter 02/17/22 1750 3 days               Peripheral Intravenous Line  Duration                  Peripheral IV - Single Lumen 02/17/22 1154 20 G Right Wrist 4 days         Peripheral IV - Single Lumen 02/17/22 1216 20 G Left Hand 4 days                  Significant Labs:    CBC/Anemia Profile:  Recent Labs   Lab 02/20/22  0353 02/21/22  0434   WBC 18.89* 20.62*   HGB 10.7* 10.2*   HCT 35.3* 33.9*    328   MCV 84 84   RDW 17.2* 16.9*        Chemistries:  Recent Labs   Lab 02/20/22  0353 02/21/22  0434    145   K 4.0 3.6   CL 97 105   CO2 29 26   BUN 35* 38*   CREATININE 1.0 0.9   CALCIUM 9.3 9.3   ALBUMIN 2.3* 2.2*   PROT 6.8 6.4   BILITOT 0.8 0.5   ALKPHOS 87 83   ALT 15 13   AST 21 17   MG 2.3 2.3   PHOS 3.7 2.6*       All pertinent labs within the past 24 hours have been reviewed.    Significant Imaging:  I have reviewed all pertinent imaging results/findings within the past 24 hours.

## 2022-02-21 NOTE — NURSING
No significant events overnight. Morphine x3, Ativan x1. Precedx at 0.8.  talked with family about situation overnight.

## 2022-02-21 NOTE — PROGRESS NOTES
[facility]  Critical Care Medicine  Progress Note    Patient Name: Janeth Boyce  MRN: [id number]  Admission Date: 2/17/2022  Hospital Length of Stay: 4 days  Code Status: DNR  Attending Provider: Epifanio Meier MD  Primary Care Provider: Freeman Caballero MD   Principal Problem: Acute on chronic respiratory failure with hypoxemia    Subjective:     HPI:  Janeth Boyce 61 y.o. 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 Pt reports last dose of chemo was 2/15, she states she received an increased dose this cycle, and endorses symptoms began shortly after. She reports associated chest soreness from coughing and dizziness. Denies fever, LE pain, and palpitations. Pt's daughter is at bedside, she reports prior to this week, pt was able to tolerate some ambulation without worsening SOB, but since onset is unable to tolerate. Pt is currently on 6L at home O2. Her home inhalers are Spiriva, Symbicort, PRN Albuterol, and PRN duonebs. Pt with recent admission in Dec 2021 2/2 to chemotherapy induced pneumonitis and again in Jan 2021 2/2 COVID-19 (DC 1/19). She was given remdesivir and dexamethasone therapy but did not require intubation. Pt scheduled to complete home Prednisone for Pneumonitis on 2/14. In the ED she is tachycardic with increased RR, currently on BiPAP with IPAP 15, EPAP 8 and FiO2 100% sating 92%. ED workup significant for WBC of 21.1, Lactate of 3.4, BNP of 102, Troponin elevated from baseline at 0.044, ABG 7.364/45.2/21/25.8/0. CXR showed increased consolidative opacities in the left mid to lower lung zones. She was given x2 Duonebs,125mg Methylprednisolone, and started on Vancomycin and Zosyn.         Hospital/ICU Course:  Mrs. Boyce was admitted to the MICU for acute on chronic hypoxemic  respiratory failure. She was started on lasix. Hem/Onc started her on methylprednisolone (1mg/kg) for a suspected pneumonitis.   2/19:  Patient unable to wean from comfort flow 60L95% along with a non-rebreather mask.  Had further goals of care discussions with the patient--that she has now a DNR/DNI but not comfort care.2/21. Placed on Morphine and Ativan to assist with tachypnea and air hunger. BiPAP continues. Family informed that the patient is dying and will likely not survive for more than a few hours to days with current therapy/      Interval History/Significant Events: Worsening respiratory status overnight. IV medications to ease suffering initiated today    Review of Systems   Unable to perform ROS: Acuity of condition   Objective:     Vital Signs (Most Recent):  Temp: 97.4 °F (36.3 °C) (02/20/22 2330)  Pulse: 83 (02/21/22 1415)  Resp: (!) 33 (02/21/22 1415)  BP: 101/65 (02/21/22 1400)  SpO2: (!) 90 % (02/21/22 1415)   Vital Signs (24h Range):  Temp:  [97.4 °F (36.3 °C)-97.9 °F (36.6 °C)] 97.4 °F (36.3 °C)  Pulse:  [] 83  Resp:  [29-84] 33  SpO2:  [90 %-99 %] 90 %  BP: (101-160)/(65-87) 101/65   Weight: 83.5 kg (184 lb 1.4 oz)  Body mass index is 29.71 kg/m².      Intake/Output Summary (Last 24 hours) at 2/21/2022 1442  Last data filed at 2/21/2022 0522  Gross per 24 hour   Intake 1076.45 ml   Output 150 ml   Net 926.45 ml       Physical Exam  Vitals and nursing note reviewed.   Constitutional:       General: She is in acute distress.      Appearance: She is well-developed. She is ill-appearing.   HENT:      Head: Normocephalic.      Mouth/Throat:      Mouth: Mucous membranes are dry.   Eyes:      Pupils: Pupils are equal, round, and reactive to light.   Cardiovascular:      Rate and Rhythm: Regular rhythm. Tachycardia present.      Pulses: Normal pulses.   Pulmonary:      Effort: Pulmonary effort is normal. Tachypnea present.   Abdominal:      General: Bowel sounds are normal. There is no  distension.      Palpations: Abdomen is soft.      Tenderness: There is no abdominal tenderness.   Musculoskeletal:         General: No swelling.   Skin:     General: Skin is warm and dry.   Neurological:      Mental Status: She is lethargic.      GCS: GCS eye subscore is 3. GCS verbal subscore is 1. GCS motor subscore is 4.   Psychiatric:         Mood and Affect: Mood normal.       Vents:  Oxygen Concentration (%): 100 (02/21/22 1111)  Lines/Drains/Airways       Drain  Duration             Female External Urinary Catheter 02/17/22 1750 3 days              Peripheral Intravenous Line  Duration                  Peripheral IV - Single Lumen 02/17/22 1154 20 G Right Wrist 4 days         Peripheral IV - Single Lumen 02/17/22 1216 20 G Left Hand 4 days                  Significant Labs:    CBC/Anemia Profile:  Recent Labs   Lab 02/20/22  0353 02/21/22  0434   WBC 18.89* 20.62*   HGB 10.7* 10.2*   HCT 35.3* 33.9*    328   MCV 84 84   RDW 17.2* 16.9*        Chemistries:  Recent Labs   Lab 02/20/22  0353 02/21/22  0434    145   K 4.0 3.6   CL 97 105   CO2 29 26   BUN 35* 38*   CREATININE 1.0 0.9   CALCIUM 9.3 9.3   ALBUMIN 2.3* 2.2*   PROT 6.8 6.4   BILITOT 0.8 0.5   ALKPHOS 87 83   ALT 15 13   AST 21 17   MG 2.3 2.3   PHOS 3.7 2.6*       All pertinent labs within the past 24 hours have been reviewed.    Significant Imaging:  I have reviewed all pertinent imaging results/findings within the past 24 hours.      ABG  Recent Labs   Lab 02/17/22  1819   PH 7.369   PO2 53   PCO2 47.7*   HCO3 27.5   BE 2     Assessment/Plan:     Pulmonary  * Acute on chronic respiratory failure with hypoxemia  Differential diagnosis includes chemotherapy-induced pneumonitis, pulmonary embolism, pneumonia, COPD exacerbation  · I feel this is less likely COPD exacerbation given limited response to topical therapy  · She has been started on methylprednisolone per Oncology due to suspected chemotherapy-induced pneumonitis  · On empiric  antibiotic therapy with vancomycin and piperacillin/tazobactam  · Also on atovaquone for PJP prophylaxis  · CT-A chest pending--two unstable to undergo scanning at this time  · Bilateral lower extremity ultrasonography without evidence for DVT  · Currently on comfort flow 60L95% in addition to a non-rebreather mask  · No longer an intubation watch--she is DNR DNI code status    Hospital-acquired pneumonia  See respiratory failure    Dependence on supplemental oxygen  As addressed above    ID  Severe sepsis  This patient does have evidence of infective focus  My overall impression is sepsis. Vital signs were reviewed and noted in progress note.  Antibiotics given-   Antibiotics (From admission, onward)            Start     Stop Route Frequency Ordered    02/17/22 2100  piperacillin-tazobactam 4.5 g in sodium chloride 0.9% 100 mL IVPB (ready to mix system)  ( Pneumonia - Moderate-High MDR )         02/22 1459 IV Every 8 hours (non-standard times) 02/17/22 1539        Cultures were taken-   Microbiology Results (last 7 days)     Procedure Component Value Units Date/Time    Blood culture #1 **CANNOT BE ORDERED STAT** [[id number]] Collected: 02/17/22 1157    Order Status: Completed Specimen: Blood from Peripheral, Hand, Right Updated: 02/21/22 1412     Blood Culture, Routine No Growth to date      No Growth to date      No Growth to date      No Growth to date      No Growth to date    Blood culture #2 **CANNOT BE ORDERED STAT** [[id number]] Collected: 02/17/22 1221    Order Status: Completed Specimen: Blood from Peripheral, Hand, Left Updated: 02/21/22 1412     Blood Culture, Routine No Growth to date      No Growth to date      No Growth to date      No Growth to date      No Growth to date        Latest lactate reviewed, they are-  No results for input(s): LACTATE in the last 72 hours.    Organ dysfunction indicated by Acute respiratory failure  Source- Hospital Acquired Pneumonia    Oncology  Secondary malignant  neoplasm of brain  MRI 2/14- No significant change from prior specifically without evidence for new parenchymal signal abnormality or intracranial enhancing lesion to suggest new or worsening intracranial metastatic disease. Scattered small to punctate foci of T2 FLAIR signal hyperintensity supratentorial white matter which are nonspecific and may represent post treatment change or chronic ischemic change.    Adenocarcinoma of lung, stage 4, right  5/5/21 lung biopsy, final pathology adenocarcinoma.  2/15/22 -Most recent Keytruda infusion   Oncology consulted--appreciate input    Other  History of 2019 novel coronavirus disease (COVID-19)  COVID19 (1/13/22).    Negative COVID (2/17/22)      Goals of care, counseling/discussion  Advance Care Planning     Date: 02/19/2022    Code Status  In light of the patients advanced and life limiting illness,I engaged the the patient in a conversation about the patient's preferences for care at the very end of life. The patient wishes to have a natural, peaceful death.  Along those lines, the patient does not wish to have CPR or other invasive treatments performed when her heart and/or breathing stops. I communicated to the patient that a DNR order would be placed in her medical record to reflect this preference.  I spent a total of 15 minutes engaging the patient in this advance care planning discussion.     Her nurse was present for the conversation and witnessed that the patient wished to go with God in the event that she cannot breathe on her own or loses her pulse.  She still would like to continue treatment.      Family at bedside. Made aware that the patient was dying and is unlikely to survive more than a few days at most with the existing therapies    Critical Care Time: 65 minutes  Critical secondary to Patient has a condition that poses threat to life and bodily function: Severe Respiratory Distress      Critical care was time spent personally by me on the following  activities: development of treatment plan with patient or surrogate and bedside caregivers, discussions with consultants, evaluation of patient's response to treatment, examination of patient, ordering and performing treatments and interventions, ordering and review of laboratory studies, ordering and review of radiographic studies, pulse oximetry, re-evaluation of patient's condition. This critical care time did not overlap with that of any other provider or involve time for any procedures.     Fiona Winterbottom, APRN, CNS  Critical Care Medicine  [facility]

## 2022-02-22 NOTE — ASSESSMENT & PLAN NOTE
Clinical condition has worsened.  Reviewed imaging with family at bedside and explained my concern for immunotherapy related pneumonitis.  Her recent COVID19 infection in January 2022 may have made her more susceptible to this adverse outcome.  Further explained that it would take a long time for her to recover and unfortunately she is running out of energy to keep up with the required work of breathing.  Furthermore, I explained that if she were to be intubated, I do not think she would be able to get off of the ventilator.  -daughter at bedside understands and is agreeable with comfort measures.  They are waiting for other family members to arrive before saying goodbye.  -please notify me through epic chat when other family members arrive.

## 2022-02-22 NOTE — PROGRESS NOTES
[facility] - Cardiac Intensive [facility]  Critical Care Medicine  Progress Note    Patient Name: Janeth Boyce  MRN: [id number]  Admission Date: 2/17/2022  Hospital Length of Stay: 5 days  Code Status: DNR  Attending Provider: Epifanio Meier MD  Primary Care Provider: Freeman Caballero MD   Principal Problem: Acute on chronic respiratory failure with hypoxemia    Subjective:     HPI:  Janeth Boyce 61 y.o. 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 Pt reports last dose of chemo was 2/15, she states she received an increased dose this cycle, and endorses symptoms began shortly after. She reports associated chest soreness from coughing and dizziness. Denies fever, LE pain, and palpitations. Pt's daughter is at bedside, she reports prior to this week, pt was able to tolerate some ambulation without worsening SOB, but since onset is unable to tolerate. Pt is currently on 6L at home O2. Her home inhalers are Spiriva, Symbicort, PRN Albuterol, and PRN duonebs. Pt with recent admission in Dec 2021 2/2 to chemotherapy induced pneumonitis and again in Jan 2021 2/2 COVID-19 (DC 1/19). She was given remdesivir and dexamethasone therapy but did not require intubation. Pt scheduled to complete home Prednisone for Pneumonitis on 2/14. In the ED she is tachycardic with increased RR, currently on BiPAP with IPAP 15, EPAP 8 and FiO2 100% sating 92%. ED workup significant for WBC of 21.1, Lactate of 3.4, BNP of 102, Troponin elevated from baseline at 0.044, ABG 7.364/45.2/21/25.8/0. CXR showed increased consolidative opacities in the left mid to lower lung zones. She was given x2 Duonebs,125mg Methylprednisolone, and started on Vancomycin and Zosyn.         Hospital/ICU Course:  Mrs. Boyce was admitted to the MICU for acute on chronic hypoxemic  respiratory failure. She was started on lasix. Hem/Onc started her on methylprednisolone (1mg/kg) for a suspected pneumonitis.   2/19:  Patient unable to wean from comfort flow 60L95% along with a non-rebreather mask.  Had further goals of care discussions with the patient--that she has now a DNR/DNI but not comfort care.2/21. Placed on Morphine and Ativan to assist with tachypnea and air hunger. BiPAP continues. Family informed that the patient is dying and will likely not survive for more than a few hours to days with current therapy. Family amenable to transitioning to full comfort measures on 2/22. Bipap discontinued.       Interval History/Significant Events: Transitioning to full comfort measures. Bipap discontinued. On morphine and ativan infusion.     Review of Systems   Unable to perform ROS: Mental status change   Objective:     Vital Signs (Most Recent):  Temp: 100.3 °F (37.9 °C) (02/22/22 0701)  Pulse: (!) 124 (02/22/22 1248)  Resp: (!) 29 (02/22/22 0930)  BP: 109/70 (02/22/22 1000)  SpO2: (!) 81 % (02/22/22 1248)   Vital Signs (24h Range):  Temp:  [68 °F (20 °C)-100.3 °F (37.9 °C)] 100.3 °F (37.9 °C)  Pulse:  [] 124  Resp:  [20-60] 29  SpO2:  [80 %-96 %] 81 %  BP: ()/(57-83) 109/70   Weight: 83.5 kg (184 lb 1.4 oz)  Body mass index is 29.71 kg/m².      Intake/Output Summary (Last 24 hours) at 2/22/2022 1419  Last data filed at 2/22/2022 1300  Gross per 24 hour   Intake 1671.01 ml   Output 150 ml   Net 1521.01 ml       Physical Exam  Vitals and nursing note reviewed.   Constitutional:       General: She is in acute distress.      Appearance: She is well-developed. She is ill-appearing.   HENT:      Head: Normocephalic.      Mouth/Throat:      Mouth: Mucous membranes are dry.   Eyes:      Pupils: Pupils are equal, round, and reactive to light.   Cardiovascular:      Rate and Rhythm: Regular rhythm. Tachycardia present.      Pulses: Normal pulses.   Pulmonary:      Effort: Pulmonary effort is  normal. Tachypnea present.   Abdominal:      General: Bowel sounds are normal. There is no distension.      Palpations: Abdomen is soft.      Tenderness: There is no abdominal tenderness.   Musculoskeletal:         General: No swelling.   Skin:     General: Skin is warm and dry.   Neurological:      Mental Status: She is lethargic.      GCS: GCS eye subscore is 3. GCS verbal subscore is 1. GCS motor subscore is 4.   Psychiatric:         Mood and Affect: Mood normal.       Vents:  Oxygen Concentration (%): 100 (02/22/22 1248)  Lines/Drains/Airways       Drain  Duration             Female External Urinary Catheter 02/17/22 1750 4 days              Peripheral Intravenous Line  Duration                  Peripheral IV - Single Lumen 02/17/22 1154 20 G Right Wrist 5 days         Peripheral IV - Single Lumen 02/17/22 1216 20 G Left Hand 5 days                  Significant Labs:    CBC/Anemia Profile:  Recent Labs   Lab 02/21/22  0434 02/22/22  0413   WBC 20.62* 22.32*   HGB 10.2* 10.5*   HCT 33.9* 35.8*    347   MCV 84 87   RDW 16.9* 17.4*        Chemistries:  Recent Labs   Lab 02/21/22  0434 02/22/22  0413    152*   K 3.6 4.3    110   CO2 26 28   BUN 38* 53*   CREATININE 0.9 1.5*   CALCIUM 9.3 9.5   ALBUMIN 2.2* 2.3*   PROT 6.4 6.7   BILITOT 0.5 0.6   ALKPHOS 83 89   ALT 13 15   AST 17 23   MG 2.3 2.8*   PHOS 2.6* 4.4       All pertinent labs within the past 24 hours have been reviewed.    Significant Imaging:  I have reviewed all pertinent imaging results/findings within the past 24 hours.      ABG  Recent Labs   Lab 02/17/22  1819   PH 7.369   PO2 53   PCO2 47.7*   HCO3 27.5   BE 2     Assessment/Plan:     Pulmonary  * Acute on chronic respiratory failure with hypoxemia  Differential diagnosis includes chemotherapy-induced pneumonitis, pulmonary embolism, pneumonia, COPD exacerbation  · I feel this is less likely COPD exacerbation given limited response to topical therapy  · She has been started on  methylprednisolone per Oncology due to suspected chemotherapy-induced pneumonitis  · On empiric antibiotic therapy with vancomycin and piperacillin/tazobactam  · Also on atovaquone for PJP prophylaxis  · CT-A chest pending--two unstable to undergo scanning at this time  · Bilateral lower extremity ultrasonography without evidence for DVT  · Currently on comfort flow 60L95% in addition to a non-rebreather mask  · No longer an intubation watch--she is DNR DNI code status  · Transitioning to comfort measures. All therapies discontinued    Hospital-acquired pneumonia  See respiratory failure    Dependence on supplemental oxygen  As addressed above    ID  Severe sepsis  This patient does have evidence of infective focus  My overall impression is sepsis. Vital signs were reviewed and noted in progress note.  Antibiotics given-   Antibiotics (From admission, onward)            None        Cultures were taken-   Microbiology Results (last 7 days)     Procedure Component Value Units Date/Time    Blood culture #1 **CANNOT BE ORDERED STAT** [[id number]] Collected: 02/17/22 1157    Order Status: Completed Specimen: Blood from Peripheral, Hand, Right Updated: 02/22/22 1412     Blood Culture, Routine No growth after 5 days.    Blood culture #2 **CANNOT BE ORDERED STAT** [[id number]] Collected: 02/17/22 1221    Order Status: Completed Specimen: Blood from Peripheral, Hand, Left Updated: 02/22/22 1412     Blood Culture, Routine No growth after 5 days.        Latest lactate reviewed, they are-  No results for input(s): LACTATE in the last 72 hours.    Organ dysfunction indicated by Acute respiratory failure  Source- Hospital Acquired Pneumonia    Transitioning to comfort measures only.     Oncology  Secondary malignant neoplasm of brain  MRI 2/14- No significant change from prior specifically without evidence for new parenchymal signal abnormality or intracranial enhancing lesion to suggest new or worsening intracranial metastatic  disease. Scattered small to punctate foci of T2 FLAIR signal hyperintensity supratentorial white matter which are nonspecific and may represent post treatment change or chronic ischemic change.    Adenocarcinoma of lung, stage 4, right  5/5/21 lung biopsy, final pathology adenocarcinoma.  2/15/22 -Most recent Keytruda infusion   Oncology following    Other  History of 2019 novel coronavirus disease (COVID-19)  COVID19 (1/13/22).    Negative COVID (2/17/22)      Goals of care, counseling/discussion  Advance Care Planning     Date: 02/19/2022    Code Status  In light of the patients advanced and life limiting illness,I engaged the the patient in a conversation about the patient's preferences for care at the very end of life. The patient wishes to have a natural, peaceful death.  Along those lines, the patient does not wish to have CPR or other invasive treatments performed when her heart and/or breathing stops. I communicated to the patient that a DNR order would be placed in her medical record to reflect this preference.  I spent a total of 15 minutes engaging the patient in this advance care planning discussion.     Her nurse was present for the conversation and witnessed that the patient wished to go with God in the event that she cannot breathe on her own or loses her pulse.  She still would like to continue treatment.    On 2/22, further GOC discussion held with daughter, Kell. Plans to transition to full comfort measures. Morphine and ativan infusions started. Bipap discontinued.       Discussed with Dr. Meier. Daughter updated at the bedside.     Critical Care Time: 40 minutes  Critical secondary to Patient has a condition that poses threat to life and bodily function: respiratory failure on continuous bipap.       Critical care was time spent personally by me on the following activities: development of treatment plan with patient or surrogate and bedside caregivers, discussions with consultants, evaluation  of patient's response to treatment, examination of patient, ordering and performing treatments and interventions, ordering and review of laboratory studies, ordering and review of radiographic studies, pulse oximetry, re-evaluation of patient's condition. This critical care time did not overlap with that of any other provider or involve time for any procedures.     Fe Mccullough PA-C  Critical Care Medicine  [facility] - [facility]

## 2022-02-22 NOTE — PROGRESS NOTES
Oncology Social Worker informed by Oncology Navigator Estephanie Jiménez RN, that patient is hospitalized. Visited patient and her family bedside this afternoon with Estephanie.  Patient is not responsive; receiving comfort care measures. DNR code status and plan is for withdrawal of bipap machine. Family all coping appropriately at the time. Answered daughter's questions about how to arrange for her oxygen equipment to be picked up by [employer]. Advised her to call me as needed.

## 2022-02-22 NOTE — SUBJECTIVE & OBJECTIVE
Interval History: still on bipap but oxygen saturation is worse, now in the low 80s.  Patient is sedated and unable to answer questions.  Family is at bedside.    Oncology Treatment Plan:   OP PEMBROLIZUMAB 200MG Q3W (C22-24 Q6W)    Medications:  Continuous Infusions:   LORazepam (ATIVAN) infusion (titrating) 2 mg/hr (02/22/22 1137)    morphine 3 mg/hr (02/22/22 1138)     Scheduled Meds:  PRN Meds:lorazepam, mophine, sodium chloride 0.9%     Review of Systems   Unable to perform ROS: Acuity of condition   Objective:     Vital Signs (Most Recent):  Temp: 100.3 °F (37.9 °C) (02/22/22 0701)  Pulse: (!) 118 (02/22/22 1045)  Resp: (!) 29 (02/22/22 0930)  BP: 109/70 (02/22/22 1000)  SpO2: (!) 84 % (02/22/22 1045)   Vital Signs (24h Range):  Temp:  [68 °F (20 °C)-100.3 °F (37.9 °C)] 100.3 °F (37.9 °C)  Pulse:  [] 118  Resp:  [20-84] 29  SpO2:  [80 %-96 %] 84 %  BP: ()/(57-83) 109/70     Weight: 83.5 kg (184 lb 1.4 oz)  Body mass index is 29.71 kg/m².  Body surface area is 1.97 meters squared.      Intake/Output Summary (Last 24 hours) at 2/22/2022 1222  Last data filed at 2/22/2022 1138  Gross per 24 hour   Intake 1666.18 ml   Output 150 ml   Net 1516.18 ml       Physical Exam  Vitals and nursing note reviewed.   Constitutional:       General: She is in acute distress.      Appearance: She is well-developed. She is ill-appearing.   HENT:      Head: Normocephalic.      Mouth/Throat:      Mouth: Mucous membranes are dry.   Eyes:      Pupils: Pupils are equal, round, and reactive to light.   Cardiovascular:      Rate and Rhythm: Regular rhythm. Tachycardia present.      Pulses: Normal pulses.   Pulmonary:      Effort: Tachypnea and respiratory distress present.   Abdominal:      General: Bowel sounds are normal. There is no distension.      Palpations: Abdomen is soft.      Tenderness: There is no abdominal tenderness.   Musculoskeletal:         General: No swelling.   Skin:     General: Skin is warm and dry.    Neurological:      Mental Status: She is lethargic.   Psychiatric:         Mood and Affect: Mood normal.       Significant Labs:   CBC:   Recent Labs   Lab 02/21/22  0434 02/22/22 0413   WBC 20.62* 22.32*   HGB 10.2* 10.5*   HCT 33.9* 35.8*    347    and CMP:   Recent Labs   Lab 02/21/22  0434 02/22/22 0413    152*   K 3.6 4.3    110   CO2 26 28   * 123*   BUN 38* 53*   CREATININE 0.9 1.5*   CALCIUM 9.3 9.5   PROT 6.4 6.7   ALBUMIN 2.2* 2.3*   BILITOT 0.5 0.6   ALKPHOS 83 89   AST 17 23   ALT 13 15   ANIONGAP 14 14   EGFRNONAA >60.0 37.1*       Diagnostic Results:  I have reviewed all pertinent imaging results/findings within the past 24 hours.  CTA Chest Non-Coronary (PE Study)    Result Date: 2/18/2022  EXAMINATION: CTA CHEST NON CORONARY CLINICAL HISTORY: Pulmonary embolism (PE) suspected, high prob; TECHNIQUE: Low dose axial images, sagittal and coronal reformations were obtained from the thoracic inlet to the lung bases following the IV administration of 100 mL of Omnipaque 350.  MIP images were performed. COMPARISON: CTA chest 01/13/2022, 12/26/2021 FINDINGS: The vascular and soft tissues structures at the base of the neck are unremarkable. There is a left-sided aortic arch with 2 branch vessels with common origin of the right brachiocephalic left common carotid arteries.  The aorta is normal in caliber, contour, and course without significant calcific atherosclerosis. There is no cardiac enlargement or pericardial fluid.  There is no calcific atherosclerosis of the coronary arteries. Enlarging mediastinal and perihilar lymph nodes, for example the prevascular lymph node measuring 1.2 cm (series 2, image 103) previously 0.7 cm. Stable dilatation of pulmonary trunk to 3.1 cm. There is redemonstration of the abrupt nonvisualization of the right upper lobar pulmonary artery.  Although this was also observed on the prior exam, this has progressed since more remote examinations as the  vessel was at least partially opacifying recently as 09/26/2021.  It is unclear whether this is related to post treatment fibrosis rather than recurrent tumor. The left upper lobe bronchus is not completely obstructed, and there is atelectasis of the left upper lobe.  The etiology of this is unclear, although there may be a large mucous plug.  However, on the prior examination, note was made of enlarged adjacent lymph nodes.  Therefore, tumor invasion is not excluded. Bilateral ground-glass opacities are increased in radiodensity in extent. Again noted is volume loss of the right upper lobe, presumed related to post treatment changes. There is newly seen, small volume amount gas anterior to the right lung; the gas is possibly extrapleural. The esophagus is normal in caliber and contour. Moderate-sized hiatal hernia. The osseous structures demonstrate no destructive lesions.     1.  No acute pulmonary thromboembolism. 2.  Redemonstration of abrupt nonvisualization of the right pulmonary artery to the upper lobe, stable from prior examination, but progressed since 09/26/2021 (at which time it was still partially opacified).  Unclear whether related to post treatment radiation fibrosis versus tumor invasion. 3.  Left upper lobe collapse distal to left upper lobe bronchial obstruction; possibly a mucous plug, but cannot exclude tumor invasion from adjacent enlarged previously described lymph nodes. 4.  New right-sided extrapulmonary gas, possibly extrapleural.  Consider upright versus left lateral decubitus expiratory Xray for confirmation. 5.  Worsened bilateral ground-glass opacification bilaterally, which may be due to infection, inflammation, or edema.  Small left pleural effusion. 6.  Additional findings above. Electronically signed by resident: Grant Valenzuela Date:    02/18/2022 Time:    10:12 Electronically signed by: Nemo Quiroz Date:    02/18/2022 Time:    13:13    MRI Brain W WO Contrast    Result Date:  2/14/2022  EXAMINATION: MRI BRAIN W WO CONTRAST CLINICAL HISTORY: metastatic lung cancer, surveillance; Malignant neoplasm of unspecified part of right bronchus or lung TECHNIQUE: Sagittal and axial T1, axial T2, axial FLAIR, axial gradient, axial diffusion imaging of the whole brain pre-contrast with postcontrast axial T1 and axial spoiled gradient imaging reformatted in the coronal and sagittal planes.. 8 ml of Gadavist injected intravenously. COMPARISON: 09/22/2021 FINDINGS: Numerous scattered small to punctate size foci of T2 FLAIR signal hyperintensity throughout the brain parenchyma primarily within the supratentorial white matter again seen.  There is no corresponding diffusion signal or enhancement.  This is nonspecific and may represent sequela of chronic microvascular ischemic change.  There is no midline shift or significant mass effect.  Continued prominent anterior left paramedian falcine calcification.  There is no new abnormal parenchymal susceptibility to suggest parenchymal hemorrhage, there is stable punctate susceptibility within the right cerebellum at site of previous lesion suggestive for treated metastases with calcification or remote hemorrhage..  Major intracranial T2 flow voids are present.  Question partially empty sella.     No significant change from prior specifically without evidence for new parenchymal signal abnormality or intracranial enhancing lesion to suggest new or worsening intracranial metastatic disease. Scattered small to punctate foci of T2 FLAIR signal hyperintensity supratentorial white matter which are nonspecific and may represent post treatment change or chronic ischemic change. Clinical correlation and continued follow-up advised. Electronically signed by: Luisito Donohue DO Date:    02/14/2022 Time:    15:13    X-Ray Chest AP Portable    Result Date: 2/17/2022  EXAMINATION: XR CHEST AP PORTABLE CLINICAL HISTORY: dyspnea; TECHNIQUE: Single frontal view of the chest was  performed. COMPARISON: 01/13/2022 FINDINGS: Stable right hemithoracic volume loss associated with dense opacification of the medial aspect of the right medial upper lung zone, better seen on CT 01/13/2022. Interval increase in left mid to lung zones patchy consolidations.  Right mid to lower lung zones opacities are stable. The mediastinal contents again noted to be shifted to the right. No other significant change.     Increased consolidative opacities in the left mid to lower lung zones, now obscuring the heart border. Electronically signed by: Nemo Quiroz Date:    02/17/2022 Time:    12:29    US Lower Extremity Veins Bilateral    Result Date: 2/18/2022  EXAMINATION: US LOWER EXTREMITY VEINS BILATERAL CLINICAL HISTORY: pain/sweling; TECHNIQUE: Duplex and color flow Doppler and dynamic compression was performed of the bilateral lower extremity veins was performed. COMPARISON: Bilateral lower extremity venous ultrasound 12/27/2021. FINDINGS: Right thigh veins: The common femoral, femoral, popliteal, upper greater saphenous, and deep femoral veins are patent and free of thrombus. The veins are normally compressible and have normal phasic flow and augmentation response. Right calf veins: The visualized calf veins are patent. Left thigh veins: The common femoral, femoral, popliteal, upper greater saphenous, and deep femoral veins are patent and free of thrombus. The veins are normally compressible and have normal phasic flow and augmentation response. Left calf veins: The visualized calf veins are patent. Miscellaneous: None     No evidence of deep venous thrombosis in either lower extremity. Electronically signed by resident: Chayo Parikh MD Date:    02/18/2022 Time:    15:42 Electronically signed by: Jaylen Ariza MD Date:    02/18/2022 Time:    15:46    Echo Saline Bubble? No    Result Date: 2/18/2022  · Technically challenging study. Echo contrast used. The cardiac valves, atria, and right ventricle are not  well seen. · The left ventricle is normal in size with concentric remodeling and normal systolic function. · The estimated ejection fraction is 65%. · Normal central venous pressure (3 mmHg).      ED US Echo    Result Date: 2/17/2022  Exam : Deepti Forrest POCUS_Cardiac: Exam Information: Exam category:  Diagnostic Indication(s) for Exam: Dyspnea Views Obtained: Parasternal long-axis, Subxiphoid, IVC view Findings: Pericardial Effusion:  Absent Left Ventricle Ejection Fraction:  Normal (> 55% EF) Gross [place] (RV:LV):  Normal IVC diameter:  <2 cm IVC collapsibility:  High collapsibility (>50%) Interpretation: Other:  thick pericardial fat pad, no pericardial effusion, normal LVEF, tachycardia, norml RV:LV ratio Electronically signed by Deepti Forrest on Thursday, February 17, 2022 at 10:16 PM    ED US Lung    Result Date: 2/17/2022  Exam : Deepti Forrest POCUS_Cardiac: Exam Information: Exam category:  Diagnostic Indication(s) for Exam: Dyspnea Views Obtained: Parasternal long-axis, Subxiphoid, IVC view Findings: Pericardial Effusion:  Absent Left Ventricle Ejection Fraction:  Normal (> 55% EF) Gross [place] (RV:LV):  Normal IVC diameter:  <2 cm IVC collapsibility:  High collapsibility (>50%) Interpretation: Other:  thick pericardial fat pad, no pericardial effusion, normal LVEF, tachycardia, norml RV:LV ratio POCUS_Thoracic: Exam Information: Exam category:  Diagnostic Consent obtained?:  Yes Indication(s) for Exam: Dyspnea Views Obtained: Right anterior / superior thorax:  Obtained Right lateral / inferior thorax:  Obtained Left anterior / superior thorax:  Obtained Left lateral / inferior thorax:  Obtained Interpretation: Other:  Right ant lung: normal pleural sliding and A lines, no right pleural effusion. Left L1: lung zone with lung consolidation, shred sign; L4: B lines, no left pleural effusion, Electronically signed by Deepti Forrest on Thursday, February 17, 2022 at 10:20 PM

## 2022-02-22 NOTE — DISCHARGE SUMMARY
Death Note  Critical Care Medicine      Admit Date: 2022    Date of Death: 2022    Time of Death: 14:26    Attending Physician: Epifanio Meier MD    Principal Diagnoses: Acute on chronic respiratory failure with hypoxemia    Preliminary Cause of Death: Acute on chronic respiratory failure with hypoxemia    Secondary Diagnoses:   Active Hospital Problems    Diagnosis  POA    *Acute on chronic respiratory failure with hypoxemia [J96.21]  Yes    Hospital-acquired pneumonia [J18.9, Y95]  Yes    Severe sepsis [A41.9, R65.20]  Yes    Goals of care, counseling/discussion [Z71.89]  Not Applicable    Dependence on supplemental oxygen [Z99.81]  Not Applicable    Secondary malignant neoplasm of brain [C79.31]  Yes     2020 MRI brain : 1.1 cm enhancing mass in the right cerebellum    6/3/2020 SRS to right cerebellar metastasis         Adenocarcinoma of lung, stage 4, right [C34.91]  Yes     Adenocarcinoma of lung with metastasis to brain - Stage IV.  Initially presented with pleural effusion on right side s/p therapeutic thoracentesis 2020. 21 lung biopsy, final pathology adenocarcinoma. PDL1 expressed in 100% of tumor cells from biopsy.  No actionable mutations identified.  Carboplatin AUC4 x1 dose 20 and palliative radiation (2020 - 2020; 20 Gy to right lung).  Also received SRS to right cerebellar metastasis 6/3/2020.  21 started pembrolizumab.        Resolved Hospital Problems   No resolved problems to display.        Discharged Condition:     HPI:  Janeth Boyce 61 y.o. female with history of Stage IV Adenocarcinoma of R lung (diagnosed in ) with mets to the brain, currently on Keytruda, Former smoker, COPD, HTN, Anxiety, and Hep C, presented to the ED c/o of worsening SOB and productive cough. Pt reports last dose of chemo was 2/15, she states she received an increased dose this cycle, and endorses symptoms began shortly after. She reports associated chest  soreness from coughing and dizziness. Denies fever, LE pain, and palpitations. Pt's daughter is at bedside, she reports prior to this week, pt was able to tolerate some ambulation without worsening SOB, but since onset is unable to tolerate. Pt is currently on 6L at home O2. Her home inhalers are Spiriva, Symbicort, PRN Albuterol, and PRN duonebs. Pt with recent admission in Dec 2021 2/2 to chemotherapy induced pneumonitis and again in Jan 2021 2/2 COVID-19 (DC 1/19). She was given remdesivir and dexamethasone therapy but did not require intubation. Pt scheduled to complete home Prednisone for Pneumonitis on 2/14. In the ED she is tachycardic with increased RR, currently on BiPAP with IPAP 15, EPAP 8 and FiO2 100% sating 92%. ED workup significant for WBC of 21.1, Lactate of 3.4, BNP of 102, Troponin elevated from baseline at 0.044, ABG 7.364/45.2/21/25.8/0. CXR showed increased consolidative opacities in the left mid to lower lung zones. She was given x2 Duonebs,125mg Methylprednisolone, and started on Vancomycin and Zosyn.         Hospital/ICU Course:  Mrs. Boyce was admitted to the MICU for acute on chronic hypoxemic respiratory failure. She was started on lasix. Hem/Onc started her on methylprednisolone (1mg/kg) for a suspected pneumonitis.   2/19:  Patient unable to wean from comfort flow 60L95% along with a non-rebreather mask.  Had further goals of care discussions with the patient--that she has now a DNR/DNI but not comfort care.2/21. Placed on Morphine and Ativan to assist with tachypnea and air hunger. BiPAP continues. Family informed that the patient is dying and will likely not survive for more than a few hours to days with current therapy. Family amenable to transitioning to full comfort measures on 2/22. Bipap discontinued.     Measures to ensure the comfort of the patient including, but not limited to, morphine as needed for pain and air hunger as well as benzodiazepines as needed for agitation. The  patient was subsequently declared dead.      Fe Mccullough PA-C  Critical Care Medicine  2/22/2022   2:48 PM

## 2022-02-22 NOTE — NURSING
Morning assessment completed with family at bedside. Patient appeared to have labored breathing with bipap 100% 12/16. Medications were adjusted at time to help with respiratory. Side rails up and bed in low position. Will continue to monitor at this time.

## 2022-02-22 NOTE — SUBJECTIVE & OBJECTIVE
Interval History/Significant Events: Transitioning to full comfort measures. Bipap discontinued. On morphine and ativan infusion.     Review of Systems   Unable to perform ROS: Mental status change   Objective:     Vital Signs (Most Recent):  Temp: 100.3 °F (37.9 °C) (02/22/22 0701)  Pulse: (!) 124 (02/22/22 1248)  Resp: (!) 29 (02/22/22 0930)  BP: 109/70 (02/22/22 1000)  SpO2: (!) 81 % (02/22/22 1248)   Vital Signs (24h Range):  Temp:  [68 °F (20 °C)-100.3 °F (37.9 °C)] 100.3 °F (37.9 °C)  Pulse:  [] 124  Resp:  [20-60] 29  SpO2:  [80 %-96 %] 81 %  BP: ()/(57-83) 109/70   Weight: 83.5 kg (184 lb 1.4 oz)  Body mass index is 29.71 kg/m².      Intake/Output Summary (Last 24 hours) at 2/22/2022 1419  Last data filed at 2/22/2022 1300  Gross per 24 hour   Intake 1671.01 ml   Output 150 ml   Net 1521.01 ml       Physical Exam  Vitals and nursing note reviewed.   Constitutional:       General: She is in acute distress.      Appearance: She is well-developed. She is ill-appearing.   HENT:      Head: Normocephalic.      Mouth/Throat:      Mouth: Mucous membranes are dry.   Eyes:      Pupils: Pupils are equal, round, and reactive to light.   Cardiovascular:      Rate and Rhythm: Regular rhythm. Tachycardia present.      Pulses: Normal pulses.   Pulmonary:      Effort: Pulmonary effort is normal. Tachypnea present.   Abdominal:      General: Bowel sounds are normal. There is no distension.      Palpations: Abdomen is soft.      Tenderness: There is no abdominal tenderness.   Musculoskeletal:         General: No swelling.   Skin:     General: Skin is warm and dry.   Neurological:      Mental Status: She is lethargic.      GCS: GCS eye subscore is 3. GCS verbal subscore is 1. GCS motor subscore is 4.   Psychiatric:         Mood and Affect: Mood normal.       Vents:  Oxygen Concentration (%): 100 (02/22/22 1248)  Lines/Drains/Airways       Drain  Duration             Female External Urinary Catheter 02/17/22 1750 4  days              Peripheral Intravenous Line  Duration                  Peripheral IV - Single Lumen 02/17/22 1154 20 G Right Wrist 5 days         Peripheral IV - Single Lumen 02/17/22 1216 20 G Left Hand 5 days                  Significant Labs:    CBC/Anemia Profile:  Recent Labs   Lab 02/21/22  0434 02/22/22  0413   WBC 20.62* 22.32*   HGB 10.2* 10.5*   HCT 33.9* 35.8*    347   MCV 84 87   RDW 16.9* 17.4*        Chemistries:  Recent Labs   Lab 02/21/22  0434 02/22/22  0413    152*   K 3.6 4.3    110   CO2 26 28   BUN 38* 53*   CREATININE 0.9 1.5*   CALCIUM 9.3 9.5   ALBUMIN 2.2* 2.3*   PROT 6.4 6.7   BILITOT 0.5 0.6   ALKPHOS 83 89   ALT 13 15   AST 17 23   MG 2.3 2.8*   PHOS 2.6* 4.4       All pertinent labs within the past 24 hours have been reviewed.    Significant Imaging:  I have reviewed all pertinent imaging results/findings within the past 24 hours.

## 2022-02-22 NOTE — SIGNIFICANT EVENT
Spoke with daughter, Kell, at the bedside. Patient currently on bipap 100% with RR 31, HR 110s, and oxygen saturation in low 80s. Patient is unresponsives but appears to be in respiratory distress. Currently on morphine, ativan and precedex infusion. Discussed with daughter that patient is showing signs that she is actively dying. Recommended we up-titrate morphine and ativan to get her more comfortable then remove the NIV. Daughter reports other family members plan to come to bedside this morning. She is appropriately upset but states understanding. Condolences were offered.     Plan:  --change to comfort care alarms  --discontinue precedex  --increased frequency of morphine boluses  --up titrate infusions for comfort  --plans to remove bipap when family arrives.     Plan of care discussed with bedside nurse.     Critical care time: 35 minutes    Fe Mccullough PA-C  Critical Care Medicine  2/22/2022   9:02 AM

## 2022-02-22 NOTE — ASSESSMENT & PLAN NOTE
Advance Care Planning     Date: 02/19/2022    Code Status  In light of the patients advanced and life limiting illness,I engaged the the patient in a conversation about the patient's preferences for care at the very end of life. The patient wishes to have a natural, peaceful death.  Along those lines, the patient does not wish to have CPR or other invasive treatments performed when her heart and/or breathing stops. I communicated to the patient that a DNR order would be placed in her medical record to reflect this preference.  I spent a total of 15 minutes engaging the patient in this advance care planning discussion.     Her nurse was present for the conversation and witnessed that the patient wished to go with God in the event that she cannot breathe on her own or loses her pulse.  She still would like to continue treatment.    On 2/22, [place] [facility] daughter, Kell. Plans to transition to full comfort measures. Morphine and ativan infusions started. Bipap discontinued.

## 2022-02-22 NOTE — ASSESSMENT & PLAN NOTE
5/5/21 lung biopsy, final pathology adenocarcinoma.  [facility]   Oncology following

## 2022-02-22 NOTE — PROGRESS NOTES
[facility] - Cardiac Intensive Care  Hematology/Oncology  Progress Note    Patient Name: Janeth Boyce  Admission Date: 2/17/2022  Hospital Length of Stay: 5 days  Code Status: DNR     Subjective:     HPI:  Patient is a 60YO year old woman with Stage IV NSCLC C24D4 Keytruda presenting to the ICU with hypoxemic respiratory failure, oncology on consult. Pt notes two days of dyspnea since her last dose of Keytruda. No fevers, chills or chest pain. She thinks this dose of chemotherapy is too strong. Patient would want intubation short term if it could make it better. She has two adult children who would make decisions for her if she cannot. CXR in ED shows worsening b/l opacities, no desirable effusion or pneumonia. Pt started on vanc/zosyn, admitted to ICU on 60L BiPap at 100%.       Interval History: still on bipap but oxygen saturation is worse, now in the low 80s.  Patient is sedated and unable to answer questions.  Family is at bedside.    Oncology Treatment Plan:   OP PEMBROLIZUMAB 200MG Q3W (C22-24 Q6W)    Medications:  Continuous Infusions:   LORazepam (ATIVAN) infusion (titrating) 2 mg/hr (02/22/22 1137)    morphine 3 mg/hr (02/22/22 1138)     Scheduled Meds:  PRN Meds:lorazepam, mophine, sodium chloride 0.9%     Review of Systems   Unable to perform ROS: Acuity of condition   Objective:     Vital Signs (Most Recent):  Temp: 100.3 °F (37.9 °C) (02/22/22 0701)  Pulse: (!) 118 (02/22/22 1045)  Resp: (!) 29 (02/22/22 0930)  BP: 109/70 (02/22/22 1000)  SpO2: (!) 84 % (02/22/22 1045)   Vital Signs (24h Range):  Temp:  [68 °F (20 °C)-100.3 °F (37.9 °C)] 100.3 °F (37.9 °C)  Pulse:  [] 118  Resp:  [20-84] 29  SpO2:  [80 %-96 %] 84 %  BP: ()/(57-83) 109/70     Weight: 83.5 kg (184 lb 1.4 oz)  Body mass index is 29.71 kg/m².  Body surface area is 1.97 meters squared.      Intake/Output Summary (Last 24 hours) at 2/22/2022 1222  Last data filed at 2/22/2022 1138  Gross per 24 hour   Intake 1666.18 ml    Output 150 ml   Net 1516.18 ml       Physical Exam  Vitals and nursing note reviewed.   Constitutional:       General: She is in acute distress.      Appearance: She is well-developed. She is ill-appearing.   HENT:      Head: Normocephalic.      Mouth/Throat:      Mouth: Mucous membranes are dry.   Eyes:      Pupils: Pupils are equal, round, and reactive to light.   Cardiovascular:      Rate and Rhythm: Regular rhythm. Tachycardia present.      Pulses: Normal pulses.   Pulmonary:      Effort: Tachypnea and respiratory distress present.   Abdominal:      General: Bowel sounds are normal. There is no distension.      Palpations: Abdomen is soft.      Tenderness: There is no abdominal tenderness.   Musculoskeletal:         General: No swelling.   Skin:     General: Skin is warm and dry.   Neurological:      Mental Status: She is lethargic.   Psychiatric:         Mood and Affect: Mood normal.       Significant Labs:   CBC:   Recent Labs   Lab 02/21/22 0434 02/22/22 0413   WBC 20.62* 22.32*   HGB 10.2* 10.5*   HCT 33.9* 35.8*    347    and CMP:   Recent Labs   Lab 02/21/22 0434 02/22/22 0413    152*   K 3.6 4.3    110   CO2 26 28   * 123*   BUN 38* 53*   CREATININE 0.9 1.5*   CALCIUM 9.3 9.5   PROT 6.4 6.7   ALBUMIN 2.2* 2.3*   BILITOT 0.5 0.6   ALKPHOS 83 89   AST 17 23   ALT 13 15   ANIONGAP 14 14   EGFRNONAA >60.0 37.1*       Diagnostic Results:  I have reviewed all pertinent imaging results/findings within the past 24 hours.  CTA Chest Non-Coronary (PE Study)    Result Date: 2/18/2022  EXAMINATION: CTA CHEST NON CORONARY CLINICAL HISTORY: Pulmonary embolism (PE) suspected, high prob; TECHNIQUE: Low dose axial images, sagittal and coronal reformations were obtained from the thoracic inlet to the lung bases following the IV administration of 100 mL of Omnipaque 350.  MIP images were performed. COMPARISON: CTA chest 01/13/2022, 12/26/2021 FINDINGS: The vascular and soft tissues structures  at the base of the neck are unremarkable. There is a left-sided aortic arch with 2 branch vessels with common origin of the right brachiocephalic left common carotid arteries.  The aorta is normal in caliber, contour, and course without significant calcific atherosclerosis. There is no cardiac enlargement or pericardial fluid.  There is no calcific atherosclerosis of the coronary arteries. Enlarging mediastinal and perihilar lymph nodes, for example the prevascular lymph node measuring 1.2 cm (series 2, image 103) previously 0.7 cm. Stable dilatation of pulmonary trunk to 3.1 cm. There is redemonstration of the abrupt nonvisualization of the right upper lobar pulmonary artery.  Although this was also observed on the prior exam, this has progressed since more remote examinations as the vessel was at least partially opacifying recently as 09/26/2021.  It is unclear whether this is related to post treatment fibrosis rather than recurrent tumor. The left upper lobe bronchus is not completely obstructed, and there is atelectasis of the left upper lobe.  The etiology of this is unclear, although there may be a large mucous plug.  However, on the prior examination, note was made of enlarged adjacent lymph nodes.  Therefore, tumor invasion is not excluded. Bilateral ground-glass opacities are increased in radiodensity in extent. Again noted is volume loss of the right upper lobe, presumed related to post treatment changes. There is newly seen, small volume amount gas anterior to the right lung; the gas is possibly extrapleural. The esophagus is normal in caliber and contour. Moderate-sized hiatal hernia. The osseous structures demonstrate no destructive lesions.     1.  No acute pulmonary thromboembolism. 2.  Redemonstration of abrupt nonvisualization of the right pulmonary artery to the upper lobe, stable from prior examination, but progressed since 09/26/2021 (at which time it was still partially opacified).  Unclear  whether related to post treatment radiation fibrosis versus tumor invasion. 3.  Left upper lobe collapse distal to left upper lobe bronchial obstruction; possibly a mucous plug, but cannot exclude tumor invasion from adjacent enlarged previously described lymph nodes. 4.  New right-sided extrapulmonary gas, possibly extrapleural.  Consider upright versus left lateral decubitus expiratory Xray for confirmation. 5.  Worsened bilateral ground-glass opacification bilaterally, which may be due to infection, inflammation, or edema.  Small left pleural effusion. 6.  Additional findings above. Electronically signed by resident: Grant Valenzuela Date:    02/18/2022 Time:    10:12 Electronically signed by: Nemo Quiroz Date:    02/18/2022 Time:    13:13    MRI Brain W WO Contrast    Result Date: 2/14/2022  EXAMINATION: MRI BRAIN W WO CONTRAST CLINICAL HISTORY: metastatic lung cancer, surveillance; Malignant neoplasm of unspecified part of right bronchus or lung TECHNIQUE: Sagittal and axial T1, axial T2, axial FLAIR, axial gradient, axial diffusion imaging of the whole brain pre-contrast with postcontrast axial T1 and axial spoiled gradient imaging reformatted in the coronal and sagittal planes.. 8 ml of Gadavist injected intravenously. COMPARISON: 09/22/2021 FINDINGS: Numerous scattered small to punctate size foci of T2 FLAIR signal hyperintensity throughout the brain parenchyma primarily within the supratentorial white matter again seen.  There is no corresponding diffusion signal or enhancement.  This is nonspecific and may represent sequela of chronic microvascular ischemic change.  There is no midline shift or significant mass effect.  Continued prominent anterior left paramedian falcine calcification.  There is no new abnormal parenchymal susceptibility to suggest parenchymal hemorrhage, there is stable punctate susceptibility within the right cerebellum at site of previous lesion suggestive for treated metastases with  calcification or remote hemorrhage..  Major intracranial T2 flow voids are present.  Question partially empty sella.     No significant change from prior specifically without evidence for new parenchymal signal abnormality or intracranial enhancing lesion to suggest new or worsening intracranial metastatic disease. Scattered small to punctate foci of T2 FLAIR signal hyperintensity supratentorial white matter which are nonspecific and may represent post treatment change or chronic ischemic change. Clinical correlation and continued follow-up advised. Electronically signed by: Luisito Donohue DO Date:    02/14/2022 Time:    15:13    X-Ray Chest AP Portable    Result Date: 2/17/2022  EXAMINATION: XR CHEST AP PORTABLE CLINICAL HISTORY: dyspnea; TECHNIQUE: Single frontal view of the chest was performed. COMPARISON: 01/13/2022 FINDINGS: Stable right hemithoracic volume loss associated with dense opacification of the medial aspect of the right medial upper lung zone, better seen on CT 01/13/2022. Interval increase in left mid to lung zones patchy consolidations.  Right mid to lower lung zones opacities are stable. The mediastinal contents again noted to be shifted to the right. No other significant change.     Increased consolidative opacities in the left mid to lower lung zones, now obscuring the heart border. Electronically signed by: Nemo Quiroz Date:    02/17/2022 Time:    12:29    US Lower Extremity Veins Bilateral    Result Date: 2/18/2022  EXAMINATION: US LOWER EXTREMITY VEINS BILATERAL CLINICAL HISTORY: pain/sweling; TECHNIQUE: Duplex and color flow Doppler and dynamic compression was performed of the bilateral lower extremity veins was performed. COMPARISON: Bilateral lower extremity venous ultrasound 12/27/2021. FINDINGS: Right thigh veins: The common femoral, femoral, popliteal, upper greater saphenous, and deep femoral veins are patent and free of thrombus. The veins are normally compressible and have normal  phasic flow and augmentation response. Right calf veins: The visualized calf veins are patent. Left thigh veins: The common femoral, femoral, popliteal, upper greater saphenous, and deep femoral veins are patent and free of thrombus. The veins are normally compressible and have normal phasic flow and augmentation response. Left calf veins: The visualized calf veins are patent. Miscellaneous: None     No evidence of deep venous thrombosis in either lower extremity. Electronically signed by resident: Chayo Parikh MD Date:    02/18/2022 Time:    15:42 Electronically signed by: Jaylen Ariza MD Date:    02/18/2022 Time:    15:46    Echo Saline Bubble? No    Result Date: 2/18/2022  · Technically challenging study. Echo contrast used. The cardiac valves, atria, and right ventricle are not well seen. · The left ventricle is normal in size with concentric remodeling and normal systolic function. · The estimated ejection fraction is 65%. · Normal central venous pressure (3 mmHg).      ED US Echo    Result Date: 2/17/2022  Exam : Deepti Forrest POCUS_Cardiac: Exam Information: Exam category:  Diagnostic Indication(s) for Exam: Dyspnea Views Obtained: Parasternal long-axis, Subxiphoid, IVC view Findings: Pericardial Effusion:  Absent Left Ventricle Ejection Fraction:  Normal (> 55% EF) Gross Carlton (RV:LV):  Normal IVC diameter:  <2 cm IVC collapsibility:  High collapsibility (>50%) Interpretation: Other:  thick pericardial fat pad, no pericardial effusion, normal LVEF, tachycardia, norml RV:LV ratio Electronically signed by Deepti Forrest on Thursday, February 17, 2022 at 10:16 PM    ED US Lung    Result Date: 2/17/2022  Exam : Deepti Forrest POCUS_Cardiac: Exam Information: Exam category:  Diagnostic Indication(s) for Exam: Dyspnea Views Obtained: Parasternal long-axis, Subxiphoid, IVC view Findings: Pericardial Effusion:  Absent Left Ventricle Ejection Fraction:  Normal (> 55% EF) Gross Carlton  (RV:LV):  Normal IVC diameter:  <2 cm IVC collapsibility:  High collapsibility (>50%) Interpretation: Other:  thick pericardial fat pad, no pericardial effusion, normal LVEF, tachycardia, norml RV:LV ratio POCUS_Thoracic: Exam Information: Exam category:  Diagnostic Consent obtained?:  Yes Indication(s) for Exam: Dyspnea Views Obtained: Right anterior / superior thorax:  Obtained Right lateral / inferior thorax:  Obtained Left anterior / superior thorax:  Obtained Left lateral / inferior thorax:  Obtained Interpretation: Other:  Right ant lung: normal pleural sliding and A lines, no right pleural effusion. Left L1: lung zone with lung consolidation, shred sign; L4: B lines, no left pleural effusion, Electronically signed by Deepti Forrest on Thursday, February 17, 2022 at 10:20 PM       Assessment/Plan:     Adenocarcinoma of lung, stage 4, right  Clinical condition has worsened.  Reviewed imaging with family at bedside and explained my concern for immunotherapy related pneumonitis.  Her recent COVID19 infection in January 2022 may have made her more susceptible to this adverse outcome.  Further explained that it would take a long time for her to recover and unfortunately she is running out of energy to keep up with the required work of breathing.  Furthermore, I explained that if she were to be intubated, I do not think she would be able to get off of the ventilator.  -daughter at bedside understands and is agreeable with comfort measures.  They are waiting for other family members to arrive before saying goodbye.  -please notify me through epic chat when other family members arrive.             Maik Peterson MD  Hematology/Oncology  [facility] - Cardiac Intensive Care

## 2022-02-22 NOTE — ASSESSMENT & PLAN NOTE
This patient does have evidence of infective focus  My overall impression is sepsis. Vital signs were reviewed and noted in progress note.  Antibiotics given-   Antibiotics (From admission, onward)            None        Cultures were taken-   Microbiology Results (last 7 days)     Procedure Component Value Units Date/Time    Blood culture #1 **CANNOT BE ORDERED STAT** [[id number]] Collected: 02/17/22 1157    Order Status: Completed Specimen: Blood from Peripheral, Hand, Right Updated: 02/22/22 1412     Blood Culture, Routine No growth after 5 days.    Blood culture #2 **CANNOT BE ORDERED STAT** [[id number]] Collected: 02/17/22 1221    Order Status: Completed Specimen: Blood from Peripheral, Hand, Left Updated: 02/22/22 1412     Blood Culture, Routine No growth after 5 days.        Latest lactate reviewed, they are-  No results for input(s): LACTATE in the last 72 hours.    Organ dysfunction indicated by Acute respiratory failure  Source- Hospital Acquired Pneumonia    Transitioning to comfort measures only.

## 2022-02-22 NOTE — ASSESSMENT & PLAN NOTE
Differential diagnosis includes chemotherapy-induced pneumonitis, pulmonary embolism, pneumonia, COPD exacerbation  · I feel this is less likely COPD exacerbation given limited response to topical therapy  · She has been started on methylprednisolone per Oncology due to suspected chemotherapy-induced pneumonitis  · On empiric antibiotic therapy with vancomycin and piperacillin/tazobactam  · Also on atovaquone for PJP prophylaxis  · CT-A chest pending--two unstable to undergo scanning at this time  · Bilateral lower extremity ultrasonography without evidence for DVT  · Currently on comfort flow 60L95% in addition to a non-rebreather mask  · No longer an intubation watch--she is DNR DNI code status  · Transitioning to comfort measures. All therapies discontinued

## 2022-02-23 NOTE — PLAN OF CARE
[facility] - Cardiac Intensive Care  Discharge Final Note    Primary Care Provider: Freeman Caballero MD    Expected Discharge Date: 2022     Patient  2022 at 1426 hours per MD.    Final Discharge Note (most recent)     Final Note - 22 0802        Final Note    Assessment Type Final Discharge Note     Anticipated Discharge Disposition         Post-Acute Status    Discharge Delays None known at this time                 Important Message from Medicare              Batool Jacobson RN     [phone]

## 2022-03-17 ENCOUNTER — DOCUMENTATION ONLY (OUTPATIENT)
Dept: HEMATOLOGY/ONCOLOGY | Facility: CLINIC | Age: 62
End: 2022-03-17
Payer: MEDICAID

## 2022-03-17 NOTE — PROGRESS NOTES
"Received message via Epic this afternoon from the phone staff Lindsay Lawrence:     "Patient daughter called because she says that there are several oxygen tanks and other oxygen supplies that the patient had.  Patient's daughter would like to know what steps should can take to gt those item collected because the patient has passed.       Kell (daughter)  @  [phone]"    Called patient's daughter and explained that [facility] will need to be contacted to schedule the  of the equipment. Daughter stated that they are cleaning out patient's apartment and has to turn in the keys by 5 PM tomorrow. Informed her that I will reach out to staff at [facility] and ask staff to call her. She stated understanding and appreciation. Contacted Yi Mariajose via [employer] Teams and asked her to call me, which she did. Explained the situation to her and she said that she will speak with the  and call patient's daughter. She sent me a message that the  is scheduled for tomorrow 3/18/22.    No other needs are indicated at this time.                         "

## 2022-09-21 NOTE — PLAN OF CARE
Problem: Occupational Therapy Goal  Goal: Occupational Therapy Goal  Description: Goals to be met by: 2/8/22    Patient will increase functional independence with ADLs by performing:    Grooming while standing at sink with Sunflower.  Toileting from toilet with Sunflower for hygiene and clothing management.   Toilet transfer to toilet with Sunflower.    Outcome: Ongoing, Progressing      

## 2023-01-05 NOTE — TELEPHONE ENCOUNTER
----- Message from Thomas Devries sent at 8/3/2020  2:50 PM CDT -----  Contact: Home Health  Patient Advice/Staff Message     Caller name/title: Ayesha (Home health nurse)    Provider: Kristen    Reason for call: Wants to speak with the doctor to discuss decreasing pt visits.    Do you feel you need to be seen today:: No        Communication Preference: [phone]    Additional Information:        Diagnosis/Prognosis/MOLST Discussed/Treatment Options

## 2023-02-05 NOTE — PLAN OF CARE
"F - Fransisca and Belief: Druze. Pt requested prayer support and a Bible.     I - Importance: Yes    C - Community: Pt was on her phone with various family members throughout 's visits.     A - Address in Care:  Requested to see pt by bedside nurse. Pt was anxious and living with uncertainty regarding possible diagnosis of "cancer." Offered pastoral support with reflective listening, compassionate presence, prayer upon request, and provision of Bible per request. Pt also made aware of 's presence as needed. Spiritual Care will continue to follow.   " 2 seconds or less

## 2023-10-27 NOTE — ASSESSMENT & PLAN NOTE
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 Remains on 6L NC, which is her home dose. Labs significant for WBC 12, downtrending from recent admission. CXR with progression of right lower lobe consolidation, but improvement in left lower lobe when compared to CXR from previous admission.     Plan:  - alfredo scheduled  - s/p 5 days of remdesivir and 4 days of dexamethasone for COVID infection  - continue continuous oxygen  - incentive spirometer provided  - mucinex for cough productive of sputum   Hemostasis: Electrofulguration

## 2023-11-02 NOTE — NURSING
Family is at bedside with patient at time. Patient is comfort care. Patient is resting in bed at time. Will continue to monitor.   PROGRESS NOTE    Subjective   Chief complaint: Norah Cleaning is a 81 y.o. female who is an acute skilled patient being seen and evaluated for weakness    HPI:  Patient working in therapy due to weakness and debility. Patient currently has unsteady gait, requiring 1 person assist with transfers. Patient utilizes w\c. For mobility.  Patient is stable with no SOB, Denies constitutional symptoms. No new nursing concerns.           Objective   Vital signs: 122/66, 98%    Physical Exam  Constitutional:       General: She is not in acute distress.  Eyes:      Extraocular Movements: Extraocular movements intact.   Cardiovascular:      Rate and Rhythm: Normal rate and regular rhythm.   Pulmonary:      Effort: Pulmonary effort is normal.      Breath sounds: Normal breath sounds.   Abdominal:      General: Bowel sounds are normal.      Palpations: Abdomen is soft.   Musculoskeletal:      Cervical back: Neck supple.      Right lower leg: No edema.      Left lower leg: No edema.   Neurological:      Mental Status: She is alert.   Psychiatric:         Mood and Affect: Mood normal.         Behavior: Behavior is cooperative.         Assessment/Plan   Problem List Items Addressed This Visit       Weakness - Primary     Continue therapy          HTN (hypertension)     Monitor BP  Labs   Continue current meds         Squamous cell carcinoma of right lung (CMS/HCC)       Follow-up with oncology           Medications, treatments, and labs reviewed  Continue medications and treatments as listed in PCC    Scribe Attestation  By signing my name below, I, Duane Lau   attest that this documentation has been prepared under the direction and in the presence of Shyla Kan MD.    Provider Attestation - Scribe documentation  All medical record entries made by the Scribe were at my direction and personally dictated by me. I have reviewed the chart and agree that the record accurately reflects my personal performance of the  history, physical exam, discussion and plan.  1. Weakness        2. Primary hypertension        3. Squamous cell carcinoma of right lung (CMS/HCC)